# Patient Record
Sex: FEMALE | Race: WHITE
[De-identification: names, ages, dates, MRNs, and addresses within clinical notes are randomized per-mention and may not be internally consistent; named-entity substitution may affect disease eponyms.]

---

## 2017-07-13 ENCOUNTER — HOSPITAL ENCOUNTER (EMERGENCY)
Dept: HOSPITAL 5 - ED | Age: 27
Discharge: HOME | End: 2017-07-13
Payer: SELF-PAY

## 2017-07-13 VITALS — SYSTOLIC BLOOD PRESSURE: 137 MMHG | DIASTOLIC BLOOD PRESSURE: 84 MMHG

## 2017-07-13 DIAGNOSIS — R07.9: Primary | ICD-10-CM

## 2017-07-13 DIAGNOSIS — E66.01: ICD-10-CM

## 2017-07-13 DIAGNOSIS — J45.909: ICD-10-CM

## 2017-07-13 LAB
ANION GAP SERPL CALC-SCNC: 17 MMOL/L
BASOPHILS NFR BLD AUTO: 0.5 % (ref 0–1.8)
BUN SERPL-MCNC: 9 MG/DL (ref 7–17)
BUN/CREAT SERPL: 18 %
CALCIUM SERPL-MCNC: 8.8 MG/DL (ref 8.4–10.2)
CHLORIDE SERPL-SCNC: 99.1 MMOL/L (ref 98–107)
CO2 SERPL-SCNC: 26 MMOL/L (ref 22–30)
EOSINOPHIL NFR BLD AUTO: 1.9 % (ref 0–4.3)
GLUCOSE SERPL-MCNC: 117 MG/DL (ref 65–100)
HCT VFR BLD CALC: 34.2 % (ref 30.3–42.9)
HGB BLD-MCNC: 10.7 GM/DL (ref 10.1–14.3)
MCH RBC QN AUTO: 23 PG (ref 28–32)
MCHC RBC AUTO-ENTMCNC: 31 % (ref 30–34)
MCV RBC AUTO: 73 FL (ref 79–97)
PLATELET # BLD: 308 K/MM3 (ref 140–440)
POTASSIUM SERPL-SCNC: 4 MMOL/L (ref 3.6–5)
RBC # BLD AUTO: 4.69 M/MM3 (ref 3.65–5.03)
SODIUM SERPL-SCNC: 138 MMOL/L (ref 137–145)
WBC # BLD AUTO: 9 K/MM3 (ref 4.5–11)

## 2017-07-13 PROCEDURE — 80048 BASIC METABOLIC PNL TOTAL CA: CPT

## 2017-07-13 PROCEDURE — 83880 ASSAY OF NATRIURETIC PEPTIDE: CPT

## 2017-07-13 PROCEDURE — 36415 COLL VENOUS BLD VENIPUNCTURE: CPT

## 2017-07-13 PROCEDURE — 94640 AIRWAY INHALATION TREATMENT: CPT

## 2017-07-13 PROCEDURE — 99284 EMERGENCY DEPT VISIT MOD MDM: CPT

## 2017-07-13 PROCEDURE — 84484 ASSAY OF TROPONIN QUANT: CPT

## 2017-07-13 PROCEDURE — 85025 COMPLETE CBC W/AUTO DIFF WBC: CPT

## 2017-07-13 PROCEDURE — 93010 ELECTROCARDIOGRAM REPORT: CPT

## 2017-07-13 PROCEDURE — 71010: CPT

## 2017-07-13 PROCEDURE — 93970 EXTREMITY STUDY: CPT

## 2017-07-13 PROCEDURE — 93005 ELECTROCARDIOGRAM TRACING: CPT

## 2017-07-13 NOTE — XRAY REPORT
AP CHEST:



HISTORY: Shortness of breath



Limited exam with poor penetration. AP view of the chest demonstrates a 

normal mediastinal and

cardiac contour with clear lungs and normal bony and soft tissue

structures.



IMPRESSION:

Unremarkable AP chest.

## 2017-07-13 NOTE — EMERGENCY DEPARTMENT REPORT
ED Chest Pain HPI





- General


Chief Complaint: Chest Pain


Stated Complaint: CHEST PAIN /ASTHMA


Time Seen by Provider: 07/13/17 11:58


Source: patient


Mode of arrival: Ambulatory


Limitations: No Limitations





- History of Present Illness


MD Complaint: chest pain


-: Gradual


Onset: during rest


Pain Location: right chest


Severity: mild


Severity scale (0 -10): 6


Quality: aching


Consistency: intermittent


Improves With: nothing, movement


Worsens With: movement


Context: other


re: dyspnea


Treatments Prior to Arrival: none


Aspirin use within the Past 7 Days: (0) No





- Related Data


 Previous Rx's











 Medication  Instructions  Recorded  Last Taken  Type


 


ALBUTEROL NEB's [Proventil 0.083% 2.5 mg PO Q6HR PRN #90 nebu 07/13/17 Unknown 

Rx





NEBS]    


 


Aspirin [Aspirin TAB] 325 mg PO ONCE #30 tablet 07/13/17 Unknown Rx


 


Hydrochlorothiazide [HCTZ] 25 mg PO QDAY #30 tablet 07/13/17 Unknown Rx











 Allergies











Allergy/AdvReac Type Severity Reaction Status Date / Time


 


No Known Allergies Allergy   Unverified 07/13/17 11:17














Heart Score





- HEART Score


History: Slightly suspicious


EKG: Non-specific


Age: < 45


Risk factors: 1-2 risk factors


Troponin: < normal limit


HEART Score: 2





- Critical Actions


Critical Actions: 0-3 pts:0.9-1.7%risk of adverse cardiac event.Candidate for 

discharge





ED Review of Systems


ROS: 


Stated complaint: CHEST PAIN /ASTHMA


Other details as noted in HPI





Constitutional: no symptoms reported


Respiratory: shortness of breath, wheezing


Cardiovascular: chest pain





ED Past Medical Hx





- Past Medical History


Previous Medical History?: Yes


Hx Asthma: Yes


Additional medical history: Morbid obesity





- Surgical History


Past Surgical History?: No





- Social History


Smoking Status: Never Smoker


Substance Use Type: Alcohol, Non Opiate Pain, Prescribed





- Medications


Home Medications: 


 Home Medications











 Medication  Instructions  Recorded  Confirmed  Last Taken  Type


 


ALBUTEROL NEB's [Proventil 0.083% 2.5 mg PO Q6HR PRN #90 nebu 07/13/17  Unknown 

Rx





NEBS]     


 


Aspirin [Aspirin TAB] 325 mg PO ONCE #30 tablet 07/13/17  Unknown Rx


 


Hydrochlorothiazide [HCTZ] 25 mg PO QDAY #30 tablet 07/13/17  Unknown Rx














ED Physical Exam





- General


Limitations: No Limitations


General appearance: alert





- Head


Head exam: Present: atraumatic





- Eye


Eye exam: Present: normal appearance





- ENT


ENT exam: Present: normal exam, normal orophraynx, mucous membranes dry, mucous 

membranes moist





- Neck


Neck exam: Present: normal inspection





- Respiratory


Respiratory exam: Present: normal lung sounds bilaterally





- Cardiovascular


Cardiovascular Exam: Present: regular rate, normal rhythm





- GI/Abdominal


GI/Abdominal exam: Present: soft, normal bowel sounds





- Back Exam


Back exam: Present: normal inspection





- Neurological Exam


Neurological exam: Present: alert, altered, oriented X3





- Psychiatric


Psychiatric exam: Present: normal affect, normal mood





- Skin


Skin exam: Present: warm, dry





ED Course


 Vital Signs











  07/13/17 07/13/17 07/13/17





  11:17 11:23 11:31


 


Temperature 98.5 F  


 


Pulse Rate 96 H 84 102 H


 


Respiratory 26 H 31 H 19





Rate   


 


Blood Pressure 194/110  151/98


 


Blood Pressure   





[Left]   


 


Blood Pressure   





[Right]   


 


O2 Sat by Pulse 94  96





Oximetry   














  07/13/17 07/13/17 07/13/17





  11:41 11:46 11:54


 


Temperature  98.4 F 


 


Pulse Rate  100 H 95 H


 


Respiratory 29 H 16 12





Rate   


 


Blood Pressure 149/89  


 


Blood Pressure  151/90 151/90





[Left]   


 


Blood Pressure   149/89





[Right]   


 


O2 Sat by Pulse 96 100 97





Oximetry   














  07/13/17 07/13/17 07/13/17





  11:55 12:08 14:16


 


Temperature  98.1 F 98.4 F


 


Pulse Rate  88 88


 


Respiratory 18 16 18





Rate   


 


Blood Pressure   


 


Blood Pressure   





[Left]   


 


Blood Pressure  138/74 139/87





[Right]   


 


O2 Sat by Pulse 96 99 97





Oximetry   














ED Medical Decision Making





- Lab Data


Result diagrams: 


 07/13/17 11:44





 07/13/17 11:44


Critical care attestation.: 


If time is entered above; I have spent that time in minutes in the direct care 

of this critically ill patient, excluding procedure time.








ED Disposition


Clinical Impression: 


 Chest pain not due to acute coronary syndrome, Asthma





Disposition: DC-01 TO HOME OR SELFCARE


Is pt being admited?: No


Does the pt Need Aspirin: No


Condition: Stable


Instructions:  Chest Pain (ED), Asthma (ED)


Prescriptions: 


ALBUTEROL NEB's [Proventil 0.083% NEBS] 2.5 mg PO Q6HR PRN #90 nebu


 PRN Reason: Wheezing


Aspirin [Aspirin TAB] 325 mg PO ONCE #30 tablet


Hydrochlorothiazide [HCTZ] 25 mg PO QDAY #30 tablet


Referrals: 


PRIMARY CARE,MD [Primary Care Provider] - 3-5 Days


Forms:  Work/School Release Form(ED)

## 2017-07-14 NOTE — VASCULAR LAB REPORT
LOWER EXTREMITY VENOUS DUPLEX:



REASON FOR EXAM: Shortness of breath/chest pain.



COMMENTS ON THE RIGHT:

All veins visualized are freely compressible without evidence of

internal echogenicity.  Flow is spontaneous and phasic throughout.



COMMENTS ON THE LEFT:

All veins visualized are freely compressible without evidence of

internal echogenicity.  Flow is spontaneous and phasic throughout.



IMPRESSION:

No evidence of acute or chronic deep venous thrombosis in either

lower extremity.

## 2017-10-09 ENCOUNTER — HOSPITAL ENCOUNTER (EMERGENCY)
Dept: HOSPITAL 5 - ED | Age: 27
Discharge: HOME | End: 2017-10-09
Payer: COMMERCIAL

## 2017-10-09 VITALS — SYSTOLIC BLOOD PRESSURE: 138 MMHG | DIASTOLIC BLOOD PRESSURE: 88 MMHG

## 2017-10-09 DIAGNOSIS — J45.901: Primary | ICD-10-CM

## 2017-10-09 LAB
ANION GAP SERPL CALC-SCNC: 19 MMOL/L
BASOPHILS NFR BLD AUTO: 0.2 % (ref 0–1.8)
BUN SERPL-MCNC: 11 MG/DL (ref 7–17)
BUN/CREAT SERPL: 22 %
CALCIUM SERPL-MCNC: 9.4 MG/DL (ref 8.4–10.2)
CHLORIDE SERPL-SCNC: 98.1 MMOL/L (ref 98–107)
CO2 SERPL-SCNC: 27 MMOL/L (ref 22–30)
EOSINOPHIL NFR BLD AUTO: 2.7 % (ref 0–4.3)
GLUCOSE SERPL-MCNC: 113 MG/DL (ref 65–100)
HCT VFR BLD CALC: 36.9 % (ref 30.3–42.9)
HGB BLD-MCNC: 11.6 GM/DL (ref 10.1–14.3)
MCH RBC QN AUTO: 22 PG (ref 28–32)
MCHC RBC AUTO-ENTMCNC: 31 % (ref 30–34)
MCV RBC AUTO: 70 FL (ref 79–97)
PLATELET # BLD: 287 K/MM3 (ref 140–440)
POTASSIUM SERPL-SCNC: 4.9 MMOL/L (ref 3.6–5)
RBC # BLD AUTO: 5.27 M/MM3 (ref 3.65–5.03)
SODIUM SERPL-SCNC: 139 MMOL/L (ref 137–145)
WBC # BLD AUTO: 9.5 K/MM3 (ref 4.5–11)

## 2017-10-09 PROCEDURE — 99284 EMERGENCY DEPT VISIT MOD MDM: CPT

## 2017-10-09 PROCEDURE — 84484 ASSAY OF TROPONIN QUANT: CPT

## 2017-10-09 PROCEDURE — 71020: CPT

## 2017-10-09 PROCEDURE — 94640 AIRWAY INHALATION TREATMENT: CPT

## 2017-10-09 PROCEDURE — 36415 COLL VENOUS BLD VENIPUNCTURE: CPT

## 2017-10-09 PROCEDURE — 84703 CHORIONIC GONADOTROPIN ASSAY: CPT

## 2017-10-09 PROCEDURE — 80048 BASIC METABOLIC PNL TOTAL CA: CPT

## 2017-10-09 PROCEDURE — 85025 COMPLETE CBC W/AUTO DIFF WBC: CPT

## 2017-10-09 NOTE — EMERGENCY DEPARTMENT REPORT
HPI





- General


Chief Complaint: Dyspnea/Respdistress


Time Seen by Provider: 10/09/17 10:14





- HPI


HPI: 


This is a 27-year-old -American female presents to the emergency 

department, being brought in by her significant other, with a complaint of a 2-

3 day history of "it's my asthma."  She feels that whenever there is a weather 

change that is what affects her asthma.  She complains of some wheezing, 

shortness of breath and a mixed dry and productive cough over the past few days

, over the weekend.  She tried her albuterol nebulized treatments did not feel 

like she was giving sufficient relief.  She denies any chest pain, fever, back 

pain, nausea, vomiting or diaphoresis.  She denies any tobacco use or illicit 

drug abuse.  No recent travel or sick contacts at home.  She does not have a 

primary care physician.








ED Past Medical Hx





- Past Medical History


Hx Asthma: Yes





- Surgical History


Past Surgical History?: No





- Social History


Smoking Status: Never Smoker


Substance Use Type: None





- Medications


Home Medications: 


 Home Medications











 Medication  Instructions  Recorded  Confirmed  Last Taken  Type


 


ALBUTEROL Inhaler [ProAir HFA 2 puff IH QID PRN #1 inhalation 10/09/17  Unknown 

Rx





Inhaler]     


 


ALBUTEROL NEB's [Proventil 0.083% 2.5 mg IH Q4H PRN #1 box 10/09/17  Unknown Rx





NEBS]     


 


predniSONE [Deltasone] 20 mg PO BID #10 tab 10/09/17  Unknown Rx














ED Review of Systems


ROS: 


Stated complaint: SOB


Other details as noted in HPI





Comment: All other systems reviewed and negative


Constitutional: denies: chills, fever


Eyes: denies: eye pain, eye discharge, vision change


ENT: denies: ear pain, throat pain


Respiratory: cough, shortness of breath, wheezing


Cardiovascular: denies: chest pain, palpitations


Gastrointestinal: denies: abdominal pain, nausea, diarrhea


Genitourinary: denies: urgency, dysuria, discharge


Musculoskeletal: denies: back pain, joint swelling, arthralgia


Skin: denies: rash, lesions


Neurological: denies: headache, weakness, paresthesias





Physical Exam





- Physical Exam


Vital Signs: 


 Vital Signs











  10/09/17





  09:47


 


Temperature 97.9 F


 


Pulse Rate 90


 


Respiratory 22





Rate 


 


Blood Pressure 144/109


 


O2 Sat by Pulse 94





Oximetry 











Physical Exam: 


GENERAL: The patient is well-developed well-nourished.


HENT: Normocephalic.  Atraumatic.    Patient has moist mucous membranes.


EYES: Extraocular motions are intact.  Pupils equal reactive to light 

bilaterally.


NECK: Supple.  Trachea is midline.


CHEST/LUNGS: Moderate wheezing throughout the chest.  No tachypnea or accessory 

muscle use.  No cough heard during examination.  There is no respiratory 

distress noted.


HEART/CARDIOVASCULAR: Regular.  There is no tachycardia.  There is no gallop 

rub or murmur.


ABDOMEN: Abdomen is soft, nontender.  Patient has normal bowel sounds.  There 

is no abdominal distention.  Morbidly obese habitus.


SKIN: Skin is warm and dry.


NEURO: The patient is awake, alert, and oriented.  The patient is cooperative.  

The patient has no focal neurologic deficits.  The patient has normal speech.


MUSCULOSKELETAL: There is no tenderness or deformity.  There is no limitation 

range of motion.  There is no evidence of acute injury.








ED Course


 Vital Signs











  10/09/17





  09:47


 


Temperature 97.9 F


 


Pulse Rate 90


 


Respiratory 22





Rate 


 


Blood Pressure 144/109


 


O2 Sat by Pulse 94





Oximetry 














ED Medical Decision Making





- Lab Data


Result diagrams: 


 10/09/17 09:56





 10/09/17 09:56





- Radiology Data


Radiology results: image reviewed


interpreted by me: 


Chest x-ray does not show any acute process.  There are no pleural effusions, 

obvious pneumonia and there is no pneumothorax.








- Medical Decision Making


27 year female presents with a few days of shortness breath, cough and wheezing 

that she believes is her asthma.  She does have moderate wheezing and 

bronchospasm when she first arrived.  She was given a few different breathing 

treatments, steroids and prior to discharge she appears much improved.  Chest x-

ray did not show any pneumonia, pleural effusions or any acute process.  Labs 

are unremarkable.  Vital signs stable throughout her ED course including being 

afebrile and no hypoxia.  She was discharged home with steroids, refill of her 

albuterol and referral to primary care.  She will return to the ER with any 

worsening of her symptoms or any acute distress.








- Differential Diagnosis


asthma, COPD, pneumonia, CHF


Critical Care Time: No


Critical care attestation.: 


If time is entered above; I have spent that time in minutes in the direct care 

of this critically ill patient, excluding procedure time.








ED Disposition


Clinical Impression: 


 Bronchospasm





Asthma exacerbation


Qualifiers:


 Asthma severity: unspecified severity Asthma persistence: unspecified 

Qualified Code(s): J45.901 - Unspecified asthma with (acute) exacerbation





Disposition: DC-01 TO HOME OR SELFCARE


Is pt being admited?: No


Condition: Stable


Instructions:  Asthma (ED), Bronchospasm (ED)


Additional Instructions: 


Please follow up with a primary care physician in the next few days.  Return to 

the emergency Department with any worsening of your symptoms or any acute 

distress.


Prescriptions: 


ALBUTEROL Inhaler [ProAir HFA Inhaler] 2 puff IH QID PRN #1 inhalation


 PRN Reason: Shortness Of Breath


ALBUTEROL NEB's [Proventil 0.083% NEBS] 2.5 mg IH Q4H PRN #1 box


 PRN Reason: Wheezing


predniSONE [Deltasone] 20 mg PO BID #10 tab


Referrals: 


PRIMARY CARE,MD [Primary Care Provider] - 3-5 Days


CLAUS STACY MD [Staff Physician] - 3-5 Days


[facility] [Outside] - 3-5 Days


[facility] [Outside] - 3-5 Days


Time of Disposition: 15:18

## 2017-12-09 ENCOUNTER — HOSPITAL ENCOUNTER (INPATIENT)
Dept: HOSPITAL 5 - ED | Age: 27
LOS: 5 days | Discharge: HOME | DRG: 871 | End: 2017-12-14
Attending: INTERNAL MEDICINE | Admitting: INTERNAL MEDICINE
Payer: SELF-PAY

## 2017-12-09 DIAGNOSIS — J69.0: ICD-10-CM

## 2017-12-09 DIAGNOSIS — J96.01: ICD-10-CM

## 2017-12-09 DIAGNOSIS — Z91.19: ICD-10-CM

## 2017-12-09 DIAGNOSIS — E66.2: ICD-10-CM

## 2017-12-09 DIAGNOSIS — J45.901: ICD-10-CM

## 2017-12-09 DIAGNOSIS — A41.9: Primary | ICD-10-CM

## 2017-12-09 LAB
%HYPO/RBC NFR BLD AUTO: (no result) %
ANION GAP SERPL CALC-SCNC: 19 MMOL/L
ANISOCYTOSIS BLD QL SMEAR: (no result)
BASE EXCESS STD BLDA CALC-SCNC: 3 MMOL/L
BILIRUB UR QL STRIP: (no result)
BLASTOCYTES % (MANUAL): 0 %
BLOOD UR QL VISUAL: (no result)
BUN SERPL-MCNC: 9 MG/DL (ref 7–17)
BUN/CREAT SERPL: 18 %
CALCIUM SERPL-MCNC: 8.4 MG/DL (ref 8.4–10.2)
CHLORIDE SERPL-SCNC: 98 MMOL/L (ref 98–107)
CO2 SERPL-SCNC: 26 MMOL/L (ref 22–30)
CO2 SERPL-SCNC: 30 MMOL/L
GLUCOSE SERPL-MCNC: 134 MG/DL (ref 65–100)
HCO3 BLDV-SCNC: 28.5 MMOL/L
HCT VFR BLD CALC: 36.1 % (ref 30.3–42.9)
HGB BLD-MCNC: 11.2 GM/DL (ref 10.1–14.3)
ISTAT PH: 7.37 (ref 7.35–7.45)
KETONES UR STRIP-MCNC: (no result) MG/DL
LEUKOCYTE ESTERASE UR QL STRIP: (no result)
MCH RBC QN AUTO: 22 PG (ref 28–32)
MCHC RBC AUTO-ENTMCNC: 31 % (ref 30–34)
MCV RBC AUTO: 71 FL (ref 79–97)
NITRITE UR QL STRIP: (no result)
OVALOCYTES BLD QL SMEAR: (no result)
PCO2 BLDA: 49.9 MM[HG] (ref 35–45)
PH UR STRIP: 6 [PH] (ref 5–7)
PLATELET # BLD: 246 K/MM3 (ref 140–440)
PO2 BLDCOA: 66 MM[HG] (ref 80–105)
POTASSIUM SERPL-SCNC: 4.5 MMOL/L (ref 3.6–5)
PROT UR STRIP-MCNC: (no result) MG/DL
RBC # BLD AUTO: 5.11 M/MM3 (ref 3.65–5.03)
RBC #/AREA URNS HPF: < 1 /HPF (ref 0–6)
SAO2 % BLDC: 92 %
SODIUM SERPL-SCNC: 138 MMOL/L (ref 137–145)
TOTAL CELLS COUNTED PERCENT: 7
UROBILINOGEN UR-MCNC: < 2 MG/DL (ref ?–2)
WBC # BLD AUTO: 10.3 K/MM3 (ref 4.5–11)
WBC #/AREA URNS HPF: < 1 /HPF (ref 0–6)

## 2017-12-09 PROCEDURE — 87040 BLOOD CULTURE FOR BACTERIA: CPT

## 2017-12-09 PROCEDURE — 81025 URINE PREGNANCY TEST: CPT

## 2017-12-09 PROCEDURE — 93010 ELECTROCARDIOGRAM REPORT: CPT

## 2017-12-09 PROCEDURE — 80048 BASIC METABOLIC PNL TOTAL CA: CPT

## 2017-12-09 PROCEDURE — 96367 TX/PROPH/DG ADDL SEQ IV INF: CPT

## 2017-12-09 PROCEDURE — 96365 THER/PROPH/DIAG IV INF INIT: CPT

## 2017-12-09 PROCEDURE — 80053 COMPREHEN METABOLIC PANEL: CPT

## 2017-12-09 PROCEDURE — 85007 BL SMEAR W/DIFF WBC COUNT: CPT

## 2017-12-09 PROCEDURE — 4A033R1 MEASUREMENT OF ARTERIAL SATURATION, PERIPHERAL, PERCUTANEOUS APPROACH: ICD-10-PCS | Performed by: INTERNAL MEDICINE

## 2017-12-09 PROCEDURE — 94760 N-INVAS EAR/PLS OXIMETRY 1: CPT

## 2017-12-09 PROCEDURE — 82803 BLOOD GASES ANY COMBINATION: CPT

## 2017-12-09 PROCEDURE — 36415 COLL VENOUS BLD VENIPUNCTURE: CPT

## 2017-12-09 PROCEDURE — 96375 TX/PRO/DX INJ NEW DRUG ADDON: CPT

## 2017-12-09 PROCEDURE — 71020: CPT

## 2017-12-09 PROCEDURE — 71010: CPT

## 2017-12-09 PROCEDURE — 85025 COMPLETE CBC W/AUTO DIFF WBC: CPT

## 2017-12-09 PROCEDURE — 81001 URINALYSIS AUTO W/SCOPE: CPT

## 2017-12-09 PROCEDURE — 83880 ASSAY OF NATRIURETIC PEPTIDE: CPT

## 2017-12-09 PROCEDURE — 93005 ELECTROCARDIOGRAM TRACING: CPT

## 2017-12-09 PROCEDURE — 94640 AIRWAY INHALATION TREATMENT: CPT

## 2017-12-09 RX ADMIN — CEFTRIAXONE SODIUM ONE MLS/10 MIN: 10 INJECTION, POWDER, FOR SOLUTION INTRAVENOUS at 17:52

## 2017-12-09 RX ADMIN — CEFTRIAXONE SODIUM ONE MLS/10 MIN: 10 INJECTION, POWDER, FOR SOLUTION INTRAVENOUS at 18:31

## 2017-12-09 RX ADMIN — IPRATROPIUM BROMIDE AND ALBUTEROL SULFATE SCH: .5; 3 SOLUTION RESPIRATORY (INHALATION) at 21:27

## 2017-12-09 RX ADMIN — HYDROCHLOROTHIAZIDE SCH MG: 25 TABLET ORAL at 23:22

## 2017-12-09 RX ADMIN — IPRATROPIUM BROMIDE AND ALBUTEROL SULFATE SCH AMPUL: .5; 3 SOLUTION RESPIRATORY (INHALATION) at 13:15

## 2017-12-09 RX ADMIN — AZITHROMYCIN SCH MLS/HR: 500 INJECTION, POWDER, LYOPHILIZED, FOR SOLUTION INTRAVENOUS at 23:56

## 2017-12-09 RX ADMIN — FAMOTIDINE SCH MG: 20 TABLET ORAL at 23:23

## 2017-12-09 NOTE — EVENT NOTE
Date: 12/09/17


See dictated H/p in reports


Acute resp failure


Asthma exacerbation


Morbid obesity

## 2017-12-09 NOTE — EMERGENCY DEPARTMENT REPORT
ED Shortness of Breath HPI





- General


Chief Complaint: Dyspnea/Respdistress


Stated Complaint: ASTHMA/CP/SOB


Time Seen by Provider: 12/09/17 14:56


Source: patient


Mode of arrival: Wheelchair


Limitations: No Limitations





- History of Present Illness


Initial Comments: 





27-year-old female with a past medical history of asthma and obesity presents 

to the hospital complaints of cough, fever, shortness of breath and wheezing 4 

days which acutely worsened just today.  No improvement with nebulized 

treatments.  Positive generalized body aches and chest pain associated with 

coughing that is moderate in intensity.  Symptoms with exertion with no 

alleviating factors





- Related Data


 Previous Rx's











 Medication  Instructions  Recorded  Last Taken  Type


 


ALBUTEROL NEB's [Proventil 0.083% 2.5 mg PO Q6HR PRN #90 nebu 07/13/17 Unknown 

Rx





NEBS]    


 


Aspirin [Aspirin TAB] 325 mg PO ONCE #30 tablet 07/13/17 Unknown Rx


 


Hydrochlorothiazide [HCTZ] 25 mg PO QDAY #30 tablet 07/13/17 Unknown Rx


 


ALBUTEROL Inhaler [ProAir HFA 2 puff IH QID PRN #1 inhalation 10/09/17 Unknown 

Rx





Inhaler]    


 


ALBUTEROL NEB's [Proventil 0.083% 2.5 mg IH Q4H PRN #1 box 10/09/17 Unknown Rx





NEBS]    


 


predniSONE [Deltasone] 20 mg PO BID #10 tab 10/09/17 Unknown Rx











 Allergies











Allergy/AdvReac Type Severity Reaction Status Date / Time


 


No Known Allergies Allergy   Unverified 07/13/17 11:17














ED Review of Systems


ROS: 


Stated complaint: ASTHMA/CP/SOB


Other details as noted in HPI





Comment: All other systems reviewed and negative


Other: 





Constitutional: As per HPI


Eyes: No eye pain visual changes or discharge


ENT: No ear pain or throat pain


Neck: Denies pain


Respiratory: As per HPI


Cardiovascular: Denies syncope


GI: Denies abdominal pain, nausea, vomiting, diarrhea


: Denies dysuria


Musculoskeletal: Denies back pain, joint swelling 


Skin: Denies rash, lesions, erythema


Neurologic: Denies headache, numbness, weakness


Psychiatric: Denies suicidal ideation, hallucinations








ED Past Medical Hx





- Past Medical History


Hx Asthma: Yes


Additional medical history: Morbid obesity





- Social History


Smoking Status: Never Smoker


Substance Use Type: None





- Medications


Home Medications: 


 Home Medications











 Medication  Instructions  Recorded  Confirmed  Last Taken  Type


 


ALBUTEROL NEB's [Proventil 0.083% 2.5 mg PO Q6HR PRN #90 nebu 07/13/17  Unknown 

Rx





NEBS]     


 


Aspirin [Aspirin TAB] 325 mg PO ONCE #30 tablet 07/13/17  Unknown Rx


 


Hydrochlorothiazide [HCTZ] 25 mg PO QDAY #30 tablet 07/13/17  Unknown Rx


 


ALBUTEROL Inhaler [ProAir HFA 2 puff IH QID PRN #1 inhalation 10/09/17  Unknown 

Rx





Inhaler]     


 


ALBUTEROL NEB's [Proventil 0.083% 2.5 mg IH Q4H PRN #1 box 10/09/17  Unknown Rx





NEBS]     


 


predniSONE [Deltasone] 20 mg PO BID #10 tab 10/09/17  Unknown Rx














ED Physical Exam





- General


Limitations: No Limitations





- Other


Other exam information: 





General: No limitations, patient is alert in no acute distress


Head exam: Atraumatic, normocephalic


Eyes exam: Normal appearance


ENT: Moist mucous membrane, normal oropharynx


Neck exam: Normal inspection, full range of motion


Respiratory exam: Bilateral wheezing, accessory muscle use, positive tachypnea


Cardiovascular: Tachycardic regular rhythm


Abdomen: Soft, nondistended, and  nontender, with normal bowel sounds, no 

rebound, or guarding


Extremity: Full range of motion normal inspection no deformity, and


Back: Normal Inspection, full range of motion, no tenderness


Neurologic: Alert, oriented x3, cranial nerves intact, no motor or sensory 

deficit


Psychiatric: normal affect, normal mood


Skin: Warm, dry, intact





ED Course


 Vital Signs











  12/09/17 12/09/17 12/09/17





  12:58 13:00 13:15


 


Temperature 97.8 F 97.8 F 


 


Pulse Rate  123 H 


 


Pulse Rate [   111 H





Anterior   





Bilateral   





Throughout]   


 


Respiratory  22 





Rate   


 


Respiratory   22





Rate [Anterior   





Bilateral   





Throughout]   


 


Blood Pressure  135/84 





[Right]   


 


O2 Sat by Pulse  82 L 





Oximetry   














  12/09/17 12/09/17 12/09/17





  13:25 13:40 14:05


 


Temperature   


 


Pulse Rate   


 


Pulse Rate [ 108 H 108 H 105 H





Anterior   





Bilateral   





Throughout]   


 


Respiratory   





Rate   


 


Respiratory 20 20 18





Rate [Anterior   





Bilateral   





Throughout]   


 


Blood Pressure   





[Right]   


 


O2 Sat by Pulse   





Oximetry   














  12/09/17 12/09/17





  16:20 16:35


 


Temperature  


 


Pulse Rate  


 


Pulse Rate [ 106 H 107 H





Anterior  





Bilateral  





Throughout]  


 


Respiratory  





Rate  


 


Respiratory 18 18





Rate [Anterior  





Bilateral  





Throughout]  


 


Blood Pressure  





[Right]  


 


O2 Sat by Pulse  





Oximetry  














- Reevaluation(s)


Reevaluation #1: 





12/09/17 


Patient received multiple nebs, albuterol, and Solu-Medrol in the ED without 

improvement.  IV antibiotics initiated for possible infiltrate





- ABG Interpretation


Ph: 7.36


PCO2: 49


PO2: 66


Bicarbonate: 28


Interpretation: other (hypoxia)


Additional Comments: 





ABG performed on 4 L nasal cannula





ED Medical Decision Making





- Lab Data


Result diagrams: 


 12/09/17 13:11





 12/09/17 13:11








 Lab Results











  12/09/17 12/09/17 12/09/17 Range/Units





  13:11 13:11 15:45 


 


WBC  10.3    (4.5-11.0)  K/mm3


 


RBC  5.11 H    (3.65-5.03)  M/mm3


 


Hgb  11.2    (10.1-14.3)  gm/dl


 


Hct  36.1    (30.3-42.9)  %


 


MCV  71 L    (79-97)  fl


 


MCH  22 L    (28-32)  pg


 


MCHC  31    (30-34)  %


 


RDW  19.3 H    (13.2-15.2)  %


 


Plt Count  246    (140-440)  K/mm3


 


Add Manual Diff  Complete    


 


Total Counted  100    


 


Seg Neuts % (Manual)  78.0 H    (40.0-70.0)  %


 


Band Neutrophils %  0    %


 


Lymphocytes % (Manual)  15.0    (13.4-35.0)  %


 


Reactive Lymphs % (Man)  0    %


 


Monocytes % (Manual)  7.0    (0.0-7.3)  %


 


Eosinophils % (Manual)  0    (0.0-4.3)  %


 


Basophils % (Manual)  0    (0.0-1.8)  %


 


Metamyelocytes %  0    %


 


Myelocytes %  0    %


 


Promyelocytes %  0    %


 


Blast Cells %  0    %


 


Nucleated RBC %  Not Reportable    


 


Seg Neutrophils # Man  8.0 H    (1.8-7.7)  K/mm3


 


Band Neutrophils #  0.0    K/mm3


 


Lymphocytes # (Manual)  1.5    (1.2-5.4)  K/mm3


 


Abs React Lymphs (Man)  0.0    K/mm3


 


Monocytes # (Manual)  0.7    (0.0-0.8)  K/mm3


 


Eosinophils # (Manual)  0.0    (0.0-0.4)  K/mm3


 


Basophils # (Manual)  0.0    (0.0-0.1)  K/mm3


 


Metamyelocytes #  0.0    K/mm3


 


Myelocytes #  0.0    K/mm3


 


Promyelocytes #  0.0    K/mm3


 


Blast Cells #  0.0    K/mm3


 


WBC Morphology  Not Reportable    


 


Hypersegmented Neuts  Not Reportable    


 


Hyposegmented Neuts  Not Reportable    


 


Hypogranular Neuts  Not Reportable    


 


Smudge Cells  Not Reportable    


 


Toxic Granulation  Not Reportable    


 


Toxic Vacuolation  Not Reportable    


 


Dohle Bodies  Not Reportable    


 


Pelger-Huet Anomaly  Not Reportable    


 


Ac Rods  Not Reportable    


 


Platelet Estimate  Consistent w auto    


 


Clumped Platelets  Not Reportable    


 


Plt Clumps, EDTA  Not Reportable    


 


Large Platelets  1+    


 


Giant Platelets  Not Reportable    


 


Platelet Satelliting  Not Reportable    


 


Plt Morphology Comment  Not Reportable    


 


RBC Morphology  Not Reportable    


 


Dimorphic RBCs  Not Reportable    


 


Polychromasia  Not Reportable    


 


Hypochromasia  2+    


 


Poikilocytosis  Not Reportable    


 


Anisocytosis  1+    


 


Microcytosis  Not Reportable    


 


Macrocytosis  Not Reportable    


 


Spherocytes  Not Reportable    


 


Pappenheimer Bodies  Not Reportable    


 


Sickle Cells  Not Reportable    


 


Target Cells  Not Reportable    


 


Tear Drop Cells  Not Reportable    


 


Ovalocytes  1+    


 


Helmet Cells  Not Reportable    


 


[place] Bodies  Not Reportable    


 


Cabot Rings  Not Reportable    


 


Apolinar Cells  Not Reportable    


 


Bite Cells  Not Reportable    


 


Crenated Cell  Not Reportable    


 


Elliptocytes  Not Reportable    


 


Acanthocytes (Spur)  Not Reportable    


 


Rouleaux  Not Reportable    


 


Hemoglobin C Crystals  Not Reportable    


 


Schistocytes  Not Reportable    


 


Malaria parasites  Not Reportable    


 


Sami Bodies  Not Reportable    


 


Hem Pathologist Commnt  No    


 


POC ABG pH     (7.35-7.45)  


 


POC ABG pCO2     (35-45)  


 


POC ABG pO2     ()  


 


POC ABG HCO3     


 


POC ABG Total CO2     


 


POC ABG O2 Sat     


 


POC ABG Base Excess     


 


FiO2     %


 


Sodium   138   (137-145)  mmol/L


 


Potassium   4.5   (3.6-5.0)  mmol/L


 


Chloride   98.0   ()  mmol/L


 


Carbon Dioxide   26   (22-30)  mmol/L


 


Anion Gap   19   mmol/L


 


BUN   9   (7-17)  mg/dL


 


Creatinine   0.5 L   (0.7-1.2)  mg/dL


 


Estimated GFR   > 60   ml/min


 


BUN/Creatinine Ratio   18   %


 


Glucose   134 H   ()  mg/dL


 


Calcium   8.4   (8.4-10.2)  mg/dL


 


NT-Pro-B Natriuret Pep    1693 H  (0-450)  pg/mL


 


Urine Color     (Yellow)  


 


Urine Turbidity     (Clear)  


 


Urine pH     (5.0-7.0)  


 


Ur Specific Gravity     (1.003-1.030)  


 


Urine Protein     (Negative)  mg/dL


 


Urine Glucose (UA)     (Negative)  mg/dL


 


Urine Ketones     (Negative)  mg/dL


 


Urine Blood     (Negative)  


 


Urine Nitrite     (Negative)  


 


Ur Reducing Substances     


 


Urine Bilirubin     (Negative)  


 


Urine Ictotest     


 


Urine Urobilinogen     (<2.0)  mg/dL


 


Ur Leukocyte Esterase     (Negative)  


 


Urine WBC (Auto)     (0.0-6.0)  /HPF


 


Urine RBC (Auto)     (0.0-6.0)  /HPF


 


U Epithel Cells (Auto)     (0-13.0)  /HPF


 


Urine HCG, Qual     (Negative)  














  12/09/17 12/09/17 Range/Units





  16:00 16:19 


 


WBC    (4.5-11.0)  K/mm3


 


RBC    (3.65-5.03)  M/mm3


 


Hgb    (10.1-14.3)  gm/dl


 


Hct    (30.3-42.9)  %


 


MCV    (79-97)  fl


 


MCH    (28-32)  pg


 


MCHC    (30-34)  %


 


RDW    (13.2-15.2)  %


 


Plt Count    (140-440)  K/mm3


 


Add Manual Diff    


 


Total Counted    


 


Seg Neuts % (Manual)    (40.0-70.0)  %


 


Band Neutrophils %    %


 


Lymphocytes % (Manual)    (13.4-35.0)  %


 


Reactive Lymphs % (Man)    %


 


Monocytes % (Manual)    (0.0-7.3)  %


 


Eosinophils % (Manual)    (0.0-4.3)  %


 


Basophils % (Manual)    (0.0-1.8)  %


 


Metamyelocytes %    %


 


Myelocytes %    %


 


Promyelocytes %    %


 


Blast Cells %    %


 


Nucleated RBC %    


 


Seg Neutrophils # Man    (1.8-7.7)  K/mm3


 


Band Neutrophils #    K/mm3


 


Lymphocytes # (Manual)    (1.2-5.4)  K/mm3


 


Abs React Lymphs (Man)    K/mm3


 


Monocytes # (Manual)    (0.0-0.8)  K/mm3


 


Eosinophils # (Manual)    (0.0-0.4)  K/mm3


 


Basophils # (Manual)    (0.0-0.1)  K/mm3


 


Metamyelocytes #    K/mm3


 


Myelocytes #    K/mm3


 


Promyelocytes #    K/mm3


 


Blast Cells #    K/mm3


 


WBC Morphology    


 


Hypersegmented Neuts    


 


Hyposegmented Neuts    


 


Hypogranular Neuts    


 


Smudge Cells    


 


Toxic Granulation    


 


Toxic Vacuolation    


 


Dohle Bodies    


 


Pelger-Huet Anomaly    


 


Ac Rods    


 


Platelet Estimate    


 


Clumped Platelets    


 


Plt Clumps, EDTA    


 


Large Platelets    


 


Giant Platelets    


 


Platelet Satelliting    


 


Plt Morphology Comment    


 


RBC Morphology    


 


Dimorphic RBCs    


 


Polychromasia    


 


Hypochromasia    


 


Poikilocytosis    


 


Anisocytosis    


 


Microcytosis    


 


Macrocytosis    


 


Spherocytes    


 


Pappenheimer Bodies    


 


Sickle Cells    


 


Target Cells    


 


Tear Drop Cells    


 


Ovalocytes    


 


Helmet Cells    


 


[place] Bodies    


 


Cabot Rings    


 


[place] Cells    


 


Bite Cells    


 


Crenated Cell    


 


Elliptocytes    


 


Acanthocytes (Spur)    


 


Rouleaux    


 


Hemoglobin C Crystals    


 


Schistocytes    


 


Malaria parasites    


 


Sami Bodies    


 


Hem Pathologist Commnt    


 


POC ABG pH   7.365  (7.35-7.45)  


 


POC ABG pCO2   49.9 H  (35-45)  


 


POC ABG pO2   66 L  ()  


 


POC ABG HCO3   28.5  


 


POC ABG Total CO2   30  


 


POC ABG O2 Sat   92  


 


POC ABG Base Excess   3  


 


FiO2   4  %


 


Sodium    (137-145)  mmol/L


 


Potassium    (3.6-5.0)  mmol/L


 


Chloride    ()  mmol/L


 


Carbon Dioxide    (22-30)  mmol/L


 


Anion Gap    mmol/L


 


BUN    (7-17)  mg/dL


 


Creatinine    (0.7-1.2)  mg/dL


 


Estimated GFR    ml/min


 


BUN/Creatinine Ratio    %


 


Glucose    ()  mg/dL


 


Calcium    (8.4-10.2)  mg/dL


 


NT-Pro-B Natriuret Pep    (0-450)  pg/mL


 


Urine Color  Straw   (Yellow)  


 


Urine Turbidity  Clear   (Clear)  


 


Urine pH  6.0   (5.0-7.0)  


 


Ur Specific Gravity  1.001 L   (1.003-1.030)  


 


Urine Protein  <15 mg/dl   (Negative)  mg/dL


 


Urine Glucose (UA)  Neg   (Negative)  mg/dL


 


Urine Ketones  Neg   (Negative)  mg/dL


 


Urine Blood  Sm   (Negative)  


 


Urine Nitrite  Neg   (Negative)  


 


Ur Reducing Substances  Not Reportable   


 


Urine Bilirubin  Neg   (Negative)  


 


Urine Ictotest  Not Reportable   


 


Urine Urobilinogen  < 2.0   (<2.0)  mg/dL


 


Ur Leukocyte Esterase  Neg   (Negative)  


 


Urine WBC (Auto)  < 1.0   (0.0-6.0)  /HPF


 


Urine RBC (Auto)  < 1.0   (0.0-6.0)  /HPF


 


U Epithel Cells (Auto)  < 1.0   (0-13.0)  /HPF


 


Urine HCG, Qual  Negative   (Negative)  














- EKG Data


-: EKG Interpreted by Me


EKG shows normal: sinus rhythm, axis (80), QRS complexes (75), ST-T waves (

inferior and flat lateral T waves)


Rate: tachycardia (105)





- EKG Data


When compared to previous EKG there are: no significant change (compared to 07/ 13/2017)





- Radiology Data


Radiology results: report reviewed (chest x-ray read by radiologist: Suspicious 

for right lung base atelectasis or infiltrate)





- Medical Decision Making





Patient received treatment in ED for acute asthma exacerbation with associated 

community-acquired pneumonia.  Patient is also hypoxic on room air in ABG was 

performed on 4 L of oxygen nasal





- Differential Diagnosis


pneumonia, bronchitis, PE, CHF, asthma


Critical Care Time: No


Critical care attestation.: 


If time is entered above; I have spent that time in minutes in the direct care 

of this critically ill patient, excluding procedure time.








ED Disposition


Clinical Impression: 


 Pneumonia, Acute asthma, Morbid obesity, Hypoxia





Disposition: DC-09 OP ADMIT IP TO THIS HOSP


Is pt being admited?: Yes


Condition: Stable


Time of Disposition: 16:20 (Dr Choudhary/hospitalist)

## 2017-12-09 NOTE — XRAY REPORT
FINAL REPORT



PROCEDURE:  XR CHEST ROUTINE 2V



TECHNIQUE:  PA and lateral chest radiographs were obtained. CPT

15161







HISTORY:  shortness of breath 



COMPARISON:  No prior studies are available for comparison.



FINDINGS:  

Study is limited by patient body habitus. The lateral view is

significantly limited. On the AP view there is asymmetric

opacification of the right lung base, which may be related to

atelectasis or infiltrate. Cannot exclude a right pleural

effusion. The upper lungs are well aerated. No pneumothorax is

seen. 



IMPRESSION:  

Study is very limited, however findings are suspicious for right

lung base atelectasis or infiltrate.

## 2017-12-10 LAB
ALBUMIN SERPL-MCNC: 3.8 G/DL (ref 3.9–5)
ALBUMIN/GLOB SERPL: 0.8 %
ALP SERPL-CCNC: 77 UNITS/L (ref 35–129)
ALT SERPL-CCNC: 40 UNITS/L (ref 7–56)
ANION GAP SERPL CALC-SCNC: 17 MMOL/L
BASOPHILS NFR BLD AUTO: 0.2 % (ref 0–1.8)
BILIRUB SERPL-MCNC: 0.4 MG/DL (ref 0.1–1.2)
BUN SERPL-MCNC: 8 MG/DL (ref 7–17)
BUN/CREAT SERPL: 20 %
CALCIUM SERPL-MCNC: 8.8 MG/DL (ref 8.4–10.2)
CHLORIDE SERPL-SCNC: 97.4 MMOL/L (ref 98–107)
CO2 SERPL-SCNC: 29 MMOL/L (ref 22–30)
EOSINOPHIL NFR BLD AUTO: 0 % (ref 0–4.3)
GLUCOSE SERPL-MCNC: 158 MG/DL (ref 65–100)
HCT VFR BLD CALC: 36 % (ref 30.3–42.9)
HGB BLD-MCNC: 11 GM/DL (ref 10.1–14.3)
MCH RBC QN AUTO: 22 PG (ref 28–32)
MCHC RBC AUTO-ENTMCNC: 31 % (ref 30–34)
MCV RBC AUTO: 72 FL (ref 79–97)
PLATELET # BLD: 291 K/MM3 (ref 140–440)
POTASSIUM SERPL-SCNC: 4.6 MMOL/L (ref 3.6–5)
PROT SERPL-MCNC: 8.6 G/DL (ref 6.3–8.2)
RBC # BLD AUTO: 5.03 M/MM3 (ref 3.65–5.03)
SODIUM SERPL-SCNC: 139 MMOL/L (ref 137–145)
WBC # BLD AUTO: 12.3 K/MM3 (ref 4.5–11)

## 2017-12-10 RX ADMIN — FAMOTIDINE SCH MG: 20 TABLET ORAL at 22:33

## 2017-12-10 RX ADMIN — IPRATROPIUM BROMIDE AND ALBUTEROL SULFATE SCH AMPUL: .5; 3 SOLUTION RESPIRATORY (INHALATION) at 13:27

## 2017-12-10 RX ADMIN — IPRATROPIUM BROMIDE AND ALBUTEROL SULFATE SCH AMPUL: .5; 3 SOLUTION RESPIRATORY (INHALATION) at 08:02

## 2017-12-10 RX ADMIN — HYDROCHLOROTHIAZIDE SCH: 25 TABLET ORAL at 11:00

## 2017-12-10 RX ADMIN — FAMOTIDINE SCH MG: 20 TABLET ORAL at 11:01

## 2017-12-10 RX ADMIN — IPRATROPIUM BROMIDE AND ALBUTEROL SULFATE SCH AMPUL: .5; 3 SOLUTION RESPIRATORY (INHALATION) at 20:07

## 2017-12-10 RX ADMIN — CEFTRIAXONE SODIUM SCH MLS/10 MIN: 10 INJECTION, POWDER, FOR SOLUTION INTRAVENOUS at 17:21

## 2017-12-10 RX ADMIN — HEPARIN SODIUM SCH UNIT: 5000 INJECTION, SOLUTION INTRAVENOUS; SUBCUTANEOUS at 22:31

## 2017-12-10 RX ADMIN — AZITHROMYCIN SCH MLS/HR: 500 INJECTION, POWDER, LYOPHILIZED, FOR SOLUTION INTRAVENOUS at 11:11

## 2017-12-10 NOTE — HISTORY AND PHYSICAL REPORT
CHIEF COMPLAINT:  Persistent shortness of breath for 4 days.



HISTORY OF PRESENT ILLNESS:  The patient is a 27-year-old -American

female with history of asthma who has been having persistent wheezing and

shortness of breath for the last 4 days.  Cough productive of mucoid sputum. 

The patient also has generalized body aches.  Shortness of breath and wheezing

not relieved by home nebulizer machine and treatments.  The patient also was

treated with multiple nebulizer treatments in the ER with no relief.  Hence, the

patient being admitted.



PAST MEDICAL HISTORY:  As mentioned asthma.



PAST SURGICAL HISTORY:  None.



SOCIAL HISTORY:  Does not smoke.  No alcohol.  Obesity present.



FAMILY HISTORY:  Hypertension.



CURRENT MEDICATIONS:  Albuterol inhaler and nebulizers.  Prednisone 20 mg twice

a day.  Aspirin 325 once a day.  Hydrochlorothiazide 325 once a day.



REVIEW OF SYSTEMS:  Significant for severe wheezing and shortness of breath, not

relieved by the regular nebulizer treatments.  Otherwise, review of systems

negative.  A 14-point review of systems done.



EMERGENCY ROOM COURSE:  The patient was given multiple breathing treatments with

no relief.  Hence, the patient being admitted.



PHYSICAL EXAMINATION:

GENERAL:  Young female, obese, cooperative during examination.

VITAL SIGNS:  Blood pressure is 137/95, temperature is 97.5, pulse is 103,

respirations 22, sats are 87-95%.

HEENT:  Unremarkable.

NECK:  Accessory muscles of respiration are prominent.

CHEST AND LUNGS:  Bilateral inspiratory rhonchi present.  Decreased air entry

present.  Persistent wheezing present.

CARDIOVASCULAR:  S1, S2 heard.  No gallop, no murmur, no rub.  Apical impulse in

left fifth intercostal space and midclavicular line.

ABDOMEN:  Soft and benign.  No hepatosplenomegaly.  No guarding, no rigidity. 

Hernial orifices are normal.

EXTREMITIES:  Good pedal pulses.  No pedal edema.

CENTRAL NERVOUS SYSTEM:  Alert and oriented x 4, nonfocal exam.

SKIN:  Normal.



LABORATORY DATA:  White count is 10,300, hemoglobin 11.2, hematocrit is 36.1,

platelet count is 246,000.  Electrolytes are normal.  Sodium is 138 and glucose

is slightly high at 134.  ABG:  pH of 7.365, pCO2 of 49.9, pO2 of 66,

bicarbonate of 28.5.  BNP 1693.  Urine negative.  Chest x-ray:  RLL infiltrate



ASSESSMENT AND PLAN:

1.  Acute respiratory failure.  The patient had O2 sats of 87% initially. 

Resolved to some extent after nebulizer treatments and steroids.  Continue to

treat with nebulizer treatments q. 6 round the clock and q. 3 hours  p.r.n., IV

Solu-Medrol at 60 mg q.8h. and also IV Levaquin 750 q.24h.

2.  Acute asthma exacerbation.  As mentioned, continue nebulizer treatments,

Solu-Medrol, and Abx

3. RLL Pneumonia on CXR Patient initiated on Rocephin and Zithromax

4.  Obesity, counseled.  The patient has respiratory distress and hence full

counseling was not done.

5.  Deep venous thrombosis prophylaxis.  Lovenox 40 mg subcutaneous daily.





DD: 12/10/2017 07:01

DT: 12/10/2017 07:24

[facility]# [id number]  [id number]

ANA PAULA/JIA HERNANDEZ

## 2017-12-10 NOTE — PROGRESS NOTE
Assessment and Plan


Assessment and plan: 


Patient is 27-year-old extremely obese woman BMI 66.2 with sequelae of health 

conditions such as sleep apnea noncompliant with CPAP, asthma who presents with 

acute shortness of breath with hypoxia pulse ox 87%.  Chest x-ray shows right 

lower lobe infiltrates.





-Acute hypoxic respiratory failure: Treated with oxygen


-Acute asthma exacerbation: Treated with IV steroids and nebulizer treatments


-Sepsis pneumonia, right lower lobe aspiration type: Treatment IV antibiotics


-Extreme obesity BMI 66.2 with obesity hypoventilation syndrome suspected/sleep 

apnea: Counseling done, she says she is getting insurance next month and will 

reestablish care with physicians, consult dietitian


-DVT prophylaxis: change lovenox to sq heparin due to weight








History


Interval history: 


Patient was seen and examined.  Follow-up on current diagnosis.  Overnight 

uneventful.  Patient denies any chest pain, nausea/vomiting or severe 

headaches.  Imaging, nursing note, chart, labs and old chart reviewed.  

Discussed with patient.  Shortness breath is improved with oxygen.  Patient 

doesn't have insurance so she hasn't seen a physician, she has sleep apnea but 

she doesn't have a CPAP machine because he has a follow-up because no physician








Hospitalist Physical





- Physical exam


Narrative exam: 


GEN: Ill-appearing Morbid obese BMI 66.2 NAD, AWAKE, ALERT, ORIENTATED 3


HEENT: NCAT, EOMI, PERRL, OP Clear


NECK: supple, no adenopathy, no thyromegaly, no JVD


CVS/HEART: RRR, NORMAL S1S2, NO JVD, pulses present bilaterally


CHEST/LUNGS: Bilateral rhonchi Symmetrical chest expansion, diminished entry 

bilaterally


GI/Abdomen: soft, NTND, good bowel sounds, no guarding or rebound


/Bladder: no suprapubic tenderness, no CVA or paraspinal tenderness


EXT/Skin: no c/c/e, no obvious rash


MSK: FROM x 4


Neuro: CN 2-12 grossly intact, no new focal deficits


Psych: calm








- Constitutional


Vitals: 


 











Temp Pulse Resp BP Pulse Ox


 


 97.6 F   100 H  18   131/36   95 


 


 12/10/17 12:20  12/10/17 12:20  12/10/17 12:20  12/10/17 12:20  12/10/17 12:20














Results





- Labs


CBC & Chem 7: 


 12/10/17 05:43





 12/10/17 05:43


Labs: 


 Laboratory Last Values











WBC  12.3 K/mm3 (4.5-11.0)  H  12/10/17  05:43    


 


RBC  5.03 M/mm3 (3.65-5.03)   12/10/17  05:43    


 


Hgb  11.0 gm/dl (10.1-14.3)   12/10/17  05:43    


 


Hct  36.0 % (30.3-42.9)   12/10/17  05:43    


 


MCV  72 fl (79-97)  L  12/10/17  05:43    


 


MCH  22 pg (28-32)  L  12/10/17  05:43    


 


MCHC  31 % (30-34)   12/10/17  05:43    


 


RDW  19.5 % (13.2-15.2)  H  12/10/17  05:43    


 


Plt Count  291 K/mm3 (140-440)   12/10/17  05:43    


 


Lymph % (Auto)  10.7 % (13.4-35.0)  L  12/10/17  05:43    


 


Mono % (Auto)  3.2 % (0.0-7.3)   12/10/17  05:43    


 


Eos % (Auto)  0.0 % (0.0-4.3)   12/10/17  05:43    


 


Baso % (Auto)  0.2 % (0.0-1.8)   12/10/17  05:43    


 


Lymph #  1.3 K/mm3 (1.2-5.4)   12/10/17  05:43    


 


Mono #  0.4 K/mm3 (0.0-0.8)   12/10/17  05:43    


 


Eos #  0.0 K/mm3 (0.0-0.4)   12/10/17  05:43    


 


Baso #  0.0 K/mm3 (0.0-0.1)   12/10/17  05:43    


 


Add Manual Diff  Complete   12/09/17  13:11    


 


Total Counted  100   12/09/17  13:11    


 


Seg Neutrophils %  85.9 % (40.0-70.0)  H  12/10/17  05:43    


 


Seg Neuts % (Manual)  78.0 % (40.0-70.0)  H  12/09/17  13:11    


 


Band Neutrophils %  0 %  12/09/17  13:11    


 


Lymphocytes % (Manual)  15.0 % (13.4-35.0)   12/09/17  13:11    


 


Reactive Lymphs % (Man)  0 %  12/09/17  13:11    


 


Monocytes % (Manual)  7.0 % (0.0-7.3)   12/09/17  13:11    


 


Eosinophils % (Manual)  0 % (0.0-4.3)   12/09/17  13:11    


 


Basophils % (Manual)  0 % (0.0-1.8)   12/09/17  13:11    


 


Metamyelocytes %  0 %  12/09/17  13:11    


 


Myelocytes %  0 %  12/09/17  13:11    


 


Promyelocytes %  0 %  12/09/17  13:11    


 


Blast Cells %  0 %  12/09/17  13:11    


 


Nucleated RBC %  Not Reportable   12/09/17  13:11    


 


Seg Neutrophils #  10.6 K/mm3 (1.8-7.7)  H  12/10/17  05:43    


 


Seg Neutrophils # Man  8.0 K/mm3 (1.8-7.7)  H  12/09/17  13:11    


 


Band Neutrophils #  0.0 K/mm3  12/09/17  13:11    


 


Lymphocytes # (Manual)  1.5 K/mm3 (1.2-5.4)   12/09/17  13:11    


 


Abs React Lymphs (Man)  0.0 K/mm3  12/09/17  13:11    


 


Monocytes # (Manual)  0.7 K/mm3 (0.0-0.8)   12/09/17  13:11    


 


Eosinophils # (Manual)  0.0 K/mm3 (0.0-0.4)   12/09/17  13:11    


 


Basophils # (Manual)  0.0 K/mm3 (0.0-0.1)   12/09/17  13:11    


 


Metamyelocytes #  0.0 K/mm3  12/09/17  13:11    


 


Myelocytes #  0.0 K/mm3  12/09/17  13:11    


 


Promyelocytes #  0.0 K/mm3  12/09/17  13:11    


 


Blast Cells #  0.0 K/mm3  12/09/17  13:11    


 


WBC Morphology  Not Reportable   12/09/17  13:11    


 


Hypersegmented Neuts  Not Reportable   12/09/17  13:11    


 


Hyposegmented Neuts  Not Reportable   12/09/17  13:11    


 


Hypogranular Neuts  Not Reportable   12/09/17  13:11    


 


Smudge Cells  Not Reportable   12/09/17  13:11    


 


Toxic Granulation  Not Reportable   12/09/17  13:11    


 


Toxic Vacuolation  Not Reportable   12/09/17  13:11    


 


Dohle Bodies  Not Reportable   12/09/17  13:11    


 


Pelger-Huet Anomaly  Not Reportable   12/09/17  13:11    


 


Ac Rods  Not Reportable   12/09/17  13:11    


 


Platelet Estimate  Consistent w auto   12/09/17  13:11    


 


Clumped Platelets  Not Reportable   12/09/17  13:11    


 


Plt Clumps, EDTA  Not Reportable   12/09/17  13:11    


 


Large Platelets  1+   12/09/17  13:11    


 


Giant Platelets  Not Reportable   12/09/17  13:11    


 


Platelet Satelliting  Not Reportable   12/09/17  13:11    


 


Plt Morphology Comment  Not Reportable   12/09/17  13:11    


 


RBC Morphology  Not Reportable   12/09/17  13:11    


 


Dimorphic RBCs  Not Reportable   12/09/17  13:11    


 


Polychromasia  Not Reportable   12/09/17  13:11    


 


Hypochromasia  2+   12/09/17  13:11    


 


Poikilocytosis  Not Reportable   12/09/17  13:11    


 


Anisocytosis  1+   12/09/17  13:11    


 


Microcytosis  Not Reportable   12/09/17  13:11    


 


Macrocytosis  Not Reportable   12/09/17  13:11    


 


Spherocytes  Not Reportable   12/09/17  13:11    


 


Pappenheimer Bodies  Not Reportable   12/09/17  13:11    


 


Sickle Cells  Not Reportable   12/09/17  13:11    


 


Target Cells  Not Reportable   12/09/17  13:11    


 


Tear Drop Cells  Not Reportable   12/09/17  13:11    


 


Ovalocytes  1+   12/09/17  13:11    


 


Helmet Cells  Not Reportable   12/09/17  13:11    


 


[place] Bodies  Not Reportable   12/09/17  13:11    


 


Cabot Rings  Not Reportable   12/09/17  13:11    


 


[place] Cells  Not Reportable   12/09/17  13:11    


 


Bite Cells  Not Reportable   12/09/17  13:11    


 


Crenated Cell  Not Reportable   12/09/17  13:11    


 


Elliptocytes  Not Reportable   12/09/17  13:11    


 


Acanthocytes (Spur)  Not Reportable   12/09/17  13:11    


 


Rouleaux  Not Reportable   12/09/17  13:11    


 


Hemoglobin C Crystals  Not Reportable   12/09/17  13:11    


 


Schistocytes  Not Reportable   12/09/17  13:11    


 


Malaria parasites  Not Reportable   12/09/17  13:11    


 


Sami Bodies  Not Reportable   12/09/17  13:11    


 


Hem Pathologist Commnt  No   12/09/17  13:11    


 


POC ABG pH  7.365  (7.35-7.45)   12/09/17  16:19    


 


POC ABG pCO2  49.9  (35-45)  H  12/09/17  16:19    


 


POC ABG pO2  66  ()  L  12/09/17  16:19    


 


POC ABG HCO3  28.5   12/09/17  16:19    


 


POC ABG Total CO2  30   12/09/17  16:19    


 


POC ABG O2 Sat  92   12/09/17  16:19    


 


POC ABG Base Excess  3   12/09/17  16:19    


 


FiO2  4 %  12/09/17  16:19    


 


Sodium  139 mmol/L (137-145)   12/10/17  05:43    


 


Potassium  4.6 mmol/L (3.6-5.0)   12/10/17  05:43    


 


Chloride  97.4 mmol/L ()  L  12/10/17  05:43    


 


Carbon Dioxide  29 mmol/L (22-30)   12/10/17  05:43    


 


Anion Gap  17 mmol/L  12/10/17  05:43    


 


BUN  8 mg/dL (7-17)   12/10/17  05:43    


 


Creatinine  0.4 mg/dL (0.7-1.2)  L  12/10/17  05:43    


 


Estimated GFR  > 60 ml/min  12/10/17  05:43    


 


BUN/Creatinine Ratio  20 %  12/10/17  05:43    


 


Glucose  158 mg/dL ()  H  12/10/17  05:43    


 


Calcium  8.8 mg/dL (8.4-10.2)   12/10/17  05:43    


 


Total Bilirubin  0.40 mg/dL (0.1-1.2)   12/10/17  05:43    


 


AST  29 units/L (5-40)   12/10/17  05:43    


 


ALT  40 units/L (7-56)   12/10/17  05:43    


 


Alkaline Phosphatase  77 units/L ()   12/10/17  05:43    


 


NT-Pro-B Natriuret Pep  1693 pg/mL (0-450)  H  12/09/17  15:45    


 


Total Protein  8.6 g/dL (6.3-8.2)  H  12/10/17  05:43    


 


Albumin  3.8 g/dL (3.9-5)  L  12/10/17  05:43    


 


Albumin/Globulin Ratio  0.8 %  12/10/17  05:43    


 


Urine Color  Straw  (Yellow)   12/09/17  16:00    


 


Urine Turbidity  Clear  (Clear)   12/09/17  16:00    


 


Urine pH  6.0  (5.0-7.0)   12/09/17  16:00    


 


Ur Specific Gravity  1.001  (1.003-1.030)  L  12/09/17  16:00    


 


Urine Protein  <15 mg/dl mg/dL (Negative)   12/09/17  16:00    


 


Urine Glucose (UA)  Neg mg/dL (Negative)   12/09/17  16:00    


 


Urine Ketones  Neg mg/dL (Negative)   12/09/17  16:00    


 


Urine Blood  Sm  (Negative)   12/09/17  16:00    


 


Urine Nitrite  Neg  (Negative)   12/09/17  16:00    


 


Ur Reducing Substances  Not Reportable   12/09/17  16:00    


 


Urine Bilirubin  Neg  (Negative)   12/09/17  16:00    


 


Urine Ictotest  Not Reportable   12/09/17  16:00    


 


Urine Urobilinogen  < 2.0 mg/dL (<2.0)   12/09/17  16:00    


 


Ur Leukocyte Esterase  Neg  (Negative)   12/09/17  16:00    


 


Urine WBC (Auto)  < 1.0 /HPF (0.0-6.0)   12/09/17  16:00    


 


Urine RBC (Auto)  < 1.0 /HPF (0.0-6.0)   12/09/17  16:00    


 


U Epithel Cells (Auto)  < 1.0 /HPF (0-13.0)   12/09/17  16:00    


 


Urine HCG, Qual  Negative  (Negative)   12/09/17  16:00

## 2017-12-11 RX ADMIN — CEFTRIAXONE SODIUM SCH MLS/10 MIN: 10 INJECTION, POWDER, FOR SOLUTION INTRAVENOUS at 12:31

## 2017-12-11 RX ADMIN — HEPARIN SODIUM SCH UNIT: 5000 INJECTION, SOLUTION INTRAVENOUS; SUBCUTANEOUS at 06:37

## 2017-12-11 RX ADMIN — HEPARIN SODIUM SCH UNIT: 5000 INJECTION, SOLUTION INTRAVENOUS; SUBCUTANEOUS at 21:30

## 2017-12-11 RX ADMIN — HEPARIN SODIUM SCH UNIT: 5000 INJECTION, SOLUTION INTRAVENOUS; SUBCUTANEOUS at 15:53

## 2017-12-11 RX ADMIN — FAMOTIDINE SCH MG: 20 TABLET ORAL at 10:35

## 2017-12-11 RX ADMIN — IPRATROPIUM BROMIDE AND ALBUTEROL SULFATE SCH AMPUL: .5; 3 SOLUTION RESPIRATORY (INHALATION) at 09:21

## 2017-12-11 RX ADMIN — AZITHROMYCIN SCH MLS/HR: 500 INJECTION, POWDER, LYOPHILIZED, FOR SOLUTION INTRAVENOUS at 12:42

## 2017-12-11 RX ADMIN — IPRATROPIUM BROMIDE AND ALBUTEROL SULFATE SCH AMPUL: .5; 3 SOLUTION RESPIRATORY (INHALATION) at 19:15

## 2017-12-11 RX ADMIN — FAMOTIDINE SCH MG: 20 TABLET ORAL at 21:30

## 2017-12-11 RX ADMIN — HYDROCHLOROTHIAZIDE SCH MG: 25 TABLET ORAL at 10:35

## 2017-12-11 RX ADMIN — IPRATROPIUM BROMIDE AND ALBUTEROL SULFATE SCH AMPUL: .5; 3 SOLUTION RESPIRATORY (INHALATION) at 13:29

## 2017-12-11 NOTE — PROGRESS NOTE
Assessment and Plan


Assessment and plan: 


Patient is 27-year-old extremely obese woman BMI 66.2 with sequelae of health 

conditions such as sleep apnea noncompliant with CPAP, asthma who presents with 

acute shortness of breath with hypoxia pulse ox 87%.  Chest x-ray shows right 

lower lobe infiltrates.





-Acute hypoxic respiratory failure: Treated with oxygen


-Acute asthma exacerbation: Treated with IV steroids and nebulizer treatments


-Sepsis pneumonia, right lower lobe aspiration type: Treatment IV antibiotics


-Extreme obesity BMI 66.2 with obesity hypoventilation syndrome suspected/sleep 

apnea: Counseling done, she says she is getting insurance next month and will 

reestablish care with physicians, consult dietitian


-DVT prophylaxis: change lovenox to sq heparin due to weight





Consult Pulmonology








History


Interval history: 


Patient was seen and examined.  Follow-up on current diagnosis.  Overnight 

uneventful.  Patient denies any chest pain, nausea/vomiting or severe 

headaches.  Imaging, nursing note, chart, labs and old chart reviewed.  

Discussed with patient.  Shortness breath is improved with oxygen.  Patient 

doesn't have insurance so she hasn't seen a physician, she has sleep apnea but 

she doesn't have a CPAP machine because he has a follow-up because no physician








Hospitalist Physical





- Physical exam


Narrative exam: 


GEN: Ill-appearing Morbid obese BMI 67.4 NAD, AWAKE, ALERT, ORIENTATED 3


HEENT: NCAT, EOMI, PERRL, OP Clear


NECK: supple, no adenopathy, no thyromegaly, no JVD


CVS/HEART: RRR, NORMAL S1S2, NO JVD, pulses present bilaterally


CHEST/LUNGS: Bilateral rhonchi Symmetrical chest expansion, diminished entry 

bilaterally


GI/Abdomen: soft, NTND, good bowel sounds, no guarding or rebound


/Bladder: no suprapubic tenderness, no CVA or paraspinal tenderness


EXT/Skin: no c/c/e, no obvious rash


MSK: FROM x 4


Neuro: CN 2-12 grossly intact, no new focal deficits


Psych: calm








- Constitutional


Vitals: 


 











Temp Pulse Resp BP Pulse Ox


 


 97.8 F   87   22   141/72   99 


 


 12/11/17 09:39  12/11/17 09:39  12/11/17 09:39  12/11/17 09:39  12/11/17 09:39














Results





- Labs


CBC & Chem 7: 


 12/10/17 05:43





 12/10/17 05:43


Labs: 


 Laboratory Last Values











WBC  12.3 K/mm3 (4.5-11.0)  H  12/10/17  05:43    


 


RBC  5.03 M/mm3 (3.65-5.03)   12/10/17  05:43    


 


Hgb  11.0 gm/dl (10.1-14.3)   12/10/17  05:43    


 


Hct  36.0 % (30.3-42.9)   12/10/17  05:43    


 


MCV  72 fl (79-97)  L  12/10/17  05:43    


 


MCH  22 pg (28-32)  L  12/10/17  05:43    


 


MCHC  31 % (30-34)   12/10/17  05:43    


 


RDW  19.5 % (13.2-15.2)  H  12/10/17  05:43    


 


Plt Count  291 K/mm3 (140-440)   12/10/17  05:43    


 


Lymph % (Auto)  10.7 % (13.4-35.0)  L  12/10/17  05:43    


 


Mono % (Auto)  3.2 % (0.0-7.3)   12/10/17  05:43    


 


Eos % (Auto)  0.0 % (0.0-4.3)   12/10/17  05:43    


 


Baso % (Auto)  0.2 % (0.0-1.8)   12/10/17  05:43    


 


Lymph #  1.3 K/mm3 (1.2-5.4)   12/10/17  05:43    


 


Mono #  0.4 K/mm3 (0.0-0.8)   12/10/17  05:43    


 


Eos #  0.0 K/mm3 (0.0-0.4)   12/10/17  05:43    


 


Baso #  0.0 K/mm3 (0.0-0.1)   12/10/17  05:43    


 


Add Manual Diff  Complete   12/09/17  13:11    


 


Total Counted  100   12/09/17  13:11    


 


Seg Neutrophils %  85.9 % (40.0-70.0)  H  12/10/17  05:43    


 


Seg Neuts % (Manual)  78.0 % (40.0-70.0)  H  12/09/17  13:11    


 


Band Neutrophils %  0 %  12/09/17  13:11    


 


Lymphocytes % (Manual)  15.0 % (13.4-35.0)   12/09/17  13:11    


 


Reactive Lymphs % (Man)  0 %  12/09/17  13:11    


 


Monocytes % (Manual)  7.0 % (0.0-7.3)   12/09/17  13:11    


 


Eosinophils % (Manual)  0 % (0.0-4.3)   12/09/17  13:11    


 


Basophils % (Manual)  0 % (0.0-1.8)   12/09/17  13:11    


 


Metamyelocytes %  0 %  12/09/17  13:11    


 


Myelocytes %  0 %  12/09/17  13:11    


 


Promyelocytes %  0 %  12/09/17  13:11    


 


Blast Cells %  0 %  12/09/17  13:11    


 


Nucleated RBC %  Not Reportable   12/09/17  13:11    


 


Seg Neutrophils #  10.6 K/mm3 (1.8-7.7)  H  12/10/17  05:43    


 


Seg Neutrophils # Man  8.0 K/mm3 (1.8-7.7)  H  12/09/17  13:11    


 


Band Neutrophils #  0.0 K/mm3  12/09/17  13:11    


 


Lymphocytes # (Manual)  1.5 K/mm3 (1.2-5.4)   12/09/17  13:11    


 


Abs React Lymphs (Man)  0.0 K/mm3  12/09/17  13:11    


 


Monocytes # (Manual)  0.7 K/mm3 (0.0-0.8)   12/09/17  13:11    


 


Eosinophils # (Manual)  0.0 K/mm3 (0.0-0.4)   12/09/17  13:11    


 


Basophils # (Manual)  0.0 K/mm3 (0.0-0.1)   12/09/17  13:11    


 


Metamyelocytes #  0.0 K/mm3  12/09/17  13:11    


 


Myelocytes #  0.0 K/mm3  12/09/17  13:11    


 


Promyelocytes #  0.0 K/mm3  12/09/17  13:11    


 


Blast Cells #  0.0 K/mm3  12/09/17  13:11    


 


WBC Morphology  Not Reportable   12/09/17  13:11    


 


Hypersegmented Neuts  Not Reportable   12/09/17  13:11    


 


Hyposegmented Neuts  Not Reportable   12/09/17  13:11    


 


Hypogranular Neuts  Not Reportable   12/09/17  13:11    


 


Smudge Cells  Not Reportable   12/09/17  13:11    


 


Toxic Granulation  Not Reportable   12/09/17  13:11    


 


Toxic Vacuolation  Not Reportable   12/09/17  13:11    


 


Dohle Bodies  Not Reportable   12/09/17  13:11    


 


Pelger-Huet Anomaly  Not Reportable   12/09/17  13:11    


 


Ac Rods  Not Reportable   12/09/17  13:11    


 


Platelet Estimate  Consistent w auto   12/09/17  13:11    


 


Clumped Platelets  Not Reportable   12/09/17  13:11    


 


Plt Clumps, EDTA  Not Reportable   12/09/17  13:11    


 


Large Platelets  1+   12/09/17  13:11    


 


Giant Platelets  Not Reportable   12/09/17  13:11    


 


Platelet Satelliting  Not Reportable   12/09/17  13:11    


 


Plt Morphology Comment  Not Reportable   12/09/17  13:11    


 


RBC Morphology  Not Reportable   12/09/17  13:11    


 


Dimorphic RBCs  Not Reportable   12/09/17  13:11    


 


Polychromasia  Not Reportable   12/09/17  13:11    


 


Hypochromasia  2+   12/09/17  13:11    


 


Poikilocytosis  Not Reportable   12/09/17  13:11    


 


Anisocytosis  1+   12/09/17  13:11    


 


Microcytosis  Not Reportable   12/09/17  13:11    


 


Macrocytosis  Not Reportable   12/09/17  13:11    


 


Spherocytes  Not Reportable   12/09/17  13:11    


 


Pappenheimer Bodies  Not Reportable   12/09/17  13:11    


 


Sickle Cells  Not Reportable   12/09/17  13:11    


 


Target Cells  Not Reportable   12/09/17  13:11    


 


Tear Drop Cells  Not Reportable   12/09/17  13:11    


 


Ovalocytes  1+   12/09/17  13:11    


 


Helmet Cells  Not Reportable   12/09/17  13:11    


 


[place] Bodies  Not Reportable   12/09/17  13:11    


 


[place] Rings  Not Reportable   12/09/17  13:11    


 


[place] Cells  Not Reportable   12/09/17  13:11    


 


Bite Cells  Not Reportable   12/09/17  13:11    


 


Crenated Cell  Not Reportable   12/09/17  13:11    


 


Elliptocytes  Not Reportable   12/09/17  13:11    


 


Acanthocytes (Spur)  Not Reportable   12/09/17  13:11    


 


Rouleaux  Not Reportable   12/09/17  13:11    


 


Hemoglobin C Crystals  Not Reportable   12/09/17  13:11    


 


Schistocytes  Not Reportable   12/09/17  13:11    


 


Malaria parasites  Not Reportable   12/09/17  13:11    


 


Sami Bodies  Not Reportable   12/09/17  13:11    


 


Hem Pathologist Commnt  No   12/09/17  13:11    


 


POC ABG pH  7.365  (7.35-7.45)   12/09/17  16:19    


 


POC ABG pCO2  49.9  (35-45)  H  12/09/17  16:19    


 


POC ABG pO2  66  ()  L  12/09/17  16:19    


 


POC ABG HCO3  28.5   12/09/17  16:19    


 


POC ABG Total CO2  30   12/09/17  16:19    


 


POC ABG O2 Sat  92   12/09/17  16:19    


 


POC ABG Base Excess  3   12/09/17  16:19    


 


FiO2  4 %  12/09/17  16:19    


 


Sodium  139 mmol/L (137-145)   12/10/17  05:43    


 


Potassium  4.6 mmol/L (3.6-5.0)   12/10/17  05:43    


 


Chloride  97.4 mmol/L ()  L  12/10/17  05:43    


 


Carbon Dioxide  29 mmol/L (22-30)   12/10/17  05:43    


 


Anion Gap  17 mmol/L  12/10/17  05:43    


 


BUN  8 mg/dL (7-17)   12/10/17  05:43    


 


Creatinine  0.4 mg/dL (0.7-1.2)  L  12/10/17  05:43    


 


Estimated GFR  > 60 ml/min  12/10/17  05:43    


 


BUN/Creatinine Ratio  20 %  12/10/17  05:43    


 


Glucose  158 mg/dL ()  H  12/10/17  05:43    


 


Calcium  8.8 mg/dL (8.4-10.2)   12/10/17  05:43    


 


Total Bilirubin  0.40 mg/dL (0.1-1.2)   12/10/17  05:43    


 


AST  29 units/L (5-40)   12/10/17  05:43    


 


ALT  40 units/L (7-56)   12/10/17  05:43    


 


Alkaline Phosphatase  77 units/L ()   12/10/17  05:43    


 


NT-Pro-B Natriuret Pep  1693 pg/mL (0-450)  H  12/09/17  15:45    


 


Total Protein  8.6 g/dL (6.3-8.2)  H  12/10/17  05:43    


 


Albumin  3.8 g/dL (3.9-5)  L  12/10/17  05:43    


 


Albumin/Globulin Ratio  0.8 %  12/10/17  05:43    


 


Urine Color  Straw  (Yellow)   12/09/17  16:00    


 


Urine Turbidity  Clear  (Clear)   12/09/17  16:00    


 


Urine pH  6.0  (5.0-7.0)   12/09/17  16:00    


 


Ur Specific Gravity  1.001  (1.003-1.030)  L  12/09/17  16:00    


 


Urine Protein  <15 mg/dl mg/dL (Negative)   12/09/17  16:00    


 


Urine Glucose (UA)  Neg mg/dL (Negative)   12/09/17  16:00    


 


Urine Ketones  Neg mg/dL (Negative)   12/09/17  16:00    


 


Urine Blood  Sm  (Negative)   12/09/17  16:00    


 


Urine Nitrite  Neg  (Negative)   12/09/17  16:00    


 


Ur Reducing Substances  Not Reportable   12/09/17  16:00    


 


Urine Bilirubin  Neg  (Negative)   12/09/17  16:00    


 


Urine Ictotest  Not Reportable   12/09/17  16:00    


 


Urine Urobilinogen  < 2.0 mg/dL (<2.0)   12/09/17  16:00    


 


Ur Leukocyte Esterase  Neg  (Negative)   12/09/17  16:00    


 


Urine WBC (Auto)  < 1.0 /HPF (0.0-6.0)   12/09/17  16:00    


 


Urine RBC (Auto)  < 1.0 /HPF (0.0-6.0)   12/09/17  16:00    


 


U Epithel Cells (Auto)  < 1.0 /HPF (0-13.0)   12/09/17  16:00    


 


Urine HCG, Qual  Negative  (Negative)   12/09/17  16:00

## 2017-12-12 RX ADMIN — HEPARIN SODIUM SCH UNIT: 5000 INJECTION, SOLUTION INTRAVENOUS; SUBCUTANEOUS at 15:32

## 2017-12-12 RX ADMIN — AZITHROMYCIN SCH MG: 250 TABLET, FILM COATED ORAL at 09:31

## 2017-12-12 RX ADMIN — IPRATROPIUM BROMIDE AND ALBUTEROL SULFATE SCH AMPUL: .5; 3 SOLUTION RESPIRATORY (INHALATION) at 14:14

## 2017-12-12 RX ADMIN — HEPARIN SODIUM SCH UNIT: 5000 INJECTION, SOLUTION INTRAVENOUS; SUBCUTANEOUS at 06:09

## 2017-12-12 RX ADMIN — FAMOTIDINE SCH MG: 20 TABLET ORAL at 09:30

## 2017-12-12 RX ADMIN — HEPARIN SODIUM SCH UNIT: 5000 INJECTION, SOLUTION INTRAVENOUS; SUBCUTANEOUS at 21:42

## 2017-12-12 RX ADMIN — IPRATROPIUM BROMIDE AND ALBUTEROL SULFATE SCH AMPUL: .5; 3 SOLUTION RESPIRATORY (INHALATION) at 19:48

## 2017-12-12 RX ADMIN — CEFTRIAXONE SODIUM SCH MLS/10 MIN: 10 INJECTION, POWDER, FOR SOLUTION INTRAVENOUS at 13:41

## 2017-12-12 RX ADMIN — FAMOTIDINE SCH MG: 20 TABLET ORAL at 21:41

## 2017-12-12 RX ADMIN — IPRATROPIUM BROMIDE AND ALBUTEROL SULFATE SCH: .5; 3 SOLUTION RESPIRATORY (INHALATION) at 14:13

## 2017-12-12 RX ADMIN — HYDROCHLOROTHIAZIDE SCH MG: 25 TABLET ORAL at 09:30

## 2017-12-12 NOTE — PROGRESS NOTE
Assessment and Plan


Assessment and plan: 


Patient is 27-year-old extremely obese woman BMI 66.2 with sequelae of health 

conditions such as sleep apnea noncompliant with CPAP, asthma who presents with 

acute shortness of breath with hypoxia pulse ox 87%.  Chest x-ray shows right 

lower lobe infiltrates.





-Acute hypoxic respiratory failure: Treated with oxygen


-Acute asthma exacerbation: Treated with IV steroids and nebulizer treatments


-Sepsis pneumonia, right lower lobe aspiration type: Treatment IV antibiotics


-Extreme obesity BMI 66.2 with obesity hypoventilation syndrome suspected/sleep 

apnea: Counseling done, she says she is getting insurance next month and will 

reestablish care with physicians, consult dietitian


-DVT prophylaxis: change lovenox to sq heparin due to weight





Consulted Pulmonology





Bariatric bed ordered and accurate weight done and BMI is actually 88.2 not 66.2





Patient may not need Home O2 but patient doesn't have insurance. D/w case 

management [place]








History


Interval history: 


Patient was seen and examined.  Follow-up on current diagnosis.  Overnight 

uneventful.  Patient denies any chest pain, nausea/vomiting or severe 

headaches.  Imaging, nursing note, chart, labs and old chart reviewed.  

Discussed with patient.  Shortness breath is improved with oxygen.  Patient 

doesn't have insurance so she hasn't seen a physician, she has sleep apnea but 

she doesn't have a CPAP machine because he has a follow-up because no physician








Hospitalist Physical





- Physical exam


Narrative exam: 


GEN: Ill-appearing Morbid obese BMI 88.2 NAD, AWAKE, ALERT, ORIENTATED 3


HEENT: NCAT, EOMI, PERRL, OP Clear


NECK: supple, no adenopathy, no thyromegaly, no JVD


CVS/HEART: RRR, NORMAL S1S2, NO JVD, pulses present bilaterally


CHEST/LUNGS: Bilateral rhonchi Symmetrical chest expansion, diminished entry 

bilaterally


GI/Abdomen: soft, NTND, good bowel sounds, no guarding or rebound


/Bladder: no suprapubic tenderness, no CVA or paraspinal tenderness


EXT/Skin: no c/c/e, no obvious rash


MSK: FROM x 4


Neuro: CN 2-12 grossly intact, no new focal deficits


Psych: calm








- Constitutional


Vitals: 


 











Temp Pulse Resp BP Pulse Ox


 


 97.6 F   100 H  24   146/102   92 


 


 12/12/17 09:01  12/12/17 09:01  12/12/17 09:01  12/12/17 09:01  12/12/17 09:01














Results





- Labs


CBC & Chem 7: 


 12/10/17 05:43





 12/10/17 05:43


Labs: 


 Laboratory Last Values











WBC  12.3 K/mm3 (4.5-11.0)  H  12/10/17  05:43    


 


RBC  5.03 M/mm3 (3.65-5.03)   12/10/17  05:43    


 


Hgb  11.0 gm/dl (10.1-14.3)   12/10/17  05:43    


 


Hct  36.0 % (30.3-42.9)   12/10/17  05:43    


 


MCV  72 fl (79-97)  L  12/10/17  05:43    


 


MCH  22 pg (28-32)  L  12/10/17  05:43    


 


MCHC  31 % (30-34)   12/10/17  05:43    


 


RDW  19.5 % (13.2-15.2)  H  12/10/17  05:43    


 


Plt Count  291 K/mm3 (140-440)   12/10/17  05:43    


 


Lymph % (Auto)  10.7 % (13.4-35.0)  L  12/10/17  05:43    


 


Mono % (Auto)  3.2 % (0.0-7.3)   12/10/17  05:43    


 


Eos % (Auto)  0.0 % (0.0-4.3)   12/10/17  05:43    


 


Baso % (Auto)  0.2 % (0.0-1.8)   12/10/17  05:43    


 


Lymph #  1.3 K/mm3 (1.2-5.4)   12/10/17  05:43    


 


Mono #  0.4 K/mm3 (0.0-0.8)   12/10/17  05:43    


 


Eos #  0.0 K/mm3 (0.0-0.4)   12/10/17  05:43    


 


Baso #  0.0 K/mm3 (0.0-0.1)   12/10/17  05:43    


 


Add Manual Diff  Complete   12/09/17  13:11    


 


Total Counted  100   12/09/17  13:11    


 


Seg Neutrophils %  85.9 % (40.0-70.0)  H  12/10/17  05:43    


 


Seg Neuts % (Manual)  78.0 % (40.0-70.0)  H  12/09/17  13:11    


 


Band Neutrophils %  0 %  12/09/17  13:11    


 


Lymphocytes % (Manual)  15.0 % (13.4-35.0)   12/09/17  13:11    


 


Reactive Lymphs % (Man)  0 %  12/09/17  13:11    


 


Monocytes % (Manual)  7.0 % (0.0-7.3)   12/09/17  13:11    


 


Eosinophils % (Manual)  0 % (0.0-4.3)   12/09/17  13:11    


 


Basophils % (Manual)  0 % (0.0-1.8)   12/09/17  13:11    


 


Metamyelocytes %  0 %  12/09/17  13:11    


 


Myelocytes %  0 %  12/09/17  13:11    


 


Promyelocytes %  0 %  12/09/17  13:11    


 


Blast Cells %  0 %  12/09/17  13:11    


 


Nucleated RBC %  Not Reportable   12/09/17  13:11    


 


Seg Neutrophils #  10.6 K/mm3 (1.8-7.7)  H  12/10/17  05:43    


 


Seg Neutrophils # Man  8.0 K/mm3 (1.8-7.7)  H  12/09/17  13:11    


 


Band Neutrophils #  0.0 K/mm3  12/09/17  13:11    


 


Lymphocytes # (Manual)  1.5 K/mm3 (1.2-5.4)   12/09/17  13:11    


 


Abs React Lymphs (Man)  0.0 K/mm3  12/09/17  13:11    


 


Monocytes # (Manual)  0.7 K/mm3 (0.0-0.8)   12/09/17  13:11    


 


Eosinophils # (Manual)  0.0 K/mm3 (0.0-0.4)   12/09/17  13:11    


 


Basophils # (Manual)  0.0 K/mm3 (0.0-0.1)   12/09/17  13:11    


 


Metamyelocytes #  0.0 K/mm3  12/09/17  13:11    


 


Myelocytes #  0.0 K/mm3  12/09/17  13:11    


 


Promyelocytes #  0.0 K/mm3  12/09/17  13:11    


 


Blast Cells #  0.0 K/mm3  12/09/17  13:11    


 


WBC Morphology  Not Reportable   12/09/17  13:11    


 


Hypersegmented Neuts  Not Reportable   12/09/17  13:11    


 


Hyposegmented Neuts  Not Reportable   12/09/17  13:11    


 


Hypogranular Neuts  Not Reportable   12/09/17  13:11    


 


Smudge Cells  Not Reportable   12/09/17  13:11    


 


Toxic Granulation  Not Reportable   12/09/17  13:11    


 


Toxic Vacuolation  Not Reportable   12/09/17  13:11    


 


Dohle Bodies  Not Reportable   12/09/17  13:11    


 


Pelger-Huet Anomaly  Not Reportable   12/09/17  13:11    


 


Ac Rods  Not Reportable   12/09/17  13:11    


 


Platelet Estimate  Consistent w auto   12/09/17  13:11    


 


Clumped Platelets  Not Reportable   12/09/17  13:11    


 


Plt Clumps, EDTA  Not Reportable   12/09/17  13:11    


 


Large Platelets  1+   12/09/17  13:11    


 


Giant Platelets  Not Reportable   12/09/17  13:11    


 


Platelet Satelliting  Not Reportable   12/09/17  13:11    


 


Plt Morphology Comment  Not Reportable   12/09/17  13:11    


 


RBC Morphology  Not Reportable   12/09/17  13:11    


 


Dimorphic RBCs  Not Reportable   12/09/17  13:11    


 


Polychromasia  Not Reportable   12/09/17  13:11    


 


Hypochromasia  2+   12/09/17  13:11    


 


Poikilocytosis  Not Reportable   12/09/17  13:11    


 


Anisocytosis  1+   12/09/17  13:11    


 


Microcytosis  Not Reportable   12/09/17  13:11    


 


Macrocytosis  Not Reportable   12/09/17  13:11    


 


Spherocytes  Not Reportable   12/09/17  13:11    


 


Pappenheimer Bodies  Not Reportable   12/09/17  13:11    


 


Sickle Cells  Not Reportable   12/09/17  13:11    


 


Target Cells  Not Reportable   12/09/17  13:11    


 


Tear Drop Cells  Not Reportable   12/09/17  13:11    


 


Ovalocytes  1+   12/09/17  13:11    


 


Helmet Cells  Not Reportable   12/09/17  13:11    


 


[place] Bodies  Not Reportable   12/09/17  13:11    


 


[place] Rings  Not Reportable   12/09/17  13:11    


 


[place] Cells  Not Reportable   12/09/17  13:11    


 


Bite Cells  Not Reportable   12/09/17  13:11    


 


Crenated Cell  Not Reportable   12/09/17  13:11    


 


Elliptocytes  Not Reportable   12/09/17  13:11    


 


Acanthocytes (Spur)  Not Reportable   12/09/17  13:11    


 


Rouleaux  Not Reportable   12/09/17  13:11    


 


Hemoglobin C Crystals  Not Reportable   12/09/17  13:11    


 


Schistocytes  Not Reportable   12/09/17  13:11    


 


Malaria parasites  Not Reportable   12/09/17  13:11    


 


Sami Bodies  Not Reportable   12/09/17  13:11    


 


Hem Pathologist Commnt  No   12/09/17  13:11    


 


POC ABG pH  7.365  (7.35-7.45)   12/09/17  16:19    


 


POC ABG pCO2  49.9  (35-45)  H  12/09/17  16:19    


 


POC ABG pO2  66  ()  L  12/09/17  16:19    


 


POC ABG HCO3  28.5   12/09/17  16:19    


 


POC ABG Total CO2  30   12/09/17  16:19    


 


POC ABG O2 Sat  92   12/09/17  16:19    


 


POC ABG Base Excess  3   12/09/17  16:19    


 


FiO2  4 %  12/09/17  16:19    


 


Sodium  139 mmol/L (137-145)   12/10/17  05:43    


 


Potassium  4.6 mmol/L (3.6-5.0)   12/10/17  05:43    


 


Chloride  97.4 mmol/L ()  L  12/10/17  05:43    


 


Carbon Dioxide  29 mmol/L (22-30)   12/10/17  05:43    


 


Anion Gap  17 mmol/L  12/10/17  05:43    


 


BUN  8 mg/dL (7-17)   12/10/17  05:43    


 


Creatinine  0.4 mg/dL (0.7-1.2)  L  12/10/17  05:43    


 


Estimated GFR  > 60 ml/min  12/10/17  05:43    


 


BUN/Creatinine Ratio  20 %  12/10/17  05:43    


 


Glucose  158 mg/dL ()  H  12/10/17  05:43    


 


Calcium  8.8 mg/dL (8.4-10.2)   12/10/17  05:43    


 


Total Bilirubin  0.40 mg/dL (0.1-1.2)   12/10/17  05:43    


 


AST  29 units/L (5-40)   12/10/17  05:43    


 


ALT  40 units/L (7-56)   12/10/17  05:43    


 


Alkaline Phosphatase  77 units/L ()   12/10/17  05:43    


 


NT-Pro-B Natriuret Pep  1693 pg/mL (0-450)  H  12/09/17  15:45    


 


Total Protein  8.6 g/dL (6.3-8.2)  H  12/10/17  05:43    


 


Albumin  3.8 g/dL (3.9-5)  L  12/10/17  05:43    


 


Albumin/Globulin Ratio  0.8 %  12/10/17  05:43    


 


Urine Color  Straw  (Yellow)   12/09/17  16:00    


 


Urine Turbidity  Clear  (Clear)   12/09/17  16:00    


 


Urine pH  6.0  (5.0-7.0)   12/09/17  16:00    


 


Ur Specific Gravity  1.001  (1.003-1.030)  L  12/09/17  16:00    


 


Urine Protein  <15 mg/dl mg/dL (Negative)   12/09/17  16:00    


 


Urine Glucose (UA)  Neg mg/dL (Negative)   12/09/17  16:00    


 


Urine Ketones  Neg mg/dL (Negative)   12/09/17  16:00    


 


Urine Blood  Sm  (Negative)   12/09/17  16:00    


 


Urine Nitrite  Neg  (Negative)   12/09/17  16:00    


 


Ur Reducing Substances  Not Reportable   12/09/17  16:00    


 


Urine Bilirubin  Neg  (Negative)   12/09/17  16:00    


 


Urine Ictotest  Not Reportable   12/09/17  16:00    


 


Urine Urobilinogen  < 2.0 mg/dL (<2.0)   12/09/17  16:00    


 


Ur Leukocyte Esterase  Neg  (Negative)   12/09/17  16:00    


 


Urine WBC (Auto)  < 1.0 /HPF (0.0-6.0)   12/09/17  16:00    


 


Urine RBC (Auto)  < 1.0 /HPF (0.0-6.0)   12/09/17  16:00    


 


U Epithel Cells (Auto)  < 1.0 /HPF (0-13.0)   12/09/17  16:00    


 


Urine HCG, Qual  Negative  (Negative)   12/09/17  16:00

## 2017-12-12 NOTE — PROGRESS NOTE
Assessment and Plan





Community-acquired pneumonia.  No fever this morning.  Chest signs improved.  

Still some cough.


Chronic respiratory acidosis.  Possibly related to obesity hypoventilation 

syndrome.


Asthma exacerbation.  No wheezing this morning.


Obesity hypoventilation syndrome


FABIAN by history


Severe morbid obesity








Recommendations





Continue nebulizer therapy.


Continue antibiotics and if no additional information from cultures, 

recommended 7 days of oral treatment.


Possibly amenable to be switched over to oral antibiotics, if no fever noted


If the wheezing, can switch to oral steroids and tapered down in the next 6 

days.


Will need to continue on Breo or Symbicort for wheezing at discharge


Out of bed as tolerated.  Incentive spirometry


DVT prophylaxis measures


Influenza vaccination.


Needs outpatient evaluation for chronic respiratory acidosis








Discussed with patient in detail.  All questions answered.














Subjective


Date of service: 12/12/17


Principal diagnosis: community-acquired pneumonia, asthma exacerbation, morbid 

obesity


Interval history: 





Reports some cough but feeling better this morning.  No fever reported.  No 

chest pain.





Objective


 Vital Signs - 12hr











  12/12/17 12/12/17





  05:06 09:01


 


Temperature 97.7 F 97.6 F


 


Pulse Rate 78 100 H


 


Respiratory 24 24





Rate  


 


Blood Pressure 137/82 146/102


 


O2 Sat by Pulse 95 92





Oximetry  











Constitutional: no acute distress, alert, other (severe morbid obesity.)


ENT: other (Mallampati 4)


Neck: supple, no JVD (difficult to evaluate the patient obesity)


Ascultation: Right: diminished breath sounds, Bilateral: clear


Gastrointestinal: normoactive bowel sounds, non-tender, non-distended


Extremities: no cyanosis, no edema


Neurologic: normal mental status, non-focal exam, CN II-XII normal, motor 

strength normal and


Psychiatric: mood appropriate, affect normal


CBC and BMP: 


 12/10/17 05:43





 12/10/17 05:43


ABG, PT/INR, D-dimer: 


ABG











POC ABG pH  7.365  (7.35-7.45)   12/09/17  16:19    


 


POC ABG pCO2  49.9  (35-45)  H  12/09/17  16:19    


 


POC ABG pO2  66  ()  L  12/09/17  16:19    


 


POC ABG HCO3  28.5   12/09/17  16:19    


 


POC ABG Total CO2  30   12/09/17  16:19    


 


POC ABG O2 Sat  92   12/09/17  16:19    








Abnormal lab findings: 


 Abnormal Labs











  12/09/17 12/09/17 12/09/17





  13:11 13:11 15:45


 


WBC   


 


RBC  5.11 H  


 


MCV  71 L  


 


MCH  22 L  


 


RDW  19.3 H  


 


Lymph % (Auto)   


 


Seg Neutrophils %   


 


Seg Neuts % (Manual)  78.0 H  


 


Seg Neutrophils #   


 


Seg Neutrophils # Man  8.0 H  


 


POC ABG pCO2   


 


POC ABG pO2   


 


Chloride   


 


Creatinine   0.5 L 


 


Glucose   134 H 


 


NT-Pro-B Natriuret Pep    1693 H


 


Total Protein   


 


Albumin   


 


Ur Specific Gravity   














  12/09/17 12/09/17 12/10/17





  16:00 16:19 05:43


 


WBC    12.3 H


 


RBC   


 


MCV    72 L


 


MCH    22 L


 


RDW    19.5 H


 


Lymph % (Auto)    10.7 L


 


Seg Neutrophils %    85.9 H


 


Seg Neuts % (Manual)   


 


Seg Neutrophils #    10.6 H


 


Seg Neutrophils # Man   


 


POC ABG pCO2   49.9 H 


 


POC ABG pO2   66 L 


 


Chloride   


 


Creatinine   


 


Glucose   


 


NT-Pro-B Natriuret Pep   


 


Total Protein   


 


Albumin   


 


Ur Specific Gravity  1.001 L  














  12/10/17





  05:43


 


WBC 


 


RBC 


 


MCV 


 


MCH 


 


RDW 


 


Lymph % (Auto) 


 


Seg Neutrophils % 


 


Seg Neuts % (Manual) 


 


Seg Neutrophils # 


 


Seg Neutrophils # Man 


 


POC ABG pCO2 


 


POC ABG pO2 


 


Chloride  97.4 L


 


Creatinine  0.4 L


 


Glucose  158 H


 


NT-Pro-B Natriuret Pep 


 


Total Protein  8.6 H


 


Albumin  3.8 L


 


Ur Specific Gravity

## 2017-12-13 RX ADMIN — HEPARIN SODIUM SCH UNIT: 5000 INJECTION, SOLUTION INTRAVENOUS; SUBCUTANEOUS at 15:58

## 2017-12-13 RX ADMIN — IPRATROPIUM BROMIDE AND ALBUTEROL SULFATE SCH AMPUL: .5; 3 SOLUTION RESPIRATORY (INHALATION) at 20:48

## 2017-12-13 RX ADMIN — HEPARIN SODIUM SCH UNIT: 5000 INJECTION, SOLUTION INTRAVENOUS; SUBCUTANEOUS at 22:20

## 2017-12-13 RX ADMIN — IPRATROPIUM BROMIDE AND ALBUTEROL SULFATE SCH AMPUL: .5; 3 SOLUTION RESPIRATORY (INHALATION) at 14:30

## 2017-12-13 RX ADMIN — AZITHROMYCIN SCH MG: 250 TABLET, FILM COATED ORAL at 10:35

## 2017-12-13 RX ADMIN — FAMOTIDINE SCH MG: 20 TABLET ORAL at 22:20

## 2017-12-13 RX ADMIN — HYDROCHLOROTHIAZIDE SCH MG: 25 TABLET ORAL at 10:36

## 2017-12-13 RX ADMIN — CEFTRIAXONE SODIUM SCH MLS/10 MIN: 10 INJECTION, POWDER, FOR SOLUTION INTRAVENOUS at 10:41

## 2017-12-13 RX ADMIN — HEPARIN SODIUM SCH UNIT: 5000 INJECTION, SOLUTION INTRAVENOUS; SUBCUTANEOUS at 05:40

## 2017-12-13 RX ADMIN — IPRATROPIUM BROMIDE AND ALBUTEROL SULFATE SCH AMPUL: .5; 3 SOLUTION RESPIRATORY (INHALATION) at 07:40

## 2017-12-13 RX ADMIN — FAMOTIDINE SCH MG: 20 TABLET ORAL at 10:36

## 2017-12-13 NOTE — PROGRESS NOTE
Assessment and Plan


Assessment and plan: 


Patient is 27-year-old extremely obese woman BMI 88.2 with sequelae of health 

conditions such as sleep apnea noncompliant with CPAP, asthma who presents with 

acute shortness of breath with hypoxia pulse ox 87%.  Chest x-ray shows right 

lower lobe infiltrates.





-Acute hypoxic respiratory failure: Treated with oxygen


-Acute asthma exacerbation: Treated with IV steroids and nebulizer treatments


-Sepsis pneumonia, right lower lobe aspiration type: Treatment IV antibiotics


-Extreme obesity BMI 88.2 with obesity hypoventilation syndrome suspected/sleep 

apnea: Counseling done, she says she is getting insurance next month and will 

reestablish care with physicians, consult dietitian


-DVT prophylaxis: change lovenox to sq heparin due to weight





Bariatric bed ordered and accurate weight done and BMI is actually 88.2 not 66.2





Patient pulse ox dropped into in the 70s when she walked to the nursing 

station. She need home oxygen, but she has no insurance so she will need to pay 

for it herself. 





The cost of O2 is 250.00 per month according to case management. Patient's 

insurance will start Jan. 1st according to patient, so the o2 will be covered 

then. She doesn't want to pay for O2 until then. I told her she can't work or 

drive without O2. 





D/c once cleared by pulmonology. 





History


Interval history: 


Patient was seen and examined.  Follow-up on current diagnosis.  Overnight 

uneventful.  Patient denies any chest pain, nausea/vomiting or severe 

headaches.  Imaging, nursing note, chart, labs and old chart reviewed.  

Discussed with patient.  Shortness breath is improved with oxygen.  Patient 

doesn't have insurance so she hasn't seen a physician, she has sleep apnea but 

she doesn't have a CPAP machine because he has a follow-up because no physician








Hospitalist Physical





- Physical exam


Narrative exam: 


GEN: Ill-appearing Morbid obese BMI 88.2 NAD, AWAKE, ALERT, ORIENTATED 3


HEENT: NCAT, EOMI, PERRL, OP Clear


NECK: supple, no adenopathy, no thyromegaly, no JVD


CVS/HEART: RRR, NORMAL S1S2, NO JVD, pulses present bilaterally


CHEST/LUNGS: Bilateral rhonchi Symmetrical chest expansion, diminished entry 

bilaterally


GI/Abdomen: soft, NTND, good bowel sounds, no guarding or rebound


/Bladder: no suprapubic tenderness, no CVA or paraspinal tenderness


EXT/Skin: no c/c/e, no obvious rash


MSK: FROM x 4


Neuro: CN 2-12 grossly intact, no new focal deficits


Psych: calm








- Constitutional


Vitals: 


 











Temp Pulse Resp BP Pulse Ox


 


 97.3 F L  79   24   141/102   98 


 


 12/13/17 04:44  12/13/17 04:44  12/13/17 04:44  12/13/17 04:44  12/13/17 04:44














Results





- Labs


CBC & Chem 7: 


 12/10/17 05:43





 12/10/17 05:43


Labs: 


 Laboratory Last Values











WBC  12.3 K/mm3 (4.5-11.0)  H  12/10/17  05:43    


 


RBC  5.03 M/mm3 (3.65-5.03)   12/10/17  05:43    


 


Hgb  11.0 gm/dl (10.1-14.3)   12/10/17  05:43    


 


Hct  36.0 % (30.3-42.9)   12/10/17  05:43    


 


MCV  72 fl (79-97)  L  12/10/17  05:43    


 


MCH  22 pg (28-32)  L  12/10/17  05:43    


 


MCHC  31 % (30-34)   12/10/17  05:43    


 


RDW  19.5 % (13.2-15.2)  H  12/10/17  05:43    


 


Plt Count  291 K/mm3 (140-440)   12/10/17  05:43    


 


Lymph % (Auto)  10.7 % (13.4-35.0)  L  12/10/17  05:43    


 


Mono % (Auto)  3.2 % (0.0-7.3)   12/10/17  05:43    


 


Eos % (Auto)  0.0 % (0.0-4.3)   12/10/17  05:43    


 


Baso % (Auto)  0.2 % (0.0-1.8)   12/10/17  05:43    


 


Lymph #  1.3 K/mm3 (1.2-5.4)   12/10/17  05:43    


 


Mono #  0.4 K/mm3 (0.0-0.8)   12/10/17  05:43    


 


Eos #  0.0 K/mm3 (0.0-0.4)   12/10/17  05:43    


 


Baso #  0.0 K/mm3 (0.0-0.1)   12/10/17  05:43    


 


Add Manual Diff  Complete   12/09/17  13:11    


 


Total Counted  100   12/09/17  13:11    


 


Seg Neutrophils %  85.9 % (40.0-70.0)  H  12/10/17  05:43    


 


Seg Neuts % (Manual)  78.0 % (40.0-70.0)  H  12/09/17  13:11    


 


Band Neutrophils %  0 %  12/09/17  13:11    


 


Lymphocytes % (Manual)  15.0 % (13.4-35.0)   12/09/17  13:11    


 


Reactive Lymphs % (Man)  0 %  12/09/17  13:11    


 


Monocytes % (Manual)  7.0 % (0.0-7.3)   12/09/17  13:11    


 


Eosinophils % (Manual)  0 % (0.0-4.3)   12/09/17  13:11    


 


Basophils % (Manual)  0 % (0.0-1.8)   12/09/17  13:11    


 


Metamyelocytes %  0 %  12/09/17  13:11    


 


Myelocytes %  0 %  12/09/17  13:11    


 


Promyelocytes %  0 %  12/09/17  13:11    


 


Blast Cells %  0 %  12/09/17  13:11    


 


Nucleated RBC %  Not Reportable   12/09/17  13:11    


 


Seg Neutrophils #  10.6 K/mm3 (1.8-7.7)  H  12/10/17  05:43    


 


Seg Neutrophils # Man  8.0 K/mm3 (1.8-7.7)  H  12/09/17  13:11    


 


Band Neutrophils #  0.0 K/mm3  12/09/17  13:11    


 


Lymphocytes # (Manual)  1.5 K/mm3 (1.2-5.4)   12/09/17  13:11    


 


Abs React Lymphs (Man)  0.0 K/mm3  12/09/17  13:11    


 


Monocytes # (Manual)  0.7 K/mm3 (0.0-0.8)   12/09/17  13:11    


 


Eosinophils # (Manual)  0.0 K/mm3 (0.0-0.4)   12/09/17  13:11    


 


Basophils # (Manual)  0.0 K/mm3 (0.0-0.1)   12/09/17  13:11    


 


Metamyelocytes #  0.0 K/mm3  12/09/17  13:11    


 


Myelocytes #  0.0 K/mm3  12/09/17  13:11    


 


Promyelocytes #  0.0 K/mm3  12/09/17  13:11    


 


Blast Cells #  0.0 K/mm3  12/09/17  13:11    


 


WBC Morphology  Not Reportable   12/09/17  13:11    


 


Hypersegmented Neuts  Not Reportable   12/09/17  13:11    


 


Hyposegmented Neuts  Not Reportable   12/09/17  13:11    


 


Hypogranular Neuts  Not Reportable   12/09/17  13:11    


 


Smudge Cells  Not Reportable   12/09/17  13:11    


 


Toxic Granulation  Not Reportable   12/09/17  13:11    


 


Toxic Vacuolation  Not Reportable   12/09/17  13:11    


 


Dohle Bodies  Not Reportable   12/09/17  13:11    


 


Pelger-Huet Anomaly  Not Reportable   12/09/17  13:11    


 


Ac Rods  Not Reportable   12/09/17  13:11    


 


Platelet Estimate  Consistent w auto   12/09/17  13:11    


 


Clumped Platelets  Not Reportable   12/09/17  13:11    


 


Plt Clumps, EDTA  Not Reportable   12/09/17  13:11    


 


Large Platelets  1+   12/09/17  13:11    


 


Giant Platelets  Not Reportable   12/09/17  13:11    


 


Platelet Satelliting  Not Reportable   12/09/17  13:11    


 


Plt Morphology Comment  Not Reportable   12/09/17  13:11    


 


RBC Morphology  Not Reportable   12/09/17  13:11    


 


Dimorphic RBCs  Not Reportable   12/09/17  13:11    


 


Polychromasia  Not Reportable   12/09/17  13:11    


 


Hypochromasia  2+   12/09/17  13:11    


 


Poikilocytosis  Not Reportable   12/09/17  13:11    


 


Anisocytosis  1+   12/09/17  13:11    


 


Microcytosis  Not Reportable   12/09/17  13:11    


 


Macrocytosis  Not Reportable   12/09/17  13:11    


 


Spherocytes  Not Reportable   12/09/17  13:11    


 


Pappenheimer Bodies  Not Reportable   12/09/17  13:11    


 


Sickle Cells  Not Reportable   12/09/17  13:11    


 


Target Cells  Not Reportable   12/09/17  13:11    


 


Tear Drop Cells  Not Reportable   12/09/17  13:11    


 


Ovalocytes  1+   12/09/17  13:11    


 


Helmet Cells  Not Reportable   12/09/17  13:11    


 


[place] Bodies  Not Reportable   12/09/17  13:11    


 


Cabot Rings  Not Reportable   12/09/17  13:11    


 


Apolinar Cells  Not Reportable   12/09/17  13:11    


 


Bite Cells  Not Reportable   12/09/17  13:11    


 


Crenated Cell  Not Reportable   12/09/17  13:11    


 


Elliptocytes  Not Reportable   12/09/17  13:11    


 


Acanthocytes (Spur)  Not Reportable   12/09/17  13:11    


 


Rouleaux  Not Reportable   12/09/17  13:11    


 


Hemoglobin C Crystals  Not Reportable   12/09/17  13:11    


 


Schistocytes  Not Reportable   12/09/17  13:11    


 


Malaria parasites  Not Reportable   12/09/17  13:11    


 


Sami Bodies  Not Reportable   12/09/17  13:11    


 


Hem Pathologist Commnt  No   12/09/17  13:11    


 


POC ABG pH  7.365  (7.35-7.45)   12/09/17  16:19    


 


POC ABG pCO2  49.9  (35-45)  H  12/09/17  16:19    


 


POC ABG pO2  66  ()  L  12/09/17  16:19    


 


POC ABG HCO3  28.5   12/09/17  16:19    


 


POC ABG Total CO2  30   12/09/17  16:19    


 


POC ABG O2 Sat  92   12/09/17  16:19    


 


POC ABG Base Excess  3   12/09/17  16:19    


 


FiO2  4 %  12/09/17  16:19    


 


Sodium  139 mmol/L (137-145)   12/10/17  05:43    


 


Potassium  4.6 mmol/L (3.6-5.0)   12/10/17  05:43    


 


Chloride  97.4 mmol/L ()  L  12/10/17  05:43    


 


Carbon Dioxide  29 mmol/L (22-30)   12/10/17  05:43    


 


Anion Gap  17 mmol/L  12/10/17  05:43    


 


BUN  8 mg/dL (7-17)   12/10/17  05:43    


 


Creatinine  0.4 mg/dL (0.7-1.2)  L  12/10/17  05:43    


 


Estimated GFR  > 60 ml/min  12/10/17  05:43    


 


BUN/Creatinine Ratio  20 %  12/10/17  05:43    


 


Glucose  158 mg/dL ()  H  12/10/17  05:43    


 


Calcium  8.8 mg/dL (8.4-10.2)   12/10/17  05:43    


 


Total Bilirubin  0.40 mg/dL (0.1-1.2)   12/10/17  05:43    


 


AST  29 units/L (5-40)   12/10/17  05:43    


 


ALT  40 units/L (7-56)   12/10/17  05:43    


 


Alkaline Phosphatase  77 units/L ()   12/10/17  05:43    


 


NT-Pro-B Natriuret Pep  1693 pg/mL (0-450)  H  12/09/17  15:45    


 


Total Protein  8.6 g/dL (6.3-8.2)  H  12/10/17  05:43    


 


Albumin  3.8 g/dL (3.9-5)  L  12/10/17  05:43    


 


Albumin/Globulin Ratio  0.8 %  12/10/17  05:43    


 


Urine Color  Straw  (Yellow)   12/09/17  16:00    


 


Urine Turbidity  Clear  (Clear)   12/09/17  16:00    


 


Urine pH  6.0  (5.0-7.0)   12/09/17  16:00    


 


Ur Specific Gravity  1.001  (1.003-1.030)  L  12/09/17  16:00    


 


Urine Protein  <15 mg/dl mg/dL (Negative)   12/09/17  16:00    


 


Urine Glucose (UA)  Neg mg/dL (Negative)   12/09/17  16:00    


 


Urine Ketones  Neg mg/dL (Negative)   12/09/17  16:00    


 


Urine Blood  Sm  (Negative)   12/09/17  16:00    


 


Urine Nitrite  Neg  (Negative)   12/09/17  16:00    


 


Ur Reducing Substances  Not Reportable   12/09/17  16:00    


 


Urine Bilirubin  Neg  (Negative)   12/09/17  16:00    


 


Urine Ictotest  Not Reportable   12/09/17  16:00    


 


Urine Urobilinogen  < 2.0 mg/dL (<2.0)   12/09/17  16:00    


 


Ur Leukocyte Esterase  Neg  (Negative)   12/09/17  16:00    


 


Urine WBC (Auto)  < 1.0 /HPF (0.0-6.0)   12/09/17  16:00    


 


Urine RBC (Auto)  < 1.0 /HPF (0.0-6.0)   12/09/17  16:00    


 


U Epithel Cells (Auto)  < 1.0 /HPF (0-13.0)   12/09/17  16:00    


 


Urine HCG, Qual  Negative  (Negative)   12/09/17  16:00

## 2017-12-13 NOTE — PROGRESS NOTE
Assessment and Plan





Community-acquired pneumonia.  Improved with no fever or minimal cough.


Chronic respiratory acidosis.  Possibly related to obesity hypoventilation 

syndrome.


Asthma exacerbation.  No wheezing, controlled


Obesity hypoventilation syndrome


FABIAN by history


Severe morbid obesity








Recommendations





Continue nebulizer therapy.


Continue antibiotics and if no additional information from cultures, 

recommended 7 days of oral treatment.


Possibly amenable to be switched over to oral antibiotics, if no fever noted


Will need to continue on Breo or Symbicort for wheezing at discharge


Out of bed as tolerated.  


Incentive spirometry


May not need oxygen at rest but need to be rechecked before discharge walking.


DVT prophylaxis measures


 Weight control reduction diet








Discussed with patient in detail.  All questions answered.














Subjective


Date of service: 12/13/17


Principal diagnosis: community-acquired pneumonia, asthma exacerbation, morbid 

obesity


Interval history: 


Some sporadic cough but no expectoration.  Apparently needed oxygen when she 

tried to move out of bed.








Objective


 Vital Signs - 12hr











  12/13/17 12/13/17 12/13/17





  04:44 07:40 07:50


 


Temperature 97.3 F L  


 


Pulse Rate 79  


 


Pulse Rate [  78 83





Anterior   





Bilateral   





Throughout]   


 


Respiratory 24  





Rate   


 


Respiratory  18 18





Rate [Anterior   





Bilateral   





Throughout]   


 


Blood Pressure 141/102  


 


O2 Sat by Pulse 98 98 





Oximetry   














  12/13/17





  07:51


 


Temperature 97.6 F


 


Pulse Rate 78


 


Pulse Rate [ 





Anterior 





Bilateral 





Throughout] 


 


Respiratory 24





Rate 


 


Respiratory 





Rate [Anterior 





Bilateral 





Throughout] 


 


Blood Pressure 125/88


 


O2 Sat by Pulse 94





Oximetry 











Constitutional: no acute distress, alert, other (severe morbid obesity.)


Neck: supple, no JVD (difficult to evaluate the patient obesity)


Ascultation: Right: diminished breath sounds, wheezes, rhonchi, Bilateral: clear


Cardiovascular: regular rate and rhythm


Gastrointestinal: normoactive bowel sounds, non-tender, non-distended


Extremities: no cyanosis, no edema


Neurologic: normal mental status, non-focal exam, CN II-XII normal, motor 

strength normal and


Psychiatric: mood appropriate, affect normal


CBC and BMP: 


 12/10/17 05:43





 12/10/17 05:43


ABG, PT/INR, D-dimer: 


ABG











POC ABG pH  7.365  (7.35-7.45)   12/09/17  16:19    


 


POC ABG pCO2  49.9  (35-45)  H  12/09/17  16:19    


 


POC ABG pO2  66  ()  L  12/09/17  16:19    


 


POC ABG HCO3  28.5   12/09/17  16:19    


 


POC ABG Total CO2  30   12/09/17  16:19    


 


POC ABG O2 Sat  92   12/09/17  16:19    








Abnormal lab findings: 


 Abnormal Labs











  12/09/17 12/09/17 12/09/17





  13:11 13:11 15:45


 


WBC   


 


RBC  5.11 H  


 


MCV  71 L  


 


MCH  22 L  


 


RDW  19.3 H  


 


Lymph % (Auto)   


 


Seg Neutrophils %   


 


Seg Neuts % (Manual)  78.0 H  


 


Seg Neutrophils #   


 


Seg Neutrophils # Man  8.0 H  


 


POC ABG pCO2   


 


POC ABG pO2   


 


Chloride   


 


Creatinine   0.5 L 


 


Glucose   134 H 


 


NT-Pro-B Natriuret Pep    1693 H


 


Total Protein   


 


Albumin   


 


Ur Specific Gravity   














  12/09/17 12/09/17 12/10/17





  16:00 16:19 05:43


 


WBC    12.3 H


 


RBC   


 


MCV    72 L


 


MCH    22 L


 


RDW    19.5 H


 


Lymph % (Auto)    10.7 L


 


Seg Neutrophils %    85.9 H


 


Seg Neuts % (Manual)   


 


Seg Neutrophils #    10.6 H


 


Seg Neutrophils # Man   


 


POC ABG pCO2   49.9 H 


 


POC ABG pO2   66 L 


 


Chloride   


 


Creatinine   


 


Glucose   


 


NT-Pro-B Natriuret Pep   


 


Total Protein   


 


Albumin   


 


Ur Specific Gravity  1.001 L  














  12/10/17





  05:43


 


WBC 


 


RBC 


 


MCV 


 


MCH 


 


RDW 


 


Lymph % (Auto) 


 


Seg Neutrophils % 


 


Seg Neuts % (Manual) 


 


Seg Neutrophils # 


 


Seg Neutrophils # Man 


 


POC ABG pCO2 


 


POC ABG pO2 


 


Chloride  97.4 L


 


Creatinine  0.4 L


 


Glucose  158 H


 


NT-Pro-B Natriuret Pep 


 


Total Protein  8.6 H


 


Albumin  3.8 L


 


Ur Specific Gravity

## 2017-12-13 NOTE — XRAY REPORT
Single view chest: Compared to 12/9/17.



History: Pneumonia.



Findings:



Andrew patient radiograph. Visualized lungs appears unremarkable. 

Trachea is midline. Cardiomegaly.



Impression:



Film in poor inspiration. Visualized lungs appear unremarkable.

## 2017-12-14 VITALS — DIASTOLIC BLOOD PRESSURE: 79 MMHG | SYSTOLIC BLOOD PRESSURE: 143 MMHG

## 2017-12-14 RX ADMIN — HEPARIN SODIUM SCH UNIT: 5000 INJECTION, SOLUTION INTRAVENOUS; SUBCUTANEOUS at 13:41

## 2017-12-14 RX ADMIN — IPRATROPIUM BROMIDE AND ALBUTEROL SULFATE SCH AMPUL: .5; 3 SOLUTION RESPIRATORY (INHALATION) at 13:46

## 2017-12-14 RX ADMIN — HYDROCHLOROTHIAZIDE SCH MG: 25 TABLET ORAL at 09:34

## 2017-12-14 RX ADMIN — HEPARIN SODIUM SCH UNIT: 5000 INJECTION, SOLUTION INTRAVENOUS; SUBCUTANEOUS at 05:52

## 2017-12-14 RX ADMIN — CEFTRIAXONE SODIUM SCH MLS/10 MIN: 10 INJECTION, POWDER, FOR SOLUTION INTRAVENOUS at 11:41

## 2017-12-14 RX ADMIN — IPRATROPIUM BROMIDE AND ALBUTEROL SULFATE SCH AMPUL: .5; 3 SOLUTION RESPIRATORY (INHALATION) at 08:34

## 2017-12-14 RX ADMIN — FAMOTIDINE SCH MG: 20 TABLET ORAL at 09:34

## 2017-12-14 RX ADMIN — AZITHROMYCIN SCH MG: 250 TABLET, FILM COATED ORAL at 09:34

## 2017-12-14 NOTE — DISCHARGE SUMMARY
Providers





- Providers


Date of Admission: 


12/09/17 19:28





Date of discharge: 12/14/17


Attending physician: 


ADDISON LAMA





 





12/10/17 13:08


Consult to Dietitian/Nutrition [CONS] Routine 


   Physician Instructions: bmi 66


   Reason For Exam: 


   Reason for Consult: Diet education





12/11/17 11:30


Consult to Physician [CONS] Routine 


   Consulting Provider: ANIRUDH NGUYỄN


   Reason For Exam: resp failure, fabian, bmi 67


   Place consult to:: Conrad


   Notified:: yes


   Phone number called:: [phone]


   If yes, spoke with:: Giuliana


   Time called:: 13:15











Primary care physician: 


PRIMARY CARE MD








Hospitalization


Condition: Stable


Hospital course: 


Patient is 27-year-old extremely obese woman BMI 88.2 with sequelae of health 

conditions such as sleep apnea noncompliant with CPAP, asthma who presents with 

acute shortness of breath with hypoxia pulse ox 87%.  Chest x-ray shows right 

lower lobe infiltrates.





-Acute hypoxic respiratory failure: Treated with oxygen


-Acute asthma exacerbation: Treated with IV steroids and nebulizer treatments


-Sepsis pneumonia, right lower lobe aspiration type: Treatment IV antibiotics


-Extreme obesity BMI 88.2 with obesity hypoventilation syndrome suspected/sleep 

apnea: Counseling done, she says she is getting insurance next month and will 

reestablish care with physicians, consult dietitian


-DVT prophylaxis: change lovenox to sq heparin due to weight





Bariatric bed ordered and accurate weight done and BMI is actually 88.2 not 66.2





Patient pulse ox dropped into in the 70s when she walked to the nursing 

station. She need home oxygen, but she has no insurance so she will need to pay 

for it herself. 





The cost of O2 is 250.00 per month according to case management. Patient's 

insurance will start Jan. 1st according to patient, so the o2 will be covered 

then. She doesn't want to pay for O2 until then. I told her she can't work or 

drive without O2. 





D/c once cleared by pulmonology. 





per pulmonology: "Community-acquired pneumonia.  Resolved infiltrate on x-ray.


Chronic respiratory acidosis.  Possibly related to obesity hypoventilation 

syndrome.


Asthma exacerbation.  Controlled


Obesity hypoventilation syndrome


FABIAN by history


Severe morbid obesity


Recommendations


2 by mouth Complete antibiotics  


Outpatient treatment with Breo or Symbicort for wheezing at discharge


Out of bed as tolerated.  


Incentive spirometry, continue as an outpatient


DVT prophylaxis measures


Weight control reduction diet


Outpatient evaluation for her sleep apnea.  We can do this at the office or she 

can continue her follow-up at [facility] clinic


Discussed with patient in detail.  


Will sign off."





I had a long discussion with patient, she doesn't want to pay for oxygen (

mostly likely to be prorated this month but she doesn't want to pay anything). 

She uses her mother oxygen tank when she needs. I will discharge her home with 

Oxygen and if she decides to pay for O2 then she can call the company. She is 

not cleared for work and no strenous activities stressed. 


Disposition: DC-01 TO HOME OR SELFCARE


Time spent for discharge: 39 minutes





Core Measure Documentation





- Palliative Care


Palliative Care/ Comfort Measures: Not Applicable





- Core Measures


Any of the following diagnoses?: none





- VTE Discharge Requirements


Deep Vein Thrombosis/Pulmonary Embolism Present on Admission: No


Has pt received <5 days of overlap therapy or INR<2.0: No


Anticoagulant overlap therapy prescribed at discharge: No


Contraindication No Overlap Therapy order at DC: Not Indicated





Exam





- Physical Exam


Narrative exam: 


GEN: Ill-appearing Morbid obese BMI 88.2 NAD, AWAKE, ALERT, ORIENTATED 3


HEENT: NCAT, EOMI, PERRL, OP Clear


NECK: supple, no adenopathy, no thyromegaly, no JVD


CVS/HEART: RRR, NORMAL S1S2, NO JVD, pulses present bilaterally


CHEST/LUNGS: Bilateral rhonchi Symmetrical chest expansion, diminished entry 

bilaterally


GI/Abdomen: soft, NTND, good bowel sounds, no guarding or rebound


/Bladder: no suprapubic tenderness, no CVA or paraspinal tenderness


EXT/Skin: no c/c/e, no obvious rash


MSK: FROM x 4


Neuro: CN 2-12 grossly intact, no new focal deficits


Psych: calm








- Constitutional


Vitals: 


 











Temp Pulse Resp BP Pulse Ox


 


 97.7 F   87   16   134/82   97 


 


 12/14/17 07:48  12/14/17 08:35  12/14/17 08:35  12/14/17 07:48  12/14/17 08:34














Plan


Activity: other (no strenous activity until cleared by pulmonologist)


Diet: low fat (2000 michael maximum per day including liquids calories)


Special Instructions: home oxygen via


Durable Medical Equipment Needed Upon Discharge: Oxygen, Nebulizer


Follow up with: 


PRIMARY CARE,MD [Primary Care Provider] - 7 Days


GREGORY CALLEJAS MD [Staff Physician] - 7 Days


Prescriptions: 


RX: ALBUTEROL NEB's [Proventil 0.083% NEBS] 2.5 mg IH Q3HRT PRN #30 day


 PRN Reason: Shortness Of Breath


RX: Hydrochlorothiazide [HCTZ] 25 mg PO QDAY #30 tablet


Levofloxacin [Levaquin] 750 mg PO QDAY #3 day


methylPREDNISolone [Medrol Dose George] 1 dose PO DAILY #1 pack


RX: Ipratropium/Albuterol Sulfate [DUONEB *Not for PRN Use*] 1 ampul IH TIDRT #

30 day


Other Discharge Orders: 


Nebulizer  (Amb) Location: Determined By Patient


Oxygen (Amb) Location: Determined By Patient

## 2017-12-14 NOTE — PROGRESS NOTE
Assessment and Plan





Community-acquired pneumonia.  Resolved infiltrate on x-ray.


Chronic respiratory acidosis.  Possibly related to obesity hypoventilation 

syndrome.


Asthma exacerbation.  Controlled


Obesity hypoventilation syndrome


FABIAN by history


Severe morbid obesity








Recommendations





Complete antibiotics  


Outpatient treatment with Breo or Symbicort for wheezing at discharge


Out of bed as tolerated.  


Incentive spirometry, continue as an outpatient


DVT prophylaxis measures


Weight control reduction diet


Outpatient evaluation for her sleep apnea.  We can do this at the office or she 

can continue her follow-up at [facility] clinic








Discussed with patient in detail.  





Will sign off.











Subjective


Date of service: 12/14/17


Principal diagnosis: community-acquired pneumonia, asthma exacerbation, morbid 

obesity


Interval history: 


No complaints reported.  No cough.  Breathing appears to be adequate per 

patient.  Asking when she can be discharged.








Objective


 Vital Signs - 12hr











  12/14/17 12/14/17 12/14/17





  01:16 04:16 07:48


 


Temperature 97.6 F 98.0 F 97.7 F


 


Pulse Rate  83 66


 


Pulse Rate [   





Throughout]   


 


Respiratory 19 18 24





Rate   


 


Respiratory   





Rate [   





Throughout]   


 


Blood Pressure 154/104 142/99 134/82


 


O2 Sat by Pulse  89 97





Oximetry   














  12/14/17 12/14/17 12/14/17





  08:00 08:34 08:35


 


Temperature   


 


Pulse Rate   


 


Pulse Rate [ 87  87





Throughout]   


 


Respiratory   





Rate   


 


Respiratory 17  16





Rate [   





Throughout]   


 


Blood Pressure   


 


O2 Sat by Pulse  97 





Oximetry   











Constitutional: no acute distress, alert, other (severe morbid obesity.)


Neck: supple, no JVD (difficult to evaluate the patient obesity)


Ascultation: Bilateral: clear, diminished breath sounds


Cardiovascular: regular rate and rhythm


Gastrointestinal: normoactive bowel sounds, non-tender, non-distended


Extremities: no cyanosis, no edema


Neurologic: normal mental status, non-focal exam, CN II-XII normal, motor 

strength normal and


Psychiatric: mood appropriate, affect normal


CBC and BMP: 


 12/10/17 05:43





 12/10/17 05:43


ABG, PT/INR, D-dimer: 


ABG











POC ABG pH  7.365  (7.35-7.45)   12/09/17  16:19    


 


POC ABG pCO2  49.9  (35-45)  H  12/09/17  16:19    


 


POC ABG pO2  66  ()  L  12/09/17  16:19    


 


POC ABG HCO3  28.5   12/09/17  16:19    


 


POC ABG Total CO2  30   12/09/17  16:19    


 


POC ABG O2 Sat  92   12/09/17  16:19    








Abnormal lab findings: 


 Abnormal Labs











  12/09/17 12/09/17 12/09/17





  13:11 13:11 15:45


 


WBC   


 


RBC  5.11 H  


 


MCV  71 L  


 


MCH  22 L  


 


RDW  19.3 H  


 


Lymph % (Auto)   


 


Seg Neutrophils %   


 


Seg Neuts % (Manual)  78.0 H  


 


Seg Neutrophils #   


 


Seg Neutrophils # Man  8.0 H  


 


POC ABG pCO2   


 


POC ABG pO2   


 


Chloride   


 


Creatinine   0.5 L 


 


Glucose   134 H 


 


NT-Pro-B Natriuret Pep    1693 H


 


Total Protein   


 


Albumin   


 


Ur Specific Gravity   














  12/09/17 12/09/17 12/10/17





  16:00 16:19 05:43


 


WBC    12.3 H


 


RBC   


 


MCV    72 L


 


MCH    22 L


 


RDW    19.5 H


 


Lymph % (Auto)    10.7 L


 


Seg Neutrophils %    85.9 H


 


Seg Neuts % (Manual)   


 


Seg Neutrophils #    10.6 H


 


Seg Neutrophils # Man   


 


POC ABG pCO2   49.9 H 


 


POC ABG pO2   66 L 


 


Chloride   


 


Creatinine   


 


Glucose   


 


NT-Pro-B Natriuret Pep   


 


Total Protein   


 


Albumin   


 


Ur Specific Gravity  1.001 L  














  12/10/17





  05:43


 


WBC 


 


RBC 


 


MCV 


 


MCH 


 


RDW 


 


Lymph % (Auto) 


 


Seg Neutrophils % 


 


Seg Neuts % (Manual) 


 


Seg Neutrophils # 


 


Seg Neutrophils # Man 


 


POC ABG pCO2 


 


POC ABG pO2 


 


Chloride  97.4 L


 


Creatinine  0.4 L


 


Glucose  158 H


 


NT-Pro-B Natriuret Pep 


 


Total Protein  8.6 H


 


Albumin  3.8 L


 


Ur Specific [place] 











Chest x-ray: report reviewed, image reviewed

## 2020-10-24 NOTE — CONSULTATION
History of Present Illness


Consult date: 12/11/17


Reason for consult: dyspnea, cough, pneumonia, other (morbid obesity, BMI of 

67.4)


History of present illness: 


Called to evaluate case of a 27-year-old -American female, with history 

of asthma presenting with shortness of breath, general malaise and fever for 

the past 4 days.  The patient has prior history of asthma, morbid obesity with 

reportedly sleep apnea but not on active treatment.  Patient reports that she 

started to feel sick about one week ago, with new onset of increased cough, 

wheezing and fever noted sporadically.  Her sputum becomes greenish since then, 

no hemoptysis reported.  No chest pain or chills.  She denies any history of 

sick contact.


Has no flow by sedation this year.  She does not smoke.  Has never been 

intubated for asthma.


Reported history of asthma for years now and she was first diagnosed with sleep 

apnea in high school.  Does not use a CPAP .  No pulmonary follow-up.








Past History


Past Surgical History: No surgical history


Social history: full code.  denies: smoking, alcohol abuse, IV drug use


Family history: CAD, hypertension





Medications and Allergies


 Allergies











Allergy/AdvReac Type Severity Reaction Status Date / Time


 


No Known Allergies Allergy   Unverified 07/13/17 11:17











 Home Medications











 Medication  Instructions  Recorded  Confirmed  Last Taken  Type


 


ALBUTEROL Inhaler [ProAir HFA 2 puff IH QID PRN #1 inhalation 10/09/17 12/12/17 

Unknown Rx





Inhaler]     


 


ALBUTEROL NEB's [Proventil 0.083% 2.5 mg IH Q4H PRN #1 box 10/09/17 12/12/17 

Unknown Rx





NEBS]     











Active Meds: 


Active Medications





Acetaminophen (Tylenol)  650 mg PO Q4H PRN


   PRN Reason: Pain MILD(1-3)/Fever >100.5/HA


Albuterol (Proventil)  2.5 mg IH Q3HRT PRN


   PRN Reason: Shortness Of Breath


   Last Admin: 12/09/17 13:40 Dose:  2.5 mg


Albuterol/Ipratropium (Duoneb *Not For Prn Use*)  1 ampul IH TIDRT DELL


   Last Admin: 12/11/17 13:29 Dose:  1 ampul


Azithromycin (Zithromax)  500 mg PO QDAY DELL


Bisacodyl (Dulcolax)  10 mg AR QDAY PRN


   PRN Reason: Constipation unrelieved by MOM


Famotidine (Pepcid)  20 mg PO BID [facility]


   Last Admin: 12/11/17 10:35 Dose:  20 mg


Heparin Sodium (Porcine) (Heparin)  5,000 unit SUB-Q Q8HR [facility]


   Last Admin: 12/11/17 06:37 Dose:  5,000 unit


Hydrochlorothiazide (Hctz)  25 mg PO QDAY [facility]


   Last Admin: 12/11/17 10:35 Dose:  25 mg


Azithromycin 500 mg/ Sodium (Chloride)  250 mls @ 250 mls/hr IV Q24HR [facility]


   Stop: 12/11/17 13:59


   Last Admin: 12/11/17 12:42 Dose:  250 mls/hr


Ceftriaxone Sodium 2 gm/ (Sodium Chloride)  20 mls @ 20 mls/10 min IV Q24HR [facility]


   Last Admin: 12/11/17 12:31 Dose:  20 mls/10 min


Magnesium Hydroxide (Milk Of Magnesia)  30 ml PO Q4H PRN


   PRN Reason: Constipation


Methylprednisolone Sodium Succinate (Solu-Medrol)  60 mg IV Q8H [facility]


   Last Admin: 12/11/17 11:48 Dose:  60 mg


Morphine Sulfate (Morphine)  2 mg IV Q4H PRN


   PRN Reason: Pain, Moderate (4-6)


Ondansetron HCl (Zofran)  4 mg IV Q8H PRN


   PRN Reason: N/V unrelieved by Reglan


Oxycodone/Acetaminophen (Percocet 5/325)  1 tab PO Q6H PRN


   PRN Reason: Pain, Moderate (4-6)











Review of Systems


Constitutional: weight gain, fever, chills, fatigue, no sweats, no night sweats

, no weakness, no malaise, no lethargy, no daytime sleepiness


Ears, nose, mouth and throat: no ear pain, no ear discharge, no tinnitis


Cardiovascular: edema, shortness of breath, dyspnea on exertion, no chest pain, 

no orthopnea, no palpitations, no syncope


Respiratory: cough, cough with sputum, excessive sputum, congestion, sleep apnea

, no hemoptysis, no wheezing, no pleurisy, no pain, no home oxygen


Gastrointestinal: no abdominal pain, no nausea, no vomiting, no diarrhea


Neurological: no head injury, no transient paralysis, no paralysis, no weakness

, no parathesias





Physical Examination


Vital signs: 


 Vital Signs











Temp


 


 97.8 F 


 


 12/09/17 12:58











General appearance: no acute distress, alert, other (severe morbid obesity.)


ENT: other (Mallampati 4)


Neck: supple, no JVD (difficult to evaluate the patient obesity)


Ascultation: Right: wheezes, rhonchi, Bilateral: diminished breath sounds


Gastrointestinal: normoactive bowel sounds, non-tender, non-distended


Extremities: no cyanosis, no edema


Musculoskeletal: no deformities


normal mental status, non-focal exam, CN II-XII normal, motor strength normal 

and


mood appropriate, affect normal





Results





- Laboratory Findings


CBC and BMP: 


 12/10/17 05:43





 12/10/17 05:43


ABG











POC ABG pH  7.365  (7.35-7.45)   12/09/17  16:19    


 


POC ABG pCO2  49.9  (35-45)  H  12/09/17  16:19    


 


POC ABG pO2  66  ()  L  12/09/17  16:19    


 


POC ABG HCO3  28.5   12/09/17  16:19    


 


POC ABG Total CO2  30   12/09/17  16:19    


 


POC ABG O2 Sat  92   12/09/17  16:19    








Abnormal lab findings: 


 Abnormal Labs











  12/09/17 12/09/17 12/09/17





  13:11 13:11 15:45


 


WBC   


 


RBC  5.11 H  


 


MCV  71 L  


 


MCH  22 L  


 


RDW  19.3 H  


 


Lymph % (Auto)   


 


Seg Neutrophils %   


 


Seg Neuts % (Manual)  78.0 H  


 


Seg Neutrophils #   


 


Seg Neutrophils # Man  8.0 H  


 


POC ABG pCO2   


 


POC ABG pO2   


 


Chloride   


 


Creatinine   0.5 L 


 


Glucose   134 H 


 


NT-Pro-B Natriuret Pep    1693 H


 


Total Protein   


 


Albumin   


 


Ur Specific Gravity   














  12/09/17 12/09/17 12/10/17





  16:00 16:19 05:43


 


WBC    12.3 H


 


RBC   


 


MCV    72 L


 


MCH    22 L


 


RDW    19.5 H


 


Lymph % (Auto)    10.7 L


 


Seg Neutrophils %    85.9 H


 


Seg Neuts % (Manual)   


 


Seg Neutrophils #    10.6 H


 


Seg Neutrophils # Man   


 


POC ABG pCO2   49.9 H 


 


POC ABG pO2   66 L 


 


Chloride   


 


Creatinine   


 


Glucose   


 


NT-Pro-B Natriuret Pep   


 


Total Protein   


 


Albumin   


 


Ur Specific Gravity  1.001 L  














  12/10/17





  05:43


 


WBC 


 


RBC 


 


MCV 


 


MCH 


 


RDW 


 


Lymph % (Auto) 


 


Seg Neutrophils % 


 


Seg Neuts % (Manual) 


 


Seg Neutrophils # 


 


Seg Neutrophils # Man 


 


POC ABG pCO2 


 


POC ABG pO2 


 


Chloride  97.4 L


 


Creatinine  0.4 L


 


Glucose  158 H


 


NT-Pro-B Natriuret Pep 


 


Total Protein  8.6 H


 


Albumin  3.8 L


 


Ur Specific Gravity 














- Diagnostic Findings


Chest x-ray: report reviewed, image reviewed





Assessment and Plan





Community-acquired pneumonia.  Patient will large her consolidation in the 

right lower lobe.


Chronic respiratory acidosis.  Possibly related to obesity hypoventilation 

syndrome.


Obesity hypoventilation syndrome


FABIAN by history


Severe morbid obesity








Recommendations





SC


B/C q 15 min x 2


Ceftriazone 1-2 g qd IV/Azithromycin 500 mg qd IV or Levaquin 750 mg qd if no 

allergies or contraindications


Review antibiotic treatment once cultures available and de-escalate if 

appropriate


Albuterol 2.5 milligram nebulizations every 4-6 hours with or without 

ipratropium


Solu-Medrol 40-60 mg IV every 6-8 hours


Oxygen support via nasal cannula or mask to maintain oximetry over 92%


DVT prophylaxis measures














Thanks, will follow with you.
9

## 2021-01-21 ENCOUNTER — HOSPITAL ENCOUNTER (INPATIENT)
Dept: HOSPITAL 5 - ED | Age: 31
LOS: 3 days | Discharge: HOME | DRG: 189 | End: 2021-01-24
Attending: INTERNAL MEDICINE | Admitting: INTERNAL MEDICINE
Payer: COMMERCIAL

## 2021-01-21 DIAGNOSIS — J96.01: Primary | ICD-10-CM

## 2021-01-21 DIAGNOSIS — Z79.899: ICD-10-CM

## 2021-01-21 DIAGNOSIS — E66.2: ICD-10-CM

## 2021-01-21 DIAGNOSIS — Z20.822: ICD-10-CM

## 2021-01-21 DIAGNOSIS — I10: ICD-10-CM

## 2021-01-21 DIAGNOSIS — Z79.891: ICD-10-CM

## 2021-01-21 DIAGNOSIS — J45.901: ICD-10-CM

## 2021-01-21 DIAGNOSIS — Z79.01: ICD-10-CM

## 2021-01-21 LAB
ALBUMIN SERPL-MCNC: 3.5 G/DL (ref 3.9–5)
ALT SERPL-CCNC: 25 UNITS/L (ref 7–56)
BACTERIA #/AREA URNS HPF: (no result) /HPF
BASOPHILS # (AUTO): 0 K/MM3 (ref 0–0.1)
BASOPHILS NFR BLD AUTO: 0.6 % (ref 0–1.8)
BILIRUB UR QL STRIP: (no result)
BLOOD UR QL VISUAL: (no result)
BUN SERPL-MCNC: 7 MG/DL (ref 7–17)
BUN/CREAT SERPL: 12 %
CALCIUM SERPL-MCNC: 8.7 MG/DL (ref 8.4–10.2)
EOSINOPHIL # BLD AUTO: 0.2 K/MM3 (ref 0–0.4)
EOSINOPHIL NFR BLD AUTO: 1.8 % (ref 0–4.3)
HCO3 BLDA-SCNC: 35.7 MMOL/L (ref 20–26)
HCT VFR BLD CALC: 35.4 % (ref 30.3–42.9)
HEMOLYSIS INDEX: 68
HGB BLD-MCNC: 10.4 GM/DL (ref 10.1–14.3)
LYMPHOCYTES # BLD AUTO: 1.6 K/MM3 (ref 1.2–5.4)
LYMPHOCYTES NFR BLD AUTO: 19.2 % (ref 13.4–35)
MCHC RBC AUTO-ENTMCNC: 29 % (ref 30–34)
MCV RBC AUTO: 75 FL (ref 79–97)
MONOCYTES # (AUTO): 0.6 K/MM3 (ref 0–0.8)
MONOCYTES % (AUTO): 7.1 % (ref 0–7.3)
MUCOUS THREADS #/AREA URNS HPF: (no result) /HPF
PCO2 BLDA: 64.3 MM HG
PH BLDA: 7.36 PH UNITS (ref 7.35–7.45)
PH UR STRIP: 7 [PH] (ref 5–7)
PLATELET # BLD: 284 K/MM3 (ref 140–440)
PO2 BLDA: 61.4 MM HG (ref 80–90)
RBC # BLD AUTO: 4.72 M/MM3 (ref 3.65–5.03)
RBC #/AREA URNS HPF: 1 /HPF (ref 0–6)
UROBILINOGEN UR-MCNC: < 2 MG/DL (ref ?–2)
WBC #/AREA URNS HPF: 1 /HPF (ref 0–6)

## 2021-01-21 PROCEDURE — 93306 TTE W/DOPPLER COMPLETE: CPT

## 2021-01-21 PROCEDURE — 82728 ASSAY OF FERRITIN: CPT

## 2021-01-21 PROCEDURE — 80048 BASIC METABOLIC PNL TOTAL CA: CPT

## 2021-01-21 PROCEDURE — U0003 INFECTIOUS AGENT DETECTION BY NUCLEIC ACID (DNA OR RNA); SEVERE ACUTE RESPIRATORY SYNDROME CORONAVIRUS 2 (SARS-COV-2) (CORONAVIRUS DISEASE [COVID-19]), AMPLIFIED PROBE TECHNIQUE, MAKING USE OF HIGH THROUGHPUT TECHNOLOGIES AS DESCRIBED BY CMS-2020-01-R: HCPCS

## 2021-01-21 PROCEDURE — 80053 COMPREHEN METABOLIC PANEL: CPT

## 2021-01-21 PROCEDURE — 4A033R1 MEASUREMENT OF ARTERIAL SATURATION, PERIPHERAL, PERCUTANEOUS APPROACH: ICD-10-PCS | Performed by: INTERNAL MEDICINE

## 2021-01-21 PROCEDURE — 82803 BLOOD GASES ANY COMBINATION: CPT

## 2021-01-21 PROCEDURE — 94760 N-INVAS EAR/PLS OXIMETRY 1: CPT

## 2021-01-21 PROCEDURE — 93970 EXTREMITY STUDY: CPT

## 2021-01-21 PROCEDURE — 85025 COMPLETE CBC W/AUTO DIFF WBC: CPT

## 2021-01-21 PROCEDURE — 94644 CONT INHLJ TX 1ST HOUR: CPT

## 2021-01-21 PROCEDURE — 36415 COLL VENOUS BLD VENIPUNCTURE: CPT

## 2021-01-21 PROCEDURE — 85610 PROTHROMBIN TIME: CPT

## 2021-01-21 PROCEDURE — 81001 URINALYSIS AUTO W/SCOPE: CPT

## 2021-01-21 PROCEDURE — 93005 ELECTROCARDIOGRAM TRACING: CPT

## 2021-01-21 PROCEDURE — G0378 HOSPITAL OBSERVATION PER HR: HCPCS

## 2021-01-21 PROCEDURE — 84484 ASSAY OF TROPONIN QUANT: CPT

## 2021-01-21 PROCEDURE — 94640 AIRWAY INHALATION TREATMENT: CPT

## 2021-01-21 PROCEDURE — 81025 URINE PREGNANCY TEST: CPT

## 2021-01-21 PROCEDURE — 71045 X-RAY EXAM CHEST 1 VIEW: CPT

## 2021-01-21 PROCEDURE — 85379 FIBRIN DEGRADATION QUANT: CPT

## 2021-01-21 PROCEDURE — 83615 LACTATE (LD) (LDH) ENZYME: CPT

## 2021-01-21 NOTE — HISTORY AND PHYSICAL REPORT
History of Present Illness


Date of examination: 01/21/21


Date of admission: 


01/21/2021


Chief complaint: 





Shortness of Breath


History of present illness: 





30-year-old female with known history of asthma and hypertension presenting to 

the emergency room today complaining of shortness of breath which has been 

ongoing for the past few days.  She has been using her albuterol nebulizing 

treatments at home without any significant improvement. 


She denies any chest pain, no fever or chills, no nausea vomiting, no headache 

or dizziness.  She denies any sick contacts and no recent travel.  Denies any 

contact with anyone with COVID-19.





 Upon arrival in the emergency room patient's oxygen saturation was about 77%.


He was placed on oxygen 4 L of nasal cannula with improvement of oxygen 

saturation to 96%.








Work-up in the emergency room reveals elevated D-dimer.


Chest x-ray showed no acute findings.  CT angiogram could not be done because of

patient's size.





Patient is being admitted with asthma exacerbation with hypoxia.


Patient will be scheduled for VQ scan in the a.m. to evaluate for possible 

pulmonary embolism.





Past History


Past Medical History: hypertension, other (Asthma,Morbid Obesity )


Past Surgical History: No surgical history


Social history: no significant social history


Family history: no significant family history





Medications and Allergies


                                    Allergies











Allergy/AdvReac Type Severity Reaction Status Date / Time


 


No Known Allergies Allergy   Unverified 07/13/17 11:17











                                Home Medications











 Medication  Instructions  Recorded  Confirmed  Last Taken  Type


 


ALBUTEROL NEB's [Proventil 0.083% 2.5 mg IH Q3HRT PRN #30 day 12/14/17  Unknown 

Rx





NEBS]     


 


Acetaminophen [Acetaminophen TAB] 325 mg PO Q4H PRN #30 tablet 12/14/17  Unknown

Rx


 


Famotidine [Pepcid] 20 mg PO BID #14 tablet 12/14/17  Unknown Rx


 


Ipratropium/Albuterol Sulfate 1 ampul IH TIDRT #30 day 12/14/17  Unknown Rx





[DUONEB *Not for PRN Use*]     


 


Albuterol Mdi (or & Nicu Only) 2 puff IH QID PRN #1 inhalation 12/04/18  Unknown

Rx





[ProAir HFA Inhaler]     


 


Azithromycin [Zithromax Z-JAVIER] 0 mg PO DAILY #1 tab 12/04/18  Unknown Rx


 


Benzonatate [Tessalon Perles] 100 mg PO Q8HR #15 capsule 12/04/18  Unknown Rx


 


hydroCHLOROthiazide [HCTZ] 25 mg PO QDAY #30 tablet 12/04/18  Unknown Rx


 


predniSONE 10 mg PO DAILY #13 tablet 12/04/18  Unknown Rx











Active Meds: 


Active Medications





Acetaminophen (Acetaminophen 325 Mg Tab)  650 mg PO Q4H PRN


   PRN Reason: Pain MILD(1-3)/Fever >100.5/HA


Albuterol/Ipratropium (Ipratropium/Albuterol Sulfate 3 Ml Ampul.Neb)  1 ampul IH

 Q6HRT DELL


Sodium Chloride (Nacl 0.9% 1000 Ml)  1,000 mls @ 125 mls/hr IV AS DIRECT DELL


Magnesium Hydroxide (Magnesium Hydroxide (Mom) Oral Liqd Udc)  30 ml PO Q4H PRN


   PRN Reason: Constipation


Methylprednisolone Sodium Succinate (Methylprednisolone Sod Succinate 40 Mg/1 Ml

 Inj)  40 mg IV Q8HR DELL


Morphine Sulfate (Morphine 2 Mg/1 Ml Inj)  2 mg IV Q4H PRN


   PRN Reason: Pain, Moderate (4-6)


Ondansetron HCl (Ondansetron 4 Mg/2 Ml Inj)  4 mg IV Q8H PRN


   PRN Reason: Nausea And Vomiting


Sodium Chloride (Sodium Chloride 0.9% 10 Ml Flush Syringe)  10 ml IV BID DELL


Sodium Chloride (Sodium Chloride 0.9% 10 Ml Flush Syringe)  10 ml IV PRN PRN


   PRN Reason: LINE FLUSH











Review of Systems


Constitutional: no fever, no chills, no fatigue


Ears, nose, mouth and throat: no nasal congestion, no sore throat


Cardiovascular: no chest pain, no palpitations


Respiratory: shortness of breath, wheezing, no cough


Gastrointestinal: no abdominal pain, no nausea, no vomiting, no diarrhea


Genitourinary Female: no pelvic pain, no flank pain, no dysuria


Musculoskeletal: no neck pain, no low back pain


Integumentary: no rash, no pruritis


Neurological: no headaches, no confusion


Psychiatric: no anxiety, no depression





Exam





- Constitutional


Vitals: 


                                        











Temp Pulse Resp BP Pulse Ox


 


 98.3 F   96 H  22   166/103   99 


 


 01/21/21 14:35  01/21/21 22:11  01/21/21 22:11  01/21/21 14:35  01/21/21 18:33











General appearance: Present: no acute distress, well-nourished, obese





- EENT


Eyes: Present: PERRL, EOM intact.  Absent: scleral icterus


ENT: hearing intact, clear oral mucosa, dentition normal





- Neck


Neck: Present: supple, normal ROM





- Respiratory


Respiratory effort: normal


Respiratory: bilateral: wheezing





- Cardiovascular


Rhythm: regular


Heart Sounds: Present: S1 & S2.  Absent: gallop, systolic murmur, diastolic 

murmur, rub, click





- Extremities


Extremities: no ischemia, pulses intact, pulses symmetrical, Full ROM


Peripheral Pulses: within normal limits





- Abdominal


General gastrointestinal: Present: soft, non-tender, distended (Obese.), normal 

bowel sounds, mass





- Integumentary


Integumentary: Present: clear, warm, dry.  Absent: rash





- Musculoskeletal


Musculoskeletal: strength equal bilaterally





- Psychiatric


Psychiatric: appropriate mood/affect, intact judgment & insight, memory intact, 

cooperative





- Neurologic


Neurologic: CNII-XII intact, no focal deficits, moves all extremities





HEART Score





- HEART Score


Troponin: 


                                        











Troponin T  < 0.010 ng/mL (0.00-0.029)   01/21/21  20:16    














Results





- Labs


CBC & Chem 7: 


                                 01/21/21 16:13





                                 01/21/21 16:13


Labs: 


                              Abnormal lab results











  01/21/21 01/21/21 01/21/21 Range/Units





  16:13 16:13 22:05 


 


MCV  75 L    (79-97)  fl


 


MCH  22 L    (28-32)  pg


 


MCHC  29 L    (30-34)  %


 


RDW  19.7 H    (13.2-15.2)  %


 


Seg Neutrophils %  71.3 H    (40.0-70.0)  %


 


ABG pO2    61.4 L  (80.0-90.0)  mm Hg


 


ABG HCO3    35.7 H  (20.0-26.0)  mmol/L


 


ABG O2 Saturation    90.8 L  (95.0-99.0)  %


 


ABG Base Excess    8.7 H  (-2.0-3.0)  mmol/L


 


ABG Hemoglobin    9.6 L  (12.0-16.0)  gm/dl


 


Oxyhemoglobin    88.7 L  (95.0-99.0)  %


 


Sodium   136 L   (137-145)  mmol/L


 


Chloride   94.7 L   ()  mmol/L


 


Carbon Dioxide   36 H   (22-30)  mmol/L


 


Total Protein   8.7 H   (6.3-8.2)  g/dL


 


Albumin   3.5 L   (3.9-5)  g/dL














Assessment and Plan





- Patient Problems


(1) Asthma with severe exacerbation


Current Visit: Yes   Status: Acute   


Plan to address problem: 


Patient placed on nebulizing treatments and IV steroid.  Will monitor oxygen 

saturation closely.








(2) Hypoxia


Current Visit: Yes   Status: Acute   


Plan to address problem: 


Patient placed on oxygen by nasal cannula.  We will keep O2 saturation greater 

or equal to 94%.








(3) Morbid obesity with BMI of 70 and over, adult


Current Visit: Yes   Status: Acute   


Plan to address problem: 


Patient has BMI of 81.5


Dietary consult placed.








(4) DVT prophylaxis


Current Visit: Yes   Status: Acute   


Plan to address problem: 


Patient placed on anticoagulation with subcutaneous Lovenox.


She has been given subcutaneous Lovenox 1 mg/kg meanwhile pending when VQ scan 

will be done.








(5) Full code status


Current Visit: Yes   Status: Acute   


Plan to address problem: 


Patient is a full code.

## 2021-01-21 NOTE — EVENT NOTE
ED Screening Note


ED Screening Note: 


morbidly obese AA asthmatic comes to ER with cp and sob





sat 75





mom a/w


dad dec ACS


non smoker





This initial assessment/diagnostic orders/clinical plan/treatment(s) is/are 

subject to change based on patients health status, clinical progression and re-

assessment by fellow clinical providers in the ED. Further treatment and workup 

at subsequent clinical providers discretion. Patient/guardian urged not to elope

from the ED as their condition may be serious if not clinically assessed and 

managed. 





Initial orders include: 


ro asthma ae/uri/covid/acs

## 2021-01-21 NOTE — EMERGENCY DEPARTMENT REPORT
ED General Adult HPI





- General


Chief complaint: Dyspnea/Respdistress


Stated complaint: CHEST PAIN


Time Seen by Provider: 01/21/21 14:36


Source: patient


Mode of arrival: Ambulatory


Limitations: No Limitations





- History of Present Illness


Initial comments: 





The patient presents to the emergency department with a chief complaint of 

shortness of breath  that has been present for the last couple of days.  Patient

states she has had no relief of her shortness of breath with albuterol nebulizer

treatments at home.  Upon the patient's arrival to the ED her O2 sats was 77%.  

Patient was placed on 4 L of nasal cannula and O2 sats raised to 96%.


-: Gradual


Location: chest


Radiation: non-radiation


Severity scale (0 -10): 4


Quality: aching


Consistency: constant


Improves with: none


Worsens with: none


Associated Symptoms: denies other symptoms


Treatments Prior to Arrival: none





- Related Data


                                  Previous Rx's











 Medication  Instructions  Recorded  Last Taken  Type


 


ALBUTEROL NEB's [Proventil 0.083% 2.5 mg IH Q3HRT PRN #30 day 12/14/17 Unknown 

Rx





NEBS]    


 


Acetaminophen [Acetaminophen TAB] 325 mg PO Q4H PRN #30 tablet 12/14/17 Unknown 

Rx


 


Famotidine [Pepcid] 20 mg PO BID #14 tablet 12/14/17 Unknown Rx


 


Ipratropium/Albuterol Sulfate 1 ampul IH TIDRT #30 day 12/14/17 Unknown Rx





[DUONEB *Not for PRN Use*]    


 


Albuterol Mdi (or & Nicu Only) 2 puff IH QID PRN #1 inhalation 12/04/18 Unknown 

Rx





[ProAir HFA Inhaler]    


 


Azithromycin [Zithromax Z-JAVIER] 0 mg PO DAILY #1 tab 12/04/18 Unknown Rx


 


Benzonatate [Tessalon Perles] 100 mg PO Q8HR #15 capsule 12/04/18 Unknown Rx


 


hydroCHLOROthiazide [HCTZ] 25 mg PO QDAY #30 tablet 12/04/18 Unknown Rx


 


predniSONE 10 mg PO DAILY #13 tablet 12/04/18 Unknown Rx











                                    Allergies











Allergy/AdvReac Type Severity Reaction Status Date / Time


 


No Known Allergies Allergy   Unverified 07/13/17 11:17














ED Review of Systems


ROS: 


Stated complaint: CHEST PAIN


Other details as noted in HPI





Constitutional: denies: chills, fever


Eyes: denies: eye pain, eye discharge, vision change


ENT: denies: ear pain, throat pain


Respiratory: shortness of breath, wheezing.  denies: cough


Cardiovascular: denies: chest pain, palpitations


Endocrine: no symptoms reported


Gastrointestinal: denies: abdominal pain, nausea, diarrhea


Genitourinary: denies: urgency, dysuria, discharge


Musculoskeletal: denies: back pain, joint swelling, arthralgia


Skin: denies: rash, lesions


Neurological: denies: headache, weakness, paresthesias


Psychiatric: denies: anxiety, depression


Hematological/Lymphatic: denies: easy bleeding, easy bruising





ED Past Medical Hx





- Past Medical History


Previous Medical History?: Yes


Hx Hypertension: Yes


Hx Asthma: Yes


Additional medical history: Morbid obesity.  Sleep apnea





- Social History


Smoking Status: Never Smoker





- Medications


Home Medications: 


                                Home Medications











 Medication  Instructions  Recorded  Confirmed  Last Taken  Type


 


ALBUTEROL NEB's [Proventil 0.083% 2.5 mg IH Q3HRT PRN #30 day 12/14/17  Unknown 

Rx





NEBS]     


 


Acetaminophen [Acetaminophen TAB] 325 mg PO Q4H PRN #30 tablet 12/14/17  Unknown

Rx


 


Famotidine [Pepcid] 20 mg PO BID #14 tablet 12/14/17  Unknown Rx


 


Ipratropium/Albuterol Sulfate 1 ampul IH TIDRT #30 day 12/14/17  Unknown Rx





[DUONEB *Not for PRN Use*]     


 


Albuterol Mdi (or & Nicu Only) 2 puff IH QID PRN #1 inhalation 12/04/18  Unknown

Rx





[ProAir HFA Inhaler]     


 


Azithromycin [Zithromax Z-JAVIER] 0 mg PO DAILY #1 tab 12/04/18  Unknown Rx


 


Benzonatate [Tessalon Perles] 100 mg PO Q8HR #15 capsule 12/04/18  Unknown Rx


 


hydroCHLOROthiazide [HCTZ] 25 mg PO QDAY #30 tablet 12/04/18  Unknown Rx


 


predniSONE 10 mg PO DAILY #13 tablet 12/04/18  Unknown Rx














ED Physical Exam





- General


Limitations: No Limitations


General appearance: alert, in no apparent distress





- Head


Head exam: Present: atraumatic, normocephalic





- Eye


Eye exam: Present: normal appearance, PERRL, EOMI





- ENT


ENT exam: Present: mucous membranes moist





- Neck


Neck exam: Present: normal inspection





- Respiratory


Respiratory exam: Present: wheezes, accessory muscle use.  Absent: respiratory 

distress





- Cardiovascular


Cardiovascular Exam: Present: normal rhythm, tachycardia.  Absent: systolic 

murmur, diastolic murmur, rubs, gallop





- GI/Abdominal


GI/Abdominal exam: Present: soft, normal bowel sounds.  Absent: distended, tende

rness





- Extremities Exam


Extremities exam: Present: normal inspection





- Back Exam


Back exam: Present: normal inspection





- Neurological Exam


Neurological exam: Present: alert, oriented X3, CN II-XII intact.  Absent: motor

 sensory deficit





- Psychiatric


Psychiatric exam: Present: normal affect, normal mood





- Skin


Skin exam: Present: warm, dry, intact, normal color.  Absent: rash





ED Course


                                   Vital Signs











  01/21/21 01/21/21 01/21/21





  14:35 14:46 18:33


 


Temperature 98.3 F  


 


Pulse Rate 112 H  70


 


Pulse Rate [   





Bilateral]   


 


Respiratory 26 H  20





Rate   


 


Respiratory   





Rate [Bilateral   





]   


 


Blood Pressure 166/103  





[Right]   


 


O2 Sat by Pulse 77 L 96 99





Oximetry   














  01/21/21





  22:11


 


Temperature 


 


Pulse Rate 


 


Pulse Rate [ 96 H





Bilateral] 


 


Respiratory 





Rate 


 


Respiratory 22





Rate [Bilateral 





] 


 


Blood Pressure 





[Right] 


 


O2 Sat by Pulse 





Oximetry 














ED Medical Decision Making





- Lab Data


Result diagrams: 


                                 01/21/21 16:13





                                 01/21/21 16:13








                                   Lab Results











  01/21/21 01/21/21 01/21/21 Range/Units





  16:13 16:13 17:38 


 


WBC  8.5    (4.5-11.0)  K/mm3


 


RBC  4.72    (3.65-5.03)  M/mm3


 


Hgb  10.4    (10.1-14.3)  gm/dl


 


Hct  35.4    (30.3-42.9)  %


 


MCV  75 L    (79-97)  fl


 


MCH  22 L    (28-32)  pg


 


MCHC  29 L    (30-34)  %


 


RDW  19.7 H    (13.2-15.2)  %


 


Plt Count  284    (140-440)  K/mm3


 


Lymph % (Auto)  19.2    (13.4-35.0)  %


 


Mono % (Auto)  7.1    (0.0-7.3)  %


 


Eos % (Auto)  1.8    (0.0-4.3)  %


 


Baso % (Auto)  0.6    (0.0-1.8)  %


 


Lymph # (Auto)  1.6    (1.2-5.4)  K/mm3


 


Mono # (Auto)  0.6    (0.0-0.8)  K/mm3


 


Eos # (Auto)  0.2    (0.0-0.4)  K/mm3


 


Baso # (Auto)  0.0    (0.0-0.1)  K/mm3


 


Seg Neutrophils %  71.3 H    (40.0-70.0)  %


 


Seg Neutrophils #  6.1    (1.8-7.7)  K/mm3


 


ABG pH     (7.350-7.450)  pH Units


 


ABG pCO2     mm Hg


 


ABG pO2     (80.0-90.0)  mm Hg


 


ABG HCO3     (20.0-26.0)  mmol/L


 


ABG O2 Saturation     (95.0-99.0)  %


 


ABG O2 Content     (0.0-44)  


 


ABG Base Excess     (-2.0-3.0)  mmol/L


 


ABG Hemoglobin     (12.0-16.0)  gm/dl


 


ABG Carboxyhemoglobin     (0.0-5.0)  %


 


ABG Methemoglobin     (0.0-1.5)  %


 


Oxyhemoglobin     (95.0-99.0)  %


 


FiO2     %


 


Sodium   136 L   (137-145)  mmol/L


 


Potassium   4.8   (3.6-5.0)  mmol/L


 


Chloride   94.7 L   ()  mmol/L


 


Carbon Dioxide   36 H   (22-30)  mmol/L


 


Anion Gap   10   mmol/L


 


BUN   7   (7-17)  mg/dL


 


Creatinine   0.6   (0.6-1.2)  mg/dL


 


Estimated GFR   > 60   ml/min


 


BUN/Creatinine Ratio   12   %


 


Glucose   94   ()  mg/dL


 


Calcium   8.7   (8.4-10.2)  mg/dL


 


Total Bilirubin   0.30   (0.1-1.2)  mg/dL


 


AST   28   (5-40)  units/L


 


ALT   25   (7-56)  units/L


 


Alkaline Phosphatase   108   ()  units/L


 


Troponin T   < 0.010   (0.00-0.029)  ng/mL


 


Total Protein   8.7 H   (6.3-8.2)  g/dL


 


Albumin   3.5 L   (3.9-5)  g/dL


 


Albumin/Globulin Ratio   0.7   %


 


Urine Color    Yellow  (Yellow)  


 


Urine Turbidity    Slightly-cloudy  (Clear)  


 


Urine pH    7.0  (5.0-7.0)  


 


Ur Specific Gravity    1.012  (1.003-1.030)  


 


Urine Protein    30 mg/dl  (Negative)  mg/dL


 


Urine Glucose (UA)    Neg  (Negative)  mg/dL


 


Urine Ketones    Neg  (Negative)  mg/dL


 


Urine Blood    Neg  (Negative)  


 


Urine Nitrite    Neg  (Negative)  


 


Ur Reducing Substances    Not Reportable  


 


Urine Bilirubin    Neg  (Negative)  


 


Urine Ictotest    Not Reportable  


 


Urine Urobilinogen    < 2.0  (<2.0)  mg/dL


 


Ur Leukocyte Esterase    Tr  (Negative)  


 


Urine WBC (Auto)    1.0  (0.0-6.0)  /HPF


 


Urine RBC (Auto)    1.0  (0.0-6.0)  /HPF


 


U Epithel Cells (Auto)    8.0  (0-13.0)  /HPF


 


Urine Bacteria (Auto)    1+  (Negative)  /HPF


 


Urine Mucus    Few  /HPF


 


Urine HCG, Qual    Negative  (Negative)  














  01/21/21 01/21/21 Range/Units





  20:16 22:05 


 


WBC    (4.5-11.0)  K/mm3


 


RBC    (3.65-5.03)  M/mm3


 


Hgb    (10.1-14.3)  gm/dl


 


Hct    (30.3-42.9)  %


 


MCV    (79-97)  fl


 


MCH    (28-32)  pg


 


MCHC    (30-34)  %


 


RDW    (13.2-15.2)  %


 


Plt Count    (140-440)  K/mm3


 


Lymph % (Auto)    (13.4-35.0)  %


 


Mono % (Auto)    (0.0-7.3)  %


 


Eos % (Auto)    (0.0-4.3)  %


 


Baso % (Auto)    (0.0-1.8)  %


 


Lymph # (Auto)    (1.2-5.4)  K/mm3


 


Mono # (Auto)    (0.0-0.8)  K/mm3


 


Eos # (Auto)    (0.0-0.4)  K/mm3


 


Baso # (Auto)    (0.0-0.1)  K/mm3


 


Seg Neutrophils %    (40.0-70.0)  %


 


Seg Neutrophils #    (1.8-7.7)  K/mm3


 


ABG pH   7.362  (7.350-7.450)  pH Units


 


ABG pCO2   64.3  mm Hg


 


ABG pO2   61.4 L  (80.0-90.0)  mm Hg


 


ABG HCO3   35.7 H  (20.0-26.0)  mmol/L


 


ABG O2 Saturation   90.8 L  (95.0-99.0)  %


 


ABG O2 Content   12.0  (0.0-44)  


 


ABG Base Excess   8.7 H  (-2.0-3.0)  mmol/L


 


ABG Hemoglobin   9.6 L  (12.0-16.0)  gm/dl


 


ABG Carboxyhemoglobin   1.9  (0.0-5.0)  %


 


ABG Methemoglobin   0.5  (0.0-1.5)  %


 


Oxyhemoglobin   88.7 L  (95.0-99.0)  %


 


FiO2   21  %


 


Sodium    (137-145)  mmol/L


 


Potassium    (3.6-5.0)  mmol/L


 


Chloride    ()  mmol/L


 


Carbon Dioxide    (22-30)  mmol/L


 


Anion Gap    mmol/L


 


BUN    (7-17)  mg/dL


 


Creatinine    (0.6-1.2)  mg/dL


 


Estimated GFR    ml/min


 


BUN/Creatinine Ratio    %


 


Glucose    ()  mg/dL


 


Calcium    (8.4-10.2)  mg/dL


 


Total Bilirubin    (0.1-1.2)  mg/dL


 


AST    (5-40)  units/L


 


ALT    (7-56)  units/L


 


Alkaline Phosphatase    ()  units/L


 


Troponin T  < 0.010   (0.00-0.029)  ng/mL


 


Total Protein    (6.3-8.2)  g/dL


 


Albumin    (3.9-5)  g/dL


 


Albumin/Globulin Ratio    %


 


Urine Color    (Yellow)  


 


Urine Turbidity    (Clear)  


 


Urine pH    (5.0-7.0)  


 


Ur Specific Gravity    (1.003-1.030)  


 


Urine Protein    (Negative)  mg/dL


 


Urine Glucose (UA)    (Negative)  mg/dL


 


Urine Ketones    (Negative)  mg/dL


 


Urine Blood    (Negative)  


 


Urine Nitrite    (Negative)  


 


Ur Reducing Substances    


 


Urine Bilirubin    (Negative)  


 


Urine Ictotest    


 


Urine Urobilinogen    (<2.0)  mg/dL


 


Ur Leukocyte Esterase    (Negative)  


 


Urine WBC (Auto)    (0.0-6.0)  /HPF


 


Urine RBC (Auto)    (0.0-6.0)  /HPF


 


U Epithel Cells (Auto)    (0-13.0)  /HPF


 


Urine Bacteria (Auto)    (Negative)  /HPF


 


Urine Mucus    /HPF


 


Urine HCG, Qual    (Negative)  














- Radiology Data


Radiology results: report reviewed





- Medical Decision Making





The patient's O2 sats was 77% on arrival patient was placed on O2 via nasal 

cannula with O2 sats decreasing to the mid 90s


ABG shows a PO2 of approximately 61 with O2 sat of 90%.


Upon me taken the patient off O2 her sats dropped into the low 80s


Patient received a continuous breathing treatment, magnesium, Solu-Medrol prior 

to me removing her oxygen for further evaluation.


D-dimer will be obtained and followed by admitting team with the plan to give 

her Lovenox if its elevated and VQ scan in the a.m.


Critical Care Time: Yes


Critical care time in (mins) excluding proc time.: 45


Critical care attestation.: 


If time is entered above; I have spent that time in minutes in the direct care 

of this critically ill patient, excluding procedure time.








ED Disposition


Clinical Impression: 


 Hypoxia, Asthma with severe exacerbation





Disposition: [id number] OP ADMIT IP TO THIS HOSP


Is pt being admited?: Yes


Does the pt Need Aspirin: Yes


Condition: Fair


Referrals: 


PRIMARY CARE,MD [Primary Care Provider] - 3-5 Days

## 2021-01-21 NOTE — XRAY REPORT
CHEST 1 VIEW 1/21/2021 8:15 PM



INDICATION / CLINICAL INFORMATION: Chest Pain.



COMPARISON: 12/2/2018



FINDINGS:



SUPPORT DEVICES: None.



HEART / MEDIASTINUM: No significant abnormality. 



LUNGS / PLEURA: No significant pulmonary or pleural abnormality. No pneumothorax. 



ADDITIONAL FINDINGS: No significant additional findings.



IMPRESSION:

1. No acute findings.



Signer Name: Joel Banuelos MD 

Signed: 1/21/2021 8:38 PM

Workstation Name: [id number]

## 2021-01-22 LAB
BASOPHILS # (AUTO): 0 K/MM3 (ref 0–0.1)
BASOPHILS NFR BLD AUTO: 0.2 % (ref 0–1.8)
BUN SERPL-MCNC: 7 MG/DL (ref 7–17)
BUN/CREAT SERPL: 14 %
CALCIUM SERPL-MCNC: 8.6 MG/DL (ref 8.4–10.2)
EOSINOPHIL # BLD AUTO: 0 K/MM3 (ref 0–0.4)
EOSINOPHIL NFR BLD AUTO: 0.1 % (ref 0–4.3)
HCT VFR BLD CALC: 32 % (ref 30.3–42.9)
HEMOLYSIS INDEX: 9
HGB BLD-MCNC: 9.6 GM/DL (ref 10.1–14.3)
INR PPP: 1.2 (ref 0.87–1.13)
LYMPHOCYTES # BLD AUTO: 0.8 K/MM3 (ref 1.2–5.4)
LYMPHOCYTES NFR BLD AUTO: 8.6 % (ref 13.4–35)
MCHC RBC AUTO-ENTMCNC: 30 % (ref 30–34)
MCV RBC AUTO: 74 FL (ref 79–97)
MONOCYTES # (AUTO): 0.3 K/MM3 (ref 0–0.8)
MONOCYTES % (AUTO): 3 % (ref 0–7.3)
PLATELET # BLD: 281 K/MM3 (ref 140–440)
RBC # BLD AUTO: 4.35 M/MM3 (ref 3.65–5.03)

## 2021-01-22 RX ADMIN — IPRATROPIUM BROMIDE AND ALBUTEROL SULFATE SCH AMPUL: .5; 3 SOLUTION RESPIRATORY (INHALATION) at 20:49

## 2021-01-22 RX ADMIN — Medication SCH ML: at 10:13

## 2021-01-22 RX ADMIN — METHYLPREDNISOLONE SODIUM SUCCINATE SCH MG: 40 INJECTION, POWDER, FOR SOLUTION INTRAMUSCULAR; INTRAVENOUS at 05:52

## 2021-01-22 RX ADMIN — IPRATROPIUM BROMIDE AND ALBUTEROL SULFATE SCH AMPUL: .5; 3 SOLUTION RESPIRATORY (INHALATION) at 04:23

## 2021-01-22 RX ADMIN — BUDESONIDE SCH MG: 0.5 INHALANT RESPIRATORY (INHALATION) at 20:49

## 2021-01-22 RX ADMIN — METHYLPREDNISOLONE SODIUM SUCCINATE SCH MG: 40 INJECTION, POWDER, FOR SOLUTION INTRAMUSCULAR; INTRAVENOUS at 21:39

## 2021-01-22 RX ADMIN — ARFORMOTEROL TARTRATE SCH MCG: 15 SOLUTION RESPIRATORY (INHALATION) at 20:49

## 2021-01-22 RX ADMIN — Medication SCH ML: at 21:39

## 2021-01-22 RX ADMIN — IPRATROPIUM BROMIDE AND ALBUTEROL SULFATE SCH AMPUL: .5; 3 SOLUTION RESPIRATORY (INHALATION) at 08:10

## 2021-01-22 RX ADMIN — FAMOTIDINE SCH MG: 20 TABLET ORAL at 15:02

## 2021-01-22 RX ADMIN — IPRATROPIUM BROMIDE AND ALBUTEROL SULFATE SCH: .5; 3 SOLUTION RESPIRATORY (INHALATION) at 16:02

## 2021-01-22 RX ADMIN — METHYLPREDNISOLONE SODIUM SUCCINATE SCH MG: 40 INJECTION, POWDER, FOR SOLUTION INTRAMUSCULAR; INTRAVENOUS at 15:02

## 2021-01-22 RX ADMIN — FUROSEMIDE SCH MG: 10 INJECTION, SOLUTION INTRAVENOUS at 15:02

## 2021-01-22 NOTE — CONSULTATION
PULMONARY CONSULTATION NOTE



CONSULTING PHYSICIAN:  Dr. Richard Aranda.



REASON FOR CONSULTATION:  Acute asthma exacerbation, acute hypoxemic respiratory

failure.



CHIEF COMPLAINT AND HISTORY OF PRESENT ILLNESS:  The patient is a 30-year-old

morbidly obese female with past medical history of hypertension, not on home

oxygen, showed up in the Emergency Room yesterday complaining of shortness of

breath, had been going on for a few days.  She was using her albuterol nebulizer

treatments more frequently at home without significant improvement.  She is not

on any controller medication.  She denies a history of intubation as regards of

the asthma.  She thinks she was admitted less than twice in the hospital last

year.  She denies nightly awakenings with her asthma symptoms, but states that

she pretty much does not get to sleep well at night.  It is unclear if it is

because of the asthma or if it is because of sleep disordered breathing.  She

states she was tested for obstructive sleep apnea in the past was positive, but

did not like the noninvasive ventilation machine.  She had denied any nausea,

vomiting, cough or expectoration.  Denied any hemoptysis.  Denied any new onset

leg pain or swelling either unilaterally or bilaterally or suggestion of a deep

venous thrombosis.  She denied any sick contacts.  Denied known contact with

anyone with COVID-19 infection.  In the Emergency Room, she was hypoxemic, O2

sats 77% on room air.  This improved on 4 liters nasal cannula.  Workup in the

ER revealed elevated D-dimers.  CT angiogram could not be done because of her

son.  We are asked to assist with management.  When I stopped by to see her, she

was resting in bed, still with mildly increased respiratory effort at rest.  She

was on supplemental oxygen at 2 liters nasal cannula.  Denied chest pains.  She

has smoked intermittently in her life, but never a pack a day smoker and gives a

less than 5-pack-year tobacco smoking history and does not currently smoke. 

This really is as much of the history of presentation as I have.



PAST MEDICAL HISTORY:  Asthma, hypertension, morbid obesity, obstructive sleep

apnea.



PAST SURGICAL HISTORY:  Denies.



MEDICATIONS:  She was on at the time I stopped by to see her were reviewed,

pertinent medications include the following:  Tylenol 650 mg p.o. q. 4 hours

p.r.n. mild pain or fevers, DuoNeb nebulizer treatments nebulized q. 6 hours,

p.r.n. milk of magnesia, Solu-Medrol 40 mg IV q. 8 hours, morphine 2 mg IV q. 4

hours p.r.n. moderate pain, Zofran 4 mg IV q. 8 hours p.r.n. nausea and

vomiting.



ALLERGIES:  No known drug allergies.



DIET:  Morbidly obese, BMI 81.5.  Denies acute weight loss or gain in the

preceding few weeks to months.



FAMILY AND SOCIAL HISTORY:  Lives in the community.  She has a patchy tobacco

use history, but denies.  She gives a less than 5-pack-year smoking history and

states she does not smoke currently.  Denies alcohol or illicit drug use or

abuse.  Family and social history otherwise noncontributory.



REVIEW OF SYSTEMS:  No loss of consciousness.  No new-onset seizures.  No

new-onset focal weakness.  She has been a little lightheaded at times with

coughing spells.  Denies gross hematochezia or melena.  Denies gross hematuria

or dysuria.  Denies hematemesis.  Denies hemoptysis, denies heat or cold

intolerance.  Admits to sleep disordered breathing.  Denies polydipsia or

polyuria.  Complete 13-system review of systems obtained.  Pertinent positives

and/or negatives as in body of history above, otherwise noncontributory.



PHYSICAL EXAMINATION:

VITAL SIGNS:  At presentation, she is afebrile and she also denied fevers at

home.  Temperature 98.3 degrees Fahrenheit, pulse of 112, respiratory rate of

26, blood pressure 166/103, O2 sats were 77% at presentation.

GENERAL:  She is a young, morbidly obese female, normocephalic, atraumatic,

talking to me with mildly increased respiratory effort at rest.

HEAD, EYES, EARS, NOSE AND THROAT:  Anicteric.  No conjunctival erythema. 

Oropharynx was moist.  Mallampati #4 oropharynx.  No gross jugular venous

distention, no thyromegaly.  She does have a large neck circumference.  Grossly,

there were no palpable lymph nodes in the supraclavicular or submandibular lymph

node chains.

LUNGS:  Auscultation of both lung fields significant for diminished bilateral

air movement, prolonged expiratory phase.  No active wheezing.

HEART:  Heart sounds 1 and 2 are heard.  They were regular in rate and rhythm at

the time of my evaluation without overt rubs or murmurs.

ABDOMEN:  Soft, full, protuberant.  Bowel sounds are positive, nontender, no

palpable hepatosplenomegaly.

EXTREMITIES:  Without overt digital clubbing, no cyanosis, no pedal edema. 

Pedal pulses were 2+ bilaterally.

NEUROLOGIC:  Pupils were equal, round, reactive to light, about 4 mm. 

Extraocular muscle movements were intact.  She moves all 4 extremities

spontaneously.

SKIN:  Normal turgor in the areas examined without overt cellulitis or rash. 

Please see the registered nurse and wound care nurse's notes for full

description of her skin.

PSYCHIATRIC:  Mood or affect were normal/appropriate.  She had intact judgment

and insight.



LABORATORY DATA:  From my review are as follows:  White cell count 8500,

hemoglobin 10.4, hematocrit 35.4, platelet count 284.  No manual differential. 

D-dimer was elevated at 763.  Arterial blood gas showed a pH of 7.36, pCO2 of

64, pO2 of 61 that was on room air.  Serum sodium was 136, potassium 4.8,

chloride 95, bicarbonate 36, BUN 7, creatinine 0.6, glucose was 94.  Liver

function tests within normal limits.  Troponin within normal limits. 

Urinalysis, trace leukocyte esterase, negative for nitrites, only 1 white cell

per high power field, 1+ bacteremia.  Urine pregnancy test was negative.  No

blood cultures or other cultures for my review.  Chest x-ray was done.  I have

reviewed the images as well as the radiologist's interpretation, it is a

somewhat lordotic film.  Soft tissue shadows also do affect the interpretation,

perhaps slightly under penetrated.  Overall, I really do not see any acute

infiltrate.  I cannot rule out small bilateral pleural effusions, but I think

those are just due to soft tissue shadows.  Of note, she does have borderline

cardiomegaly.



ASSESSMENT:

1.  Acute hypoxemic respiratory failure.

2.  Acute asthma exacerbation.

3.  Morbid obesity.

4.  Obstructive sleep apnea.

5.  Chronic hypercarbia.

6.  History of hypertension.

7.  Elevated serum D-dimers.

8.  Hyperkalemia with potassium of 5.3 today.

9.  Hyperglycemia.

10.  Obstructive sleep apnea, untreated.



PLAN:  We do agree with current interventions for acute asthma exacerbation.  I

will go ahead and start her on long-acting bronchodilators with Brovana as well

as inhaled corticosteroids.   She has refused to use a bilevel positive air

pressure ventilation therapy at bedtime for increased shortness of breath. 

States that she is claustrophobic to some extent.  She does agree to repeat a

sleep study upon discharge.  Considering her comorbidities, I will go ahead and

order a 2D echo, both to check right-sided pressures, but also left-sided

pressures.  Her BUN and creatinine suggests that she may be able to tolerate

some diuresis and I will await to see if she has a systolic or diastolic

dysfunction.  The chest x-ray, although it is affected by soft tissue shadows,

may also represent an element of mild interstitial edema.  I will empirically

give her a one-time dose of Lasix.  Oxygen will be weaned to keep sats greater

than or equal to about 90%.  She will remain in contact and airborne precautions

while the coronavirus test result is pending.  I think this is appropriate

considering her comorbidities.  I will go ahead and get a ferritin level as well

as LDH and trend as necessary to help guide clinical decision making especially

if her coronavirus PCR test comes back positive.  I will start her on double

dose Lovenox, so to say DVT prophylaxis, although 40 mg subcutaneous b.i.d. in

light of her morbid obesity.  I will hold on full weight-based anticoagulation. 

At this point, she will be started also on Pepcid for GI prophylaxis, especially

with her on steroids and anticoagulation.  Flu and pneumonia vaccination will be

addressed per protocol.  Chronic disease medication management will be deferred

to the attending physician.



Thank you very much for the consult.  We will follow along and make further

recommendations as picture progresses/becomes clearer.  Continued tobacco

abstinence has been strongly counseled as have lifestyle changes to include

weight loss at the bedside.





DD: 01/22/2021 14:34

DT: 01/22/2021 15:23

JOB# [id number]  [id number]

AJM/JIA

## 2021-01-22 NOTE — PROGRESS NOTE
Assessment and Plan


Assessment and plan: 





Acute asthma exacerbation.  Continue bronchodilators and IV steroids.  O2 to 

make sats greater than 92%.





Acute hypoxic respiratory failure.  Etiology secondary to above.  Continue O2 to

maintain sats.





Morbid obesity.  Patient has a BMI of 81.5.  Patient will be counseled on diet, 

exercise and lifestyle modifications prior to discharge.





Elevated D-dimer.  Check lower extremity Doppler studies for risk 

stratification.  Body habitus too large for CTA chest.  Empiric Lovenox 

presently





History


Interval history: 





No new issues overnight.





Hospitalist Physical





- Constitutional


Vitals: 


                                        











Temp Pulse Resp BP Pulse Ox


 


 97.7 F   89   20   142/78   96 


 


 01/22/21 04:47  01/22/21 08:00  01/22/21 08:00  01/22/21 04:47  01/22/21 08:47











General appearance: Present: no acute distress, well-nourished, obese





- EENT


Eyes: Present: PERRL, EOM intact


ENT: hearing intact, clear oral mucosa, dentition normal





- Neck


Neck: Present: supple, normal ROM





- Respiratory


Respiratory effort: normal


Respiratory: bilateral: CTA





- Cardiovascular


Rhythm: regular


Heart Sounds: Present: S1 & S2.  Absent: gallop, rub





- Extremities


Extremities: no ischemia, No edema, Full ROM





- Abdominal


General gastrointestinal: soft, non-tender, non-distended, normal bowel sounds





- Integumentary


Integumentary: Present: clear, warm, dry





- Neurologic


Neurologic: CNII-XII intact, moves all extremities





HEART Score





- HEART Score


Troponin: 


                                        











Troponin T  < 0.010 ng/mL (0.00-0.029)   01/21/21  20:16    














Results





- Labs


CBC & Chem 7: 


                                 01/22/21 04:31





                                 01/22/21 04:31


Labs: 


                             Laboratory Last Values











WBC  9.8 K/mm3 (4.5-11.0)   01/22/21  04:31    


 


RBC  4.35 M/mm3 (3.65-5.03)   01/22/21  04:31    


 


Hgb  9.6 gm/dl (10.1-14.3)  L  01/22/21  04:31    


 


Hct  32.0 % (30.3-42.9)   01/22/21  04:31    


 


MCV  74 fl (79-97)  L  01/22/21  04:31    


 


MCH  22 pg (28-32)  L  01/22/21  04:31    


 


MCHC  30 % (30-34)   01/22/21  04:31    


 


RDW  19.2 % (13.2-15.2)  H  01/22/21  04:31    


 


Plt Count  281 K/mm3 (140-440)   01/22/21  04:31    


 


Lymph % (Auto)  8.6 % (13.4-35.0)  L  01/22/21  04:31    


 


Mono % (Auto)  3.0 % (0.0-7.3)   01/22/21  04:31    


 


Eos % (Auto)  0.1 % (0.0-4.3)   01/22/21  04:31    


 


Baso % (Auto)  0.2 % (0.0-1.8)   01/22/21  04:31    


 


Lymph # (Auto)  0.8 K/mm3 (1.2-5.4)  L  01/22/21  04:31    


 


Mono # (Auto)  0.3 K/mm3 (0.0-0.8)   01/22/21  04:31    


 


Eos # (Auto)  0.0 K/mm3 (0.0-0.4)   01/22/21  04:31    


 


Baso # (Auto)  0.0 K/mm3 (0.0-0.1)   01/22/21  04:31    


 


Seg Neutrophils %  88.1 % (40.0-70.0)  H  01/22/21  04:31    


 


Seg Neutrophils #  8.6 K/mm3 (1.8-7.7)  H  01/22/21  04:31    


 


PT  15.2 Sec. (12.2-14.9)  H  01/22/21  04:31    


 


INR  1.20  (0.87-1.13)  H  01/22/21  04:31    


 


D-Dimer  762.58 ng/mlDDU (0-234)  H  01/21/21  23:13    


 


ABG pH  7.362 pH Units (7.350-7.450)   01/21/21  22:05    


 


ABG pCO2  64.3 mm Hg  01/21/21  22:05    


 


ABG pO2  61.4 mm Hg (80.0-90.0)  L  01/21/21  22:05    


 


ABG HCO3  35.7 mmol/L (20.0-26.0)  H  01/21/21  22:05    


 


ABG O2 Saturation  90.8 % (95.0-99.0)  L  01/21/21  22:05    


 


ABG O2 Content  12.0  (0.0-44)   01/21/21  22:05    


 


ABG Base Excess  8.7 mmol/L (-2.0-3.0)  H  01/21/21  22:05    


 


ABG Hemoglobin  9.6 gm/dl (12.0-16.0)  L  01/21/21  22:05    


 


ABG Carboxyhemoglobin  1.9 % (0.0-5.0)   01/21/21  22:05    


 


ABG Methemoglobin  0.5 % (0.0-1.5)   01/21/21  22:05    


 


Oxyhemoglobin  88.7 % (95.0-99.0)  L  01/21/21  22:05    


 


FiO2  21 %  01/21/21  22:05    


 


Sodium  138 mmol/L (137-145)   01/22/21  04:31    


 


Potassium  5.3 mmol/L (3.6-5.0)  H  01/22/21  04:31    


 


Chloride  95.2 mmol/L ()  L  01/22/21  04:31    


 


Carbon Dioxide  36 mmol/L (22-30)  H  01/22/21  04:31    


 


Anion Gap  12 mmol/L  01/22/21  04:31    


 


BUN  7 mg/dL (7-17)   01/22/21  04:31    


 


Creatinine  0.5 mg/dL (0.6-1.2)  L  01/22/21  04:31    


 


Estimated GFR  > 60 ml/min  01/22/21  04:31    


 


BUN/Creatinine Ratio  14 %  01/22/21  04:31    


 


Glucose  163 mg/dL ()  H  01/22/21  04:31    


 


Calcium  8.6 mg/dL (8.4-10.2)   01/22/21  04:31    


 


Total Bilirubin  0.30 mg/dL (0.1-1.2)   01/21/21  16:13    


 


AST  28 units/L (5-40)   01/21/21  16:13    


 


ALT  25 units/L (7-56)   01/21/21  16:13    


 


Alkaline Phosphatase  108 units/L ()   01/21/21  16:13    


 


Troponin T  < 0.010 ng/mL (0.00-0.029)   01/21/21  20:16    


 


Total Protein  8.7 g/dL (6.3-8.2)  H  01/21/21  16:13    


 


Albumin  3.5 g/dL (3.9-5)  L  01/21/21  16:13    


 


Albumin/Globulin Ratio  0.7 %  01/21/21  16:13    


 


Urine Color  Yellow  (Yellow)   01/21/21  17:38    


 


Urine Turbidity  Slightly-cloudy  (Clear)   01/21/21  17:38    


 


Urine pH  7.0  (5.0-7.0)   01/21/21  17:38    


 


Ur Specific Gravity  1.012  (1.003-1.030)   01/21/21  17:38    


 


Urine Protein  30 mg/dl mg/dL (Negative)   01/21/21  17:38    


 


Urine Glucose (UA)  Neg mg/dL (Negative)   01/21/21  17:38    


 


Urine Ketones  Neg mg/dL (Negative)   01/21/21  17:38    


 


Urine Blood  Neg  (Negative)   01/21/21  17:38    


 


Urine Nitrite  Neg  (Negative)   01/21/21  17:38    


 


Ur Reducing Substances  Not Reportable   01/21/21  17:38    


 


Urine Bilirubin  Neg  (Negative)   01/21/21  17:38    


 


Urine Ictotest  Not Reportable   01/21/21  17:38    


 


Urine Urobilinogen  < 2.0 mg/dL (<2.0)   01/21/21  17:38    


 


Ur Leukocyte Esterase  Tr  (Negative)   01/21/21  17:38    


 


Urine WBC (Auto)  1.0 /HPF (0.0-6.0)   01/21/21  17:38    


 


Urine RBC (Auto)  1.0 /HPF (0.0-6.0)   01/21/21  17:38    


 


U Epithel Cells (Auto)  8.0 /HPF (0-13.0)   01/21/21  17:38    


 


Urine Bacteria (Auto)  1+ /HPF (Negative)   01/21/21  17:38    


 


Urine Mucus  Few /HPF  01/21/21  17:38    


 


Urine HCG, Qual  Negative  (Negative)   01/21/21  17:38    











Diamond/IV: 


                                        





Voiding Method                   Toilet


IV Catheter Type [Left Hand]     INT / Saline Lock











Active Medications





- Current Medications


Current Medications: 














Generic Name Dose Route Start Last Admin





  Trade Name Freq  PRN Reason Stop Dose Admin


 


Acetaminophen  650 mg  01/21/21 23:22 





  Acetaminophen 325 Mg Tab  PO  





  Q4H PRN  





  Pain MILD(1-3)/Fever >100.5/HA  


 


Albuterol/Ipratropium  1 ampul  01/22/21 02:00  01/22/21 08:10





  Ipratropium/Albuterol Sulfate 3 Ml Ampul.Neb  IH   1 ampul





  Q6HRT DELL   Administration


 


Sodium Chloride  1,000 mls @ 125 mls/hr  01/21/21 23:30 





  Nacl 0.9% 1000 Ml  IV  





  AS DIRECT DELL  


 


Magnesium Hydroxide  30 ml  01/21/21 23:22 





  Magnesium Hydroxide (Mom) Oral Liqd Udc  PO  





  Q4H PRN  





  Constipation  


 


Methylprednisolone Sodium Succinate  40 mg  01/22/21 06:00  01/22/21 05:52





  Methylprednisolone Sod Succinate 40 Mg/1 Ml Inj  IV   40 mg





  Q8HR DELL   Administration


 


Morphine Sulfate  2 mg  01/21/21 23:22 





  Morphine 2 Mg/1 Ml Inj  IV  





  Q4H PRN  





  Pain, Moderate (4-6)  


 


Ondansetron HCl  4 mg  01/21/21 23:22 





  Ondansetron 4 Mg/2 Ml Inj  IV  





  Q8H PRN  





  Nausea And Vomiting  


 


Sodium Chloride  10 ml  01/22/21 10:00  01/22/21 10:13





  Sodium Chloride 0.9% 10 Ml Flush Syringe  IV   10 ml





  BID DELL   Administration


 


Sodium Chloride  10 ml  01/21/21 23:22 





  Sodium Chloride 0.9% 10 Ml Flush Syringe  IV  





  PRN PRN  





  LINE FLUSH

## 2021-01-22 NOTE — VASCULAR LAB REPORT
VL venous duplex LE BILAT



INDICATION / CLINICAL INFORMATION:

elevated d-dimer.



COMPARISON:

None available.



FINDINGS:

No evidence of deep vein thrombosis in either leg.



Signer Name: Carlos Veloz MD 

Signed: 1/22/2021 3:39 PM

Workstation Name: [email or web address]

## 2021-01-22 NOTE — CONSULTATION
History of Present Illness


Consult date: 01/22/21


Requesting physician: FANNY ALCALA


Reason for consult: asthma


History of present illness: 





PULMONARY/CCM CONSULT NOTE (Full dictation # [id number])





Please see dictated notes for full details





Past History


Past Medical History: hypertension, other (Asthma,Morbid Obesity )


Past Surgical History: No surgical history


Social history: no significant social history


Family history: no significant family history





Medications and Allergies


                                    Allergies











Allergy/AdvReac Type Severity Reaction Status Date / Time


 


No Known Allergies Allergy   Unverified 07/13/17 11:17











                                Home Medications











 Medication  Instructions  Recorded  Confirmed  Last Taken  Type


 


ALBUTEROL NEB's [Proventil 0.083% 2.5 mg IH Q3HRT PRN #30 day 12/14/17 01/22/21 

Unknown Rx





NEBS]     


 


Acetaminophen [Acetaminophen TAB] 325 mg PO Q4H PRN #30 tablet 12/14/17 01/22/21

Unknown Rx


 


Famotidine [Pepcid] 20 mg PO BID #14 tablet 12/14/17 01/22/21 Unknown Rx


 


Ipratropium/Albuterol Sulfate 1 ampul IH TIDRT #30 day 12/14/17 01/22/21 Unknown

 Rx





[DUONEB *Not for PRN Use*]     


 


Albuterol Mdi (or & Nicu Only) 2 puff IH QID PRN #1 inhalation 12/04/18 01/22/21

Unknown Rx





[ProAir HFA Inhaler]     


 


Azithromycin [Zithromax Z-JAVIER] 0 mg PO DAILY #1 tab 12/04/18 01/22/21 Unknown Rx


 


Benzonatate [Tessalon Perles] 100 mg PO Q8HR #15 capsule 12/04/18 01/22/21 

Unknown Rx


 


hydroCHLOROthiazide [HCTZ] 25 mg PO QDAY #30 tablet 12/04/18 01/22/21 Unknown Rx


 


predniSONE 10 mg PO DAILY #13 tablet 12/04/18 01/22/21 Unknown Rx











Active Meds: 


Active Medications





Acetaminophen (Acetaminophen 325 Mg Tab)  650 mg PO Q4H PRN


   PRN Reason: Pain MILD(1-3)/Fever >100.5/HA


Albuterol/Ipratropium (Ipratropium/Albuterol Sulfate 3 Ml Ampul.Neb)  1 ampul IH

 Q6HRT DELL


   Last Admin: 01/22/21 08:10 Dose:  1 ampul


   Documented by: 


Sodium Chloride (Nacl 0.9% 1000 Ml)  1,000 mls @ 125 mls/hr IV AS DIRECT [facility]


Magnesium Hydroxide (Magnesium Hydroxide (Mom) Oral Liqd Udc)  30 ml PO Q4H PRN


   PRN Reason: Constipation


Methylprednisolone Sodium Succinate (Methylprednisolone Sod Succinate 40 Mg/1 Ml

 Inj)  40 mg IV Q8HR [facility]


   Last Admin: 01/22/21 05:52 Dose:  40 mg


   Documented by: 


Morphine Sulfate (Morphine 2 Mg/1 Ml Inj)  2 mg IV Q4H PRN


   PRN Reason: Pain, Moderate (4-6)


Ondansetron HCl (Ondansetron 4 Mg/2 Ml Inj)  4 mg IV Q8H PRN


   PRN Reason: Nausea And Vomiting


Sodium Chloride (Sodium Chloride 0.9% 10 Ml Flush Syringe)  10 ml IV BID [facility]


   Last Admin: 01/22/21 10:13 Dose:  10 ml


   Documented by: 


Sodium Chloride (Sodium Chloride 0.9% 10 Ml Flush Syringe)  10 ml IV PRN PRN


   PRN Reason: LINE FLUSH











Physical Examination


Vital signs: 


                                   Vital Signs











Temp Pulse Resp BP Pulse Ox


 


 98.3 F   112 H  26 H  166/103   77 L


 


 01/21/21 14:35  01/21/21 14:35  01/21/21 14:35  01/21/21 14:35  01/21/21 14:35














Results





- Laboratory Findings


CBC and BMP: 


                                 01/22/21 04:31





                                 01/22/21 04:31


ABG











ABG pH  7.362 pH Units (7.350-7.450)   01/21/21  22:05    


 


ABG pCO2  64.3 mm Hg  01/21/21  22:05    


 


ABG pO2  61.4 mm Hg (80.0-90.0)  L  01/21/21  22:05    


 


ABG O2 Saturation  90.8 % (95.0-99.0)  L  01/21/21  22:05    





PT/INR, D-dimer











PT  15.2 Sec. (12.2-14.9)  H  01/22/21  04:31    


 


INR  1.20  (0.87-1.13)  H  01/22/21  04:31    


 


D-Dimer  762.58 ng/mlDDU (0-234)  H  01/21/21  23:13    








Abnormal lab findings: 


                                  Abnormal Labs











  01/21/21 01/21/21 01/21/21





  16:13 16:13 22:05


 


Hgb   


 


MCV  75 L  


 


MCH  22 L  


 


MCHC  29 L  


 


RDW  19.7 H  


 


Lymph % (Auto)   


 


Lymph # (Auto)   


 


Seg Neutrophils %  71.3 H  


 


Seg Neutrophils #   


 


PT   


 


INR   


 


D-Dimer   


 


ABG pO2    61.4 L


 


ABG HCO3    35.7 H


 


ABG O2 Saturation    90.8 L


 


ABG Base Excess    8.7 H


 


ABG Hemoglobin    9.6 L


 


Oxyhemoglobin    88.7 L


 


Sodium   136 L 


 


Potassium   


 


Chloride   94.7 L 


 


Carbon Dioxide   36 H 


 


Creatinine   


 


Glucose   


 


Total Protein   8.7 H 


 


Albumin   3.5 L 














  01/21/21 01/22/21 01/22/21





  23:13 04:31 04:31


 


Hgb   9.6 L 


 


MCV   74 L 


 


MCH   22 L 


 


MCHC   


 


RDW   19.2 H 


 


Lymph % (Auto)   8.6 L 


 


Lymph # (Auto)   0.8 L 


 


Seg Neutrophils %   88.1 H 


 


Seg Neutrophils #   8.6 H 


 


PT    15.2 H


 


INR    1.20 H


 


D-Dimer  762.58 H  


 


ABG pO2   


 


ABG HCO3   


 


ABG O2 Saturation   


 


ABG Base Excess   


 


ABG Hemoglobin   


 


Oxyhemoglobin   


 


Sodium   


 


Potassium   


 


Chloride   


 


Carbon Dioxide   


 


Creatinine   


 


Glucose   


 


Total Protein   


 


Albumin   














  01/22/21





  04:31


 


Hgb 


 


MCV 


 


MCH 


 


MCHC 


 


RDW 


 


Lymph % (Auto) 


 


Lymph # (Auto) 


 


Seg Neutrophils % 


 


Seg Neutrophils # 


 


PT 


 


INR 


 


D-Dimer 


 


ABG pO2 


 


ABG HCO3 


 


ABG O2 Saturation 


 


ABG Base Excess 


 


ABG Hemoglobin 


 


Oxyhemoglobin 


 


Sodium 


 


Potassium  5.3 H


 


Chloride  95.2 L


 


Carbon Dioxide  36 H


 


Creatinine  0.5 L


 


Glucose  163 H


 


Total Protein 


 


Albumin

## 2021-01-23 RX ADMIN — IPRATROPIUM BROMIDE AND ALBUTEROL SULFATE SCH AMPUL: .5; 3 SOLUTION RESPIRATORY (INHALATION) at 20:43

## 2021-01-23 RX ADMIN — IPRATROPIUM BROMIDE AND ALBUTEROL SULFATE SCH AMPUL: .5; 3 SOLUTION RESPIRATORY (INHALATION) at 09:00

## 2021-01-23 RX ADMIN — FAMOTIDINE SCH MG: 20 TABLET ORAL at 21:52

## 2021-01-23 RX ADMIN — BENZONATATE SCH MG: 100 CAPSULE ORAL at 14:03

## 2021-01-23 RX ADMIN — Medication SCH ML: at 09:54

## 2021-01-23 RX ADMIN — BENZONATATE SCH MG: 100 CAPSULE ORAL at 21:52

## 2021-01-23 RX ADMIN — IPRATROPIUM BROMIDE AND ALBUTEROL SULFATE SCH: .5; 3 SOLUTION RESPIRATORY (INHALATION) at 02:27

## 2021-01-23 RX ADMIN — METHYLPREDNISOLONE SODIUM SUCCINATE SCH MG: 40 INJECTION, POWDER, FOR SOLUTION INTRAMUSCULAR; INTRAVENOUS at 05:20

## 2021-01-23 RX ADMIN — BUDESONIDE SCH MG: 0.5 INHALANT RESPIRATORY (INHALATION) at 09:00

## 2021-01-23 RX ADMIN — BUDESONIDE SCH MG: 0.5 INHALANT RESPIRATORY (INHALATION) at 20:43

## 2021-01-23 RX ADMIN — ARFORMOTEROL TARTRATE SCH MCG: 15 SOLUTION RESPIRATORY (INHALATION) at 09:00

## 2021-01-23 RX ADMIN — FAMOTIDINE SCH MG: 20 TABLET ORAL at 09:54

## 2021-01-23 RX ADMIN — Medication SCH ML: at 21:52

## 2021-01-23 RX ADMIN — ARFORMOTEROL TARTRATE SCH MCG: 15 SOLUTION RESPIRATORY (INHALATION) at 20:43

## 2021-01-23 RX ADMIN — FUROSEMIDE SCH MG: 10 INJECTION, SOLUTION INTRAVENOUS at 09:54

## 2021-01-23 RX ADMIN — IPRATROPIUM BROMIDE AND ALBUTEROL SULFATE SCH AMPUL: .5; 3 SOLUTION RESPIRATORY (INHALATION) at 13:26

## 2021-01-23 RX ADMIN — METHYLPREDNISOLONE SODIUM SUCCINATE SCH MG: 40 INJECTION, POWDER, FOR SOLUTION INTRAMUSCULAR; INTRAVENOUS at 17:35

## 2021-01-23 NOTE — PROGRESS NOTE
Assessment and Plan


Assessment and plan: 





Acute asthma exacerbation.  Continue bronchodilators and IV steroids.  O2 to 

make sats greater than 92%.





Obesity hypoventilation syndrome/FABIAN





Acute hypoxic respiratory failure.  Etiology multifactorial and secondary to 

above.  Continue O2 to maintain sats.





Morbid obesity.  Patient has a BMI of 81.5.  Patient will be counseled on diet, 

exercise and lifestyle modifications prior to discharge.





Elevated D-dimer.  Check lower extremity Doppler studies for risk 

stratification.  Body habitus too large for CTA chest or VQ scan.  Empiric 

Lovenox presently





1/23.  Doppler studies negative for DVT.  D-dimer only mildly elevated.  Wean IV

steroids today and continue bronchodilators/nebulizers.  Covid testing negative.





History


Interval history: 





No new issues overnight.





Hospitalist Physical





- Constitutional


Vitals: 


                                        











Temp Pulse Resp BP Pulse Ox


 


 97.7 F   87   18   164/103   98 


 


 01/23/21 03:56  01/23/21 09:00  01/23/21 09:00  01/23/21 04:21  01/23/21 09:00











General appearance: Present: no acute distress, well-nourished, obese





- EENT


Eyes: Present: PERRL, EOM intact


ENT: hearing intact, clear oral mucosa, dentition normal





- Neck


Neck: Present: supple, normal ROM





- Respiratory


Respiratory effort: normal


Respiratory: bilateral: CTA





- Cardiovascular


Rhythm: regular


Heart Sounds: Present: S1 & S2.  Absent: gallop, rub





- Extremities


Extremities: no ischemia, No edema, Full ROM





- Abdominal


General gastrointestinal: soft, non-tender, non-distended, normal bowel sounds





- Integumentary


Integumentary: Present: clear, warm, dry





- Neurologic


Neurologic: CNII-XII intact, moves all extremities





HEART Score





- HEART Score


Troponin: 


                                        











Troponin T  < 0.010 ng/mL (0.00-0.029)   01/21/21  20:16    














Results





- Labs


CBC & Chem 7: 


                                 01/22/21 04:31





                                 01/22/21 04:31


Labs: 


                             Laboratory Last Values











WBC  9.8 K/mm3 (4.5-11.0)   01/22/21  04:31    


 


RBC  4.35 M/mm3 (3.65-5.03)   01/22/21  04:31    


 


Hgb  9.6 gm/dl (10.1-14.3)  L  01/22/21  04:31    


 


Hct  32.0 % (30.3-42.9)   01/22/21  04:31    


 


MCV  74 fl (79-97)  L  01/22/21  04:31    


 


MCH  22 pg (28-32)  L  01/22/21  04:31    


 


MCHC  30 % (30-34)   01/22/21  04:31    


 


RDW  19.2 % (13.2-15.2)  H  01/22/21  04:31    


 


Plt Count  281 K/mm3 (140-440)   01/22/21  04:31    


 


Lymph % (Auto)  8.6 % (13.4-35.0)  L  01/22/21  04:31    


 


Mono % (Auto)  3.0 % (0.0-7.3)   01/22/21  04:31    


 


Eos % (Auto)  0.1 % (0.0-4.3)   01/22/21  04:31    


 


Baso % (Auto)  0.2 % (0.0-1.8)   01/22/21  04:31    


 


Lymph # (Auto)  0.8 K/mm3 (1.2-5.4)  L  01/22/21  04:31    


 


Mono # (Auto)  0.3 K/mm3 (0.0-0.8)   01/22/21  04:31    


 


Eos # (Auto)  0.0 K/mm3 (0.0-0.4)   01/22/21  04:31    


 


Baso # (Auto)  0.0 K/mm3 (0.0-0.1)   01/22/21  04:31    


 


Seg Neutrophils %  88.1 % (40.0-70.0)  H  01/22/21  04:31    


 


Seg Neutrophils #  8.6 K/mm3 (1.8-7.7)  H  01/22/21  04:31    


 


PT  15.2 Sec. (12.2-14.9)  H  01/22/21  04:31    


 


INR  1.20  (0.87-1.13)  H  01/22/21  04:31    


 


D-Dimer  762.58 ng/mlDDU (0-234)  H  01/21/21  23:13    


 


ABG pH  7.362 pH Units (7.350-7.450)   01/21/21  22:05    


 


ABG pCO2  64.3 mm Hg  01/21/21  22:05    


 


ABG pO2  61.4 mm Hg (80.0-90.0)  L  01/21/21  22:05    


 


ABG HCO3  35.7 mmol/L (20.0-26.0)  H  01/21/21  22:05    


 


ABG O2 Saturation  90.8 % (95.0-99.0)  L  01/21/21  22:05    


 


ABG O2 Content  12.0  (0.0-44)   01/21/21  22:05    


 


ABG Base Excess  8.7 mmol/L (-2.0-3.0)  H  01/21/21  22:05    


 


ABG Hemoglobin  9.6 gm/dl (12.0-16.0)  L  01/21/21  22:05    


 


ABG Carboxyhemoglobin  1.9 % (0.0-5.0)   01/21/21  22:05    


 


ABG Methemoglobin  0.5 % (0.0-1.5)   01/21/21  22:05    


 


Oxyhemoglobin  88.7 % (95.0-99.0)  L  01/21/21  22:05    


 


FiO2  21 %  01/21/21  22:05    


 


Sodium  138 mmol/L (137-145)   01/22/21  04:31    


 


Potassium  5.3 mmol/L (3.6-5.0)  H  01/22/21  04:31    


 


Chloride  95.2 mmol/L ()  L  01/22/21  04:31    


 


Carbon Dioxide  36 mmol/L (22-30)  H  01/22/21  04:31    


 


Anion Gap  12 mmol/L  01/22/21  04:31    


 


BUN  7 mg/dL (7-17)   01/22/21  04:31    


 


Creatinine  0.5 mg/dL (0.6-1.2)  L  01/22/21  04:31    


 


Estimated GFR  > 60 ml/min  01/22/21  04:31    


 


BUN/Creatinine Ratio  14 %  01/22/21  04:31    


 


Glucose  163 mg/dL ()  H  01/22/21  04:31    


 


Calcium  8.6 mg/dL (8.4-10.2)   01/22/21  04:31    


 


Ferritin  56.3 ng/mL (10.0-200.0)   01/22/21  18:21    


 


Total Bilirubin  0.30 mg/dL (0.1-1.2)   01/21/21  16:13    


 


AST  28 units/L (5-40)   01/21/21  16:13    


 


ALT  25 units/L (7-56)   01/21/21  16:13    


 


Alkaline Phosphatase  108 units/L ()   01/21/21  16:13    


 


Lactate Dehydrogenase  282 units/L ()  H  01/22/21  18:21    


 


Troponin T  < 0.010 ng/mL (0.00-0.029)   01/21/21  20:16    


 


Total Protein  8.7 g/dL (6.3-8.2)  H  01/21/21  16:13    


 


Albumin  3.5 g/dL (3.9-5)  L  01/21/21  16:13    


 


Albumin/Globulin Ratio  0.7 %  01/21/21  16:13    


 


Urine Color  Yellow  (Yellow)   01/21/21  17:38    


 


Urine Turbidity  Slightly-cloudy  (Clear)   01/21/21  17:38    


 


Urine pH  7.0  (5.0-7.0)   01/21/21  17:38    


 


Ur Specific Gravity  1.012  (1.003-1.030)   01/21/21  17:38    


 


Urine Protein  30 mg/dl mg/dL (Negative)   01/21/21  17:38    


 


Urine Glucose (UA)  Neg mg/dL (Negative)   01/21/21  17:38    


 


Urine Ketones  Neg mg/dL (Negative)   01/21/21  17:38    


 


Urine Blood  Neg  (Negative)   01/21/21  17:38    


 


Urine Nitrite  Neg  (Negative)   01/21/21  17:38    


 


Ur Reducing Substances  Not Reportable   01/21/21  17:38    


 


Urine Bilirubin  Neg  (Negative)   01/21/21  17:38    


 


Urine Ictotest  Not Reportable   01/21/21  17:38    


 


Urine Urobilinogen  < 2.0 mg/dL (<2.0)   01/21/21  17:38    


 


Ur Leukocyte Esterase  Tr  (Negative)   01/21/21  17:38    


 


Urine WBC (Auto)  1.0 /HPF (0.0-6.0)   01/21/21  17:38    


 


Urine RBC (Auto)  1.0 /HPF (0.0-6.0)   01/21/21  17:38    


 


U Epithel Cells (Auto)  8.0 /HPF (0-13.0)   01/21/21  17:38    


 


Urine Bacteria (Auto)  1+ /HPF (Negative)   01/21/21  17:38    


 


Urine Mucus  Few /HPF  01/21/21  17:38    


 


Urine HCG, Qual  Negative  (Negative)   01/21/21  17:38    


 


Coronavirus (PCR)  Negative  (Negative)   01/22/21  Unknown











Diamond/IV: 


                                        





Voiding Method                   Toilet


IV Catheter Type [Left Hand]     INT / Saline Lock











Active Medications





- Current Medications


Current Medications: 














Generic Name Dose Route Start Last Admin





  Trade Name Freq  PRN Reason Stop Dose Admin


 


Acetaminophen  650 mg  01/21/21 23:22 





  Acetaminophen 325 Mg Tab  PO  





  Q4H PRN  





  Pain MILD(1-3)/Fever >100.5/HA  


 


Albuterol/Ipratropium  1 ampul  01/22/21 02:00  01/23/21 09:00





  Ipratropium/Albuterol Sulfate 3 Ml Ampul.Neb  IH   1 ampul





  Q6HRT DELL   Administration


 


Arformoterol Tartrate  15 mcg  01/22/21 20:00  01/23/21 09:00





  Arformoterol 15 Mcg/2 Ml Nebu  IH   15 mcg





  Q12HRT DELL   Administration


 


Budesonide  0.5 mg  01/22/21 20:00  01/23/21 09:00





  Budesonide 0.5 Mg/2 Ml Nebu  IH   0.5 mg





  Q12HRT DELL   Administration


 


Famotidine  20 mg  01/22/21 15:00  01/22/21 15:02





  Famotidine 20 Mg Tab  PO   20 mg





  QDAY DELL   Administration


 


Furosemide  20 mg  01/22/21 15:00  01/22/21 15:02





  Furosemide 20 Mg/2 Ml Inj  IV  01/23/21 14:59  20 mg





  QDAY DELL   Administration


 


Hydralazine HCl  5 mg  01/23/21 04:13  01/23/21 04:21





  Hydralazine 20 Mg/1 Ml Inj  IV   5 mg





  Q6H PRN   Administration





  Hypertension  


 


Hydrochlorothiazide  25 mg  01/23/21 10:00 





  Hydrochlorothiazide 25 Mg Tab  PO  





  QDAY DELL  


 


Sodium Chloride  1,000 mls @ 42 mls/hr  01/23/21 04:10  01/23/21 04:21





  Nacl 0.9% 1000 Ml  IV  01/24/21 03:58  42 mls/hr





  ONCE ONE   Administration


 


Magnesium Hydroxide  30 ml  01/21/21 23:22 





  Magnesium Hydroxide (Mom) Oral Liqd Udc  PO  





  Q4H PRN  





  Constipation  


 


Methylprednisolone Sodium Succinate  40 mg  01/22/21 06:00  01/23/21 05:20





  Methylprednisolone Sod Succinate 40 Mg/1 Ml Inj  IV   40 mg





  Q8HR DELL   Administration


 


Morphine Sulfate  2 mg  01/21/21 23:22 





  Morphine 2 Mg/1 Ml Inj  IV  





  Q4H PRN  





  Pain, Moderate (4-6)  


 


Ondansetron HCl  4 mg  01/21/21 23:22 





  Ondansetron 4 Mg/2 Ml Inj  IV  





  Q8H PRN  





  Nausea And Vomiting  


 


Sodium Chloride  10 ml  01/22/21 10:00  01/22/21 21:39





  Sodium Chloride 0.9% 10 Ml Flush Syringe  IV   10 ml





  BID DELL   Administration


 


Sodium Chloride  10 ml  01/21/21 23:22 





  Sodium Chloride 0.9% 10 Ml Flush Syringe  IV  





  PRN PRN  





  LINE FLUSH

## 2021-01-23 NOTE — PROGRESS NOTE
Assessment and Plan





This is 30 yearl old  female. Morbidly Obese history of asthma 

admitted for shortness of breath and wheezing. Patient resting on 3 litres o2. 

Says breathing better. O2 saturation 98% on 3 litres O2. Patient afebrile no 

leukocytosis. Patient denies chest pain. Slight cough. Patient is on I/V 

solumedrol and albuterol/atrovent aerosol treatments q 6 hours. Chest xray 

reported . No acute findings. Recommend sleep study as out patient.





- Patient Problems


(1) Asthma with severe exacerbation


Status: Acute   


Plan to address problem: 


O2 3 litres via nasal canula.


 Continue I/V Solumedrol.


 Albuterol/atrovent aerosol treatments q 6 hours.








(2) Morbid obesity with BMI of 70 and over, adult


Status: Acute   


Plan to address problem: 


Recommend to loose weight.


 Diet and exercise.


 Recommend sleep study as out patient.








(3) Sleep apnea


Status: Acute   


Plan to address problem: 


Patient with morbid Obesity and symptoms of sleep apnea, like snoring, 

fragmentation of sleep and day time sleepiness. Recommend sleep study as out 

patient.








Subjective


Date of service: 01/23/21


Interval history: 





This is 30 yearl old  female. Morbidly Obese history of asthma 

admitted for shortness of breath and wheezing. Patient resting on 3 litres o2. S

ays breathing better. O2 saturation 98% on 3 litres O2. Patient afebrile no 

leukocytosis. Patient denies chest pain. Slight cough. Patient is on I/V 

solumedrol and albuterol/atrovent aerosol treatments q 6 hours. Chest xray 

reported . No acute findings. Recommend sleep study as out patient.





Objective


                               Vital Signs - 12hr











  01/23/21 01/23/21 01/23/21





  03:56 04:21 07:42


 


Temperature 97.7 F  98.4 F


 


Pulse Rate 95 H  73


 


Pulse Rate [   





Bilateral]   


 


Respiratory 20  20





Rate   


 


Respiratory   





Rate [Bilateral   





]   


 


Blood Pressure 164/103 164/103 128/91


 


O2 Sat by Pulse 95  92





Oximetry   














  01/23/21 01/23/21 01/23/21





  09:00 10:00 11:28


 


Temperature   


 


Pulse Rate   72


 


Pulse Rate [ 87  





Bilateral]   


 


Respiratory  20 





Rate   


 


Respiratory 18  





Rate [Bilateral   





]   


 


Blood Pressure   


 


O2 Sat by Pulse 98 96 





Oximetry   











Constitutional: no acute distress, alert, other (Morbidly Obese.)


Eyes: non-icteric


ENT: oropharynx moist


Neck: supple, other (Short)


Effort: mildly labored


Ascultation: Bilateral: diminished breath sounds, other (Prolonged expiratory 

phase.)


Cardiovascular: regular rate and rhythm


Gastrointestinal: normoactive bowel sounds, soft, other (Obese.)


Integumentary: rash, other (Stasis dermatitis.)


Extremities: no cyanosis


Neurologic: normal mental status, non-focal exam, pupils equal and round


Psychiatric: mood appropriate


CBC and BMP: 


                                 01/22/21 04:31





                                 01/22/21 04:31


ABG, PT/INR, D-dimer: 


ABG











ABG pH  7.362 pH Units (7.350-7.450)   01/21/21  22:05    


 


ABG pCO2  64.3 mm Hg  01/21/21  22:05    


 


ABG pO2  61.4 mm Hg (80.0-90.0)  L  01/21/21  22:05    


 


ABG O2 Saturation  90.8 % (95.0-99.0)  L  01/21/21  22:05    





PT/INR, D-dimer











PT  15.2 Sec. (12.2-14.9)  H  01/22/21  04:31    


 


INR  1.20  (0.87-1.13)  H  01/22/21  04:31    


 


D-Dimer  762.58 ng/mlDDU (0-234)  H  01/21/21  23:13    








Abnormal lab findings: 


                                  Abnormal Labs











  01/21/21 01/21/21 01/21/21





  16:13 16:13 22:05


 


Hgb   


 


MCV  75 L  


 


MCH  22 L  


 


MCHC  29 L  


 


RDW  19.7 H  


 


Lymph % (Auto)   


 


Lymph # (Auto)   


 


Seg Neutrophils %  71.3 H  


 


Seg Neutrophils #   


 


PT   


 


INR   


 


D-Dimer   


 


ABG pO2    61.4 L


 


ABG HCO3    35.7 H


 


ABG O2 Saturation    90.8 L


 


ABG Base Excess    8.7 H


 


ABG Hemoglobin    9.6 L


 


Oxyhemoglobin    88.7 L


 


Sodium   136 L 


 


Potassium   


 


Chloride   94.7 L 


 


Carbon Dioxide   36 H 


 


Creatinine   


 


Glucose   


 


Lactate Dehydrogenase   


 


Total Protein   8.7 H 


 


Albumin   3.5 L 














  01/21/21 01/22/21 01/22/21





  23:13 04:31 04:31


 


Hgb   9.6 L 


 


MCV   74 L 


 


MCH   22 L 


 


MCHC   


 


RDW   19.2 H 


 


Lymph % (Auto)   8.6 L 


 


Lymph # (Auto)   0.8 L 


 


Seg Neutrophils %   88.1 H 


 


Seg Neutrophils #   8.6 H 


 


PT    15.2 H


 


INR    1.20 H


 


D-Dimer  762.58 H  


 


ABG pO2   


 


ABG HCO3   


 


ABG O2 Saturation   


 


ABG Base Excess   


 


ABG Hemoglobin   


 


Oxyhemoglobin   


 


Sodium   


 


Potassium   


 


Chloride   


 


Carbon Dioxide   


 


Creatinine   


 


Glucose   


 


Lactate Dehydrogenase   


 


Total Protein   


 


Albumin   














  01/22/21 01/22/21





  04:31 18:21


 


Hgb  


 


MCV  


 


MCH  


 


MCHC  


 


RDW  


 


Lymph % (Auto)  


 


Lymph # (Auto)  


 


Seg Neutrophils %  


 


Seg Neutrophils #  


 


PT  


 


INR  


 


D-Dimer  


 


ABG pO2  


 


ABG HCO3  


 


ABG O2 Saturation  


 


ABG Base Excess  


 


ABG Hemoglobin  


 


Oxyhemoglobin  


 


Sodium  


 


Potassium  5.3 H 


 


Chloride  95.2 L 


 


Carbon Dioxide  36 H 


 


Creatinine  0.5 L 


 


Glucose  163 H 


 


Lactate Dehydrogenase   282 H


 


Total Protein  


 


Albumin  











Chest x-ray: report reviewed (No acute findings.), image reviewed

## 2021-01-24 VITALS — DIASTOLIC BLOOD PRESSURE: 89 MMHG | SYSTOLIC BLOOD PRESSURE: 142 MMHG

## 2021-01-24 RX ADMIN — Medication SCH ML: at 10:52

## 2021-01-24 RX ADMIN — BENZOCAINE AND MENTHOL PRN EACH: 15; 2.6 LOZENGE ORAL at 06:26

## 2021-01-24 RX ADMIN — BENZONATATE SCH MG: 100 CAPSULE ORAL at 06:22

## 2021-01-24 RX ADMIN — IPRATROPIUM BROMIDE AND ALBUTEROL SULFATE SCH: .5; 3 SOLUTION RESPIRATORY (INHALATION) at 08:08

## 2021-01-24 RX ADMIN — FAMOTIDINE SCH MG: 20 TABLET ORAL at 10:52

## 2021-01-24 RX ADMIN — BUDESONIDE SCH MG: 0.5 INHALANT RESPIRATORY (INHALATION) at 08:08

## 2021-01-24 RX ADMIN — IPRATROPIUM BROMIDE AND ALBUTEROL SULFATE SCH: .5; 3 SOLUTION RESPIRATORY (INHALATION) at 06:04

## 2021-01-24 RX ADMIN — METHYLPREDNISOLONE SODIUM SUCCINATE SCH MG: 40 INJECTION, POWDER, FOR SOLUTION INTRAMUSCULAR; INTRAVENOUS at 06:22

## 2021-01-24 RX ADMIN — ARFORMOTEROL TARTRATE SCH MCG: 15 SOLUTION RESPIRATORY (INHALATION) at 08:08

## 2021-01-24 RX ADMIN — IPRATROPIUM BROMIDE AND ALBUTEROL SULFATE SCH: .5; 3 SOLUTION RESPIRATORY (INHALATION) at 14:10

## 2021-01-24 RX ADMIN — BENZOCAINE AND MENTHOL PRN EACH: 15; 2.6 LOZENGE ORAL at 01:30

## 2021-01-24 NOTE — DISCHARGE SUMMARY
Providers





- Providers


Date of Admission: 


01/23/21 11:56





Date of discharge: 01/24/21


Attending physician: 


SUJATHA MALONEY





                                        





01/21/21 23:22


Consult to Physician [CONS] Routine 


   Comment: 


   Consulting Provider: RAJENDRA CHEATHAM


   Physician Instructions: 


   Reason For Exam: Asthma Exacerbation











Primary care physician: 


PRIMARY CARE MD








Hospitalization


Reason for admission: asthma exac


Condition: Fair


Hospital course: 





30-year-old female with known history of asthma and hypertension presenting to 

the emergency room complaining of shortness of breath which had been ongoing for

 the past few days.  She reported using her albuterol nebulizing treatments at 

home without any significant improvement. 


She denied any chest pain, no fever or chills, no nausea vomiting, no headache 

or dizziness.  She denies any sick contacts and no recent travel.  Denies any 

contact with anyone with COVID-19.  Upon arrival in the emergency room patient's

 oxygen saturation was about 77%.  The patient was admitted with diagnosis of 

acute hypoxic respiratory failure secondary to acute asthma exacerbation, 

obesity hypoventilation syndrome and FABIAN.  Patient was also noted to have work-

up in the emergency room that revealed elevated D-dimer.  However, Chest x-ray 

showed no acute findings.  CT angiogram and VQ scan could not be done because of

 patient's body habitus/obesity.  Patient had further risk stratification with 

lower extremity Dopplers that were found to be negative.  Patient also had 

COVID-19 testing that was negative.  Patient was treated with IV steroids, 

bronchodilators and nebulizer treatments.  Patient's oxygenation improved and 

she returned back to her baseline satting greater than 92% on room air.  Patient

 is felt to receive maximal hospital benefit and will be discharged home.  

Dedicated discharge time 35 minutes.








Disposition: DC-01 TO HOME OR SELFCARE


Time spent for discharge: 35





- Discharge Diagnoses


(1) Acute respiratory failure with hypoxia


Status: Acute   





(2) Obesity hypoventilation syndrome


Status: Acute   





(3) Asthma with severe exacerbation


Status: Acute   





(4) Morbid obesity


Status: Acute   





(5) Sleep apnea


Status: Acute   





Core Measure Documentation





- Palliative Care


Palliative Care/ Comfort Measures: Not Applicable





- Core Measures


Any of the following diagnoses?: none





Exam





- Constitutional


Vitals: 


                                        











Temp Pulse Resp BP Pulse Ox


 


 97.2 F L  89   20   142/96   97 


 


 01/24/21 04:34  01/24/21 08:08  01/24/21 08:08  01/24/21 04:34  01/24/21 08:07











General appearance: Present: no acute distress, obese





- EENT


Eyes: Present: PERRL


ENT: hearing intact, clear oral mucosa





- Neck


Neck: Present: supple, normal ROM





- Respiratory


Respiratory effort: normal


Respiratory: bilateral: CTA





- Cardiovascular


Heart Sounds: Present: S1 & S2.  Absent: rub, click





- Extremities


Extremities: pulses symmetrical, No edema


Peripheral Pulses: within normal limits





- Abdominal


General gastrointestinal: Present: soft, non-tender, non-distended, normal bowel

 sounds


Female genitourinary: Present: normal





- Integumentary


Integumentary: Present: clear, warm, dry





- Musculoskeletal


Musculoskeletal: gait normal, strength equal bilaterally





- Psychiatric


Psychiatric: appropriate mood/affect, intact judgment & insight





- Neurologic


Neurologic: CNII-XII intact, moves all extremities





Plan


Activity: advance as tolerated


Weight Bearing Status: Weight Bear as Tolerated


Diet: low fat, low cholesterol


Follow up with: 


PRIMARY CARE,MD [Primary Care Provider] - 3-5 Days


LU DELA CRUZ MD [Staff Physician] - 7 Days


Prescriptions: 


Ipratropium/Albuterol Sulfate [DUONEB *Not for PRN Use*] 1 ampul IH TIDRT #30 

day


hydroCHLOROthiazide [HCTZ] 25 mg PO QDAY #30 tablet


Famotidine [Pepcid] 20 mg PO BID #14 tablet


Albuterol Mdi (or & Nicu Only) [ProAir HFA Inhaler] 2 puff IH QID PRN #1 

inhalation


 PRN Reason: Shortness Of Breath


ALBUTEROL NEB's [Proventil 0.083% NEBS] 2.5 mg IH Q3HRT PRN #30 day


 PRN Reason: Shortness Of Breath


Benzonatate [Tessalon Perles] 100 mg PO Q8HR #15 capsule


Azithromycin [Zithromax Z-JAVIER] 0 mg PO DAILY #1 tab

## 2021-01-24 NOTE — PROGRESS NOTE
Assessment and Plan





This is 30 yearl old  female. Morbidly Obese history of asthma 

admitted for shortness of breath and wheezing. Patient resting on 3 litres o2. 

Says breathing better. O2 saturation 94% on 3 litres O2. Patient afebrile no 

leukocytosis. Patient denies chest pain. Slight cough. Patient is on I/V 

solumedrol and albuterol/atrovent aerosol treatments q 6 hours. Chest xray 

reported . No acute findings. 


Patient going home to day.


Patient already has homne o2 3 litres.


Recommend PO steroids in tapering doses.


Continue albuterol/atrovent aerosol treatments.


Recommend sleep study as out patient.


Recommend to loose weight.





- Patient Problems


(1) Asthma with severe exacerbation


Status: Acute   


Plan to address problem: 


O2 3 litres via nasal canula.


 Recommend Po prednisone in tapering doses.


 Albuterol/atrovent aerosol treatments q 6 hours.








(2) Morbid obesity with BMI of 70 and over, adult


Status: Acute   


Plan to address problem: 


Recommend to loose weight.


 Diet and exercise.


 Recommend sleep study as out patient.








(3) Sleep apnea


Status: Acute   


Plan to address problem: 


Patient with morbid Obesity, Short neck, and symptoms of sleep apnea, like 

snoring, fragmentation of sleep and day time sleepiness. Recommend sleep study 

as out patient.








Subjective


Date of service: 01/24/21


Interval history: 





This is 30 yearl old  female. Morbidly Obese history of asthma 

admitted for shortness of breath and wheezing. Patient resting on 3 litres o2. 

Says breathing better. O2 saturation 94% on 3 litres O2. Patient afebrile no 

leukocytosis. Patient denies chest pain. Slight cough. Patient is on I/V 

solumedrol and albuterol/atrovent aerosol treatments q 6 hours. Chest xray 

reported . No acute findings. 


Patient going home to day.


Patient already has homne o2 3 litres.


Recommend PO steroids in tapering doses.


Continue albuterol/atrovent aerosol treatments.


Recommend sleep study as out patient.


Recommend to loose weight.





Objective


                               Vital Signs - 12hr











  01/24/21 01/24/21 01/24/21





  04:34 08:07 08:08


 


Temperature 97.2 F L 97.8 F 


 


Pulse Rate 83 68 


 


Pulse Rate [   89





Bilateral]   


 


Respiratory 20 24 





Rate   


 


Respiratory   20





Rate [Bilateral   





]   


 


Blood Pressure 142/96 130/95 


 


O2 Sat by Pulse 97 100 





Oximetry   














  01/24/21 01/24/21 01/24/21





  11:00 11:35 12:39


 


Temperature  98.5 F 


 


Pulse Rate  71 85


 


Pulse Rate [   





Bilateral]   


 


Respiratory 20 24 





Rate   


 


Respiratory   





Rate [Bilateral   





]   


 


Blood Pressure  142/89 


 


O2 Sat by Pulse 96 94 





Oximetry   











Constitutional: no acute distress, alert, other (Morbidly Obese.)


Eyes: non-icteric


ENT: oropharynx moist


Neck: supple, other (Short)


Effort: mildly labored


Ascultation: Bilateral: diminished breath sounds, other (Prolonged expiratory p

hase.)


Cardiovascular: regular rate and rhythm


Gastrointestinal: normoactive bowel sounds, soft, other (Obese.)


Integumentary: rash, other (Stasis dermatitis.)


Extremities: no cyanosis


Neurologic: normal mental status, non-focal exam, pupils equal and round


Psychiatric: mood appropriate


CBC and BMP: 


                                 01/22/21 04:31





                                 01/22/21 04:31


ABG, PT/INR, D-dimer: 


ABG











ABG pH  7.362 pH Units (7.350-7.450)   01/21/21  22:05    


 


ABG pCO2  64.3 mm Hg  01/21/21  22:05    


 


ABG pO2  61.4 mm Hg (80.0-90.0)  L  01/21/21  22:05    


 


ABG O2 Saturation  90.8 % (95.0-99.0)  L  01/21/21  22:05    





PT/INR, D-dimer











PT  15.2 Sec. (12.2-14.9)  H  01/22/21  04:31    


 


INR  1.20  (0.87-1.13)  H  01/22/21  04:31    


 


D-Dimer  762.58 ng/mlDDU (0-234)  H  01/21/21  23:13    








Abnormal lab findings: 


                                  Abnormal Labs











  01/21/21 01/21/21 01/21/21





  16:13 16:13 22:05


 


Hgb   


 


MCV  75 L  


 


MCH  22 L  


 


MCHC  29 L  


 


RDW  19.7 H  


 


Lymph % (Auto)   


 


Lymph # (Auto)   


 


Seg Neutrophils %  71.3 H  


 


Seg Neutrophils #   


 


PT   


 


INR   


 


D-Dimer   


 


ABG pO2    61.4 L


 


ABG HCO3    35.7 H


 


ABG O2 Saturation    90.8 L


 


ABG Base Excess    8.7 H


 


ABG Hemoglobin    9.6 L


 


Oxyhemoglobin    88.7 L


 


Sodium   136 L 


 


Potassium   


 


Chloride   94.7 L 


 


Carbon Dioxide   36 H 


 


Creatinine   


 


Glucose   


 


Lactate Dehydrogenase   


 


Total Protein   8.7 H 


 


Albumin   3.5 L 














  01/21/21 01/22/21 01/22/21





  23:13 04:31 04:31


 


Hgb   9.6 L 


 


MCV   74 L 


 


MCH   22 L 


 


MCHC   


 


RDW   19.2 H 


 


Lymph % (Auto)   8.6 L 


 


Lymph # (Auto)   0.8 L 


 


Seg Neutrophils %   88.1 H 


 


Seg Neutrophils #   8.6 H 


 


PT    15.2 H


 


INR    1.20 H


 


D-Dimer  762.58 H  


 


ABG pO2   


 


ABG HCO3   


 


ABG O2 Saturation   


 


ABG Base Excess   


 


ABG Hemoglobin   


 


Oxyhemoglobin   


 


Sodium   


 


Potassium   


 


Chloride   


 


Carbon Dioxide   


 


Creatinine   


 


Glucose   


 


Lactate Dehydrogenase   


 


Total Protein   


 


Albumin   














  01/22/21 01/22/21





  04:31 18:21


 


Hgb  


 


MCV  


 


MCH  


 


MCHC  


 


RDW  


 


Lymph % (Auto)  


 


Lymph # (Auto)  


 


Seg Neutrophils %  


 


Seg Neutrophils #  


 


PT  


 


INR  


 


D-Dimer  


 


ABG pO2  


 


ABG HCO3  


 


ABG O2 Saturation  


 


ABG Base Excess  


 


ABG Hemoglobin  


 


Oxyhemoglobin  


 


Sodium  


 


Potassium  5.3 H 


 


Chloride  95.2 L 


 


Carbon Dioxide  36 H 


 


Creatinine  0.5 L 


 


Glucose  163 H 


 


Lactate Dehydrogenase   282 H


 


Total Protein  


 


Albumin

## 2021-09-21 ENCOUNTER — HOSPITAL ENCOUNTER (INPATIENT)
Dept: HOSPITAL 5 - ED | Age: 31
LOS: 19 days | DRG: 283 | End: 2021-10-10
Attending: INTERNAL MEDICINE | Admitting: HOSPITALIST
Payer: COMMERCIAL

## 2021-09-21 DIAGNOSIS — R73.9: ICD-10-CM

## 2021-09-21 DIAGNOSIS — R74.01: ICD-10-CM

## 2021-09-21 DIAGNOSIS — J44.1: ICD-10-CM

## 2021-09-21 DIAGNOSIS — Z20.822: ICD-10-CM

## 2021-09-21 DIAGNOSIS — Z71.3: ICD-10-CM

## 2021-09-21 DIAGNOSIS — Z13.30: ICD-10-CM

## 2021-09-21 DIAGNOSIS — E87.1: ICD-10-CM

## 2021-09-21 DIAGNOSIS — E03.9: ICD-10-CM

## 2021-09-21 DIAGNOSIS — I11.0: Primary | ICD-10-CM

## 2021-09-21 DIAGNOSIS — G93.41: ICD-10-CM

## 2021-09-21 DIAGNOSIS — I50.33: ICD-10-CM

## 2021-09-21 DIAGNOSIS — E83.51: ICD-10-CM

## 2021-09-21 DIAGNOSIS — K72.00: ICD-10-CM

## 2021-09-21 DIAGNOSIS — I21.A1: ICD-10-CM

## 2021-09-21 DIAGNOSIS — E16.2: ICD-10-CM

## 2021-09-21 DIAGNOSIS — Z83.3: ICD-10-CM

## 2021-09-21 DIAGNOSIS — Z79.899: ICD-10-CM

## 2021-09-21 DIAGNOSIS — E87.5: ICD-10-CM

## 2021-09-21 DIAGNOSIS — E87.0: ICD-10-CM

## 2021-09-21 DIAGNOSIS — E66.2: ICD-10-CM

## 2021-09-21 DIAGNOSIS — N17.0: ICD-10-CM

## 2021-09-21 DIAGNOSIS — J96.21: ICD-10-CM

## 2021-09-21 DIAGNOSIS — I89.0: ICD-10-CM

## 2021-09-21 DIAGNOSIS — D63.8: ICD-10-CM

## 2021-09-21 DIAGNOSIS — J96.22: ICD-10-CM

## 2021-09-21 DIAGNOSIS — Z82.49: ICD-10-CM

## 2021-09-21 LAB
BUN SERPL-MCNC: 15 MG/DL (ref 7–17)
BUN/CREAT SERPL: 11 %
CALCIUM SERPL-MCNC: 8.8 MG/DL (ref 8.4–10.2)
HCT VFR BLD CALC: 33.5 % (ref 30.3–42.9)
HEMOLYSIS INDEX: 20
HGB BLD-MCNC: 9.1 GM/DL (ref 10.1–14.3)
MCHC RBC AUTO-ENTMCNC: 27 % (ref 30–34)
MCV RBC AUTO: 77 FL (ref 79–97)
PLATELET # BLD: 367 K/MM3 (ref 140–440)
RBC # BLD AUTO: 4.36 M/MM3 (ref 3.65–5.03)

## 2021-09-21 PROCEDURE — 93005 ELECTROCARDIOGRAM TRACING: CPT

## 2021-09-21 PROCEDURE — 82533 TOTAL CORTISOL: CPT

## 2021-09-21 PROCEDURE — C9113 INJ PANTOPRAZOLE SODIUM, VIA: HCPCS

## 2021-09-21 PROCEDURE — 83935 ASSAY OF URINE OSMOLALITY: CPT

## 2021-09-21 PROCEDURE — 80053 COMPREHEN METABOLIC PANEL: CPT

## 2021-09-21 PROCEDURE — 85007 BL SMEAR W/DIFF WBC COUNT: CPT

## 2021-09-21 PROCEDURE — 85379 FIBRIN DEGRADATION QUANT: CPT

## 2021-09-21 PROCEDURE — 71045 X-RAY EXAM CHEST 1 VIEW: CPT

## 2021-09-21 PROCEDURE — 87040 BLOOD CULTURE FOR BACTERIA: CPT

## 2021-09-21 PROCEDURE — 74018 RADEX ABDOMEN 1 VIEW: CPT

## 2021-09-21 PROCEDURE — 85610 PROTHROMBIN TIME: CPT

## 2021-09-21 PROCEDURE — 82330 ASSAY OF CALCIUM: CPT

## 2021-09-21 PROCEDURE — 80048 BASIC METABOLIC PNL TOTAL CA: CPT

## 2021-09-21 PROCEDURE — 86803 HEPATITIS C AB TEST: CPT

## 2021-09-21 PROCEDURE — 84100 ASSAY OF PHOSPHORUS: CPT

## 2021-09-21 PROCEDURE — 80061 LIPID PANEL: CPT

## 2021-09-21 PROCEDURE — 93321 DOPPLER ECHO F-UP/LMTD STD: CPT

## 2021-09-21 PROCEDURE — 82803 BLOOD GASES ANY COMBINATION: CPT

## 2021-09-21 PROCEDURE — 84484 ASSAY OF TROPONIN QUANT: CPT

## 2021-09-21 PROCEDURE — 76705 ECHO EXAM OF ABDOMEN: CPT

## 2021-09-21 PROCEDURE — 86403 PARTICLE AGGLUT ANTBDY SCRN: CPT

## 2021-09-21 PROCEDURE — 83735 ASSAY OF MAGNESIUM: CPT

## 2021-09-21 PROCEDURE — 82553 CREATINE MB FRACTION: CPT

## 2021-09-21 PROCEDURE — 85027 COMPLETE CBC AUTOMATED: CPT

## 2021-09-21 PROCEDURE — 85014 HEMATOCRIT: CPT

## 2021-09-21 PROCEDURE — 94660 CPAP INITIATION&MGMT: CPT

## 2021-09-21 PROCEDURE — 83930 ASSAY OF BLOOD OSMOLALITY: CPT

## 2021-09-21 PROCEDURE — 84443 ASSAY THYROID STIM HORMONE: CPT

## 2021-09-21 PROCEDURE — 82962 GLUCOSE BLOOD TEST: CPT

## 2021-09-21 PROCEDURE — 84439 ASSAY OF FREE THYROXINE: CPT

## 2021-09-21 PROCEDURE — 82805 BLOOD GASES W/O2 SATURATION: CPT

## 2021-09-21 PROCEDURE — 85018 HEMOGLOBIN: CPT

## 2021-09-21 PROCEDURE — 84300 ASSAY OF URINE SODIUM: CPT

## 2021-09-21 PROCEDURE — 80076 HEPATIC FUNCTION PANEL: CPT

## 2021-09-21 PROCEDURE — 86708 HEPATITIS A ANTIBODY: CPT

## 2021-09-21 PROCEDURE — 87076 CULTURE ANAEROBE IDENT EACH: CPT

## 2021-09-21 PROCEDURE — 36415 COLL VENOUS BLD VENIPUNCTURE: CPT

## 2021-09-21 PROCEDURE — 94760 N-INVAS EAR/PLS OXIMETRY 1: CPT

## 2021-09-21 PROCEDURE — 94640 AIRWAY INHALATION TREATMENT: CPT

## 2021-09-21 PROCEDURE — 93308 TTE F-UP OR LMTD: CPT

## 2021-09-21 PROCEDURE — U0003 INFECTIOUS AGENT DETECTION BY NUCLEIC ACID (DNA OR RNA); SEVERE ACUTE RESPIRATORY SYNDROME CORONAVIRUS 2 (SARS-COV-2) (CORONAVIRUS DISEASE [COVID-19]), AMPLIFIED PROBE TECHNIQUE, MAKING USE OF HIGH THROUGHPUT TECHNOLOGIES AS DESCRIBED BY CMS-2020-01-R: HCPCS

## 2021-09-21 PROCEDURE — 85049 AUTOMATED PLATELET COUNT: CPT

## 2021-09-21 PROCEDURE — 36600 WITHDRAWAL OF ARTERIAL BLOOD: CPT

## 2021-09-21 PROCEDURE — 83880 ASSAY OF NATRIURETIC PEPTIDE: CPT

## 2021-09-21 PROCEDURE — 85025 COMPLETE CBC W/AUTO DIFF WBC: CPT

## 2021-09-21 PROCEDURE — 76770 US EXAM ABDO BACK WALL COMP: CPT

## 2021-09-21 PROCEDURE — 85730 THROMBOPLASTIN TIME PARTIAL: CPT

## 2021-09-21 PROCEDURE — 86706 HEP B SURFACE ANTIBODY: CPT

## 2021-09-21 PROCEDURE — 82550 ASSAY OF CK (CPK): CPT

## 2021-09-21 PROCEDURE — 93325 DOPPLER ECHO COLOR FLOW MAPG: CPT

## 2021-09-21 PROCEDURE — 93970 EXTREMITY STUDY: CPT

## 2021-09-21 NOTE — XRAY REPORT
XR chest 1V ap



INDICATION / CLINICAL INFORMATION: dyspnea.



COMPARISON: 1/21/2021



FINDINGS: Findings in the chest are accentuated by body habitus and phase of inspiration.



SUPPORT DEVICES: None.

HEART /PULMONARY VASCULATURE: The cardiac silhouette is accentuated. Pulmonary vasculature is congest
ed. 

LUNGS / PLEURA: No focal airspace consolidation. No pleural effusion. No pneumothorax. 



ADDITIONAL FINDINGS: No significant additional findings.



IMPRESSION:

Cardiac silhouette is accentuated with prominence of the pulmonary vasculature, may reflect CHF.



Signer Name: Chang Mckenzie MD 

Signed: 9/21/2021 10:39 PM

Workstation Name: [id number]

## 2021-09-21 NOTE — EMERGENCY DEPARTMENT REPORT
ED Shortness of Breath HPI





- General


Chief Complaint: Dyspnea/Respdistress


Stated Complaint: SOB


Time Seen by Provider: 09/21/21 21:45


Source: patient


Mode of arrival: Stretcher


Limitations: Physical Limitation





- History of Present Illness


Initial Comments: 





 patient presents with multiple issues including shortness of breath, edema, 

generalized malaise, fatigue, and weakness.  She states she just does not feel 

well.  She has a headache.  She states her legs are swollen.  She feels as 

though she is retaining fluid.  She states that she went to her regular doctors 

on the 13th.  They tested her for Covid.  It was negative.  She was told to go 

see a cardiologist.  She is not seen a cardiologist as of yet.  She came in here

because she was not feeling well and did not know what to do.  She has had no 

sick contacts.  There is been no hematemesis or coffee-ground emesis.  She has 

no vomiting.  She does report diarrhea.





- Related Data


                                  Previous Rx's











 Medication  Instructions  Recorded  Last Taken  Type


 


Acetaminophen [Acetaminophen TAB] 325 mg PO Q4H PRN #30 tablet 12/14/17 Unknown 

Rx


 


ALBUTEROL NEB's [Proventil 0.083% 2.5 mg IH Q3HRT PRN #30 day 01/24/21 Unknown 

Rx





NEBS]    


 


Albuterol Mdi (or & Nicu Only) 2 puff IH QID PRN #1 inhalation 01/24/21 Unknown 

Rx





[ProAir HFA Inhaler]    


 


Azithromycin [Zithromax Z-JAVIER] 0 mg PO DAILY #1 tab 01/24/21 Unknown Rx


 


Benzonatate [Tessalon Perles] 100 mg PO Q8HR #15 capsule 01/24/21 Unknown Rx


 


Famotidine [Pepcid] 20 mg PO BID #14 tablet 01/24/21 Unknown Rx


 


Ipratropium/Albuterol Sulfate 1 ampul IH TIDRT #30 day 01/24/21 Unknown Rx





[DUONEB *Not for PRN Use*]    


 


hydroCHLOROthiazide [HCTZ] 25 mg PO QDAY  tablet 01/24/21 Unknown Rx


 


hydroCHLOROthiazide [HCTZ] 25 mg PO QDAY #30 tablet 01/24/21 Unknown Rx


 


methylPREDNISolone [Medrol 4MG 4 mg PO DAILY #1 tab.ds.pk 01/24/21 Unknown Rx





DOSEPAK (21 tabs)]    











                                    Allergies











Allergy/AdvReac Type Severity Reaction Status Date / Time


 


No Known Allergies Allergy   Unverified 07/13/17 11:17














ED Review of Systems


ROS: 


Stated complaint: SOB


Other details as noted in HPI





Comment: All other systems reviewed and negative


Constitutional: no symptoms reported


Eyes: denies: eye pain


ENT: denies: throat pain


Respiratory: see HPI


Cardiovascular: denies: chest pain


Endocrine: denies: unexplained weight loss


Gastrointestinal: denies: abdominal pain


Genitourinary: denies: dysuria


Musculoskeletal: denies: back pain


Skin: denies: rash


Neurological: as per HPI, headache


Hematological/Lymphatic: denies: easy bruising





ED Past Medical Hx





- Past Medical History


Hx Hypertension: Yes


Hx Asthma: Yes


Hx COPD: Yes


Additional medical history: Morbid obesity.  Sleep apnea





- Family History


Family history: hypertension





- Social History


Smoking Status: Never Smoker





- Medications


Home Medications: 


                                Home Medications











 Medication  Instructions  Recorded  Confirmed  Last Taken  Type


 


Acetaminophen [Acetaminophen TAB] 325 mg PO Q4H PRN #30 tablet 12/14/17 01/22/21

Unknown Rx


 


ALBUTEROL NEB's [Proventil 0.083% 2.5 mg IH Q3HRT PRN #30 day 01/24/21  Unknown 

Rx





NEBS]     


 


Albuterol Mdi (or & Nicu Only) 2 puff IH QID PRN #1 inhalation 01/24/21  Unknown

Rx





[ProAir HFA Inhaler]     


 


Azithromycin [Zithromax Z-JAVIER] 0 mg PO DAILY #1 tab 01/24/21  Unknown Rx


 


Benzonatate [Tessalon Perles] 100 mg PO Q8HR #15 capsule 01/24/21  Unknown Rx


 


Famotidine [Pepcid] 20 mg PO BID #14 tablet 01/24/21  Unknown Rx


 


Ipratropium/Albuterol Sulfate 1 ampul IH TIDRT #30 day 01/24/21  Unknown Rx





[DUONEB *Not for PRN Use*]     


 


hydroCHLOROthiazide [HCTZ] 25 mg PO QDAY  tablet 01/24/21  Unknown Rx


 


hydroCHLOROthiazide [HCTZ] 25 mg PO QDAY #30 tablet 01/24/21  Unknown Rx


 


methylPREDNISolone [Medrol 4MG 4 mg PO DAILY #1 tab.ds.pk 01/24/21  Unknown Rx





DOSEPAK (21 tabs)]     














ED Physical Exam





- General


Limitations: No Limitations, Physical Limitation, Other (  Pulse ox was noted at

90% and hypoxic.  On her 3 L by cannula, she is 99%.  This is baseline for her.)


General appearance: alert, in no apparent distress, obese





- Head


Head exam: Present: atraumatic, normocephalic





- Eye


Eye exam: Present: normal appearance, EOMI





- ENT


ENT exam: Present: normal exam, normal orophraynx





- Neck


Neck exam: Present: normal inspection.  Absent: meningismus





- Respiratory


Respiratory exam: Present: normal lung sounds bilaterally.  Absent: respiratory 

distress





- Cardiovascular


Cardiovascular Exam: Present: normal rhythm, tachycardia





- GI/Abdominal


GI/Abdominal exam: Present: soft.  Absent: tenderness





- Extremities Exam


Extremities exam: Present: normal capillary refill, pedal edema (  Bilateral 2+ 

above the knee)





- Neurological Exam


Neurological exam: Present: alert, oriented X3, other ( body habitus limits 

complete neurologic exam).  Absent: motor sensory deficit





- Psychiatric


Psychiatric exam: Present: normal affect, normal mood





- Skin


Skin exam: Present: warm, dry





ED Course


                                   Vital Signs











  09/21/21 09/21/21 09/21/21





  21:41 21:45 22:01


 


Temperature   


 


Pulse Rate 116 H 116 H 117 H


 


Respiratory 15 12 30 H





Rate   


 


Blood Pressure   106/31


 


Blood Pressure   





[Right]   


 


O2 Sat by Pulse 91 89 48 L





Oximetry   














  09/21/21 09/21/21 09/21/21





  22:15 22:31 22:45


 


Temperature   


 


Pulse Rate   119 H


 


Respiratory   14





Rate   


 


Blood Pressure 90/14 76/25 91/47


 


Blood Pressure   





[Right]   


 


O2 Sat by Pulse 92 83 L 83 L





Oximetry   














  09/21/21 09/21/21 09/21/21





  23:01 23:15 23:31


 


Temperature   


 


Pulse Rate   


 


Respiratory 24 21 17





Rate   


 


Blood Pressure 79/15 89/18 95/43


 


Blood Pressure   





[Right]   


 


O2 Sat by Pulse 88 84 91





Oximetry   














  09/21/21 09/22/21 09/22/21





  23:45 00:01 00:15


 


Temperature   


 


Pulse Rate   


 


Respiratory 22 24 15





Rate   


 


Blood Pressure 90/43 86/43 117/30


 


Blood Pressure   





[Right]   


 


O2 Sat by Pulse 95 97 65 L





Oximetry   














  09/22/21 09/22/21 09/22/21





  00:31 00:45 01:01


 


Temperature   


 


Pulse Rate   


 


Respiratory 12 31 H 18





Rate   


 


Blood Pressure 117/30 89/71 117/30


 


Blood Pressure   





[Right]   


 


O2 Sat by Pulse 98 92 94





Oximetry   














  09/22/21 09/22/21 09/22/21





  01:15 01:31 01:45


 


Temperature   


 


Pulse Rate   


 


Respiratory 19 37 H 25 H





Rate   


 


Blood Pressure 91/42 101/50 98/50


 


Blood Pressure   





[Right]   


 


O2 Sat by Pulse 94 99 95





Oximetry   














  09/22/21 09/22/21 09/22/21





  02:01 02:15 02:31


 


Temperature   


 


Pulse Rate   


 


Respiratory 18 18 15





Rate   


 


Blood Pressure 106/49 117/94 79/49


 


Blood Pressure   





[Right]   


 


O2 Sat by Pulse 95 99 91





Oximetry   














  09/22/21 09/22/21 09/22/21





  02:45 03:01 03:16


 


Temperature   


 


Pulse Rate  117 H 51 L


 


Respiratory 35 H 29 H 16





Rate   


 


Blood Pressure 81/50 81/50 84/51


 


Blood Pressure   





[Right]   


 


O2 Sat by Pulse 97 97 96





Oximetry   














  09/22/21 09/22/21 09/22/21





  03:31 03:45 04:01


 


Temperature   


 


Pulse Rate   


 


Respiratory   





Rate   


 


Blood Pressure 93/59 93/59 93/59


 


Blood Pressure   





[Right]   


 


O2 Sat by Pulse 98 97 95





Oximetry   














  09/22/21 09/22/21 09/22/21





  04:15 04:31 04:45


 


Temperature   


 


Pulse Rate  113 H 


 


Respiratory  19 





Rate   


 


Blood Pressure 93/59 95/47 95/47


 


Blood Pressure  95/47 





[Right]   


 


O2 Sat by Pulse 95 95 95





Oximetry   














  09/22/21 09/22/21 09/22/21





  05:01 05:15 05:31


 


Temperature   


 


Pulse Rate   


 


Respiratory   





Rate   


 


Blood Pressure 84/47 84/47 83/44


 


Blood Pressure   





[Right]   


 


O2 Sat by Pulse 96 82 L 97





Oximetry   














  09/22/21 09/22/21 09/22/21





  05:45 06:01 06:15


 


Temperature   


 


Pulse Rate   


 


Respiratory   





Rate   


 


Blood Pressure 99/40 108/42 108/42


 


Blood Pressure   





[Right]   


 


O2 Sat by Pulse 92 91 94





Oximetry   














  09/22/21 09/22/21 09/22/21





  06:31 06:45 07:01


 


Temperature   


 


Pulse Rate   


 


Respiratory   





Rate   


 


Blood Pressure 108/48 108/48 112/53


 


Blood Pressure   





[Right]   


 


O2 Sat by Pulse 80 L 75 L 94





Oximetry   














  09/22/21 09/22/21 09/22/21





  07:15 07:30 07:31


 


Temperature  98.0 F 


 


Pulse Rate  108 H 


 


Respiratory  19 





Rate   


 


Blood Pressure 112/53  109/64


 


Blood Pressure  109/64 





[Right]   


 


O2 Sat by Pulse 96 96 96





Oximetry   














  09/22/21 09/22/21 09/22/21





  07:41 07:50 08:01


 


Temperature   


 


Pulse Rate   


 


Respiratory   





Rate   


 


Blood Pressure 109/64 98/48 98/48


 


Blood Pressure   





[Right]   


 


O2 Sat by Pulse 94 94 95





Oximetry   














  09/22/21 09/22/21





  08:11 08:20


 


Temperature  


 


Pulse Rate  


 


Respiratory  





Rate  


 


Blood Pressure 98/48 83/40


 


Blood Pressure  





[Right]  


 


O2 Sat by Pulse 96 95





Oximetry  














- Reevaluation(s)


Reevaluation #1: 





09/21/21 21:55


  IV and labs were ordered.


Reevaluation #2: 





09/25/21 11:44


  Chart was completed during downtime.


09/25/21 11:44





09/25/21 11:48


Patient did require admission.  Labs have been reviewed.  Case has been 

discussed with the hospitalist.





ED Medical Decision Making





- Lab Data


Result diagrams: 


                                 09/23/21 04:52





                                 09/25/21 05:48





- Medical Decision Making





  Patient presented with dependent edema and was found to have anasarca.  She 

had evidence of pulmonary edema that would require admission and diuresis.  She 

did not have evidence of STEMI  on EKG patient did not have evidence of 

pneumonia or pneumothorax.  There is no symptomatology that would have been 

consistent with coronavirus infection.  She will be admitted for diuresis.


Critical Care Time: No


Critical care attestation.: 


If time is entered above; I have spent that time in minutes in the direct care 

of this critically ill patient, excluding procedure time.








ED Disposition


Clinical Impression: 


 Generalized weakness





Dyspnea


Qualifiers:


 Dyspnea type: shortness of breath Qualified Code(s): R06.02 - Shortness of 

breath





Disposition: 01 HOME / SELF CARE / HOMELESS


Is pt being admited?: No


Does the pt Need Aspirin: No


Condition: Stable

## 2021-09-22 LAB
%HYPO/RBC NFR BLD AUTO: (no result) %
ANISOCYTOSIS BLD QL SMEAR: (no result)
BAND NEUTROPHILS # (MANUAL): 2.2 K/MM3
BUN SERPL-MCNC: 16 MG/DL (ref 7–17)
BUN/CREAT SERPL: 9 %
CALCIUM SERPL-MCNC: 8.6 MG/DL (ref 8.4–10.2)
HCT VFR BLD CALC: 32.2 % (ref 30.3–42.9)
HDLC SERPL-MCNC: 25 MG/DL (ref 40–59)
HEMOLYSIS INDEX: 0
HGB BLD-MCNC: 9.3 GM/DL (ref 10.1–14.3)
MCHC RBC AUTO-ENTMCNC: 29 % (ref 30–34)
MCV RBC AUTO: 74 FL (ref 79–97)
MYELOCYTES # (MANUAL): 0.5 K/MM3
PLATELET # BLD: 399 K/MM3 (ref 140–440)
PROMYELOCYTES # (MANUAL): 0 K/MM3
RBC # BLD AUTO: 4.33 M/MM3 (ref 3.65–5.03)
TOTAL CELLS COUNTED BLD: 100

## 2021-09-22 RX ADMIN — HEPARIN SODIUM SCH UNIT: 5000 INJECTION, SOLUTION INTRAVENOUS; SUBCUTANEOUS at 22:52

## 2021-09-22 RX ADMIN — Medication SCH ML: at 11:04

## 2021-09-22 RX ADMIN — FAMOTIDINE SCH MG: 20 TABLET ORAL at 22:52

## 2021-09-22 RX ADMIN — FAMOTIDINE SCH MG: 20 TABLET ORAL at 11:03

## 2021-09-22 RX ADMIN — HEPARIN SODIUM SCH UNIT: 5000 INJECTION, SOLUTION INTRAVENOUS; SUBCUTANEOUS at 06:30

## 2021-09-22 RX ADMIN — HEPARIN SODIUM SCH UNIT: 5000 INJECTION, SOLUTION INTRAVENOUS; SUBCUTANEOUS at 13:00

## 2021-09-22 RX ADMIN — BENZONATATE SCH MG: 100 CAPSULE ORAL at 13:00

## 2021-09-22 RX ADMIN — BENZONATATE SCH MG: 100 CAPSULE ORAL at 22:52

## 2021-09-22 RX ADMIN — Medication SCH ML: at 22:52

## 2021-09-22 RX ADMIN — NYSTATIN SCH APPLIC: 100000 POWDER TOPICAL at 22:54

## 2021-09-22 RX ADMIN — IPRATROPIUM BROMIDE AND ALBUTEROL SULFATE SCH: .5; 3 SOLUTION RESPIRATORY (INHALATION) at 17:39

## 2021-09-22 RX ADMIN — IPRATROPIUM BROMIDE AND ALBUTEROL SULFATE SCH: .5; 3 SOLUTION RESPIRATORY (INHALATION) at 17:40

## 2021-09-22 NOTE — HISTORY AND PHYSICAL REPORT
History of Present Illness


Date of examination: 09/22/21


Date of admission: 


09/22/21


Chief complaint: 





Dyspnea


Respiratory distress


History of present illness: 





31 years old female with history of morbid obesity, hypertension, asthma COPD 

sleep apnea was brought to the emergency room because of shortness of breath, 

edema, generalized malaise, fatigue, and weakness for last couple of days.  She 

states she just does not feel well.  She has a headache.  She states her legs 

are swollen.  She feels as though she is retaining fluid.  She states that she 

went to her regular doctors on the 13th.  They tested her for Covid.  It was 

negative.  She was told to go see a cardiologist.  She is not seen a 

cardiologist as of yet.  She came in here because she was not feeling well and 

did not know what to do.  She has had no sick contacts.  








In the emergency room patient is found to have acute CHF exacerbation patient 

proBNP is 7573 also patient cardiac enzyme is elevated troponin is 0.043. 

Patient blood glucose also 55 patient is hypoglycemic





Med rec is done. Advance discharge process is initiated





Past History


Past Medical History: COPD, hypertension, other (Asthma, morbid obesity, sleep 

apnea)





Medications and Allergies


                                    Allergies











Allergy/AdvReac Type Severity Reaction Status Date / Time


 


No Known Allergies Allergy   Unverified 07/13/17 11:17











                                Home Medications











 Medication  Instructions  Recorded  Confirmed  Last Taken  Type


 


Acetaminophen [Acetaminophen TAB] 325 mg PO Q4H PRN #30 tablet 12/14/17 01/22/21

Unknown Rx


 


ALBUTEROL NEB's [Proventil 0.083% 2.5 mg IH Q3HRT PRN #30 day 01/24/21  Unknown 

Rx





NEBS]     


 


Albuterol Mdi (or & Nicu Only) 2 puff IH QID PRN #1 inhalation 01/24/21  Unknown

Rx





[ProAir HFA Inhaler]     


 


Azithromycin [Zithromax Z-JAVIER] 0 mg PO DAILY #1 tab 01/24/21  Unknown Rx


 


Benzonatate [Tessalon Perles] 100 mg PO Q8HR #15 capsule 01/24/21  Unknown Rx


 


Famotidine [Pepcid] 20 mg PO BID #14 tablet 01/24/21  Unknown Rx


 


Ipratropium/Albuterol Sulfate 1 ampul IH TIDRT #30 day 01/24/21  Unknown Rx





[DUONEB *Not for PRN Use*]     


 


hydroCHLOROthiazide [HCTZ] 25 mg PO QDAY  tablet 01/24/21  Unknown Rx


 


hydroCHLOROthiazide [HCTZ] 25 mg PO QDAY #30 tablet 01/24/21  Unknown Rx


 


methylPREDNISolone [Medrol 4MG 4 mg PO DAILY #1 tab.ds.pk 01/24/21  Unknown Rx





DOSEPAK (21 tabs)]     














Review of Systems


All systems: negative


Constitutional: fatigue, weakness, malaise


Cardiovascular: edema, shortness of breath, dyspnea on exertion, other (Leg 

swelling)


Respiratory: shortness of breath, dyspnea on exertion





Exam





- Constitutional


Vitals: 


                                        











Temp Pulse Resp BP Pulse Ox


 


    116 H  8 L     92 


 


    09/21/21 21:41  09/21/21 21:41     09/21/21 21:41











General appearance: Present: no acute distress, well-nourished





- EENT


Eyes: Present: PERRL


ENT: hearing intact, clear oral mucosa





- Neck


Neck: Present: supple, normal ROM





- Respiratory


Respiratory effort: normal


Respiratory: bilateral: rales





- Cardiovascular


Heart Sounds: Present: S1 & S2.  Absent: rub, click





- Extremities


Extremities: pulses symmetrical


Extremity abnormal: edema


Peripheral Pulses: within normal limits





- Abdominal


General gastrointestinal: Present: soft, non-tender, non-distended, normal bowel

sounds


Female genitourinary: Present: normal





- Integumentary


Integumentary: Present: clear, warm, dry





- Musculoskeletal


Musculoskeletal: gait normal, strength equal bilaterally





- Psychiatric


Psychiatric: appropriate mood/affect, intact judgment & insight





- Neurologic


Neurologic: CNII-XII intact, moves all extremities





HEART Score





- HEART Score


Troponin: 


                                        











Troponin T  0.043 ng/mL (0.00-0.029)  H  09/21/21  22:04    














Results





- Labs


CBC & Chem 7: 


                                 09/21/21 22:04





                                 09/21/21 22:04


Labs: 


                             Laboratory Last Values











WBC  17.7 K/mm3 (4.5-11.0)  H  09/21/21  22:04    


 


RBC  4.36 M/mm3 (3.65-5.03)   09/21/21  22:04    


 


Hgb  9.1 gm/dl (10.1-14.3)  L  09/21/21  22:04    


 


Hct  33.5 % (30.3-42.9)   09/21/21  22:04    


 


MCV  77 fl (79-97)  L  09/21/21  22:04    


 


MCH  21 pg (28-32)  L  09/21/21  22:04    


 


MCHC  27 % (30-34)  L  09/21/21  22:04    


 


RDW  24.3 % (13.2-15.2)  H  09/21/21  22:04    


 


Plt Count  367 K/mm3 (140-440)   09/21/21  22:04    


 


Sodium  135 mmol/L (137-145)  L  09/21/21  22:04    


 


Potassium  5.0 mmol/L (3.6-5.0)   09/21/21  22:04    


 


Chloride  91.0 mmol/L ()  L  09/21/21  22:04    


 


Carbon Dioxide  17 mmol/L (22-30)  L  09/21/21  22:04    


 


Anion Gap  32 mmol/L  09/21/21  22:04    


 


BUN  15 mg/dL (7-17)   09/21/21  22:04    


 


Creatinine  1.4 mg/dL (0.6-1.2)  H  09/21/21  22:04    


 


Estimated GFR  52 ml/min  09/21/21  22:04    


 


BUN/Creatinine Ratio  11 %  09/21/21  22:04    


 


Glucose  55 mg/dL ()  L  09/21/21  22:04    


 


POC Glucose  85 mg/dL ()   09/22/21  02:41    


 


Calcium  8.8 mg/dL (8.4-10.2)   09/21/21  22:04    


 


Troponin T  0.043 ng/mL (0.00-0.029)  H  09/21/21  22:04    


 


NT-Pro-B Natriuret Pep  7573 pg/mL (0-450)  H  09/21/21  22:04    














- Imaging and Cardiology


Chest x-ray: report reviewed





Assessment and Plan


VTE prophylaxis?: Chemical


Plan of care discussed with patient/family: Yes





- Patient Problems


(1) Non-ST elevation MI (NSTEMI)


Current Visit: Yes   Status: Acute   


Plan to address problem: 


Admit the patient to the medical telemetry. Aspirin 325 mg p.o. daily. Lipitor 

40 mg p.o. daily. Nitroglycerin as needed. We do the serial cardiac enzyme. We 

also consult cardiology for evaluation and order echocardiogram.








(2) Acute exacerbation of CHF (congestive heart failure)


Current Visit: Yes   Status: Acute   


Plan to address problem: 


Fluid restriction. Maintain input output. Lasix 40 mg IV every 12 hours. Oxygen 

via nasal cannula 3 L/min. DuoNeb by nebulizer every 4 hours. Echocardiogram. 

Cardiology consult








(3) Hypertension


Current Visit: Yes   Status: Acute   


Plan to address problem: 


We will continue the home medication. Hydralazine 10 mg IV every 6 hours as 

needed. We will monitor the blood pressure closely








(4) COPD (chronic obstructive pulmonary disease)


Current Visit: Yes   Status: Acute   


Plan to address problem: 


Oxygen by nasal cannula 3 L/min. DuoNeb via nebulizer every 4 hours. Albuterol 

via nebulizer every 4 hours as needed.








(5) Morbid obesity


Current Visit: Yes   Status: Acute   


Plan to address problem: 


We counseled regarding weight reduction. Outpatient follow-up with bariatric 

surgeon








(6) Hyperglycemia


Current Visit: Yes   Status: Acute   


Plan to address problem: 


We will give 1 ampoule of D50. We will put the patient on cardiac diet. We will 

monitor the blood glucose closely








(7) DVT prophylaxis


Current Visit: No   Status: Acute   


Plan to address problem: 


Heparin 5000 units subcu every 8 hours for DVT prophylaxis. Pepcid 20 mg p.o. 

twice daily for GI prophylaxis. Patient is a full code

## 2021-09-22 NOTE — EVENT NOTE
Date: 09/22/21


Patient seen and examined


31 years old female with history of morbid obesity, hypertension, asthma COPD 

sleep apnea was brought to the emergency room because of shortness of breath, 

edema, generalized malaise, fatigue, and weakness for last couple of days.


Patient admitted with an STEMI and acute exacerbation of CHF. Continue current 

management and plan as dictated in the HPI. Cardiology consulted will follow 

recommendation


2D echo ordered we will follow, continue IV diuresis

## 2021-09-22 NOTE — CONSULTATION
History of Present Illness


Consult date: 09/22/21


Requesting physician: GARCIA CORTEZ


Consult reason: other (NSTEMI)


History of present illness: 





Patient is a 30 y/o female, previously unknown to our practice, with a PMHX of 

morbid obesity, HTN, COPD who presented to [facility] with a complaint of SOB, edema, 

malaise, and falls x 1month. She reports that in the last week these symptoms 

have worsened and she came to the hospital for relief. She reports that she is 

retaining fluid, has orthopnea, and CASTILLO. She reports that her breathing is 

better with O2 and worsened with exertion.Work up reveal troponins 0.032, BNP 

7573, cr 1.8, and WBC 24.3. Cardiology is consulted for NSTEMI.





Past History


Past Medical History: COPD, hypertension, other (Asthma, morbid obesity, sleep 

apnea)


Social history: no significant social history


Family history: CAD, diabetes





Medications and Allergies


                                    Allergies











Allergy/AdvReac Type Severity Reaction Status Date / Time


 


No Known Allergies Allergy   Unverified 07/13/17 11:17











                                Home Medications











 Medication  Instructions  Recorded  Confirmed  Last Taken  Type


 


Acetaminophen [Acetaminophen TAB] 325 mg PO Q4H PRN #30 tablet 12/14/17 01/22/21

Unknown Rx


 


ALBUTEROL NEB's [Proventil 0.083% 2.5 mg IH Q3HRT PRN #30 day 01/24/21  Unknown 

Rx





NEBS]     


 


Albuterol Mdi (or & Nicu Only) 2 puff IH QID PRN #1 inhalation 01/24/21  Unknown

Rx





[ProAir HFA Inhaler]     


 


Azithromycin [Zithromax Z-JAVIER] 0 mg PO DAILY #1 tab 01/24/21  Unknown Rx


 


Benzonatate [Tessalon Perles] 100 mg PO Q8HR #15 capsule 01/24/21  Unknown Rx


 


Famotidine [Pepcid] 20 mg PO BID #14 tablet 01/24/21  Unknown Rx


 


Ipratropium/Albuterol Sulfate 1 ampul IH TIDRT #30 day 01/24/21  Unknown Rx





[DUONEB *Not for PRN Use*]     


 


hydroCHLOROthiazide [HCTZ] 25 mg PO QDAY  tablet 01/24/21  Unknown Rx


 


hydroCHLOROthiazide [HCTZ] 25 mg PO QDAY #30 tablet 01/24/21  Unknown Rx


 


methylPREDNISolone [Medrol 4MG 4 mg PO DAILY #1 tab.ds.pk 01/24/21  Unknown Rx





DOSEPAK (21 tabs)]     











Active Meds: 


Active Medications





Acetaminophen (Acetaminophen 325 Mg Tab)  650 mg PO Q4H PRN


   PRN Reason: Pain MILD(1-3)/Fever >100.5/HA


Albuterol (Albuterol 2.5 Mg/3 Ml Nebu)  2.5 mg IH Q4HRT PRN


   PRN Reason: Shortness Of Breath


Albuterol/Ipratropium (Ipratropium/Albuterol Sulfate 3 Ml Ampul.Neb)  1 ampul IH

TIDRT [facility]


Aspirin (Aspirin Ec 325 Mg Tab)  325 mg PO QDAY [facility]


Atorvastatin Calcium (Atorvastatin 40 Mg Tab)  40 mg PO QHS [facility]


Azithromycin (Azithromycin 250 Mg Tab)  500 mg PO DAILY [facility]; Protocol


   Stop: 09/27/21 10:01


Benzonatate (Benzonatate 100 Mg Cap)  100 mg PO Q8HR [facility]


   Last Admin: 09/22/21 13:00 Dose:  100 mg


   Documented by: 


Famotidine (Famotidine 20 Mg Tab)  20 mg PO BID [facility]


   Last Admin: 09/22/21 11:03 Dose:  20 mg


   Documented by: 


Heparin Sodium (Porcine) (Heparin 5,000 Unit/1 Ml Vial)  5,000 unit SUB-Q Q8HR 

[facility]


   Last Admin: 09/22/21 13:00 Dose:  5,000 unit


   Documented by: 


Hydrochlorothiazide (Hydrochlorothiazide 25 Mg Tab)  25 mg PO QDAY [facility]


   Last Admin: 09/22/21 11:03 Dose:  25 mg


   Documented by: 


Hydromorphone HCl (Hydromorphone 1 Mg/1 Ml Inj)  0.5 mg IV Q3H PRN


   PRN Reason: Pain , Severe (7-10)


Sodium Chloride (Nacl 0.9% 1000 Ml)  1,000 mls @ 75 mls/hr IV ONCE ONE


   Stop: 09/23/21 02:14


   Last Admin: 09/22/21 12:57 Dose:  75 mls/hr


   Documented by: 


Methylprednisolone (Methylprednisolone 4 Mg Tab)  4 mg PO DAILY [facility]


   Last Admin: 09/22/21 11:03 Dose:  4 mg


   Documented by: 


Nitroglycerin (Nitroglycerin 0.4 Mg Tab Subl)  0.4 mg SL Q5M PRN


   PRN Reason: Chest Pain


Ondansetron HCl (Ondansetron 4 Mg/2 Ml Inj)  4 mg IV Q8H PRN


   PRN Reason: Nausea And Vomiting


Oxycodone/Acetaminophen (Oxycodone /Acetaminophen 5-325mg Tab)  1 tab PO Q6H PRN


   PRN Reason: Pain, Moderate (4-6)


Sodium Chloride (Sodium Chloride 0.9% 10 Ml Flush Syringe)  10 ml IV BID [facility]


   Last Admin: 09/22/21 11:04 Dose:  10 ml


   Documented by: 


Sodium Chloride (Sodium Chloride 0.9% 10 Ml Flush Syringe)  10 ml IV PRN PRN


   PRN Reason: LINE FLUSH


Tramadol HCl (Tramadol 50 Mg Tab)  50 mg PO Q6H PRN


   PRN Reason: Pain, Moderate (4-6)











Review of Systems


All systems: negative


Constitutional: fatigue, weakness, no weight loss, no weight gain, no fever, no 

chills


Ears, nose, mouth and throat: no nose pain, no nasal congestion, no nasal 

discharge, no sinus pressure, no sinus pain


Cardiovascular: orthopnea, edema, shortness of breath, dyspnea on exertion, no 

chest pain, no palpitations, no rapid/irregular heart beat


Respiratory: cough with sputum, shortness of breath, dyspnea on exertion, no 

cough


Gastrointestinal: no abdominal pain, no nausea, no vomiting


Musculoskeletal: no neck stiffness, no neck pain, no shooting arm pain


Integumentary: no rash, no pruritis, no redness


Neurological: weakness, no paralysis, no parathesias


Psychiatric: no anxiety


Endocrine: no cold intolerance


Hematologic/Lymphatic: no easy bruising, no easy bleeding





Physical Examination


                                   Vital Signs











Pulse Resp Pulse Ox


 


 116 H  8 L  92 


 


 09/21/21 21:41  09/21/21 21:41  09/21/21 21:41











General appearance: no acute distress


HEENT: Positive: PERRL


Cardiac: Positive: Regular Rhythm, Tachycardia


Lungs: Positive: Decreased Breath Sounds


Neuro: Positive: Grossly Intact


Abdomen: Positive: Soft


Skin: Negative: Rash, Suspicious Lesions, Ulceration


Extremities: Present: upper extr. pulses, edema





Results





                                 09/22/21 04:03





                                 09/22/21 04:03


                                     Lipids











  09/21/21 Range/Units





  22:04 


 


Triglycerides  105  (2-149)  mg/dL


 


Cholesterol  85  ()  mg/dL


 


HDL Cholesterol  25 L  (40-59)  mg/dL


 


Cholesterol/HDL Ratio  3.40  %








                                       CBC











  09/21/21 09/22/21 Range/Units





  22:04 04:03 


 


WBC  17.7 H  24.3 H  (4.5-11.0)  K/mm3


 


RBC  4.36  4.33  (3.65-5.03)  M/mm3


 


Hgb  9.1 L  9.3 L  (10.1-14.3)  gm/dl


 


Hct  33.5  32.2  (30.3-42.9)  %


 


Plt Count  367  399  (140-440)  K/mm3








                          Comprehensive Metabolic Panel











  09/21/21 09/22/21 Range/Units





  22:04 04:03 


 


Sodium  135 L  134 L  (137-145)  mmol/L


 


Potassium  5.0  4.9  (3.6-5.0)  mmol/L


 


Chloride  91.0 L  91.2 L  ()  mmol/L


 


Carbon Dioxide  17 L  23  (22-30)  mmol/L


 


BUN  15  16  (7-17)  mg/dL


 


Creatinine  1.4 H  1.8 H  (0.6-1.2)  mg/dL


 


Glucose  55 L  87  ()  mg/dL


 


Calcium  8.8  8.6  (8.4-10.2)  mg/dL














- Imaging and Cardiology


Echo: pending, report reviewed


EKG: report reviewed, image reviewed





EKG interpretations





- Telemetry


EKG Rhythm: Sinus Tachycardia





- EKG


Sinus rhythms and dysrhythmias: sinus tachycardia


Repolarization changes or abnormalities: nonspecific abnormality, ST segment, 

and/or T wave





Assessment and Plan





Patient is a 30 y/o female with a PMHX of morbid obesity, HTN, COPD 








Elevated troponins


Elevated BNP


* Troponins minimally elevated 0.032->0.099->0.078. EKG shows sinus tach 116 

  with no acute ischemic changes. AMI ruled out


* BNP noted to be elevated. Hold Lasix in acute renal insufficiency. Echo 

  pending


* Echo 1/22/2021- Technically very difficult and limited study, due to patient 

  body habitus EF 55 to 60%, left and right atrium are normal in size right 

  ventricular cavity is normal right ventricular global systolic function





Acute Renal insufficiency


* Patient cr 1.8 on admission.


* Management per primary team





HTN


* Patient has been hypotensive.


* Hold ACE/ARB in setting of acute renal insufficiency and hypotension





Obesity Hypoventilation Syndrome


COPD


* Patient currently on NC


* Management per primary team








Echo pending. Hold ACE/ARB and Lasix in setting of renal insufficiency.


Patient seen in conjunction with Dr. Torres who agrees with this plan of care. 

Will continue to follow





- Patient Problems


(1) Elevated troponin


Current Visit: Yes   Status: Acute   





(2) Acute renal insufficiency


Current Visit: Yes   Status: Acute   





(3) COPD (chronic obstructive pulmonary disease)


Current Visit: Yes   Status: Acute   





(4) Hypertension


Current Visit: Yes   Status: Acute   





(5) Morbid obesity


Current Visit: Yes   Status: Acute   





(6) Morbid obesity with BMI of 70 and over, adult


Current Visit: No   Status: Acute   





(7) Obesity hypoventilation syndrome


Current Visit: No   Status: Acute

## 2021-09-22 NOTE — ELECTROCARDIOGRAPH REPORT
[facility]

                                       

Test Date:    2021               Test Time:    22:34:19

Pat Name:     PIPE AHMADI             Department:   

Patient ID:   [id number]          Room:         A459 1

Gender:       F                        Technician:   HENRY

:          [id number]               Requested By: TALI PALACIOS

Order Number: [id number]              Reading MD:   Reagan Torres

                                 Measurements

Intervals                              Axis          

Rate:         116                      P:            65

MS:           126                      QRS:          85

QRSD:         67                       T:            -15

QT:           329                                    

QTc:          458                                    

                           Interpretive Statements

Sinus tachycardia

Ventricular premature complex

Low voltage, precordial leads

Borderline T abnormalities, diffuse leads

No previous ECG available for comparison

Electronically Signed On 2021 10:06:38 EDT by Reagan Torres

## 2021-09-23 LAB
%HYPO/RBC NFR BLD AUTO: (no result) %
ANISOCYTOSIS BLD QL SMEAR: (no result)
BAND NEUTROPHILS # (MANUAL): 2.7 K/MM3
BUN SERPL-MCNC: 27 MG/DL (ref 7–17)
BUN SERPL-MCNC: 33 MG/DL (ref 7–17)
BUN/CREAT SERPL: 11 %
BUN/CREAT SERPL: 12 %
CALCIUM SERPL-MCNC: 7.3 MG/DL (ref 8.4–10.2)
CALCIUM SERPL-MCNC: 7.7 MG/DL (ref 8.4–10.2)
HCT VFR BLD CALC: 30 % (ref 30.3–42.9)
HEMOLYSIS INDEX: 0
HEMOLYSIS INDEX: 9
HGB BLD-MCNC: 8.5 GM/DL (ref 10.1–14.3)
MCHC RBC AUTO-ENTMCNC: 28 % (ref 30–34)
MCV RBC AUTO: 73 FL (ref 79–97)
MYELOCYTES # (MANUAL): 0 K/MM3
PLATELET # BLD: 339 K/MM3 (ref 140–440)
PROMYELOCYTES # (MANUAL): 0 K/MM3
RBC # BLD AUTO: 4.09 M/MM3 (ref 3.65–5.03)
TARGETS BLD QL SMEAR: (no result)
TOTAL CELLS COUNTED BLD: 100
TOXIC GRANULES BLD QL SMEAR: (no result)
WBC TOXIC VACUOLES BLD QL SMEAR: (no result)

## 2021-09-23 RX ADMIN — IPRATROPIUM BROMIDE AND ALBUTEROL SULFATE SCH: .5; 3 SOLUTION RESPIRATORY (INHALATION) at 01:05

## 2021-09-23 RX ADMIN — BENZONATATE SCH MG: 100 CAPSULE ORAL at 21:38

## 2021-09-23 RX ADMIN — IPRATROPIUM BROMIDE AND ALBUTEROL SULFATE SCH AMPUL: .5; 3 SOLUTION RESPIRATORY (INHALATION) at 16:40

## 2021-09-23 RX ADMIN — IPRATROPIUM BROMIDE AND ALBUTEROL SULFATE SCH AMPUL: .5; 3 SOLUTION RESPIRATORY (INHALATION) at 22:02

## 2021-09-23 RX ADMIN — HEPARIN SODIUM SCH UNIT: 5000 INJECTION, SOLUTION INTRAVENOUS; SUBCUTANEOUS at 06:21

## 2021-09-23 RX ADMIN — NYSTATIN SCH: 100000 POWDER TOPICAL at 05:27

## 2021-09-23 RX ADMIN — IPRATROPIUM BROMIDE AND ALBUTEROL SULFATE SCH AMPUL: .5; 3 SOLUTION RESPIRATORY (INHALATION) at 11:27

## 2021-09-23 RX ADMIN — IPRATROPIUM BROMIDE AND ALBUTEROL SULFATE SCH: .5; 3 SOLUTION RESPIRATORY (INHALATION) at 00:19

## 2021-09-23 RX ADMIN — FAMOTIDINE SCH MG: 20 TABLET ORAL at 09:58

## 2021-09-23 RX ADMIN — HEPARIN SODIUM SCH UNIT: 5000 INJECTION, SOLUTION INTRAVENOUS; SUBCUTANEOUS at 21:38

## 2021-09-23 RX ADMIN — Medication SCH ML: at 21:39

## 2021-09-23 RX ADMIN — BENZONATATE SCH MG: 100 CAPSULE ORAL at 14:41

## 2021-09-23 RX ADMIN — AZITHROMYCIN SCH MG: 250 TABLET, FILM COATED ORAL at 10:02

## 2021-09-23 RX ADMIN — NYSTATIN SCH APPLIC: 100000 POWDER TOPICAL at 21:39

## 2021-09-23 RX ADMIN — BENZONATATE SCH MG: 100 CAPSULE ORAL at 06:21

## 2021-09-23 RX ADMIN — ASPIRIN SCH MG: 325 TABLET, COATED ORAL at 09:58

## 2021-09-23 RX ADMIN — SODIUM POLYSTYRENE SULFONATE SCH GM: 15 SUSPENSION ORAL; RECTAL at 18:43

## 2021-09-23 RX ADMIN — NYSTATIN SCH APPLIC: 100000 POWDER TOPICAL at 09:58

## 2021-09-23 RX ADMIN — NYSTATIN SCH: 100000 POWDER TOPICAL at 11:43

## 2021-09-23 RX ADMIN — BENZONATATE SCH: 100 CAPSULE ORAL at 11:43

## 2021-09-23 RX ADMIN — Medication SCH ML: at 09:59

## 2021-09-23 RX ADMIN — HEPARIN SODIUM SCH UNIT: 5000 INJECTION, SOLUTION INTRAVENOUS; SUBCUTANEOUS at 14:45

## 2021-09-23 RX ADMIN — FAMOTIDINE SCH MG: 10 TABLET ORAL at 21:38

## 2021-09-23 NOTE — CONSULTATION
History of Present Illness





- Reason for Consult


Consult date: 09/23/21


acute renal failure





- History of Present Illness


This is a 31-year-old woman with morbid obesity and hypertension who presented 

with 1 month history of progressive shortness of breath and lower extremity 

swelling.  In the emergency department she was noted to be in heart for 

exacerbation along with acute kidney injury and was admitted for further workup.

 Nephrology was consulted for acute kidney injury.  Patient is somnolent and is 

unable to provide details at this time.  History has been obtained from chart.








Past History


Past Medical History: COPD, hypertension, other (Asthma, morbid obesity, sleep 

apnea)


Social history: no significant social history


Family history: CAD, diabetes





Medications and Allergies


                                    Allergies











Allergy/AdvReac Type Severity Reaction Status Date / Time


 


No Known Allergies Allergy   Unverified 07/13/17 11:17











                                Home Medications











 Medication  Instructions  Recorded  Confirmed  Last Taken  Type


 


Acetaminophen [Acetaminophen TAB] 325 mg PO Q4H PRN #30 tablet 12/14/17 01/22/21

Unknown Rx


 


ALBUTEROL NEB's [Proventil 0.083% 2.5 mg IH Q3HRT PRN #30 day 01/24/21  Unknown 

Rx





NEBS]     


 


Albuterol Mdi (or & Nicu Only) 2 puff IH QID PRN #1 inhalation 01/24/21  Unknown

Rx





[ProAir HFA Inhaler]     


 


Azithromycin [Zithromax Z-JAVIER] 0 mg PO DAILY #1 tab 01/24/21  Unknown Rx


 


Benzonatate [Tessalon Perles] 100 mg PO Q8HR #15 capsule 01/24/21  Unknown Rx


 


Famotidine [Pepcid] 20 mg PO BID #14 tablet 01/24/21  Unknown Rx


 


Ipratropium/Albuterol Sulfate 1 ampul IH TIDRT #30 day 01/24/21  Unknown Rx





[DUONEB *Not for PRN Use*]     


 


hydroCHLOROthiazide [HCTZ] 25 mg PO QDAY  tablet 01/24/21  Unknown Rx


 


hydroCHLOROthiazide [HCTZ] 25 mg PO QDAY #30 tablet 01/24/21  Unknown Rx


 


methylPREDNISolone [Medrol 4MG 4 mg PO DAILY #1 tab.ds.pk 01/24/21  Unknown Rx





DOSEPAK (21 tabs)]     











Active Meds: 


Active Medications





Acetaminophen (Acetaminophen 325 Mg Tab)  650 mg PO Q4H PRN


   PRN Reason: Pain MILD(1-3)/Fever >100.5/HA


Albuterol (Albuterol 2.5 Mg/3 Ml Nebu)  2.5 mg IH Q4HRT PRN


   PRN Reason: Shortness Of Breath


Albuterol/Ipratropium (Ipratropium/Albuterol Sulfate 3 Ml Ampul.Neb)  1 ampul IH

TIDRT [facility]


   Last Admin: 09/23/21 11:27 Dose:  1 ampul


   Documented by: 


Aspirin (Aspirin Ec 325 Mg Tab)  325 mg PO QDAY [facility]


   Last Admin: 09/23/21 09:58 Dose:  325 mg


   Documented by: 


Atorvastatin Calcium (Atorvastatin 40 Mg Tab)  40 mg PO QHS [facility]


   Last Admin: 09/22/21 22:52 Dose:  40 mg


   Documented by: 


Azithromycin (Azithromycin 250 Mg Tab)  500 mg PO DAILY [facility]; Protocol


   Stop: 09/27/21 10:01


   Last Admin: 09/23/21 10:02 Dose:  500 mg


   Documented by: 


Benzonatate (Benzonatate 100 Mg Cap)  100 mg PO Q8HR [facility]


   Last Admin: 09/23/21 14:41 Dose:  100 mg


   Documented by: 


Famotidine (Famotidine 10 Mg Tab)  10 mg PO BID [facility]


Heparin Sodium (Porcine) (Heparin 5,000 Unit/1 Ml Vial)  5,000 unit SUB-Q Q8HR 

[facility]


   Last Admin: 09/23/21 14:45 Dose:  5,000 unit


   Documented by: 


Hydromorphone HCl (Hydromorphone 1 Mg/1 Ml Inj)  0.5 mg IV Q3H PRN


   PRN Reason: Pain , Severe (7-10)


Sodium Chloride (Nacl 0.9% 1000 Ml)  1,000 mls @ 125 mls/hr IV AS DIRECT [facility]


   Stop: 09/23/21 20:59


   Last Admin: 09/23/21 09:59 Dose:  125 mls/hr


   Documented by: 


Methylprednisolone (Methylprednisolone 4 Mg Tab)  4 mg PO DAILY [facility]


   Last Admin: 09/23/21 09:58 Dose:  4 mg


   Documented by: 


Nitroglycerin (Nitroglycerin 0.4 Mg Tab Subl)  0.4 mg SL Q5M PRN


   PRN Reason: Chest Pain


Nystatin (Nystatin Powder 15 Gm)  1 applic TP BID [facility]


   Last Admin: 09/23/21 11:43 Dose:  Not Given


   Documented by: 


Ondansetron HCl (Ondansetron 4 Mg/2 Ml Inj)  4 mg IV Q8H PRN


   PRN Reason: Nausea And Vomiting


Oxycodone/Acetaminophen (Oxycodone /Acetaminophen 5-325mg Tab)  1 tab PO Q6H PRN


   PRN Reason: Pain, Moderate (4-6)


Sodium Chloride (Sodium Chloride 0.9% 10 Ml Flush Syringe)  10 ml IV BID [facility]


   Last Admin: 09/23/21 09:59 Dose:  10 ml


   Documented by: 


Sodium Chloride (Sodium Chloride 0.9% 10 Ml Flush Syringe)  10 ml IV PRN PRN


   PRN Reason: LINE FLUSH


Tramadol HCl (Tramadol 50 Mg Tab)  50 mg PO Q6H PRN


   PRN Reason: Pain, Moderate (4-6)











Review of Systems


ROS unobtainable: due to mental status





Exam





- Vital Signs


Vital signs: 


                                   Vital Signs











Pulse Resp Pulse Ox


 


 116 H  8 L  92 


 


 09/21/21 21:41  09/21/21 21:41  09/21/21 21:41














- Physical Exam


Narrative exam: 


General: Morbid obesity


HEENT: Oral mucosa moist


Neck: Supple, no JVD


Chest: Clear to auscultation bilaterally


Heart: RRR, S1 and S2, no pericardial rub


Abdomen: Soft, nontender, no renal bruit


Extremity: No peripheral cyanosis, edema


Neurological: Somnolent, awakens to sternal rub


Dermatology: Unable to assess


Psych: No agitation


Musculoskeletal: No joint effusion








Results





- Lab Results





                                 09/23/21 04:52





                                 09/23/21 15:46


                             Most recent lab results











Calcium  7.3 mg/dL (8.4-10.2)  L  09/23/21  15:46    














Assessment and Plan


Assessment





Acute kidney injury, unknown baseline


Hyponatremia


Hyperkalemia


Hypotension


Hypocalcemia


Morbid obesity





Recommendations





Order Kayexalate


Check urine electrolytes


Check renal ultrasound


Check cortisol


Check ionized calcium


Maintain MAP more than 65


Hold ACE/ARB at this time


Hold diuretics given soft pressures


Renally dose medications


Avoid nephrotoxins


Renal diet

## 2021-09-23 NOTE — PROGRESS NOTE
Assessment and Plan


Assessment and plan: 





- Patient Problems


(1) Non-ST elevation MI (NSTEMI)


Current Visit: Yes   Status: Acute   


Plan to address problem: 


Admit the patient to the medical telemetry. Aspirin 325 mg p.o. daily. Lipitor 

40 mg p.o. daily. Nitroglycerin as needed. We do the serial cardiac enzyme. We 

also consult cardiology for evaluation and order echocardiogram.








(2) Acute exacerbation of CHF (congestive heart failure)


Current Visit: Yes   Status: Acute   


Plan to address problem: 


Fluid restriction. Maintain input output. Lasix 40 mg IV every 12 hours. Oxygen 

via nasal cannula 3 L/min. DuoNeb by nebulizer every 4 hours. Echocardiogram. 

Cardiology consult








(3) Hypertension


Current Visit: Yes   Status: Acute   


Plan to address problem: 


We will continue the home medication. Hydralazine 10 mg IV every 6 hours as 

needed. We will monitor the blood pressure closely








(4) COPD (chronic obstructive pulmonary disease)


Current Visit: Yes   Status: Acute   


Plan to address problem: 


Oxygen by nasal cannula 3 L/min. DuoNeb via nebulizer every 4 hours. Albuterol 

via nebulizer every 4 hours as needed.








(5) Morbid obesity


Current Visit: Yes   Status: Acute   


Plan to address problem: 


We counseled regarding weight reduction. Outpatient follow-up with bariatric 

surgeon








(6) Hyperglycemia


Current Visit: Yes   Status: Acute   


Plan to address problem: 


We will give 1 ampoule of D50. We will put the patient on cardiac diet. We will 

monitor the blood glucose closely








(7) DVT prophylaxis


Current Visit: No   Status: Acute   


Plan to address problem: 


Heparin 5000 units subcu every 8 hours for DVT prophylaxis. Pepcid 20 mg p.o. 

twice daily for GI prophylaxis. Patient is a full code





9/23


-Patient is on heparin drip for non-STEMI, cardiology is following.


-Patient is off Lasix and ACE inhibitors because of [place].


-Kidney function is worsening overnight and I put a consult for nephrology.


-Patient is hypotensive and blood pressure from this morning was 101/41.  We 

will continue to monitor.  And if it is dropping we we will give him fluid.


-Echo was done and EF of 50 to 55%.  Diastolic dysfunction is indeterminate.  

Management per cardiology


-Patient has COPD continues on dexamethasone, nebulizer treatment as needed.


-Patient has hyponatremia and hyperkalemia.  I give the patient Kayexalate.  

Monitor electrolytes.  Will follow nephrology for additional recommendation.








History


Interval history: 





Patient was seen and evaluated this morning


Patient was on 3 L of oxygen, which she uses at home


Patient was complaining chest pain, no shortness of breath





Hospitalist Physical





- Physical exam


Narrative exam: 





 Not in cardiopulmonary distress. 


 The patient is morbidly obese.


 Vital signs as documented.


 Head exam is unremarkable.


 No scleral icterus .


 Neck is without jugular venous distension, thyromegaly, or carotid bruits. 


 Lungs are clear to auscultation.


Cardiac exam reveals regular rate and  Rhythm. 


Abdominal exam reveals normal bowel sounds, nontender, no organomegaly.


Extremities are nonedematous and both femoral and pedal pulses are normal.


CNS: Alert and oriented 3.  No focal weakness.





- Constitutional


Vitals: 


                                        











Temp Pulse Resp BP Pulse Ox


 


 98.6 F   95 H  20   104/47   95 


 


 09/23/21 08:50  09/23/21 08:50  09/23/21 08:50  09/23/21 08:50  09/23/21 08:50











General appearance: Present: no acute distress





HEART Score





- HEART Score


Troponin: 


                                        











Troponin T  0.078 ng/mL (0.00-0.029)  H D 09/22/21  13:03    














Results





- Labs


CBC & Chem 7: 


                                 09/23/21 04:52





                                 09/23/21 04:52


Labs: 


                             Laboratory Last Values











WBC  20.9 K/mm3 (4.5-11.0)  H  09/23/21  04:52    


 


RBC  4.09 M/mm3 (3.65-5.03)   09/23/21  04:52    


 


Hgb  8.5 gm/dl (10.1-14.3)  L  09/23/21  04:52    


 


Hct  30.0 % (30.3-42.9)  L  09/23/21  04:52    


 


MCV  73 fl (79-97)  L  09/23/21  04:52    


 


MCH  21 pg (28-32)  L  09/23/21  04:52    


 


MCHC  28 % (30-34)  L  09/23/21  04:52    


 


RDW  23.9 % (13.2-15.2)  H  09/23/21  04:52    


 


Plt Count  339 K/mm3 (140-440)   09/23/21  04:52    


 


Add Manual Diff  Complete   09/22/21  04:03    


 


Total Counted  100   09/22/21  04:03    


 


Seg Neuts % (Manual)  78.0 % (40.0-70.0)  H  09/22/21  04:03    


 


Band Neutrophils %  9.0 %  09/22/21  04:03    


 


Lymphocytes % (Manual)  7.0 % (13.4-35.0)  L  09/22/21  04:03    


 


Monocytes % (Manual)  4.0 % (0.0-7.3)   09/22/21  04:03    


 


Myelocytes %  2.0 %  09/22/21  04:03    


 


Nucleated RBC %  1.0 % (0.0-0.9)  H  09/22/21  04:03    


 


Seg Neutrophils # Man  19.0 K/mm3 (1.8-7.7)  H  09/22/21  04:03    


 


Band Neutrophils #  2.2 K/mm3  09/22/21  04:03    


 


Lymphocytes # (Manual)  1.7 K/mm3 (1.2-5.4)   09/22/21  04:03    


 


Abs React Lymphs (Man)  0.0 K/mm3  09/22/21  04:03    


 


Monocytes # (Manual)  1.0 K/mm3 (0.0-0.8)  H  09/22/21  04:03    


 


Eosinophils # (Manual)  0.0 K/mm3 (0.0-0.4)   09/22/21  04:03    


 


Basophils # (Manual)  0.0 K/mm3 (0.0-0.1)   09/22/21  04:03    


 


Metamyelocytes #  0.0 K/mm3  09/22/21  04:03    


 


Myelocytes #  0.5 K/mm3  09/22/21  04:03    


 


Promyelocytes #  0.0 K/mm3  09/22/21  04:03    


 


Blast Cells #  0.0 K/mm3  09/22/21  04:03    


 


WBC Morphology  Not Reportable   09/22/21  04:03    


 


Hypersegmented Neuts  Not Reportable   09/22/21  04:03    


 


Hyposegmented Neuts  Not Reportable   09/22/21  04:03    


 


Hypogranular Neuts  Not Reportable   09/22/21  04:03    


 


Smudge Cells  Not Reportable   09/22/21  04:03    


 


Toxic Granulation  Not Reportable   09/22/21  04:03    


 


Toxic Vacuolation  Not Reportable   09/22/21  04:03    


 


Dohle Bodies  Not Reportable   09/22/21  04:03    


 


Pelger-Huet Anomaly  Not Reportable   09/22/21  04:03    


 


Ac Rods  Not Reportable   09/22/21  04:03    


 


Platelet Estimate  Consistent w auto   09/22/21  04:03    


 


Clumped Platelets  Not Reportable   09/22/21  04:03    


 


Plt Clumps, EDTA  Not Reportable   09/22/21  04:03    


 


Large Platelets  Not Reportable   09/22/21  04:03    


 


Giant Platelets  Not Reportable   09/22/21  04:03    


 


Platelet Satelliting  Not Reportable   09/22/21  04:03    


 


Plt Morphology Comment  Not Reportable   09/22/21  04:03    


 


RBC Morphology  Not Reportable   09/22/21  04:03    


 


Dimorphic RBCs  Not Reportable   09/22/21  04:03    


 


Polychromasia  Not Reportable   09/22/21  04:03    


 


Hypochromasia  2+   09/22/21  04:03    


 


Poikilocytosis  Not Reportable   09/22/21  04:03    


 


Anisocytosis  2+   09/22/21  04:03    


 


Microcytosis  Not Reportable   09/22/21  04:03    


 


Macrocytosis  Not Reportable   09/22/21  04:03    


 


Spherocytes  Not Reportable   09/22/21  04:03    


 


Pappenheimer Bodies  Not Reportable   09/22/21  04:03    


 


Sickle Cells  Not Reportable   09/22/21  04:03    


 


Target Cells  Not Reportable   09/22/21  04:03    


 


Tear Drop Cells  Not Reportable   09/22/21  04:03    


 


Ovalocytes  Not Reportable   09/22/21  04:03    


 


Helmet Cells  Not Reportable   09/22/21  04:03    


 


[place] Bodies  Not Reportable   09/22/21  04:03    


 


Cabot Rings  Not Reportable   09/22/21  04:03    


 


Apolinar Cells  Not Reportable   09/22/21  04:03    


 


Bite Cells  Not Reportable   09/22/21  04:03    


 


Crenated Cell  Not Reportable   09/22/21  04:03    


 


Elliptocytes  Not Reportable   09/22/21  04:03    


 


Acanthocytes (Spur)  Not Reportable   09/22/21  04:03    


 


Rouleaux  Not Reportable   09/22/21  04:03    


 


Hemoglobin C Crystals  Not Reportable   09/22/21  04:03    


 


Schistocytes  Not Reportable   09/22/21  04:03    


 


Malaria parasites  Not Reportable   09/22/21  04:03    


 


Sami Bodies  Not Reportable   09/22/21  04:03    


 


Hem Pathologist Commnt  No   09/22/21  04:03    


 


Sodium  129 mmol/L (137-145)  L  09/23/21  04:52    


 


Potassium  5.6 mmol/L (3.6-5.0)  H  09/23/21  04:52    


 


Chloride  88.6 mmol/L ()  L  09/23/21  04:52    


 


Carbon Dioxide  26 mmol/L (22-30)   09/23/21  04:52    


 


Anion Gap  20 mmol/L  09/23/21  04:52    


 


BUN  27 mg/dL (7-17)  H  09/23/21  04:52    


 


Creatinine  2.4 mg/dL (0.6-1.2)  H  09/23/21  04:52    


 


Estimated GFR  28 ml/min  09/23/21  04:52    


 


BUN/Creatinine Ratio  11 %  09/23/21  04:52    


 


Glucose  121 mg/dL ()  H  09/23/21  04:52    


 


POC Glucose  139 mg/dL ()  H  09/23/21  08:46    


 


Calcium  7.7 mg/dL (8.4-10.2)  L  09/23/21  04:52    


 


Troponin T  0.078 ng/mL (0.00-0.029)  H D 09/22/21  13:03    


 


NT-Pro-B Natriuret Pep  7573 pg/mL (0-450)  H  09/21/21  22:04    


 


Triglycerides  105 mg/dL (2-149)   09/21/21  22:04    


 


Cholesterol  85 mg/dL ()   09/21/21  22:04    


 


LDL Cholesterol Direct  47 mg/dL ()  L  09/21/21  22:04    


 


HDL Cholesterol  25 mg/dL (40-59)  L  09/21/21  22:04    


 


Cholesterol/HDL Ratio  3.40 %  09/21/21  22:04    











Diamond/IV: 


                                        





Voiding Method                   External Female Catheter











Active Medications





- Current Medications


Current Medications: 














Generic Name Dose Route Start Last Admin





  Trade Name Freq  PRN Reason Stop Dose Admin


 


Acetaminophen  650 mg  09/22/21 03:00 





  Acetaminophen 325 Mg Tab  PO  





  Q4H PRN  





  Pain MILD(1-3)/Fever >100.5/HA  


 


Albuterol  2.5 mg  09/22/21 03:00 





  Albuterol 2.5 Mg/3 Ml Nebu  IH  





  Q4HRT PRN  





  Shortness Of Breath  


 


Albuterol/Ipratropium  1 ampul  09/22/21 08:00  09/23/21 01:05





  Ipratropium/Albuterol Sulfate 3 Ml Ampul.Neb  IH   Not Given





  TIDRT [facility]  


 


Aspirin  325 mg  09/23/21 10:00 





  Aspirin Ec 325 Mg Tab  PO  





  QDAY [facility]  


 


Atorvastatin Calcium  40 mg  09/22/21 22:00  09/22/21 22:52





  Atorvastatin 40 Mg Tab  PO   40 mg





  QHS DELL   Administration


 


Azithromycin  500 mg  09/23/21 10:00 





  Azithromycin 250 Mg Tab  PO  09/27/21 10:01 





  DAILY [facility]  





  Protocol  


 


Benzonatate  100 mg  09/22/21 06:00  09/23/21 06:21





  Benzonatate 100 Mg Cap  PO   100 mg





  Q8HR DELL   Administration


 


Famotidine  20 mg  09/22/21 10:00  09/22/21 22:52





  Famotidine 20 Mg Tab  PO   20 mg





  BID [facility]   Administration


 


Heparin Sodium (Porcine)  5,000 unit  09/22/21 06:00  09/23/21 06:21





  Heparin 5,000 Unit/1 Ml Vial  SUB-Q   5,000 unit





  Q8HR [facility]   Administration


 


Hydromorphone HCl  0.5 mg  09/22/21 03:00 





  Hydromorphone 1 Mg/1 Ml Inj  IV  





  Q3H PRN  





  Pain , Severe (7-10)  


 


Sodium Chloride  1,000 mls @ 125 mls/hr  09/23/21 09:00 





  Nacl 0.9% 1000 Ml  IV  09/23/21 20:59 





  AS DIRECT [facility]  


 


Methylprednisolone  4 mg  09/22/21 10:00  09/22/21 11:03





  Methylprednisolone 4 Mg Tab  PO   4 mg





  DAILY [facility]   Administration


 


Nitroglycerin  0.4 mg  09/22/21 03:00 





  Nitroglycerin 0.4 Mg Tab Subl  SL  





  Q5M PRN  





  Chest Pain  


 


Nystatin  1 applic  09/22/21 18:00  09/23/21 05:27





  Nystatin Powder 15 Gm  TP   Not Given





  BID [facility]  


 


Ondansetron HCl  4 mg  09/22/21 03:00 





  Ondansetron 4 Mg/2 Ml Inj  IV  





  Q8H PRN  





  Nausea And Vomiting  


 


Oxycodone/Acetaminophen  1 tab  09/22/21 03:00 





  Oxycodone /Acetaminophen 5-325mg Tab  PO  





  Q6H PRN  





  Pain, Moderate (4-6)  


 


Sodium Chloride  10 ml  09/22/21 10:00  09/22/21 22:52





  Sodium Chloride 0.9% 10 Ml Flush Syringe  IV   10 ml





  BID DELL   Administration


 


Sodium Chloride  10 ml  09/22/21 03:00 





  Sodium Chloride 0.9% 10 Ml Flush Syringe  IV  





  PRN PRN  





  LINE FLUSH  


 


Sodium Polystyrene Sulfonate  30 gm  09/23/21 08:54 





  Sodium Polystyrene 15 Gm/60 Ml Oral Liqd  PO  09/23/21 08:55 





  ONCE ONE  


 


Tramadol HCl  50 mg  09/22/21 03:00 





  Tramadol 50 Mg Tab  PO  





  Q6H PRN  





  Pain, Moderate (4-6)  














Nutrition/Malnutrition Assess





- Dietary Evaluation


Nutrition/Malnutrition Findings: 


                                        





Nutrition Notes                                            Start:  09/22/21 

14:21


Freq:                                                      Status: Active       




Protocol:                                                                       




 Document     09/22/21 14:21  GB  (Rec: 09/22/21 14:37  GB  [id number])


 Nutrition Notes


     Need for Assessment generated from:         RN Referral


     Initial or Follow up                        Assessment


     Current Diagnosis                           Hypertension


     Other Pertinent Diagnosis                   Dyspnea, respiratory distress,


                                                 morbid obesity


     Current Diet                                Cardiac


     Labs/Tests                                  9/22: Na 134 low [id number]


                                                 Creatinine 1.8 elevated


                                                 [id number]


     Pertinent Medications                       azithromycin, fluid


                                                 restriction


     Height                                      5 ft 4 in


     Weight                                      272.155 kg


     Ideal Body Weight (kg)                      54.54


     BMI                                         102.9


     Intake Prior to Admission                   Good


     Weight change and time frame                Pt reports no significant


                                                 weight change.


     Weight Status                               Morbidly Obese


     Subjective/Other Information                Pt states she does want to


                                                 lose weight.  This writer, BATLA,


                                                 discussed with pt to select a


                                                 changeable habits to focus on


                                                 and make the changes a few


                                                 items at a time (examples if


                                                 eat out 5 nights change to 3


                                                 nights/ 1 fruit a day change


                                                 to 2 fruit a day).  Encouraged


                                                 pt to contact RD in Bariatric


                                                 center for outpatient


                                                 counseling.


     Percent of energy/protein needs met:        Meals and snacks provided meet


                                                 100% of estimated energy


                                                 needs.


     Burn                                        Absent


     Trauma                                      Absent


     GI Symptoms                                 None


     Food Allergy                                No


     Usual Diet at Home                          regular


     Skin Integrity/Comment                      skin risk 13


     Current % PO                                Good (%)


     Minimum of two criteria                     No


     #1


      Nutrition Diagnosis                        Overweight/obesity


      Comments:                                  Discussed diet/life style


                                                 changes using small goal


                                                 successes to advancement,


                                                 encouraged outpatient RD


                                                 counseling at bariatric center


      Etiology                                   dyspnea, respiratory distress


      As Evidenced by Signs and Symptoms         , %


     Is patient on ventilator?                   No


     Is Patient Ambulatory and/or Out of Bed     No


     REE-([place]-confined to bed)      4093.764


     Kcal/Kg value to use for calculation        10


     Approximate Energy Requirements Using       2722


      kcal/Kg                                    


     Calculation Used for Recommendations        Kcal/kg


     Additional Notes                            Protein 0.6 g/kg r/t BMI over


                                                 50: 163g


                                                 Fluids: 1ml/kcal or per MD


 Nutrition Intervention


     Change Diet Order:                          continue with current diet


     Nutrition Support:                          n/a


     Add Supplement/Snack (indicate name/kcal    n/a


      /protein )                                 


     Teaching Recipient                          Patient


     Learning Readiness                          Good


     Teaching Methods                            Discussion


     Response to Teaching                        Verbalize understanding


     Barriers to Learning                        Motivation,Emotional,Social


     Patient aware of follow up options          Yes


     Actions To Overcome Barriers                Collaboration with Other


                                                 Providers


     Goal #1                                     Pt to contact and make


                                                 appointment with outpatient RD


                                                 in bariatric center


     Goal #2                                     weight to decrease -1% during


                                                 LOS


     Follow-Up By:                               09/27/21

## 2021-09-23 NOTE — PROGRESS NOTE
Assessment and Plan





Patient is a 30 y/o female with a PMHX of morbid obesity, HTN, COPD 








NSTEMI type 2


Elevated BNP


* Troponins minimally elevated 0.032->0.099->0.078. EKG shows sinus tach 116 

  with no acute ischemic changes. AMI ruled out


* BNP noted to be elevated. Hold Lasix in acute renal insufficiency. Echo 

  pending


* Echo 1/22/2021- Technically very difficult and limited study, due to patient 

  body habitus EF 55 to 60%, left and right atrium are normal in size right 

  ventricular cavity is normal right ventricular global systolic function


* Echo 9/22/2021-technically difficult study, contrast used for LV function, EF 

  55 to 60%, right ventricular systolic function is normal, aortic valve not 

  well visualized





Acute Renal insufficiency


Hyponatremia


* Patient cr 1.8 on admission.


* Nephrology following


HTN


* Patient has been hypotensive.


* Hold ACE/ARB in setting of acute renal insufficiency and hypotension





Obesity Hypoventilation Syndrome


COPD


* Patient currently on NC


* Management per primary team





Plan:





Normal EF on echo. Continue to hold ACE/ARB, HCTZ, and Lasix in setting of renal

insufficiency. BMP in AM. No ischemic eval due to patient's due patient's 

obesity.


Patient seen in conjunction with Dr. Torres who agrees with this plan of care. 

Will continue to follow





- Patient Problems


(1) Elevated troponin


Current Visit: Yes   Status: Acute   





(2) Acute renal insufficiency


Current Visit: Yes   Status: Acute   





(3) COPD (chronic obstructive pulmonary disease)


Current Visit: Yes   Status: Acute   





(4) Hypertension


Current Visit: Yes   Status: Acute   





(5) Morbid obesity


Current Visit: Yes   Status: Acute   





(6) Morbid obesity with BMI of 70 and over, adult


Current Visit: No   Status: Acute   





(7) Obesity hypoventilation syndrome


Current Visit: No   Status: Acute   





Subjective


Date of service: 09/23/21


Principal diagnosis: Acute on chronic renal insufficiency


Interval history: 





Patient lying in bed.


Sinus  95 with no events on monitor





Objective


                                   Vital Signs











  Temp Pulse Pulse Pulse Resp BP Pulse Ox


 


 09/23/21 08:50  98.6 F  95 H    20  104/47  95


 


 09/23/21 05:44  98.1 F  94 H    18  101/41  95


 


 09/23/21 01:05        98


 


 09/23/21 00:20   106 H     


 


 09/22/21 19:50        93


 


 09/22/21 19:27  98.0 F  106 H    20  108/50  93


 


 09/22/21 15:52  97.4 F L  89     139/113 


 


 09/22/21 14:13    79  79  17   95














- Physical Examination


General: No Apparent Distress


HEENT: Positive: PERRL


Cardiac: Positive: Reg Rate and Rhythm


Lungs: Positive: Decreased Breath Sounds


Neuro: Positive: Grossly Intact


Abdomen: Positive: Soft


Skin: Negative: Rash, Suspicious Lesions, Ulceration


Extremities: Present: upper extr. pulses, edema





- Labs and Meds


                                       CBC











  09/23/21 Range/Units





  04:52 


 


WBC  20.9 H  (4.5-11.0)  K/mm3


 


RBC  4.09  (3.65-5.03)  M/mm3


 


Hgb  8.5 L  (10.1-14.3)  gm/dl


 


Hct  30.0 L  (30.3-42.9)  %


 


Plt Count  339  (140-440)  K/mm3








                          Comprehensive Metabolic Panel











  09/23/21 Range/Units





  04:52 


 


Sodium  129 L  (137-145)  mmol/L


 


Potassium  5.6 H  (3.6-5.0)  mmol/L


 


Chloride  88.6 L  ()  mmol/L


 


Carbon Dioxide  26  (22-30)  mmol/L


 


BUN  27 H  (7-17)  mg/dL


 


Creatinine  2.4 H  (0.6-1.2)  mg/dL


 


Glucose  121 H  ()  mg/dL


 


Calcium  7.7 L  (8.4-10.2)  mg/dL














- Imaging and Cardiology


EKG: report reviewed, image reviewed


Echo: report reviewed





- Telemetry


EKG Rhythm: Sinus Rhythm





- EKG


Sinus rhythms and dysrhythmias: sinus tachycardia


Repolarization changes or abnormalities: nonspecific abnormality, ST segment, 

and/or T wave

## 2021-09-24 LAB
BUN SERPL-MCNC: 39 MG/DL (ref 7–17)
BUN SERPL-MCNC: 47 MG/DL (ref 7–17)
BUN/CREAT SERPL: 13 %
BUN/CREAT SERPL: 16 %
CALCIUM SERPL-MCNC: 6.9 MG/DL (ref 8.4–10.2)
CALCIUM SERPL-MCNC: 7.2 MG/DL (ref 8.4–10.2)
HEMOLYSIS INDEX: 0
HEMOLYSIS INDEX: 11

## 2021-09-24 RX ADMIN — Medication SCH ML: at 22:30

## 2021-09-24 RX ADMIN — NYSTATIN SCH: 100000 POWDER TOPICAL at 11:01

## 2021-09-24 RX ADMIN — Medication SCH ML: at 11:01

## 2021-09-24 RX ADMIN — HEPARIN SODIUM SCH: 5000 INJECTION, SOLUTION INTRAVENOUS; SUBCUTANEOUS at 15:55

## 2021-09-24 RX ADMIN — ASPIRIN SCH MG: 325 TABLET, COATED ORAL at 11:01

## 2021-09-24 RX ADMIN — SODIUM POLYSTYRENE SULFONATE SCH GM: 15 SUSPENSION ORAL; RECTAL at 11:00

## 2021-09-24 RX ADMIN — FAMOTIDINE SCH MG: 10 TABLET ORAL at 22:30

## 2021-09-24 RX ADMIN — BENZONATATE SCH MG: 100 CAPSULE ORAL at 22:30

## 2021-09-24 RX ADMIN — NYSTATIN SCH APPLIC: 100000 POWDER TOPICAL at 22:31

## 2021-09-24 RX ADMIN — HEPARIN SODIUM SCH UNIT: 5000 INJECTION, SOLUTION INTRAVENOUS; SUBCUTANEOUS at 22:30

## 2021-09-24 RX ADMIN — OXYCODONE AND ACETAMINOPHEN PRN TAB: 5; 325 TABLET ORAL at 18:16

## 2021-09-24 RX ADMIN — BENZONATATE SCH MG: 100 CAPSULE ORAL at 05:25

## 2021-09-24 RX ADMIN — IPRATROPIUM BROMIDE AND ALBUTEROL SULFATE SCH AMPUL: .5; 3 SOLUTION RESPIRATORY (INHALATION) at 14:15

## 2021-09-24 RX ADMIN — AZITHROMYCIN SCH MG: 250 TABLET, FILM COATED ORAL at 11:00

## 2021-09-24 RX ADMIN — IPRATROPIUM BROMIDE AND ALBUTEROL SULFATE SCH AMPUL: .5; 3 SOLUTION RESPIRATORY (INHALATION) at 09:36

## 2021-09-24 RX ADMIN — FAMOTIDINE SCH MG: 10 TABLET ORAL at 11:01

## 2021-09-24 RX ADMIN — BENZONATATE SCH MG: 100 CAPSULE ORAL at 17:15

## 2021-09-24 RX ADMIN — IPRATROPIUM BROMIDE AND ALBUTEROL SULFATE SCH AMPUL: .5; 3 SOLUTION RESPIRATORY (INHALATION) at 19:10

## 2021-09-24 RX ADMIN — HEPARIN SODIUM SCH UNIT: 5000 INJECTION, SOLUTION INTRAVENOUS; SUBCUTANEOUS at 05:25

## 2021-09-24 NOTE — PROGRESS NOTE
Assessment and Plan


Assessment





Acute kidney injury, unknown baseline. Renal ultrasound notes poor visualization

due to body habitus.


Hyponatremia


Hyperkalemia


Hypotension


Hypocalcemia


Morbid obesity





Recommendations





Suspect intravascular volume depletion - Ordered LR x 1 L, assess reponse


S/p Kayexalate


Ordered cortisol - pending


Ordered ionized calcium - pending


Maintain MAP more than 65


Hold ACE/ARB at this time


Hold diuretics given soft pressures


Renally dose medications


Avoid nephrotoxins


Renal diet








Subjective


Date of service: 09/24/21


Principal diagnosis: Acute on chronic renal insufficiency


Interval history: 


Patient was seen for her renal issues


Nursing, interdisciplinary and consult notes were reviewed


Vitals, input and output, medications and labs were reviewed


On oxygen








Objective





- Exam


Narrative Exam: 


General: Morbid obesity


HEENT: Oral mucosa moist


Neck: Supple, no JVD


Chest: Clear to auscultation bilaterally


Heart: RRR, S1 and S2, no pericardial rub


Abdomen: Soft, nontender, no renal bruit


Extremity: No peripheral cyanosis, edema


Neurological: Awake and alert


Dermatology: Unable to assess


Psych: Calm and cooperative


Musculoskeletal: No joint effusion








- Vital Signs


Vital signs: 


                               Vital Signs - 12hr











  09/24/21 09/24/21 09/24/21





  07:49 08:00 09:38


 


Pulse Rate [  91 H 





Anterior   





Bilateral   





Throughout]   


 


Respiratory  18 





Rate [Anterior   





Bilateral   





Throughout]   


 


O2 Sat by Pulse 91  98





Oximetry   














  09/24/21





  14:00


 


Pulse Rate [ 89





Anterior 





Bilateral 





Throughout] 


 


Respiratory 18





Rate [Anterior 





Bilateral 





Throughout] 


 


O2 Sat by Pulse 





Oximetry 














- Lab





                                 09/23/21 04:52





                                 09/24/21 02:20


                             Most recent lab results











Calcium  7.2 mg/dL (8.4-10.2)  L  09/24/21  02:20    


 


Urine Sodium  16 mmol/L  09/23/21  18:46    














Medications & Allergies





- Medications


Allergies/Adverse Reactions: 


                                    Allergies





No Known Allergies Allergy (Unverified 07/13/17 11:17)


   








Home Medications: 


                                Home Medications











 Medication  Instructions  Recorded  Confirmed  Last Taken  Type


 


Acetaminophen [Acetaminophen TAB] 325 mg PO Q4H PRN #30 tablet 12/14/17 01/22/21

Unknown Rx


 


ALBUTEROL NEB's [Proventil 0.083% 2.5 mg IH Q3HRT PRN #30 day 01/24/21  Unknown 

Rx





NEBS]     


 


Albuterol Mdi (or & Nicu Only) 2 puff IH QID PRN #1 inhalation 01/24/21  Unknown

Rx





[ProAir HFA Inhaler]     


 


Azithromycin [Zithromax Z-JAVIER] 0 mg PO DAILY #1 tab 01/24/21  Unknown Rx


 


Benzonatate [Tessalon Perles] 100 mg PO Q8HR #15 capsule 01/24/21  Unknown Rx


 


Famotidine [Pepcid] 20 mg PO BID #14 tablet 01/24/21  Unknown Rx


 


Ipratropium/Albuterol Sulfate 1 ampul IH TIDRT #30 day 01/24/21  Unknown Rx





[DUONEB *Not for PRN Use*]     


 


hydroCHLOROthiazide [HCTZ] 25 mg PO QDAY  tablet 01/24/21  Unknown Rx


 


hydroCHLOROthiazide [HCTZ] 25 mg PO QDAY #30 tablet 01/24/21  Unknown Rx


 


methylPREDNISolone [Medrol 4MG 4 mg PO DAILY #1 tab.ds.pk 01/24/21  Unknown Rx





DOSEPAK (21 tabs)]     











Active Medications: 














Generic Name Dose Route Start Last Admin





  Trade Name Freq  PRN Reason Stop Dose Admin


 


Acetaminophen  650 mg  09/22/21 03:00 





  Acetaminophen 325 Mg Tab  PO  





  Q4H PRN  





  Pain MILD(1-3)/Fever >100.5/HA  


 


Albuterol  2.5 mg  09/22/21 03:00 





  Albuterol 2.5 Mg/3 Ml Nebu  IH  





  Q4HRT PRN  





  Shortness Of Breath  


 


Albuterol/Ipratropium  1 ampul  09/22/21 08:00  09/24/21 14:15





  Ipratropium/Albuterol Sulfate 3 Ml Ampul.Neb  IH   1 ampul





  TIDRT DELL   Administration


 


Aspirin  325 mg  09/23/21 10:00  09/24/21 11:01





  Aspirin Ec 325 Mg Tab  PO   325 mg





  QDAY DELL   Administration


 


Atorvastatin Calcium  40 mg  09/22/21 22:00  09/23/21 21:38





  Atorvastatin 40 Mg Tab  PO   40 mg





  QHS DELL   Administration


 


Azithromycin  500 mg  09/23/21 10:00  09/24/21 11:00





  Azithromycin 250 Mg Tab  PO  09/27/21 10:01  500 mg





  DAILY DELL   Administration





  Protocol  


 


Benzonatate  100 mg  09/22/21 06:00  09/24/21 05:25





  Benzonatate 100 Mg Cap  PO   100 mg





  Q8HR DELL   Administration


 


Famotidine  10 mg  09/23/21 22:00  09/24/21 11:01





  Famotidine 10 Mg Tab  PO   10 mg





  BID DELL   Administration


 


Heparin Sodium (Porcine)  5,000 unit  09/22/21 06:00  09/24/21 05:25





  Heparin 5,000 Unit/1 Ml Vial  SUB-Q   5,000 unit





  Q8HR DELL   Administration


 


Hydromorphone HCl  0.5 mg  09/22/21 03:00 





  Hydromorphone 1 Mg/1 Ml Inj  IV  





  Q3H PRN  





  Pain , Severe (7-10)  


 


Lactated Ringer's  1,000 mls @ 100 mls/hr  09/24/21 10:00  09/24/21 11:02





  Lactated Ringers  IV  09/24/21 19:59  100 mls/hr





  AS DIRECT DELL   Administration


 


Methylprednisolone  4 mg  09/22/21 10:00  09/24/21 11:01





  Methylprednisolone 4 Mg Tab  PO   4 mg





  DAILY DELL   Administration


 


Nitroglycerin  0.4 mg  09/22/21 03:00 





  Nitroglycerin 0.4 Mg Tab Subl  SL  





  Q5M PRN  





  Chest Pain  


 


Nystatin  1 applic  09/22/21 18:00  09/24/21 11:01





  Nystatin Powder 15 Gm  TP   Not Given





  BID DELL  


 


Ondansetron HCl  4 mg  09/22/21 03:00 





  Ondansetron 4 Mg/2 Ml Inj  IV  





  Q8H PRN  





  Nausea And Vomiting  


 


Oxycodone/Acetaminophen  1 tab  09/22/21 03:00 





  Oxycodone /Acetaminophen 5-325mg Tab  PO  





  Q6H PRN  





  Pain, Moderate (4-6)  


 


Sodium Chloride  10 ml  09/22/21 10:00  09/24/21 11:01





  Sodium Chloride 0.9% 10 Ml Flush Syringe  IV   10 ml





  BID DELL   Administration


 


Sodium Chloride  10 ml  09/22/21 03:00 





  Sodium Chloride 0.9% 10 Ml Flush Syringe  IV  





  PRN PRN  





  LINE FLUSH  


 


Tramadol HCl  50 mg  09/22/21 03:00 





  Tramadol 50 Mg Tab  PO  





  Q6H PRN  





  Pain, Moderate (4-6)

## 2021-09-24 NOTE — PROGRESS NOTE
Assessment and Plan





Patient is a 30 y/o female with a PMHX of morbid obesity, HTN, COPD 








NSTEMI type 2


Elevated BNP


* Troponins minimally elevated 0.032->0.099->0.078. EKG shows sinus tach 116 

  with no acute ischemic changes.


* BNP noted to be elevated. Hold Lasix in acute renal insufficiency.


* Echo 1/22/2021- Technically very difficult and limited study, due to patient 

  body habitus EF 55 to 60%, left and right atrium are normal in size right 

  ventricular cavity is normal right ventricular global systolic function


* Echo 9/22/2021-technically difficult study, contrast used for LV function, EF 

  55 to 60%, right ventricular systolic function is normal, aortic valve not 

  well visualized





Acute Renal insufficiency


Hyponatremia


* Patient cr 1.8 on admission.


* Nephrology following


HTN


* Patient has been hypotensive.


* Hold ACE/ARB in setting of acute renal insufficiency and hypotension





Obesity Hypoventilation Syndrome


COPD


* Patient currently on NC


* Management per primary team





Plan:





Continue to hold ACE/ARB, HCTZ, and Lasix in setting of renal insufficiency. BMP

in AM. No ischemic eval due to patient's due patient's obesity.


Patient seen in conjunction with Dr. Torres who agrees with this plan of care. 

Will continue to follow





- Patient Problems


(1) Elevated troponin


Current Visit: Yes   Status: Acute   





(2) Acute renal insufficiency


Current Visit: Yes   Status: Acute   





(3) COPD (chronic obstructive pulmonary disease)


Current Visit: Yes   Status: Acute   





(4) Hypertension


Current Visit: Yes   Status: Acute   





(5) Morbid obesity


Current Visit: Yes   Status: Acute   





(6) Morbid obesity with BMI of 70 and over, adult


Current Visit: No   Status: Acute   





(7) Obesity hypoventilation syndrome


Current Visit: No   Status: Acute   





Subjective


Date of service: 09/24/21


Principal diagnosis: Acute on chronic renal insufficiency


Interval history: 





Patient lying in bed.


Sinus  95 with no events on monitor





Objective


                                   Vital Signs











  Temp Pulse Pulse Resp Resp BP Pulse Ox


 


 09/24/21 09:38        98


 


 09/24/21 08:00    91 H   18  


 


 09/24/21 07:49        91


 


 09/24/21 00:00   95 H     


 


 09/23/21 23:24        91


 


 09/23/21 21:57    85   18   98


 


 09/23/21 19:26  98.8 F  95 H   21   114/71  74 L


 


 09/23/21 17:05   91 H      89


 


 09/23/21 17:00  98.5 F  95 H   20   109/65  66 L


 


 09/23/21 16:50      18  


 


 09/23/21 16:00   95 H     














- Physical Examination


General: No Apparent Distress


HEENT: Positive: PERRL


Neck: Positive: trachea midline


Cardiac: Positive: Reg Rate and Rhythm


Lungs: Positive: Decreased Breath Sounds


Neuro: Positive: Grossly Intact


Abdomen: Positive: Soft


Skin: Negative: Rash, Suspicious Lesions, Ulceration


Extremities: Present: upper extr. pulses, edema





- Labs and Meds


                          Comprehensive Metabolic Panel











  09/23/21 09/24/21 Range/Units





  15:46 02:20 


 


Sodium  128 L  128 L  (137-145)  mmol/L


 


Potassium  5.5 H  5.2 H  (3.6-5.0)  mmol/L


 


Chloride  88.1 L  88.6 L  ()  mmol/L


 


Carbon Dioxide  26  25  (22-30)  mmol/L


 


BUN  33 H  39 H  (7-17)  mg/dL


 


Creatinine  2.7 H  3.1 H  (0.6-1.2)  mg/dL


 


Glucose  172 H  171 H  ()  mg/dL


 


Calcium  7.3 L  7.2 L  (8.4-10.2)  mg/dL














- Imaging and Cardiology


EKG: report reviewed, image reviewed


Echo: report reviewed





- Telemetry


EKG Rhythm: Sinus Rhythm





- EKG


Sinus rhythms and dysrhythmias: sinus rhythm


Repolarization changes or abnormalities: nonspecific abnormality, ST segment, 

and/or T wave

## 2021-09-24 NOTE — PROGRESS NOTE
Assessment and Plan


Assessment and plan: 





- Patient Problems


(1) Non-ST elevation MI (NSTEMI)


Current Visit: Yes   Status: Acute   


Plan to address problem: 


Admit the patient to the medical telemetry. Aspirin 325 mg p.o. daily. Lipitor 

40 mg p.o. daily. Nitroglycerin as needed. We do the serial cardiac enzyme. We 

also consult cardiology for evaluation and order echocardiogram.








(2) Acute exacerbation of CHF (congestive heart failure)


Current Visit: Yes   Status: Acute   


Plan to address problem: 


Fluid restriction. Maintain input output. Lasix 40 mg IV every 12 hours. Oxygen 

via nasal cannula 3 L/min. DuoNeb by nebulizer every 4 hours. Echocardiogram. 

Cardiology consult








(3) Hypertension


Current Visit: Yes   Status: Acute   


Plan to address problem: 


We will continue the home medication. Hydralazine 10 mg IV every 6 hours as 

needed. We will monitor the blood pressure closely








(4) COPD (chronic obstructive pulmonary disease)


Current Visit: Yes   Status: Acute   


Plan to address problem: 


Oxygen by nasal cannula 3 L/min. DuoNeb via nebulizer every 4 hours. Albuterol 

via nebulizer every 4 hours as needed.








(5) Morbid obesity


Current Visit: Yes   Status: Acute   


Plan to address problem: 


We counseled regarding weight reduction. Outpatient follow-up with bariatric 

surgeon








(6) Hyperglycemia


Current Visit: Yes   Status: Acute   


Plan to address problem: 


We will give 1 ampoule of D50. We will put the patient on cardiac diet. We will 

monitor the blood glucose closely








(7) DVT prophylaxis


Current Visit: No   Status: Acute   


Plan to address problem: 


Heparin 5000 units subcu every 8 hours for DVT prophylaxis. Pepcid 20 mg p.o. 

twice daily for GI prophylaxis. Patient is a full code





9/23


-Patient is on heparin drip for non-STEMI, cardiology is following.


-Patient is off Lasix and ACE inhibitors because of WOODROW.


-Kidney function is worsening overnight and I put a consult for nephrology.


-Patient is hypotensive and blood pressure from this morning was 101/41.  We 

will continue to monitor.  And if it is dropping we we will give him fluid.


-Echo was done and EF of 50 to 55%.  Diastolic dysfunction is indeterminate.  

Management per cardiology


-Patient has COPD continues on dexamethasone, nebulizer treatment as needed.


-Patient has hyponatremia and hyperkalemia.  I give the patient Kayexalate.  

Monitor electrolytes.  Will follow nephrology for additional recommendation.





9/24


-Renal function is worsening, nephrology is following


-Blood pressure is better today


-Hyperkalemia; I added Kayexalate


-Morbid obesity








History


Interval history: 





Patient was seen and evaluated this morning


Patient was on 3 L of oxygen, which she uses at home


Patient was complaining chest pain, no shortness of breath





Hospitalist Physical





- Physical exam


Narrative exam: 





 Not in cardiopulmonary distress. 


 The patient is morbidly obese.


 Vital signs as documented.


 Head exam is unremarkable.


 No scleral icterus .


 Neck is without jugular venous distension, thyromegaly, or carotid bruits. 


 Lungs are clear to auscultation.


Cardiac exam reveals regular rate and  Rhythm. 


Abdominal exam reveals normal bowel sounds, nontender, no organomegaly.


Extremities are nonedematous and both femoral and pedal pulses are normal.


CNS: Alert and oriented 3.  No focal weakness.





- Constitutional


Vitals: 


                                        











Temp Pulse Resp BP Pulse Ox


 


 98.8 F   95 H  18   114/71   91 


 


 09/23/21 19:26  09/24/21 00:00  09/23/21 21:57  09/23/21 19:26  09/24/21 07:49











General appearance: Present: no acute distress





HEART Score





- HEART Score


Troponin: 


                                        











Troponin T  0.078 ng/mL (0.00-0.029)  H D 09/22/21  13:03    














Results





- Labs


CBC & Chem 7: 


                                 09/23/21 04:52





                                 09/24/21 02:20


Labs: 


                             Laboratory Last Values











WBC  20.9 K/mm3 (4.5-11.0)  H  09/23/21  04:52    


 


RBC  4.09 M/mm3 (3.65-5.03)   09/23/21  04:52    


 


Hgb  8.5 gm/dl (10.1-14.3)  L  09/23/21  04:52    


 


Hct  30.0 % (30.3-42.9)  L  09/23/21  04:52    


 


MCV  73 fl (79-97)  L  09/23/21  04:52    


 


MCH  21 pg (28-32)  L  09/23/21  04:52    


 


MCHC  28 % (30-34)  L  09/23/21  04:52    


 


RDW  23.9 % (13.2-15.2)  H  09/23/21  04:52    


 


Plt Count  339 K/mm3 (140-440)   09/23/21  04:52    


 


Add Manual Diff  Complete   09/23/21  04:52    


 


Total Counted  100   09/23/21  04:52    


 


Seg Neuts % (Manual)  77.0 % (40.0-70.0)  H  09/23/21  04:52    


 


Band Neutrophils %  13.0 %  09/23/21  04:52    


 


Lymphocytes % (Manual)  1.0 % (13.4-35.0)  L  09/23/21  04:52    


 


Reactive Lymphs % (Man)  6.0 %  09/23/21  04:52    


 


Monocytes % (Manual)  2.0 % (0.0-7.3)   09/23/21  04:52    


 


Metamyelocytes %  1.0 %  09/23/21  04:52    


 


Myelocytes %  2.0 %  09/22/21  04:03    


 


Nucleated RBC %  1.0 % (0.0-0.9)  H  09/23/21  04:52    


 


Seg Neutrophils # Man  16.1 K/mm3 (1.8-7.7)  H  09/23/21  04:52    


 


Band Neutrophils #  2.7 K/mm3  09/23/21  04:52    


 


Lymphocytes # (Manual)  0.2 K/mm3 (1.2-5.4)  L  09/23/21  04:52    


 


Abs React Lymphs (Man)  1.3 K/mm3  09/23/21  04:52    


 


Monocytes # (Manual)  0.4 K/mm3 (0.0-0.8)   09/23/21  04:52    


 


Eosinophils # (Manual)  0.0 K/mm3 (0.0-0.4)   09/23/21  04:52    


 


Basophils # (Manual)  0.0 K/mm3 (0.0-0.1)   09/23/21  04:52    


 


Metamyelocytes #  0.2 K/mm3  09/23/21  04:52    


 


Myelocytes #  0.0 K/mm3  09/23/21  04:52    


 


Promyelocytes #  0.0 K/mm3  09/23/21  04:52    


 


Blast Cells #  0.0 K/mm3  09/23/21  04:52    


 


WBC Morphology  Not Reportable   09/23/21  04:52    


 


Hypersegmented Neuts  Not Reportable   09/23/21  04:52    


 


Hyposegmented Neuts  Not Reportable   09/23/21  04:52    


 


Hypogranular Neuts  Not Reportable   09/23/21  04:52    


 


Smudge Cells  Not Reportable   09/23/21  04:52    


 


Toxic Granulation  2+   09/23/21  04:52    


 


Toxic Vacuolation  2+   09/23/21  04:52    


 


Dohle Bodies  Not Reportable   09/23/21  04:52    


 


Pelger-Huet Anomaly  Not Reportable   09/23/21  04:52    


 


Ac Rods  Not Reportable   09/23/21  04:52    


 


Platelet Estimate  Consistent w auto   09/23/21  04:52    


 


Clumped Platelets  1+   09/23/21  04:52    


 


Plt Clumps, EDTA  Not Reportable   09/23/21  04:52    


 


Large Platelets  2+   09/23/21  04:52    


 


Giant Platelets  Not Reportable   09/23/21  04:52    


 


Platelet Satelliting  Rare   09/23/21  04:52    


 


Plt Morphology Comment  Not Reportable   09/23/21  04:52    


 


RBC Morphology  Not Reportable   09/23/21  04:52    


 


Dimorphic RBCs  Not Reportable   09/23/21  04:52    


 


Polychromasia  Not Reportable   09/23/21  04:52    


 


Hypochromasia  2+   09/23/21  04:52    


 


Poikilocytosis  Not Reportable   09/23/21  04:52    


 


Anisocytosis  2+   09/23/21  04:52    


 


Microcytosis  Not Reportable   09/23/21  04:52    


 


Macrocytosis  Not Reportable   09/23/21  04:52    


 


Spherocytes  Not Reportable   09/23/21  04:52    


 


Pappenheimer Bodies  Not Reportable   09/23/21  04:52    


 


Sickle Cells  Not Reportable   09/23/21  04:52    


 


Target Cells  1+   09/23/21  04:52    


 


Tear Drop Cells  Not Reportable   09/23/21  04:52    


 


Ovalocytes  Not Reportable   09/23/21  04:52    


 


Helmet Cells  Not Reportable   09/23/21  04:52    


 


[place] Bodies  Not Reportable   09/23/21  04:52    


 


Cabot Rings  Not Reportable   09/23/21  04:52    


 


Apolinar Cells  Not Reportable   09/23/21  04:52    


 


Bite Cells  Not Reportable   09/23/21  04:52    


 


Crenated Cell  Not Reportable   09/23/21  04:52    


 


Elliptocytes  Not Reportable   09/23/21  04:52    


 


Acanthocytes (Spur)  Not Reportable   09/23/21  04:52    


 


Rouleaux  Not Reportable   09/23/21  04:52    


 


Hemoglobin C Crystals  Not Reportable   09/23/21  04:52    


 


Schistocytes  Not Reportable   09/23/21  04:52    


 


Malaria parasites  Not Reportable   09/23/21  04:52    


 


Sami Bodies  Not Reportable   09/23/21  04:52    


 


Hem Pathologist Commnt  No   09/23/21  04:52    


 


Sodium  128 mmol/L (137-145)  L  09/24/21  02:20    


 


Potassium  5.2 mmol/L (3.6-5.0)  H  09/24/21  02:20    


 


Chloride  88.6 mmol/L ()  L  09/24/21  02:20    


 


Carbon Dioxide  25 mmol/L (22-30)   09/24/21  02:20    


 


Anion Gap  20 mmol/L  09/24/21  02:20    


 


BUN  39 mg/dL (7-17)  H  09/24/21  02:20    


 


Creatinine  3.1 mg/dL (0.6-1.2)  H  09/24/21  02:20    


 


Estimated GFR  21 ml/min  09/24/21  02:20    


 


BUN/Creatinine Ratio  13 %  09/24/21  02:20    


 


Glucose  171 mg/dL ()  H  09/24/21  02:20    


 


POC Glucose  168 mg/dL ()  H  09/24/21  07:39    


 


Osmolality  301 Mosm/kg  09/23/21  23:15    


 


Calcium  7.2 mg/dL (8.4-10.2)  L  09/24/21  02:20    


 


Troponin T  0.078 ng/mL (0.00-0.029)  H D 09/22/21  13:03    


 


NT-Pro-B Natriuret Pep  7573 pg/mL (0-450)  H  09/21/21  22:04    


 


Triglycerides  105 mg/dL (2-149)   09/21/21  22:04    


 


Cholesterol  85 mg/dL ()   09/21/21  22:04    


 


LDL Cholesterol Direct  47 mg/dL ()  L  09/21/21  22:04    


 


HDL Cholesterol  25 mg/dL (40-59)  L  09/21/21  22:04    


 


Cholesterol/HDL Ratio  3.40 %  09/21/21  22:04    


 


Urine Osmolality  157 Mosm/kg  09/23/21  18:46    


 


Urine Sodium  16 mmol/L  09/23/21  18:46    











Diamond/IV: 


                                        





Voiding Method                   External Female Catheter











Active Medications





- Current Medications


Current Medications: 














Generic Name Dose Route Start Last Admin





  Trade Name Freq  PRN Reason Stop Dose Admin


 


Acetaminophen  650 mg  09/22/21 03:00 





  Acetaminophen 325 Mg Tab  PO  





  Q4H PRN  





  Pain MILD(1-3)/Fever >100.5/HA  


 


Albuterol  2.5 mg  09/22/21 03:00 





  Albuterol 2.5 Mg/3 Ml Nebu  IH  





  Q4HRT PRN  





  Shortness Of Breath  


 


Albuterol/Ipratropium  1 ampul  09/22/21 08:00  09/23/21 22:02





  Ipratropium/Albuterol Sulfate 3 Ml Ampul.Neb  IH   1 ampul





  TIDRT DELL   Administration


 


Aspirin  325 mg  09/23/21 10:00  09/23/21 09:58





  Aspirin Ec 325 Mg Tab  PO   325 mg





  QDAY DELL   Administration


 


Atorvastatin Calcium  40 mg  09/22/21 22:00  09/23/21 21:38





  Atorvastatin 40 Mg Tab  PO   40 mg





  QHS DELL   Administration


 


Azithromycin  500 mg  09/23/21 10:00  09/23/21 10:02





  Azithromycin 250 Mg Tab  PO  09/27/21 10:01  500 mg





  DAILY DELL   Administration





  Protocol  


 


Benzonatate  100 mg  09/22/21 06:00  09/24/21 05:25





  Benzonatate 100 Mg Cap  PO   100 mg





  Q8HR DELL   Administration


 


Famotidine  10 mg  09/23/21 22:00  09/23/21 21:38





  Famotidine 10 Mg Tab  PO   10 mg





  BID DELL   Administration


 


Heparin Sodium (Porcine)  5,000 unit  09/22/21 06:00  09/24/21 05:25





  Heparin 5,000 Unit/1 Ml Vial  SUB-Q   5,000 unit





  Q8HR DELL   Administration


 


Hydromorphone HCl  0.5 mg  09/22/21 03:00 





  Hydromorphone 1 Mg/1 Ml Inj  IV  





  Q3H PRN  





  Pain , Severe (7-10)  


 


Methylprednisolone  4 mg  09/22/21 10:00  09/23/21 09:58





  Methylprednisolone 4 Mg Tab  PO   4 mg





  DAILY DELL   Administration


 


Nitroglycerin  0.4 mg  09/22/21 03:00 





  Nitroglycerin 0.4 Mg Tab Subl  SL  





  Q5M PRN  





  Chest Pain  


 


Nystatin  1 applic  09/22/21 18:00  09/23/21 21:39





  Nystatin Powder 15 Gm  TP   1 applic





  BID DELL   Administration


 


Ondansetron HCl  4 mg  09/22/21 03:00 





  Ondansetron 4 Mg/2 Ml Inj  IV  





  Q8H PRN  





  Nausea And Vomiting  


 


Oxycodone/Acetaminophen  1 tab  09/22/21 03:00 





  Oxycodone /Acetaminophen 5-325mg Tab  PO  





  Q6H PRN  





  Pain, Moderate (4-6)  


 


Sodium Chloride  10 ml  09/22/21 10:00  09/23/21 21:39





  Sodium Chloride 0.9% 10 Ml Flush Syringe  IV   10 ml





  BID DELL   Administration


 


Sodium Chloride  10 ml  09/22/21 03:00 





  Sodium Chloride 0.9% 10 Ml Flush Syringe  IV  





  PRN PRN  





  LINE FLUSH  


 


Sodium Polystyrene Sulfonate  15 gm  09/23/21 18:00  09/23/21 18:43





  Sodium Polystyrene 15 Gm/60 Ml Oral Liqd  PO   15 gm





  QDAY DELL   Administration


 


Sodium Polystyrene Sulfonate  30 gm  09/24/21 07:57 





  Sodium Polystyrene 15 Gm/60 Ml Oral Liqd  PO  09/24/21 12:00 





  ONCE NR  


 


Tramadol HCl  50 mg  09/22/21 03:00 





  Tramadol 50 Mg Tab  PO  





  Q6H PRN  





  Pain, Moderate (4-6)  














Nutrition/Malnutrition Assess





- Dietary Evaluation


Nutrition/Malnutrition Findings: 


                                        





Nutrition Notes                                            Start:  09/22/21 

14:21


Freq:                                                      Status: Active       




Protocol:                                                                       




 Document     09/22/21 14:21  GB  (Rec: 09/22/21 14:37  GB  [id number])


 Nutrition Notes


     Need for Assessment generated from:         RN Referral


     Initial or Follow up                        Assessment


     Current Diagnosis                           Hypertension


     Other Pertinent Diagnosis                   Dyspnea, respiratory distress,


                                                 morbid obesity


     Current Diet                                Cardiac


     Labs/Tests                                  9/22: Na 134 low f6qlygus


                                                 Creatinine 1.8 elevated


                                                 [id number]


     Pertinent Medications                       azithromycin, fluid


                                                 restriction


     Height                                      5 ft 4 in


     Weight                                      272.155 kg


     Ideal Body Weight (kg)                      54.54


     BMI                                         102.9


     Intake Prior to Admission                   Good


     Weight change and time frame                Pt reports no significant


                                                 weight change.


     Weight Status                               Morbidly Obese


     Subjective/Other Information                Pt states she does want to


                                                 lose weight.  This writer, RD,


                                                 discussed with pt to select a


                                                 changeable habits to focus on


                                                 and make the changes a few


                                                 items at a time (examples if


                                                 eat out 5 nights change to 3


                                                 nights/ 1 fruit a day change


                                                 to 2 fruit a day).  Encouraged


                                                 pt to contact RD in Bariatric


                                                 center for outpatient


                                                 counseling.


     Percent of energy/protein needs met:        Meals and snacks provided meet


                                                 100% of estimated energy


                                                 needs.


     Burn                                        Absent


     Trauma                                      Absent


     GI Symptoms                                 None


     Food Allergy                                No


     Usual Diet at Home                          regular


     Skin Integrity/Comment                      skin risk 13


     Current % PO                                Good (%)


     Minimum of two criteria                     No


     #1


      Nutrition Diagnosis                        Overweight/obesity


      Comments:                                  Discussed diet/life style


                                                 changes using small goal


                                                 successes to advancement,


                                                 encouraged outpatient RD


                                                 counseling at bariatric center


      Etiology                                   dyspnea, respiratory distress


      As Evidenced by Signs and Symptoms         , %


     Is patient on ventilator?                   No


     Is Patient Ambulatory and/or Out of Bed     No


     REE-([facility]-confined to bed)      4093.764


     Kcal/Kg value to use for calculation        10


     Approximate Energy Requirements Using       2722


      kcal/Kg                                    


     Calculation Used for Recommendations        Kcal/kg


     Additional Notes                            Protein 0.6 g/kg r/t BMI over


                                                 50: 163g


                                                 Fluids: 1ml/kcal or per MD


 Nutrition Intervention


     Change Diet Order:                          continue with current diet


     Nutrition Support:                          n/a


     Add Supplement/Snack (indicate name/kcal    n/a


      /protein )                                 


     Teaching Recipient                          Patient


     Learning Readiness                          Good


     Teaching Methods                            Discussion


     Response to Teaching                        Verbalize understanding


     Barriers to Learning                        Motivation,Emotional,Social


     Patient aware of follow up options          Yes


     Actions To Overcome Barriers                Collaboration with Other


                                                 Providers


     Goal #1                                     Pt to contact and make


                                                 appointment with outpatient RD


                                                 in bariatric center


     Goal #2                                     weight to decrease -1% during


                                                 LOS


     Follow-Up By:                               09/27/21

## 2021-09-24 NOTE — ULTRASOUND REPORT
ULTRASOUND RENAL



INDICATION:

WOODROW.



COMPARISON:

No relevant prior imaging study available. 



FINDINGS:

RIGHT KIDNEY: Not visualized secondary to body habitus and bowel gas

LEFT KIDNEY: Not visualized secondary to body habitus and bowel gas



Urinary Bladder: Not visualized secondary to body habitus and bowel gas.

Free Fluid: None.

Additional Findings: None.



IMPRESSION

1. Suboptimal study. Bladder and kidneys not visualized sonographically..



Signer Name: Dominic Pretty MD 

Signed: 9/24/2021 9:44 AM

Workstation Name: [id number]

## 2021-09-24 NOTE — ELECTROCARDIOGRAPH REPORT
[facility]

                                       

Test Date:    2021               Test Time:    06:41:52

Pat Name:     PIPE AHMADI             Department:   

Patient ID:   [id number]          Room:         A459

Gender:       F                        Technician:   NKECHI

:          1990               Requested By: GARCIA CORTEZ

Order Number: [id number]              Reading MD:   Reagan Torres

                                 Measurements

Intervals                              Axis          

Rate:         94                       P:            71

MI:           116                      QRS:          71

QRSD:         78                       T:            -50

QT:           348                                    

QTc:          435                                    

                           Interpretive Statements

Sinus rhythm

Probable left atrial enlargement

nonspecific st-t

Compared to ECG 2021 22:34:19

Sinus tachycardia no longer present

Ventricular premature complex(es) no longer present

T-wave abnormality no longer present

Electronically Signed On 2021 8:50:26 EDT by Reagan Torres

## 2021-09-25 LAB
BUN SERPL-MCNC: 53 MG/DL (ref 7–17)
BUN SERPL-MCNC: 53 MG/DL (ref 7–17)
BUN/CREAT SERPL: 19 %
BUN/CREAT SERPL: 22 %
CALCIUM SERPL-MCNC: 7.1 MG/DL (ref 8.4–10.2)
CALCIUM SERPL-MCNC: 7.2 MG/DL (ref 8.4–10.2)
HEMOLYSIS INDEX: 221
HEMOLYSIS INDEX: 74

## 2021-09-25 RX ADMIN — NYSTATIN SCH APPLIC: 100000 POWDER TOPICAL at 09:48

## 2021-09-25 RX ADMIN — SODIUM CHLORIDE TAB 1 GM SCH GM: 1 TAB at 22:38

## 2021-09-25 RX ADMIN — BENZONATATE SCH MG: 100 CAPSULE ORAL at 05:15

## 2021-09-25 RX ADMIN — HEPARIN SODIUM SCH: 5000 INJECTION, SOLUTION INTRAVENOUS; SUBCUTANEOUS at 14:04

## 2021-09-25 RX ADMIN — FUROSEMIDE SCH MG: 10 INJECTION, SOLUTION INTRAVENOUS at 18:09

## 2021-09-25 RX ADMIN — HEPARIN SODIUM SCH: 5000 INJECTION, SOLUTION INTRAVENOUS; SUBCUTANEOUS at 22:37

## 2021-09-25 RX ADMIN — ASPIRIN SCH MG: 325 TABLET, COATED ORAL at 09:48

## 2021-09-25 RX ADMIN — SODIUM POLYSTYRENE SULFONATE SCH GM: 15 SUSPENSION ORAL; RECTAL at 11:07

## 2021-09-25 RX ADMIN — Medication SCH ML: at 22:38

## 2021-09-25 RX ADMIN — NYSTATIN SCH APPLIC: 100000 POWDER TOPICAL at 22:45

## 2021-09-25 RX ADMIN — IPRATROPIUM BROMIDE AND ALBUTEROL SULFATE SCH AMPUL: .5; 3 SOLUTION RESPIRATORY (INHALATION) at 12:39

## 2021-09-25 RX ADMIN — FAMOTIDINE SCH MG: 10 TABLET ORAL at 22:38

## 2021-09-25 RX ADMIN — BENZONATATE SCH MG: 100 CAPSULE ORAL at 22:38

## 2021-09-25 RX ADMIN — BENZONATATE SCH MG: 100 CAPSULE ORAL at 14:03

## 2021-09-25 RX ADMIN — HEPARIN SODIUM SCH UNIT: 5000 INJECTION, SOLUTION INTRAVENOUS; SUBCUTANEOUS at 05:15

## 2021-09-25 RX ADMIN — AZITHROMYCIN SCH MG: 250 TABLET, FILM COATED ORAL at 09:48

## 2021-09-25 RX ADMIN — SODIUM CHLORIDE TAB 1 GM SCH GM: 1 TAB at 14:03

## 2021-09-25 RX ADMIN — IPRATROPIUM BROMIDE AND ALBUTEROL SULFATE SCH AMPUL: .5; 3 SOLUTION RESPIRATORY (INHALATION) at 22:12

## 2021-09-25 RX ADMIN — Medication SCH ML: at 09:48

## 2021-09-25 RX ADMIN — FAMOTIDINE SCH MG: 10 TABLET ORAL at 09:47

## 2021-09-25 NOTE — PROGRESS NOTE
Assessment and Plan


Assessment





Acute kidney injury, unknown baseline. Renal ultrasound notes poor visualization

due to body habitus.


Hyponatremia


Hyperkalemia


Hypotension


Hypocalcemia


Morbid obesity





Recommendations





Na trended down following hydration, start sodium tabs and diuresis, recheck 

labs this evening


S/p Kayexalate


Ordered cortisol - pending


Ordered ionized calcium - pending


Maintain MAP more than 65


Hold ACE/ARB at this time


Hold diuretics given soft pressures


Renally dose medications


Avoid nephrotoxins


Renal diet








Subjective


Date of service: 09/25/21


Principal diagnosis: Acute on chronic renal insufficiency


Interval history: 


Patient was seen for her renal issues


Nursing, interdisciplinary and consult notes were reviewed


Vitals, input and output, medications and labs were reviewed


Remains on NC








Objective





- Exam


Narrative Exam: 


General: Morbid obesity


HEENT: Oral mucosa moist


Neck: Supple, no JVD


Chest: Clear to auscultation bilaterally


Heart: RRR, S1 and S2, no pericardial rub


Abdomen: Soft, nontender, no renal bruit


Extremity: No peripheral cyanosis, edema


Neurological: Awake and alert


Dermatology: Unable to assess


Psych: Calm and cooperative


Musculoskeletal: No joint effusion








- Vital Signs


Vital signs: 


                               Vital Signs - 12hr











  09/25/21 09/25/21 09/25/21





  08:24 11:55 12:00


 


Temperature 97.6 F 98.3 F 


 


Pulse Rate 97 H 96 H 


 


Respiratory 22 22 





Rate   


 


Blood Pressure 140/70 137/62 


 


O2 Sat by Pulse 96 99 91





Oximetry   














  09/25/21





  16:18


 


Temperature 98.9 F


 


Pulse Rate 101 H


 


Respiratory 22





Rate 


 


Blood Pressure 137/76


 


O2 Sat by Pulse 95





Oximetry 














- Lab





                                 09/23/21 04:52





                                 09/25/21 05:48


                             Most recent lab results











Calcium  7.2 mg/dL (8.4-10.2)  L  09/25/21  05:48    


 


Urine Sodium  16 mmol/L  09/23/21  18:46    














Medications & Allergies





- Medications


Allergies/Adverse Reactions: 


                                    Allergies





No Known Allergies Allergy (Unverified 07/13/17 11:17)


   








Home Medications: 


                                Home Medications











 Medication  Instructions  Recorded  Confirmed  Last Taken  Type


 


Acetaminophen [Acetaminophen TAB] 325 mg PO Q4H PRN #30 tablet 12/14/17 01/22/21

Unknown Rx


 


ALBUTEROL NEB's [Proventil 0.083% 2.5 mg IH Q3HRT PRN #30 day 01/24/21  Unknown 

Rx





NEBS]     


 


Albuterol Mdi (or & Nicu Only) 2 puff IH QID PRN #1 inhalation 01/24/21  Unknown

Rx





[ProAir HFA Inhaler]     


 


Azithromycin [Zithromax Z-JAVIER] 0 mg PO DAILY #1 tab 01/24/21  Unknown Rx


 


Benzonatate [Tessalon Perles] 100 mg PO Q8HR #15 capsule 01/24/21  Unknown Rx


 


Famotidine [Pepcid] 20 mg PO BID #14 tablet 01/24/21  Unknown Rx


 


Ipratropium/Albuterol Sulfate 1 ampul IH TIDRT #30 day 01/24/21  Unknown Rx





[DUONEB *Not for PRN Use*]     


 


hydroCHLOROthiazide [HCTZ] 25 mg PO QDAY  tablet 01/24/21  Unknown Rx


 


hydroCHLOROthiazide [HCTZ] 25 mg PO QDAY #30 tablet 01/24/21  Unknown Rx


 


methylPREDNISolone [Medrol 4MG 4 mg PO DAILY #1 tab.ds.pk 01/24/21  Unknown Rx





DOSEPAK (21 tabs)]     











Active Medications: 














Generic Name Dose Route Start Last Admin





  Trade Name Freq  PRN Reason Stop Dose Admin


 


Acetaminophen  650 mg  09/22/21 03:00 





  Acetaminophen 325 Mg Tab  PO  





  Q4H PRN  





  Pain MILD(1-3)/Fever >100.5/HA  


 


Albuterol  2.5 mg  09/22/21 03:00 





  Albuterol 2.5 Mg/3 Ml Nebu  IH  





  Q4HRT PRN  





  Shortness Of Breath  


 


Albuterol/Ipratropium  1 ampul  09/22/21 08:00  09/25/21 12:39





  Ipratropium/Albuterol Sulfate 3 Ml Ampul.Neb  IH   1 ampul





  TIDRT DELL   Administration


 


Aspirin  325 mg  09/23/21 10:00  09/25/21 09:48





  Aspirin Ec 325 Mg Tab  PO   325 mg





  QDAY DELL   Administration


 


Atorvastatin Calcium  40 mg  09/22/21 22:00  09/24/21 22:30





  Atorvastatin 40 Mg Tab  PO   40 mg





  QHS DELL   Administration


 


Azithromycin  500 mg  09/23/21 10:00  09/25/21 09:48





  Azithromycin 250 Mg Tab  PO  09/27/21 10:01  500 mg





  DAILY DELL   Administration





  Protocol  


 


Benzonatate  100 mg  09/22/21 06:00  09/25/21 14:03





  Benzonatate 100 Mg Cap  PO   100 mg





  Q8HR DELL   Administration


 


Famotidine  10 mg  09/23/21 22:00  09/25/21 09:47





  Famotidine 10 Mg Tab  PO   10 mg





  BID DELL   Administration


 


Furosemide  60 mg  09/25/21 18:00  09/25/21 18:09





  Furosemide 40 Mg/4 Ml Inj  IV   60 mg





  0600,1800 DELL   Administration


 


Heparin Sodium (Porcine)  5,000 unit  09/22/21 06:00  09/25/21 14:04





  Heparin 5,000 Unit/1 Ml Vial  SUB-Q   Not Given





  Q8HR DELL  


 


Hydromorphone HCl  0.5 mg  09/22/21 03:00 





  Hydromorphone 1 Mg/1 Ml Inj  IV  





  Q3H PRN  





  Pain , Severe (7-10)  


 


Methylprednisolone  4 mg  09/22/21 10:00  09/25/21 09:47





  Methylprednisolone 4 Mg Tab  PO   4 mg





  DAILY DELL   Administration


 


Nitroglycerin  0.4 mg  09/22/21 03:00 





  Nitroglycerin 0.4 Mg Tab Subl  SL  





  Q5M PRN  





  Chest Pain  


 


Nystatin  1 applic  09/22/21 18:00  09/25/21 09:48





  Nystatin Powder 15 Gm  TP   1 applic





  BID DELL   Administration


 


Ondansetron HCl  4 mg  09/22/21 03:00 





  Ondansetron 4 Mg/2 Ml Inj  IV  





  Q8H PRN  





  Nausea And Vomiting  


 


Oxycodone/Acetaminophen  1 tab  09/22/21 03:00  09/24/21 18:16





  Oxycodone /Acetaminophen 5-325mg Tab  PO   1 tab





  Q6H PRN   Administration





  Pain, Moderate (4-6)  


 


Sodium Chloride  10 ml  09/22/21 10:00  09/25/21 09:48





  Sodium Chloride 0.9% 10 Ml Flush Syringe  IV   10 ml





  BID DELL   Administration


 


Sodium Chloride  10 ml  09/22/21 03:00 





  Sodium Chloride 0.9% 10 Ml Flush Syringe  IV  





  PRN PRN  





  LINE FLUSH  


 


Sodium Chloride  1 gm  09/25/21 14:00  09/25/21 14:03





  Sodium Chloride 1 Gm Tab  PO   1 gm





  TID DELL   Administration


 


Sodium Polystyrene Sulfonate  15 gm  09/25/21 11:00  09/25/21 11:07





  Sodium Polystyrene 15 Gm/60 Ml Oral Liqd  PO   15 gm





  QDAY DELL   Administration


 


Tramadol HCl  50 mg  09/22/21 03:00 





  Tramadol 50 Mg Tab  PO  





  Q6H PRN  





  Pain, Moderate (4-6)

## 2021-09-25 NOTE — PROGRESS NOTE
Assessment and Plan


Assessment and plan: 





- Patient Problems


(1) Non-ST elevation MI (NSTEMI)


Current Visit: Yes   Status: Acute   


Plan to address problem: 


Admit the patient to the medical telemetry. Aspirin 325 mg p.o. daily. Lipitor 

40 mg p.o. daily. Nitroglycerin as needed. We do the serial cardiac enzyme. We 

also consult cardiology for evaluation and order echocardiogram.








(2) Acute exacerbation of CHF (congestive heart failure)


Current Visit: Yes   Status: Acute   


Plan to address problem: 


Fluid restriction. Maintain input output. Lasix 40 mg IV every 12 hours. Oxygen 

via nasal cannula 3 L/min. DuoNeb by nebulizer every 4 hours. Echocardiogram. 

Cardiology consult








(3) Hypertension


Current Visit: Yes   Status: Acute   


Plan to address problem: 


We will continue the home medication. Hydralazine 10 mg IV every 6 hours as 

needed. We will monitor the blood pressure closely








(4) COPD (chronic obstructive pulmonary disease)


Current Visit: Yes   Status: Acute   


Plan to address problem: 


Oxygen by nasal cannula 3 L/min. DuoNeb via nebulizer every 4 hours. Albuterol 

via nebulizer every 4 hours as needed.








(5) Morbid obesity


Current Visit: Yes   Status: Acute   


Plan to address problem: 


We counseled regarding weight reduction. Outpatient follow-up with bariatric 

surgeon








(6) Hyperglycemia


Current Visit: Yes   Status: Acute   


Plan to address problem: 


We will give 1 ampoule of D50. We will put the patient on cardiac diet. We will 

monitor the blood glucose closely








(7) DVT prophylaxis


Current Visit: No   Status: Acute   


Plan to address problem: 


Heparin 5000 units subcu every 8 hours for DVT prophylaxis. Pepcid 20 mg p.o. 

twice daily for GI prophylaxis. Patient is a full code





9/23


-Patient is on heparin drip for non-STEMI, cardiology is following.


-Patient is off Lasix and ACE inhibitors because of [place].


-Kidney function is worsening overnight and I put a consult for nephrology.


-Patient is hypotensive and blood pressure from this morning was 101/41.  We 

will continue to monitor.  And if it is dropping we we will give him fluid.


-Echo was done and EF of 50 to 55%.  Diastolic dysfunction is indeterminate.  

Management per cardiology


-Patient has COPD continues on dexamethasone, nebulizer treatment as needed.


-Patient has hyponatremia and hyperkalemia.  I give the patient Kayexalate.  

Monitor electrolytes.  Will follow nephrology for additional recommendation.





9/24


-Renal function is worsening, nephrology is following


-Blood pressure is better today


-Hyperkalemia; I added Kayexalate


-Morbid obesity








9/25


-Slight improvement in creatinine.  Hyponatremia worsened. Nephrology is 

following and put the patient back on Lasix.


-Blood pressure is better today


-Potassium this morning was 5.8, I ordered 60 g of Kayexalate


-Morbidly obese


-Obesity hypoventilation syndrome





History


Interval history: 





Patient was seen and evaluated this morning


Patient was on 6L of oxygen


No chest pain or shortness of breath


She was complaining generalized weakness





Hospitalist Physical





- Physical exam


Narrative exam: 





 Not in cardiopulmonary distress. 


 The patient is morbidly obese.


 Vital signs as documented.


 Head exam is unremarkable.


 No scleral icterus .


 Neck is without jugular venous distension, thyromegaly, or carotid bruits. 


 Lungs are clear to auscultation.


Cardiac exam reveals regular rate and  Rhythm. 


Abdominal exam reveals normal bowel sounds, nontender, no organomegaly.


Extremities are nonedematous and both femoral and pedal pulses are normal.


CNS: Alert and oriented 3.  No focal weakness.





- Constitutional


Vitals: 


                                        











Temp Pulse Resp BP Pulse Ox


 


 97.6 F   97 H  22   140/70   96 


 


 09/25/21 08:24  09/25/21 08:24  09/25/21 08:24  09/25/21 08:24  09/25/21 08:24











General appearance: Present: no acute distress





HEART Score





- HEART Score


Troponin: 


                                        











Troponin T  0.078 ng/mL (0.00-0.029)  H D 09/22/21  13:03    














Results





- Labs


CBC & Chem 7: 


                                 09/23/21 04:52





                                 09/25/21 05:48


Labs: 


                             Laboratory Last Values











WBC  20.9 K/mm3 (4.5-11.0)  H  09/23/21  04:52    


 


RBC  4.09 M/mm3 (3.65-5.03)   09/23/21  04:52    


 


Hgb  8.5 gm/dl (10.1-14.3)  L  09/23/21  04:52    


 


Hct  30.0 % (30.3-42.9)  L  09/23/21  04:52    


 


MCV  73 fl (79-97)  L  09/23/21  04:52    


 


MCH  21 pg (28-32)  L  09/23/21  04:52    


 


MCHC  28 % (30-34)  L  09/23/21  04:52    


 


RDW  23.9 % (13.2-15.2)  H  09/23/21  04:52    


 


Plt Count  339 K/mm3 (140-440)   09/23/21  04:52    


 


Add Manual Diff  Complete   09/23/21  04:52    


 


Total Counted  100   09/23/21  04:52    


 


Seg Neuts % (Manual)  77.0 % (40.0-70.0)  H  09/23/21  04:52    


 


Band Neutrophils %  13.0 %  09/23/21  04:52    


 


Lymphocytes % (Manual)  1.0 % (13.4-35.0)  L  09/23/21  04:52    


 


Reactive Lymphs % (Man)  6.0 %  09/23/21  04:52    


 


Monocytes % (Manual)  2.0 % (0.0-7.3)   09/23/21  04:52    


 


Metamyelocytes %  1.0 %  09/23/21  04:52    


 


Myelocytes %  2.0 %  09/22/21  04:03    


 


Nucleated RBC %  1.0 % (0.0-0.9)  H  09/23/21  04:52    


 


Seg Neutrophils # Man  16.1 K/mm3 (1.8-7.7)  H  09/23/21  04:52    


 


Band Neutrophils #  2.7 K/mm3  09/23/21  04:52    


 


Lymphocytes # (Manual)  0.2 K/mm3 (1.2-5.4)  L  09/23/21  04:52    


 


Abs React Lymphs (Man)  1.3 K/mm3  09/23/21  04:52    


 


Monocytes # (Manual)  0.4 K/mm3 (0.0-0.8)   09/23/21  04:52    


 


Eosinophils # (Manual)  0.0 K/mm3 (0.0-0.4)   09/23/21  04:52    


 


Basophils # (Manual)  0.0 K/mm3 (0.0-0.1)   09/23/21  04:52    


 


Metamyelocytes #  0.2 K/mm3  09/23/21  04:52    


 


Myelocytes #  0.0 K/mm3  09/23/21  04:52    


 


Promyelocytes #  0.0 K/mm3  09/23/21  04:52    


 


Blast Cells #  0.0 K/mm3  09/23/21  04:52    


 


WBC Morphology  Not Reportable   09/23/21  04:52    


 


Hypersegmented Neuts  Not Reportable   09/23/21  04:52    


 


Hyposegmented Neuts  Not Reportable   09/23/21  04:52    


 


Hypogranular Neuts  Not Reportable   09/23/21  04:52    


 


Smudge Cells  Not Reportable   09/23/21  04:52    


 


Toxic Granulation  2+   09/23/21  04:52    


 


Toxic Vacuolation  2+   09/23/21  04:52    


 


Dohle Bodies  Not Reportable   09/23/21  04:52    


 


Pelger-Huet Anomaly  Not Reportable   09/23/21  04:52    


 


Ac Rods  Not Reportable   09/23/21  04:52    


 


Platelet Estimate  Consistent w auto   09/23/21  04:52    


 


Clumped Platelets  1+   09/23/21  04:52    


 


Plt Clumps, EDTA  Not Reportable   09/23/21  04:52    


 


Large Platelets  2+   09/23/21  04:52    


 


Giant Platelets  Not Reportable   09/23/21  04:52    


 


Platelet Satelliting  Rare   09/23/21  04:52    


 


Plt Morphology Comment  Not Reportable   09/23/21  04:52    


 


RBC Morphology  Not Reportable   09/23/21  04:52    


 


Dimorphic RBCs  Not Reportable   09/23/21  04:52    


 


Polychromasia  Not Reportable   09/23/21  04:52    


 


Hypochromasia  2+   09/23/21  04:52    


 


Poikilocytosis  Not Reportable   09/23/21  04:52    


 


Anisocytosis  2+   09/23/21  04:52    


 


Microcytosis  Not Reportable   09/23/21  04:52    


 


Macrocytosis  Not Reportable   09/23/21  04:52    


 


Spherocytes  Not Reportable   09/23/21  04:52    


 


Pappenheimer Bodies  Not Reportable   09/23/21  04:52    


 


Sickle Cells  Not Reportable   09/23/21  04:52    


 


Target Cells  1+   09/23/21  04:52    


 


Tear Drop Cells  Not Reportable   09/23/21  04:52    


 


Ovalocytes  Not Reportable   09/23/21  04:52    


 


Helmet Cells  Not Reportable   09/23/21  04:52    


 


[place] Bodies  Not Reportable   09/23/21  04:52    


 


Cabot Rings  Not Reportable   09/23/21  04:52    


 


Apolinar Cells  Not Reportable   09/23/21  04:52    


 


Bite Cells  Not Reportable   09/23/21  04:52    


 


Crenated Cell  Not Reportable   09/23/21  04:52    


 


Elliptocytes  Not Reportable   09/23/21  04:52    


 


Acanthocytes (Spur)  Not Reportable   09/23/21  04:52    


 


Rouleaux  Not Reportable   09/23/21  04:52    


 


Hemoglobin C Crystals  Not Reportable   09/23/21  04:52    


 


Schistocytes  Not Reportable   09/23/21  04:52    


 


Malaria parasites  Not Reportable   09/23/21  04:52    


 


Sami Bodies  Not Reportable   09/23/21  04:52    


 


Hem Pathologist Commnt  No   09/23/21  04:52    


 


Sodium  124 mmol/L (137-145)  L  09/25/21  05:48    


 


Potassium  5.8 mmol/L (3.6-5.0)  H D 09/25/21  05:48    


 


Chloride  89.4 mmol/L ()  L  09/25/21  05:48    


 


Carbon Dioxide  22 mmol/L (22-30)   09/25/21  05:48    


 


Anion Gap  18 mmol/L  09/25/21  05:48    


 


BUN  53 mg/dL (7-17)  H  09/25/21  05:48    


 


Creatinine  2.8 mg/dL (0.6-1.2)  H  09/25/21  05:48    


 


Estimated GFR  24 ml/min  09/25/21  05:48    


 


BUN/Creatinine Ratio  19 %  09/25/21  05:48    


 


Glucose  150 mg/dL ()  H  09/25/21  05:48    


 


POC Glucose  152 mg/dL ()  H  09/25/21  08:48    


 


Osmolality  301 Mosm/kg  09/23/21  23:15    


 


Calcium  7.2 mg/dL (8.4-10.2)  L  09/25/21  05:48    


 


Total Creatine Kinase  1712 units/L ()  H  09/24/21  10:04    


 


Troponin T  0.078 ng/mL (0.00-0.029)  H D 09/22/21  13:03    


 


NT-Pro-B Natriuret Pep  7573 pg/mL (0-450)  H  09/21/21  22:04    


 


Triglycerides  105 mg/dL (2-149)   09/21/21  22:04    


 


Cholesterol  85 mg/dL ()   09/21/21  22:04    


 


LDL Cholesterol Direct  47 mg/dL ()  L  09/21/21  22:04    


 


HDL Cholesterol  25 mg/dL (40-59)  L  09/21/21  22:04    


 


Cholesterol/HDL Ratio  3.40 %  09/21/21  22:04    


 


Urine Osmolality  157 Mosm/kg  09/23/21  18:46    


 


Urine Sodium  16 mmol/L  09/23/21  18:46    











Diamond/IV: 


                                        





Voiding Method                   External Female Catheter











Active Medications





- Current Medications


Current Medications: 














Generic Name Dose Route Start Last Admin





  Trade Name Freq  PRN Reason Stop Dose Admin


 


Acetaminophen  650 mg  09/22/21 03:00 





  Acetaminophen 325 Mg Tab  PO  





  Q4H PRN  





  Pain MILD(1-3)/Fever >100.5/HA  


 


Albuterol  2.5 mg  09/22/21 03:00 





  Albuterol 2.5 Mg/3 Ml Nebu  IH  





  Q4HRT PRN  





  Shortness Of Breath  


 


Albuterol/Ipratropium  1 ampul  09/22/21 08:00  09/24/21 19:10





  Ipratropium/Albuterol Sulfate 3 Ml Ampul.Neb  IH   1 ampul





  TIDRT DELL   Administration


 


Aspirin  325 mg  09/23/21 10:00  09/25/21 09:48





  Aspirin Ec 325 Mg Tab  PO   325 mg





  QDAY DELL   Administration


 


Atorvastatin Calcium  40 mg  09/22/21 22:00  09/24/21 22:30





  Atorvastatin 40 Mg Tab  PO   40 mg





  QHS DELL   Administration


 


Azithromycin  500 mg  09/23/21 10:00  09/25/21 09:48





  Azithromycin 250 Mg Tab  PO  09/27/21 10:01  500 mg





  DAILY DELL   Administration





  Protocol  


 


Benzonatate  100 mg  09/22/21 06:00  09/25/21 05:15





  Benzonatate 100 Mg Cap  PO   100 mg





  Q8HR DELL   Administration


 


Famotidine  10 mg  09/23/21 22:00  09/25/21 09:47





  Famotidine 10 Mg Tab  PO   10 mg





  BID DELL   Administration


 


Furosemide  60 mg  09/25/21 18:00 





  Furosemide 40 Mg/4 Ml Inj  IV  





  0600,1800 DELL  


 


Heparin Sodium (Porcine)  5,000 unit  09/22/21 06:00  09/25/21 05:15





  Heparin 5,000 Unit/1 Ml Vial  SUB-Q   5,000 unit





  Q8HR DELL   Administration


 


Hydromorphone HCl  0.5 mg  09/22/21 03:00 





  Hydromorphone 1 Mg/1 Ml Inj  IV  





  Q3H PRN  





  Pain , Severe (7-10)  


 


Methylprednisolone  4 mg  09/22/21 10:00  09/25/21 09:47





  Methylprednisolone 4 Mg Tab  PO   4 mg





  DAILY DELL   Administration


 


Nitroglycerin  0.4 mg  09/22/21 03:00 





  Nitroglycerin 0.4 Mg Tab Subl  SL  





  Q5M PRN  





  Chest Pain  


 


Nystatin  1 applic  09/22/21 18:00  09/25/21 09:48





  Nystatin Powder 15 Gm  TP   1 applic





  BID DELL   Administration


 


Ondansetron HCl  4 mg  09/22/21 03:00 





  Ondansetron 4 Mg/2 Ml Inj  IV  





  Q8H PRN  





  Nausea And Vomiting  


 


Oxycodone/Acetaminophen  1 tab  09/22/21 03:00  09/24/21 18:16





  Oxycodone /Acetaminophen 5-325mg Tab  PO   1 tab





  Q6H PRN   Administration





  Pain, Moderate (4-6)  


 


Sodium Chloride  10 ml  09/22/21 10:00  09/25/21 09:48





  Sodium Chloride 0.9% 10 Ml Flush Syringe  IV   10 ml





  BID DELL   Administration


 


Sodium Chloride  10 ml  09/22/21 03:00 





  Sodium Chloride 0.9% 10 Ml Flush Syringe  IV  





  PRN PRN  





  LINE FLUSH  


 


Sodium Polystyrene Sulfonate  15 gm  09/25/21 11:00  09/25/21 11:07





  Sodium Polystyrene 15 Gm/60 Ml Oral Liqd  PO   15 gm





  QDAY DELL   Administration


 


Sodium Polystyrene Sulfonate  60 gm  09/25/21 11:23 





  Sodium Polystyrene 15 Gm/60 Ml Oral Liqd  PO  09/25/21 11:24 





  ONCE ONE  


 


Tramadol HCl  50 mg  09/22/21 03:00 





  Tramadol 50 Mg Tab  PO  





  Q6H PRN  





  Pain, Moderate (4-6)  














Nutrition/Malnutrition Assess





- Dietary Evaluation


Nutrition/Malnutrition Findings: 


                                        





Nutrition Notes                                            Start:  09/22/21 

14:21


Freq:                                                      Status: Active       




Protocol:                                                                       




 Document     09/22/21 14:21  GB  (Rec: 09/22/21 14:37  GB  [id number])


 Nutrition Notes


     Need for Assessment generated from:         RN Referral


     Initial or Follow up                        Assessment


     Current Diagnosis                           Hypertension


     Other Pertinent Diagnosis                   Dyspnea, respiratory distress,


                                                 morbid obesity


     Current Diet                                Cardiac


     Labs/Tests                                  9/22: Na 134 low [id number]


                                                 Creatinine 1.8 elevated


                                                 [id number]


     Pertinent Medications                       azithromycin, fluid


                                                 restriction


     Height                                      5 ft 4 in


     Weight                                      272.155 kg


     Ideal Body Weight (kg)                      54.54


     BMI                                         102.9


     Intake Prior to Admission                   Good


     Weight change and time frame                Pt reports no significant


                                                 weight change.


     Weight Status                               Morbidly Obese


     Subjective/Other Information                Pt states she does want to


                                                 lose weight.  This writer, BALTA,


                                                 discussed with pt to select a


                                                 changeable habits to focus on


                                                 and make the changes a few


                                                 items at a time (examples if


                                                 eat out 5 nights change to 3


                                                 nights/ 1 fruit a day change


                                                 to 2 fruit a day).  Encouraged


                                                 pt to contact RD in Bariatric


                                                 center for outpatient


                                                 counseling.


     Percent of energy/protein needs met:        Meals and snacks provided meet


                                                 100% of estimated energy


                                                 needs.


     Burn                                        Absent


     Trauma                                      Absent


     GI Symptoms                                 None


     Food Allergy                                No


     Usual Diet at Home                          regular


     Skin Integrity/Comment                      skin risk 13


     Current % PO                                Good (%)


     Minimum of two criteria                     No


     #1


      Nutrition Diagnosis                        Overweight/obesity


      Comments:                                  Discussed diet/life style


                                                 changes using small goal


                                                 successes to advancement,


                                                 encouraged outpatient RD


                                                 counseling at bariatric center


      Etiology                                   dyspnea, respiratory distress


      As Evidenced by Signs and Symptoms         , %


     Is patient on ventilator?                   No


     Is Patient Ambulatory and/or Out of Bed     No


     REE-([facility]-confined to bed)      4093.764


     Kcal/Kg value to use for calculation        10


     Approximate Energy Requirements Using       2722


      kcal/Kg                                    


     Calculation Used for Recommendations        Kcal/kg


     Additional Notes                            Protein 0.6 g/kg r/t BMI over


                                                 50: 163g


                                                 Fluids: 1ml/kcal or per MD


 Nutrition Intervention


     Change Diet Order:                          continue with current diet


     Nutrition Support:                          n/a


     Add Supplement/Snack (indicate name/kcal    n/a


      /protein )                                 


     Teaching Recipient                          Patient


     Learning Readiness                          Good


     Teaching Methods                            Discussion


     Response to Teaching                        Verbalize understanding


     Barriers to Learning                        Motivation,Emotional,Social


     Patient aware of follow up options          Yes


     Actions To Overcome Barriers                Collaboration with Other


                                                 Providers


     Goal #1                                     Pt to contact and make


                                                 appointment with outpatient RD


                                                 in bariatric center


     Goal #2                                     weight to decrease -1% during


                                                 LOS


     Follow-Up By:                               09/27/21

## 2021-09-26 LAB
%HYPO/RBC NFR BLD AUTO: (no result) %
BAND NEUTROPHILS # (MANUAL): 0.1 K/MM3
BUN SERPL-MCNC: 54 MG/DL (ref 7–17)
BUN/CREAT SERPL: 23 %
CALCIUM SERPL-MCNC: 7.2 MG/DL (ref 8.4–10.2)
HCT VFR BLD CALC: 29.8 % (ref 30.3–42.9)
HEMOLYSIS INDEX: 0
HGB BLD-MCNC: 8.7 GM/DL (ref 10.1–14.3)
MCHC RBC AUTO-ENTMCNC: 29 % (ref 30–34)
MCV RBC AUTO: 74 FL (ref 79–97)
MYELOCYTES # (MANUAL): 0 K/MM3
PLATELET # BLD: 254 K/MM3 (ref 140–440)
PROMYELOCYTES # (MANUAL): 0 K/MM3
RBC # BLD AUTO: 4.05 M/MM3 (ref 3.65–5.03)
TARGETS BLD QL SMEAR: (no result)
TOTAL CELLS COUNTED BLD: 100

## 2021-09-26 RX ADMIN — FAMOTIDINE SCH MG: 10 TABLET ORAL at 21:05

## 2021-09-26 RX ADMIN — HEPARIN SODIUM SCH: 5000 INJECTION, SOLUTION INTRAVENOUS; SUBCUTANEOUS at 13:56

## 2021-09-26 RX ADMIN — HEPARIN SODIUM SCH UNIT: 5000 INJECTION, SOLUTION INTRAVENOUS; SUBCUTANEOUS at 21:04

## 2021-09-26 RX ADMIN — BENZONATATE SCH MG: 100 CAPSULE ORAL at 06:30

## 2021-09-26 RX ADMIN — Medication SCH ML: at 21:05

## 2021-09-26 RX ADMIN — BENZONATATE SCH MG: 100 CAPSULE ORAL at 13:55

## 2021-09-26 RX ADMIN — HEPARIN SODIUM SCH: 5000 INJECTION, SOLUTION INTRAVENOUS; SUBCUTANEOUS at 06:31

## 2021-09-26 RX ADMIN — IPRATROPIUM BROMIDE AND ALBUTEROL SULFATE SCH AMPUL: .5; 3 SOLUTION RESPIRATORY (INHALATION) at 20:46

## 2021-09-26 RX ADMIN — FAMOTIDINE SCH MG: 10 TABLET ORAL at 09:37

## 2021-09-26 RX ADMIN — ASPIRIN SCH MG: 325 TABLET, COATED ORAL at 09:37

## 2021-09-26 RX ADMIN — Medication SCH ML: at 09:39

## 2021-09-26 RX ADMIN — SODIUM CHLORIDE TAB 1 GM SCH GM: 1 TAB at 09:38

## 2021-09-26 RX ADMIN — SODIUM POLYSTYRENE SULFONATE SCH GM: 15 SUSPENSION ORAL; RECTAL at 09:39

## 2021-09-26 RX ADMIN — SODIUM CHLORIDE TAB 1 GM SCH GM: 1 TAB at 13:55

## 2021-09-26 RX ADMIN — SODIUM CHLORIDE TAB 1 GM SCH GM: 1 TAB at 21:04

## 2021-09-26 RX ADMIN — BENZONATATE SCH MG: 100 CAPSULE ORAL at 21:05

## 2021-09-26 RX ADMIN — FUROSEMIDE SCH MG: 10 INJECTION, SOLUTION INTRAVENOUS at 06:30

## 2021-09-26 RX ADMIN — IPRATROPIUM BROMIDE AND ALBUTEROL SULFATE SCH AMPUL: .5; 3 SOLUTION RESPIRATORY (INHALATION) at 14:38

## 2021-09-26 RX ADMIN — NYSTATIN SCH APPLIC: 100000 POWDER TOPICAL at 09:39

## 2021-09-26 RX ADMIN — FUROSEMIDE SCH MG: 10 INJECTION, SOLUTION INTRAVENOUS at 17:06

## 2021-09-26 RX ADMIN — IPRATROPIUM BROMIDE AND ALBUTEROL SULFATE SCH: .5; 3 SOLUTION RESPIRATORY (INHALATION) at 10:05

## 2021-09-26 RX ADMIN — AZITHROMYCIN SCH MG: 250 TABLET, FILM COATED ORAL at 09:38

## 2021-09-26 RX ADMIN — NYSTATIN SCH APPLIC: 100000 POWDER TOPICAL at 21:05

## 2021-09-26 NOTE — PROGRESS NOTE
Assessment and Plan


Assessment and plan: 





- Patient Problems


(1) Non-ST elevation MI (NSTEMI)


Current Visit: Yes   Status: Acute   


Plan to address problem: 


Admit the patient to the medical telemetry. Aspirin 325 mg p.o. daily. Lipitor 

40 mg p.o. daily. Nitroglycerin as needed. We do the serial cardiac enzyme. We 

also consult cardiology for evaluation and order echocardiogram.








(2) Acute exacerbation of CHF (congestive heart failure)


Current Visit: Yes   Status: Acute   


Plan to address problem: 


Fluid restriction. Maintain input output. Lasix 40 mg IV every 12 hours. Oxygen 

via nasal cannula 3 L/min. DuoNeb by nebulizer every 4 hours. Echocardiogram. 

Cardiology consult








(3) Hypertension


Current Visit: Yes   Status: Acute   


Plan to address problem: 


We will continue the home medication. Hydralazine 10 mg IV every 6 hours as 

needed. We will monitor the blood pressure closely








(4) COPD (chronic obstructive pulmonary disease)


Current Visit: Yes   Status: Acute   


Plan to address problem: 


Oxygen by nasal cannula 3 L/min. DuoNeb via nebulizer every 4 hours. Albuterol 

via nebulizer every 4 hours as needed.








(5) Morbid obesity


Current Visit: Yes   Status: Acute   


Plan to address problem: 


We counseled regarding weight reduction. Outpatient follow-up with bariatric 

surgeon








(6) Hyperglycemia


Current Visit: Yes   Status: Acute   


Plan to address problem: 


We will give 1 ampoule of D50. We will put the patient on cardiac diet. We will 

monitor the blood glucose closely








(7) DVT prophylaxis


Current Visit: No   Status: Acute   


Plan to address problem: 


Heparin 5000 units subcu every 8 hours for DVT prophylaxis. Pepcid 20 mg p.o. 

twice daily for GI prophylaxis. Patient is a full code





9/23


-Patient is on heparin drip for non-STEMI, cardiology is following.


-Patient is off Lasix and ACE inhibitors because of [place].


-Kidney function is worsening overnight and I put a consult for nephrology.


-Patient is hypotensive and blood pressure from this morning was 101/41.  We 

will continue to monitor.  And if it is dropping we we will give him fluid.


-Echo was done and EF of 50 to 55%.  Diastolic dysfunction is indeterminate.  

Management per cardiology


-Patient has COPD continues on dexamethasone, nebulizer treatment as needed.


-Patient has hyponatremia and hyperkalemia.  I give the patient Kayexalate.  

Monitor electrolytes.  Will follow nephrology for additional recommendation.





9/24


-Renal function is worsening, nephrology is following


-Blood pressure is better today


-Hyperkalemia; I added Kayexalate


-Morbid obesity








9/25


-Slight improvement in creatinine.  Hyponatremia worsened. Nephrology is 

following and put the patient back on Lasix.


-Blood PRESSURE IS BETTER today


-Potassium this morning was 5.8, I ordered 60 g of Kayexalate


-Morbidly obese


-Obesity hypoventilation syndrome





9/26


-Creatinine is improving and this morning was 2.4


-Hyponatremia improved this morning was 134.  Patient is on Lasix


-Blood pressure is better


-Patient is on 10 L of oxygen; worsened 


-Morbidly obese, FABIAN





History


Interval history: 





Patient was seen and evaluated this morning


Patient was on 10 L of oxygen


No chest pain





Hospitalist Physical





- Physical exam


Narrative exam: 





 Not in cardiopulmonary distress. 


 The patient is morbidly obese.


 Vital signs as documented.


 Head exam is unremarkable.


 No scleral icterus .


 Neck is without jugular venous distension, thyromegaly, or carotid bruits. 


 Lungs are clear to auscultation.


Cardiac exam reveals regular rate and  Rhythm. 


Abdominal exam reveals normal bowel sounds, nontender, no organomegaly.


Extremities are nonedematous and both femoral and pedal pulses are normal.


CNS: Alert and oriented 3.  No focal weakness.





- Constitutional


Vitals: 


                                        











Temp Pulse Resp BP Pulse Ox


 


 97.7 F   93 H  22   136/64   90 


 


 09/26/21 08:28  09/26/21 08:28  09/26/21 08:28  09/26/21 08:28  09/26/21 08:28











General appearance: Present: no acute distress





HEART Score





- HEART Score


Troponin: 


                                        











Troponin T  0.078 ng/mL (0.00-0.029)  H D 09/22/21  13:03    














Results





- Labs


CBC & Chem 7: 


                                 09/23/21 04:52





                                 09/26/21 05:03


Labs: 


                             Laboratory Last Values











WBC  20.9 K/mm3 (4.5-11.0)  H  09/23/21  04:52    


 


RBC  4.09 M/mm3 (3.65-5.03)   09/23/21  04:52    


 


Hgb  8.5 gm/dl (10.1-14.3)  L  09/23/21  04:52    


 


Hct  30.0 % (30.3-42.9)  L  09/23/21  04:52    


 


MCV  73 fl (79-97)  L  09/23/21  04:52    


 


MCH  21 pg (28-32)  L  09/23/21  04:52    


 


MCHC  28 % (30-34)  L  09/23/21  04:52    


 


RDW  23.9 % (13.2-15.2)  H  09/23/21  04:52    


 


Plt Count  339 K/mm3 (140-440)   09/23/21  04:52    


 


Add Manual Diff  Complete   09/23/21  04:52    


 


Total Counted  100   09/23/21  04:52    


 


Seg Neuts % (Manual)  77.0 % (40.0-70.0)  H  09/23/21  04:52    


 


Band Neutrophils %  13.0 %  09/23/21  04:52    


 


Lymphocytes % (Manual)  1.0 % (13.4-35.0)  L  09/23/21  04:52    


 


Reactive Lymphs % (Man)  6.0 %  09/23/21  04:52    


 


Monocytes % (Manual)  2.0 % (0.0-7.3)   09/23/21  04:52    


 


Metamyelocytes %  1.0 %  09/23/21  04:52    


 


Myelocytes %  2.0 %  09/22/21  04:03    


 


Nucleated RBC %  1.0 % (0.0-0.9)  H  09/23/21  04:52    


 


Seg Neutrophils # Man  16.1 K/mm3 (1.8-7.7)  H  09/23/21  04:52    


 


Band Neutrophils #  2.7 K/mm3  09/23/21  04:52    


 


Lymphocytes # (Manual)  0.2 K/mm3 (1.2-5.4)  L  09/23/21  04:52    


 


Abs React Lymphs (Man)  1.3 K/mm3  09/23/21  04:52    


 


Monocytes # (Manual)  0.4 K/mm3 (0.0-0.8)   09/23/21  04:52    


 


Eosinophils # (Manual)  0.0 K/mm3 (0.0-0.4)   09/23/21  04:52    


 


Basophils # (Manual)  0.0 K/mm3 (0.0-0.1)   09/23/21  04:52    


 


Metamyelocytes #  0.2 K/mm3  09/23/21  04:52    


 


Myelocytes #  0.0 K/mm3  09/23/21  04:52    


 


Promyelocytes #  0.0 K/mm3  09/23/21  04:52    


 


Blast Cells #  0.0 K/mm3  09/23/21  04:52    


 


WBC Morphology  Not Reportable   09/23/21  04:52    


 


Hypersegmented Neuts  Not Reportable   09/23/21  04:52    


 


Hyposegmented Neuts  Not Reportable   09/23/21  04:52    


 


Hypogranular Neuts  Not Reportable   09/23/21  04:52    


 


Smudge Cells  Not Reportable   09/23/21  04:52    


 


Toxic Granulation  2+   09/23/21  04:52    


 


Toxic Vacuolation  2+   09/23/21  04:52    


 


Dohle Bodies  Not Reportable   09/23/21  04:52    


 


Pelger-Huet Anomaly  Not Reportable   09/23/21  04:52    


 


Ac Rods  Not Reportable   09/23/21  04:52    


 


Platelet Estimate  Consistent w auto   09/23/21  04:52    


 


Clumped Platelets  1+   09/23/21  04:52    


 


Plt Clumps, EDTA  Not Reportable   09/23/21  04:52    


 


Large Platelets  2+   09/23/21  04:52    


 


Giant Platelets  Not Reportable   09/23/21  04:52    


 


Platelet Satelliting  Rare   09/23/21  04:52    


 


Plt Morphology Comment  Not Reportable   09/23/21  04:52    


 


RBC Morphology  Not Reportable   09/23/21  04:52    


 


Dimorphic RBCs  Not Reportable   09/23/21  04:52    


 


Polychromasia  Not Reportable   09/23/21  04:52    


 


Hypochromasia  2+   09/23/21  04:52    


 


Poikilocytosis  Not Reportable   09/23/21  04:52    


 


Anisocytosis  2+   09/23/21  04:52    


 


Microcytosis  Not Reportable   09/23/21  04:52    


 


Macrocytosis  Not Reportable   09/23/21  04:52    


 


Spherocytes  Not Reportable   09/23/21  04:52    


 


Pappenheimer Bodies  Not Reportable   09/23/21  04:52    


 


Sickle Cells  Not Reportable   09/23/21  04:52    


 


Target Cells  1+   09/23/21  04:52    


 


Tear Drop Cells  Not Reportable   09/23/21  04:52    


 


Ovalocytes  Not Reportable   09/23/21  04:52    


 


Helmet Cells  Not Reportable   09/23/21  04:52    


 


[place] Bodies  Not Reportable   09/23/21  04:52    


 


Cabot Rings  Not Reportable   09/23/21  04:52    


 


Apolinar Cells  Not Reportable   09/23/21  04:52    


 


Bite Cells  Not Reportable   09/23/21  04:52    


 


Crenated Cell  Not Reportable   09/23/21  04:52    


 


Elliptocytes  Not Reportable   09/23/21  04:52    


 


Acanthocytes (Spur)  Not Reportable   09/23/21  04:52    


 


Rouleaux  Not Reportable   09/23/21  04:52    


 


Hemoglobin C Crystals  Not Reportable   09/23/21  04:52    


 


Schistocytes  Not Reportable   09/23/21  04:52    


 


Malaria parasites  Not Reportable   09/23/21  04:52    


 


Sami Bodies  Not Reportable   09/23/21  04:52    


 


Hem Pathologist Commnt  No   09/23/21  04:52    


 


Sodium  134 mmol/L (137-145)  L  09/26/21  05:03    


 


Potassium  4.2 mmol/L (3.6-5.0)   09/26/21  05:03    


 


Chloride  92.9 mmol/L ()  L  09/26/21  05:03    


 


Carbon Dioxide  33 mmol/L (22-30)  H D 09/26/21  05:03    


 


Anion Gap  12 mmol/L  09/26/21  05:03    


 


BUN  54 mg/dL (7-17)  H  09/26/21  05:03    


 


Creatinine  2.4 mg/dL (0.6-1.2)  H  09/26/21  05:03    


 


Estimated GFR  28 ml/min  09/26/21  05:03    


 


BUN/Creatinine Ratio  23 %  09/26/21  05:03    


 


Glucose  150 mg/dL ()  H  09/26/21  05:03    


 


POC Glucose  144 mg/dL ()  H  09/26/21  07:41    


 


Osmolality  301 Mosm/kg  09/23/21  23:15    


 


Calcium  7.2 mg/dL (8.4-10.2)  L  09/26/21  05:03    


 


Total Creatine Kinase  1712 units/L ()  H  09/24/21  10:04    


 


Troponin T  0.078 ng/mL (0.00-0.029)  H D 09/22/21  13:03    


 


NT-Pro-B Natriuret Pep  7573 pg/mL (0-450)  H  09/21/21  22:04    


 


Triglycerides  105 mg/dL (2-149)   09/21/21  22:04    


 


Cholesterol  85 mg/dL ()   09/21/21  22:04    


 


LDL Cholesterol Direct  47 mg/dL ()  L  09/21/21  22:04    


 


HDL Cholesterol  25 mg/dL (40-59)  L  09/21/21  22:04    


 


Cholesterol/HDL Ratio  3.40 %  09/21/21  22:04    


 


Urine Osmolality  157 Mosm/kg  09/23/21  18:46    


 


Urine Sodium  16 mmol/L  09/23/21  18:46    











Diamond/IV: 


                                        





Voiding Method                   Incontinent











Active Medications





- Current Medications


Current Medications: 














Generic Name Dose Route Start Last Admin





  Trade Name Freq  PRN Reason Stop Dose Admin


 


Acetaminophen  650 mg  09/22/21 03:00 





  Acetaminophen 325 Mg Tab  PO  





  Q4H PRN  





  Pain MILD(1-3)/Fever >100.5/HA  


 


Albuterol  2.5 mg  09/22/21 03:00 





  Albuterol 2.5 Mg/3 Ml Nebu  IH  





  Q4HRT PRN  





  Shortness Of Breath  


 


Albuterol/Ipratropium  1 ampul  09/22/21 08:00  09/25/21 22:12





  Ipratropium/Albuterol Sulfate 3 Ml Ampul.Neb  IH   1 ampul





  TIDRT DELL   Administration


 


Aspirin  325 mg  09/23/21 10:00  09/25/21 09:48





  Aspirin Ec 325 Mg Tab  PO   325 mg





  QDAY DELL   Administration


 


Atorvastatin Calcium  40 mg  09/22/21 22:00  09/25/21 22:39





  Atorvastatin 40 Mg Tab  PO   40 mg





  QHS DELL   Administration


 


Azithromycin  500 mg  09/23/21 10:00  09/25/21 09:48





  Azithromycin 250 Mg Tab  PO  09/27/21 10:01  500 mg





  DAILY DELL   Administration





  Protocol  


 


Benzonatate  100 mg  09/22/21 06:00  09/26/21 06:30





  Benzonatate 100 Mg Cap  PO   100 mg





  Q8HR DELL   Administration


 


Famotidine  10 mg  09/23/21 22:00  09/25/21 22:38





  Famotidine 10 Mg Tab  PO   10 mg





  BID DELL   Administration


 


Furosemide  60 mg  09/25/21 18:00  09/26/21 06:30





  Furosemide 40 Mg/4 Ml Inj  IV   60 mg





  0600,1800 DELL   Administration


 


Heparin Sodium (Porcine)  5,000 unit  09/22/21 06:00  09/25/21 22:37





  Heparin 5,000 Unit/1 Ml Vial  SUB-Q   Not Given





  Q8HR DELL  


 


Hydromorphone HCl  0.5 mg  09/22/21 03:00 





  Hydromorphone 1 Mg/1 Ml Inj  IV  





  Q3H PRN  





  Pain , Severe (7-10)  


 


Methylprednisolone  4 mg  09/22/21 10:00  09/25/21 09:47





  Methylprednisolone 4 Mg Tab  PO   4 mg





  DAILY DELL   Administration


 


Nitroglycerin  0.4 mg  09/22/21 03:00 





  Nitroglycerin 0.4 Mg Tab Subl  SL  





  Q5M PRN  





  Chest Pain  


 


Nystatin  1 applic  09/22/21 18:00  09/25/21 22:45





  Nystatin Powder 15 Gm  TP   1 applic





  BID DELL   Administration


 


Ondansetron HCl  4 mg  09/22/21 03:00 





  Ondansetron 4 Mg/2 Ml Inj  IV  





  Q8H PRN  





  Nausea And Vomiting  


 


Oxycodone/Acetaminophen  1 tab  09/22/21 03:00  09/24/21 18:16





  Oxycodone /Acetaminophen 5-325mg Tab  PO   1 tab





  Q6H PRN   Administration





  Pain, Moderate (4-6)  


 


Sodium Chloride  10 ml  09/22/21 10:00  09/25/21 22:38





  Sodium Chloride 0.9% 10 Ml Flush Syringe  IV   10 ml





  BID DELL   Administration


 


Sodium Chloride  10 ml  09/22/21 03:00 





  Sodium Chloride 0.9% 10 Ml Flush Syringe  IV  





  PRN PRN  





  LINE FLUSH  


 


Sodium Chloride  1 gm  09/25/21 14:00  09/25/21 22:38





  Sodium Chloride 1 Gm Tab  PO   1 gm





  TID DELL   Administration


 


Sodium Polystyrene Sulfonate  15 gm  09/25/21 11:00  09/25/21 11:07





  Sodium Polystyrene 15 Gm/60 Ml Oral Liqd  PO   15 gm





  QDAY DELL   Administration


 


Tramadol HCl  50 mg  09/22/21 03:00 





  Tramadol 50 Mg Tab  PO  





  Q6H PRN  





  Pain, Moderate (4-6)  














Nutrition/Malnutrition Assess





- Dietary Evaluation


Nutrition/Malnutrition Findings: 


                                        





Nutrition Notes                                            Start:  09/22/21 

14:21


Freq:                                                      Status: Active       




Protocol:                                                                       




 Document     09/22/21 14:21  GB  (Rec: 09/22/21 14:37  GB  [id number])


 Nutrition Notes


     Need for Assessment generated from:         RN Referral


     Initial or Follow up                        Assessment


     Current Diagnosis                           Hypertension


     Other Pertinent Diagnosis                   Dyspnea, respiratory distress,


                                                 morbid obesity


     Current Diet                                Cardiac


     Labs/Tests                                  9/22: Na 134 low [id number]


                                                 Creatinine 1.8 elevated


                                                 [id number]


     Pertinent Medications                       azithromycin, fluid


                                                 restriction


     Height                                      5 ft 4 in


     Weight                                      272.155 kg


     Ideal Body Weight (kg)                      54.54


     BMI                                         102.9


     Intake Prior to Admission                   Good


     Weight change and time frame                Pt reports no significant


                                                 weight change.


     Weight Status                               Morbidly Obese


     Subjective/Other Information                Pt states she does want to


                                                 lose weight.  This writer, BALTA,


                                                 discussed with pt to select a


                                                 changeable habits to focus on


                                                 and make the changes a few


                                                 items at a time (examples if


                                                 eat out 5 nights change to 3


                                                 nights/ 1 fruit a day change


                                                 to 2 fruit a day).  Encouraged


                                                 pt to contact RD in Bariatric


                                                 center for outpatient


                                                 counseling.


     Percent of energy/protein needs met:        Meals and snacks provided meet


                                                 100% of estimated energy


                                                 needs.


     Burn                                        Absent


     Trauma                                      Absent


     GI Symptoms                                 None


     Food Allergy                                No


     Usual Diet at Home                          regular


     Skin Integrity/Comment                      skin risk 13


     Current % PO                                Good (%)


     Minimum of two criteria                     No


     #1


      Nutrition Diagnosis                        Overweight/obesity


      Comments:                                  Discussed diet/life style


                                                 changes using small goal


                                                 successes to advancement,


                                                 encouraged outpatient RD


                                                 counseling at bariatric center


      Etiology                                   dyspnea, respiratory distress


      As Evidenced by Signs and Symptoms         , %


     Is patient on ventilator?                   No


     Is Patient Ambulatory and/or Out of Bed     No


     REE-([facility]-confined to bed)      4093.764


     Kcal/Kg value to use for calculation        10


     Approximate Energy Requirements Using       2722


      kcal/Kg                                    


     Calculation Used for Recommendations        Kcal/kg


     Additional Notes                            Protein 0.6 g/kg r/t BMI over


                                                 50: 163g


                                                 Fluids: 1ml/kcal or per MD


 Nutrition Intervention


     Change Diet Order:                          continue with current diet


     Nutrition Support:                          n/a


     Add Supplement/Snack (indicate name/kcal    n/a


      /protein )                                 


     Teaching Recipient                          Patient


     Learning Readiness                          Good


     Teaching Methods                            Discussion


     Response to Teaching                        Verbalize understanding


     Barriers to Learning                        Motivation,Emotional,Social


     Patient aware of follow up options          Yes


     Actions To Overcome Barriers                Collaboration with Other


                                                 Providers


     Goal #1                                     Pt to contact and make


                                                 appointment with outpatient RD


                                                 in bariatric center


     Goal #2                                     weight to decrease -1% during


                                                 LOS


     Follow-Up By:                               09/27/21

## 2021-09-26 NOTE — XRAY REPORT
CHEST 1 VIEW



INDICATION / CLINICAL INFORMATION: chest eval.



COMPARISON: 9/21/2021



FINDINGS:



SUPPORT DEVICES: None.

HEART / MEDIASTINUM: Stable. 

LUNGS / PLEURA: Examination markedly degraded secondary to soft tissue attenuation. No significant in
terval change in faint bibasilar airspace opacities. No pneumothorax. 



ADDITIONAL FINDINGS: No significant additional findings.



IMPRESSION:

Allowing for Limited examination, no change.



Signer Name: Kenneth Hills MD 

Signed: 9/26/2021 3:18 PM

Workstation Name: [id number]

## 2021-09-26 NOTE — PROGRESS NOTE
Assessment and Plan


Assessment





Acute kidney injury, unknown baseline. Renal ultrasound notes poor visualization

due to body habitus.


Hypervolemic Hyponatremia


Hyperkalemia


Hypocalcemia


Morbid obesity





Recommendations





Continue sodium tabs and diuresis


S/p Kayexalate on admissiom


Ordered cortisol - pending


Ordered ionized calcium - pending


Maintain MAP more than 65


Hold ACE/ARB at this time


Hold diuretics given soft pressures


Renally dose medications


Avoid nephrotoxins


Renal diet








Subjective


Date of service: 09/26/21


Principal diagnosis: Acute on chronic renal insufficiency


Interval history: 


Patient was seen for her renal issues


Nursing, interdisciplinary and consult notes were reviewed


Vitals, input and output, medications and labs were reviewed


Remains on NC








Objective





- Exam


Narrative Exam: 


General: Morbid obesity


HEENT: Oral mucosa moist


Neck: Supple, no JVD


Chest: Clear to auscultation bilaterally


Heart: RRR, S1 and S2, no pericardial rub


Abdomen: Soft, nontender, no renal bruit


Extremity: No peripheral cyanosis, edema


Neurological: Awake and alert


Dermatology: Unable to assess


Psych: Calm and cooperative


Musculoskeletal: No joint effusion








- Vital Signs


Vital signs: 


                               Vital Signs - 12hr











  09/26/21 09/26/21 09/26/21





  06:23 08:10 08:28


 


Temperature 97.6 F  97.7 F


 


Pulse Rate 102 H 99 H 93 H


 


Pulse Rate [   





Anterior   





Bilateral   





Throughout]   


 


Respiratory 20  22





Rate   


 


Respiratory   





Rate [Anterior   





Bilateral   





Throughout]   


 


Blood Pressure 123/68  136/64


 


O2 Sat by Pulse 98  90





Oximetry   














  09/26/21 09/26/21 09/26/21





  10:04 11:42 12:00


 


Temperature  98.9 F 


 


Pulse Rate  103 H 


 


Pulse Rate [   





Anterior   





Bilateral   





Throughout]   


 


Respiratory  22 





Rate   


 


Respiratory   





Rate [Anterior   





Bilateral   





Throughout]   


 


Blood Pressure  142/73 


 


O2 Sat by Pulse 96 99 91





Oximetry   














  09/26/21 09/26/21 09/26/21





  14:53 14:54 17:02


 


Temperature   98.3 F


 


Pulse Rate   104 H


 


Pulse Rate [ 101 H  





Anterior   





Bilateral   





Throughout]   


 


Respiratory   20





Rate   


 


Respiratory 20  





Rate [Anterior   





Bilateral   





Throughout]   


 


Blood Pressure   145/81


 


O2 Sat by Pulse  98 97





Oximetry   














- Lab





                                 09/26/21 14:11





                                 09/26/21 05:03


                             Most recent lab results











Calcium  7.2 mg/dL (8.4-10.2)  L  09/26/21  05:03    


 


Urine Sodium  16 mmol/L  09/23/21  18:46    














Medications & Allergies





- Medications


Allergies/Adverse Reactions: 


                                    Allergies





No Known Allergies Allergy (Unverified 07/13/17 11:17)


   








Home Medications: 


                                Home Medications











 Medication  Instructions  Recorded  Confirmed  Last Taken  Type


 


Acetaminophen [Acetaminophen TAB] 325 mg PO Q4H PRN #30 tablet 12/14/17 01/22/21

Unknown Rx


 


ALBUTEROL NEB's [Proventil 0.083% 2.5 mg IH Q3HRT PRN #30 day 01/24/21  Unknown 

Rx





NEBS]     


 


Albuterol Mdi (or & Nicu Only) 2 puff IH QID PRN #1 inhalation 01/24/21  Unknown

Rx





[ProAir HFA Inhaler]     


 


Azithromycin [Zithromax Z-JAVIER] 0 mg PO DAILY #1 tab 01/24/21  Unknown Rx


 


Benzonatate [Tessalon Perles] 100 mg PO Q8HR #15 capsule 01/24/21  Unknown Rx


 


Famotidine [Pepcid] 20 mg PO BID #14 tablet 01/24/21  Unknown Rx


 


Ipratropium/Albuterol Sulfate 1 ampul IH TIDRT #30 day 01/24/21  Unknown Rx





[DUONEB *Not for PRN Use*]     


 


hydroCHLOROthiazide [HCTZ] 25 mg PO QDAY  tablet 01/24/21  Unknown Rx


 


hydroCHLOROthiazide [HCTZ] 25 mg PO QDAY #30 tablet 01/24/21  Unknown Rx


 


methylPREDNISolone [Medrol 4MG 4 mg PO DAILY #1 tab.ds.pk 01/24/21  Unknown Rx





DOSEPAK (21 tabs)]     











Active Medications: 














Generic Name Dose Route Start Last Admin





  Trade Name Freq  PRN Reason Stop Dose Admin


 


Acetaminophen  650 mg  09/22/21 03:00 





  Acetaminophen 325 Mg Tab  PO  





  Q4H PRN  





  Pain MILD(1-3)/Fever >100.5/HA  


 


Albuterol  2.5 mg  09/22/21 03:00 





  Albuterol 2.5 Mg/3 Ml Nebu  IH  





  Q4HRT PRN  





  Shortness Of Breath  


 


Albuterol/Ipratropium  1 ampul  09/22/21 08:00  09/26/21 14:38





  Ipratropium/Albuterol Sulfate 3 Ml Ampul.Neb  IH   1 ampul





  TIDRT DELL   Administration


 


Aspirin  325 mg  09/23/21 10:00  09/26/21 09:37





  Aspirin Ec 325 Mg Tab  PO   325 mg





  QDAY DELL   Administration


 


Atorvastatin Calcium  40 mg  09/22/21 22:00  09/25/21 22:39





  Atorvastatin 40 Mg Tab  PO   40 mg





  QHS DELL   Administration


 


Azithromycin  500 mg  09/23/21 10:00  09/26/21 09:38





  Azithromycin 250 Mg Tab  PO  09/27/21 10:01  500 mg





  DAILY DELL   Administration





  Protocol  


 


Benzonatate  100 mg  09/22/21 06:00  09/26/21 13:55





  Benzonatate 100 Mg Cap  PO   100 mg





  Q8HR DELL   Administration


 


Famotidine  10 mg  09/23/21 22:00  09/26/21 09:37





  Famotidine 10 Mg Tab  PO   10 mg





  BID DELL   Administration


 


Furosemide  60 mg  09/25/21 18:00  09/26/21 17:06





  Furosemide 40 Mg/4 Ml Inj  IV   60 mg





  0600,1800 [facility]   Administration


 


Guaifenesin  200 mg  09/26/21 14:47 





  Guaifenesin 100 Mg/5 Ml Oral Liqd  PO  





  Q4H PRN  





  Cough  


 


Heparin Sodium (Porcine)  5,000 unit  09/22/21 06:00  09/26/21 13:56





  Heparin 5,000 Unit/1 Ml Vial  SUB-Q   Not Given





  Q8HR [facility]  


 


Hydromorphone HCl  0.5 mg  09/22/21 03:00 





  Hydromorphone 1 Mg/1 Ml Inj  IV  





  Q3H PRN  





  Pain , Severe (7-10)  


 


Methylprednisolone  4 mg  09/22/21 10:00  09/26/21 09:39





  Methylprednisolone 4 Mg Tab  PO   4 mg





  DAILY DELL   Administration


 


Nitroglycerin  0.4 mg  09/22/21 03:00 





  Nitroglycerin 0.4 Mg Tab Subl  SL  





  Q5M PRN  





  Chest Pain  


 


Nystatin  1 applic  09/22/21 18:00  09/26/21 09:39





  Nystatin Powder 15 Gm  TP   1 applic





  BID DELL   Administration


 


Ondansetron HCl  4 mg  09/22/21 03:00 





  Ondansetron 4 Mg/2 Ml Inj  IV  





  Q8H PRN  





  Nausea And Vomiting  


 


Oxycodone/Acetaminophen  1 tab  09/22/21 03:00  09/24/21 18:16





  Oxycodone /Acetaminophen 5-325mg Tab  PO   1 tab





  Q6H PRN   Administration





  Pain, Moderate (4-6)  


 


Sodium Chloride  10 ml  09/22/21 10:00  09/26/21 09:39





  Sodium Chloride 0.9% 10 Ml Flush Syringe  IV   10 ml





  BID DELL   Administration


 


Sodium Chloride  10 ml  09/22/21 03:00 





  Sodium Chloride 0.9% 10 Ml Flush Syringe  IV  





  PRN PRN  





  LINE FLUSH  


 


Sodium Chloride  1 gm  09/25/21 14:00  09/26/21 13:55





  Sodium Chloride 1 Gm Tab  PO   1 gm





  TID DELL   Administration


 


Tramadol HCl  50 mg  09/22/21 03:00 





  Tramadol 50 Mg Tab  PO  





  Q6H PRN  





  Pain, Moderate (4-6)

## 2021-09-26 NOTE — PROGRESS NOTE
Assessment and Plan











Morbid obesity/OHS/COPD


Acute Renal insufficiency


Hyponatremia


HTN





Echo 1/22/2021:  Technically very difficult and limited study, due to patient 

body habitus EF 55 to 60%, left and right atrium are normal in size right 

ventricular cavity is normal right ventricular global systolic function


Echo 9/22/2021:  technically difficult study, contrast used for LV function, EF 

55 to 60%, right ventricular systolic function is normal, aortic valve not well 

visualized





Consider PO diuretics











Subjective


Date of service: 09/26/21


Principal diagnosis: Acute on chronic renal insufficiency


Interval history: 





Pt lying in bed sleeping.  





Objective


                                   Vital Signs











  Temp Pulse Pulse Resp Resp BP Pulse Ox


 


 09/26/21 10:04        96


 


 09/26/21 08:28  97.7 F  93 H   22   136/64  90


 


 09/26/21 06:23  97.6 F  102 H   20   123/68  98


 


 09/26/21 03:45        96


 


 09/26/21 00:11        91


 


 09/26/21 00:08   106 H     


 


 09/26/21 00:03  97.4 F L  106 H   18   127/76  96


 


 09/25/21 22:17        96


 


 09/25/21 22:16    99 H   20  


 


 09/25/21 21:06  98.2 F  97 H   18   138/85  92


 


 09/25/21 16:18  98.9 F  101 H   22   137/76  95


 


 09/25/21 13:20    90   18  


 


 09/25/21 12:00        91


 


 09/25/21 11:55  98.3 F  96 H   22   137/62  99














- Physical Examination


General: No Apparent Distress


HEENT: Positive: PERRL


Neck: Positive: trachea midline


Cardiac: Positive: Reg Rate and Rhythm


Lungs: Positive: Decreased Breath Sounds


Neuro: Positive: Grossly Intact


Abdomen: Positive: Soft


Skin: Negative: Rash, Suspicious Lesions, Ulceration


Extremities: Present: upper extr. pulses.  Absent: edema





- Labs and Meds


                          Comprehensive Metabolic Panel











  09/25/21 09/26/21 Range/Units





  19:12 05:03 


 


Sodium  128 L  134 L  (137-145)  mmol/L


 


Potassium  5.0  4.2  (3.6-5.0)  mmol/L


 


Chloride  89.7 L  92.9 L  ()  mmol/L


 


Carbon Dioxide  26  33 H D  (22-30)  mmol/L


 


BUN  53 H  54 H  (7-17)  mg/dL


 


Creatinine  2.4 H  2.4 H  (0.6-1.2)  mg/dL


 


Glucose  159 H  150 H  ()  mg/dL


 


Calcium  7.1 L  7.2 L  (8.4-10.2)  mg/dL














- Imaging and Cardiology


EKG: report reviewed, image reviewed


Echo: report reviewed





- EKG


Sinus rhythms and dysrhythmias: sinus rhythm


Repolarization changes or abnormalities: nonspecific abnormality, ST segment, 

and/or T wave

## 2021-09-27 LAB
BUN SERPL-MCNC: 63 MG/DL (ref 7–17)
BUN/CREAT SERPL: 29 %
CALCIUM SERPL-MCNC: 7.8 MG/DL (ref 8.4–10.2)
HCO3 BLDA-SCNC: 36.2 MMOL/L (ref 20–26)
HCO3 BLDA-SCNC: 37.2 MMOL/L (ref 20–26)
HEMOLYSIS INDEX: 0
PCO2 BLDA: 109.3 MM HG
PCO2 BLDA: 86.9 MM HG
PH BLDA: 7.15 PH UNITS (ref 7.35–7.45)
PH BLDA: 7.24 PH UNITS (ref 7.35–7.45)
PO2 BLDA: 81.8 MM HG (ref 80–90)
PO2 BLDA: 91.8 MM HG (ref 80–90)

## 2021-09-27 PROCEDURE — 5A09357 ASSISTANCE WITH RESPIRATORY VENTILATION, LESS THAN 24 CONSECUTIVE HOURS, CONTINUOUS POSITIVE AIRWAY PRESSURE: ICD-10-PCS | Performed by: INTERNAL MEDICINE

## 2021-09-27 PROCEDURE — 0BH17EZ INSERTION OF ENDOTRACHEAL AIRWAY INTO TRACHEA, VIA NATURAL OR ARTIFICIAL OPENING: ICD-10-PCS | Performed by: INTERNAL MEDICINE

## 2021-09-27 PROCEDURE — 4A033R1 MEASUREMENT OF ARTERIAL SATURATION, PERIPHERAL, PERCUTANEOUS APPROACH: ICD-10-PCS | Performed by: INTERNAL MEDICINE

## 2021-09-27 RX ADMIN — ASPIRIN SCH MG: 325 TABLET, COATED ORAL at 11:59

## 2021-09-27 RX ADMIN — SODIUM CHLORIDE TAB 1 GM SCH: 1 TAB at 08:05

## 2021-09-27 RX ADMIN — BENZONATATE SCH: 100 CAPSULE ORAL at 23:25

## 2021-09-27 RX ADMIN — FUROSEMIDE SCH MG: 40 TABLET ORAL at 18:58

## 2021-09-27 RX ADMIN — OXYCODONE AND ACETAMINOPHEN PRN TAB: 5; 325 TABLET ORAL at 01:52

## 2021-09-27 RX ADMIN — HEPARIN SODIUM SCH UNIT: 5000 INJECTION, SOLUTION INTRAVENOUS; SUBCUTANEOUS at 22:37

## 2021-09-27 RX ADMIN — HEPARIN SODIUM SCH UNIT: 5000 INJECTION, SOLUTION INTRAVENOUS; SUBCUTANEOUS at 15:02

## 2021-09-27 RX ADMIN — FUROSEMIDE SCH MG: 10 INJECTION, SOLUTION INTRAVENOUS at 06:38

## 2021-09-27 RX ADMIN — FAMOTIDINE SCH: 10 TABLET ORAL at 23:25

## 2021-09-27 RX ADMIN — Medication SCH ML: at 22:37

## 2021-09-27 RX ADMIN — Medication SCH ML: at 11:08

## 2021-09-27 RX ADMIN — HEPARIN SODIUM SCH UNIT: 5000 INJECTION, SOLUTION INTRAVENOUS; SUBCUTANEOUS at 06:38

## 2021-09-27 RX ADMIN — IPRATROPIUM BROMIDE AND ALBUTEROL SULFATE SCH AMPUL: .5; 3 SOLUTION RESPIRATORY (INHALATION) at 14:48

## 2021-09-27 RX ADMIN — SODIUM CHLORIDE TAB 1 GM SCH GM: 1 TAB at 16:00

## 2021-09-27 RX ADMIN — SODIUM CHLORIDE TAB 1 GM SCH: 1 TAB at 23:25

## 2021-09-27 RX ADMIN — BENZONATATE SCH MG: 100 CAPSULE ORAL at 15:02

## 2021-09-27 RX ADMIN — NYSTATIN SCH APPLIC: 100000 POWDER TOPICAL at 23:29

## 2021-09-27 RX ADMIN — NYSTATIN SCH APPLIC: 100000 POWDER TOPICAL at 12:05

## 2021-09-27 RX ADMIN — HYDROMORPHONE HYDROCHLORIDE PRN MG: 1 INJECTION, SOLUTION INTRAMUSCULAR; INTRAVENOUS; SUBCUTANEOUS at 18:57

## 2021-09-27 RX ADMIN — BENZONATATE SCH MG: 100 CAPSULE ORAL at 06:38

## 2021-09-27 RX ADMIN — AZITHROMYCIN SCH MG: 250 TABLET, FILM COATED ORAL at 11:55

## 2021-09-27 RX ADMIN — IPRATROPIUM BROMIDE AND ALBUTEROL SULFATE SCH AMPUL: .5; 3 SOLUTION RESPIRATORY (INHALATION) at 08:11

## 2021-09-27 RX ADMIN — IPRATROPIUM BROMIDE AND ALBUTEROL SULFATE SCH: .5; 3 SOLUTION RESPIRATORY (INHALATION) at 21:56

## 2021-09-27 RX ADMIN — FAMOTIDINE SCH MG: 10 TABLET ORAL at 11:03

## 2021-09-27 NOTE — PROGRESS NOTE
Assessment and Plan





Patient is a 32 y/o female with a PMHX of morbid obesity, HTN, COPD 








NSTEMI type 2


Elevated BNP


* Troponins minimally elevated 0.032->0.099->0.078. EKG shows sinus tach 116 

  with no acute ischemic changes.


* BNP noted to be elevated. Currently on Lasix 40mg PO BID


* Echo 1/22/2021- Technically very difficult and limited study, due to patient 

  body habitus EF 55 to 60%, left and right atrium are normal in size right 

  ventricular cavity is normal right ventricular global systolic function


* Echo 9/22/2021-technically difficult study, contrast used for LV function, EF 

  55 to 60%, right ventricular systolic function is normal, aortic valve not 

  well visualized





Acute Renal insufficiency


Hyponatremia


* Patient cr 1.8 on admission.


* Nephrology following


HTN


* Patient has been hypotensive.


* Hold ACE/ARB in setting of acute renal insufficiency and hypotension





Obesity Hypoventilation Syndrome


COPD


* Patient currently on NC


* Management per primary team





Plan:





Continue to hold ACE/ARB, HCTZ in setting of renal insufficiency.No ischemic 

eval due to patient's due patient's obesity.


Patient seen in conjunction with Dr. Ariza who agrees with this plan of care. 

Will continue to follow





- Patient Problems


(1) Elevated troponin


Current Visit: Yes   Status: Acute   





(2) Acute renal insufficiency


Current Visit: Yes   Status: Acute   





(3) COPD (chronic obstructive pulmonary disease)


Current Visit: Yes   Status: Acute   





(4) Hypertension


Current Visit: Yes   Status: Acute   





(5) Morbid obesity


Current Visit: Yes   Status: Acute   





(6) Morbid obesity with BMI of 70 and over, adult


Current Visit: No   Status: Acute   





(7) Obesity hypoventilation syndrome


Current Visit: No   Status: Acute   





Subjective


Date of service: 09/27/21


Principal diagnosis: Acute on chronic renal insufficiency


Interval history: 





Patient lying in bed.


Sinus  100 with no events on monitor





Objective


                                   Vital Signs











  Temp Pulse Pulse Pulse Resp Resp BP


 


 09/27/21 08:11    104 H    20 


 


 09/27/21 04:33       


 


 09/27/21 04:03       


 


 09/27/21 03:20  98.1 F  103 H    24   114/72


 


 09/27/21 00:00     104 H   


 


 09/26/21 23:04   103 H    20   128/66


 


 09/26/21 22:00   105 H     


 


 09/26/21 20:50       


 


 09/26/21 20:49    103 H    20 


 


 09/26/21 19:16  98.7 F  101 H    20   128/78


 


 09/26/21 17:02  98.3 F  104 H    20   145/81


 


 09/26/21 14:54       


 


 09/26/21 14:53    101 H    20 


 


 09/26/21 12:00       


 


 09/26/21 11:42  98.9 F  103 H    22   142/73














  Pulse Ox


 


 09/27/21 08:11  98


 


 09/27/21 04:33  100


 


 09/27/21 04:03  100


 


 09/27/21 03:20  43 L


 


 09/27/21 00:00  91


 


 09/26/21 23:04  97


 


 09/26/21 22:00 


 


 09/26/21 20:50  97


 


 09/26/21 20:49 


 


 09/26/21 19:16  97


 


 09/26/21 17:02  97


 


 09/26/21 14:54  98


 


 09/26/21 14:53 


 


 09/26/21 12:00  91


 


 09/26/21 11:42  99














- Physical Examination


General: No Apparent Distress


HEENT: Positive: PERRL


Neck: Positive: trachea midline


Cardiac: Positive: Regular Rhythm, Tachycardia


Lungs: Positive: Decreased Breath Sounds


Neuro: Positive: Grossly Intact


Abdomen: Positive: Soft


Skin: Negative: Rash, Suspicious Lesions, Ulceration


Extremities: Present: upper extr. pulses.  Absent: edema





- Labs and Meds


                                       CBC











  09/26/21 Range/Units





  14:11 


 


WBC  14.7 H  (4.5-11.0)  K/mm3


 


RBC  4.05  (3.65-5.03)  M/mm3


 


Hgb  8.7 L  (10.1-14.3)  gm/dl


 


Hct  29.8 L  (30.3-42.9)  %


 


Plt Count  254  (140-440)  K/mm3








                          Comprehensive Metabolic Panel











  09/27/21 Range/Units





  05:03 


 


Sodium  134 L  (137-145)  mmol/L


 


Potassium  4.2  (3.6-5.0)  mmol/L


 


Chloride  91.3 L  ()  mmol/L


 


Carbon Dioxide  33 H  (22-30)  mmol/L


 


BUN  63 H  (7-17)  mg/dL


 


Creatinine  2.2 H  (0.6-1.2)  mg/dL


 


Glucose  159 H  ()  mg/dL


 


Calcium  7.8 L  (8.4-10.2)  mg/dL














- Imaging and Cardiology


EKG: report reviewed, image reviewed


Echo: report reviewed





- Telemetry


EKG Rhythm: Sinus Tachycardia





- EKG


Sinus rhythms and dysrhythmias: sinus tachycardia


Repolarization changes or abnormalities: nonspecific abnormality, ST segment, 

and/or T wave

## 2021-09-27 NOTE — CONSULTATION
History of Present Illness


Consult date: 09/27/21


Reason for consult: dyspnea, hypoxemia, pneumonia, obstructive sleep apnea


History of present illness: 





31 years old female with history of morbid obesity, hypertension, asthma COPD 

sleep apnea was brought to the emergency room because of shortness of breath, 

edema, generalized malaise, fatigue, and weakness for last couple of days.  She 

states she just does not feel well.  She has a headache.  She states her legs 

are swollen.  She feels as though she is retaining fluid.  She states that she 

went to her regular doctors on the 13th.  They tested her for Covid.  It was 

negative.  She was told to go see a cardiologist.  She is not seen a car

diologist as of yet.  She came in here because she was not feeling well and did 

not know what to do.  She has had no sick contacts.  





Patients smoking, alchol or drug history not known at this time. Social history 

not known at this time. No known drug allergies.





In the emergency room patient is found to have acute CHF exacerbation patient 

proBNP is 7573 also patient cardiac enzyme is elevated troponin is 0.043. 

Patient blood glucose also 55 patient is hypoglycemic


Patient lethergic and sleepy. Patient afebrile. has leukocytosis. Blood pressure

129/77, Pulse 103.


Patient is on BIPAP 24/12, rate 20, FIO2 30%. O2 saturation running 98%.


Patients chest xray 9/26/21 reported Examination markedly degraded secondary to 

soft tissue attenuation. No significant 


interval change in faint bibasilar airspace opacities. No pneumothorax.   





Patient presently on Albuterol/atrovent aerosol treatments q 6 hours., S/C 

Heparin, Famotidine, Po medrol.


 











Past History


Past Medical History: hypertension, other (Asthma, morbid obesity, sleep apnea. 

Obesity hypoventilation.)


Social history: no significant social history


Family history: CAD, diabetes





Medications and Allergies


                                    Allergies











Allergy/AdvReac Type Severity Reaction Status Date / Time


 


No Known Allergies Allergy   Unverified 07/13/17 11:17











                                Home Medications











 Medication  Instructions  Recorded  Confirmed  Last Taken  Type


 


Acetaminophen [Acetaminophen TAB] 325 mg PO Q4H PRN #30 tablet 12/14/17 01/22/21

Unknown Rx


 


ALBUTEROL NEB's [Proventil 0.083% 2.5 mg IH Q3HRT PRN #30 day 01/24/21  Unknown 

Rx





NEBS]     


 


Albuterol Mdi (or & Nicu Only) 2 puff IH QID PRN #1 inhalation 01/24/21  Unknown

Rx





[ProAir HFA Inhaler]     


 


Azithromycin [Zithromax Z-JAVIER] 0 mg PO DAILY #1 tab 01/24/21  Unknown Rx


 


Benzonatate [Tessalon Perles] 100 mg PO Q8HR #15 capsule 01/24/21  Unknown Rx


 


Famotidine [Pepcid] 20 mg PO BID #14 tablet 01/24/21  Unknown Rx


 


Ipratropium/Albuterol Sulfate 1 ampul IH TIDRT #30 day 01/24/21  Unknown Rx





[DUONEB *Not for PRN Use*]     


 


hydroCHLOROthiazide [HCTZ] 25 mg PO QDAY  tablet 01/24/21  Unknown Rx


 


hydroCHLOROthiazide [HCTZ] 25 mg PO QDAY #30 tablet 01/24/21  Unknown Rx


 


methylPREDNISolone [Medrol 4MG 4 mg PO DAILY #1 tab.ds.pk 01/24/21  Unknown Rx





DOSEPAK (21 tabs)]     











Active Meds: 


Active Medications





Acetaminophen (Acetaminophen 325 Mg Tab)  650 mg PO Q4H PRN


   PRN Reason: Pain MILD(1-3)/Fever >100.5/HA


Albuterol (Albuterol 2.5 Mg/3 Ml Nebu)  2.5 mg IH Q4HRT PRN


   PRN Reason: Shortness Of Breath


Albuterol/Ipratropium (Ipratropium/Albuterol Sulfate 3 Ml Ampul.Neb)  1 ampul IH

TIDRT [facility]


   Last Admin: 09/27/21 21:56 Dose:  Not Given


   Documented by: 


Aspirin (Aspirin Ec 325 Mg Tab)  325 mg PO QDAY [facility]


   Last Admin: 09/27/21 11:59 Dose:  325 mg


   Documented by: 


Atorvastatin Calcium (Atorvastatin 40 Mg Tab)  40 mg PO QHS [facility]


   Last Admin: 09/26/21 21:04 Dose:  40 mg


   Documented by: 


Benzonatate (Benzonatate 100 Mg Cap)  100 mg PO Q8HR [facility]


   Last Admin: 09/27/21 15:02 Dose:  100 mg


   Documented by: 


Famotidine (Famotidine 10 Mg Tab)  10 mg PO BID [facility]


   Last Admin: 09/27/21 11:03 Dose:  10 mg


   Documented by: 


Furosemide (Furosemide 40 Mg Tab)  40 mg PO 0600,1800 [facility]


   Last Admin: 09/27/21 18:58 Dose:  40 mg


   Documented by: 


Guaifenesin (Guaifenesin 100 Mg/5 Ml Oral Liqd)  200 mg PO Q4H PRN


   PRN Reason: Cough


Heparin Sodium (Porcine) (Heparin 5,000 Unit/1 Ml Vial)  5,000 unit SUB-Q Q8HR 

[facility]


   Last Admin: 09/27/21 22:37 Dose:  5,000 unit


   Documented by: 


Hydromorphone HCl (Hydromorphone 1 Mg/1 Ml Inj)  0.5 mg IV Q3H PRN


   PRN Reason: Pain , Severe (7-10)


   Last Admin: 09/27/21 18:57 Dose:  0.5 mg


   Documented by: 


Methylprednisolone (Methylprednisolone 4 Mg Tab)  4 mg PO DAILY [facility]


   Last Admin: 09/27/21 12:00 Dose:  4 mg


   Documented by: 


Nitroglycerin (Nitroglycerin 0.4 Mg Tab Subl)  0.4 mg SL Q5M PRN


   PRN Reason: Chest Pain


Nystatin (Nystatin Powder 15 Gm)  1 applic TP BID [facility]


   Last Admin: 09/27/21 12:05 Dose:  1 applic


   Documented by: 


Ondansetron HCl (Ondansetron 4 Mg/2 Ml Inj)  4 mg IV Q8H PRN


   PRN Reason: Nausea And Vomiting


Oxycodone/Acetaminophen (Oxycodone /Acetaminophen 5-325mg Tab)  1 tab PO Q6H PRN


   PRN Reason: Pain, Moderate (4-6)


   Last Admin: 09/27/21 01:52 Dose:  1 tab


   Documented by: 


Sodium Chloride (Sodium Chloride 0.9% 10 Ml Flush Syringe)  10 ml IV BID [facility]


   Last Admin: 09/27/21 22:37 Dose:  10 ml


   Documented by: 


Sodium Chloride (Sodium Chloride 0.9% 10 Ml Flush Syringe)  10 ml IV PRN PRN


   PRN Reason: LINE FLUSH


Sodium Chloride (Sodium Chloride 1 Gm Tab)  1 gm PO TID [facility]


   Last Admin: 09/27/21 16:00 Dose:  1 gm


   Documented by: 


Tramadol HCl (Tramadol 50 Mg Tab)  50 mg PO Q6H PRN


   PRN Reason: Pain, Moderate (4-6)











Review of Systems


All systems: negative





Physical Examination


Vital signs: 


                                   Vital Signs











Pulse Resp Pulse Ox


 


 116 H  8 L  92 


 


 09/21/21 21:41  09/21/21 21:41  09/21/21 21:41











General appearance: lethargic, appears uncomfortable, other (Morbidly Obese on 

BIPAP.)


Eyes: non-icteric


ENT: oropharynx moist


Neck: supple, no lymphadenopathy, no JVD


Effort: mildly labored


Ascultation: Bilateral: diminished breath sounds


Cardiovascular: regular rate and rhythm


Gastrointestinal: normoactive bowel sounds, soft, non-tender


Integumentary: decubitus ulcer, other (Stasis dermatititis on legs and abdomen.)


Extremities: edema


Musculoskeletal: other (Morbidly Obese. can Not move.)


Gait: other (Morbidly Obese, Can Not move.)


pupils equal and round, unable to assess


other (Can not assess.)





Results





- Laboratory Findings


CBC and BMP: 


                                 09/29/21 04:55





                                 09/29/21 04:55


ABG











ABG pH  7.237 pH Units (7.350-7.450)  L  09/27/21  18:40    


 


ABG pCO2  86.9 mm Hg  09/27/21  18:40    


 


ABG pO2  81.8 mm Hg (80.0-90.0)   09/27/21  18:40    


 


ABG O2 Saturation  95.9 % (95.0-99.0)   09/27/21  18:40    








Abnormal lab findings: 


                                  Abnormal Labs











  09/21/21 09/21/21 09/22/21





  22:04 22:04 00:18


 


WBC  17.7 H  


 


Hgb  9.1 L  


 


Hct   


 


MCV  77 L  


 


MCH  21 L  


 


MCHC  27 L  


 


RDW  24.3 H  


 


Seg Neuts % (Manual)   


 


Lymphocytes % (Manual)   


 


Nucleated RBC %   


 


Seg Neutrophils # Man   


 


Lymphocytes # (Manual)   


 


Monocytes # (Manual)   


 


ABG pH   


 


ABG pO2   


 


ABG HCO3   


 


ABG Base Excess   


 


ABG Hemoglobin   


 


Oxyhemoglobin   


 


Sodium   135 L 


 


Potassium   


 


Chloride   91.0 L 


 


Carbon Dioxide   17 L 


 


BUN   


 


Creatinine   1.4 H 


 


Glucose   55 L 


 


POC Glucose    49 L


 


Calcium   


 


Ionized Calcium   


 


Total Creatine Kinase   


 


Troponin T   0.043 H 


 


NT-Pro-B Natriuret Pep   7573 H 


 


LDL Cholesterol Direct   47 L 


 


HDL Cholesterol   25 L 














  09/22/21 09/22/21 09/22/21





  04:03 04:03 09:22


 


WBC  24.3 H  


 


Hgb  9.3 L  


 


Hct   


 


MCV  74 L  


 


MCH  21 L  


 


MCHC  29 L  


 


RDW  23.1 H  


 


Seg Neuts % (Manual)  78.0 H  


 


Lymphocytes % (Manual)  7.0 L  


 


Nucleated RBC %  1.0 H  


 


Seg Neutrophils # Man  19.0 H  


 


Lymphocytes # (Manual)   


 


Monocytes # (Manual)  1.0 H  


 


ABG pH   


 


ABG pO2   


 


ABG HCO3   


 


ABG Base Excess   


 


ABG Hemoglobin   


 


Oxyhemoglobin   


 


Sodium   134 L 


 


Potassium   


 


Chloride   91.2 L 


 


Carbon Dioxide   


 


BUN   


 


Creatinine   1.8 H 


 


Glucose   


 


POC Glucose   


 


Calcium   


 


Ionized Calcium   


 


Total Creatine Kinase   


 


Troponin T    0.099 H


 


NT-Pro-B Natriuret Pep   


 


LDL Cholesterol Direct   


 


HDL Cholesterol   














  09/22/21 09/22/21 09/23/21





  13:03 20:28 04:52


 


WBC    20.9 H


 


Hgb    8.5 L


 


Hct    30.0 L


 


MCV    73 L


 


MCH    21 L


 


MCHC    28 L


 


RDW    23.9 H


 


Seg Neuts % (Manual)    77.0 H


 


Lymphocytes % (Manual)    1.0 L


 


Nucleated RBC %    1.0 H


 


Seg Neutrophils # Man    16.1 H


 


Lymphocytes # (Manual)    0.2 L


 


Monocytes # (Manual)   


 


ABG pH   


 


ABG pO2   


 


ABG HCO3   


 


ABG Base Excess   


 


ABG Hemoglobin   


 


Oxyhemoglobin   


 


Sodium   


 


Potassium   


 


Chloride   


 


Carbon Dioxide   


 


BUN   


 


Creatinine   


 


Glucose   


 


POC Glucose   115 H 


 


Calcium   


 


Ionized Calcium   


 


Total Creatine Kinase   


 


Troponin T  0.078 H D  


 


NT-Pro-B Natriuret Pep   


 


LDL Cholesterol Direct   


 


HDL Cholesterol   














  09/23/21 09/23/21 09/23/21





  04:52 08:46 11:50


 


WBC   


 


Hgb   


 


Hct   


 


MCV   


 


MCH   


 


MCHC   


 


RDW   


 


Seg Neuts % (Manual)   


 


Lymphocytes % (Manual)   


 


Nucleated RBC %   


 


Seg Neutrophils # Man   


 


Lymphocytes # (Manual)   


 


Monocytes # (Manual)   


 


ABG pH   


 


ABG pO2   


 


ABG HCO3   


 


ABG Base Excess   


 


ABG Hemoglobin   


 


Oxyhemoglobin   


 


Sodium  129 L  


 


Potassium  5.6 H  


 


Chloride  88.6 L  


 


Carbon Dioxide   


 


BUN  27 H  


 


Creatinine  2.4 H  


 


Glucose  121 H  


 


POC Glucose   139 H  156 H


 


Calcium  7.7 L  


 


Ionized Calcium   


 


Total Creatine Kinase   


 


Troponin T   


 


NT-Pro-B Natriuret Pep   


 


LDL Cholesterol Direct   


 


HDL Cholesterol   














  09/23/21 09/23/21 09/23/21





  15:46 16:58 22:08


 


WBC   


 


Hgb   


 


Hct   


 


MCV   


 


MCH   


 


MCHC   


 


RDW   


 


Seg Neuts % (Manual)   


 


Lymphocytes % (Manual)   


 


Nucleated RBC %   


 


Seg Neutrophils # Man   


 


Lymphocytes # (Manual)   


 


Monocytes # (Manual)   


 


ABG pH   


 


ABG pO2   


 


ABG HCO3   


 


ABG Base Excess   


 


ABG Hemoglobin   


 


Oxyhemoglobin   


 


Sodium  128 L  


 


Potassium  5.5 H  


 


Chloride  88.1 L  


 


Carbon Dioxide   


 


BUN  33 H  


 


Creatinine  2.7 H  


 


Glucose  172 H  


 


POC Glucose   187 H  186 H


 


Calcium  7.3 L  


 


Ionized Calcium   


 


Total Creatine Kinase   


 


Troponin T   


 


NT-Pro-B Natriuret Pep   


 


LDL Cholesterol Direct   


 


HDL Cholesterol   














  09/23/21 09/24/21 09/24/21





  23:15 02:20 07:39


 


WBC   


 


Hgb   


 


Hct   


 


MCV   


 


MCH   


 


MCHC   


 


RDW   


 


Seg Neuts % (Manual)   


 


Lymphocytes % (Manual)   


 


Nucleated RBC %   


 


Seg Neutrophils # Man   


 


Lymphocytes # (Manual)   


 


Monocytes # (Manual)   


 


ABG pH   


 


ABG pO2   


 


ABG HCO3   


 


ABG Base Excess   


 


ABG Hemoglobin   


 


Oxyhemoglobin   


 


Sodium   128 L 


 


Potassium   5.2 H 


 


Chloride   88.6 L 


 


Carbon Dioxide   


 


BUN   39 H 


 


Creatinine   3.1 H 


 


Glucose   171 H 


 


POC Glucose    168 H


 


Calcium   7.2 L 


 


Ionized Calcium  3.6 L  


 


Total Creatine Kinase   


 


Troponin T   


 


NT-Pro-B Natriuret Pep   


 


LDL Cholesterol Direct   


 


HDL Cholesterol   














  09/24/21 09/24/21 09/24/21





  10:04 11:37 17:18


 


WBC   


 


Hgb   


 


Hct   


 


MCV   


 


MCH   


 


MCHC   


 


RDW   


 


Seg Neuts % (Manual)   


 


Lymphocytes % (Manual)   


 


Nucleated RBC %   


 


Seg Neutrophils # Man   


 


Lymphocytes # (Manual)   


 


Monocytes # (Manual)   


 


ABG pH   


 


ABG pO2   


 


ABG HCO3   


 


ABG Base Excess   


 


ABG Hemoglobin   


 


Oxyhemoglobin   


 


Sodium   


 


Potassium   


 


Chloride   


 


Carbon Dioxide   


 


BUN   


 


Creatinine   


 


Glucose   


 


POC Glucose   170 H  152 H


 


Calcium   


 


Ionized Calcium   


 


Total Creatine Kinase  1712 H  


 


Troponin T   


 


NT-Pro-B Natriuret Pep   


 


LDL Cholesterol Direct   


 


HDL Cholesterol   














  09/24/21 09/24/21 09/25/21





  19:38 22:02 05:48


 


WBC   


 


Hgb   


 


Hct   


 


MCV   


 


MCH   


 


MCHC   


 


RDW   


 


Seg Neuts % (Manual)   


 


Lymphocytes % (Manual)   


 


Nucleated RBC %   


 


Seg Neutrophils # Man   


 


Lymphocytes # (Manual)   


 


Monocytes # (Manual)   


 


ABG pH   


 


ABG pO2   


 


ABG HCO3   


 


ABG Base Excess   


 


ABG Hemoglobin   


 


Oxyhemoglobin   


 


Sodium  128 L   124 L


 


Potassium    5.8 H D


 


Chloride  89.6 L   89.4 L


 


Carbon Dioxide   


 


BUN  47 H   53 H


 


Creatinine  3.0 H   2.8 H


 


Glucose  165 H   150 H


 


POC Glucose   147 H 


 


Calcium  6.9 L   7.2 L


 


Ionized Calcium   


 


Total Creatine Kinase   


 


Troponin T   


 


NT-Pro-B Natriuret Pep   


 


LDL Cholesterol Direct   


 


HDL Cholesterol   














  09/25/21 09/25/21 09/25/21





  08:48 11:35 19:12


 


WBC   


 


Hgb   


 


Hct   


 


MCV   


 


MCH   


 


MCHC   


 


RDW   


 


Seg Neuts % (Manual)   


 


Lymphocytes % (Manual)   


 


Nucleated RBC %   


 


Seg Neutrophils # Man   


 


Lymphocytes # (Manual)   


 


Monocytes # (Manual)   


 


ABG pH   


 


ABG pO2   


 


ABG HCO3   


 


ABG Base Excess   


 


ABG Hemoglobin   


 


Oxyhemoglobin   


 


Sodium    128 L


 


Potassium   


 


Chloride    89.7 L


 


Carbon Dioxide   


 


BUN    53 H


 


Creatinine    2.4 H


 


Glucose    159 H


 


POC Glucose  152 H  152 H 


 


Calcium    7.1 L


 


Ionized Calcium   


 


Total Creatine Kinase   


 


Troponin T   


 


NT-Pro-B Natriuret Pep   


 


LDL Cholesterol Direct   


 


HDL Cholesterol   














  09/25/21 09/26/21 09/26/21





  22:03 05:03 07:41


 


WBC   


 


Hgb   


 


Hct   


 


MCV   


 


MCH   


 


MCHC   


 


RDW   


 


Seg Neuts % (Manual)   


 


Lymphocytes % (Manual)   


 


Nucleated RBC %   


 


Seg Neutrophils # Man   


 


Lymphocytes # (Manual)   


 


Monocytes # (Manual)   


 


ABG pH   


 


ABG pO2   


 


ABG HCO3   


 


ABG Base Excess   


 


ABG Hemoglobin   


 


Oxyhemoglobin   


 


Sodium   134 L 


 


Potassium   


 


Chloride   92.9 L 


 


Carbon Dioxide   33 H D 


 


BUN   54 H 


 


Creatinine   2.4 H 


 


Glucose   150 H 


 


POC Glucose  152 H   144 H


 


Calcium   7.2 L 


 


Ionized Calcium   


 


Total Creatine Kinase   


 


Troponin T   


 


NT-Pro-B Natriuret Pep   


 


LDL Cholesterol Direct   


 


HDL Cholesterol   














  09/26/21 09/26/21 09/26/21





  11:38 14:11 17:02


 


WBC   14.7 H 


 


Hgb   8.7 L 


 


Hct   29.8 L 


 


MCV   74 L 


 


MCH   22 L 


 


MCHC   29 L 


 


RDW   24.9 H 


 


Seg Neuts % (Manual)   86.0 H 


 


Lymphocytes % (Manual)   6.0 L 


 


Nucleated RBC %   


 


Seg Neutrophils # Man   12.6 H 


 


Lymphocytes # (Manual)   0.9 L 


 


Monocytes # (Manual)   


 


ABG pH   


 


ABG pO2   


 


ABG HCO3   


 


ABG Base Excess   


 


ABG Hemoglobin   


 


Oxyhemoglobin   


 


Sodium   


 


Potassium   


 


Chloride   


 


Carbon Dioxide   


 


BUN   


 


Creatinine   


 


Glucose   


 


POC Glucose  151 H   154 H


 


Calcium   


 


Ionized Calcium   


 


Total Creatine Kinase   


 


Troponin T   


 


NT-Pro-B Natriuret Pep   


 


LDL Cholesterol Direct   


 


HDL Cholesterol   














  09/26/21 09/27/21 09/27/21





  23:14 05:03 10:00


 


WBC   


 


Hgb   


 


Hct   


 


MCV   


 


MCH   


 


MCHC   


 


RDW   


 


Seg Neuts % (Manual)   


 


Lymphocytes % (Manual)   


 


Nucleated RBC %   


 


Seg Neutrophils # Man   


 


Lymphocytes # (Manual)   


 


Monocytes # (Manual)   


 


ABG pH    7.150 L*


 


ABG pO2    91.8 H


 


ABG HCO3    37.2 H


 


ABG Base Excess    7.1 H


 


ABG Hemoglobin    7.1 L


 


Oxyhemoglobin    93.4 L


 


Sodium   134 L 


 


Potassium   


 


Chloride   91.3 L 


 


Carbon Dioxide   33 H 


 


BUN   63 H 


 


Creatinine   2.2 H 


 


Glucose   159 H 


 


POC Glucose  151 H  


 


Calcium   7.8 L 


 


Ionized Calcium   


 


Total Creatine Kinase   


 


Troponin T   


 


NT-Pro-B Natriuret Pep   


 


LDL Cholesterol Direct   


 


HDL Cholesterol   














  09/27/21 09/27/21 09/27/21





  12:39 16:37 18:40


 


WBC   


 


Hgb   


 


Hct   


 


MCV   


 


MCH   


 


MCHC   


 


RDW   


 


Seg Neuts % (Manual)   


 


Lymphocytes % (Manual)   


 


Nucleated RBC %   


 


Seg Neutrophils # Man   


 


Lymphocytes # (Manual)   


 


Monocytes # (Manual)   


 


ABG pH    7.237 L


 


ABG pO2   


 


ABG HCO3    36.2 H


 


ABG Base Excess    7.4 H


 


ABG Hemoglobin    7.8 L


 


Oxyhemoglobin    93.7 L


 


Sodium   


 


Potassium   


 


Chloride   


 


Carbon Dioxide   


 


BUN   


 


Creatinine   


 


Glucose   


 


POC Glucose  140 H  132 H 


 


Calcium   


 


Ionized Calcium   


 


Total Creatine Kinase   


 


Troponin T   


 


NT-Pro-B Natriuret Pep   


 


LDL Cholesterol Direct   


 


HDL Cholesterol   














- Diagnostic Findings


Chest x-ray: report reviewed, image reviewed


Additional studies: 





CHEST 1 VIEW  9/26/21


 


 INDICATION / CLINICAL INFORMATION: chest eval.  


 


 COMPARISON: 9/21/2021  


 


 FINDINGS:  


 


 SUPPORT DEVICES: None.  


 HEART / MEDIASTINUM: Stable.   


 LUNGS / PLEURA: Examination markedly degraded secondary to soft tissue 

attenuation. No significant 


interval change in faint bibasilar airspace opacities. No pneumothorax.   


 


 ADDITIONAL FINDINGS: No significant additional findings.  


 


 IMPRESSION:  


 Allowing for Limited examination, no change.  


 








Assessment and Plan





31 years old female with history of morbid obesity, hypertension, asthma , sleep

apnea was brought to the emergency room because of shortness of breath, edema, 

generalized malaise, fatigue, and weakness for last couple of days.  She states 

she just does not feel well.  She has a headache.  She states her legs are 

swollen.  She feels as though she is retaining fluid.  She states that she went 

to her regular doctors on the 13th.  They tested her for Covid.  It was 

negative.  She was told to go see a cardiologist.  She is not seen a 

cardiologist as of yet.  She came in here because she was not feeling well and 

did not know what to do.  She has had no sick contacts.  








Patients smoking, alchol or drug history not known at this time. Social history 

not known at this time. No known drug allergies.





In the emergency room patient is found to have acute CHF exacerbation patient 

proBNP is 7573 also patient cardiac enzyme is elevated troponin is 0.043. 

Patient blood glucose also 55 patient is hypoglycemic





Patient lethergic and sleepy. Patient afebrile. has leukocytosis. Blood pressure

129/77, Pulse 103.


Patient is on BIPAP 24/12, rate 20, FIO2 30%. O2 saturation running 98%.


Patients chest xray 9/26/21 reported Examination markedly degraded secondary to 

soft tissue attenuation. No significant 


interval change in faint bibasilar airspace opacities. No pneumothorax.   





Patient presently on Albuterol/atrovent aerosol treatments q 6 hours., S/C 

Heparin, Famotidine, Po medrol.





I spent critical care time of 45 minutes, review the chart, examine the patient 

, Review chest xray, Lab results, talking to the respiratory therapy and nursing

staff in this critically ill morbidly obese patient.


 





- Patient Problems


(1) Acute respiratory failure with hypoxia and hypercapnia


Current Visit: Yes   Status: Acute   


Plan to address problem: 


BIPAP 24/12, rate 20, FIO2 30%


 Albuterol/atrovent aerosol treatments.


 Continue medrol


 Contibue S/C Heparin.


 Continue famotidine.


 








(2) Acute exacerbation of CHF (congestive heart failure)


Current Visit: Yes   Status: Acute   


Plan to address problem: 


Management as per cardiology.








(3) Hypertension


Current Visit: Yes   Status: Acute   


Plan to address problem: 


Management as per primary care.








(4) Morbid obesity with BMI of 70 and over, adult


Current Visit: No   Status: Acute   


Plan to address problem: 


Weight reduction diet.


 Mobility protocol


 Fall precautions.








(5) Obesity hypoventilation syndrome


Current Visit: No   Status: Acute   


Plan to address problem: 


BIPAP 24/12, rate 20, FIO2 30%.








(6) Sleep apnea


Current Visit: No   Status: Acute   


Plan to address problem: 


BIPAP 24/12, rate 20, FIO2 30%.


 Sleep study if she can as out patient.

## 2021-09-27 NOTE — PROGRESS NOTE
Assessment and Plan


Assessment and plan: 


(1) Non-ST elevation MI (NSTEMI)


Current Visit: Yes   Status: Acute   


Plan to address problem: 


Admit the patient to the medical telemetry. Aspirin 325 mg p.o. daily. Lipitor 

40 mg p.o. daily. Nitroglycerin as needed. We do the serial cardiac enzyme. We 

also consult cardiology for evaluation and order echocardiogram.








(2) Acute exacerbation of CHF (congestive heart failure)


Current Visit: Yes   Status: Acute   


Plan to address problem: 


Fluid restriction. Maintain input output. Lasix 40 mg IV every 12 hours. Oxygen 

via nasal cannula 3 L/min. DuoNeb by nebulizer every 4 hours. Echocardiogram. 

Cardiology consult








(3) Hypertension


Current Visit: Yes   Status: Acute   


Plan to address problem: 


We will continue the home medication. Hydralazine 10 mg IV every 6 hours as 

needed. We will monitor the blood pressure closely








(4) COPD (chronic obstructive pulmonary disease)


Current Visit: Yes   Status: Acute   


Plan to address problem: 


Oxygen by nasal cannula 3 L/min. DuoNeb via nebulizer every 4 hours. Albuterol 

via nebulizer every 4 hours as needed.








(5) Morbid obesity


Current Visit: Yes   Status: Acute   


Plan to address problem: 


We counseled regarding weight reduction. Outpatient follow-up with bariatric 

surgeon








(6) Hyperglycemia


Current Visit: Yes   Status: Acute   


Plan to address problem: 


We will give 1 ampoule of D50. We will put the patient on cardiac diet. We will 

monitor the blood glucose closely








(7) DVT prophylaxis


Current Visit: No   Status: Acute   


Plan to address problem: 


Heparin 5000 units subcu every 8 hours for DVT prophylaxis. Pepcid 20 mg p.o. 

twice daily for GI prophylaxis. Patient is a full code





9/23


-Patient is on heparin drip for non-STEMI, cardiology is following.


-Patient is off Lasix and ACE inhibitors because of [place].


-Kidney function is worsening overnight and I put a consult for nephrology.


-Patient is hypotensive and blood pressure from this morning was 101/41.  We 

will continue to monitor.  And if it is dropping we we will give him fluid.


-Echo was done and EF of 50 to 55%.  Diastolic dysfunction is indeterminate.  

Management per cardiology


-Patient has COPD continues on dexamethasone, nebulizer treatment as needed.


-Patient has hyponatremia and hyperkalemia.  I give the patient Kayexalate.  

Monitor electrolytes.  Will follow nephrology for additional recommendation.





9/24


-Renal function is worsening, nephrology is following


-Blood pressure is better today


-Hyperkalemia; I added Kayexalate


-Morbid obesity








9/25


-Slight improvement in creatinine.  Hyponatremia worsened. Nephrology is 

following and put the patient back on Lasix.


-Blood PRESSURE IS BETTER today


-Potassium this morning was 5.8, I ordered 60 g of Kayexalate


-Morbidly obese


-Obesity hypoventilation syndrome





9/26


-Creatinine is improving and this morning was 2.4


-Hyponatremia improved this morning was 134.  Patient is on Lasix


-Blood pressure is better


-Patient is on 10 L of oxygen; worsened 


-Morbidly obese, FABIAN








9/27: Follow ordered ultrasound of abdomen, per Physician unable to get CT. I 

ordered an ABG due to my concern about her breathing pattern this morning, 

patient may benefit from. Will try to establish if patient has a primary 

pulmonary doctor. ABG is showing consistent with Respiratory Acidosis. Will 

place on BIPAP now. May need to transfer to IMCU for closer monitoring. Patient 

likely with Obesity Hypoventilation syndrome.


cardiology work up, ongoing. 


RENAL SHOWING SOME IMPROVEMENT


Guarded prognosis





History


Interval history: 


Patient seen and examined, very stoprous this morning, on 10 liters of oxygen, 

no cpap/bipap in the room








Hospitalist Physical





- Constitutional


Vitals: 


                                        











Temp Pulse Resp BP Pulse Ox


 


 98.1 F   104 H  20   114/72   98 


 


 09/27/21 03:20  09/27/21 08:11  09/27/21 08:11  09/27/21 03:20  09/27/21 08:11











General appearance: Present: no acute distress





HEART Score





- HEART Score


Troponin: 


                                        











Troponin T  0.078 ng/mL (0.00-0.029)  H D 09/22/21  13:03    














Results





- Labs


CBC & Chem 7: 


                                 09/26/21 14:11





                                 09/27/21 05:03


Labs: 


                             Laboratory Last Values











WBC  14.7 K/mm3 (4.5-11.0)  H  09/26/21  14:11    


 


RBC  4.05 M/mm3 (3.65-5.03)   09/26/21  14:11    


 


Hgb  8.7 gm/dl (10.1-14.3)  L  09/26/21  14:11    


 


Hct  29.8 % (30.3-42.9)  L  09/26/21  14:11    


 


MCV  74 fl (79-97)  L  09/26/21  14:11    


 


MCH  22 pg (28-32)  L  09/26/21  14:11    


 


MCHC  29 % (30-34)  L  09/26/21  14:11    


 


RDW  24.9 % (13.2-15.2)  H  09/26/21  14:11    


 


Plt Count  254 K/mm3 (140-440)   09/26/21  14:11    


 


Add Manual Diff  Complete   09/26/21  14:11    


 


Total Counted  100   09/26/21  14:11    


 


Seg Neuts % (Manual)  86.0 % (40.0-70.0)  H  09/26/21  14:11    


 


Band Neutrophils %  1.0 %  09/26/21  14:11    


 


Lymphocytes % (Manual)  6.0 % (13.4-35.0)  L  09/26/21  14:11    


 


Reactive Lymphs % (Man)  1.0 %  09/26/21  14:11    


 


Monocytes % (Manual)  4.0 % (0.0-7.3)   09/26/21  14:11    


 


Metamyelocytes %  2.0 %  09/26/21  14:11    


 


Myelocytes %  2.0 %  09/22/21  04:03    


 


Nucleated RBC %  Not Reportable   09/26/21  14:11    


 


Seg Neutrophils # Man  12.6 K/mm3 (1.8-7.7)  H  09/26/21  14:11    


 


Band Neutrophils #  0.1 K/mm3  09/26/21  14:11    


 


Lymphocytes # (Manual)  0.9 K/mm3 (1.2-5.4)  L  09/26/21  14:11    


 


Abs React Lymphs (Man)  0.1 K/mm3  09/26/21  14:11    


 


Monocytes # (Manual)  0.6 K/mm3 (0.0-0.8)   09/26/21  14:11    


 


Eosinophils # (Manual)  0.0 K/mm3 (0.0-0.4)   09/26/21  14:11    


 


Basophils # (Manual)  0.0 K/mm3 (0.0-0.1)   09/26/21  14:11    


 


Metamyelocytes #  0.3 K/mm3  09/26/21  14:11    


 


Myelocytes #  0.0 K/mm3  09/26/21  14:11    


 


Promyelocytes #  0.0 K/mm3  09/26/21  14:11    


 


Blast Cells #  0.0 K/mm3  09/26/21  14:11    


 


WBC Morphology  Not Reportable   09/26/21  14:11    


 


WBC Morphology  TNR   09/26/21  14:11    


 


Hypersegmented Neuts  Not Reportable   09/26/21  14:11    


 


Hyposegmented Neuts  Not Reportable   09/26/21  14:11    


 


Hypogranular Neuts  Not Reportable   09/26/21  14:11    


 


Smudge Cells  Not Reportable   09/26/21  14:11    


 


Toxic Granulation  Not Reportable   09/26/21  14:11    


 


Toxic Vacuolation  Not Reportable   09/26/21  14:11    


 


Dohle Bodies  Not Reportable   09/26/21  14:11    


 


Pelger-Huet Anomaly  Not Reportable   09/26/21  14:11    


 


Ac Rods  Not Reportable   09/26/21  14:11    


 


Platelet Estimate  Consistent w auto   09/26/21  14:11    


 


Clumped Platelets  Not Reportable   09/26/21  14:11    


 


Plt Clumps, EDTA  Not Reportable   09/26/21  14:11    


 


Large Platelets  Not Reportable   09/26/21  14:11    


 


Giant Platelets  Not Reportable   09/26/21  14:11    


 


Platelet Satelliting  Not Reportable   09/26/21  14:11    


 


Plt Morphology Comment  Not Reportable   09/26/21  14:11    


 


RBC Morphology  Not Reportable   09/26/21  14:11    


 


Dimorphic RBCs  Not Reportable   09/26/21  14:11    


 


Polychromasia  Few   09/26/21  14:11    


 


Hypochromasia  1+   09/26/21  14:11    


 


Poikilocytosis  Not Reportable   09/26/21  14:11    


 


Anisocytosis  Not Reportable   09/26/21  14:11    


 


Microcytosis  Not Reportable   09/26/21  14:11    


 


Macrocytosis  Not Reportable   09/26/21  14:11    


 


Spherocytes  Not Reportable   09/26/21  14:11    


 


Pappenheimer Bodies  Not Reportable   09/26/21  14:11    


 


Sickle Cells  Not Reportable   09/26/21  14:11    


 


Target Cells  1+   09/26/21  14:11    


 


Tear Drop Cells  Few   09/26/21  14:11    


 


Ovalocytes  Not Reportable   09/26/21  14:11    


 


Helmet Cells  Not Reportable   09/26/21  14:11    


 


[place] Bodies  Not Reportable   09/26/21  14:11    


 


Cabot Rings  Not Reportable   09/26/21  14:11    


 


Apolinar Cells  Not Reportable   09/26/21  14:11    


 


Bite Cells  Not Reportable   09/26/21  14:11    


 


Crenated Cell  Not Reportable   09/26/21  14:11    


 


Elliptocytes  Not Reportable   09/26/21  14:11    


 


Acanthocytes (Spur)  Not Reportable   09/26/21  14:11    


 


Rouleaux  Not Reportable   09/26/21  14:11    


 


Hemoglobin C Crystals  Not Reportable   09/26/21  14:11    


 


Schistocytes  Not Reportable   09/26/21  14:11    


 


Malaria parasites  Not Reportable   09/26/21  14:11    


 


Sami Bodies  Not Reportable   09/26/21  14:11    


 


Hem Pathologist Commnt  No   09/26/21  14:11    


 


Sodium  134 mmol/L (137-145)  L  09/27/21  05:03    


 


Potassium  4.2 mmol/L (3.6-5.0)   09/27/21  05:03    


 


Chloride  91.3 mmol/L ()  L  09/27/21  05:03    


 


Carbon Dioxide  33 mmol/L (22-30)  H  09/27/21  05:03    


 


Anion Gap  14 mmol/L  09/27/21  05:03    


 


BUN  63 mg/dL (7-17)  H  09/27/21  05:03    


 


Creatinine  2.2 mg/dL (0.6-1.2)  H  09/27/21  05:03    


 


Estimated GFR  31 ml/min  09/27/21  05:03    


 


BUN/Creatinine Ratio  29 %  09/27/21  05:03    


 


Glucose  159 mg/dL ()  H  09/27/21  05:03    


 


POC Glucose  151 mg/dL ()  H  09/26/21  23:14    


 


Osmolality  301 Mosm/kg  09/23/21  23:15    


 


Calcium  7.8 mg/dL (8.4-10.2)  L  09/27/21  05:03    


 


Ionized Calcium  3.6 mg/dL (4.8-5.6)  L  09/23/21  23:15    


 


Total Creatine Kinase  1712 units/L ()  H  09/24/21  10:04    


 


Troponin T  0.078 ng/mL (0.00-0.029)  H D 09/22/21  13:03    


 


NT-Pro-B Natriuret Pep  7573 pg/mL (0-450)  H  09/21/21  22:04    


 


Triglycerides  105 mg/dL (2-149)   09/21/21  22:04    


 


Cholesterol  85 mg/dL ()   09/21/21  22:04    


 


LDL Cholesterol Direct  47 mg/dL ()  L  09/21/21  22:04    


 


HDL Cholesterol  25 mg/dL (40-59)  L  09/21/21  22:04    


 


Cholesterol/HDL Ratio  3.40 %  09/21/21  22:04    


 


Urine Osmolality  157 Mosm/kg  09/23/21  18:46    


 


Urine Sodium  16 mmol/L  09/23/21  18:46    











Diamond/IV: 


                                        





Voiding Method                   External Female Catheter











Active Medications





- Current Medications


Current Medications: 














Generic Name Dose Route Start Last Admin





  Trade Name Freq  PRN Reason Stop Dose Admin


 


Acetaminophen  650 mg  09/22/21 03:00 





  Acetaminophen 325 Mg Tab  PO  





  Q4H PRN  





  Pain MILD(1-3)/Fever >100.5/HA  


 


Albuterol  2.5 mg  09/22/21 03:00 





  Albuterol 2.5 Mg/3 Ml Nebu  IH  





  Q4HRT PRN  





  Shortness Of Breath  


 


Albuterol/Ipratropium  1 ampul  09/22/21 08:00  09/27/21 08:11





  Ipratropium/Albuterol Sulfate 3 Ml Ampul.Neb  IH   1 ampul





  TIDRT DELL   Administration


 


Aspirin  325 mg  09/23/21 10:00  09/26/21 09:37





  Aspirin Ec 325 Mg Tab  PO   325 mg





  QDAY DELL   Administration


 


Atorvastatin Calcium  40 mg  09/22/21 22:00  09/26/21 21:04





  Atorvastatin 40 Mg Tab  PO   40 mg





  QHS DELL   Administration


 


Azithromycin  500 mg  09/23/21 10:00  09/26/21 09:38





  Azithromycin 250 Mg Tab  PO  09/27/21 10:01  500 mg





  DAILY DELL   Administration





  Protocol  


 


Benzonatate  100 mg  09/22/21 06:00  09/27/21 06:38





  Benzonatate 100 Mg Cap  PO   100 mg





  Q8HR DELL   Administration


 


Famotidine  10 mg  09/23/21 22:00  09/26/21 21:05





  Famotidine 10 Mg Tab  PO   10 mg





  BID DELL   Administration


 


Furosemide  60 mg  09/25/21 18:00  09/27/21 06:38





  Furosemide 40 Mg/4 Ml Inj  IV   60 mg





  0600,1800 DELL   Administration


 


Guaifenesin  200 mg  09/26/21 14:47 





  Guaifenesin 100 Mg/5 Ml Oral Liqd  PO  





  Q4H PRN  





  Cough  


 


Heparin Sodium (Porcine)  5,000 unit  09/22/21 06:00  09/27/21 06:38





  Heparin 5,000 Unit/1 Ml Vial  SUB-Q   5,000 unit





  Q8HR DELL   Administration


 


Hydromorphone HCl  0.5 mg  09/22/21 03:00 





  Hydromorphone 1 Mg/1 Ml Inj  IV  





  Q3H PRN  





  Pain , Severe (7-10)  


 


Methylprednisolone  4 mg  09/22/21 10:00  09/26/21 09:39





  Methylprednisolone 4 Mg Tab  PO   4 mg





  DAILY DELL   Administration


 


Nitroglycerin  0.4 mg  09/22/21 03:00 





  Nitroglycerin 0.4 Mg Tab Subl  SL  





  Q5M PRN  





  Chest Pain  


 


Nystatin  1 applic  09/22/21 18:00  09/26/21 21:05





  Nystatin Powder 15 Gm  TP   1 applic





  BID DELL   Administration


 


Ondansetron HCl  4 mg  09/22/21 03:00 





  Ondansetron 4 Mg/2 Ml Inj  IV  





  Q8H PRN  





  Nausea And Vomiting  


 


Oxycodone/Acetaminophen  1 tab  09/22/21 03:00  09/27/21 01:52





  Oxycodone /Acetaminophen 5-325mg Tab  PO   1 tab





  Q6H PRN   Administration





  Pain, Moderate (4-6)  


 


Sodium Chloride  10 ml  09/22/21 10:00  09/26/21 21:05





  Sodium Chloride 0.9% 10 Ml Flush Syringe  IV   10 ml





  BID DELL   Administration


 


Sodium Chloride  10 ml  09/22/21 03:00 





  Sodium Chloride 0.9% 10 Ml Flush Syringe  IV  





  PRN PRN  





  LINE FLUSH  


 


Sodium Chloride  1 gm  09/25/21 14:00  09/26/21 21:04





  Sodium Chloride 1 Gm Tab  PO   1 gm





  TID DELL   Administration


 


Tramadol HCl  50 mg  09/22/21 03:00 





  Tramadol 50 Mg Tab  PO  





  Q6H PRN  





  Pain, Moderate (4-6)  














Nutrition/Malnutrition Assess





- Dietary Evaluation


Nutrition/Malnutrition Findings: 


                                        





Nutrition Notes                                            Start:  09/22/21 

14:21


Freq:                                                      Status: Active       




Protocol:                                                                       




 Document     09/22/21 14:21  GB  (Rec: 09/22/21 14:37  GB  [id number])


 Nutrition Notes


     Need for Assessment generated from:         RN Referral


     Initial or Follow up                        Assessment


     Current Diagnosis                           Hypertension


     Other Pertinent Diagnosis                   Dyspnea, respiratory distress,


                                                 morbid obesity


     Current Diet                                Cardiac


     Labs/Tests                                  9/22: Na 134 low [id number]


                                                 Creatinine 1.8 elevated


                                                 [id number]


     Pertinent Medications                       azithromycin, fluid


                                                 restriction


     Height                                      5 ft 4 in


     Weight                                      272.155 kg


     Ideal Body Weight (kg)                      54.54


     BMI                                         102.9


     Intake Prior to Admission                   Good


     Weight change and time frame                Pt reports no significant


                                                 weight change.


     Weight Status                               Morbidly Obese


     Subjective/Other Information                Pt states she does want to


                                                 lose weight.  This writer, RD,


                                                 discussed with pt to select a


                                                 changeable habits to focus on


                                                 and make the changes a few


                                                 items at a time (examples if


                                                 eat out 5 nights change to 3


                                                 nights/ 1 fruit a day change


                                                 to 2 fruit a day).  Encouraged


                                                 pt to contact RD in Bariatric


                                                 center for outpatient


                                                 counseling.


     Percent of energy/protein needs met:        Meals and snacks provided meet


                                                 100% of estimated energy


                                                 needs.


     Burn                                        Absent


     Trauma                                      Absent


     GI Symptoms                                 None


     Food Allergy                                No


     Usual Diet at Home                          regular


     Skin Integrity/Comment                      skin risk 13


     Current % PO                                Good (%)


     Minimum of two criteria                     No


     #1


      Nutrition Diagnosis                        Overweight/obesity


      Comments:                                  Discussed diet/life style


                                                 changes using small goal


                                                 successes to advancement,


                                                 encouraged outpatient RD


                                                 counseling at bariatric center


      Etiology                                   dyspnea, respiratory distress


      As Evidenced by Signs and Symptoms         , %


     Is patient on ventilator?                   No


     Is Patient Ambulatory and/or Out of Bed     No


     REE-([facility]-confined to bed)      4093.764


     Kcal/Kg value to use for calculation        10


     Approximate Energy Requirements Using       2722


      kcal/Kg                                    


     Calculation Used for Recommendations        Kcal/kg


     Additional Notes                            Protein 0.6 g/kg r/t BMI over


                                                 50: 163g


                                                 Fluids: 1ml/kcal or per MD


 Nutrition Intervention


     Change Diet Order:                          continue with current diet


     Nutrition Support:                          n/a


     Add Supplement/Snack (indicate name/kcal    n/a


      /protein )                                 


     Teaching Recipient                          Patient


     Learning Readiness                          Good


     Teaching Methods                            Discussion


     Response to Teaching                        Verbalize understanding


     Barriers to Learning                        Motivation,Emotional,Social


     Patient aware of follow up options          Yes


     Actions To Overcome Barriers                Collaboration with Other


                                                 Providers


     Goal #1                                     Pt to contact and make


                                                 appointment with outpatient RD


                                                 in bariatric center


     Goal #2                                     weight to decrease -1% during


                                                 LOS


     Follow-Up By:                               09/27/21

## 2021-09-27 NOTE — PROGRESS NOTE
Subjective


Date of service: 09/27/21


Principal diagnosis: Acute on chronic renal insufficiency


Interval history: 





Assessment


Acute kidney injury, unknown baseline. Renal ultrasound notes poor visualization

due to body habitus.


Hypervolemic Hyponatremia


Hyperkalemia


Hypocalcemia


Morbid obesity











Recommendations


Continue sodium tabs and diuresis


cr is stable today


echo noted, likely diastolic disease


rec lasix 40mg po bid


rise in bun noted


S/p Kayexalate on admission


Maintain MAP more than 65


Hold ACE/ARB at this time


Hold diuretics given soft pressures


Renally dose medications


Avoid nephrotoxins


Renal diet


home soon from renal standpoint, follow up 2 weeks








Subjective


Date of service: 09/26/21


Principal diagnosis: Acute on chronic renal insufficiency


Interval history: 


Patient was seen for her renal issues


Nursing, interdisciplinary and consult notes were reviewed


Vitals, input and output, medications and labs were reviewed


Remains on NC








Objective





- Exam


Narrative Exam: 


General: Morbid obesity


HEENT: Oral mucosa moist


Neck: Supple, no JVD


Chest: Clear to auscultation bilaterally


Heart: RRR, S1 and S2, no pericardial rub


Abdomen: Soft, nontender, no renal bruit


Extremity: No peripheral cyanosis, edema


Neurological: Awake and alert


Dermatology: Unable to assess


Psych: Calm and cooperative


Musculoskeletal: No joint effusion





Objective





- Vital Signs


Vital signs: 


                               Vital Signs - 12hr











  09/26/21 09/27/21 09/27/21





  23:04 00:00 03:20


 


Temperature   98.1 F


 


Pulse Rate 103 H  103 H


 


Pulse Rate [   





Anterior   





Bilateral   





Throughout]   


 


Pulse Rate [  104 H 





From Monitor]   


 


Respiratory 20  24





Rate   


 


Respiratory   





Rate [Anterior   





Bilateral   





Throughout]   


 


Blood Pressure 128/66  114/72


 


O2 Sat by Pulse 97 91 43 L





Oximetry   














  09/27/21 09/27/21 09/27/21





  04:03 04:33 08:11


 


Temperature   


 


Pulse Rate   


 


Pulse Rate [   104 H





Anterior   





Bilateral   





Throughout]   


 


Pulse Rate [   





From Monitor]   


 


Respiratory   





Rate   


 


Respiratory   20





Rate [Anterior   





Bilateral   





Throughout]   


 


Blood Pressure   


 


O2 Sat by Pulse 100 100 98





Oximetry   














- Lab





                                 09/26/21 14:11





                                 09/27/21 05:03


                             Most recent lab results











Calcium  7.8 mg/dL (8.4-10.2)  L  09/27/21  05:03    


 


Urine Sodium  16 mmol/L  09/23/21  18:46    














Medications & Allergies





- Medications


Allergies/Adverse Reactions: 


                                    Allergies





No Known Allergies Allergy (Unverified 07/13/17 11:17)


   








Home Medications: 


                                Home Medications











 Medication  Instructions  Recorded  Confirmed  Last Taken  Type


 


Acetaminophen [Acetaminophen TAB] 325 mg PO Q4H PRN #30 tablet 12/14/17 01/22/21

Unknown Rx


 


ALBUTEROL NEB's [Proventil 0.083% 2.5 mg IH Q3HRT PRN #30 day 01/24/21  Unknown 

Rx





NEBS]     


 


Albuterol Mdi (or & Nicu Only) 2 puff IH QID PRN #1 inhalation 01/24/21  Unknown

Rx





[ProAir HFA Inhaler]     


 


Azithromycin [Zithromax Z-JAVIER] 0 mg PO DAILY #1 tab 01/24/21  Unknown Rx


 


Benzonatate [Tessalon Perles] 100 mg PO Q8HR #15 capsule 01/24/21  Unknown Rx


 


Famotidine [Pepcid] 20 mg PO BID #14 tablet 01/24/21  Unknown Rx


 


Ipratropium/Albuterol Sulfate 1 ampul IH TIDRT #30 day 01/24/21  Unknown Rx





[DUONEB *Not for PRN Use*]     


 


hydroCHLOROthiazide [HCTZ] 25 mg PO QDAY  tablet 01/24/21  Unknown Rx


 


hydroCHLOROthiazide [HCTZ] 25 mg PO QDAY #30 tablet 01/24/21  Unknown Rx


 


methylPREDNISolone [Medrol 4MG 4 mg PO DAILY #1 tab.ds.pk 01/24/21  Unknown Rx





DOSEPAK (21 tabs)]     











Active Medications: 














Generic Name Dose Route Start Last Admin





  Trade Name Freq  PRN Reason Stop Dose Admin


 


Acetaminophen  650 mg  09/22/21 03:00 





  Acetaminophen 325 Mg Tab  PO  





  Q4H PRN  





  Pain MILD(1-3)/Fever >100.5/HA  


 


Albuterol  2.5 mg  09/22/21 03:00 





  Albuterol 2.5 Mg/3 Ml Nebu  IH  





  Q4HRT PRN  





  Shortness Of Breath  


 


Albuterol/Ipratropium  1 ampul  09/22/21 08:00  09/27/21 08:11





  Ipratropium/Albuterol Sulfate 3 Ml Ampul.Neb  IH   1 ampul





  TIDRT DELL   Administration


 


Aspirin  325 mg  09/23/21 10:00  09/26/21 09:37





  Aspirin Ec 325 Mg Tab  PO   325 mg





  QDAY DELL   Administration


 


Atorvastatin Calcium  40 mg  09/22/21 22:00  09/26/21 21:04





  Atorvastatin 40 Mg Tab  PO   40 mg





  QHS DELL   Administration


 


Benzonatate  100 mg  09/22/21 06:00  09/27/21 06:38





  Benzonatate 100 Mg Cap  PO   100 mg





  Q8HR DELL   Administration


 


Famotidine  10 mg  09/23/21 22:00  09/26/21 21:05





  Famotidine 10 Mg Tab  PO   10 mg





  BID DELL   Administration


 


Furosemide  60 mg  09/25/21 18:00  09/27/21 06:38





  Furosemide 40 Mg/4 Ml Inj  IV   60 mg





  0600,1800 DELL   Administration


 


Guaifenesin  200 mg  09/26/21 14:47 





  Guaifenesin 100 Mg/5 Ml Oral Liqd  PO  





  Q4H PRN  





  Cough  


 


Heparin Sodium (Porcine)  5,000 unit  09/22/21 06:00  09/27/21 06:38





  Heparin 5,000 Unit/1 Ml Vial  SUB-Q   5,000 unit





  Q8HR DELL   Administration


 


Hydromorphone HCl  0.5 mg  09/22/21 03:00 





  Hydromorphone 1 Mg/1 Ml Inj  IV  





  Q3H PRN  





  Pain , Severe (7-10)  


 


Methylprednisolone  4 mg  09/22/21 10:00  09/26/21 09:39





  Methylprednisolone 4 Mg Tab  PO   4 mg





  DAILY DELL   Administration


 


Nitroglycerin  0.4 mg  09/22/21 03:00 





  Nitroglycerin 0.4 Mg Tab Subl  SL  





  Q5M PRN  





  Chest Pain  


 


Nystatin  1 applic  09/22/21 18:00  09/26/21 21:05





  Nystatin Powder 15 Gm  TP   1 applic





  BID DELL   Administration


 


Ondansetron HCl  4 mg  09/22/21 03:00 





  Ondansetron 4 Mg/2 Ml Inj  IV  





  Q8H PRN  





  Nausea And Vomiting  


 


Oxycodone/Acetaminophen  1 tab  09/22/21 03:00  09/27/21 01:52





  Oxycodone /Acetaminophen 5-325mg Tab  PO   1 tab





  Q6H PRN   Administration





  Pain, Moderate (4-6)  


 


Sodium Chloride  10 ml  09/22/21 10:00  09/26/21 21:05





  Sodium Chloride 0.9% 10 Ml Flush Syringe  IV   10 ml





  BID DELL   Administration


 


Sodium Chloride  10 ml  09/22/21 03:00 





  Sodium Chloride 0.9% 10 Ml Flush Syringe  IV  





  PRN PRN  





  LINE FLUSH  


 


Sodium Chloride  1 gm  09/25/21 14:00  09/26/21 21:04





  Sodium Chloride 1 Gm Tab  PO   1 gm





  TID DELL   Administration


 


Tramadol HCl  50 mg  09/22/21 03:00 





  Tramadol 50 Mg Tab  PO  





  Q6H PRN  





  Pain, Moderate (4-6)

## 2021-09-28 ENCOUNTER — OUT OF OFFICE VISIT (OUTPATIENT)
Dept: URBAN - METROPOLITAN AREA MEDICAL CENTER 41 | Facility: MEDICAL CENTER | Age: 31
End: 2021-09-28
Payer: COMMERCIAL

## 2021-09-28 DIAGNOSIS — R74.8 ABNORMAL ALKALINE PHOSPHATASE TEST: ICD-10-CM

## 2021-09-28 DIAGNOSIS — R93.2 ABN FIND-BILIARY TRACT: ICD-10-CM

## 2021-09-28 LAB
ALBUMIN SERPL-MCNC: 3.1 G/DL (ref 3.9–5)
ALT SERPL-CCNC: 835 UNITS/L (ref 7–56)
BUN SERPL-MCNC: 68 MG/DL (ref 7–17)
BUN/CREAT SERPL: 36 %
CALCIUM SERPL-MCNC: 8.1 MG/DL (ref 8.4–10.2)
HCT VFR BLD CALC: 29.1 % (ref 30.3–42.9)
HEMOLYSIS INDEX: 7
HGB BLD-MCNC: 8.5 GM/DL (ref 10.1–14.3)
MCHC RBC AUTO-ENTMCNC: 29 % (ref 30–34)
MCV RBC AUTO: 72 FL (ref 79–97)
PLATELET # BLD: 294 K/MM3 (ref 140–440)
RBC # BLD AUTO: 4.03 M/MM3 (ref 3.65–5.03)

## 2021-09-28 PROCEDURE — 99222 1ST HOSP IP/OBS MODERATE 55: CPT | Performed by: INTERNAL MEDICINE

## 2021-09-28 PROCEDURE — G8427 DOCREV CUR MEDS BY ELIG CLIN: HCPCS | Performed by: INTERNAL MEDICINE

## 2021-09-28 PROCEDURE — 99232 SBSQ HOSP IP/OBS MODERATE 35: CPT | Performed by: INTERNAL MEDICINE

## 2021-09-28 RX ADMIN — HEPARIN SODIUM SCH UNIT: 5000 INJECTION, SOLUTION INTRAVENOUS; SUBCUTANEOUS at 21:03

## 2021-09-28 RX ADMIN — HEPARIN SODIUM SCH UNIT: 5000 INJECTION, SOLUTION INTRAVENOUS; SUBCUTANEOUS at 14:32

## 2021-09-28 RX ADMIN — FUROSEMIDE SCH: 40 TABLET ORAL at 05:46

## 2021-09-28 RX ADMIN — NYSTATIN SCH APPLIC: 100000 POWDER TOPICAL at 16:47

## 2021-09-28 RX ADMIN — HEPARIN SODIUM SCH UNIT: 5000 INJECTION, SOLUTION INTRAVENOUS; SUBCUTANEOUS at 05:36

## 2021-09-28 RX ADMIN — ASPIRIN SCH MG: 325 TABLET, COATED ORAL at 11:31

## 2021-09-28 RX ADMIN — Medication SCH ML: at 11:31

## 2021-09-28 RX ADMIN — IPRATROPIUM BROMIDE AND ALBUTEROL SULFATE SCH: .5; 3 SOLUTION RESPIRATORY (INHALATION) at 18:03

## 2021-09-28 RX ADMIN — BENZONATATE SCH: 100 CAPSULE ORAL at 05:46

## 2021-09-28 RX ADMIN — FAMOTIDINE SCH MG: 10 TABLET ORAL at 11:31

## 2021-09-28 RX ADMIN — BENZONATATE SCH MG: 100 CAPSULE ORAL at 14:32

## 2021-09-28 RX ADMIN — HYDROMORPHONE HYDROCHLORIDE PRN MG: 1 INJECTION, SOLUTION INTRAMUSCULAR; INTRAVENOUS; SUBCUTANEOUS at 04:16

## 2021-09-28 RX ADMIN — FAMOTIDINE SCH MG: 10 TABLET ORAL at 21:02

## 2021-09-28 RX ADMIN — BENZONATATE SCH MG: 100 CAPSULE ORAL at 21:02

## 2021-09-28 RX ADMIN — NYSTATIN SCH APPLIC: 100000 POWDER TOPICAL at 21:02

## 2021-09-28 NOTE — CONSULTATION
History of Present Illness


Consult date: 09/28/21


Reason for consult: wound care


Chief complaint: 





Abdominal wound





- History of present illness


History of present illness: 





31-year-old female history of severe morbid obesity, obesity hypoventilation 

syndrome who was admitted to the hospital with an acute respiratory 

exacerbation. Patient is being managed in the [facility]. Patient was noted to have 

bloody drainage from below her pannus today with in the area. Surgery is 

consulted for evaluation. Patient states she has had a longstanding history of 

wounds in this area and has not sought out care for this issue. Patient is 

bedbound.





Past History


Past Medical History: COPD, hypertension, other (Asthma, morbid obesity, sleep 

apnea)


Social history: no significant social history


Family history: CAD, diabetes





Medications and Allergies


                                    Allergies











Allergy/AdvReac Type Severity Reaction Status Date / Time


 


No Known Allergies Allergy   Unverified 07/13/17 11:17











                                Home Medications











 Medication  Instructions  Recorded  Confirmed  Last Taken  Type


 


Acetaminophen [Acetaminophen TAB] 325 mg PO Q4H PRN #30 tablet 12/14/17 01/22/21

Unknown Rx


 


ALBUTEROL NEB's [Proventil 0.083% 2.5 mg IH Q3HRT PRN #30 day 01/24/21  Unknown 

Rx





NEBS]     


 


Albuterol Mdi (or & Nicu Only) 2 puff IH QID PRN #1 inhalation 01/24/21  Unknown

Rx





[ProAir HFA Inhaler]     


 


Azithromycin [Zithromax Z-JAVIER] 0 mg PO DAILY #1 tab 01/24/21  Unknown Rx


 


Benzonatate [Tessalon Perles] 100 mg PO Q8HR #15 capsule 01/24/21  Unknown Rx


 


Famotidine [Pepcid] 20 mg PO BID #14 tablet 01/24/21  Unknown Rx


 


Ipratropium/Albuterol Sulfate 1 ampul IH TIDRT #30 day 01/24/21  Unknown Rx





[DUONEB *Not for PRN Use*]     


 


hydroCHLOROthiazide [HCTZ] 25 mg PO QDAY  tablet 01/24/21  Unknown Rx


 


hydroCHLOROthiazide [HCTZ] 25 mg PO QDAY #30 tablet 01/24/21  Unknown Rx


 


methylPREDNISolone [Medrol 4MG 4 mg PO DAILY #1 tab.ds.pk 01/24/21  Unknown Rx





DOSEPAK (21 tabs)]     











Active Meds: 


Active Medications





Acetaminophen (Acetaminophen 325 Mg Tab)  650 mg PO Q4H PRN


   PRN Reason: Pain MILD(1-3)/Fever >100.5/HA


Albuterol (Albuterol 2.5 Mg/3 Ml Nebu)  2.5 mg IH Q4HRT PRN


   PRN Reason: Shortness Of Breath


Albuterol/Ipratropium (Ipratropium/Albuterol Sulfate 3 Ml Ampul.Neb)  1 ampul IH

TIDRT [facility]


   Last Admin: 09/27/21 21:56 Dose:  Not Given


   Documented by: 


Aspirin (Aspirin Ec 325 Mg Tab)  325 mg PO QDAY [facility]


   Last Admin: 09/28/21 11:31 Dose:  325 mg


   Documented by: 


Atorvastatin Calcium (Atorvastatin 40 Mg Tab)  40 mg PO QHS [facility]


   Last Admin: 09/27/21 23:25 Dose:  Not Given


   Documented by: 


Benzonatate (Benzonatate 100 Mg Cap)  100 mg PO Q8HR [facility]


   Last Admin: 09/28/21 14:32 Dose:  100 mg


   Documented by: 


Famotidine (Famotidine 10 Mg Tab)  10 mg PO BID [facility]


   Last Admin: 09/28/21 11:31 Dose:  10 mg


   Documented by: 


Furosemide (Furosemide 40 Mg Tab)  40 mg PO 0600,1800 [facility]


   Last Admin: 09/28/21 05:46 Dose:  Not Given


   Documented by: 


Guaifenesin (Guaifenesin 100 Mg/5 Ml Oral Liqd)  200 mg PO Q4H PRN


   PRN Reason: Cough


Heparin Sodium (Porcine) (Heparin 5,000 Unit/1 Ml Vial)  5,000 unit SUB-Q Q8HR 

[facility]


   Last Admin: 09/28/21 14:32 Dose:  5,000 unit


   Documented by: 


Hydromorphone HCl (Hydromorphone 1 Mg/1 Ml Inj)  0.5 mg IV Q3H PRN


   PRN Reason: Pain , Severe (7-10)


   Last Admin: 09/28/21 04:16 Dose:  0.5 mg


   Documented by: 


Methylprednisolone (Methylprednisolone 4 Mg Tab)  4 mg PO DAILY [facility]


   Last Admin: 09/28/21 11:56 Dose:  4 mg


   Documented by: 


Nitroglycerin (Nitroglycerin 0.4 Mg Tab Subl)  0.4 mg SL Q5M PRN


   PRN Reason: Chest Pain


Nystatin (Nystatin Powder 15 Gm)  1 applic TP BID [facility]


   Last Admin: 09/27/21 23:29 Dose:  1 applic


   Documented by: 


Ondansetron HCl (Ondansetron 4 Mg/2 Ml Inj)  4 mg IV Q8H PRN


   PRN Reason: Nausea And Vomiting


Oxycodone/Acetaminophen (Oxycodone /Acetaminophen 5-325mg Tab)  1 tab PO Q6H PRN


   PRN Reason: Pain, Moderate (4-6)


   Last Admin: 09/27/21 01:52 Dose:  1 tab


   Documented by: 


Sodium Chloride (Sodium Chloride 0.9% 10 Ml Flush Syringe)  10 ml IV BID DELL


   Last Admin: 09/28/21 11:31 Dose:  10 ml


   Documented by: 


Sodium Chloride (Sodium Chloride 0.9% 10 Ml Flush Syringe)  10 ml IV PRN PRN


   PRN Reason: LINE FLUSH


Tramadol HCl (Tramadol 50 Mg Tab)  50 mg PO Q6H PRN


   PRN Reason: Pain, Moderate (4-6)











Review of Systems


All systems: negative (10 point ROS performed and negative except for that 

listed in HPI)





Exam


                                   Vital Signs











Pulse Resp Pulse Ox


 


 116 H  8 L  92 


 


 09/21/21 21:41  09/21/21 21:41  09/21/21 21:41











Narrative exam: 





Gen.: Awake, alert, oriented x3.  No apparent distress


ENT: Trachea midline.  No lymphadenopathy.  No scleral icterus or conjunctival 

pallor


CV: S1, S2 present


Respiratory: No audible wheezes


Abdomen: Soft, obese, nontender. There is severe lymphedema involving the lower 

aspect of the abdominal wall, large pannus. There are 2 wounds of the skin at 

under the pannus towards the left of the midline. The wound beds are pink with 

minimal hyper granulation tissue. There is serous drainage from the area of lym

phedema. No obvious signs of infection. Both wounds were packed with 1 piece of 

Mesalt packing. A calcium alginate pad was placed over the area of serous 

drainage and covered with an ABD pad. The pannus was elevated with a rolled up 

towel.


Extremities: No clubbing, cyanosis, edema





Results





- Labs





                                 09/28/21 04:27





                                 09/28/21 04:27


                              Abnormal lab results











  09/27/21 09/27/21 09/27/21 Range/Units





  16:37 18:40 23:55 


 


Hgb     (10.1-14.3)  gm/dl


 


Hct     (30.3-42.9)  %


 


MCV     (79-97)  fl


 


MCH     (28-32)  pg


 


MCHC     (30-34)  %


 


RDW     (13.2-15.2)  %


 


ABG pH   7.237 L   (7.350-7.450)  pH Units


 


ABG HCO3   36.2 H   (20.0-26.0)  mmol/L


 


ABG Base Excess   7.4 H   (-2.0-3.0)  mmol/L


 


ABG Hemoglobin   7.8 L   (12.0-16.0)  gm/dl


 


Oxyhemoglobin   93.7 L   (95.0-99.0)  %


 


Sodium     (137-145)  mmol/L


 


Chloride     ()  mmol/L


 


Carbon Dioxide     (22-30)  mmol/L


 


BUN     (7-17)  mg/dL


 


Creatinine     (0.6-1.2)  mg/dL


 


Glucose     ()  mg/dL


 


POC Glucose  132 H   135 H  ()  mg/dL


 


Calcium     (8.4-10.2)  mg/dL


 


Total Bilirubin     (0.1-1.2)  mg/dL


 


AST     (5-40)  units/L


 


ALT     (7-56)  units/L


 


Total Protein     (6.3-8.2)  g/dL


 


Albumin     (3.9-5)  g/dL














  09/28/21 09/28/21 Range/Units





  04:27 04:27 


 


Hgb  8.5 L   (10.1-14.3)  gm/dl


 


Hct  29.1 L   (30.3-42.9)  %


 


MCV  72 L   (79-97)  fl


 


MCH  21 L   (28-32)  pg


 


MCHC  29 L   (30-34)  %


 


RDW  24.9 H   (13.2-15.2)  %


 


ABG pH    (7.350-7.450)  pH Units


 


ABG HCO3    (20.0-26.0)  mmol/L


 


ABG Base Excess    (-2.0-3.0)  mmol/L


 


ABG Hemoglobin    (12.0-16.0)  gm/dl


 


Oxyhemoglobin    (95.0-99.0)  %


 


Sodium   135 L  (137-145)  mmol/L


 


Chloride   93.5 L  ()  mmol/L


 


Carbon Dioxide   33 H  (22-30)  mmol/L


 


BUN   68 H  (7-17)  mg/dL


 


Creatinine   1.9 H  (0.6-1.2)  mg/dL


 


Glucose   143 H  ()  mg/dL


 


POC Glucose    ()  mg/dL


 


Calcium   8.1 L  (8.4-10.2)  mg/dL


 


Total Bilirubin   1.50 H  (0.1-1.2)  mg/dL


 


AST   494 H  (5-40)  units/L


 


ALT   835 H  (7-56)  units/L


 


Total Protein   9.5 H  (6.3-8.2)  g/dL


 


Albumin   3.1 L  (3.9-5)  g/dL








                                 Diabetes panel











  09/28/21 Range/Units





  04:27 


 


Sodium  135 L  (137-145)  mmol/L


 


Potassium  4.4  (3.6-5.0)  mmol/L


 


Chloride  93.5 L  ()  mmol/L


 


Carbon Dioxide  33 H  (22-30)  mmol/L


 


BUN  68 H  (7-17)  mg/dL


 


Creatinine  1.9 H  (0.6-1.2)  mg/dL


 


Glucose  143 H  ()  mg/dL


 


Calcium  8.1 L  (8.4-10.2)  mg/dL


 


AST  494 H  (5-40)  units/L


 


ALT  835 H  (7-56)  units/L


 


Alkaline Phosphatase  95  ()  units/L


 


Total Protein  9.5 H  (6.3-8.2)  g/dL


 


Albumin  3.1 L  (3.9-5)  g/dL








                                  Calcium panel











  09/28/21 Range/Units





  04:27 


 


Calcium  8.1 L  (8.4-10.2)  mg/dL


 


Albumin  3.1 L  (3.9-5)  g/dL








                                 Pituitary panel











  09/28/21 Range/Units





  04:27 


 


Sodium  135 L  (137-145)  mmol/L


 


Potassium  4.4  (3.6-5.0)  mmol/L


 


Chloride  93.5 L  ()  mmol/L


 


Carbon Dioxide  33 H  (22-30)  mmol/L


 


BUN  68 H  (7-17)  mg/dL


 


Creatinine  1.9 H  (0.6-1.2)  mg/dL


 


Glucose  143 H  ()  mg/dL


 


Calcium  8.1 L  (8.4-10.2)  mg/dL








                                  Adrenal panel











  09/28/21 Range/Units





  04:27 


 


Sodium  135 L  (137-145)  mmol/L


 


Potassium  4.4  (3.6-5.0)  mmol/L


 


Chloride  93.5 L  ()  mmol/L


 


Carbon Dioxide  33 H  (22-30)  mmol/L


 


BUN  68 H  (7-17)  mg/dL


 


Creatinine  1.9 H  (0.6-1.2)  mg/dL


 


Glucose  143 H  ()  mg/dL


 


Calcium  8.1 L  (8.4-10.2)  mg/dL


 


Total Bilirubin  1.50 H  (0.1-1.2)  mg/dL


 


AST  494 H  (5-40)  units/L


 


ALT  835 H  (7-56)  units/L


 


Alkaline Phosphatase  95  ()  units/L


 


Total Protein  9.5 H  (6.3-8.2)  g/dL


 


Albumin  3.1 L  (3.9-5)  g/dL














Assessment and Plan





31-year-old female with





1. Super morbid obesity


2. Lymphedema of abdominal wall


3. Open wound of abdominal wall without penetration into abdominal cavity





Plan:


1. Pack wounds daily with mesalt. Need to maintain dry environment


2. Elevate pannus


3. Optimize nutrition





Guarded prognosis for wound healing due to body habitus and location of wounds.


No acute surgical intervention.





Thank you for this consultation. Please call with any questions or concerns.





Evaluation and treatment of this patient was during the time of the national and

state emergency arising from COVID19 coronavirus pandemic. Treatment and 

procedures performed meet the current and available best practice and guidelines

for patient during the COVID pandemic.

## 2021-09-28 NOTE — ULTRASOUND REPORT
LIMITED ABDOMINAL ULTRASOUND



HISTORY: Liver failure.



COMPARISON: 9/23/2021.



TECHNIQUE: Multiple real-time ultrasonographic grayscale images were obtained of the abdomen.



FINDINGS:



Exam is limited due to patient's body habitus and limited range of motion. Technologist reports these
 are the best images possible under current conditions.



Limited evaluation of liver shows no discrete hepatic lesion. The contour liver appears slightly nodu
le which is a nonspecific finding, but can be seen in the setting of cirrhosis. The common bile duct 
is within normal limits measuring 4.6 mm.



The aorta measures up to 2.3 cm within its proximal extent, within normal limits.



The gallbladder and right kidney are not well visualized.



Free fluid:  Minimal ascites detected.



Additional findings: None.



IMPRESSION:



Limited ultrasound of the abdomen without acute findings.



The liver is poorly visualized, but appears to demonstrate slightly nodular contour. This is a nonspe
cific finding, but can be seen in the setting of cirrhosis. Recommend clinical correlation.



Signer Name: Brendan Driver MD 

Signed: 9/28/2021 10:54 AM

Workstation Name: [id number]

## 2021-09-28 NOTE — PROGRESS NOTE
Assessment and Plan





31 years old female with history of morbid obesity, hypertension, asthma , sleep

apnea was brought to the emergency room because of shortness of breath, edema, 

generalized malaise, fatigue, and weakness for last couple of days.  She states 

she just does not feel well.  She has a headache.  She states her legs are 

swollen.  She feels as though she is retaining fluid.  She states that she went 

to her regular doctors on the 13th.  They tested her for Covid.  It was 

negative.  She was told to go see a cardiologist.  She is not seen a car

diologist as of yet.  She came in here because she was not feeling well and did 

not know what to do.  She has had no sick contacts.  








In the emergency room patient is found to have acute CHF exacerbation patient 

proBNP is 7573 also patient cardiac enzyme is elevated troponin is 0.043. 

Patient blood glucose also 55 patient is hypoglycemic





Patient awake this morning. Still confused some what. Patient presently on 4 

litres O2. O2 saturation 98%. Patient eating. Patient off the BIPAP for eating. 

Recommend to place her back on BIPAP after finish eating. BIPAP 24/12, rate 20, 

FIO2 30% stand by in the room.


Patient afebrile. No leukocytosis to day. Blood pressure 160/80. pulse 99.





Patient is on Albuterol/atrovent aerosol treatments, S/C Heparin, Famotidine and

PO Medrol.





I spent critical care time of 35 minutes, reviewing the chart, Examine the 

patient, Review lab results, talking to respiratory therapy and nursing staff 

and work out plan of treatment in this morbidly Obese, critically ill Patient.








- Patient Problems


(1) Acute respiratory failure with hypoxia and hypercapnia


Current Visit: Yes   Status: Acute   


Plan to address problem: 


BIPAP 24/12, rate 20, FIO2 30%


 Albuterol/atrovent aerosol treatments.


 Continue medrol


 Contibue S/C Heparin.


 Continue famotidine.


 








(2) Acute exacerbation of CHF (congestive heart failure)


Current Visit: Yes   Status: Acute   


Plan to address problem: 


Management as per cardiology.








(3) Hypertension


Current Visit: Yes   Status: Acute   


Plan to address problem: 


Management as per primary care.








(4) Morbid obesity with BMI of 70 and over, adult


Current Visit: No   Status: Acute   


Plan to address problem: 


Weight reduction diet.


 Mobility protocol


 Fall precautions.








(5) Obesity hypoventilation syndrome


Current Visit: No   Status: Acute   


Plan to address problem: 


BIPAP 24/12, rate 20, FIO2 30%.








(6) Sleep apnea


Current Visit: No   Status: Acute   


Plan to address problem: 


BIPAP 24/12, rate 20, FIO2 30%.


 Sleep study if she can as out patient.








Subjective


Date of service: 09/28/21


Principal diagnosis: Acute on chronic renal insufficiency


Interval history: 





31 years old female with history of morbid obesity, hypertension, asthma COPD 

sleep apnea was brought to the emergency room because of shortness of breath, 

edema, generalized malaise, fatigue, and weakness for last couple of days.  She 

states she just does not feel well.  She has a headache.  She states her legs 

are swollen.  She feels as though she is retaining fluid.  She states that she 

went to her regular doctors on the 13th.  They tested her for Covid.  It was 

negative.  She was told to go see a cardiologist.  She is not seen a 

cardiologist as of yet.  She came in here because she was not feeling well and 

did not know what to do.  She has had no sick contacts.  








In the emergency room patient is found to have acute CHF exacerbation patient 

proBNP is 7573 also patient cardiac enzyme is elevated troponin is 0.043. P

atient blood glucose also 55 patient is hypoglycemic





Patient awake this morning. Still confused some what. Patient presently on 4 

litres O2. O2 saturation 98%. Patient eating. Patient off the BIPAP for eating. 

Recommend to place her back on BIPAP after finish eating. BIPAP 24/12, rate 20, 

FIO2 30% stand by in the room.


Patient afebrile. No leukocytosis to day. Blood pressure 160/80. pulse 99.





Patient is on Albuterol/atrovent aerosol treatments, S/C Heparin, Famotidine and

PO Medrol.








Objective


                               Vital Signs - 12hr











  09/28/21 09/28/21 09/28/21





  00:00 04:00 08:00


 


Temperature 98.2 F 98.4 F 98 F


 


Pulse Rate [ 108 H  





From Monitor]   


 


O2 Sat by Pulse 99  





Oximetry   











Constitutional: no acute distress, alert, other (Morbidly Obese on 4 litres O2.)


Eyes: non-icteric


ENT: oropharynx moist


Neck: supple, no lymphadenopathy, no JVD


Effort: mildly labored


Ascultation: Bilateral: diminished breath sounds


Cardiovascular: regular rate and rhythm


Gastrointestinal: normoactive bowel sounds, soft, non-tender


Integumentary: decubitus ulcer, other (Stasis dermatititis on legs and abdomen.)


Neurologic: pupils equal and round, unable to assess


Psychiatric: other (Can not assess.)


CBC and BMP: 


                                 09/29/21 04:55





                                 09/29/21 04:55


ABG, PT/INR, D-dimer: 


ABG











ABG pH  7.237 pH Units (7.350-7.450)  L  09/27/21  18:40    


 


ABG pCO2  86.9 mm Hg  09/27/21  18:40    


 


ABG pO2  81.8 mm Hg (80.0-90.0)   09/27/21  18:40    


 


ABG O2 Saturation  95.9 % (95.0-99.0)   09/27/21  18:40    








Abnormal lab findings: 


                                  Abnormal Labs











  09/21/21 09/21/21 09/22/21





  22:04 22:04 00:18


 


WBC  17.7 H  


 


Hgb  9.1 L  


 


Hct   


 


MCV  77 L  


 


MCH  21 L  


 


MCHC  27 L  


 


RDW  24.3 H  


 


Seg Neuts % (Manual)   


 


Lymphocytes % (Manual)   


 


Nucleated RBC %   


 


Seg Neutrophils # Man   


 


Lymphocytes # (Manual)   


 


Monocytes # (Manual)   


 


ABG pH   


 


ABG pO2   


 


ABG HCO3   


 


ABG Base Excess   


 


ABG Hemoglobin   


 


Oxyhemoglobin   


 


Sodium   135 L 


 


Potassium   


 


Chloride   91.0 L 


 


Carbon Dioxide   17 L 


 


BUN   


 


Creatinine   1.4 H 


 


Glucose   55 L 


 


POC Glucose    49 L


 


Calcium   


 


Ionized Calcium   


 


Total Bilirubin   


 


AST   


 


ALT   


 


Total Creatine Kinase   


 


Troponin T   0.043 H 


 


NT-Pro-B Natriuret Pep   7573 H 


 


Total Protein   


 


Albumin   


 


LDL Cholesterol Direct   47 L 


 


HDL Cholesterol   25 L 














  09/22/21 09/22/21 09/22/21





  04:03 04:03 09:22


 


WBC  24.3 H  


 


Hgb  9.3 L  


 


Hct   


 


MCV  74 L  


 


MCH  21 L  


 


MCHC  29 L  


 


RDW  23.1 H  


 


Seg Neuts % (Manual)  78.0 H  


 


Lymphocytes % (Manual)  7.0 L  


 


Nucleated RBC %  1.0 H  


 


Seg Neutrophils # Man  19.0 H  


 


Lymphocytes # (Manual)   


 


Monocytes # (Manual)  1.0 H  


 


ABG pH   


 


ABG pO2   


 


ABG HCO3   


 


ABG Base Excess   


 


ABG Hemoglobin   


 


Oxyhemoglobin   


 


Sodium   134 L 


 


Potassium   


 


Chloride   91.2 L 


 


Carbon Dioxide   


 


BUN   


 


Creatinine   1.8 H 


 


Glucose   


 


POC Glucose   


 


Calcium   


 


Ionized Calcium   


 


Total Bilirubin   


 


AST   


 


ALT   


 


Total Creatine Kinase   


 


Troponin T    0.099 H


 


NT-Pro-B Natriuret Pep   


 


Total Protein   


 


Albumin   


 


LDL Cholesterol Direct   


 


HDL Cholesterol   














  09/22/21 09/22/21 09/23/21





  13:03 20:28 04:52


 


WBC    20.9 H


 


Hgb    8.5 L


 


Hct    30.0 L


 


MCV    73 L


 


MCH    21 L


 


MCHC    28 L


 


RDW    23.9 H


 


Seg Neuts % (Manual)    77.0 H


 


Lymphocytes % (Manual)    1.0 L


 


Nucleated RBC %    1.0 H


 


Seg Neutrophils # Man    16.1 H


 


Lymphocytes # (Manual)    0.2 L


 


Monocytes # (Manual)   


 


ABG pH   


 


ABG pO2   


 


ABG HCO3   


 


ABG Base Excess   


 


ABG Hemoglobin   


 


Oxyhemoglobin   


 


Sodium   


 


Potassium   


 


Chloride   


 


Carbon Dioxide   


 


BUN   


 


Creatinine   


 


Glucose   


 


POC Glucose   115 H 


 


Calcium   


 


Ionized Calcium   


 


Total Bilirubin   


 


AST   


 


ALT   


 


Total Creatine Kinase   


 


Troponin T  0.078 H D  


 


NT-Pro-B Natriuret Pep   


 


Total Protein   


 


Albumin   


 


LDL Cholesterol Direct   


 


HDL Cholesterol   














  09/23/21 09/23/21 09/23/21





  04:52 08:46 11:50


 


WBC   


 


Hgb   


 


Hct   


 


MCV   


 


MCH   


 


MCHC   


 


RDW   


 


Seg Neuts % (Manual)   


 


Lymphocytes % (Manual)   


 


Nucleated RBC %   


 


Seg Neutrophils # Man   


 


Lymphocytes # (Manual)   


 


Monocytes # (Manual)   


 


ABG pH   


 


ABG pO2   


 


ABG HCO3   


 


ABG Base Excess   


 


ABG Hemoglobin   


 


Oxyhemoglobin   


 


Sodium  129 L  


 


Potassium  5.6 H  


 


Chloride  88.6 L  


 


Carbon Dioxide   


 


BUN  27 H  


 


Creatinine  2.4 H  


 


Glucose  121 H  


 


POC Glucose   139 H  156 H


 


Calcium  7.7 L  


 


Ionized Calcium   


 


Total Bilirubin   


 


AST   


 


ALT   


 


Total Creatine Kinase   


 


Troponin T   


 


NT-Pro-B Natriuret Pep   


 


Total Protein   


 


Albumin   


 


LDL Cholesterol Direct   


 


HDL Cholesterol   














  09/23/21 09/23/21 09/23/21





  15:46 16:58 22:08


 


WBC   


 


Hgb   


 


Hct   


 


MCV   


 


MCH   


 


MCHC   


 


RDW   


 


Seg Neuts % (Manual)   


 


Lymphocytes % (Manual)   


 


Nucleated RBC %   


 


Seg Neutrophils # Man   


 


Lymphocytes # (Manual)   


 


Monocytes # (Manual)   


 


ABG pH   


 


ABG pO2   


 


ABG HCO3   


 


ABG Base Excess   


 


ABG Hemoglobin   


 


Oxyhemoglobin   


 


Sodium  128 L  


 


Potassium  5.5 H  


 


Chloride  88.1 L  


 


Carbon Dioxide   


 


BUN  33 H  


 


Creatinine  2.7 H  


 


Glucose  172 H  


 


POC Glucose   187 H  186 H


 


Calcium  7.3 L  


 


Ionized Calcium   


 


Total Bilirubin   


 


AST   


 


ALT   


 


Total Creatine Kinase   


 


Troponin T   


 


NT-Pro-B Natriuret Pep   


 


Total Protein   


 


Albumin   


 


LDL Cholesterol Direct   


 


HDL Cholesterol   














  09/23/21 09/24/21 09/24/21





  23:15 02:20 07:39


 


WBC   


 


Hgb   


 


Hct   


 


MCV   


 


MCH   


 


MCHC   


 


RDW   


 


Seg Neuts % (Manual)   


 


Lymphocytes % (Manual)   


 


Nucleated RBC %   


 


Seg Neutrophils # Man   


 


Lymphocytes # (Manual)   


 


Monocytes # (Manual)   


 


ABG pH   


 


ABG pO2   


 


ABG HCO3   


 


ABG Base Excess   


 


ABG Hemoglobin   


 


Oxyhemoglobin   


 


Sodium   128 L 


 


Potassium   5.2 H 


 


Chloride   88.6 L 


 


Carbon Dioxide   


 


BUN   39 H 


 


Creatinine   3.1 H 


 


Glucose   171 H 


 


POC Glucose    168 H


 


Calcium   7.2 L 


 


Ionized Calcium  3.6 L  


 


Total Bilirubin   


 


AST   


 


ALT   


 


Total Creatine Kinase   


 


Troponin T   


 


NT-Pro-B Natriuret Pep   


 


Total Protein   


 


Albumin   


 


LDL Cholesterol Direct   


 


HDL Cholesterol   














  09/24/21 09/24/21 09/24/21





  10:04 11:37 17:18


 


WBC   


 


Hgb   


 


Hct   


 


MCV   


 


MCH   


 


MCHC   


 


RDW   


 


Seg Neuts % (Manual)   


 


Lymphocytes % (Manual)   


 


Nucleated RBC %   


 


Seg Neutrophils # Man   


 


Lymphocytes # (Manual)   


 


Monocytes # (Manual)   


 


ABG pH   


 


ABG pO2   


 


ABG HCO3   


 


ABG Base Excess   


 


ABG Hemoglobin   


 


Oxyhemoglobin   


 


Sodium   


 


Potassium   


 


Chloride   


 


Carbon Dioxide   


 


BUN   


 


Creatinine   


 


Glucose   


 


POC Glucose   170 H  152 H


 


Calcium   


 


Ionized Calcium   


 


Total Bilirubin   


 


AST   


 


ALT   


 


Total Creatine Kinase  1712 H  


 


Troponin T   


 


NT-Pro-B Natriuret Pep   


 


Total Protein   


 


Albumin   


 


LDL Cholesterol Direct   


 


HDL Cholesterol   














  09/24/21 09/24/21 09/25/21





  19:38 22:02 05:48


 


WBC   


 


Hgb   


 


Hct   


 


MCV   


 


MCH   


 


MCHC   


 


RDW   


 


Seg Neuts % (Manual)   


 


Lymphocytes % (Manual)   


 


Nucleated RBC %   


 


Seg Neutrophils # Man   


 


Lymphocytes # (Manual)   


 


Monocytes # (Manual)   


 


ABG pH   


 


ABG pO2   


 


ABG HCO3   


 


ABG Base Excess   


 


ABG Hemoglobin   


 


Oxyhemoglobin   


 


Sodium  128 L   124 L


 


Potassium    5.8 H D


 


Chloride  89.6 L   89.4 L


 


Carbon Dioxide   


 


BUN  47 H   53 H


 


Creatinine  3.0 H   2.8 H


 


Glucose  165 H   150 H


 


POC Glucose   147 H 


 


Calcium  6.9 L   7.2 L


 


Ionized Calcium   


 


Total Bilirubin   


 


AST   


 


ALT   


 


Total Creatine Kinase   


 


Troponin T   


 


NT-Pro-B Natriuret Pep   


 


Total Protein   


 


Albumin   


 


LDL Cholesterol Direct   


 


HDL Cholesterol   














  09/25/21 09/25/21 09/25/21





  08:48 11:35 19:12


 


WBC   


 


Hgb   


 


Hct   


 


MCV   


 


MCH   


 


MCHC   


 


RDW   


 


Seg Neuts % (Manual)   


 


Lymphocytes % (Manual)   


 


Nucleated RBC %   


 


Seg Neutrophils # Man   


 


Lymphocytes # (Manual)   


 


Monocytes # (Manual)   


 


ABG pH   


 


ABG pO2   


 


ABG HCO3   


 


ABG Base Excess   


 


ABG Hemoglobin   


 


Oxyhemoglobin   


 


Sodium    128 L


 


Potassium   


 


Chloride    89.7 L


 


Carbon Dioxide   


 


BUN    53 H


 


Creatinine    2.4 H


 


Glucose    159 H


 


POC Glucose  152 H  152 H 


 


Calcium    7.1 L


 


Ionized Calcium   


 


Total Bilirubin   


 


AST   


 


ALT   


 


Total Creatine Kinase   


 


Troponin T   


 


NT-Pro-B Natriuret Pep   


 


Total Protein   


 


Albumin   


 


LDL Cholesterol Direct   


 


HDL Cholesterol   














  09/25/21 09/26/21 09/26/21





  22:03 05:03 07:41


 


WBC   


 


Hgb   


 


Hct   


 


MCV   


 


MCH   


 


MCHC   


 


RDW   


 


Seg Neuts % (Manual)   


 


Lymphocytes % (Manual)   


 


Nucleated RBC %   


 


Seg Neutrophils # Man   


 


Lymphocytes # (Manual)   


 


Monocytes # (Manual)   


 


ABG pH   


 


ABG pO2   


 


ABG HCO3   


 


ABG Base Excess   


 


ABG Hemoglobin   


 


Oxyhemoglobin   


 


Sodium   134 L 


 


Potassium   


 


Chloride   92.9 L 


 


Carbon Dioxide   33 H D 


 


BUN   54 H 


 


Creatinine   2.4 H 


 


Glucose   150 H 


 


POC Glucose  152 H   144 H


 


Calcium   7.2 L 


 


Ionized Calcium   


 


Total Bilirubin   


 


AST   


 


ALT   


 


Total Creatine Kinase   


 


Troponin T   


 


NT-Pro-B Natriuret Pep   


 


Total Protein   


 


Albumin   


 


LDL Cholesterol Direct   


 


HDL Cholesterol   














  09/26/21 09/26/21 09/26/21





  11:38 14:11 17:02


 


WBC   14.7 H 


 


Hgb   8.7 L 


 


Hct   29.8 L 


 


MCV   74 L 


 


MCH   22 L 


 


MCHC   29 L 


 


RDW   24.9 H 


 


Seg Neuts % (Manual)   86.0 H 


 


Lymphocytes % (Manual)   6.0 L 


 


Nucleated RBC %   


 


Seg Neutrophils # Man   12.6 H 


 


Lymphocytes # (Manual)   0.9 L 


 


Monocytes # (Manual)   


 


ABG pH   


 


ABG pO2   


 


ABG HCO3   


 


ABG Base Excess   


 


ABG Hemoglobin   


 


Oxyhemoglobin   


 


Sodium   


 


Potassium   


 


Chloride   


 


Carbon Dioxide   


 


BUN   


 


Creatinine   


 


Glucose   


 


POC Glucose  151 H   154 H


 


Calcium   


 


Ionized Calcium   


 


Total Bilirubin   


 


AST   


 


ALT   


 


Total Creatine Kinase   


 


Troponin T   


 


NT-Pro-B Natriuret Pep   


 


Total Protein   


 


Albumin   


 


LDL Cholesterol Direct   


 


HDL Cholesterol   














  09/26/21 09/27/21 09/27/21





  23:14 05:03 10:00


 


WBC   


 


Hgb   


 


Hct   


 


MCV   


 


MCH   


 


MCHC   


 


RDW   


 


Seg Neuts % (Manual)   


 


Lymphocytes % (Manual)   


 


Nucleated RBC %   


 


Seg Neutrophils # Man   


 


Lymphocytes # (Manual)   


 


Monocytes # (Manual)   


 


ABG pH    7.150 L*


 


ABG pO2    91.8 H


 


ABG HCO3    37.2 H


 


ABG Base Excess    7.1 H


 


ABG Hemoglobin    7.1 L


 


Oxyhemoglobin    93.4 L


 


Sodium   134 L 


 


Potassium   


 


Chloride   91.3 L 


 


Carbon Dioxide   33 H 


 


BUN   63 H 


 


Creatinine   2.2 H 


 


Glucose   159 H 


 


POC Glucose  151 H  


 


Calcium   7.8 L 


 


Ionized Calcium   


 


Total Bilirubin   


 


AST   


 


ALT   


 


Total Creatine Kinase   


 


Troponin T   


 


NT-Pro-B Natriuret Pep   


 


Total Protein   


 


Albumin   


 


LDL Cholesterol Direct   


 


HDL Cholesterol   














  09/27/21 09/27/21 09/27/21





  12:39 16:37 18:40


 


WBC   


 


Hgb   


 


Hct   


 


MCV   


 


MCH   


 


MCHC   


 


RDW   


 


Seg Neuts % (Manual)   


 


Lymphocytes % (Manual)   


 


Nucleated RBC %   


 


Seg Neutrophils # Man   


 


Lymphocytes # (Manual)   


 


Monocytes # (Manual)   


 


ABG pH    7.237 L


 


ABG pO2   


 


ABG HCO3    36.2 H


 


ABG Base Excess    7.4 H


 


ABG Hemoglobin    7.8 L


 


Oxyhemoglobin    93.7 L


 


Sodium   


 


Potassium   


 


Chloride   


 


Carbon Dioxide   


 


BUN   


 


Creatinine   


 


Glucose   


 


POC Glucose  140 H  132 H 


 


Calcium   


 


Ionized Calcium   


 


Total Bilirubin   


 


AST   


 


ALT   


 


Total Creatine Kinase   


 


Troponin T   


 


NT-Pro-B Natriuret Pep   


 


Total Protein   


 


Albumin   


 


LDL Cholesterol Direct   


 


HDL Cholesterol   














  09/27/21 09/28/21 09/28/21





  23:55 04:27 04:27


 


WBC   


 


Hgb   8.5 L 


 


Hct   29.1 L 


 


MCV   72 L 


 


MCH   21 L 


 


MCHC   29 L 


 


RDW   24.9 H 


 


Seg Neuts % (Manual)   


 


Lymphocytes % (Manual)   


 


Nucleated RBC %   


 


Seg Neutrophils # Man   


 


Lymphocytes # (Manual)   


 


Monocytes # (Manual)   


 


ABG pH   


 


ABG pO2   


 


ABG HCO3   


 


ABG Base Excess   


 


ABG Hemoglobin   


 


Oxyhemoglobin   


 


Sodium    135 L


 


Potassium   


 


Chloride    93.5 L


 


Carbon Dioxide    33 H


 


BUN    68 H


 


Creatinine    1.9 H


 


Glucose    143 H


 


POC Glucose  135 H  


 


Calcium    8.1 L


 


Ionized Calcium   


 


Total Bilirubin    1.50 H


 


AST    494 H


 


ALT    835 H


 


Total Creatine Kinase   


 


Troponin T   


 


NT-Pro-B Natriuret Pep   


 


Total Protein    9.5 H


 


Albumin    3.1 L


 


LDL Cholesterol Direct   


 


HDL Cholesterol

## 2021-09-28 NOTE — PROGRESS NOTE
Subjective


Date of service: 09/28/21


Principal diagnosis: Acute on chronic renal insufficiency


Interval history: 





Assessment


Acute kidney injury, unknown baseline. Renal ultrasound notes poor visualization

due to body habitus.


Hypervolemic Hyponatremia


Hyperkalemia


Hypocalcemia


Morbid obesity











Recommendations


Continue diuresis


stop Na tabls


cr is stable today


echo noted, likely diastolic disease


rec lasix 40mg po bid at discharge


rise in bun noted


S/p Kayexalate on admission


Maintain MAP more than 65


Hold ACE/ARB at this time


Hold diuretics given soft pressures


Renally dose medications


Avoid nephrotoxins


Renal diet





Subjective


Date of service: 09/26/21


Principal diagnosis: Acute on chronic renal insufficiency


Interval history: 


Patient was seen for her renal issues


Nursing, interdisciplinary and consult notes were reviewed


Vitals, input and output, medications and labs were reviewed


Remains on NC








Objective





- Exam


Narrative Exam: 


General: Morbid obesity


HEENT: Oral mucosa moist


Neck: Supple, no JVD


Chest: Clear to auscultation bilaterally


Heart: RRR, S1 and S2, no pericardial rub


Abdomen: Soft, nontender, no renal bruit


Extremity: No peripheral cyanosis, edema


Neurological: Awake and alert


Dermatology: Unable to assess


Psych: Calm and cooperative


Musculoskeletal: No joint effusion





Objective





- Vital Signs


Vital signs: 


                               Vital Signs - 12hr











  09/28/21 09/28/21 09/28/21





  00:00 04:00 08:00


 


Temperature 98.2 F 98.4 F 98 F


 


Pulse Rate [ 108 H  





From Monitor]   


 


O2 Sat by Pulse 99  





Oximetry   














- Lab





                                 09/28/21 04:27





                                 09/28/21 04:27


                             Most recent lab results











ABG pH  7.237 pH Units (7.350-7.450)  L  09/27/21  18:40    


 


ABG pCO2  86.9 mm Hg  09/27/21  18:40    


 


ABG pO2  81.8 mm Hg (80.0-90.0)   09/27/21  18:40    


 


ABG HCO3  36.2 mmol/L (20.0-26.0)  H  09/27/21  18:40    


 


ABG O2 Saturation  95.9 % (95.0-99.0)   09/27/21  18:40    


 


Calcium  8.1 mg/dL (8.4-10.2)  L  09/28/21  04:27    


 


Urine Sodium  16 mmol/L  09/23/21  18:46    














Medications & Allergies





- Medications


Allergies/Adverse Reactions: 


                                    Allergies





No Known Allergies Allergy (Unverified 07/13/17 11:17)


   








Home Medications: 


                                Home Medications











 Medication  Instructions  Recorded  Confirmed  Last Taken  Type


 


Acetaminophen [Acetaminophen TAB] 325 mg PO Q4H PRN #30 tablet 12/14/17 01/22/21

Unknown Rx


 


ALBUTEROL NEB's [Proventil 0.083% 2.5 mg IH Q3HRT PRN #30 day 01/24/21  Unknown 

Rx





NEBS]     


 


Albuterol Mdi (or & Nicu Only) 2 puff IH QID PRN #1 inhalation 01/24/21  Unknown

Rx





[ProAir HFA Inhaler]     


 


Azithromycin [Zithromax Z-JAVIER] 0 mg PO DAILY #1 tab 01/24/21  Unknown Rx


 


Benzonatate [Tessalon Perles] 100 mg PO Q8HR #15 capsule 01/24/21  Unknown Rx


 


Famotidine [Pepcid] 20 mg PO BID #14 tablet 01/24/21  Unknown Rx


 


Ipratropium/Albuterol Sulfate 1 ampul IH TIDRT #30 day 01/24/21  Unknown Rx





[DUONEB *Not for PRN Use*]     


 


hydroCHLOROthiazide [HCTZ] 25 mg PO QDAY  tablet 01/24/21  Unknown Rx


 


hydroCHLOROthiazide [HCTZ] 25 mg PO QDAY #30 tablet 01/24/21  Unknown Rx


 


methylPREDNISolone [Medrol 4MG 4 mg PO DAILY #1 tab.ds.pk 01/24/21  Unknown Rx





DOSEPAK (21 tabs)]     











Active Medications: 














Generic Name Dose Route Start Last Admin





  Trade Name Freq  PRN Reason Stop Dose Admin


 


Acetaminophen  650 mg  09/22/21 03:00 





  Acetaminophen 325 Mg Tab  PO  





  Q4H PRN  





  Pain MILD(1-3)/Fever >100.5/HA  


 


Albuterol  2.5 mg  09/22/21 03:00 





  Albuterol 2.5 Mg/3 Ml Nebu  IH  





  Q4HRT PRN  





  Shortness Of Breath  


 


Albuterol/Ipratropium  1 ampul  09/22/21 08:00  09/27/21 21:56





  Ipratropium/Albuterol Sulfate 3 Ml Ampul.Neb  IH   Not Given





  TIDRT DELL  


 


Aspirin  325 mg  09/23/21 10:00  09/27/21 11:59





  Aspirin Ec 325 Mg Tab  PO   325 mg





  QDAY DELL   Administration


 


Atorvastatin Calcium  40 mg  09/22/21 22:00  09/27/21 23:25





  Atorvastatin 40 Mg Tab  PO   Not Given





  QHS DELL  


 


Benzonatate  100 mg  09/22/21 06:00  09/28/21 05:46





  Benzonatate 100 Mg Cap  PO   Not Given





  Q8HR [facility]  


 


Famotidine  10 mg  09/23/21 22:00  09/27/21 23:25





  Famotidine 10 Mg Tab  PO   Not Given





  BID [facility]  


 


Furosemide  40 mg  09/27/21 18:00  09/28/21 05:46





  Furosemide 40 Mg Tab  PO   Not Given





  0600,1800 [facility]  


 


Guaifenesin  200 mg  09/26/21 14:47 





  Guaifenesin 100 Mg/5 Ml Oral Liqd  PO  





  Q4H PRN  





  Cough  


 


Heparin Sodium (Porcine)  5,000 unit  09/22/21 06:00  09/28/21 05:36





  Heparin 5,000 Unit/1 Ml Vial  SUB-Q   5,000 unit





  Q8HR DELL   Administration


 


Hydromorphone HCl  0.5 mg  09/22/21 03:00  09/28/21 04:16





  Hydromorphone 1 Mg/1 Ml Inj  IV   0.5 mg





  Q3H PRN   Administration





  Pain , Severe (7-10)  


 


Methylprednisolone  4 mg  09/22/21 10:00  09/27/21 12:00





  Methylprednisolone 4 Mg Tab  PO   4 mg





  DAILY DELL   Administration


 


Nitroglycerin  0.4 mg  09/22/21 03:00 





  Nitroglycerin 0.4 Mg Tab Subl  SL  





  Q5M PRN  





  Chest Pain  


 


Nystatin  1 applic  09/22/21 18:00  09/27/21 23:29





  Nystatin Powder 15 Gm  TP   1 applic





  BID DELL   Administration


 


Ondansetron HCl  4 mg  09/22/21 03:00 





  Ondansetron 4 Mg/2 Ml Inj  IV  





  Q8H PRN  





  Nausea And Vomiting  


 


Oxycodone/Acetaminophen  1 tab  09/22/21 03:00  09/27/21 01:52





  Oxycodone /Acetaminophen 5-325mg Tab  PO   1 tab





  Q6H PRN   Administration





  Pain, Moderate (4-6)  


 


Sodium Chloride  10 ml  09/22/21 10:00  09/27/21 22:37





  Sodium Chloride 0.9% 10 Ml Flush Syringe  IV   10 ml





  BID DELL   Administration


 


Sodium Chloride  10 ml  09/22/21 03:00 





  Sodium Chloride 0.9% 10 Ml Flush Syringe  IV  





  PRN PRN  





  LINE FLUSH  


 


Tramadol HCl  50 mg  09/22/21 03:00 





  Tramadol 50 Mg Tab  PO  





  Q6H PRN  





  Pain, Moderate (4-6)

## 2021-09-28 NOTE — PROGRESS NOTE
Assessment and Plan





Patient is a 30 y/o female with a PMHX of morbid obesity, HTN, COPD 








NSTEMI type 2


Elevated BNP


* Troponins minimally elevated 0.032->0.099->0.078. EKG shows sinus tach 116 

  with no acute ischemic changes.


* BNP noted to be elevated. Currently on Lasix 40mg PO BID


* Echo 1/22/2021- Technically very difficult and limited study, due to patient 

  body habitus EF 55 to 60%, left and right atrium are normal in size right 

  ventricular cavity is normal right ventricular global systolic function


* Echo 9/22/2021-technically difficult study, contrast used for LV function, EF 

  55 to 60%, right ventricular systolic function is normal, aortic valve not 

  well visualized





Acute Renal insufficiency


Hyponatremia


* Patient cr 1.8 on admission.


* Nephrology following


HTN


* Patient has been hypotensive.


* Hold ACE/ARB in setting of acute renal insufficiency and hypotension





Obesity Hypoventilation Syndrome


COPD


Acute respiratory failure


* Patient trasnferreed to [facility] yesterday and is currently on Bipap


* Management per pulmonology





Plan:





Continue to hold ACE/ARB, HCTZ in setting of renal insufficiency. No ischemic 

eval due to patient's due patient's obesity. Cardiac status is stable


Patient seen in conjunction with Dr. Ariza who agrees with this plan of care. 

Will see as needed.





- Patient Problems


(1) Elevated troponin


Current Visit: Yes   Status: Acute   





(2) Acute renal insufficiency


Current Visit: Yes   Status: Acute   





(3) COPD (chronic obstructive pulmonary disease)


Current Visit: Yes   Status: Acute   





(4) Hypertension


Current Visit: Yes   Status: Acute   





(5) Morbid obesity


Current Visit: Yes   Status: Acute   





(6) Morbid obesity with BMI of 70 and over, adult


Current Visit: No   Status: Acute   





(7) Obesity hypoventilation syndrome


Current Visit: No   Status: Acute   





Subjective


Date of service: 09/28/21


Principal diagnosis: Acute on chronic renal insufficiency


Interval history: 





Patient transferred to [facility] for respiratory distress yesterday. Patient lying in

bed currently on BIpap


Sinus  93 with no events on monitor





Objective


                                   Vital Signs











  Temp Pulse Pulse Pulse Resp Resp BP


 


 09/28/21 08:00  98 F      


 


 09/28/21 04:00  98.4 F      


 


 09/28/21 00:00  98.2 F    108 H   


 


 09/27/21 22:00   100 H     


 


 09/27/21 21:35   97 H    22   120/61


 


 09/27/21 21:20  98.2 F      


 


 09/27/21 20:00    100 H    20 


 


 09/27/21 18:40   96 H    26 H  


 


 09/27/21 15:00   98 H    25 H  


 


 09/27/21 14:48    100 H    20 


 


 09/27/21 12:00     111 H   














  Pulse Ox


 


 09/28/21 08:00 


 


 09/28/21 04:00 


 


 09/28/21 00:00  99


 


 09/27/21 22:00 


 


 09/27/21 21:35  93


 


 09/27/21 21:20 


 


 09/27/21 20:00 


 


 09/27/21 18:40  96


 


 09/27/21 15:00  96


 


 09/27/21 14:48  96


 


 09/27/21 12:00  98














- Physical Examination


General: No Apparent Distress


HEENT: Positive: PERRL


Neck: Positive: trachea midline


Cardiac: Positive: Reg Rate and Rhythm


Lungs: Positive: Decreased Breath Sounds


Neuro: Positive: Grossly Intact


Abdomen: Positive: Soft


Skin: Negative: Rash, Suspicious Lesions, Ulceration


Extremities: Present: upper extr. pulses.  Absent: edema





- Labs and Meds


                                 Cardiac Enzymes











  09/28/21 Range/Units





  04:27 


 


AST  494 H  (5-40)  units/L








                                       CBC











  09/28/21 Range/Units





  04:27 


 


WBC  10.8  (4.5-11.0)  K/mm3


 


RBC  4.03  (3.65-5.03)  M/mm3


 


Hgb  8.5 L  (10.1-14.3)  gm/dl


 


Hct  29.1 L  (30.3-42.9)  %


 


Plt Count  294  (140-440)  K/mm3








                          Comprehensive Metabolic Panel











  09/28/21 Range/Units





  04:27 


 


Sodium  135 L  (137-145)  mmol/L


 


Potassium  4.4  (3.6-5.0)  mmol/L


 


Chloride  93.5 L  ()  mmol/L


 


Carbon Dioxide  33 H  (22-30)  mmol/L


 


BUN  68 H  (7-17)  mg/dL


 


Creatinine  1.9 H  (0.6-1.2)  mg/dL


 


Glucose  143 H  ()  mg/dL


 


Calcium  8.1 L  (8.4-10.2)  mg/dL


 


AST  494 H  (5-40)  units/L


 


ALT  835 H  (7-56)  units/L


 


Alkaline Phosphatase  95  ()  units/L


 


Total Protein  9.5 H  (6.3-8.2)  g/dL


 


Albumin  3.1 L  (3.9-5)  g/dL














- Imaging and Cardiology


EKG: report reviewed, image reviewed


Echo: report reviewed





- Telemetry


EKG Rhythm: Sinus Rhythm





- EKG


Sinus rhythms and dysrhythmias: sinus rhythm


Repolarization changes or abnormalities: nonspecific abnormality, ST segment, 

and/or T wave

## 2021-09-28 NOTE — PROGRESS NOTE
Assessment and Plan


Assessment and plan: 


(1) Non-ST elevation MI (NSTEMI)


Current Visit: Yes   Status: Acute   


Plan to address problem: 


Admit the patient to the medical telemetry. Aspirin 325 mg p.o. daily. Lipitor 

40 mg p.o. daily. Nitroglycerin as needed. We do the serial cardiac enzyme. We 

also consult cardiology for evaluation and order echocardiogram.








(2) Acute exacerbation of CHF (congestive heart failure)


Current Visit: Yes   Status: Acute   


Plan to address problem: 


Fluid restriction. Maintain input output. Lasix 40 mg IV every 12 hours. Oxygen 

via nasal cannula 3 L/min. DuoNeb by nebulizer every 4 hours. Echocardiogram. 

Cardiology consult








(3) Hypertension


Current Visit: Yes   Status: Acute   


Plan to address problem: 


We will continue the home medication. Hydralazine 10 mg IV every 6 hours as 

needed. We will monitor the blood pressure closely








(4) COPD (chronic obstructive pulmonary disease)


Current Visit: Yes   Status: Acute   


Plan to address problem: 


Oxygen by nasal cannula 3 L/min. DuoNeb via nebulizer every 4 hours. Albuterol 

via nebulizer every 4 hours as needed.








(5) Morbid obesity


Current Visit: Yes   Status: Acute   


Plan to address problem: 


We counseled regarding weight reduction. Outpatient follow-up with bariatric 

surgeon








(6) Hyperglycemia


Current Visit: Yes   Status: Acute   


Plan to address problem: 


We will give 1 ampoule of D50. We will put the patient on cardiac diet. We will 

monitor the blood glucose closely








(7) DVT prophylaxis


Current Visit: No   Status: Acute   


Plan to address problem: 


Heparin 5000 units subcu every 8 hours for DVT prophylaxis. Pepcid 20 mg p.o. 

twice daily for GI prophylaxis. Patient is a full code





9/23


-Patient is on heparin drip for non-STEMI, cardiology is following.


-Patient is off Lasix and ACE inhibitors because of [place].


-Kidney function is worsening overnight and I put a consult for nephrology.


-Patient is hypotensive and blood pressure from this morning was 101/41.  We 

will continue to monitor.  And if it is dropping we we will give him fluid.


-Echo was done and EF of 50 to 55%.  Diastolic dysfunction is indeterminate.  

Management per cardiology


-Patient has COPD continues on dexamethasone, nebulizer treatment as needed.


-Patient has hyponatremia and hyperkalemia.  I give the patient Kayexalate.  

Monitor electrolytes.  Will follow nephrology for additional recommendation.





9/24


-Renal function is worsening, nephrology is following


-Blood pressure is better today


-Hyperkalemia; I added Kayexalate


-Morbid obesity








9/25


-Slight improvement in creatinine.  Hyponatremia worsened. Nephrology is 

following and put the patient back on Lasix.


-Blood PRESSURE IS BETTER today


-Potassium this morning was 5.8, I ordered 60 g of Kayexalate


-Morbidly obese


-Obesity hypoventilation syndrome





9/26


-Creatinine is improving and this morning was 2.4


-Hyponatremia improved this morning was 134.  Patient is on Lasix


-Blood pressure is better


-Patient is on 10 L of oxygen; worsened 


-Morbidly obese, FABIAN








9/27: Follow ordered ultrasound of abdomen, per Physician unable to get CT. I 

ordered an ABG due to my concern about her breathing pattern this morning, 

patient may benefit from. Will try to establish if patient has a primary 

pulmonary doctor. ABG is showing consistent with Respiratory Acidosis. Will 

place on BIPAP now. May need to transfer to [facility] for closer monitoring. Patient 

likely with Obesity Hypoventilation syndrome.


cardiology work up, ongoing. 


RENAL SHOWING SOME IMPROVEMENT


Guarded prognosis





9/28: Patient seen and examined today identified some lesion under the pannus 

will obtain wound care and wound surgeon evaluation.  Nephrology input noted 

continue diuresis and stop the sodium tabs creatinine is stable.  I did discuss 

with the family and updated them.  We will continue to hold ACE and ARB and if 

pressures continue to drop may need to hold diuresis also despite as written 

above.  Monitor labs including H&H.  She is more awake review of ABG shows 

improving CO2.  Will discuss with case management as patient may need BiPAP on 

discharge home.  I also updated the family





History


Interval history: 


Patient seen and examined, More awake, on BIPAP this morning. Noted some bleed 

overnight, thinking its from the skin under the panus per nursing staff.








Hospitalist Physical





- Physical exam


Narrative exam: 


VITAL SIGNS:  Reviewed.    


GENERAL:  The patient is morbidly obese, more response on BIPAP this morning, 

Vital signs as documented.


HEAD:  No signs of head trauma.


EYES:  Pupils are equal.  Extraocular motions intact.  


EARS:  Hearing grossly intact.


MOUTH:  Oropharynx is normal. 


NECK:  No adenopathy, no JVD.  


CHEST:  Chest with diminished breath sounds bilaterally.  No wheezes, rales, or 

rhonchi.  


CARDIAC:  Regular rate and rhythm.  S1 and S2, without murmurs, gallops, or 

rubs.


VASCULAR:  No Edema.  Peripheral pulses normal and equal in all extremities.


ABDOMEN:  enlarged pannus, thinkend skin, non tender and non distended.  No   

rebound or guarding, and no masses palpated.   Bowel Sounds normal.


MUSCULOSKELETAL:  Good range of motion of all major joints. Extremities without 

clubbing, cyanosis or edema.  


NEUROLOGIC EXAM:  Alert and oriented x 3   No focal sensory or strength defi

cits.   Speech normal.  Follows commands.


PSYCHIATRIC:  Mood normal.


SKIN:  detail exam as documented in skin assessment








- Constitutional


Vitals: 


                                        











Temp Pulse Resp BP Pulse Ox


 


 98 F   108 H  22   120/61   99 


 


 09/28/21 08:00  09/28/21 00:00  09/27/21 21:35  09/27/21 21:35  09/28/21 00:00











General appearance: Present: no acute distress





HEART Score





- HEART Score


Troponin: 


                                        











Troponin T  0.078 ng/mL (0.00-0.029)  H D 09/22/21  13:03    














Results





- Labs


CBC & Chem 7: 


                                 09/28/21 04:27





                                 09/28/21 04:27


Labs: 


                             Laboratory Last Values











WBC  10.8 K/mm3 (4.5-11.0)   09/28/21  04:27    


 


RBC  4.03 M/mm3 (3.65-5.03)   09/28/21  04:27    


 


Hgb  8.5 gm/dl (10.1-14.3)  L  09/28/21  04:27    


 


Hct  29.1 % (30.3-42.9)  L  09/28/21  04:27    


 


MCV  72 fl (79-97)  L  09/28/21  04:27    


 


MCH  21 pg (28-32)  L  09/28/21  04:27    


 


MCHC  29 % (30-34)  L  09/28/21  04:27    


 


RDW  24.9 % (13.2-15.2)  H  09/28/21  04:27    


 


Plt Count  294 K/mm3 (140-440)   09/28/21  04:27    


 


Add Manual Diff  Complete   09/26/21  14:11    


 


Total Counted  100   09/26/21  14:11    


 


Seg Neuts % (Manual)  86.0 % (40.0-70.0)  H  09/26/21  14:11    


 


Band Neutrophils %  1.0 %  09/26/21  14:11    


 


Lymphocytes % (Manual)  6.0 % (13.4-35.0)  L  09/26/21  14:11    


 


Reactive Lymphs % (Man)  1.0 %  09/26/21  14:11    


 


Monocytes % (Manual)  4.0 % (0.0-7.3)   09/26/21  14:11    


 


Metamyelocytes %  2.0 %  09/26/21  14:11    


 


Myelocytes %  2.0 %  09/22/21  04:03    


 


Nucleated RBC %  Not Reportable   09/26/21  14:11    


 


Seg Neutrophils # Man  12.6 K/mm3 (1.8-7.7)  H  09/26/21  14:11    


 


Band Neutrophils #  0.1 K/mm3  09/26/21  14:11    


 


Lymphocytes # (Manual)  0.9 K/mm3 (1.2-5.4)  L  09/26/21  14:11    


 


Abs React Lymphs (Man)  0.1 K/mm3  09/26/21  14:11    


 


Monocytes # (Manual)  0.6 K/mm3 (0.0-0.8)   09/26/21  14:11    


 


Eosinophils # (Manual)  0.0 K/mm3 (0.0-0.4)   09/26/21  14:11    


 


Basophils # (Manual)  0.0 K/mm3 (0.0-0.1)   09/26/21  14:11    


 


Metamyelocytes #  0.3 K/mm3  09/26/21  14:11    


 


Myelocytes #  0.0 K/mm3  09/26/21  14:11    


 


Promyelocytes #  0.0 K/mm3  09/26/21  14:11    


 


Blast Cells #  0.0 K/mm3  09/26/21  14:11    


 


WBC Morphology  Not Reportable   09/26/21  14:11    


 


WBC Morphology  TNR   09/26/21  14:11    


 


Hypersegmented Neuts  Not Reportable   09/26/21  14:11    


 


Hyposegmented Neuts  Not Reportable   09/26/21  14:11    


 


Hypogranular Neuts  Not Reportable   09/26/21  14:11    


 


Smudge Cells  Not Reportable   09/26/21  14:11    


 


Toxic Granulation  Not Reportable   09/26/21  14:11    


 


Toxic Vacuolation  Not Reportable   09/26/21  14:11    


 


Dohle Bodies  Not Reportable   09/26/21  14:11    


 


Pelger-Huet Anomaly  Not Reportable   09/26/21  14:11    


 


Ac Rods  Not Reportable   09/26/21  14:11    


 


Platelet Estimate  Consistent w auto   09/26/21  14:11    


 


Clumped Platelets  Not Reportable   09/26/21  14:11    


 


Plt Clumps, EDTA  Not Reportable   09/26/21  14:11    


 


Large Platelets  Not Reportable   09/26/21  14:11    


 


Giant Platelets  Not Reportable   09/26/21  14:11    


 


Platelet Satelliting  Not Reportable   09/26/21  14:11    


 


Plt Morphology Comment  Not Reportable   09/26/21  14:11    


 


RBC Morphology  Not Reportable   09/26/21  14:11    


 


Dimorphic RBCs  Not Reportable   09/26/21  14:11    


 


Polychromasia  Few   09/26/21  14:11    


 


Hypochromasia  1+   09/26/21  14:11    


 


Poikilocytosis  Not Reportable   09/26/21  14:11    


 


Anisocytosis  Not Reportable   09/26/21  14:11    


 


Microcytosis  Not Reportable   09/26/21  14:11    


 


Macrocytosis  Not Reportable   09/26/21  14:11    


 


Spherocytes  Not Reportable   09/26/21  14:11    


 


Pappenheimer Bodies  Not Reportable   09/26/21  14:11    


 


Sickle Cells  Not Reportable   09/26/21  14:11    


 


Target Cells  1+   09/26/21  14:11    


 


Tear Drop Cells  Few   09/26/21  14:11    


 


Ovalocytes  Not Reportable   09/26/21  14:11    


 


Helmet Cells  Not Reportable   09/26/21  14:11    


 


[place] Bodies  Not Reportable   09/26/21  14:11    


 


Cabot Rings  Not Reportable   09/26/21  14:11    


 


Apolinar Cells  Not Reportable   09/26/21  14:11    


 


Bite Cells  Not Reportable   09/26/21  14:11    


 


Crenated Cell  Not Reportable   09/26/21  14:11    


 


Elliptocytes  Not Reportable   09/26/21  14:11    


 


Acanthocytes (Spur)  Not Reportable   09/26/21  14:11    


 


Rouleaux  Not Reportable   09/26/21  14:11    


 


Hemoglobin C Crystals  Not Reportable   09/26/21  14:11    


 


Schistocytes  Not Reportable   09/26/21  14:11    


 


Malaria parasites  Not Reportable   09/26/21  14:11    


 


Sami Bodies  Not Reportable   09/26/21  14:11    


 


Hem Pathologist Commnt  No   09/26/21  14:11    


 


ABG pH  7.237 pH Units (7.350-7.450)  L  09/27/21  18:40    


 


ABG pCO2  86.9 mm Hg  09/27/21  18:40    


 


ABG pO2  81.8 mm Hg (80.0-90.0)   09/27/21  18:40    


 


ABG HCO3  36.2 mmol/L (20.0-26.0)  H  09/27/21  18:40    


 


ABG O2 Saturation  95.9 % (95.0-99.0)   09/27/21  18:40    


 


ABG O2 Content  10.4  (0.0-44)   09/27/21  18:40    


 


ABG Base Excess  7.4 mmol/L (-2.0-3.0)  H  09/27/21  18:40    


 


ABG Hemoglobin  7.8 gm/dl (12.0-16.0)  L  09/27/21  18:40    


 


ABG Carboxyhemoglobin  1.9 % (0.0-5.0)   09/27/21  18:40    


 


ABG Methemoglobin  0.4 % (0.0-1.5)   09/27/21  18:40    


 


Oxyhemoglobin  93.7 % (95.0-99.0)  L  09/27/21  18:40    


 


FiO2  30 %  09/27/21  18:40    


 


Sodium  135 mmol/L (137-145)  L  09/28/21  04:27    


 


Potassium  4.4 mmol/L (3.6-5.0)   09/28/21  04:27    


 


Chloride  93.5 mmol/L ()  L  09/28/21  04:27    


 


Carbon Dioxide  33 mmol/L (22-30)  H  09/28/21  04:27    


 


Anion Gap  13 mmol/L  09/28/21  04:27    


 


BUN  68 mg/dL (7-17)  H  09/28/21  04:27    


 


Creatinine  1.9 mg/dL (0.6-1.2)  H  09/28/21  04:27    


 


Estimated GFR  37 ml/min  09/28/21  04:27    


 


BUN/Creatinine Ratio  36 %  09/28/21  04:27    


 


Glucose  143 mg/dL ()  H  09/28/21  04:27    


 


POC Glucose  105 mg/dL ()   09/28/21  11:28    


 


Osmolality  301 Mosm/kg  09/23/21  23:15    


 


Calcium  8.1 mg/dL (8.4-10.2)  L  09/28/21  04:27    


 


Ionized Calcium  3.6 mg/dL (4.8-5.6)  L  09/23/21  23:15    


 


Total Bilirubin  1.50 mg/dL (0.1-1.2)  H  09/28/21  04:27    


 


AST  494 units/L (5-40)  H  09/28/21  04:27    


 


ALT  835 units/L (7-56)  H  09/28/21  04:27    


 


Alkaline Phosphatase  95 units/L ()   09/28/21  04:27    


 


Total Creatine Kinase  1712 units/L ()  H  09/24/21  10:04    


 


Troponin T  0.078 ng/mL (0.00-0.029)  H D 09/22/21  13:03    


 


NT-Pro-B Natriuret Pep  7573 pg/mL (0-450)  H  09/21/21  22:04    


 


Total Protein  9.5 g/dL (6.3-8.2)  H  09/28/21  04:27    


 


Albumin  3.1 g/dL (3.9-5)  L  09/28/21  04:27    


 


Albumin/Globulin Ratio  0.5 %  09/28/21  04:27    


 


Triglycerides  105 mg/dL (2-149)   09/21/21  22:04    


 


Cholesterol  85 mg/dL ()   09/21/21  22:04    


 


LDL Cholesterol Direct  47 mg/dL ()  L  09/21/21  22:04    


 


HDL Cholesterol  25 mg/dL (40-59)  L  09/21/21  22:04    


 


Cholesterol/HDL Ratio  3.40 %  09/21/21  22:04    


 


Total Cortisol  41.3 mcg/dL (***)   09/24/21  10:04    


 


Urine Osmolality  157 Mosm/kg  09/23/21  18:46    


 


Urine Sodium  16 mmol/L  09/23/21  18:46    











Diamond/IV: 


                                        





Voiding Method                   Indwelling Catheter











Active Medications





- Current Medications


Current Medications: 














Generic Name Dose Route Start Last Admin





  Trade Name Freq  PRN Reason Stop Dose Admin


 


Acetaminophen  650 mg  09/22/21 03:00 





  Acetaminophen 325 Mg Tab  PO  





  Q4H PRN  





  Pain MILD(1-3)/Fever >100.5/HA  


 


Albuterol  2.5 mg  09/22/21 03:00 





  Albuterol 2.5 Mg/3 Ml Nebu  IH  





  Q4HRT PRN  





  Shortness Of Breath  


 


Albuterol/Ipratropium  1 ampul  09/22/21 08:00  09/27/21 21:56





  Ipratropium/Albuterol Sulfate 3 Ml Ampul.Neb  IH   Not Given





  TIDRT DELL  


 


Aspirin  325 mg  09/23/21 10:00  09/28/21 11:31





  Aspirin Ec 325 Mg Tab  PO   325 mg





  QDAY DELL   Administration


 


Atorvastatin Calcium  40 mg  09/22/21 22:00  09/27/21 23:25





  Atorvastatin 40 Mg Tab  PO   Not Given





  QHS DELL  


 


Benzonatate  100 mg  09/22/21 06:00  09/28/21 05:46





  Benzonatate 100 Mg Cap  PO   Not Given





  Q8HR [facility]  


 


Famotidine  10 mg  09/23/21 22:00  09/28/21 11:31





  Famotidine 10 Mg Tab  PO   10 mg





  BID DELL   Administration


 


Furosemide  40 mg  09/27/21 18:00  09/28/21 05:46





  Furosemide 40 Mg Tab  PO   Not Given





  0600,1800 [facility]  


 


Guaifenesin  200 mg  09/26/21 14:47 





  Guaifenesin 100 Mg/5 Ml Oral Liqd  PO  





  Q4H PRN  





  Cough  


 


Heparin Sodium (Porcine)  5,000 unit  09/22/21 06:00  09/28/21 05:36





  Heparin 5,000 Unit/1 Ml Vial  SUB-Q   5,000 unit





  Q8HR DELL   Administration


 


Hydromorphone HCl  0.5 mg  09/22/21 03:00  09/28/21 04:16





  Hydromorphone 1 Mg/1 Ml Inj  IV   0.5 mg





  Q3H PRN   Administration





  Pain , Severe (7-10)  


 


Methylprednisolone  4 mg  09/22/21 10:00  09/27/21 12:00





  Methylprednisolone 4 Mg Tab  PO   4 mg





  DAILY DELL   Administration


 


Nitroglycerin  0.4 mg  09/22/21 03:00 





  Nitroglycerin 0.4 Mg Tab Subl  SL  





  Q5M PRN  





  Chest Pain  


 


Nystatin  1 applic  09/22/21 18:00  09/27/21 23:29





  Nystatin Powder 15 Gm  TP   1 applic





  BID DELL   Administration


 


Ondansetron HCl  4 mg  09/22/21 03:00 





  Ondansetron 4 Mg/2 Ml Inj  IV  





  Q8H PRN  





  Nausea And Vomiting  


 


Oxycodone/Acetaminophen  1 tab  09/22/21 03:00  09/27/21 01:52





  Oxycodone /Acetaminophen 5-325mg Tab  PO   1 tab





  Q6H PRN   Administration





  Pain, Moderate (4-6)  


 


Sodium Chloride  10 ml  09/22/21 10:00  09/28/21 11:31





  Sodium Chloride 0.9% 10 Ml Flush Syringe  IV   10 ml





  BID DELL   Administration


 


Sodium Chloride  10 ml  09/22/21 03:00 





  Sodium Chloride 0.9% 10 Ml Flush Syringe  IV  





  PRN PRN  





  LINE FLUSH  


 


Tramadol HCl  50 mg  09/22/21 03:00 





  Tramadol 50 Mg Tab  PO  





  Q6H PRN  





  Pain, Moderate (4-6)  














Nutrition/Malnutrition Assess





- Dietary Evaluation


Nutrition/Malnutrition Findings: 


                                        





Nutrition Notes                                            Start:  09/22/21 

14:21


Freq:                                                      Status: Active       




Protocol:                                                                       




 Document     09/27/21 17:08  GB  (Rec: 09/27/21 17:14  GB  [id number])


 Nutrition Notes


     Initial or Follow up                        Reassessment


     Current Diagnosis                           Hypertension


     Other Pertinent Diagnosis                   Dyspnea, respiratory distress,


                                                 morbid obesity


     Current Diet                                Regular


     Labs/Tests                                  9/27: Na 134, BUN 63,


                                                 creatinine 2.2, glucose 159,


                                                 Ca 7.8


     Pertinent Medications                       Lasix, NaCl IV flushes


     Height                                      5 ft 4 in


     Weight                                      287.5 kg


     Ideal Body Weight (kg)                      54.54


     BMI                                         108.8


     Weight change and time frame                expect weight fluctuations r/t


                                                 fluid therapy, CHF


                                                 complications


     Weight Status                               Morbidly Obese


     Subjective/Other Information                Per MD notes 9/27: fluid


                                                 restrictions, on nasal cannula


                                                 3L, possibly obesity


                                                 hypoventilation syndrome,


                                                 recommended to bariatric


                                                 consult


     Percent of energy/protein needs met:        Meals and snacks provided meet


                                                 100% of estimated energy


                                                 needs.  Current po intake of


                                                 meals recorded average 50%.


     Burn                                        Absent


     Trauma                                      Absent


     GI Symptoms                                 None


     Food Allergy                                No


     Current % PO                                Fair (50-74%)


     Minimum of two criteria                     No


     #1


      Nutrition Diagnosis                        Overweight/obesity


      Comments:                                  Discussed diet/life style


                                                 changes using small goal


                                                 successes to advancement,


                                                 encouraged outpatient RD


                                                 counseling at bariatric center


      Etiology                                   dyspnea, respiratory distress


      As Evidenced by Signs and Symptoms         , %


      Diagnosis Progress(for reassessment        Continues


       documentation)                            


     Is patient on ventilator?                   No


     Is Patient Ambulatory and/or Out of Bed     No


     REE-([facility]-confined to bed)      4277.724


     Kcal/Kg value to use for calculation        10


     Approximate Energy Requirements Using       2875


      kcal/Kg                                    


     Calculation Used for Recommendations        Kcal/kg


     Additional Notes                            Protein 0.6 g/kg r/t BMI over


                                                 50: 163g


                                                 Fluids: 1ml/kcal or per MD


 Nutrition Intervention


     Change Diet Order:                          continue with current diet


     Goal #1                                     Pt to contact and make


                                                 appointment with outpatient RD


                                                 in bariatric center


     Goal #2                                     weight to decrease -1% during


                                                 LOS


     Follow-Up By:                               10/04/21


     Additional Comments                         f/u for weight loss, po intake

## 2021-09-29 LAB
ALBUMIN SERPL-MCNC: 2.9 G/DL (ref 3.9–5)
ALT SERPL-CCNC: 609 UNITS/L (ref 7–56)
BUN SERPL-MCNC: 63 MG/DL (ref 7–17)
BUN/CREAT SERPL: 42 %
CALCIUM SERPL-MCNC: 8.3 MG/DL (ref 8.4–10.2)
HCT VFR BLD CALC: 27.1 % (ref 30.3–42.9)
HEMOLYSIS INDEX: 0
HGB BLD-MCNC: 8 GM/DL (ref 10.1–14.3)
MCHC RBC AUTO-ENTMCNC: 30 % (ref 30–34)
MCV RBC AUTO: 70 FL (ref 79–97)
PLATELET # BLD: 301 K/MM3 (ref 140–440)
RBC # BLD AUTO: 3.89 M/MM3 (ref 3.65–5.03)

## 2021-09-29 PROCEDURE — 5A09557 ASSISTANCE WITH RESPIRATORY VENTILATION, GREATER THAN 96 CONSECUTIVE HOURS, CONTINUOUS POSITIVE AIRWAY PRESSURE: ICD-10-PCS | Performed by: INTERNAL MEDICINE

## 2021-09-29 RX ADMIN — IPRATROPIUM BROMIDE AND ALBUTEROL SULFATE SCH: .5; 3 SOLUTION RESPIRATORY (INHALATION) at 09:47

## 2021-09-29 RX ADMIN — ASPIRIN SCH MG: 325 TABLET, COATED ORAL at 10:18

## 2021-09-29 RX ADMIN — BENZONATATE SCH MG: 100 CAPSULE ORAL at 05:44

## 2021-09-29 RX ADMIN — NYSTATIN SCH APPLIC: 100000 POWDER TOPICAL at 10:18

## 2021-09-29 RX ADMIN — FUROSEMIDE SCH MG: 40 TABLET ORAL at 05:43

## 2021-09-29 RX ADMIN — BENZONATATE SCH MG: 100 CAPSULE ORAL at 13:20

## 2021-09-29 RX ADMIN — Medication SCH ML: at 10:20

## 2021-09-29 RX ADMIN — IPRATROPIUM BROMIDE AND ALBUTEROL SULFATE SCH AMPUL: .5; 3 SOLUTION RESPIRATORY (INHALATION) at 22:27

## 2021-09-29 RX ADMIN — ONDANSETRON PRN MG: 2 INJECTION INTRAMUSCULAR; INTRAVENOUS at 15:14

## 2021-09-29 RX ADMIN — FUROSEMIDE SCH MG: 40 TABLET ORAL at 17:22

## 2021-09-29 RX ADMIN — FAMOTIDINE SCH MG: 20 TABLET ORAL at 22:16

## 2021-09-29 RX ADMIN — HEPARIN SODIUM SCH UNIT: 5000 INJECTION, SOLUTION INTRAVENOUS; SUBCUTANEOUS at 13:20

## 2021-09-29 RX ADMIN — IPRATROPIUM BROMIDE AND ALBUTEROL SULFATE SCH: .5; 3 SOLUTION RESPIRATORY (INHALATION) at 00:18

## 2021-09-29 RX ADMIN — FAMOTIDINE SCH MG: 20 TABLET ORAL at 10:18

## 2021-09-29 RX ADMIN — IPRATROPIUM BROMIDE AND ALBUTEROL SULFATE SCH AMPUL: .5; 3 SOLUTION RESPIRATORY (INHALATION) at 14:30

## 2021-09-29 RX ADMIN — FUROSEMIDE SCH: 40 TABLET ORAL at 06:48

## 2021-09-29 RX ADMIN — BENZONATATE SCH MG: 100 CAPSULE ORAL at 22:16

## 2021-09-29 RX ADMIN — Medication SCH ML: at 00:37

## 2021-09-29 RX ADMIN — NYSTATIN SCH APPLIC: 100000 POWDER TOPICAL at 22:46

## 2021-09-29 RX ADMIN — HEPARIN SODIUM SCH UNIT: 5000 INJECTION, SOLUTION INTRAVENOUS; SUBCUTANEOUS at 22:16

## 2021-09-29 RX ADMIN — Medication SCH ML: at 22:17

## 2021-09-29 RX ADMIN — ONDANSETRON PRN MG: 2 INJECTION INTRAMUSCULAR; INTRAVENOUS at 23:51

## 2021-09-29 RX ADMIN — HEPARIN SODIUM SCH UNIT: 5000 INJECTION, SOLUTION INTRAVENOUS; SUBCUTANEOUS at 05:44

## 2021-09-29 NOTE — PROGRESS NOTE
Assessment and Plan





31 years old female with history of morbid obesity, hypertension, asthma , sleep

apnea was brought to the emergency room because of shortness of breath, edema, 

generalized malaise, fatigue, and weakness for last couple of days.  She states 

she just does not feel well.  She has a headache.  She states her legs are 

swollen.  She feels as though she is retaining fluid.  She states that she went 

to her regular doctors on the 13th.  They tested her for Covid.  It was 

negative.  She was told to go see a cardiologist.  She is not seen a car

diologist as of yet.  She came in here because she was not feeling well and did 

not know what to do.  She has had no sick contacts.  








In the emergency room patient is found to have acute CHF exacerbation patient 

proBNP is 7573 also patient cardiac enzyme is elevated troponin is 0.043. 

Patient blood glucose also 55 patient is hypoglycemic


Patients present blood sugur 130.





Patient is sleeping  on BIPAP 24/12, rate 20, FIO2 30% and O2 saturation 98%. No

increased work of breathing on present settings. Patient afebrile. No 

leukocytosis. Blood pressure 107/58, Pulse 94.





Patient presently on albuterol/atrovent aerosol treatments q 6 hours, S/C 

Hepatin, Famotidine and PO Medrol.





I spent critical care time of 35 minutes, review the chart, examine the patient,

review lab results, talking to the nursing staff and respiratory therapy and 

work out plan of treatment in this critically ill morbidly Obese patient.








- Patient Problems


(1) Acute respiratory failure with hypoxia and hypercapnia


Current Visit: Yes   Status: Acute   


Plan to address problem: 


BIPAP 24/12, rate 20, FIO2 30%


 Albuterol/atrovent aerosol treatments.


 Continue medrol


 Contibue S/C Heparin.


 Continue famotidine.


 








(2) Acute exacerbation of CHF (congestive heart failure)


Current Visit: Yes   Status: Acute   


Plan to address problem: 


Management as per cardiology.








(3) Hypertension


Current Visit: Yes   Status: Acute   


Plan to address problem: 


Management as per primary care.








(4) Morbid obesity with BMI of 70 and over, adult


Current Visit: No   Status: Acute   


Plan to address problem: 


Weight reduction diet.


 Mobility protocol


 Fall precautions.








(5) Obesity hypoventilation syndrome


Current Visit: No   Status: Acute   


Plan to address problem: 


BIPAP 24/12, rate 20, FIO2 30%.








(6) Sleep apnea


Current Visit: No   Status: Acute   


Plan to address problem: 


BIPAP 24/12, rate 20, FIO2 30%.


 Sleep study if she can as out patient.








Subjective


Date of service: 09/29/21


Principal diagnosis: Acute on chronic renal insufficiency


Interval history: 





31 years old female with history of morbid obesity, hypertension, asthma COPD 

sleep apnea was brought to the emergency room because of shortness of breath, 

edema, generalized malaise, fatigue, and weakness for last couple of days.  She 

states she just does not feel well.  She has a headache.  She states her legs 

are swollen.  She feels as though she is retaining fluid.  She states that she 

went to her regular doctors on the 13th.  They tested her for Covid.  It was 

negative.  She was told to go see a cardiologist.  She is not seen a 

cardiologist as of yet.  She came in here because she was not feeling well and 

did not know what to do.  She has had no sick contacts.  








In the emergency room patient is found to have acute CHF exacerbation patient 

proBNP is 7573 also patient cardiac enzyme is elevated troponin is 0.043. 

Patient blood glucose also 55 patient is hypoglycemic. Patients present blood 

sugur 130.





Patient is sleeping  on BIPAP 24/12, rate 20, FIO2 30% and O2 saturation 98%. No

increased work of breathing on present settings. Patient afebrile. No 

leukocytosis. Blood pressure 107/58, Pulse 94.





Patient presently on albuterol/atrovent aerosol treatments q 6 hours, S/C 

Hepatin, Famotidine and PO Medrol.








Objective


                               Vital Signs - 12hr











  09/29/21 09/29/21 09/29/21





  08:30 09:00 09:30


 


Temperature   


 


Pulse Rate 100 H 98 H 99 H


 


Pulse Rate [   





Anterior   





Bilateral   





Throughout]   


 


Pulse Rate [   





From Monitor]   


 


Respiratory 15 23 24





Rate   


 


Respiratory   





Rate [Anterior   





Bilateral   





Throughout]   


 


Blood Pressure 133/81 132/83 121/57


 


O2 Sat by Pulse 95 95 96





Oximetry   














  09/29/21 09/29/21 09/29/21





  10:00 10:30 11:00


 


Temperature   


 


Pulse Rate 100 H 98 H 100 H


 


Pulse Rate [   





Anterior   





Bilateral   





Throughout]   


 


Pulse Rate [   





From Monitor]   


 


Respiratory 29 H 30 H 29 H





Rate   


 


Respiratory   





Rate [Anterior   





Bilateral   





Throughout]   


 


Blood Pressure 123/73 123/73 123/73


 


O2 Sat by Pulse 94 97 97





Oximetry   














  09/29/21 09/29/21 09/29/21





  11:30 12:00 12:30


 


Temperature  98.8 F 


 


Pulse Rate 97 H 103 H 104 H


 


Pulse Rate [   





Anterior   





Bilateral   





Throughout]   


 


Pulse Rate [  103 H 





From Monitor]   


 


Respiratory 32 H 14 31 H





Rate   


 


Respiratory   





Rate [Anterior   





Bilateral   





Throughout]   


 


Blood Pressure 168/90 168/90 168/90


 


O2 Sat by Pulse 96 90 96





Oximetry   














  09/29/21 09/29/21 09/29/21





  13:00 13:30 14:00


 


Temperature   


 


Pulse Rate 105 H 103 H 101 H


 


Pulse Rate [   





Anterior   





Bilateral   





Throughout]   


 


Pulse Rate [   





From Monitor]   


 


Respiratory 36 H 22 24





Rate   


 


Respiratory   





Rate [Anterior   





Bilateral   





Throughout]   


 


Blood Pressure 168/90 168/90 165/83


 


O2 Sat by Pulse 95 93 91





Oximetry   














  09/29/21 09/29/21 09/29/21





  14:30 15:00 15:30


 


Temperature   


 


Pulse Rate 98 H 98 H 101 H


 


Pulse Rate [ 102 H  





Anterior   





Bilateral   





Throughout]   


 


Pulse Rate [   





From Monitor]   


 


Respiratory 20 33 H 27 H





Rate   


 


Respiratory 20  





Rate [Anterior   





Bilateral   





Throughout]   


 


Blood Pressure 130/66 124/76 117/78


 


O2 Sat by Pulse  96 91





Oximetry   














  09/29/21 09/29/21 09/29/21





  16:00 16:30 17:00


 


Temperature 98.6 F  


 


Pulse Rate 103 H 105 H 105 H


 


Pulse Rate [   





Anterior   





Bilateral   





Throughout]   


 


Pulse Rate [ 98 H  





From Monitor]   


 


Respiratory 14 25 H 16





Rate   


 


Respiratory   





Rate [Anterior   





Bilateral   





Throughout]   


 


Blood Pressure 130/78 125/82 125/82


 


O2 Sat by Pulse 94 91 77 L





Oximetry   














  09/29/21 09/29/21 09/29/21





  17:30 18:00 18:30


 


Temperature   


 


Pulse Rate 102 H 102 H 97 H


 


Pulse Rate [   





Anterior   





Bilateral   





Throughout]   


 


Pulse Rate [   





From Monitor]   


 


Respiratory 20 21 20





Rate   


 


Respiratory   





Rate [Anterior   





Bilateral   





Throughout]   


 


Blood Pressure 125/77 117/66 116/71


 


O2 Sat by Pulse 93 87 95





Oximetry   














  09/29/21 09/29/21





  18:32 19:00


 


Temperature  


 


Pulse Rate 101 H 


 


Pulse Rate [  





Anterior  





Bilateral  





Throughout]  


 


Pulse Rate [  





From Monitor]  


 


Respiratory 23 23





Rate  


 


Respiratory  





Rate [Anterior  





Bilateral  





Throughout]  


 


Blood Pressure 126/72 116/65


 


O2 Sat by Pulse 92 91





Oximetry  











Constitutional: no acute distress, asleep, other (Morbidly Obese , sleeping on 

BIPAP.)


Eyes: non-icteric


ENT: oropharynx moist


Neck: supple, no lymphadenopathy, no JVD


Effort: mildly labored


Ascultation: Bilateral: diminished breath sounds


Cardiovascular: regular rate and rhythm


Gastrointestinal: normoactive bowel sounds, soft, non-tender


Integumentary: decubitus ulcer, other (Stasis dermatititis on legs and abdomen.)


Extremities: edema, other (stasis dermatitis)


Neurologic: pupils equal and round, unable to assess


Psychiatric: other (Can not assess.)


CBC and BMP: 


                                 09/29/21 04:55





                                 09/29/21 04:55


ABG, PT/INR, D-dimer: 


ABG











ABG pH  7.291  (7.320-7.450)  L  09/28/21  12:03    


 


POC ABG pCO2  75.5 mmHg (32.0-48.0)  H  09/28/21  12:03    


 


ABG pCO2  86.9 mm Hg  09/27/21  18:40    


 


POC ABG pO2  76.2 mmHg ()  L  09/28/21  12:03    


 


ABG pO2  81.8 mm Hg (80.0-90.0)   09/27/21  18:40    


 


POC ABG HCO3  35.6   09/28/21  12:03    


 


ABG O2 Saturation  93.3  (0-100)   09/28/21  12:03    








Abnormal lab findings: 


                                  Abnormal Labs











  09/21/21 09/21/21 09/22/21





  22:04 22:04 00:18


 


WBC  17.7 H  


 


Hgb  9.1 L  


 


Hct   


 


MCV  77 L  


 


MCH  21 L  


 


MCHC  27 L  


 


RDW  24.3 H  


 


Seg Neuts % (Manual)   


 


Lymphocytes % (Manual)   


 


Nucleated RBC %   


 


Seg Neutrophils # Man   


 


Lymphocytes # (Manual)   


 


Monocytes # (Manual)   


 


ABG pH   


 


POC ABG pCO2   


 


POC ABG pO2   


 


ABG pO2   


 


ABG HCO3   


 


ABG Base Excess   


 


ABG Hemoglobin   


 


ABG Oxyhemoglobin   


 


ABG Sodium   


 


ABG Chloride   


 


ABG Glucose   


 


Oxyhemoglobin   


 


Carboxyhemoglobin   


 


Sodium   135 L 


 


Potassium   


 


Chloride   91.0 L 


 


Carbon Dioxide   17 L 


 


BUN   


 


Creatinine   1.4 H 


 


Glucose   55 L 


 


POC Glucose    49 L


 


Calcium   


 


Ionized Calcium   


 


Total Bilirubin   


 


AST   


 


ALT   


 


Total Creatine Kinase   


 


Troponin T   0.043 H 


 


NT-Pro-B Natriuret Pep   7573 H 


 


Total Protein   


 


Albumin   


 


LDL Cholesterol Direct   47 L 


 


HDL Cholesterol   25 L 


 


Arterial Blood Glucose   














  09/22/21 09/22/21 09/22/21





  04:03 04:03 09:22


 


WBC  24.3 H  


 


Hgb  9.3 L  


 


Hct   


 


MCV  74 L  


 


MCH  21 L  


 


MCHC  29 L  


 


RDW  23.1 H  


 


Seg Neuts % (Manual)  78.0 H  


 


Lymphocytes % (Manual)  7.0 L  


 


Nucleated RBC %  1.0 H  


 


Seg Neutrophils # Man  19.0 H  


 


Lymphocytes # (Manual)   


 


Monocytes # (Manual)  1.0 H  


 


ABG pH   


 


POC ABG pCO2   


 


POC ABG pO2   


 


ABG pO2   


 


ABG HCO3   


 


ABG Base Excess   


 


ABG Hemoglobin   


 


ABG Oxyhemoglobin   


 


ABG Sodium   


 


ABG Chloride   


 


ABG Glucose   


 


Oxyhemoglobin   


 


Carboxyhemoglobin   


 


Sodium   134 L 


 


Potassium   


 


Chloride   91.2 L 


 


Carbon Dioxide   


 


BUN   


 


Creatinine   1.8 H 


 


Glucose   


 


POC Glucose   


 


Calcium   


 


Ionized Calcium   


 


Total Bilirubin   


 


AST   


 


ALT   


 


Total Creatine Kinase   


 


Troponin T    0.099 H


 


NT-Pro-B Natriuret Pep   


 


Total Protein   


 


Albumin   


 


LDL Cholesterol Direct   


 


HDL Cholesterol   


 


Arterial Blood Glucose   














  09/22/21 09/22/21 09/23/21





  13:03 20:28 04:52


 


WBC    20.9 H


 


Hgb    8.5 L


 


Hct    30.0 L


 


MCV    73 L


 


MCH    21 L


 


MCHC    28 L


 


RDW    23.9 H


 


Seg Neuts % (Manual)    77.0 H


 


Lymphocytes % (Manual)    1.0 L


 


Nucleated RBC %    1.0 H


 


Seg Neutrophils # Man    16.1 H


 


Lymphocytes # (Manual)    0.2 L


 


Monocytes # (Manual)   


 


ABG pH   


 


POC ABG pCO2   


 


POC ABG pO2   


 


ABG pO2   


 


ABG HCO3   


 


ABG Base Excess   


 


ABG Hemoglobin   


 


ABG Oxyhemoglobin   


 


ABG Sodium   


 


ABG Chloride   


 


ABG Glucose   


 


Oxyhemoglobin   


 


Carboxyhemoglobin   


 


Sodium   


 


Potassium   


 


Chloride   


 


Carbon Dioxide   


 


BUN   


 


Creatinine   


 


Glucose   


 


POC Glucose   115 H 


 


Calcium   


 


Ionized Calcium   


 


Total Bilirubin   


 


AST   


 


ALT   


 


Total Creatine Kinase   


 


Troponin T  0.078 H D  


 


NT-Pro-B Natriuret Pep   


 


Total Protein   


 


Albumin   


 


LDL Cholesterol Direct   


 


HDL Cholesterol   


 


Arterial Blood Glucose   














  09/23/21 09/23/21 09/23/21





  04:52 08:46 11:50


 


WBC   


 


Hgb   


 


Hct   


 


MCV   


 


MCH   


 


MCHC   


 


RDW   


 


Seg Neuts % (Manual)   


 


Lymphocytes % (Manual)   


 


Nucleated RBC %   


 


Seg Neutrophils # Man   


 


Lymphocytes # (Manual)   


 


Monocytes # (Manual)   


 


ABG pH   


 


POC ABG pCO2   


 


POC ABG pO2   


 


ABG pO2   


 


ABG HCO3   


 


ABG Base Excess   


 


ABG Hemoglobin   


 


ABG Oxyhemoglobin   


 


ABG Sodium   


 


ABG Chloride   


 


ABG Glucose   


 


Oxyhemoglobin   


 


Carboxyhemoglobin   


 


Sodium  129 L  


 


Potassium  5.6 H  


 


Chloride  88.6 L  


 


Carbon Dioxide   


 


BUN  27 H  


 


Creatinine  2.4 H  


 


Glucose  121 H  


 


POC Glucose   139 H  156 H


 


Calcium  7.7 L  


 


Ionized Calcium   


 


Total Bilirubin   


 


AST   


 


ALT   


 


Total Creatine Kinase   


 


Troponin T   


 


NT-Pro-B Natriuret Pep   


 


Total Protein   


 


Albumin   


 


LDL Cholesterol Direct   


 


HDL Cholesterol   


 


Arterial Blood Glucose   














  09/23/21 09/23/21 09/23/21





  15:46 16:58 22:08


 


WBC   


 


Hgb   


 


Hct   


 


MCV   


 


MCH   


 


MCHC   


 


RDW   


 


Seg Neuts % (Manual)   


 


Lymphocytes % (Manual)   


 


Nucleated RBC %   


 


Seg Neutrophils # Man   


 


Lymphocytes # (Manual)   


 


Monocytes # (Manual)   


 


ABG pH   


 


POC ABG pCO2   


 


POC ABG pO2   


 


ABG pO2   


 


ABG HCO3   


 


ABG Base Excess   


 


ABG Hemoglobin   


 


ABG Oxyhemoglobin   


 


ABG Sodium   


 


ABG Chloride   


 


ABG Glucose   


 


Oxyhemoglobin   


 


Carboxyhemoglobin   


 


Sodium  128 L  


 


Potassium  5.5 H  


 


Chloride  88.1 L  


 


Carbon Dioxide   


 


BUN  33 H  


 


Creatinine  2.7 H  


 


Glucose  172 H  


 


POC Glucose   187 H  186 H


 


Calcium  7.3 L  


 


Ionized Calcium   


 


Total Bilirubin   


 


AST   


 


ALT   


 


Total Creatine Kinase   


 


Troponin T   


 


NT-Pro-B Natriuret Pep   


 


Total Protein   


 


Albumin   


 


LDL Cholesterol Direct   


 


HDL Cholesterol   


 


Arterial Blood Glucose   














  09/23/21 09/24/21 09/24/21





  23:15 02:20 07:39


 


WBC   


 


Hgb   


 


Hct   


 


MCV   


 


MCH   


 


MCHC   


 


RDW   


 


Seg Neuts % (Manual)   


 


Lymphocytes % (Manual)   


 


Nucleated RBC %   


 


Seg Neutrophils # Man   


 


Lymphocytes # (Manual)   


 


Monocytes # (Manual)   


 


ABG pH   


 


POC ABG pCO2   


 


POC ABG pO2   


 


ABG pO2   


 


ABG HCO3   


 


ABG Base Excess   


 


ABG Hemoglobin   


 


ABG Oxyhemoglobin   


 


ABG Sodium   


 


ABG Chloride   


 


ABG Glucose   


 


Oxyhemoglobin   


 


Carboxyhemoglobin   


 


Sodium   128 L 


 


Potassium   5.2 H 


 


Chloride   88.6 L 


 


Carbon Dioxide   


 


BUN   39 H 


 


Creatinine   3.1 H 


 


Glucose   171 H 


 


POC Glucose    168 H


 


Calcium   7.2 L 


 


Ionized Calcium  3.6 L  


 


Total Bilirubin   


 


AST   


 


ALT   


 


Total Creatine Kinase   


 


Troponin T   


 


NT-Pro-B Natriuret Pep   


 


Total Protein   


 


Albumin   


 


LDL Cholesterol Direct   


 


HDL Cholesterol   


 


Arterial Blood Glucose   














  09/24/21 09/24/21 09/24/21





  10:04 11:37 17:18


 


WBC   


 


Hgb   


 


Hct   


 


MCV   


 


MCH   


 


MCHC   


 


RDW   


 


Seg Neuts % (Manual)   


 


Lymphocytes % (Manual)   


 


Nucleated RBC %   


 


Seg Neutrophils # Man   


 


Lymphocytes # (Manual)   


 


Monocytes # (Manual)   


 


ABG pH   


 


POC ABG pCO2   


 


POC ABG pO2   


 


ABG pO2   


 


ABG HCO3   


 


ABG Base Excess   


 


ABG Hemoglobin   


 


ABG Oxyhemoglobin   


 


ABG Sodium   


 


ABG Chloride   


 


ABG Glucose   


 


Oxyhemoglobin   


 


Carboxyhemoglobin   


 


Sodium   


 


Potassium   


 


Chloride   


 


Carbon Dioxide   


 


BUN   


 


Creatinine   


 


Glucose   


 


POC Glucose   170 H  152 H


 


Calcium   


 


Ionized Calcium   


 


Total Bilirubin   


 


AST   


 


ALT   


 


Total Creatine Kinase  1712 H  


 


Troponin T   


 


NT-Pro-B Natriuret Pep   


 


Total Protein   


 


Albumin   


 


LDL Cholesterol Direct   


 


HDL Cholesterol   


 


Arterial Blood Glucose   














  09/24/21 09/24/21 09/25/21





  19:38 22:02 05:48


 


WBC   


 


Hgb   


 


Hct   


 


MCV   


 


MCH   


 


MCHC   


 


RDW   


 


Seg Neuts % (Manual)   


 


Lymphocytes % (Manual)   


 


Nucleated RBC %   


 


Seg Neutrophils # Man   


 


Lymphocytes # (Manual)   


 


Monocytes # (Manual)   


 


ABG pH   


 


POC ABG pCO2   


 


POC ABG pO2   


 


ABG pO2   


 


ABG HCO3   


 


ABG Base Excess   


 


ABG Hemoglobin   


 


ABG Oxyhemoglobin   


 


ABG Sodium   


 


ABG Chloride   


 


ABG Glucose   


 


Oxyhemoglobin   


 


Carboxyhemoglobin   


 


Sodium  128 L   124 L


 


Potassium    5.8 H D


 


Chloride  89.6 L   89.4 L


 


Carbon Dioxide   


 


BUN  47 H   53 H


 


Creatinine  3.0 H   2.8 H


 


Glucose  165 H   150 H


 


POC Glucose   147 H 


 


Calcium  6.9 L   7.2 L


 


Ionized Calcium   


 


Total Bilirubin   


 


AST   


 


ALT   


 


Total Creatine Kinase   


 


Troponin T   


 


NT-Pro-B Natriuret Pep   


 


Total Protein   


 


Albumin   


 


LDL Cholesterol Direct   


 


HDL Cholesterol   


 


Arterial Blood Glucose   














  09/25/21 09/25/21 09/25/21





  08:48 11:35 19:12


 


WBC   


 


Hgb   


 


Hct   


 


MCV   


 


MCH   


 


MCHC   


 


RDW   


 


Seg Neuts % (Manual)   


 


Lymphocytes % (Manual)   


 


Nucleated RBC %   


 


Seg Neutrophils # Man   


 


Lymphocytes # (Manual)   


 


Monocytes # (Manual)   


 


ABG pH   


 


POC ABG pCO2   


 


POC ABG pO2   


 


ABG pO2   


 


ABG HCO3   


 


ABG Base Excess   


 


ABG Hemoglobin   


 


ABG Oxyhemoglobin   


 


ABG Sodium   


 


ABG Chloride   


 


ABG Glucose   


 


Oxyhemoglobin   


 


Carboxyhemoglobin   


 


Sodium    128 L


 


Potassium   


 


Chloride    89.7 L


 


Carbon Dioxide   


 


BUN    53 H


 


Creatinine    2.4 H


 


Glucose    159 H


 


POC Glucose  152 H  152 H 


 


Calcium    7.1 L


 


Ionized Calcium   


 


Total Bilirubin   


 


AST   


 


ALT   


 


Total Creatine Kinase   


 


Troponin T   


 


NT-Pro-B Natriuret Pep   


 


Total Protein   


 


Albumin   


 


LDL Cholesterol Direct   


 


HDL Cholesterol   


 


Arterial Blood Glucose   














  09/25/21 09/26/21 09/26/21





  22:03 05:03 07:41


 


WBC   


 


Hgb   


 


Hct   


 


MCV   


 


MCH   


 


MCHC   


 


RDW   


 


Seg Neuts % (Manual)   


 


Lymphocytes % (Manual)   


 


Nucleated RBC %   


 


Seg Neutrophils # Man   


 


Lymphocytes # (Manual)   


 


Monocytes # (Manual)   


 


ABG pH   


 


POC ABG pCO2   


 


POC ABG pO2   


 


ABG pO2   


 


ABG HCO3   


 


ABG Base Excess   


 


ABG Hemoglobin   


 


ABG Oxyhemoglobin   


 


ABG Sodium   


 


ABG Chloride   


 


ABG Glucose   


 


Oxyhemoglobin   


 


Carboxyhemoglobin   


 


Sodium   134 L 


 


Potassium   


 


Chloride   92.9 L 


 


Carbon Dioxide   33 H D 


 


BUN   54 H 


 


Creatinine   2.4 H 


 


Glucose   150 H 


 


POC Glucose  152 H   144 H


 


Calcium   7.2 L 


 


Ionized Calcium   


 


Total Bilirubin   


 


AST   


 


ALT   


 


Total Creatine Kinase   


 


Troponin T   


 


NT-Pro-B Natriuret Pep   


 


Total Protein   


 


Albumin   


 


LDL Cholesterol Direct   


 


HDL Cholesterol   


 


Arterial Blood Glucose   














  09/26/21 09/26/21 09/26/21





  11:38 14:11 17:02


 


WBC   14.7 H 


 


Hgb   8.7 L 


 


Hct   29.8 L 


 


MCV   74 L 


 


MCH   22 L 


 


MCHC   29 L 


 


RDW   24.9 H 


 


Seg Neuts % (Manual)   86.0 H 


 


Lymphocytes % (Manual)   6.0 L 


 


Nucleated RBC %   


 


Seg Neutrophils # Man   12.6 H 


 


Lymphocytes # (Manual)   0.9 L 


 


Monocytes # (Manual)   


 


ABG pH   


 


POC ABG pCO2   


 


POC ABG pO2   


 


ABG pO2   


 


ABG HCO3   


 


ABG Base Excess   


 


ABG Hemoglobin   


 


ABG Oxyhemoglobin   


 


ABG Sodium   


 


ABG Chloride   


 


ABG Glucose   


 


Oxyhemoglobin   


 


Carboxyhemoglobin   


 


Sodium   


 


Potassium   


 


Chloride   


 


Carbon Dioxide   


 


BUN   


 


Creatinine   


 


Glucose   


 


POC Glucose  151 H   154 H


 


Calcium   


 


Ionized Calcium   


 


Total Bilirubin   


 


AST   


 


ALT   


 


Total Creatine Kinase   


 


Troponin T   


 


NT-Pro-B Natriuret Pep   


 


Total Protein   


 


Albumin   


 


LDL Cholesterol Direct   


 


HDL Cholesterol   


 


Arterial Blood Glucose   














  09/26/21 09/27/21 09/27/21





  23:14 05:03 10:00


 


WBC   


 


Hgb   


 


Hct   


 


MCV   


 


MCH   


 


MCHC   


 


RDW   


 


Seg Neuts % (Manual)   


 


Lymphocytes % (Manual)   


 


Nucleated RBC %   


 


Seg Neutrophils # Man   


 


Lymphocytes # (Manual)   


 


Monocytes # (Manual)   


 


ABG pH    7.150 L*


 


POC ABG pCO2   


 


POC ABG pO2   


 


ABG pO2    91.8 H


 


ABG HCO3    37.2 H


 


ABG Base Excess    7.1 H


 


ABG Hemoglobin    7.1 L


 


ABG Oxyhemoglobin   


 


ABG Sodium   


 


ABG Chloride   


 


ABG Glucose   


 


Oxyhemoglobin    93.4 L


 


Carboxyhemoglobin   


 


Sodium   134 L 


 


Potassium   


 


Chloride   91.3 L 


 


Carbon Dioxide   33 H 


 


BUN   63 H 


 


Creatinine   2.2 H 


 


Glucose   159 H 


 


POC Glucose  151 H  


 


Calcium   7.8 L 


 


Ionized Calcium   


 


Total Bilirubin   


 


AST   


 


ALT   


 


Total Creatine Kinase   


 


Troponin T   


 


NT-Pro-B Natriuret Pep   


 


Total Protein   


 


Albumin   


 


LDL Cholesterol Direct   


 


HDL Cholesterol   


 


Arterial Blood Glucose   














  09/27/21 09/27/21 09/27/21





  12:39 16:37 18:40


 


WBC   


 


Hgb   


 


Hct   


 


MCV   


 


MCH   


 


MCHC   


 


RDW   


 


Seg Neuts % (Manual)   


 


Lymphocytes % (Manual)   


 


Nucleated RBC %   


 


Seg Neutrophils # Man   


 


Lymphocytes # (Manual)   


 


Monocytes # (Manual)   


 


ABG pH    7.237 L


 


POC ABG pCO2   


 


POC ABG pO2   


 


ABG pO2   


 


ABG HCO3    36.2 H


 


ABG Base Excess    7.4 H


 


ABG Hemoglobin    7.8 L


 


ABG Oxyhemoglobin   


 


ABG Sodium   


 


ABG Chloride   


 


ABG Glucose   


 


Oxyhemoglobin    93.7 L


 


Carboxyhemoglobin   


 


Sodium   


 


Potassium   


 


Chloride   


 


Carbon Dioxide   


 


BUN   


 


Creatinine   


 


Glucose   


 


POC Glucose  140 H  132 H 


 


Calcium   


 


Ionized Calcium   


 


Total Bilirubin   


 


AST   


 


ALT   


 


Total Creatine Kinase   


 


Troponin T   


 


NT-Pro-B Natriuret Pep   


 


Total Protein   


 


Albumin   


 


LDL Cholesterol Direct   


 


HDL Cholesterol   


 


Arterial Blood Glucose   














  09/27/21 09/28/21 09/28/21





  23:55 04:27 04:27


 


WBC   


 


Hgb   8.5 L 


 


Hct   29.1 L 


 


MCV   72 L 


 


MCH   21 L 


 


MCHC   29 L 


 


RDW   24.9 H 


 


Seg Neuts % (Manual)   


 


Lymphocytes % (Manual)   


 


Nucleated RBC %   


 


Seg Neutrophils # Man   


 


Lymphocytes # (Manual)   


 


Monocytes # (Manual)   


 


ABG pH   


 


POC ABG pCO2   


 


POC ABG pO2   


 


ABG pO2   


 


ABG HCO3   


 


ABG Base Excess   


 


ABG Hemoglobin   


 


ABG Oxyhemoglobin   


 


ABG Sodium   


 


ABG Chloride   


 


ABG Glucose   


 


Oxyhemoglobin   


 


Carboxyhemoglobin   


 


Sodium    135 L


 


Potassium   


 


Chloride    93.5 L


 


Carbon Dioxide    33 H


 


BUN    68 H


 


Creatinine    1.9 H


 


Glucose    143 H


 


POC Glucose  135 H  


 


Calcium    8.1 L


 


Ionized Calcium   


 


Total Bilirubin    1.50 H


 


AST    494 H


 


ALT    835 H


 


Total Creatine Kinase   


 


Troponin T   


 


NT-Pro-B Natriuret Pep   


 


Total Protein    9.5 H


 


Albumin    3.1 L


 


LDL Cholesterol Direct   


 


HDL Cholesterol   


 


Arterial Blood Glucose   














  09/28/21 09/28/21 09/29/21





  12:03 17:24 00:19


 


WBC   


 


Hgb   


 


Hct   


 


MCV   


 


MCH   


 


MCHC   


 


RDW   


 


Seg Neuts % (Manual)   


 


Lymphocytes % (Manual)   


 


Nucleated RBC %   


 


Seg Neutrophils # Man   


 


Lymphocytes # (Manual)   


 


Monocytes # (Manual)   


 


ABG pH  7.291 L  


 


POC ABG pCO2  75.5 H  


 


POC ABG pO2  76.2 L  


 


ABG pO2   


 


ABG HCO3   


 


ABG Base Excess   


 


ABG Hemoglobin  9.9 L  


 


ABG Oxyhemoglobin  91.0 L  


 


ABG Sodium  134.9 L  


 


ABG Chloride  93.0 L  


 


ABG Glucose  146 H  


 


Oxyhemoglobin   


 


Carboxyhemoglobin  2.2 H  


 


Sodium   


 


Potassium   


 


Chloride   


 


Carbon Dioxide   


 


BUN   


 


Creatinine   


 


Glucose   


 


POC Glucose   134 H  136 H


 


Calcium   


 


Ionized Calcium   


 


Total Bilirubin   


 


AST   


 


ALT   


 


Total Creatine Kinase   


 


Troponin T   


 


NT-Pro-B Natriuret Pep   


 


Total Protein   


 


Albumin   


 


LDL Cholesterol Direct   


 


HDL Cholesterol   


 


Arterial Blood Glucose  146 H  














  09/29/21 09/29/21 09/29/21





  04:55 04:55 05:29


 


WBC   


 


Hgb  8.0 L  


 


Hct  27.1 L  


 


MCV  70 L  


 


MCH  21 L  


 


MCHC   


 


RDW  24.3 H  


 


Seg Neuts % (Manual)   


 


Lymphocytes % (Manual)   


 


Nucleated RBC %   


 


Seg Neutrophils # Man   


 


Lymphocytes # (Manual)   


 


Monocytes # (Manual)   


 


ABG pH   


 


POC ABG pCO2   


 


POC ABG pO2   


 


ABG pO2   


 


ABG HCO3   


 


ABG Base Excess   


 


ABG Hemoglobin   


 


ABG Oxyhemoglobin   


 


ABG Sodium   


 


ABG Chloride   


 


ABG Glucose   


 


Oxyhemoglobin   


 


Carboxyhemoglobin   


 


Sodium   


 


Potassium   


 


Chloride   95.1 L 


 


Carbon Dioxide   36 H 


 


BUN   63 H 


 


Creatinine   1.5 H 


 


Glucose   131 H 


 


POC Glucose    118 H


 


Calcium   8.3 L 


 


Ionized Calcium   


 


Total Bilirubin   1.80 H 


 


AST   323 H 


 


ALT   609 H 


 


Total Creatine Kinase   


 


Troponin T   


 


NT-Pro-B Natriuret Pep   


 


Total Protein   9.3 H 


 


Albumin   2.9 L 


 


LDL Cholesterol Direct   


 


HDL Cholesterol   


 


Arterial Blood Glucose   














  09/29/21 09/29/21





  11:40 18:30


 


WBC  


 


Hgb  


 


Hct  


 


MCV  


 


MCH  


 


MCHC  


 


RDW  


 


Seg Neuts % (Manual)  


 


Lymphocytes % (Manual)  


 


Nucleated RBC %  


 


Seg Neutrophils # Man  


 


Lymphocytes # (Manual)  


 


Monocytes # (Manual)  


 


ABG pH  


 


POC ABG pCO2  


 


POC ABG pO2  


 


ABG pO2  


 


ABG HCO3  


 


ABG Base Excess  


 


ABG Hemoglobin  


 


ABG Oxyhemoglobin  


 


ABG Sodium  


 


ABG Chloride  


 


ABG Glucose  


 


Oxyhemoglobin  


 


Carboxyhemoglobin  


 


Sodium  


 


Potassium  


 


Chloride  


 


Carbon Dioxide  


 


BUN  


 


Creatinine  


 


Glucose  


 


POC Glucose  139 H  130 H


 


Calcium  


 


Ionized Calcium  


 


Total Bilirubin  


 


AST  


 


ALT  


 


Total Creatine Kinase  


 


Troponin T  


 


NT-Pro-B Natriuret Pep  


 


Total Protein  


 


Albumin  


 


LDL Cholesterol Direct  


 


HDL Cholesterol  


 


Arterial Blood Glucose

## 2021-09-29 NOTE — PROGRESS NOTE
Subjective


Principal diagnosis: Acute on chronic renal insufficiency


Interval history: 





Assessment


Acute kidney injury, unknown baseline. Renal ultrasound notes poor visualization

due to body habitus.


Hypervolemic Hyponatremia


Hyperkalemia


Hypocalcemia


Morbid obesity











Recommendations


Continue diuresis


stopped Na tabls


cr is stable today


echo noted, likely diastolic disease


rec lasix 40mg po bid at discharge


rise in bun noted


S/p Kayexalate on admission


Maintain MAP more than 65


Hold ACE/ARB at this time


Hold diuretics given soft pressures


Renally dose medications


Avoid nephrotoxins


Renal diet


will follow lytes prn





Subjective:


Principal diagnosis: Acute on chronic renal insufficiency


Interval history: 


Patient was seen for her renal issues


Nursing, interdisciplinary and consult notes were reviewed


Vitals, input and output, medications and labs were reviewed


Remains on NC








Objective





- Exam


Narrative Exam: 


General: Morbid obesity


HEENT: Oral mucosa moist


Neck: Supple, no JVD


Chest: Clear to auscultation bilaterally


Heart: RRR, S1 and S2, no pericardial rub


Abdomen: Soft, nontender, no renal bruit


Extremity: No peripheral cyanosis, edema


Neurological: Awake and alert


Dermatology: Unable to assess


Psych: Calm and cooperative


Musculoskeletal: No joint effusion





Objective





- Vital Signs


Vital signs: 


                               Vital Signs - 12hr











  09/29/21 09/29/21 09/29/21





  00:19 00:30 01:00


 


Temperature   


 


Pulse Rate  94 H 94 H


 


Pulse Rate [   





From Monitor]   


 


Respiratory  24 12





Rate   


 


Blood Pressure  147/93 148/97


 


O2 Sat by Pulse 92 95 





Oximetry   














  09/29/21 09/29/21 09/29/21





  01:30 01:55 02:00


 


Temperature   


 


Pulse Rate 96 H 93 H 93 H


 


Pulse Rate [   





From Monitor]   


 


Respiratory 12 25 H 22





Rate   


 


Blood Pressure 152/92 133/80 133/80


 


O2 Sat by Pulse 98 97 95





Oximetry   














  09/29/21 09/29/21 09/29/21





  02:05 02:30 03:00


 


Temperature   


 


Pulse Rate  93 H 94 H


 


Pulse Rate [   





From Monitor]   


 


Respiratory  20 24





Rate   


 


Blood Pressure  131/84 127/81


 


O2 Sat by Pulse 92 98 98





Oximetry   














  09/29/21 09/29/21 09/29/21





  03:30 03:45 04:00


 


Temperature  98.9 F 


 


Pulse Rate 94 H  95 H


 


Pulse Rate [   





From Monitor]   


 


Respiratory 18  28 H





Rate   


 


Blood Pressure 133/73  123/87


 


O2 Sat by Pulse 97  98





Oximetry   














  09/29/21 09/29/21 09/29/21





  04:30 05:00 05:30


 


Temperature   


 


Pulse Rate 98 H 96 H 95 H


 


Pulse Rate [   





From Monitor]   


 


Respiratory 20 28 H 28 H





Rate   


 


Blood Pressure 108/54 135/84 134/76


 


O2 Sat by Pulse 98 99 98





Oximetry   














  09/29/21 09/29/21 09/29/21





  06:00 06:30 07:00


 


Temperature   


 


Pulse Rate 95 H 95 H 97 H


 


Pulse Rate [   





From Monitor]   


 


Respiratory 24 29 H 31 H





Rate   


 


Blood Pressure 125/74 133/70 118/55


 


O2 Sat by Pulse 98 97 98





Oximetry   














  09/29/21 09/29/21 09/29/21





  07:30 08:00 08:30


 


Temperature  98 F 


 


Pulse Rate 96 H 99 H 100 H


 


Pulse Rate [  101 H 





From Monitor]   


 


Respiratory 29 H 12 15





Rate   


 


Blood Pressure 111/71 137/80 133/81


 


O2 Sat by Pulse 98 97 95





Oximetry   














- Lab





                                 09/29/21 04:55





                                 09/29/21 04:55


                             Most recent lab results











ABG pH  7.291  (7.320-7.450)  L  09/28/21  12:03    


 


ABG pCO2  86.9 mm Hg  09/27/21  18:40    


 


ABG pO2  81.8 mm Hg (80.0-90.0)   09/27/21  18:40    


 


ABG HCO3  36.2 mmol/L (20.0-26.0)  H  09/27/21  18:40    


 


ABG O2 Saturation  93.3  (0-100)   09/28/21  12:03    


 


Calcium  8.3 mg/dL (8.4-10.2)  L  09/29/21  04:55    


 


Urine Sodium  16 mmol/L  09/23/21  18:46    














Medications & Allergies





- Medications


Allergies/Adverse Reactions: 


                                    Allergies





No Known Allergies Allergy (Unverified 07/13/17 11:17)


   








Home Medications: 


                                Home Medications











 Medication  Instructions  Recorded  Confirmed  Last Taken  Type


 


Acetaminophen [Acetaminophen TAB] 325 mg PO Q4H PRN #30 tablet 12/14/17 01/22/21

Unknown Rx


 


ALBUTEROL NEB's [Proventil 0.083% 2.5 mg IH Q3HRT PRN #30 day 01/24/21  Unknown 

Rx





NEBS]     


 


Albuterol Mdi (or & Nicu Only) 2 puff IH QID PRN #1 inhalation 01/24/21  Unknown

Rx





[ProAir HFA Inhaler]     


 


Azithromycin [Zithromax Z-JAVIER] 0 mg PO DAILY #1 tab 01/24/21  Unknown Rx


 


Benzonatate [Tessalon Perles] 100 mg PO Q8HR #15 capsule 01/24/21  Unknown Rx


 


Famotidine [Pepcid] 20 mg PO BID #14 tablet 01/24/21  Unknown Rx


 


Ipratropium/Albuterol Sulfate 1 ampul IH TIDRT #30 day 01/24/21  Unknown Rx





[DUONEB *Not for PRN Use*]     


 


hydroCHLOROthiazide [HCTZ] 25 mg PO QDAY  tablet 01/24/21  Unknown Rx


 


hydroCHLOROthiazide [HCTZ] 25 mg PO QDAY #30 tablet 01/24/21  Unknown Rx


 


methylPREDNISolone [Medrol 4MG 4 mg PO DAILY #1 tab.ds.pk 01/24/21  Unknown Rx





DOSEPAK (21 tabs)]     











Active Medications: 














Generic Name Dose Route Start Last Admin





  Trade Name Freq  PRN Reason Stop Dose Admin


 


Acetaminophen  650 mg  09/22/21 03:00 





  Acetaminophen 325 Mg Tab  PO  





  Q4H PRN  





  Pain MILD(1-3)/Fever >100.5/HA  


 


Albuterol  2.5 mg  09/22/21 03:00 





  Albuterol 2.5 Mg/3 Ml Nebu  IH  





  Q4HRT PRN  





  Shortness Of Breath  


 


Albuterol/Ipratropium  1 ampul  09/22/21 08:00  09/29/21 09:47





  Ipratropium/Albuterol Sulfate 3 Ml Ampul.Neb  IH   Not Given





  TIDRT DELL  


 


Aspirin  325 mg  09/23/21 10:00  09/29/21 10:18





  Aspirin Ec 325 Mg Tab  PO   325 mg





  QDAY DELL   Administration


 


Atorvastatin Calcium  40 mg  09/22/21 22:00  09/28/21 21:02





  Atorvastatin 40 Mg Tab  PO   40 mg





  QHS DELL   Administration


 


Benzonatate  100 mg  09/22/21 06:00  09/29/21 05:44





  Benzonatate 100 Mg Cap  PO   100 mg





  Q8HR DELL   Administration


 


Famotidine  20 mg  09/29/21 10:00  09/29/21 10:18





  Famotidine 20 Mg Tab  PO   20 mg





  BID DELL   Administration


 


Furosemide  40 mg  09/27/21 18:00  09/29/21 06:48





  Furosemide 40 Mg Tab  PO   Not Given





  0600,1800 [facility]  


 


Guaifenesin  200 mg  09/26/21 14:47 





  Guaifenesin 100 Mg/5 Ml Oral Liqd  PO  





  Q4H PRN  





  Cough  


 


Heparin Sodium (Porcine)  5,000 unit  09/22/21 06:00  09/29/21 05:44





  Heparin 5,000 Unit/1 Ml Vial  SUB-Q   5,000 unit





  Q8HR DELL   Administration


 


Hydromorphone HCl  0.5 mg  09/22/21 03:00  09/28/21 04:16





  Hydromorphone 1 Mg/1 Ml Inj  IV   0.5 mg





  Q3H PRN   Administration





  Pain , Severe (7-10)  


 


Methylprednisolone  4 mg  09/22/21 10:00  09/29/21 10:18





  Methylprednisolone 4 Mg Tab  PO   4 mg





  DAILY DELL   Administration


 


Nitroglycerin  0.4 mg  09/22/21 03:00 





  Nitroglycerin 0.4 Mg Tab Subl  SL  





  Q5M PRN  





  Chest Pain  


 


Nystatin  1 applic  09/22/21 18:00  09/29/21 10:18





  Nystatin Powder 15 Gm  TP   1 applic





  BID DELL   Administration


 


Ondansetron HCl  4 mg  09/22/21 03:00 





  Ondansetron 4 Mg/2 Ml Inj  IV  





  Q8H PRN  





  Nausea And Vomiting  


 


Oxycodone/Acetaminophen  1 tab  09/22/21 03:00  09/27/21 01:52





  Oxycodone /Acetaminophen 5-325mg Tab  PO   1 tab





  Q6H PRN   Administration





  Pain, Moderate (4-6)  


 


Sodium Chloride  10 ml  09/22/21 10:00  09/29/21 10:20





  Sodium Chloride 0.9% 10 Ml Flush Syringe  IV   10 ml





  BID DELL   Administration


 


Sodium Chloride  10 ml  09/22/21 03:00 





  Sodium Chloride 0.9% 10 Ml Flush Syringe  IV  





  PRN PRN  





  LINE FLUSH  


 


Tramadol HCl  50 mg  09/22/21 03:00 





  Tramadol 50 Mg Tab  PO  





  Q6H PRN  





  Pain, Moderate (4-6)

## 2021-09-29 NOTE — PROGRESS NOTE
Assessment and Plan


Assessment and plan: 


(1) Non-ST elevation MI (NSTEMI) type II Procardia


Current Visit: Yes   Status: Acute   


Plan to address problem: 


Admit the patient to the medical telemetry. Aspirin 325 mg p.o. daily. Lipitor 

40 mg p.o. daily. Nitroglycerin as needed. We do the serial cardiac enzyme. We 

also consult cardiology for evaluation and order echocardiogram.








(2) Acute exacerbation of CHF (congestive heart failure)


Current Visit: Yes   Status: Acute   


Plan to address problem: 


Fluid restriction. Maintain input output. Lasix 40 mg IV every 12 hours. Oxygen 

via nasal cannula 3 L/min. DuoNeb by nebulizer every 4 hours. Echocardiogram. 

Cardiology consult








(3) Hypertension


Current Visit: Yes   Status: Acute   


Plan to address problem: 


We will continue the home medication. Hydralazine 10 mg IV every 6 hours as 

needed. We will monitor the blood pressure closely








(4) COPD (chronic obstructive pulmonary disease)


Current Visit: Yes   Status: Acute   


Plan to address problem: 


Oxygen by nasal cannula 3 L/min. DuoNeb via nebulizer every 4 hours. Albuterol 

via nebulizer every 4 hours as needed.





Super


(5) Morbid obesity


Current Visit: Yes   Status: Acute   


Plan to address problem: 


We counseled regarding weight reduction. Outpatient follow-up with bariatric 

surgeon








(6) Hyperglycemia


Current Visit: Yes   Status: Acute   


Plan to address problem: 


We will give 1 ampoule of D50. We will put the patient on cardiac diet. We will 

monitor the blood glucose closely








(7) acute kidney injury with vasomotor nephropathy





(8) Lymphedema of the abdominal wall





(9)Anemia of chronic disease





(10)Possible passive congestive hepatic failure with underlying NAFLD








(11) DVT prophylaxis


Current Visit: No   Status: Acute   


Plan to address problem: 


Heparin 5000 units subcu every 8 hours for DVT prophylaxis. Pepcid 20 mg p.o. 

twice daily for GI prophylaxis. Patient is a full code





9/23


-Patient is on heparin drip for non-STEMI, cardiology is following.


-Patient is off Lasix and ACE inhibitors because of [place].


-Kidney function is worsening overnight and I put a consult for nephrology.


-Patient is hypotensive and blood pressure from this morning was 101/41.  We 

will continue to monitor.  And if it is dropping we we will give him fluid.


-Echo was done and EF of 50 to 55%.  Diastolic dysfunction is indeterminate.  

Management per cardiology


-Patient has COPD continues on dexamethasone, nebulizer treatment as needed.


-Patient has hyponatremia and hyperkalemia.  I give the patient Kayexalate.  

Monitor electrolytes.  Will follow nephrology for additional recommendation.





9/24


-Renal function is worsening, nephrology is following


-Blood pressure is better today


-Hyperkalemia; I added Kayexalate


-Morbid obesity








9/25


-Slight improvement in creatinine.  Hyponatremia worsened. Nephrology is 

following and put the patient back on Lasix.


-Blood PRESSURE IS BETTER today


-Potassium this morning was 5.8, I ordered 60 g of Kayexalate


-Morbidly obese


-Obesity hypoventilation syndrome





9/26


-Creatinine is improving and this morning was 2.4


-Hyponatremia improved this morning was 134.  Patient is on Lasix


-Blood pressure is better


-Patient is on 10 L of oxygen; worsened 


-Morbidly obese, FABIAN








9/27: Follow ordered ultrasound of abdomen, per Physician unable to get CT. I 

ordered an ABG due to my concern about her breathing pattern this morning, 

patient may benefit from. Will try to establish if patient has a primary 

pulmonary doctor. ABG is showing consistent with Respiratory Acidosis. Will 

place on BIPAP now. May need to transfer to IMCU for closer monitoring. Patient 

likely with Obesity Hypoventilation syndrome.


cardiology work up, ongoing. 


RENAL SHOWING SOME IMPROVEMENT


Guarded prognosis





9/28: Patient seen and examined today identified some lesion under the pannus 

will obtain wound care and wound surgeon evaluation.  Nephrology input noted 

continue diuresis and stop the sodium tabs creatinine is stable.  I did discuss 

with the family and updated them.  We will continue to hold ACE and ARB and if 

pressures continue to drop may need to hold diuresis also despite as written 

above.  Monitor labs including H&H.  She is more awake review of ABG shows 

improving CO2.  Will discuss with case management as patient may need BiPAP on 

discharge home.  I also updated the family








9/29: Patient showing some clinical improvement.  Liver enzymes is showing mild 

improvement although bilirubin increased slightly.  GI input noted and as 

discussed by his notes below


"Abnormal liver enzymes in hepatocellular pattern.  broad ddx and likely 

multifactorial causes including NAFLD, congestive hepatopathy, possibly DILI.  

will check viral hep serologies.  US with limited views, possible coarsening of 

the liver"


Patient also evaluated by surgery noted with the lymphedema of abdominal wall 

and open wound of the abdominal wall without penetration into the abdominal 

cavity with recommendation to pack the wounds daily with mesalt. Need to 

maintain dry environment discussed with nursing staff.  Elevate the pannus and 

optimize nutrition for which I will get nutritional consult.


No surgical intervention at this time.  Patient is bedbound when I asked when 

last she ambulated she said while "I guess he has not worked" but it has been a 

while.  Unfortunately the LTAC center unwilling to accept the patient will 

discuss with pulmonary and with case management about obtaining BiPAP for this 

patient and possible SNF referral.





History


Interval history: 


Patient seen and examined, More awake, tolerating p.o. intake.  Nurse reports 

that patient refused BiPAP or had taken of the mask in the middle of the night. 

Reinforced the importance of this with the patient she verbalized understanding 

15 minutes counseling provided





Hospitalist Physical





- Physical exam


Narrative exam: 


VITAL SIGNS:  Reviewed.    


GENERAL:  The patient is morbidly obese, more response on BIPAP this morning, 

Vital signs as documented.


HEAD:  No signs of head trauma.


EYES:  Pupils are equal.  Extraocular motions intact.  


EARS:  Hearing grossly intact.


MOUTH:  Oropharynx is normal. 


NECK:  No adenopathy, no JVD.  


CHEST:  Chest with diminished breath sounds bilaterally.  No wheezes, rales, or 

rhonchi.  


CARDIAC:  Regular rate and rhythm.  S1 and S2, without murmurs, gallops, or 

rubs.


VASCULAR:  No Edema.  Peripheral pulses normal and equal in all extremities.


ABDOMEN:  enlarged pannus, lymphedema with thickened skin, non tender and non 

distended.  No   rebound or guarding, and no masses palpated.   Bowel Sounds nor

mal.


MUSCULOSKELETAL:  Good range of motion of all major joints. Extremities without 

clubbing, cyanosis or edema.  


NEUROLOGIC EXAM:  Alert and oriented x 3   No focal sensory or strength 

deficits.   Speech normal.  Follows commands.


PSYCHIATRIC:  Mood normal.


SKIN:  detail exam as documented in skin assessment








- Constitutional


Vitals: 


                                        











Temp Pulse Resp BP Pulse Ox


 


 98 F   95 H  24   125/74   98 


 


 09/29/21 08:00  09/29/21 06:00  09/29/21 06:00  09/29/21 06:00  09/29/21 06:00











General appearance: Present: no acute distress





HEART Score





- HEART Score


Troponin: 


                                        











Troponin T  0.078 ng/mL (0.00-0.029)  H D 09/22/21  13:03    














Results





- Labs


CBC & Chem 7: 


                                 09/29/21 04:55





                                 09/29/21 04:55


Labs: 


                             Laboratory Last Values











WBC  10.0 K/mm3 (4.5-11.0)   09/29/21  04:55    


 


RBC  3.89 M/mm3 (3.65-5.03)   09/29/21  04:55    


 


Hgb  8.0 gm/dl (10.1-14.3)  L  09/29/21  04:55    


 


Hct  27.1 % (30.3-42.9)  L  09/29/21  04:55    


 


MCV  70 fl (79-97)  L  09/29/21  04:55    


 


MCH  21 pg (28-32)  L  09/29/21  04:55    


 


MCHC  30 % (30-34)   09/29/21  04:55    


 


RDW  24.3 % (13.2-15.2)  H  09/29/21  04:55    


 


Plt Count  301 K/mm3 (140-440)   09/29/21  04:55    


 


Add Manual Diff  Complete   09/26/21  14:11    


 


Total Counted  100   09/26/21  14:11    


 


Seg Neuts % (Manual)  86.0 % (40.0-70.0)  H  09/26/21  14:11    


 


Band Neutrophils %  1.0 %  09/26/21  14:11    


 


Lymphocytes % (Manual)  6.0 % (13.4-35.0)  L  09/26/21  14:11    


 


Reactive Lymphs % (Man)  1.0 %  09/26/21  14:11    


 


Monocytes % (Manual)  4.0 % (0.0-7.3)   09/26/21  14:11    


 


Metamyelocytes %  2.0 %  09/26/21  14:11    


 


Myelocytes %  2.0 %  09/22/21  04:03    


 


Nucleated RBC %  Not Reportable   09/26/21  14:11    


 


Seg Neutrophils # Man  12.6 K/mm3 (1.8-7.7)  H  09/26/21  14:11    


 


Band Neutrophils #  0.1 K/mm3  09/26/21  14:11    


 


Lymphocytes # (Manual)  0.9 K/mm3 (1.2-5.4)  L  09/26/21  14:11    


 


Abs React Lymphs (Man)  0.1 K/mm3  09/26/21  14:11    


 


Monocytes # (Manual)  0.6 K/mm3 (0.0-0.8)   09/26/21  14:11    


 


Eosinophils # (Manual)  0.0 K/mm3 (0.0-0.4)   09/26/21  14:11    


 


Basophils # (Manual)  0.0 K/mm3 (0.0-0.1)   09/26/21  14:11    


 


Metamyelocytes #  0.3 K/mm3  09/26/21  14:11    


 


Myelocytes #  0.0 K/mm3  09/26/21  14:11    


 


Promyelocytes #  0.0 K/mm3  09/26/21  14:11    


 


Blast Cells #  0.0 K/mm3  09/26/21  14:11    


 


WBC Morphology  Not Reportable   09/26/21  14:11    


 


WBC Morphology  TNR   09/26/21  14:11    


 


Hypersegmented Neuts  Not Reportable   09/26/21  14:11    


 


Hyposegmented Neuts  Not Reportable   09/26/21  14:11    


 


Hypogranular Neuts  Not Reportable   09/26/21  14:11    


 


Smudge Cells  Not Reportable   09/26/21  14:11    


 


Toxic Granulation  Not Reportable   09/26/21  14:11    


 


Toxic Vacuolation  Not Reportable   09/26/21  14:11    


 


Dohle Bodies  Not Reportable   09/26/21  14:11    


 


Pelger-Huet Anomaly  Not Reportable   09/26/21  14:11    


 


Ac Rods  Not Reportable   09/26/21  14:11    


 


Platelet Estimate  Consistent w auto   09/26/21  14:11    


 


Clumped Platelets  Not Reportable   09/26/21  14:11    


 


Plt Clumps, EDTA  Not Reportable   09/26/21  14:11    


 


Large Platelets  Not Reportable   09/26/21  14:11    


 


Giant Platelets  Not Reportable   09/26/21  14:11    


 


Platelet Satelliting  Not Reportable   09/26/21  14:11    


 


Plt Morphology Comment  Not Reportable   09/26/21  14:11    


 


RBC Morphology  Not Reportable   09/26/21  14:11    


 


Dimorphic RBCs  Not Reportable   09/26/21  14:11    


 


Polychromasia  Few   09/26/21  14:11    


 


Hypochromasia  1+   09/26/21  14:11    


 


Poikilocytosis  Not Reportable   09/26/21  14:11    


 


Anisocytosis  Not Reportable   09/26/21  14:11    


 


Microcytosis  Not Reportable   09/26/21  14:11    


 


Macrocytosis  Not Reportable   09/26/21  14:11    


 


Spherocytes  Not Reportable   09/26/21  14:11    


 


Pappenheimer Bodies  Not Reportable   09/26/21  14:11    


 


Sickle Cells  Not Reportable   09/26/21  14:11    


 


Target Cells  1+   09/26/21  14:11    


 


Tear Drop Cells  Few   09/26/21  14:11    


 


Ovalocytes  Not Reportable   09/26/21  14:11    


 


Helmet Cells  Not Reportable   09/26/21  14:11    


 


[place] Bodies  Not Reportable   09/26/21  14:11    


 


C[place] Rings  Not Reportable   09/26/21  14:11    


 


[place] Cells  Not Reportable   09/26/21  14:11    


 


Bite Cells  Not Reportable   09/26/21  14:11    


 


Crenated Cell  Not Reportable   09/26/21  14:11    


 


Elliptocytes  Not Reportable   09/26/21  14:11    


 


Acanthocytes (Spur)  Not Reportable   09/26/21  14:11    


 


Rouleaux  Not Reportable   09/26/21  14:11    


 


Hemoglobin C Crystals  Not Reportable   09/26/21  14:11    


 


Schistocytes  Not Reportable   09/26/21  14:11    


 


Malaria parasites  Not Reportable   09/26/21  14:11    


 


Sami Bodies  Not Reportable   09/26/21  14:11    


 


Hem Pathologist Commnt  No   09/26/21  14:11    


 


ABG pH  7.291  (7.320-7.450)  L  09/28/21  12:03    


 


POC ABG pCO2  75.5 mmHg (32.0-48.0)  H  09/28/21  12:03    


 


ABG pCO2  86.9 mm Hg  09/27/21  18:40    


 


POC ABG pO2  76.2 mmHg ()  L  09/28/21  12:03    


 


ABG pO2  81.8 mm Hg (80.0-90.0)   09/27/21  18:40    


 


POC ABG HCO3  35.6   09/28/21  12:03    


 


ABG HCO3  36.2 mmol/L (20.0-26.0)  H  09/27/21  18:40    


 


ABG O2 Saturation  93.3  (0-100)   09/28/21  12:03    


 


ABG O2 Content  10.4  (0.0-44)   09/27/21  18:40    


 


POC ABG Base Excess  7.2   09/28/21  12:03    


 


ABG Base Excess  7.4 mmol/L (-2.0-3.0)  H  09/27/21  18:40    


 


ABG Hemoglobin  9.9  (12.0-17.5)  L  09/28/21  12:03    


 


ABG Oxyhemoglobin  91.0  (94-98)  L  09/28/21  12:03    


 


ABG Carboxyhemoglobin  1.9 % (0.0-5.0)   09/27/21  18:40    


 


ABG Methemoglobin  0.3  (0.0-1.5)   09/28/21  12:03    


 


ABG Sodium  134.9 mmol/L (136.0-145.0)  L  09/28/21  12:03    


 


ABG Potassium  4.0 mmol/L (3.40-4.50)   09/28/21  12:03    


 


ABG Chloride  93.0 mmol/L ()  L  09/28/21  12:03    


 


ABG Glucose  146 mg/dL (65-95)  H  09/28/21  12:03    


 


Oxyhemoglobin  93.7 % (95.0-99.0)  L  09/27/21  18:40    


 


Carboxyhemoglobin  2.2  (0.5-1.5)  H  09/28/21  12:03    


 


FiO2  30 %  09/27/21  18:40    


 


FiO2 %  36.0   09/28/21  12:03    


 


Sodium  139 mmol/L (137-145)   09/29/21  04:55    


 


Potassium  3.7 mmol/L (3.6-5.0)   09/29/21  04:55    


 


Chloride  95.1 mmol/L ()  L  09/29/21  04:55    


 


Carbon Dioxide  36 mmol/L (22-30)  H  09/29/21  04:55    


 


Anion Gap  12 mmol/L  09/29/21  04:55    


 


BUN  63 mg/dL (7-17)  H  09/29/21  04:55    


 


Creatinine  1.5 mg/dL (0.6-1.2)  H  09/29/21  04:55    


 


Estimated GFR  48 ml/min  09/29/21  04:55    


 


BUN/Creatinine Ratio  42 %  09/29/21  04:55    


 


Glucose  131 mg/dL ()  H  09/29/21  04:55    


 


POC Glucose  118 mg/dL ()  H  09/29/21  05:29    


 


Osmolality  301 Mosm/kg  09/23/21  23:15    


 


Calcium  8.3 mg/dL (8.4-10.2)  L  09/29/21  04:55    


 


Ionized Calcium  3.6 mg/dL (4.8-5.6)  L  09/23/21  23:15    


 


Total Bilirubin  1.80 mg/dL (0.1-1.2)  H  09/29/21  04:55    


 


AST  323 units/L (5-40)  H  09/29/21  04:55    


 


ALT  609 units/L (7-56)  H  09/29/21  04:55    


 


Alkaline Phosphatase  89 units/L ()   09/29/21  04:55    


 


Total Creatine Kinase  1712 units/L ()  H  09/24/21  10:04    


 


Troponin T  0.078 ng/mL (0.00-0.029)  H D 09/22/21  13:03    


 


NT-Pro-B Natriuret Pep  7573 pg/mL (0-450)  H  09/21/21  22:04    


 


Total Protein  9.3 g/dL (6.3-8.2)  H  09/29/21  04:55    


 


Albumin  2.9 g/dL (3.9-5)  L  09/29/21  04:55    


 


Albumin/Globulin Ratio  0.5 %  09/29/21  04:55    


 


Triglycerides  105 mg/dL (2-149)   09/21/21  22:04    


 


Cholesterol  85 mg/dL ()   09/21/21  22:04    


 


LDL Cholesterol Direct  47 mg/dL ()  L  09/21/21  22:04    


 


HDL Cholesterol  25 mg/dL (40-59)  L  09/21/21  22:04    


 


Cholesterol/HDL Ratio  3.40 %  09/21/21  22:04    


 


Total Cortisol  41.3 mcg/dL (***)   09/24/21  10:04    


 


Arterial Blood Glucose  146 mg/dL (65-95)  H  09/28/21  12:03    


 


Urine Osmolality  157 Mosm/kg  09/23/21  18:46    


 


Urine Sodium  16 mmol/L  09/23/21  18:46    


 


Coronavirus (PCR)  Negative  (Negative)   09/28/21  Unknown


 


Hepatitis A IgM Ab  Non-reactive  (NonReactive)   09/28/21  18:45    


 


Hep Bs Antigen  Nonreactive  (Negative)   09/28/21  18:45    


 


Hepatitis C Antibody  Non-reactive  (NonReactive)   09/28/21  18:45    











Diamond/IV: 


                                        





Voiding Method                   Indwelling Catheter











Active Medications





- Current Medications


Current Medications: 














Generic Name Dose Route Start Last Admin





  Trade Name Freq  PRN Reason Stop Dose Admin


 


Acetaminophen  650 mg  09/22/21 03:00 





  Acetaminophen 325 Mg Tab  PO  





  Q4H PRN  





  Pain MILD(1-3)/Fever >100.5/HA  


 


Albuterol  2.5 mg  09/22/21 03:00 





  Albuterol 2.5 Mg/3 Ml Nebu  IH  





  Q4HRT PRN  





  Shortness Of Breath  


 


Albuterol/Ipratropium  1 ampul  09/22/21 08:00  09/29/21 00:18





  Ipratropium/Albuterol Sulfate 3 Ml Ampul.Neb  IH   Not Given





  TIDRT DELL  


 


Aspirin  325 mg  09/23/21 10:00  09/28/21 11:31





  Aspirin Ec 325 Mg Tab  PO   325 mg





  QDAY DELL   Administration


 


Atorvastatin Calcium  40 mg  09/22/21 22:00  09/28/21 21:02





  Atorvastatin 40 Mg Tab  PO   40 mg





  QHS DELL   Administration


 


Benzonatate  100 mg  09/22/21 06:00  09/29/21 05:44





  Benzonatate 100 Mg Cap  PO   100 mg





  Q8HR DELL   Administration


 


Famotidine  20 mg  09/29/21 10:00 





  Famotidine 20 Mg Tab  PO  





  BID DELL  


 


Furosemide  40 mg  09/27/21 18:00  09/29/21 06:48





  Furosemide 40 Mg Tab  PO   Not Given





  0600,1800 DELL  


 


Guaifenesin  200 mg  09/26/21 14:47 





  Guaifenesin 100 Mg/5 Ml Oral Liqd  PO  





  Q4H PRN  





  Cough  


 


Heparin Sodium (Porcine)  5,000 unit  09/22/21 06:00  09/29/21 05:44





  Heparin 5,000 Unit/1 Ml Vial  SUB-Q   5,000 unit





  Q8HR DELL   Administration


 


Hydromorphone HCl  0.5 mg  09/22/21 03:00  09/28/21 04:16





  Hydromorphone 1 Mg/1 Ml Inj  IV   0.5 mg





  Q3H PRN   Administration





  Pain , Severe (7-10)  


 


Methylprednisolone  4 mg  09/22/21 10:00  09/28/21 11:56





  Methylprednisolone 4 Mg Tab  PO   4 mg





  DAILY DELL   Administration


 


Nitroglycerin  0.4 mg  09/22/21 03:00 





  Nitroglycerin 0.4 Mg Tab Subl  SL  





  Q5M PRN  





  Chest Pain  


 


Nystatin  1 applic  09/22/21 18:00  09/28/21 21:02





  Nystatin Powder 15 Gm  TP   1 applic





  BID DELL   Administration


 


Ondansetron HCl  4 mg  09/22/21 03:00 





  Ondansetron 4 Mg/2 Ml Inj  IV  





  Q8H PRN  





  Nausea And Vomiting  


 


Oxycodone/Acetaminophen  1 tab  09/22/21 03:00  09/27/21 01:52





  Oxycodone /Acetaminophen 5-325mg Tab  PO   1 tab





  Q6H PRN   Administration





  Pain, Moderate (4-6)  


 


Sodium Chloride  10 ml  09/22/21 10:00  09/29/21 00:37





  Sodium Chloride 0.9% 10 Ml Flush Syringe  IV   10 ml





  BID DELL   Administration


 


Sodium Chloride  10 ml  09/22/21 03:00 





  Sodium Chloride 0.9% 10 Ml Flush Syringe  IV  





  PRN PRN  





  LINE FLUSH  


 


Tramadol HCl  50 mg  09/22/21 03:00 





  Tramadol 50 Mg Tab  PO  





  Q6H PRN  





  Pain, Moderate (4-6)  














Nutrition/Malnutrition Assess





- Dietary Evaluation


Nutrition/Malnutrition Findings: 


                                        





Nutrition Notes                                            Start:  09/22/21 

14:21


Freq:                                                      Status: Active       




Protocol:                                                                       




 Document     09/27/21 17:08  GB  (Rec: 09/27/21 17:14  GB  [id number])


 Nutrition Notes


     Initial or Follow up                        Reassessment


     Current Diagnosis                           Hypertension


     Other Pertinent Diagnosis                   Dyspnea, respiratory distress,


                                                 morbid obesity


     Current Diet                                Regular


     Labs/Tests                                  9/27: Na 134, BUN 63,


                                                 creatinine 2.2, glucose 159,


                                                 Ca 7.8


     Pertinent Medications                       Lasix, NaCl IV flushes


     Height                                      5 ft 4 in


     Weight                                      287.5 kg


     Ideal Body Weight (kg)                      54.54


     BMI                                         108.8


     Weight change and time frame                expect weight fluctuations r/t


                                                 fluid therapy, CHF


                                                 complications


     Weight Status                               Morbidly Obese


     Subjective/Other Information                Per MD notes 9/27: fluid


                                                 restrictions, on nasal cannula


                                                 3L, possibly obesity


                                                 hypoventilation syndrome,


                                                 recommended to bariatric


                                                 consult


     Percent of energy/protein needs met:        Meals and snacks provided meet


                                                 100% of estimated energy


                                                 needs.  Current po intake of


                                                 meals recorded average 50%.


     Burn                                        Absent


     Trauma                                      Absent


     GI Symptoms                                 None


     Food Allergy                                No


     Current % PO                                Fair (50-74%)


     Minimum of two criteria                     No


     #1


      Nutrition Diagnosis                        Overweight/obesity


      Comments:                                  Discussed diet/life style


                                                 changes using small goal


                                                 successes to advancement,


                                                 encouraged outpatient RD


                                                 counseling at bariatric center


      Etiology                                   dyspnea, respiratory distress


      As Evidenced by Signs and Symptoms         , %


      Diagnosis Progress(for reassessment        Continues


       documentation)                            


     Is patient on ventilator?                   No


     Is Patient Ambulatory and/or Out of Bed     No


     REE-([facility]-confined to bed)      4277.724


     Kcal/Kg value to use for calculation        10


     Approximate Energy Requirements Using       2875


      kcal/Kg                                    


     Calculation Used for Recommendations        Kcal/kg


     Additional Notes                            Protein 0.6 g/kg r/t BMI over


                                                 50: 163g


                                                 Fluids: 1ml/kcal or per MD


 Nutrition Intervention


     Change Diet Order:                          continue with current diet


     Goal #1                                     Pt to contact and make


                                                 appointment with outpatient RD


                                                 in bariatric center


     Goal #2                                     weight to decrease -1% during


                                                 LOS


     Follow-Up By:                               10/04/21


     Additional Comments                         f/u for weight loss, po intake

## 2021-09-30 LAB
ALBUMIN SERPL-MCNC: 3.2 G/DL (ref 3.9–5)
ALT SERPL-CCNC: 572 UNITS/L (ref 7–56)
BUN SERPL-MCNC: 65 MG/DL (ref 7–17)
BUN/CREAT SERPL: 43 %
CALCIUM SERPL-MCNC: 8.9 MG/DL (ref 8.4–10.2)
HCT VFR BLD CALC: 30.2 % (ref 30.3–42.9)
HEMOLYSIS INDEX: 3
HGB BLD-MCNC: 8.8 GM/DL (ref 10.1–14.3)
MCHC RBC AUTO-ENTMCNC: 29 % (ref 30–34)
MCV RBC AUTO: 74 FL (ref 79–97)
PLATELET # BLD: 254 K/MM3 (ref 140–440)
RBC # BLD AUTO: 4.11 M/MM3 (ref 3.65–5.03)

## 2021-09-30 RX ADMIN — METHYLPREDNISOLONE SODIUM SUCCINATE SCH MG: 40 INJECTION, POWDER, FOR SOLUTION INTRAMUSCULAR; INTRAVENOUS at 09:01

## 2021-09-30 RX ADMIN — BENZONATATE SCH MG: 100 CAPSULE ORAL at 05:07

## 2021-09-30 RX ADMIN — HEPARIN SODIUM SCH UNIT: 5000 INJECTION, SOLUTION INTRAVENOUS; SUBCUTANEOUS at 21:39

## 2021-09-30 RX ADMIN — PANTOPRAZOLE SODIUM SCH MG: 40 INJECTION, POWDER, FOR SOLUTION INTRAVENOUS at 09:01

## 2021-09-30 RX ADMIN — SODIUM CHLORIDE TAB 1 GM SCH: 1 TAB at 21:46

## 2021-09-30 RX ADMIN — HEPARIN SODIUM SCH UNIT: 5000 INJECTION, SOLUTION INTRAVENOUS; SUBCUTANEOUS at 15:37

## 2021-09-30 RX ADMIN — NYSTATIN SCH APPLIC: 100000 POWDER TOPICAL at 21:41

## 2021-09-30 RX ADMIN — BENZONATATE SCH MG: 100 CAPSULE ORAL at 15:37

## 2021-09-30 RX ADMIN — NYSTATIN SCH APPLIC: 100000 POWDER TOPICAL at 11:01

## 2021-09-30 RX ADMIN — GUAIFENESIN PRN MG: 100 SOLUTION ORAL at 21:40

## 2021-09-30 RX ADMIN — Medication SCH ML: at 11:02

## 2021-09-30 RX ADMIN — Medication SCH ML: at 21:41

## 2021-09-30 RX ADMIN — HYDROMORPHONE HYDROCHLORIDE PRN MG: 1 INJECTION, SOLUTION INTRAMUSCULAR; INTRAVENOUS; SUBCUTANEOUS at 02:34

## 2021-09-30 RX ADMIN — IPRATROPIUM BROMIDE AND ALBUTEROL SULFATE SCH: .5; 3 SOLUTION RESPIRATORY (INHALATION) at 10:31

## 2021-09-30 RX ADMIN — FUROSEMIDE SCH MG: 10 INJECTION, SOLUTION INTRAVENOUS at 21:35

## 2021-09-30 RX ADMIN — HEPARIN SODIUM SCH UNIT: 5000 INJECTION, SOLUTION INTRAVENOUS; SUBCUTANEOUS at 05:07

## 2021-09-30 RX ADMIN — BENZONATATE SCH MG: 100 CAPSULE ORAL at 21:39

## 2021-09-30 RX ADMIN — ASPIRIN SCH: 325 TABLET, COATED ORAL at 10:59

## 2021-09-30 RX ADMIN — METHYLPREDNISOLONE SODIUM SUCCINATE SCH MG: 40 INJECTION, POWDER, FOR SOLUTION INTRAMUSCULAR; INTRAVENOUS at 21:37

## 2021-09-30 RX ADMIN — IPRATROPIUM BROMIDE AND ALBUTEROL SULFATE SCH AMPUL: .5; 3 SOLUTION RESPIRATORY (INHALATION) at 21:27

## 2021-09-30 RX ADMIN — OXYCODONE AND ACETAMINOPHEN PRN TAB: 5; 325 TABLET ORAL at 21:39

## 2021-09-30 RX ADMIN — IPRATROPIUM BROMIDE AND ALBUTEROL SULFATE SCH: .5; 3 SOLUTION RESPIRATORY (INHALATION) at 17:44

## 2021-09-30 RX ADMIN — FUROSEMIDE SCH MG: 40 TABLET ORAL at 05:07

## 2021-09-30 NOTE — GASTROENTEROLOGY PROGRESS NOTE
Assessment and Plan





abnormal liver enzymes - down trend last couple days, suspect multifactorial as 

previously stated in consult; monitor levels but otherwise no other work-up at 

this time from gi stand point especially given other medical issues/co-morbiditi

es.  





will sign off, please call as needed or with questions. 





Subjective


Date of service: 09/30/21


Principal diagnosis: abnormal liver enzymes


Interval history: 





events from this morning reviewed regarding pt's change in mental 

status/decreased responsiveness; remains on bipap





Objective





- Exam


Narrative Exam: 





gen: morbidly obese, on bipap


abd: obese, nt





- Constitutional


Vitals: 


                                        











Temp Pulse Resp BP Pulse Ox


 


 98.5 F   102 H  16   132/91   95 


 


 09/30/21 12:09  09/30/21 11:30  09/30/21 11:30  09/30/21 11:30  09/30/21 11:30














- Labs


CBC & Chem 7: 


                                 09/30/21 04:53





                                 09/30/21 04:53


Labs: 


                         Laboratory Results - last 24 hr











  09/29/21 09/29/21 09/30/21





  18:30 22:44 04:53


 


WBC    12.5 H


 


RBC    4.11


 


Hgb    8.8 L


 


Hct    30.2 L


 


MCV    74 L


 


MCH    21 L


 


MCHC    29 L


 


RDW    24.5 H


 


Plt Count    254


 


ABG pH   


 


POC ABG pCO2   


 


POC ABG pO2   


 


POC ABG HCO3   


 


ABG O2 Saturation   


 


POC ABG Base Excess   


 


ABG Hemoglobin   


 


ABG Oxyhemoglobin   


 


ABG Methemoglobin   


 


ABG Sodium   


 


ABG Potassium   


 


ABG Chloride   


 


ABG Glucose   


 


Carboxyhemoglobin   


 


FiO2 %   


 


Sodium   


 


Potassium   


 


Chloride   


 


Carbon Dioxide   


 


Anion Gap   


 


BUN   


 


Creatinine   


 


Estimated GFR   


 


BUN/Creatinine Ratio   


 


Glucose   


 


POC Glucose  130 H  123 H 


 


Calcium   


 


Total Bilirubin   


 


AST   


 


ALT   


 


Alkaline Phosphatase   


 


Total Protein   


 


Albumin   


 


Albumin/Globulin Ratio   


 


Arterial Blood Glucose   


 


Arterial Blood Ionized Calcium   














  09/30/21 09/30/21 09/30/21





  04:53 07:16 07:32


 


WBC   


 


RBC   


 


Hgb   


 


Hct   


 


MCV   


 


MCH   


 


MCHC   


 


RDW   


 


Plt Count   


 


ABG pH    7.086 L


 


POC ABG pCO2    142.2 H


 


POC ABG pO2    196.3 H


 


POC ABG HCO3    41.8


 


ABG O2 Saturation    99.6


 


POC ABG Base Excess    8.7


 


ABG Hemoglobin    10.0 L


 


ABG Oxyhemoglobin    98.0


 


ABG Methemoglobin    0


 


ABG Sodium    139.3


 


ABG Potassium    4.2


 


ABG Chloride    96.0 L


 


ABG Glucose    147 H


 


Carboxyhemoglobin    1.6 H


 


FiO2 %    100.0


 


Sodium  139  


 


Potassium  4.5  D  


 


Chloride  96.9 L  


 


Carbon Dioxide  35 H  


 


Anion Gap  12  


 


BUN  65 H  


 


Creatinine  1.5 H  


 


Estimated GFR  48  


 


BUN/Creatinine Ratio  43  


 


Glucose  142 H  


 


POC Glucose   138 H 


 


Calcium  8.9  


 


Total Bilirubin  1.50 H  


 


AST  251 H  


 


ALT  572 H  


 


Alkaline Phosphatase  101  


 


Total Protein  10.1 H  


 


Albumin  3.2 L  


 


Albumin/Globulin Ratio  0.5  


 


Arterial Blood Glucose    147 H


 


Arterial Blood Ionized Calcium    4.6














  09/30/21





  11:29


 


WBC 


 


RBC 


 


Hgb 


 


Hct 


 


MCV 


 


MCH 


 


MCHC 


 


RDW 


 


Plt Count 


 


ABG pH 


 


POC ABG pCO2 


 


POC ABG pO2 


 


POC ABG HCO3 


 


ABG O2 Saturation 


 


POC ABG Base Excess 


 


ABG Hemoglobin 


 


ABG Oxyhemoglobin 


 


ABG Methemoglobin 


 


ABG Sodium 


 


ABG Potassium 


 


ABG Chloride 


 


ABG Glucose 


 


Carboxyhemoglobin 


 


FiO2 % 


 


Sodium 


 


Potassium 


 


Chloride 


 


Carbon Dioxide 


 


Anion Gap 


 


BUN 


 


Creatinine 


 


Estimated GFR 


 


BUN/Creatinine Ratio 


 


Glucose 


 


POC Glucose  131 H


 


Calcium 


 


Total Bilirubin 


 


AST 


 


ALT 


 


Alkaline Phosphatase 


 


Total Protein 


 


Albumin 


 


Albumin/Globulin Ratio 


 


Arterial Blood Glucose 


 


Arterial Blood Ionized Calcium

## 2021-09-30 NOTE — PROGRESS NOTE
Assessment and Plan


Assessment and plan: 


(1) Non-ST elevation MI (NSTEMI) type II Procardia


Current Visit: Yes   Status: Acute   


Plan to address problem: 


Admit the patient to the medical telemetry. Aspirin 325 mg p.o. daily. Lipitor 

40 mg p.o. daily. Nitroglycerin as needed. We do the serial cardiac enzyme. We 

also consult cardiology for evaluation and order echocardiogram.








(2) Acute exacerbation of CHF (congestive heart failure)


Current Visit: Yes   Status: Acute   


Plan to address problem: 


Fluid restriction. Maintain input output. Lasix 40 mg IV every 12 hours. Oxygen 

via nasal cannula 3 L/min. DuoNeb by nebulizer every 4 hours. Echocardiogram. 

Cardiology consult








(3) Hypertension


Current Visit: Yes   Status: Acute   


Plan to address problem: 


We will continue the home medication. Hydralazine 10 mg IV every 6 hours as 

needed. We will monitor the blood pressure closely








(4) COPD (chronic obstructive pulmonary disease)


Current Visit: Yes   Status: Acute   


Plan to address problem: 


Oxygen by nasal cannula 3 L/min. DuoNeb via nebulizer every 4 hours. Albuterol 

via nebulizer every 4 hours as needed.





Super


(5) Morbid obesity


Current Visit: Yes   Status: Acute   


Plan to address problem: 


We counseled regarding weight reduction. Outpatient follow-up with bariatric 

surgeon








(6) Hyperglycemia


Current Visit: Yes   Status: Acute   


Plan to address problem: 


We will give 1 ampoule of D50. We will put the patient on cardiac diet. We will 

monitor the blood glucose closely








(7) acute kidney injury with vasomotor nephropathy





(8) Lymphedema of the abdominal wall





(9)Anemia of chronic disease





(10)Possible passive congestive hepatic failure with underlying NAFLD








(11) DVT prophylaxis


Current Visit: No   Status: Acute   


Plan to address problem: 


Heparin 5000 units subcu every 8 hours for DVT prophylaxis. Pepcid 20 mg p.o. 

twice daily for GI prophylaxis. Patient is a full code





9/23


-Patient is on heparin drip for non-STEMI, cardiology is following.


-Patient is off Lasix and ACE inhibitors because of [place].


-Kidney function is worsening overnight and I put a consult for nephrology.


-Patient is hypotensive and blood pressure from this morning was 101/41.  We 

will continue to monitor.  And if it is dropping we we will give him fluid.


-Echo was done and EF of 50 to 55%.  Diastolic dysfunction is indeterminate.  

Management per cardiology


-Patient has COPD continues on dexamethasone, nebulizer treatment as needed.


-Patient has hyponatremia and hyperkalemia.  I give the patient Kayexalate.  

Monitor electrolytes.  Will follow nephrology for additional recommendation.





9/24


-Renal function is worsening, nephrology is following


-Blood pressure is better today


-Hyperkalemia; I added Kayexalate


-Morbid obesity








9/25


-Slight improvement in creatinine.  Hyponatremia worsened. Nephrology is 

following and put the patient back on Lasix.


-Blood PRESSURE IS BETTER today


-Potassium this morning was 5.8, I ordered 60 g of Kayexalate


-Morbidly obese


-Obesity hypoventilation syndrome





9/26


-Creatinine is improving and this morning was 2.4


-Hyponatremia improved this morning was 134.  Patient is on Lasix


-Blood pressure is better


-Patient is on 10 L of oxygen; worsened 


-Morbidly obese, FABIAN








9/27: Follow ordered ultrasound of abdomen, per Physician unable to get CT. I 

ordered an ABG due to my concern about her breathing pattern this morning, 

patient may benefit from. Will try to establish if patient has a primary 

pulmonary doctor. ABG is showing consistent with Respiratory Acidosis. Will 

place on BIPAP now. May need to transfer to IMCU for closer monitoring. Patient 

likely with Obesity Hypoventilation syndrome.


cardiology work up, ongoing. 


RENAL SHOWING SOME IMPROVEMENT


Guarded prognosis





9/28: Patient seen and examined today identified some lesion under the pannus 

will obtain wound care and wound surgeon evaluation.  Nephrology input noted 

continue diuresis and stop the sodium tabs creatinine is stable.  I did discuss 

with the family and updated them.  We will continue to hold ACE and ARB and if 

pressures continue to drop may need to hold diuresis also despite as written 

above.  Monitor labs including H&H.  She is more awake review of ABG shows 

improving CO2.  Will discuss with case management as patient may need BiPAP on 

discharge home.  I also updated the family








9/29: Patient showing some clinical improvement.  Liver enzymes is showing mild 

improvement although bilirubin increased slightly.  GI input noted and as 

discussed by his notes below


"Abnormal liver enzymes in hepatocellular pattern.  broad ddx and likely 

multifactorial causes including NAFLD, congestive hepatopathy, possibly DILI.  

will check viral hep serologies.  US with limited views, possible coarsening of 

the liver"


Patient also evaluated by surgery noted with the lymphedema of abdominal wall 

and open wound of the abdominal wall without penetration into the abdominal 

cavity with recommendation to pack the wounds daily with mesalt. Need to 

maintain dry environment discussed with nursing staff.  Elevate the pannus and 

optimize nutrition for which I will get nutritional consult.


No surgical intervention at this time.  Patient is bedbound when I asked when 

last she ambulated she said while "I guess he has not worked" but it has been a 

while.  Unfortunately the LTAC center unwilling to accept the patient will 

discuss with pulmonary and with case management about obtaining BiPAP for this 

patient and possible SNF referral.





09/30: Patient this morning and was noted significantly lethargic and unrespons

grey respiratory and a code to be called.  The patient resuscitated without any 

invasive procedure.  ABG was obtained showed a CO2 of 142.  Despite using BiPAP 

for some time through the night, sure how long she used it for.  I have 

discontinued all IV opioids and recommend no IV opioids for this patient at this

time.  I also discontinued p.o. medications as an essential medication and 

change to IV.  We will repeat an ABG in an hour to see if there is some 

improvement.  Patient remains at high risk for possible intubation.  Clinical 

condition is guarded





History


Interval history: 


Patient seen and examined, this morning had a code called due to 

unresponsiveness.  Patient became awake shortly after invasive intervention  was

done





Hospitalist Physical





- Physical exam


Narrative exam: 


VITAL SIGNS:  Reviewed.    


GENERAL:  The patient is morbidly obese, initially very lethargic a BiPAP


Placed she is waking up some, Vital signs as documented.


HEAD:  No signs of head trauma.


EYES:  Pupils are equal.  Extraocular motions intact.  


EARS:  Hearing grossly intact.


MOUTH:  Oropharynx is normal. 


NECK:  No adenopathy, no JVD.  


CHEST:  Chest with diminished breath sounds bilaterally.  No wheezes, rales, or 

rhonchi.  


CARDIAC:  Regular rate and rhythm.  S1 and S2, without murmurs, gallops, or 

rubs.


VASCULAR:  No Edema.  Peripheral pulses normal and equal in all extremities.


ABDOMEN:  enlarged pannus, lymphedema with thickened skin, non tender and non 

distended.  No   rebound or guarding, and no masses palpated.   Bowel Sounds 

normal.


MUSCULOSKELETAL:  Good range of motion of all major joints. Extremities without 

clubbing, cyanosis or edema.  


NEUROLOGIC EXAM:  Alert and oriented x 3   No focal sensory or strength 

deficits.   Speech normal.  Follows commands.


PSYCHIATRIC:  Mood normal.


SKIN:  detail exam as documented in skin assessment








- Constitutional


Vitals: 


                                        











Temp Pulse Resp BP Pulse Ox


 


 97.9 F   108 H  23   139/92   83 L


 


 09/30/21 07:23  09/30/21 06:00  09/30/21 06:00  09/30/21 06:00  09/30/21 06:00











General appearance: Present: no acute distress





HEART Score





- HEART Score


Troponin: 


                                        











Troponin T  0.078 ng/mL (0.00-0.029)  H D 09/22/21  13:03    














Results





- Labs


CBC & Chem 7: 


                                 09/30/21 04:53





                                 09/30/21 04:53


Labs: 


                             Laboratory Last Values











WBC  12.5 K/mm3 (4.5-11.0)  H  09/30/21  04:53    


 


RBC  4.11 M/mm3 (3.65-5.03)   09/30/21  04:53    


 


Hgb  8.8 gm/dl (10.1-14.3)  L  09/30/21  04:53    


 


Hct  30.2 % (30.3-42.9)  L  09/30/21  04:53    


 


MCV  74 fl (79-97)  L  09/30/21  04:53    


 


MCH  21 pg (28-32)  L  09/30/21  04:53    


 


MCHC  29 % (30-34)  L  09/30/21  04:53    


 


RDW  24.5 % (13.2-15.2)  H  09/30/21  04:53    


 


Plt Count  254 K/mm3 (140-440)   09/30/21  04:53    


 


Add Manual Diff  Complete   09/26/21  14:11    


 


Total Counted  100   09/26/21  14:11    


 


Seg Neuts % (Manual)  86.0 % (40.0-70.0)  H  09/26/21  14:11    


 


Band Neutrophils %  1.0 %  09/26/21  14:11    


 


Lymphocytes % (Manual)  6.0 % (13.4-35.0)  L  09/26/21  14:11    


 


Reactive Lymphs % (Man)  1.0 %  09/26/21  14:11    


 


Monocytes % (Manual)  4.0 % (0.0-7.3)   09/26/21  14:11    


 


Metamyelocytes %  2.0 %  09/26/21  14:11    


 


Myelocytes %  2.0 %  09/22/21  04:03    


 


Nucleated RBC %  Not Reportable   09/26/21  14:11    


 


Seg Neutrophils # Man  12.6 K/mm3 (1.8-7.7)  H  09/26/21  14:11    


 


Band Neutrophils #  0.1 K/mm3  09/26/21  14:11    


 


Lymphocytes # (Manual)  0.9 K/mm3 (1.2-5.4)  L  09/26/21  14:11    


 


Abs React Lymphs (Man)  0.1 K/mm3  09/26/21  14:11    


 


Monocytes # (Manual)  0.6 K/mm3 (0.0-0.8)   09/26/21  14:11    


 


Eosinophils # (Manual)  0.0 K/mm3 (0.0-0.4)   09/26/21  14:11    


 


Basophils # (Manual)  0.0 K/mm3 (0.0-0.1)   09/26/21  14:11    


 


Metamyelocytes #  0.3 K/mm3  09/26/21  14:11    


 


Myelocytes #  0.0 K/mm3  09/26/21  14:11    


 


Promyelocytes #  0.0 K/mm3  09/26/21  14:11    


 


Blast Cells #  0.0 K/mm3  09/26/21  14:11    


 


WBC Morphology  Not Reportable   09/26/21  14:11    


 


WBC Morphology  TNR   09/26/21  14:11    


 


Hypersegmented Neuts  Not Reportable   09/26/21  14:11    


 


Hyposegmented Neuts  Not Reportable   09/26/21  14:11    


 


Hypogranular Neuts  Not Reportable   09/26/21  14:11    


 


Smudge Cells  Not Reportable   09/26/21  14:11    


 


Toxic Granulation  Not Reportable   09/26/21  14:11    


 


Toxic Vacuolation  Not Reportable   09/26/21  14:11    


 


Dohle Bodies  Not Reportable   09/26/21  14:11    


 


Pelger-Huet Anomaly  Not Reportable   09/26/21  14:11    


 


Ac Rods  Not Reportable   09/26/21  14:11    


 


Platelet Estimate  Consistent w auto   09/26/21  14:11    


 


Clumped Platelets  Not Reportable   09/26/21  14:11    


 


Plt Clumps, EDTA  Not Reportable   09/26/21  14:11    


 


Large Platelets  Not Reportable   09/26/21  14:11    


 


Giant Platelets  Not Reportable   09/26/21  14:11    


 


Platelet Satelliting  Not Reportable   09/26/21  14:11    


 


Plt Morphology Comment  Not Reportable   09/26/21  14:11    


 


RBC Morphology  Not Reportable   09/26/21  14:11    


 


Dimorphic RBCs  Not Reportable   09/26/21  14:11    


 


Polychromasia  Few   09/26/21  14:11    


 


Hypochromasia  1+   09/26/21  14:11    


 


Poikilocytosis  Not Reportable   09/26/21  14:11    


 


Anisocytosis  Not Reportable   09/26/21  14:11    


 


Microcytosis  Not Reportable   09/26/21  14:11    


 


Macrocytosis  Not Reportable   09/26/21  14:11    


 


Spherocytes  Not Reportable   09/26/21  14:11    


 


Pappenheimer Bodies  Not Reportable   09/26/21  14:11    


 


Sickle Cells  Not Reportable   09/26/21  14:11    


 


Target Cells  1+   09/26/21  14:11    


 


Tear Drop Cells  Few   09/26/21  14:11    


 


Ovalocytes  Not Reportable   09/26/21  14:11    


 


Helmet Cells  Not Reportable   09/26/21  14:11    


 


[place] Bodies  Not Reportable   09/26/21  14:11    


 


Cabot Rings  Not Reportable   09/26/21  14:11    


 


Apolinar Cells  Not Reportable   09/26/21  14:11    


 


Bite Cells  Not Reportable   09/26/21  14:11    


 


Crenated Cell  Not Reportable   09/26/21  14:11    


 


Elliptocytes  Not Reportable   09/26/21  14:11    


 


Acanthocytes (Spur)  Not Reportable   09/26/21  14:11    


 


Rouleaux  Not Reportable   09/26/21  14:11    


 


Hemoglobin C Crystals  Not Reportable   09/26/21  14:11    


 


Schistocytes  Not Reportable   09/26/21  14:11    


 


Malaria parasites  Not Reportable   09/26/21  14:11    


 


Sami Bodies  Not Reportable   09/26/21  14:11    


 


Hem Pathologist Commnt  No   09/26/21  14:11    


 


ABG pH  7.291  (7.320-7.450)  L  09/28/21  12:03    


 


POC ABG pCO2  75.5 mmHg (32.0-48.0)  H  09/28/21  12:03    


 


ABG pCO2  86.9 mm Hg  09/27/21  18:40    


 


POC ABG pO2  76.2 mmHg ()  L  09/28/21  12:03    


 


ABG pO2  81.8 mm Hg (80.0-90.0)   09/27/21  18:40    


 


POC ABG HCO3  35.6   09/28/21  12:03    


 


ABG HCO3  36.2 mmol/L (20.0-26.0)  H  09/27/21  18:40    


 


ABG O2 Saturation  93.3  (0-100)   09/28/21  12:03    


 


ABG O2 Content  10.4  (0.0-44)   09/27/21  18:40    


 


POC ABG Base Excess  7.2   09/28/21  12:03    


 


ABG Base Excess  7.4 mmol/L (-2.0-3.0)  H  09/27/21  18:40    


 


ABG Hemoglobin  9.9  (12.0-17.5)  L  09/28/21  12:03    


 


ABG Oxyhemoglobin  91.0  (94-98)  L  09/28/21  12:03    


 


ABG Carboxyhemoglobin  1.9 % (0.0-5.0)   09/27/21  18:40    


 


ABG Methemoglobin  0.3  (0.0-1.5)   09/28/21  12:03    


 


ABG Sodium  134.9 mmol/L (136.0-145.0)  L  09/28/21  12:03    


 


ABG Potassium  4.0 mmol/L (3.40-4.50)   09/28/21  12:03    


 


ABG Chloride  93.0 mmol/L ()  L  09/28/21  12:03    


 


ABG Glucose  146 mg/dL (65-95)  H  09/28/21  12:03    


 


Oxyhemoglobin  93.7 % (95.0-99.0)  L  09/27/21  18:40    


 


Carboxyhemoglobin  2.2  (0.5-1.5)  H  09/28/21  12:03    


 


FiO2  30 %  09/27/21  18:40    


 


FiO2 %  36.0   09/28/21  12:03    


 


Sodium  139 mmol/L (137-145)   09/30/21  04:53    


 


Potassium  4.5 mmol/L (3.6-5.0)  D 09/30/21  04:53    


 


Chloride  96.9 mmol/L ()  L  09/30/21  04:53    


 


Carbon Dioxide  35 mmol/L (22-30)  H  09/30/21  04:53    


 


Anion Gap  12 mmol/L  09/30/21  04:53    


 


BUN  65 mg/dL (7-17)  H  09/30/21  04:53    


 


Creatinine  1.5 mg/dL (0.6-1.2)  H  09/30/21  04:53    


 


Estimated GFR  48 ml/min  09/30/21  04:53    


 


BUN/Creatinine Ratio  43 %  09/30/21  04:53    


 


Glucose  142 mg/dL ()  H  09/30/21  04:53    


 


POC Glucose  138 mg/dL ()  H  09/30/21  07:16    


 


Osmolality  301 Mosm/kg  09/23/21  23:15    


 


Calcium  8.9 mg/dL (8.4-10.2)   09/30/21  04:53    


 


Ionized Calcium  3.6 mg/dL (4.8-5.6)  L  09/23/21  23:15    


 


Total Bilirubin  1.50 mg/dL (0.1-1.2)  H  09/30/21  04:53    


 


AST  251 units/L (5-40)  H  09/30/21  04:53    


 


ALT  572 units/L (7-56)  H  09/30/21  04:53    


 


Alkaline Phosphatase  101 units/L ()   09/30/21  04:53    


 


Total Creatine Kinase  1712 units/L ()  H  09/24/21  10:04    


 


Troponin T  0.078 ng/mL (0.00-0.029)  H D 09/22/21  13:03    


 


NT-Pro-B Natriuret Pep  7573 pg/mL (0-450)  H  09/21/21  22:04    


 


Total Protein  10.1 g/dL (6.3-8.2)  H  09/30/21  04:53    


 


Albumin  3.2 g/dL (3.9-5)  L  09/30/21  04:53    


 


Albumin/Globulin Ratio  0.5 %  09/30/21  04:53    


 


Triglycerides  105 mg/dL (2-149)   09/21/21  22:04    


 


Cholesterol  85 mg/dL ()   09/21/21  22:04    


 


LDL Cholesterol Direct  47 mg/dL ()  L  09/21/21  22:04    


 


HDL Cholesterol  25 mg/dL (40-59)  L  09/21/21  22:04    


 


Cholesterol/HDL Ratio  3.40 %  09/21/21  22:04    


 


Total Cortisol  41.3 mcg/dL (***)   09/24/21  10:04    


 


Arterial Blood Glucose  146 mg/dL (65-95)  H  09/28/21  12:03    


 


Urine Osmolality  157 Mosm/kg  09/23/21  18:46    


 


Urine Sodium  16 mmol/L  09/23/21  18:46    


 


Coronavirus (PCR)  Negative  (Negative)   09/28/21  Unknown


 


Hepatitis A IgM Ab  Non-reactive  (NonReactive)   09/28/21  18:45    


 


Hep Bs Antigen  Nonreactive  (Negative)   09/28/21  18:45    


 


Hepatitis C Antibody  Non-reactive  (NonReactive)   09/28/21  18:45    











Diamond/IV: 


                                        





Voiding Method                   Indwelling Catheter











Active Medications





- Current Medications


Current Medications: 














Generic Name Dose Route Start Last Admin





  Trade Name Freq  PRN Reason Stop Dose Admin


 


Acetaminophen  650 mg  09/22/21 03:00 





  Acetaminophen 325 Mg Tab  PO  





  Q4H PRN  





  Pain MILD(1-3)/Fever >100.5/HA  


 


Albuterol  2.5 mg  09/22/21 03:00 





  Albuterol 2.5 Mg/3 Ml Nebu  IH  





  Q4HRT PRN  





  Shortness Of Breath  


 


Albuterol/Ipratropium  1 ampul  09/22/21 08:00  09/29/21 22:27





  Ipratropium/Albuterol Sulfate 3 Ml Ampul.Neb  IH   1 ampul





  TIDRT DELL   Administration


 


Aspirin  325 mg  09/23/21 10:00  09/29/21 10:18





  Aspirin Ec 325 Mg Tab  PO   325 mg





  QDAY DELL   Administration


 


Atorvastatin Calcium  40 mg  09/22/21 22:00  09/29/21 22:16





  Atorvastatin 40 Mg Tab  PO   40 mg





  QHS DELL   Administration


 


Benzonatate  100 mg  09/22/21 06:00  09/30/21 05:07





  Benzonatate 100 Mg Cap  PO   100 mg





  Q8HR DELL   Administration


 


Famotidine  20 mg  09/29/21 10:00  09/29/21 22:16





  Famotidine 20 Mg Tab  PO   20 mg





  BID DELL   Administration


 


Furosemide  40 mg  09/27/21 18:00  09/30/21 05:07





  Furosemide 40 Mg Tab  PO   40 mg





  0600,1800 DELL   Administration


 


Guaifenesin  200 mg  09/26/21 14:47 





  Guaifenesin 100 Mg/5 Ml Oral Liqd  PO  





  Q4H PRN  





  Cough  


 


Heparin Sodium (Porcine)  5,000 unit  09/22/21 06:00  09/30/21 05:07





  Heparin 5,000 Unit/1 Ml Vial  SUB-Q   5,000 unit





  Q8HR DELL   Administration


 


Hydromorphone HCl  0.5 mg  09/22/21 03:00  09/30/21 02:34





  Hydromorphone 1 Mg/1 Ml Inj  IV   0.5 mg





  Q3H PRN   Administration





  Pain , Severe (7-10)  


 


Methylprednisolone  4 mg  09/22/21 10:00  09/29/21 10:18





  Methylprednisolone 4 Mg Tab  PO   4 mg





  DAILY DELL   Administration


 


Nitroglycerin  0.4 mg  09/22/21 03:00 





  Nitroglycerin 0.4 Mg Tab Subl  SL  





  Q5M PRN  





  Chest Pain  


 


Nystatin  1 applic  09/22/21 18:00  09/29/21 22:46





  Nystatin Powder 15 Gm  TP   1 applic





  BID DELL   Administration


 


Ondansetron HCl  4 mg  09/22/21 03:00  09/29/21 23:51





  Ondansetron 4 Mg/2 Ml Inj  IV   4 mg





  Q8H PRN   Administration





  Nausea And Vomiting  


 


Oxycodone/Acetaminophen  1 tab  09/22/21 03:00  09/27/21 01:52





  Oxycodone /Acetaminophen 5-325mg Tab  PO   1 tab





  Q6H PRN   Administration





  Pain, Moderate (4-6)  


 


Sodium Chloride  10 ml  09/22/21 10:00  09/29/21 22:17





  Sodium Chloride 0.9% 10 Ml Flush Syringe  IV   10 ml





  BID DELL   Administration


 


Sodium Chloride  10 ml  09/22/21 03:00 





  Sodium Chloride 0.9% 10 Ml Flush Syringe  IV  





  PRN PRN  





  LINE FLUSH  


 


Tramadol HCl  50 mg  09/22/21 03:00 





  Tramadol 50 Mg Tab  PO  





  Q6H PRN  





  Pain, Moderate (4-6)  














Nutrition/Malnutrition Assess





- Dietary Evaluation


Nutrition/Malnutrition Findings: 


                                        





Nutrition Notes                                            Start:  09/22/21 

14:21


Freq:                                                      Status: Active       




Protocol:                                                                       




 Document     09/27/21 17:08  GB  (Rec: 09/27/21 17:14  GB  [id number])


 Nutrition Notes


     Initial or Follow up                        Reassessment


     Current Diagnosis                           Hypertension


     Other Pertinent Diagnosis                   Dyspnea, respiratory distress,


                                                 morbid obesity


     Current Diet                                Regular


     Labs/Tests                                  9/27: Na 134, BUN 63,


                                                 creatinine 2.2, glucose 159,


                                                 Ca 7.8


     Pertinent Medications                       Lasix, NaCl IV flushes


     Height                                      5 ft 4 in


     Weight                                      287.5 kg


     Ideal Body Weight (kg)                      54.54


     BMI                                         108.8


     Weight change and time frame                expect weight fluctuations r/t


                                                 fluid therapy, CHF


                                                 complications


     Weight Status                               Morbidly Obese


     Subjective/Other Information                Per MD notes 9/27: fluid


                                                 restrictions, on nasal cannula


                                                 3L, possibly obesity


                                                 hypoventilation syndrome,


                                                 recommended to bariatric


                                                 consult


     Percent of energy/protein needs met:        Meals and snacks provided meet


                                                 100% of estimated energy


                                                 needs.  Current po intake of


                                                 meals recorded average 50%.


     Burn                                        Absent


     Trauma                                      Absent


     GI Symptoms                                 None


     Food Allergy                                No


     Current % PO                                Fair (50-74%)


     Minimum of two criteria                     No


     #1


      Nutrition Diagnosis                        Overweight/obesity


      Comments:                                  Discussed diet/life style


                                                 changes using small goal


                                                 successes to advancement,


                                                 encouraged outpatient RD


                                                 counseling at bariatric center


      Etiology                                   dyspnea, respiratory distress


      As Evidenced by Signs and Symptoms         , %


      Diagnosis Progress(for reassessment        Continues


       documentation)                            


     Is patient on ventilator?                   No


     Is Patient Ambulatory and/or Out of Bed     No


     REE-([facility]-confined to bed)      4277.724


     Kcal/Kg value to use for calculation        10


     Approximate Energy Requirements Using       2875


      kcal/Kg                                    


     Calculation Used for Recommendations        Kcal/kg


     Additional Notes                            Protein 0.6 g/kg r/t BMI over


                                                 50: 163g


                                                 Fluids: 1ml/kcal or per MD


 Nutrition Intervention


     Change Diet Order:                          continue with current diet


     Goal #1                                     Pt to contact and make


                                                 appointment with outpatient RD


                                                 in bariatric center


     Goal #2                                     weight to decrease -1% during


                                                 LOS


     Follow-Up By:                               10/04/21


     Additional Comments                         f/u for weight loss, po intake

## 2021-09-30 NOTE — PROGRESS NOTE
Subjective


Date of service: 09/30/21


Principal diagnosis: Acute on chronic renal insufficiency


Interval history: 





Assessment


Acute kidney injury, unknown baseline. Renal ultrasound notes poor visualization

due to body habitus.


Hypervolemic Hyponatremia


Hyperkalemia


Hypocalcemia


Morbid obesity











Recommendations


Continue diuresis


stopped Na tabls


cr is stable today


echo noted, likely diastolic disease


rec lasix 40mg po bid, iv if not responsive to diuresis


rise in bun noted


S/p Kayexalate on admission


Maintain MAP more than 65


Hold ACE/ARB at this time


Hold diuretics given soft pressures


Renally dose medications


Avoid nephrotoxins


Renal diet


will follow lytes prn





Subjective:


Principal diagnosis: Acute on chronic renal insufficiency


Interval history: 


Patient was seen for her renal issues


Nursing, interdisciplinary and consult notes were reviewed


Vitals, input and output, medications and labs were reviewed


Remains on NC








Objective





- Exam


Narrative Exam: 


General: Morbid obesity


HEENT: Oral mucosa moist


Neck: Supple, no JVD


Chest: Clear to auscultation bilaterally


Heart: RRR, S1 and S2, no pericardial rub


Abdomen: Soft, nontender, no renal bruit


Extremity: No peripheral cyanosis, edema


Neurological: Awake and alert


Dermatology: Unable to assess


Psych: Calm and cooperative


Musculoskeletal: No joint effusion





Objective





- Vital Signs


Vital signs: 


                               Vital Signs - 12hr











  09/29/21 09/29/21 09/29/21





  21:00 21:30 22:00


 


Temperature   


 


Pulse Rate 94 H 94 H 96 H


 


Pulse Rate [   





Anterior   





Bilateral   





Throughout]   


 


Pulse Rate [   





From Monitor]   


 


Respiratory 16 16 16





Rate   


 


Respiratory   





Rate [Anterior   





Bilateral   





Throughout]   


 


Blood Pressure 107/58 109/57 101/50


 


O2 Sat by Pulse 98 98 97





Oximetry   














  09/29/21 09/29/21 09/29/21





  22:08 22:24 22:27


 


Temperature   


 


Pulse Rate 95 H 94 H 


 


Pulse Rate [   





Anterior   





Bilateral   





Throughout]   


 


Pulse Rate [   





From Monitor]   


 


Respiratory 8 L 23 





Rate   


 


Respiratory   





Rate [Anterior   





Bilateral   





Throughout]   


 


Blood Pressure 101/50 103/54 


 


O2 Sat by Pulse 98 98 98





Oximetry   














  09/29/21 09/29/21 09/29/21





  22:28 22:30 23:00


 


Temperature   


 


Pulse Rate  93 H 96 H


 


Pulse Rate [ 94 H  





Anterior   





Bilateral   





Throughout]   


 


Pulse Rate [   





From Monitor]   


 


Respiratory  22 15





Rate   


 


Respiratory 20  





Rate [Anterior   





Bilateral   





Throughout]   


 


Blood Pressure  113/63 128/90


 


O2 Sat by Pulse  98 97





Oximetry   














  09/29/21 09/30/21 09/30/21





  23:30 00:00 00:30


 


Temperature  98.1 F 


 


Pulse Rate 95 H 96 H 96 H


 


Pulse Rate [   





Anterior   





Bilateral   





Throughout]   


 


Pulse Rate [  98 H 





From Monitor]   


 


Respiratory 20 14 14





Rate   


 


Respiratory   





Rate [Anterior   





Bilateral   





Throughout]   


 


Blood Pressure 140/69 149/98 165/105


 


O2 Sat by Pulse 97 94 99





Oximetry   














  09/30/21 09/30/21 09/30/21





  01:00 01:30 02:00


 


Temperature   


 


Pulse Rate 97 H 100 H 100 H


 


Pulse Rate [   





Anterior   





Bilateral   





Throughout]   


 


Pulse Rate [   





From Monitor]   


 


Respiratory 27 H 23 14





Rate   


 


Respiratory   





Rate [Anterior   





Bilateral   





Throughout]   


 


Blood Pressure 170/118 194/116 128/79


 


O2 Sat by Pulse 97 97 82 L





Oximetry   














  09/30/21 09/30/21 09/30/21





  02:30 03:00 03:30


 


Temperature   


 


Pulse Rate 100 H 104 H 105 H


 


Pulse Rate [   





Anterior   





Bilateral   





Throughout]   


 


Pulse Rate [   





From Monitor]   


 


Respiratory 27 H 18 18





Rate   


 


Respiratory   





Rate [Anterior   





Bilateral   





Throughout]   


 


Blood Pressure 143/98 135/93 137/95


 


O2 Sat by Pulse 94 95 94





Oximetry   














  09/30/21 09/30/21 09/30/21





  04:00 04:30 05:00


 


Temperature 97.8 F  


 


Pulse Rate 106 H 107 H 106 H


 


Pulse Rate [   





Anterior   





Bilateral   





Throughout]   


 


Pulse Rate [ 98 H  





From Monitor]   


 


Respiratory 21 19 24





Rate   


 


Respiratory   





Rate [Anterior   





Bilateral   





Throughout]   


 


Blood Pressure 132/99 147/101 142/101


 


O2 Sat by Pulse 94 93 91





Oximetry   














  09/30/21 09/30/21 09/30/21





  05:30 06:00 06:30


 


Temperature   


 


Pulse Rate 107 H 108 H 108 H


 


Pulse Rate [   





Anterior   





Bilateral   





Throughout]   


 


Pulse Rate [   





From Monitor]   


 


Respiratory 23 23 27 H





Rate   


 


Respiratory   





Rate [Anterior   





Bilateral   





Throughout]   


 


Blood Pressure 126/86 139/92 125/77


 


O2 Sat by Pulse 85 83 L 82 L





Oximetry   














  09/30/21 09/30/21 09/30/21





  07:00 07:23 07:30


 


Temperature  97.9 F 


 


Pulse Rate 128 H  105 H


 


Pulse Rate [   





Anterior   





Bilateral   





Throughout]   


 


Pulse Rate [   





From Monitor]   


 


Respiratory 24  18





Rate   


 


Respiratory   





Rate [Anterior   





Bilateral   





Throughout]   


 


Blood Pressure 181/128  139/98


 


O2 Sat by Pulse 91  100





Oximetry   














  09/30/21 09/30/21 09/30/21





  08:00 08:15 08:30


 


Temperature   


 


Pulse Rate 105 H 105 H 105 H


 


Pulse Rate [   





Anterior   





Bilateral   





Throughout]   


 


Pulse Rate [   





From Monitor]   


 


Respiratory 24 24 21





Rate   


 


Respiratory   





Rate [Anterior   





Bilateral   





Throughout]   


 


Blood Pressure 139/98 139/98 139/98


 


O2 Sat by Pulse 88 94 87





Oximetry   














- Lab





                                 09/30/21 04:53





                                 09/30/21 04:53


                             Most recent lab results











ABG pH  7.086  (7.320-7.450)  L  09/30/21  07:32    


 


ABG pCO2  86.9 mm Hg  09/27/21  18:40    


 


ABG pO2  81.8 mm Hg (80.0-90.0)   09/27/21  18:40    


 


ABG HCO3  36.2 mmol/L (20.0-26.0)  H  09/27/21  18:40    


 


ABG O2 Saturation  99.6  (0-100)   09/30/21  07:32    


 


Calcium  8.9 mg/dL (8.4-10.2)   09/30/21  04:53    


 


Urine Sodium  16 mmol/L  09/23/21  18:46    














Medications & Allergies





- Medications


Allergies/Adverse Reactions: 


                                    Allergies





No Known Allergies Allergy (Unverified 07/13/17 11:17)


   








Home Medications: 


                                Home Medications











 Medication  Instructions  Recorded  Confirmed  Last Taken  Type


 


Acetaminophen [Acetaminophen TAB] 325 mg PO Q4H PRN #30 tablet 12/14/17 01/22/21

Unknown Rx


 


ALBUTEROL NEB's [Proventil 0.083% 2.5 mg IH Q3HRT PRN #30 day 01/24/21  Unknown 

Rx





NEBS]     


 


Albuterol Mdi (or & Nicu Only) 2 puff IH QID PRN #1 inhalation 01/24/21  Unknown

Rx





[ProAir HFA Inhaler]     


 


Azithromycin [Zithromax Z-JAVIER] 0 mg PO DAILY #1 tab 01/24/21  Unknown Rx


 


Benzonatate [Tessalon Perles] 100 mg PO Q8HR #15 capsule 01/24/21  Unknown Rx


 


Famotidine [Pepcid] 20 mg PO BID #14 tablet 01/24/21  Unknown Rx


 


Ipratropium/Albuterol Sulfate 1 ampul IH TIDRT #30 day 01/24/21  Unknown Rx





[DUONEB *Not for PRN Use*]     


 


hydroCHLOROthiazide [HCTZ] 25 mg PO QDAY  tablet 01/24/21  Unknown Rx


 


hydroCHLOROthiazide [HCTZ] 25 mg PO QDAY #30 tablet 01/24/21  Unknown Rx


 


methylPREDNISolone [Medrol 4MG 4 mg PO DAILY #1 tab.ds.pk 01/24/21  Unknown Rx





DOSEPAK (21 tabs)]     











Active Medications: 














Generic Name Dose Route Start Last Admin





  Trade Name Freq  PRN Reason Stop Dose Admin


 


Acetaminophen  650 mg  09/22/21 03:00 





  Acetaminophen 325 Mg Tab  PO  





  Q4H PRN  





  Pain MILD(1-3)/Fever >100.5/HA  


 


Albuterol  2.5 mg  09/22/21 03:00 





  Albuterol 2.5 Mg/3 Ml Nebu  IH  





  Q4HRT PRN  





  Shortness Of Breath  


 


Albuterol/Ipratropium  1 ampul  09/22/21 08:00  09/29/21 22:27





  Ipratropium/Albuterol Sulfate 3 Ml Ampul.Neb  IH   1 ampul





  TIDRT DELL   Administration


 


Aspirin  325 mg  09/23/21 10:00  09/29/21 10:18





  Aspirin Ec 325 Mg Tab  PO   325 mg





  QDAY DELL   Administration


 


Atorvastatin Calcium  40 mg  09/22/21 22:00  09/29/21 22:16





  Atorvastatin 40 Mg Tab  PO   40 mg





  QHS DELL   Administration


 


Benzonatate  100 mg  09/22/21 06:00  09/30/21 05:07





  Benzonatate 100 Mg Cap  PO   100 mg





  Q8HR DELL   Administration


 


Furosemide  40 mg  09/30/21 18:00 





  Furosemide 40 Mg/4 Ml Inj  IV  





  0600,1800 DELL  


 


Furosemide  20 mg  09/30/21 09:00 





  Furosemide 40 Mg/4 Ml Inj  IV  09/30/21 10:00 





  ONCE@0900 NR  


 


Guaifenesin  200 mg  09/26/21 14:47 





  Guaifenesin 100 Mg/5 Ml Oral Liqd  PO  





  Q4H PRN  





  Cough  


 


Heparin Sodium (Porcine)  5,000 unit  09/22/21 06:00  09/30/21 05:07





  Heparin 5,000 Unit/1 Ml Vial  SUB-Q   5,000 unit





  Q8HR DELL   Administration


 


Methylprednisolone Sodium Succinate  20 mg  09/30/21 10:00 





  Methylprednisolone Sod Succinate 40 Mg/1 Ml Inj  IV  





  Q12HR DELL  


 


Nitroglycerin  0.4 mg  09/22/21 03:00 





  Nitroglycerin 0.4 Mg Tab Subl  SL  





  Q5M PRN  





  Chest Pain  


 


Nystatin  1 applic  09/22/21 18:00  09/29/21 22:46





  Nystatin Powder 15 Gm  TP   1 applic





  BID DELL   Administration


 


Ondansetron HCl  4 mg  09/22/21 03:00  09/29/21 23:51





  Ondansetron 4 Mg/2 Ml Inj  IV   4 mg





  Q8H PRN   Administration





  Nausea And Vomiting  


 


Oxycodone/Acetaminophen  1 tab  09/22/21 03:00  09/27/21 01:52





  Oxycodone /Acetaminophen 5-325mg Tab  PO   1 tab





  Q6H PRN   Administration





  Pain, Moderate (4-6)  


 


Pantoprazole Sodium  40 mg  09/30/21 10:00 





  Pantoprazole 40 Mg Inj  IV  





  QDAY DELL  


 


Sodium Chloride  10 ml  09/22/21 10:00  09/29/21 22:17





  Sodium Chloride 0.9% 10 Ml Flush Syringe  IV   10 ml





  BID DELL   Administration


 


Sodium Chloride  10 ml  09/22/21 03:00 





  Sodium Chloride 0.9% 10 Ml Flush Syringe  IV  





  PRN PRN  





  LINE FLUSH  


 


Tramadol HCl  50 mg  09/22/21 03:00 





  Tramadol 50 Mg Tab  PO  





  Q6H PRN  





  Pain, Moderate (4-6)

## 2021-09-30 NOTE — PROGRESS NOTE
Assessment and Plan








Acute hypoxemic and hypercapnic respiratory failure


Acute exacerbation of CHF (congestive heart failure)


Hypertension


Morbid obesity with BMI of 70 and over, adult


Obesity hypoventilation syndrome


Sleep apnea





- continue NIV RTC while attempting to transition to qhs only


- continue diuresis while following electrolytes


- continue to wean supplemental oxygen for target O2 sat's > 90% acutely


- aspiration precautions


- continue bronchodilators with pulmonary hygiene per RT


- continue accuchecks with glycemic control per SSI (While critically ill target

blood glucose of 140-180 mg/dL; avoid hypoglycemia)


- avoid nephrotoxins, renally dose all medications


- AB's per ID rec's 


- prn analgesia per pain score


- Maintenance of sleep-wake cycle, avoid delirium


- G.I. & VTE prophylaxis


- PT/OT/ROM exercises


- continue mobility protocols for pressure ulcer prophylaxis


- Monitor hemodynamics closely


- continue other care per attending / other consultants


- discharge planning ongoing concurrently





COVID SPECIFIC INTERVENTIONS


- COVID-19 assay negative





.... Re-evaluate in am & prn





CONDITION: CRITICAL


PROGNOSIS: GUARDED


CODE STATUS: FULL CODE





The high probability of a clinically significant, sudden or life-threatening 

deterioration of the [respiratory, cardiovascular, renal & neurologic] system(s)

required my full and direct attention, intervention and personal management. The

aggregate critical care time was [34] minutes without overlap. Time includes 

spent on;





[x] Data Review and interpretation 





[x] Patient assessment and monitoring of vital signs 





[x] Documentation 





[x] Medication orders and management




















Subjective


Date of service: 09/30/21


Principal diagnosis: Ac hypoxemic and hypercapnic resp failure; AE-CHF; Morbid 

obesity; FABIAN/OHS


Interval history: 





Patient is seen today for: Acute hypoxemic and hypercapnic respiratory failure; 

Acute exacerbation of CHF; Hypertension; Morbid obesity; FABIAN / OHS





Seen and examined at bedside; 24hour events reviewed; nursing and respiratory 

care staff consulted; no adverse overnight events reported to me; resting in 

bed; remains BIPAP dependent with quick desaturations during breaks; also with 

recurrent worsening of hypercapnia off BIPAP; denies acute chest pain; No 

N/V/F/C














Objective


                               Vital Signs - 12hr











  09/30/21 09/30/21 09/30/21





  02:30 03:00 03:30


 


Temperature   


 


Pulse Rate 100 H 104 H 105 H


 


Pulse Rate [   





From Monitor]   


 


Respiratory 27 H 18 18





Rate   


 


Blood Pressure 143/98 135/93 137/95


 


O2 Sat by Pulse 94 95 94





Oximetry   














  09/30/21 09/30/21 09/30/21





  04:00 04:30 05:00


 


Temperature 97.8 F  


 


Pulse Rate 106 H 107 H 106 H


 


Pulse Rate [ 98 H  





From Monitor]   


 


Respiratory 21 19 24





Rate   


 


Blood Pressure 132/99 147/101 142/101


 


O2 Sat by Pulse 94 93 91





Oximetry   














  09/30/21 09/30/21 09/30/21





  05:30 06:00 06:30


 


Temperature   


 


Pulse Rate 107 H 108 H 108 H


 


Pulse Rate [   





From Monitor]   


 


Respiratory 23 23 27 H





Rate   


 


Blood Pressure 126/86 139/92 125/77


 


O2 Sat by Pulse 85 83 L 82 L





Oximetry   














  09/30/21 09/30/21 09/30/21





  07:00 07:23 07:30


 


Temperature  97.9 F 


 


Pulse Rate 128 H  105 H


 


Pulse Rate [   





From Monitor]   


 


Respiratory 24  18





Rate   


 


Blood Pressure 181/128  139/98


 


O2 Sat by Pulse 91  100





Oximetry   














  09/30/21 09/30/21 09/30/21





  08:00 08:15 08:30


 


Temperature   


 


Pulse Rate 105 H 105 H 105 H


 


Pulse Rate [   





From Monitor]   


 


Respiratory 24 24 21





Rate   


 


Blood Pressure 139/98 139/98 139/98


 


O2 Sat by Pulse 88 94 87





Oximetry   














  09/30/21 09/30/21 09/30/21





  09:00 09:30 10:00


 


Temperature   


 


Pulse Rate 104 H 103 H 102 H


 


Pulse Rate [   





From Monitor]   


 


Respiratory 22 20 20





Rate   


 


Blood Pressure 139/98 139/98 139/98


 


O2 Sat by Pulse 90 91 93





Oximetry   














  09/30/21 09/30/21 09/30/21





  10:30 11:00 11:30


 


Temperature   


 


Pulse Rate 103 H 103 H 102 H


 


Pulse Rate [   





From Monitor]   


 


Respiratory 19 24 16





Rate   


 


Blood Pressure 117/67 116/72 132/91


 


O2 Sat by Pulse 94 94 95





Oximetry   














  09/30/21





  12:09


 


Temperature 98.5 F


 


Pulse Rate 


 


Pulse Rate [ 





From Monitor] 


 


Respiratory 





Rate 


 


Blood Pressure 


 


O2 Sat by Pulse 





Oximetry 











Constitutional: asleep, appears uncomfortable, other (young extremely obese 

female with mildly increased respiratory effort at rest on NIV)


Eyes: non-icteric


ENT: oropharynx moist, other (BIPAP FFM)


Neck: supple, no lymphadenopathy, no JVD


Effort: mildly labored


Ascultation: Bilateral: diminished breath sounds, rhonchi


Percussion: Bilateral: not dull


Cardiovascular: regular rate and rhythm


Gastrointestinal: normoactive bowel sounds, soft, non-tender, non-distended 

(protuberant)


Integumentary: rash (stasis dermatitis type), other (Stasis dermatititis on legs

and abdomen; see N notes for full details)


Extremities: pulses normal, no ischemia or petechiae, edema (2+), other (stasis 

dermatitis)


Neurologic: non-focal exam (grossly), pupils equal and round, CN II-XII normal


Psychiatric: depressed


CBC and BMP: 


                                 10/01/21 08:55





                                 10/01/21 08:55


ABG, PT/INR, D-dimer: 


ABG











ABG pH  7.086  (7.320-7.450)  L  09/30/21  07:32    


 


POC ABG pCO2  142.2 mmHg (32.0-48.0)  H  09/30/21  07:32    


 


ABG pCO2  86.9 mm Hg  09/27/21  18:40    


 


POC ABG pO2  196.3 mmHg ()  H  09/30/21  07:32    


 


ABG pO2  81.8 mm Hg (80.0-90.0)   09/27/21  18:40    


 


POC ABG HCO3  41.8   09/30/21  07:32    


 


ABG O2 Saturation  99.6  (0-100)   09/30/21  07:32    








Abnormal lab findings: 


                                  Abnormal Labs











  09/21/21 09/21/21 09/22/21





  22:04 22:04 00:18


 


WBC  17.7 H  


 


Hgb  9.1 L  


 


Hct   


 


MCV  77 L  


 


MCH  21 L  


 


MCHC  27 L  


 


RDW  24.3 H  


 


Seg Neuts % (Manual)   


 


Lymphocytes % (Manual)   


 


Nucleated RBC %   


 


Seg Neutrophils # Man   


 


Lymphocytes # (Manual)   


 


Monocytes # (Manual)   


 


ABG pH   


 


POC ABG pCO2   


 


POC ABG pO2   


 


ABG pO2   


 


ABG HCO3   


 


ABG Base Excess   


 


ABG Hemoglobin   


 


ABG Oxyhemoglobin   


 


ABG Sodium   


 


ABG Chloride   


 


ABG Glucose   


 


Oxyhemoglobin   


 


Carboxyhemoglobin   


 


Sodium   135 L 


 


Potassium   


 


Chloride   91.0 L 


 


Carbon Dioxide   17 L 


 


BUN   


 


Creatinine   1.4 H 


 


Glucose   55 L 


 


POC Glucose    49 L


 


Calcium   


 


Ionized Calcium   


 


Total Bilirubin   


 


AST   


 


ALT   


 


Total Creatine Kinase   


 


Troponin T   0.043 H 


 


NT-Pro-B Natriuret Pep   7573 H 


 


Total Protein   


 


Albumin   


 


LDL Cholesterol Direct   47 L 


 


HDL Cholesterol   25 L 


 


Arterial Blood Glucose   














  09/22/21 09/22/21 09/22/21





  04:03 04:03 09:22


 


WBC  24.3 H  


 


Hgb  9.3 L  


 


Hct   


 


MCV  74 L  


 


MCH  21 L  


 


MCHC  29 L  


 


RDW  23.1 H  


 


Seg Neuts % (Manual)  78.0 H  


 


Lymphocytes % (Manual)  7.0 L  


 


Nucleated RBC %  1.0 H  


 


Seg Neutrophils # Man  19.0 H  


 


Lymphocytes # (Manual)   


 


Monocytes # (Manual)  1.0 H  


 


ABG pH   


 


POC ABG pCO2   


 


POC ABG pO2   


 


ABG pO2   


 


ABG HCO3   


 


ABG Base Excess   


 


ABG Hemoglobin   


 


ABG Oxyhemoglobin   


 


ABG Sodium   


 


ABG Chloride   


 


ABG Glucose   


 


Oxyhemoglobin   


 


Carboxyhemoglobin   


 


Sodium   134 L 


 


Potassium   


 


Chloride   91.2 L 


 


Carbon Dioxide   


 


BUN   


 


Creatinine   1.8 H 


 


Glucose   


 


POC Glucose   


 


Calcium   


 


Ionized Calcium   


 


Total Bilirubin   


 


AST   


 


ALT   


 


Total Creatine Kinase   


 


Troponin T    0.099 H


 


NT-Pro-B Natriuret Pep   


 


Total Protein   


 


Albumin   


 


LDL Cholesterol Direct   


 


HDL Cholesterol   


 


Arterial Blood Glucose   














  09/22/21 09/22/21 09/23/21





  13:03 20:28 04:52


 


WBC    20.9 H


 


Hgb    8.5 L


 


Hct    30.0 L


 


MCV    73 L


 


MCH    21 L


 


MCHC    28 L


 


RDW    23.9 H


 


Seg Neuts % (Manual)    77.0 H


 


Lymphocytes % (Manual)    1.0 L


 


Nucleated RBC %    1.0 H


 


Seg Neutrophils # Man    16.1 H


 


Lymphocytes # (Manual)    0.2 L


 


Monocytes # (Manual)   


 


ABG pH   


 


POC ABG pCO2   


 


POC ABG pO2   


 


ABG pO2   


 


ABG HCO3   


 


ABG Base Excess   


 


ABG Hemoglobin   


 


ABG Oxyhemoglobin   


 


ABG Sodium   


 


ABG Chloride   


 


ABG Glucose   


 


Oxyhemoglobin   


 


Carboxyhemoglobin   


 


Sodium   


 


Potassium   


 


Chloride   


 


Carbon Dioxide   


 


BUN   


 


Creatinine   


 


Glucose   


 


POC Glucose   115 H 


 


Calcium   


 


Ionized Calcium   


 


Total Bilirubin   


 


AST   


 


ALT   


 


Total Creatine Kinase   


 


Troponin T  0.078 H D  


 


NT-Pro-B Natriuret Pep   


 


Total Protein   


 


Albumin   


 


LDL Cholesterol Direct   


 


HDL Cholesterol   


 


Arterial Blood Glucose   














  09/23/21 09/23/21 09/23/21





  04:52 08:46 11:50


 


WBC   


 


Hgb   


 


Hct   


 


MCV   


 


MCH   


 


MCHC   


 


RDW   


 


Seg Neuts % (Manual)   


 


Lymphocytes % (Manual)   


 


Nucleated RBC %   


 


Seg Neutrophils # Man   


 


Lymphocytes # (Manual)   


 


Monocytes # (Manual)   


 


ABG pH   


 


POC ABG pCO2   


 


POC ABG pO2   


 


ABG pO2   


 


ABG HCO3   


 


ABG Base Excess   


 


ABG Hemoglobin   


 


ABG Oxyhemoglobin   


 


ABG Sodium   


 


ABG Chloride   


 


ABG Glucose   


 


Oxyhemoglobin   


 


Carboxyhemoglobin   


 


Sodium  129 L  


 


Potassium  5.6 H  


 


Chloride  88.6 L  


 


Carbon Dioxide   


 


BUN  27 H  


 


Creatinine  2.4 H  


 


Glucose  121 H  


 


POC Glucose   139 H  156 H


 


Calcium  7.7 L  


 


Ionized Calcium   


 


Total Bilirubin   


 


AST   


 


ALT   


 


Total Creatine Kinase   


 


Troponin T   


 


NT-Pro-B Natriuret Pep   


 


Total Protein   


 


Albumin   


 


LDL Cholesterol Direct   


 


HDL Cholesterol   


 


Arterial Blood Glucose   














  09/23/21 09/23/21 09/23/21





  15:46 16:58 22:08


 


WBC   


 


Hgb   


 


Hct   


 


MCV   


 


MCH   


 


MCHC   


 


RDW   


 


Seg Neuts % (Manual)   


 


Lymphocytes % (Manual)   


 


Nucleated RBC %   


 


Seg Neutrophils # Man   


 


Lymphocytes # (Manual)   


 


Monocytes # (Manual)   


 


ABG pH   


 


POC ABG pCO2   


 


POC ABG pO2   


 


ABG pO2   


 


ABG HCO3   


 


ABG Base Excess   


 


ABG Hemoglobin   


 


ABG Oxyhemoglobin   


 


ABG Sodium   


 


ABG Chloride   


 


ABG Glucose   


 


Oxyhemoglobin   


 


Carboxyhemoglobin   


 


Sodium  128 L  


 


Potassium  5.5 H  


 


Chloride  88.1 L  


 


Carbon Dioxide   


 


BUN  33 H  


 


Creatinine  2.7 H  


 


Glucose  172 H  


 


POC Glucose   187 H  186 H


 


Calcium  7.3 L  


 


Ionized Calcium   


 


Total Bilirubin   


 


AST   


 


ALT   


 


Total Creatine Kinase   


 


Troponin T   


 


NT-Pro-B Natriuret Pep   


 


Total Protein   


 


Albumin   


 


LDL Cholesterol Direct   


 


HDL Cholesterol   


 


Arterial Blood Glucose   














  09/23/21 09/24/21 09/24/21





  23:15 02:20 07:39


 


WBC   


 


Hgb   


 


Hct   


 


MCV   


 


MCH   


 


MCHC   


 


RDW   


 


Seg Neuts % (Manual)   


 


Lymphocytes % (Manual)   


 


Nucleated RBC %   


 


Seg Neutrophils # Man   


 


Lymphocytes # (Manual)   


 


Monocytes # (Manual)   


 


ABG pH   


 


POC ABG pCO2   


 


POC ABG pO2   


 


ABG pO2   


 


ABG HCO3   


 


ABG Base Excess   


 


ABG Hemoglobin   


 


ABG Oxyhemoglobin   


 


ABG Sodium   


 


ABG Chloride   


 


ABG Glucose   


 


Oxyhemoglobin   


 


Carboxyhemoglobin   


 


Sodium   128 L 


 


Potassium   5.2 H 


 


Chloride   88.6 L 


 


Carbon Dioxide   


 


BUN   39 H 


 


Creatinine   3.1 H 


 


Glucose   171 H 


 


POC Glucose    168 H


 


Calcium   7.2 L 


 


Ionized Calcium  3.6 L  


 


Total Bilirubin   


 


AST   


 


ALT   


 


Total Creatine Kinase   


 


Troponin T   


 


NT-Pro-B Natriuret Pep   


 


Total Protein   


 


Albumin   


 


LDL Cholesterol Direct   


 


HDL Cholesterol   


 


Arterial Blood Glucose   














  09/24/21 09/24/21 09/24/21





  10:04 11:37 17:18


 


WBC   


 


Hgb   


 


Hct   


 


MCV   


 


MCH   


 


MCHC   


 


RDW   


 


Seg Neuts % (Manual)   


 


Lymphocytes % (Manual)   


 


Nucleated RBC %   


 


Seg Neutrophils # Man   


 


Lymphocytes # (Manual)   


 


Monocytes # (Manual)   


 


ABG pH   


 


POC ABG pCO2   


 


POC ABG pO2   


 


ABG pO2   


 


ABG HCO3   


 


ABG Base Excess   


 


ABG Hemoglobin   


 


ABG Oxyhemoglobin   


 


ABG Sodium   


 


ABG Chloride   


 


ABG Glucose   


 


Oxyhemoglobin   


 


Carboxyhemoglobin   


 


Sodium   


 


Potassium   


 


Chloride   


 


Carbon Dioxide   


 


BUN   


 


Creatinine   


 


Glucose   


 


POC Glucose   170 H  152 H


 


Calcium   


 


Ionized Calcium   


 


Total Bilirubin   


 


AST   


 


ALT   


 


Total Creatine Kinase  1712 H  


 


Troponin T   


 


NT-Pro-B Natriuret Pep   


 


Total Protein   


 


Albumin   


 


LDL Cholesterol Direct   


 


HDL Cholesterol   


 


Arterial Blood Glucose   














  09/24/21 09/24/21 09/25/21





  19:38 22:02 05:48


 


WBC   


 


Hgb   


 


Hct   


 


MCV   


 


MCH   


 


MCHC   


 


RDW   


 


Seg Neuts % (Manual)   


 


Lymphocytes % (Manual)   


 


Nucleated RBC %   


 


Seg Neutrophils # Man   


 


Lymphocytes # (Manual)   


 


Monocytes # (Manual)   


 


ABG pH   


 


POC ABG pCO2   


 


POC ABG pO2   


 


ABG pO2   


 


ABG HCO3   


 


ABG Base Excess   


 


ABG Hemoglobin   


 


ABG Oxyhemoglobin   


 


ABG Sodium   


 


ABG Chloride   


 


ABG Glucose   


 


Oxyhemoglobin   


 


Carboxyhemoglobin   


 


Sodium  128 L   124 L


 


Potassium    5.8 H D


 


Chloride  89.6 L   89.4 L


 


Carbon Dioxide   


 


BUN  47 H   53 H


 


Creatinine  3.0 H   2.8 H


 


Glucose  165 H   150 H


 


POC Glucose   147 H 


 


Calcium  6.9 L   7.2 L


 


Ionized Calcium   


 


Total Bilirubin   


 


AST   


 


ALT   


 


Total Creatine Kinase   


 


Troponin T   


 


NT-Pro-B Natriuret Pep   


 


Total Protein   


 


Albumin   


 


LDL Cholesterol Direct   


 


HDL Cholesterol   


 


Arterial Blood Glucose   














  09/25/21 09/25/21 09/25/21





  08:48 11:35 19:12


 


WBC   


 


Hgb   


 


Hct   


 


MCV   


 


MCH   


 


MCHC   


 


RDW   


 


Seg Neuts % (Manual)   


 


Lymphocytes % (Manual)   


 


Nucleated RBC %   


 


Seg Neutrophils # Man   


 


Lymphocytes # (Manual)   


 


Monocytes # (Manual)   


 


ABG pH   


 


POC ABG pCO2   


 


POC ABG pO2   


 


ABG pO2   


 


ABG HCO3   


 


ABG Base Excess   


 


ABG Hemoglobin   


 


ABG Oxyhemoglobin   


 


ABG Sodium   


 


ABG Chloride   


 


ABG Glucose   


 


Oxyhemoglobin   


 


Carboxyhemoglobin   


 


Sodium    128 L


 


Potassium   


 


Chloride    89.7 L


 


Carbon Dioxide   


 


BUN    53 H


 


Creatinine    2.4 H


 


Glucose    159 H


 


POC Glucose  152 H  152 H 


 


Calcium    7.1 L


 


Ionized Calcium   


 


Total Bilirubin   


 


AST   


 


ALT   


 


Total Creatine Kinase   


 


Troponin T   


 


NT-Pro-B Natriuret Pep   


 


Total Protein   


 


Albumin   


 


LDL Cholesterol Direct   


 


HDL Cholesterol   


 


Arterial Blood Glucose   














  09/25/21 09/26/21 09/26/21





  22:03 05:03 07:41


 


WBC   


 


Hgb   


 


Hct   


 


MCV   


 


MCH   


 


MCHC   


 


RDW   


 


Seg Neuts % (Manual)   


 


Lymphocytes % (Manual)   


 


Nucleated RBC %   


 


Seg Neutrophils # Man   


 


Lymphocytes # (Manual)   


 


Monocytes # (Manual)   


 


ABG pH   


 


POC ABG pCO2   


 


POC ABG pO2   


 


ABG pO2   


 


ABG HCO3   


 


ABG Base Excess   


 


ABG Hemoglobin   


 


ABG Oxyhemoglobin   


 


ABG Sodium   


 


ABG Chloride   


 


ABG Glucose   


 


Oxyhemoglobin   


 


Carboxyhemoglobin   


 


Sodium   134 L 


 


Potassium   


 


Chloride   92.9 L 


 


Carbon Dioxide   33 H D 


 


BUN   54 H 


 


Creatinine   2.4 H 


 


Glucose   150 H 


 


POC Glucose  152 H   144 H


 


Calcium   7.2 L 


 


Ionized Calcium   


 


Total Bilirubin   


 


AST   


 


ALT   


 


Total Creatine Kinase   


 


Troponin T   


 


NT-Pro-B Natriuret Pep   


 


Total Protein   


 


Albumin   


 


LDL Cholesterol Direct   


 


HDL Cholesterol   


 


Arterial Blood Glucose   














  09/26/21 09/26/21 09/26/21





  11:38 14:11 17:02


 


WBC   14.7 H 


 


Hgb   8.7 L 


 


Hct   29.8 L 


 


MCV   74 L 


 


MCH   22 L 


 


MCHC   29 L 


 


RDW   24.9 H 


 


Seg Neuts % (Manual)   86.0 H 


 


Lymphocytes % (Manual)   6.0 L 


 


Nucleated RBC %   


 


Seg Neutrophils # Man   12.6 H 


 


Lymphocytes # (Manual)   0.9 L 


 


Monocytes # (Manual)   


 


ABG pH   


 


POC ABG pCO2   


 


POC ABG pO2   


 


ABG pO2   


 


ABG HCO3   


 


ABG Base Excess   


 


ABG Hemoglobin   


 


ABG Oxyhemoglobin   


 


ABG Sodium   


 


ABG Chloride   


 


ABG Glucose   


 


Oxyhemoglobin   


 


Carboxyhemoglobin   


 


Sodium   


 


Potassium   


 


Chloride   


 


Carbon Dioxide   


 


BUN   


 


Creatinine   


 


Glucose   


 


POC Glucose  151 H   154 H


 


Calcium   


 


Ionized Calcium   


 


Total Bilirubin   


 


AST   


 


ALT   


 


Total Creatine Kinase   


 


Troponin T   


 


NT-Pro-B Natriuret Pep   


 


Total Protein   


 


Albumin   


 


LDL Cholesterol Direct   


 


HDL Cholesterol   


 


Arterial Blood Glucose   














  09/26/21 09/27/21 09/27/21





  23:14 05:03 10:00


 


WBC   


 


Hgb   


 


Hct   


 


MCV   


 


MCH   


 


MCHC   


 


RDW   


 


Seg Neuts % (Manual)   


 


Lymphocytes % (Manual)   


 


Nucleated RBC %   


 


Seg Neutrophils # Man   


 


Lymphocytes # (Manual)   


 


Monocytes # (Manual)   


 


ABG pH    7.150 L*


 


POC ABG pCO2   


 


POC ABG pO2   


 


ABG pO2    91.8 H


 


ABG HCO3    37.2 H


 


ABG Base Excess    7.1 H


 


ABG Hemoglobin    7.1 L


 


ABG Oxyhemoglobin   


 


ABG Sodium   


 


ABG Chloride   


 


ABG Glucose   


 


Oxyhemoglobin    93.4 L


 


Carboxyhemoglobin   


 


Sodium   134 L 


 


Potassium   


 


Chloride   91.3 L 


 


Carbon Dioxide   33 H 


 


BUN   63 H 


 


Creatinine   2.2 H 


 


Glucose   159 H 


 


POC Glucose  151 H  


 


Calcium   7.8 L 


 


Ionized Calcium   


 


Total Bilirubin   


 


AST   


 


ALT   


 


Total Creatine Kinase   


 


Troponin T   


 


NT-Pro-B Natriuret Pep   


 


Total Protein   


 


Albumin   


 


LDL Cholesterol Direct   


 


HDL Cholesterol   


 


Arterial Blood Glucose   














  09/27/21 09/27/21 09/27/21





  12:39 16:37 18:40


 


WBC   


 


Hgb   


 


Hct   


 


MCV   


 


MCH   


 


MCHC   


 


RDW   


 


Seg Neuts % (Manual)   


 


Lymphocytes % (Manual)   


 


Nucleated RBC %   


 


Seg Neutrophils # Man   


 


Lymphocytes # (Manual)   


 


Monocytes # (Manual)   


 


ABG pH    7.237 L


 


POC ABG pCO2   


 


POC ABG pO2   


 


ABG pO2   


 


ABG HCO3    36.2 H


 


ABG Base Excess    7.4 H


 


ABG Hemoglobin    7.8 L


 


ABG Oxyhemoglobin   


 


ABG Sodium   


 


ABG Chloride   


 


ABG Glucose   


 


Oxyhemoglobin    93.7 L


 


Carboxyhemoglobin   


 


Sodium   


 


Potassium   


 


Chloride   


 


Carbon Dioxide   


 


BUN   


 


Creatinine   


 


Glucose   


 


POC Glucose  140 H  132 H 


 


Calcium   


 


Ionized Calcium   


 


Total Bilirubin   


 


AST   


 


ALT   


 


Total Creatine Kinase   


 


Troponin T   


 


NT-Pro-B Natriuret Pep   


 


Total Protein   


 


Albumin   


 


LDL Cholesterol Direct   


 


HDL Cholesterol   


 


Arterial Blood Glucose   














  09/27/21 09/28/21 09/28/21





  23:55 04:27 04:27


 


WBC   


 


Hgb   8.5 L 


 


Hct   29.1 L 


 


MCV   72 L 


 


MCH   21 L 


 


MCHC   29 L 


 


RDW   24.9 H 


 


Seg Neuts % (Manual)   


 


Lymphocytes % (Manual)   


 


Nucleated RBC %   


 


Seg Neutrophils # Man   


 


Lymphocytes # (Manual)   


 


Monocytes # (Manual)   


 


ABG pH   


 


POC ABG pCO2   


 


POC ABG pO2   


 


ABG pO2   


 


ABG HCO3   


 


ABG Base Excess   


 


ABG Hemoglobin   


 


ABG Oxyhemoglobin   


 


ABG Sodium   


 


ABG Chloride   


 


ABG Glucose   


 


Oxyhemoglobin   


 


Carboxyhemoglobin   


 


Sodium    135 L


 


Potassium   


 


Chloride    93.5 L


 


Carbon Dioxide    33 H


 


BUN    68 H


 


Creatinine    1.9 H


 


Glucose    143 H


 


POC Glucose  135 H  


 


Calcium    8.1 L


 


Ionized Calcium   


 


Total Bilirubin    1.50 H


 


AST    494 H


 


ALT    835 H


 


Total Creatine Kinase   


 


Troponin T   


 


NT-Pro-B Natriuret Pep   


 


Total Protein    9.5 H


 


Albumin    3.1 L


 


LDL Cholesterol Direct   


 


HDL Cholesterol   


 


Arterial Blood Glucose   














  09/28/21 09/28/21 09/29/21





  12:03 17:24 00:19


 


WBC   


 


Hgb   


 


Hct   


 


MCV   


 


MCH   


 


MCHC   


 


RDW   


 


Seg Neuts % (Manual)   


 


Lymphocytes % (Manual)   


 


Nucleated RBC %   


 


Seg Neutrophils # Man   


 


Lymphocytes # (Manual)   


 


Monocytes # (Manual)   


 


ABG pH  7.291 L  


 


POC ABG pCO2  75.5 H  


 


POC ABG pO2  76.2 L  


 


ABG pO2   


 


ABG HCO3   


 


ABG Base Excess   


 


ABG Hemoglobin  9.9 L  


 


ABG Oxyhemoglobin  91.0 L  


 


ABG Sodium  134.9 L  


 


ABG Chloride  93.0 L  


 


ABG Glucose  146 H  


 


Oxyhemoglobin   


 


Carboxyhemoglobin  2.2 H  


 


Sodium   


 


Potassium   


 


Chloride   


 


Carbon Dioxide   


 


BUN   


 


Creatinine   


 


Glucose   


 


POC Glucose   134 H  136 H


 


Calcium   


 


Ionized Calcium   


 


Total Bilirubin   


 


AST   


 


ALT   


 


Total Creatine Kinase   


 


Troponin T   


 


NT-Pro-B Natriuret Pep   


 


Total Protein   


 


Albumin   


 


LDL Cholesterol Direct   


 


HDL Cholesterol   


 


Arterial Blood Glucose  146 H  














  09/29/21 09/29/21 09/29/21





  04:55 04:55 05:29


 


WBC   


 


Hgb  8.0 L  


 


Hct  27.1 L  


 


MCV  70 L  


 


MCH  21 L  


 


MCHC   


 


RDW  24.3 H  


 


Seg Neuts % (Manual)   


 


Lymphocytes % (Manual)   


 


Nucleated RBC %   


 


Seg Neutrophils # Man   


 


Lymphocytes # (Manual)   


 


Monocytes # (Manual)   


 


ABG pH   


 


POC ABG pCO2   


 


POC ABG pO2   


 


ABG pO2   


 


ABG HCO3   


 


ABG Base Excess   


 


ABG Hemoglobin   


 


ABG Oxyhemoglobin   


 


ABG Sodium   


 


ABG Chloride   


 


ABG Glucose   


 


Oxyhemoglobin   


 


Carboxyhemoglobin   


 


Sodium   


 


Potassium   


 


Chloride   95.1 L 


 


Carbon Dioxide   36 H 


 


BUN   63 H 


 


Creatinine   1.5 H 


 


Glucose   131 H 


 


POC Glucose    118 H


 


Calcium   8.3 L 


 


Ionized Calcium   


 


Total Bilirubin   1.80 H 


 


AST   323 H 


 


ALT   609 H 


 


Total Creatine Kinase   


 


Troponin T   


 


NT-Pro-B Natriuret Pep   


 


Total Protein   9.3 H 


 


Albumin   2.9 L 


 


LDL Cholesterol Direct   


 


HDL Cholesterol   


 


Arterial Blood Glucose   














  09/29/21 09/29/21 09/29/21





  11:40 18:30 22:44


 


WBC   


 


Hgb   


 


Hct   


 


MCV   


 


MCH   


 


MCHC   


 


RDW   


 


Seg Neuts % (Manual)   


 


Lymphocytes % (Manual)   


 


Nucleated RBC %   


 


Seg Neutrophils # Man   


 


Lymphocytes # (Manual)   


 


Monocytes # (Manual)   


 


ABG pH   


 


POC ABG pCO2   


 


POC ABG pO2   


 


ABG pO2   


 


ABG HCO3   


 


ABG Base Excess   


 


ABG Hemoglobin   


 


ABG Oxyhemoglobin   


 


ABG Sodium   


 


ABG Chloride   


 


ABG Glucose   


 


Oxyhemoglobin   


 


Carboxyhemoglobin   


 


Sodium   


 


Potassium   


 


Chloride   


 


Carbon Dioxide   


 


BUN   


 


Creatinine   


 


Glucose   


 


POC Glucose  139 H  130 H  123 H


 


Calcium   


 


Ionized Calcium   


 


Total Bilirubin   


 


AST   


 


ALT   


 


Total Creatine Kinase   


 


Troponin T   


 


NT-Pro-B Natriuret Pep   


 


Total Protein   


 


Albumin   


 


LDL Cholesterol Direct   


 


HDL Cholesterol   


 


Arterial Blood Glucose   














  09/30/21 09/30/21 09/30/21





  04:53 04:53 07:16


 


WBC  12.5 H  


 


Hgb  8.8 L  


 


Hct  30.2 L  


 


MCV  74 L  


 


MCH  21 L  


 


MCHC  29 L  


 


RDW  24.5 H  


 


Seg Neuts % (Manual)   


 


Lymphocytes % (Manual)   


 


Nucleated RBC %   


 


Seg Neutrophils # Man   


 


Lymphocytes # (Manual)   


 


Monocytes # (Manual)   


 


ABG pH   


 


POC ABG pCO2   


 


POC ABG pO2   


 


ABG pO2   


 


ABG HCO3   


 


ABG Base Excess   


 


ABG Hemoglobin   


 


ABG Oxyhemoglobin   


 


ABG Sodium   


 


ABG Chloride   


 


ABG Glucose   


 


Oxyhemoglobin   


 


Carboxyhemoglobin   


 


Sodium   


 


Potassium   


 


Chloride   96.9 L 


 


Carbon Dioxide   35 H 


 


BUN   65 H 


 


Creatinine   1.5 H 


 


Glucose   142 H 


 


POC Glucose    138 H


 


Calcium   


 


Ionized Calcium   


 


Total Bilirubin   1.50 H 


 


AST   251 H 


 


ALT   572 H 


 


Total Creatine Kinase   


 


Troponin T   


 


NT-Pro-B Natriuret Pep   


 


Total Protein   10.1 H 


 


Albumin   3.2 L 


 


LDL Cholesterol Direct   


 


HDL Cholesterol   


 


Arterial Blood Glucose   














  09/30/21 09/30/21





  07:32 11:29


 


WBC  


 


Hgb  


 


Hct  


 


MCV  


 


MCH  


 


MCHC  


 


RDW  


 


Seg Neuts % (Manual)  


 


Lymphocytes % (Manual)  


 


Nucleated RBC %  


 


Seg Neutrophils # Man  


 


Lymphocytes # (Manual)  


 


Monocytes # (Manual)  


 


ABG pH  7.086 L 


 


POC ABG pCO2  142.2 H 


 


POC ABG pO2  196.3 H 


 


ABG pO2  


 


ABG HCO3  


 


ABG Base Excess  


 


ABG Hemoglobin  10.0 L 


 


ABG Oxyhemoglobin  


 


ABG Sodium  


 


ABG Chloride  96.0 L 


 


ABG Glucose  147 H 


 


Oxyhemoglobin  


 


Carboxyhemoglobin  1.6 H 


 


Sodium  


 


Potassium  


 


Chloride  


 


Carbon Dioxide  


 


BUN  


 


Creatinine  


 


Glucose  


 


POC Glucose   131 H


 


Calcium  


 


Ionized Calcium  


 


Total Bilirubin  


 


AST  


 


ALT  


 


Total Creatine Kinase  


 


Troponin T  


 


NT-Pro-B Natriuret Pep  


 


Total Protein  


 


Albumin  


 


LDL Cholesterol Direct  


 


HDL Cholesterol  


 


Arterial Blood Glucose  147 H 











Chest x-ray: other (none today)


Allied health notes reviewed: nursing

## 2021-10-01 LAB
ALBUMIN SERPL-MCNC: 3.2 G/DL (ref 3.9–5)
ALT SERPL-CCNC: 437 UNITS/L (ref 7–56)
BUN SERPL-MCNC: 68 MG/DL (ref 7–17)
BUN/CREAT SERPL: 45 %
CALCIUM SERPL-MCNC: 9 MG/DL (ref 8.4–10.2)
HCT VFR BLD CALC: 29.4 % (ref 30.3–42.9)
HEMOLYSIS INDEX: 0
HGB BLD-MCNC: 8.7 GM/DL (ref 10.1–14.3)
MCHC RBC AUTO-ENTMCNC: 30 % (ref 30–34)
MCV RBC AUTO: 74 FL (ref 79–97)
PLATELET # BLD: 227 K/MM3 (ref 140–440)
RBC # BLD AUTO: 3.96 M/MM3 (ref 3.65–5.03)

## 2021-10-01 RX ADMIN — FUROSEMIDE SCH MG: 10 INJECTION, SOLUTION INTRAVENOUS at 17:15

## 2021-10-01 RX ADMIN — HEPARIN SODIUM SCH UNIT: 5000 INJECTION, SOLUTION INTRAVENOUS; SUBCUTANEOUS at 13:09

## 2021-10-01 RX ADMIN — PANTOPRAZOLE SODIUM SCH MG: 40 INJECTION, POWDER, FOR SOLUTION INTRAVENOUS at 09:41

## 2021-10-01 RX ADMIN — NYSTATIN SCH APPLIC: 100000 POWDER TOPICAL at 21:20

## 2021-10-01 RX ADMIN — METHYLPREDNISOLONE SODIUM SUCCINATE SCH MG: 40 INJECTION, POWDER, FOR SOLUTION INTRAMUSCULAR; INTRAVENOUS at 09:40

## 2021-10-01 RX ADMIN — IPRATROPIUM BROMIDE AND ALBUTEROL SULFATE SCH AMPUL: .5; 3 SOLUTION RESPIRATORY (INHALATION) at 08:40

## 2021-10-01 RX ADMIN — HEPARIN SODIUM SCH UNIT: 5000 INJECTION, SOLUTION INTRAVENOUS; SUBCUTANEOUS at 07:13

## 2021-10-01 RX ADMIN — HEPARIN SODIUM SCH UNIT: 5000 INJECTION, SOLUTION INTRAVENOUS; SUBCUTANEOUS at 21:18

## 2021-10-01 RX ADMIN — Medication SCH ML: at 09:40

## 2021-10-01 RX ADMIN — BENZONATATE SCH MG: 100 CAPSULE ORAL at 06:42

## 2021-10-01 RX ADMIN — NYSTATIN SCH APPLIC: 100000 POWDER TOPICAL at 09:41

## 2021-10-01 RX ADMIN — ASPIRIN SCH MG: 325 TABLET, COATED ORAL at 09:41

## 2021-10-01 RX ADMIN — BENZONATATE SCH MG: 100 CAPSULE ORAL at 21:19

## 2021-10-01 RX ADMIN — GUAIFENESIN PRN MG: 100 SOLUTION ORAL at 06:43

## 2021-10-01 RX ADMIN — IPRATROPIUM BROMIDE AND ALBUTEROL SULFATE SCH AMPUL: .5; 3 SOLUTION RESPIRATORY (INHALATION) at 14:34

## 2021-10-01 RX ADMIN — METHYLPREDNISOLONE SODIUM SUCCINATE SCH MG: 40 INJECTION, POWDER, FOR SOLUTION INTRAMUSCULAR; INTRAVENOUS at 21:18

## 2021-10-01 RX ADMIN — BENZONATATE SCH MG: 100 CAPSULE ORAL at 13:09

## 2021-10-01 RX ADMIN — OXYCODONE AND ACETAMINOPHEN PRN TAB: 5; 325 TABLET ORAL at 21:19

## 2021-10-01 RX ADMIN — IPRATROPIUM BROMIDE AND ALBUTEROL SULFATE SCH AMPUL: .5; 3 SOLUTION RESPIRATORY (INHALATION) at 20:09

## 2021-10-01 RX ADMIN — FUROSEMIDE SCH MG: 10 INJECTION, SOLUTION INTRAVENOUS at 06:42

## 2021-10-01 RX ADMIN — Medication SCH ML: at 21:23

## 2021-10-01 NOTE — PROGRESS NOTE
Subjective


Date of service: 10/01/21


Principal diagnosis: Ac hypoxemic and hypercapnic resp failure; AE-CHF; Morbid 

obesity; FABIAN/OHS


Interval history: 





Assessment


Acute kidney injury, unknown baseline. Renal ultrasound notes poor visualization

due to body habitus.


Hypervolemic Hyponatremia


Hyperkalemia


Hypocalcemia


Morbid obesity











Recommendations


Continue diuresis


stopped Na tabls


cr is stable today


echo noted, likely diastolic disease


rec lasix 40mg po bid, iv if not responsive to diuresis


Maintain MAP more than 65


Hold ACE/ARB at this time


Hold diuretics given soft pressures


Renally dose medications


Avoid nephrotoxins


Renal diet


will follow lytes prn





Subjective:


Principal diagnosis: Acute on chronic renal insufficiency


Interval history: 


Patient was seen for her renal issues


Nursing, interdisciplinary and consult notes were reviewed


Vitals, input and output, medications and labs were reviewed


Remains on NC








Objective





- Exam


Narrative Exam: 


General: Morbid obesity


HEENT: Oral mucosa moist


Neck: Supple, no JVD


Chest: Clear to auscultation bilaterally


Heart: RRR, S1 and S2, no pericardial rub


Abdomen: Soft, nontender, no renal bruit


Extremity: No peripheral cyanosis, edema


Neurological: Awake and alert


Dermatology: Unable to assess


Psych: Calm and cooperative


Musculoskeletal: No joint effusion





Objective





- Vital Signs


Vital signs: 


                               Vital Signs - 12hr











  10/01/21 10/01/21 10/01/21





  02:00 02:30 03:00


 


Temperature   


 


Pulse Rate 93 H 92 H 93 H


 


Pulse Rate [   





Anterior   





Bilateral   





Throughout]   


 


Pulse Rate [   





From Monitor]   


 


Respiratory 23 15 24





Rate   


 


Respiratory   





Rate [Anterior   





Bilateral   





Throughout]   


 


Respiratory   





Rate [Left   





Abdomen]   


 


Blood Pressure 122/67 124/73 134/77


 


O2 Sat by Pulse 97 97 96





Oximetry   














  10/01/21 10/01/21 10/01/21





  03:30 04:00 04:30


 


Temperature   


 


Pulse Rate 92 H 90 88


 


Pulse Rate [   





Anterior   





Bilateral   





Throughout]   


 


Pulse Rate [  92 H 





From Monitor]   


 


Respiratory 24 20 13





Rate   


 


Respiratory   





Rate [Anterior   





Bilateral   





Throughout]   


 


Respiratory   





Rate [Left   





Abdomen]   


 


Blood Pressure 127/73 127/70 125/68


 


O2 Sat by Pulse  100 98





Oximetry   














  10/01/21 10/01/21 10/01/21





  05:00 05:30 05:53


 


Temperature   97.6 F


 


Pulse Rate 90 92 H 


 


Pulse Rate [   





Anterior   





Bilateral   





Throughout]   


 


Pulse Rate [   





From Monitor]   


 


Respiratory 16 20 





Rate   


 


Respiratory   





Rate [Anterior   





Bilateral   





Throughout]   


 


Respiratory   





Rate [Left   





Abdomen]   


 


Blood Pressure 145/94 171/121 


 


O2 Sat by Pulse  100 





Oximetry   














  10/01/21 10/01/21 10/01/21





  06:00 06:30 06:50


 


Temperature   


 


Pulse Rate 94 H 91 H 


 


Pulse Rate [   





Anterior   





Bilateral   





Throughout]   


 


Pulse Rate [   





From Monitor]   


 


Respiratory 15 14 





Rate   


 


Respiratory   





Rate [Anterior   





Bilateral   





Throughout]   


 


Respiratory   16





Rate [Left   





Abdomen]   


 


Blood Pressure 158/95 164/95 


 


O2 Sat by Pulse 90 100 





Oximetry   














  10/01/21 10/01/21 10/01/21





  07:00 07:30 08:00


 


Temperature   97.8 F


 


Pulse Rate 100 H 89 90


 


Pulse Rate [   





Anterior   





Bilateral   





Throughout]   


 


Pulse Rate [   87





From Monitor]   


 


Respiratory 24 21 19





Rate   


 


Respiratory   





Rate [Anterior   





Bilateral   





Throughout]   


 


Respiratory   





Rate [Left   





Abdomen]   


 


Blood Pressure 138/81 141/86 142/95


 


O2 Sat by Pulse 100 99 100





Oximetry   














  10/01/21 10/01/21 10/01/21





  08:19 08:30 08:55


 


Temperature   


 


Pulse Rate 100 H  


 


Pulse Rate [   87





Anterior   





Bilateral   





Throughout]   


 


Pulse Rate [   





From Monitor]   


 


Respiratory 26 H 36 H 





Rate   


 


Respiratory   26 H





Rate [Anterior   





Bilateral   





Throughout]   


 


Respiratory   





Rate [Left   





Abdomen]   


 


Blood Pressure 142/95 138/85 


 


O2 Sat by Pulse 100 100 





Oximetry   














  10/01/21 10/01/21 10/01/21





  09:00 09:30 10:00


 


Temperature   


 


Pulse Rate 87 89 88


 


Pulse Rate [   





Anterior   





Bilateral   





Throughout]   


 


Pulse Rate [   





From Monitor]   


 


Respiratory 18 32 H 17





Rate   


 


Respiratory   





Rate [Anterior   





Bilateral   





Throughout]   


 


Respiratory   





Rate [Left   





Abdomen]   


 


Blood Pressure 138/85 130/82 125/78


 


O2 Sat by Pulse 100 98 98





Oximetry   














  10/01/21 10/01/21 10/01/21





  10:30 11:00 12:56


 


Temperature   


 


Pulse Rate 88 88 100 H


 


Pulse Rate [   





Anterior   





Bilateral   





Throughout]   


 


Pulse Rate [   





From Monitor]   


 


Respiratory 24 20 25 H





Rate   


 


Respiratory   





Rate [Anterior   





Bilateral   





Throughout]   


 


Respiratory   





Rate [Left   





Abdomen]   


 


Blood Pressure 114/67 132/87 130/88


 


O2 Sat by Pulse 94  98





Oximetry   














- Lab





                                 10/01/21 08:55





                                 10/01/21 08:55


                             Most recent lab results











ABG pH  7.234  (7.320-7.450)  L  10/01/21  09:47    


 


ABG pCO2  86.9 mm Hg  09/27/21  18:40    


 


ABG pO2  81.8 mm Hg (80.0-90.0)   09/27/21  18:40    


 


ABG HCO3  36.2 mmol/L (20.0-26.0)  H  09/27/21  18:40    


 


ABG O2 Saturation  99.2  (0-100)   10/01/21  09:47    


 


Calcium  9.0 mg/dL (8.4-10.2)   10/01/21  08:55    


 


Urine Sodium  16 mmol/L  09/23/21  18:46    














Medications & Allergies





- Medications


Allergies/Adverse Reactions: 


                                    Allergies





No Known Allergies Allergy (Unverified 07/13/17 11:17)


   








Home Medications: 


                                Home Medications











 Medication  Instructions  Recorded  Confirmed  Last Taken  Type


 


Acetaminophen [Acetaminophen TAB] 325 mg PO Q4H PRN #30 tablet 12/14/17 01/22/21

Unknown Rx


 


ALBUTEROL NEB's [Proventil 0.083% 2.5 mg IH Q3HRT PRN #30 day 01/24/21  Unknown 

Rx





NEBS]     


 


Albuterol Mdi (or & Nicu Only) 2 puff IH QID PRN #1 inhalation 01/24/21  Unknown

Rx





[ProAir HFA Inhaler]     


 


Azithromycin [Zithromax Z-JAVIER] 0 mg PO DAILY #1 tab 01/24/21  Unknown Rx


 


Benzonatate [Tessalon Perles] 100 mg PO Q8HR #15 capsule 01/24/21  Unknown Rx


 


Famotidine [Pepcid] 20 mg PO BID #14 tablet 01/24/21  Unknown Rx


 


Ipratropium/Albuterol Sulfate 1 ampul IH TIDRT #30 day 01/24/21  Unknown Rx





[DUONEB *Not for PRN Use*]     


 


hydroCHLOROthiazide [HCTZ] 25 mg PO QDAY  tablet 01/24/21  Unknown Rx


 


hydroCHLOROthiazide [HCTZ] 25 mg PO QDAY #30 tablet 01/24/21  Unknown Rx


 


methylPREDNISolone [Medrol 4MG 4 mg PO DAILY #1 tab.ds.pk 01/24/21  Unknown Rx





DOSEPAK (21 tabs)]     











Active Medications: 














Generic Name Dose Route Start Last Admin





  Trade Name Freq  PRN Reason Stop Dose Admin


 


Acetaminophen  650 mg  09/22/21 03:00  10/01/21 06:42





  Acetaminophen 325 Mg Tab  PO   650 mg





  Q4H PRN   Administration





  Pain MILD(1-3)/Fever >100.5/HA  


 


Albuterol  2.5 mg  09/22/21 03:00 





  Albuterol 2.5 Mg/3 Ml Nebu  IH  





  Q4HRT PRN  





  Shortness Of Breath  


 


Albuterol/Ipratropium  1 ampul  09/22/21 08:00  10/01/21 08:40





  Ipratropium/Albuterol Sulfate 3 Ml Ampul.Neb  IH   1 ampul





  TIDRT DELL   Administration


 


Aspirin  325 mg  09/23/21 10:00  10/01/21 09:41





  Aspirin Ec 325 Mg Tab  PO   325 mg





  QDAY DELL   Administration


 


Atorvastatin Calcium  40 mg  09/22/21 22:00  09/30/21 21:40





  Atorvastatin 40 Mg Tab  PO   40 mg





  QHS DELL   Administration


 


Benzonatate  100 mg  09/22/21 06:00  10/01/21 13:09





  Benzonatate 100 Mg Cap  PO   100 mg





  Q8HR DELL   Administration


 


Furosemide  40 mg  09/30/21 18:00  10/01/21 06:42





  Furosemide 40 Mg/4 Ml Inj  IV   40 mg





  0600,1800 DELL   Administration


 


Guaifenesin  200 mg  09/26/21 14:47  10/01/21 06:43





  Guaifenesin 100 Mg/5 Ml Oral Liqd  PO   200 mg





  Q4H PRN   Administration





  Cough  


 


Heparin Sodium (Porcine)  5,000 unit  09/22/21 06:00  10/01/21 13:09





  Heparin 5,000 Unit/1 Ml Vial  SUB-Q   5,000 unit





  Q8HR DELL   Administration


 


Methylprednisolone Sodium Succinate  20 mg  09/30/21 10:00  10/01/21 09:40





  Methylprednisolone Sod Succinate 40 Mg/1 Ml Inj  IV   20 mg





  Q12HR DELL   Administration


 


Nitroglycerin  0.4 mg  09/22/21 03:00 





  Nitroglycerin 0.4 Mg Tab Subl  SL  





  Q5M PRN  





  Chest Pain  


 


Nystatin  1 applic  09/22/21 18:00  10/01/21 09:41





  Nystatin Powder 15 Gm  TP   1 applic





  BID DELL   Administration


 


Ondansetron HCl  4 mg  09/22/21 03:00  09/29/21 23:51





  Ondansetron 4 Mg/2 Ml Inj  IV   4 mg





  Q8H PRN   Administration





  Nausea And Vomiting  


 


Oxycodone/Acetaminophen  1 tab  09/22/21 03:00  09/30/21 21:39





  Oxycodone /Acetaminophen 5-325mg Tab  PO   1 tab





  Q6H PRN   Administration





  Pain, Moderate (4-6)  


 


Pantoprazole Sodium  40 mg  09/30/21 10:00  10/01/21 09:41





  Pantoprazole 40 Mg Inj  IV   40 mg





  QDAY DELL   Administration


 


Sodium Chloride  10 ml  09/22/21 10:00  10/01/21 09:40





  Sodium Chloride 0.9% 10 Ml Flush Syringe  IV   10 ml





  BID DELL   Administration


 


Sodium Chloride  10 ml  09/22/21 03:00 





  Sodium Chloride 0.9% 10 Ml Flush Syringe  IV  





  PRN PRN  





  LINE FLUSH  


 


Tramadol HCl  50 mg  09/22/21 03:00 





  Tramadol 50 Mg Tab  PO  





  Q6H PRN  





  Pain, Moderate (4-6)

## 2021-10-01 NOTE — PROGRESS NOTE
Assessment and Plan


Assessment and plan: 


(1) Non-ST elevation MI (NSTEMI) type II Procardia


Current Visit: Yes   Status: Acute   


Plan to address problem: 


Admit the patient to the medical telemetry. Aspirin 325 mg p.o. daily. Lipitor 

40 mg p.o. daily. Nitroglycerin as needed. We do the serial cardiac enzyme. We 

also consult cardiology for evaluation and order echocardiogram.








(2) Acute exacerbation of CHF (congestive heart failure)


Current Visit: Yes   Status: Acute   


Plan to address problem: 


Fluid restriction. Maintain input output. Lasix 40 mg IV every 12 hours. Oxygen 

via nasal cannula 3 L/min. DuoNeb by nebulizer every 4 hours. Echocardiogram. 

Cardiology consult








(3) Hypertension


Current Visit: Yes   Status: Acute   


Plan to address problem: 


We will continue the home medication. Hydralazine 10 mg IV every 6 hours as 

needed. We will monitor the blood pressure closely








(4) COPD (chronic obstructive pulmonary disease)


Current Visit: Yes   Status: Acute   


Plan to address problem: 


Oxygen by nasal cannula 3 L/min. DuoNeb via nebulizer every 4 hours. Albuterol 

via nebulizer every 4 hours as needed.





Super


(5) Morbid obesity


Current Visit: Yes   Status: Acute   


Plan to address problem: 


We counseled regarding weight reduction. Outpatient follow-up with bariatric 

surgeon








(6) Hyperglycemia


Current Visit: Yes   Status: Acute   


Plan to address problem: 


We will give 1 ampoule of D50. We will put the patient on cardiac diet. We will 

monitor the blood glucose closely








(7) acute kidney injury with vasomotor nephropathy





(8) Lymphedema of the abdominal wall





(9)Anemia of chronic disease





(10)Possible passive congestive hepatic failure with underlying NAFLD








(11) DVT prophylaxis


Current Visit: No   Status: Acute   


Plan to address problem: 


Heparin 5000 units subcu every 8 hours for DVT prophylaxis. Pepcid 20 mg p.o. 

twice daily for GI prophylaxis. Patient is a full code





9/23


-Patient is on heparin drip for non-STEMI, cardiology is following.


-Patient is off Lasix and ACE inhibitors because of [place].


-Kidney function is worsening overnight and I put a consult for nephrology.


-Patient is hypotensive and blood pressure from this morning was 101/41.  We 

will continue to monitor.  And if it is dropping we we will give him fluid.


-Echo was done and EF of 50 to 55%.  Diastolic dysfunction is indeterminate.  

Management per cardiology


-Patient has COPD continues on dexamethasone, nebulizer treatment as needed.


-Patient has hyponatremia and hyperkalemia.  I give the patient Kayexalate.  

Monitor electrolytes.  Will follow nephrology for additional recommendation.





9/24


-Renal function is worsening, nephrology is following


-Blood pressure is better today


-Hyperkalemia; I added Kayexalate


-Morbid obesity








9/25


-Slight improvement in creatinine.  Hyponatremia worsened. Nephrology is 

following and put the patient back on Lasix.


-Blood PRESSURE IS BETTER today


-Potassium this morning was 5.8, I ordered 60 g of Kayexalate


-Morbidly obese


-Obesity hypoventilation syndrome





9/26


-Creatinine is improving and this morning was 2.4


-Hyponatremia improved this morning was 134.  Patient is on Lasix


-Blood pressure is better


-Patient is on 10 L of oxygen; worsened 


-Morbidly obese, FABIAN








9/27: Follow ordered ultrasound of abdomen, per Physician unable to get CT. I 

ordered an ABG due to my concern about her breathing pattern this morning, 

patient may benefit from. Will try to establish if patient has a primary 

pulmonary doctor. ABG is showing consistent with Respiratory Acidosis. Will 

place on BIPAP now. May need to transfer to IMCU for closer monitoring. Patient 

likely with Obesity Hypoventilation syndrome.


cardiology work up, ongoing. 


RENAL SHOWING SOME IMPROVEMENT


Guarded prognosis





9/28: Patient seen and examined today identified some lesion under the pannus 

will obtain wound care and wound surgeon evaluation.  Nephrology input noted 

continue diuresis and stop the sodium tabs creatinine is stable.  I did discuss 

with the family and updated them.  We will continue to hold ACE and ARB and if 

pressures continue to drop may need to hold diuresis also despite as written 

above.  Monitor labs including H&H.  She is more awake review of ABG shows 

improving CO2.  Will discuss with case management as patient may need BiPAP on 

discharge home.  I also updated the family








9/29: Patient showing some clinical improvement.  Liver enzymes is showing mild 

improvement although bilirubin increased slightly.  GI input noted and as 

discussed by his notes below


"Abnormal liver enzymes in hepatocellular pattern.  broad ddx and likely 

multifactorial causes including NAFLD, congestive hepatopathy, possibly DILI.  

will check viral hep serologies.  US with limited views, possible coarsening of 

the liver"


Patient also evaluated by surgery noted with the lymphedema of abdominal wall 

and open wound of the abdominal wall without penetration into the abdominal 

cavity with recommendation to pack the wounds daily with mesalt. Need to 

maintain dry environment discussed with nursing staff.  Elevate the pannus and 

optimize nutrition for which I will get nutritional consult.


No surgical intervention at this time.  Patient is bedbound when I asked when 

last she ambulated she said while "I guess he has not worked" but it has been a 

while.  Unfortunately the LTAC center unwilling to accept the patient will 

discuss with pulmonary and with case management about obtaining BiPAP for this 

patient and possible SNF referral.





09/30: Patient this morning and was noted significantly lethargic and unrespons

grey respiratory and a code to be called.  The patient resuscitated without any 

invasive procedure.  ABG was obtained showed a CO2 of 142.  Despite using BiPAP 

for some time through the night, sure how long she used it for.  I have 

discontinued all IV opioids and recommend no IV opioids for this patient at this

time.  I also discontinued p.o. medications as an essential medication and 

change to IV.  We will repeat an ABG in an hour to see if there is some 

improvement.  Patient remains at high risk for possible intubation.  Clinical 

condition is guarded





10/1: Patient remains on BiPAP this am, has some intermittent twitching, will 

check EEG, not likely seizure, but will monitor. Continue current management


, check Albumin and Pre-Albumin level.


Remains critically ill. Liver status showing improvement.





History


Interval history: 


Patient seen and examined, remains on BiPAP, some intermittent twitching





Hospitalist Physical





- Physical exam


Narrative exam: 


VITAL SIGNS:  Reviewed.    


GENERAL:  The patient is morbidly obese,  lethargic on BiPAP


Placed she is waking up some, Vital signs as documented.


HEAD:  No signs of head trauma.


EYES:  Pupils are equal.  Extraocular motions intact.  


EARS:  Hearing grossly intact.


MOUTH:  Oropharynx is normal. 


NECK:  No adenopathy, no JVD.  


CHEST:  Chest with diminished breath sounds bilaterally.  No wheezes, rales, or 

rhonchi.  


CARDIAC:  Regular rate and rhythm.  S1 and S2, without murmurs, gallops, or 

rubs.


VASCULAR:  No Edema.  Peripheral pulses normal and equal in all extremities.


ABDOMEN:  enlarged pannus, lymphedema with thickened skin, non tender and non 

distended.  No   rebound or guarding, and no masses palpated.   Bowel Sounds 

normal.


MUSCULOSKELETAL:  Good range of motion of all major joints. Extremities without 

clubbing, cyanosis or edema.  


NEUROLOGIC EXAM:  Alert and oriented x 3   No focal sensory or strength 

deficits.   Speech normal.  Follows commands.


PSYCHIATRIC:  Mood normal.


SKIN:  detail exam as documented in skin assessment








- Constitutional


Vitals: 


                                        











Temp Pulse Resp BP Pulse Ox


 


 97.8 F   87   27 H  115/63   93 


 


 10/01/21 08:00  10/01/21 14:52  10/01/21 14:52  10/01/21 14:00  10/01/21 14:00











General appearance: Present: no acute distress





HEART Score





- HEART Score


Troponin: 


                                        











Troponin T  0.078 ng/mL (0.00-0.029)  H D 09/22/21  13:03    














Results





- Labs


CBC & Chem 7: 


                                 10/01/21 08:55





                                 10/01/21 08:55


Labs: 


                             Laboratory Last Values











WBC  8.8 K/mm3 (4.5-11.0)   10/01/21  08:55    


 


RBC  3.96 M/mm3 (3.65-5.03)   10/01/21  08:55    


 


Hgb  8.7 gm/dl (10.1-14.3)  L  10/01/21  08:55    


 


Hct  29.4 % (30.3-42.9)  L  10/01/21  08:55    


 


MCV  74 fl (79-97)  L  10/01/21  08:55    


 


MCH  22 pg (28-32)  L  10/01/21  08:55    


 


MCHC  30 % (30-34)   10/01/21  08:55    


 


RDW  24.8 % (13.2-15.2)  H  10/01/21  08:55    


 


Plt Count  227 K/mm3 (140-440)   10/01/21  08:55    


 


Add Manual Diff  Complete   09/26/21  14:11    


 


Total Counted  100   09/26/21  14:11    


 


Seg Neuts % (Manual)  86.0 % (40.0-70.0)  H  09/26/21  14:11    


 


Band Neutrophils %  1.0 %  09/26/21  14:11    


 


Lymphocytes % (Manual)  6.0 % (13.4-35.0)  L  09/26/21  14:11    


 


Reactive Lymphs % (Man)  1.0 %  09/26/21  14:11    


 


Monocytes % (Manual)  4.0 % (0.0-7.3)   09/26/21  14:11    


 


Metamyelocytes %  2.0 %  09/26/21  14:11    


 


Myelocytes %  2.0 %  09/22/21  04:03    


 


Nucleated RBC %  Not Reportable   09/26/21  14:11    


 


Seg Neutrophils # Man  12.6 K/mm3 (1.8-7.7)  H  09/26/21  14:11    


 


Band Neutrophils #  0.1 K/mm3  09/26/21  14:11    


 


Lymphocytes # (Manual)  0.9 K/mm3 (1.2-5.4)  L  09/26/21  14:11    


 


Abs React Lymphs (Man)  0.1 K/mm3  09/26/21  14:11    


 


Monocytes # (Manual)  0.6 K/mm3 (0.0-0.8)   09/26/21  14:11    


 


Eosinophils # (Manual)  0.0 K/mm3 (0.0-0.4)   09/26/21  14:11    


 


Basophils # (Manual)  0.0 K/mm3 (0.0-0.1)   09/26/21  14:11    


 


Metamyelocytes #  0.3 K/mm3  09/26/21  14:11    


 


Myelocytes #  0.0 K/mm3  09/26/21  14:11    


 


Promyelocytes #  0.0 K/mm3  09/26/21  14:11    


 


Blast Cells #  0.0 K/mm3  09/26/21  14:11    


 


WBC Morphology  Not Reportable   09/26/21  14:11    


 


WBC Morphology  TNR   09/26/21  14:11    


 


Hypersegmented Neuts  Not Reportable   09/26/21  14:11    


 


Hyposegmented Neuts  Not Reportable   09/26/21  14:11    


 


Hypogranular Neuts  Not Reportable   09/26/21  14:11    


 


Smudge Cells  Not Reportable   09/26/21  14:11    


 


Toxic Granulation  Not Reportable   09/26/21  14:11    


 


Toxic Vacuolation  Not Reportable   09/26/21  14:11    


 


Dohle Bodies  Not Reportable   09/26/21  14:11    


 


Pelger-Huet Anomaly  Not Reportable   09/26/21  14:11    


 


Ac Rods  Not Reportable   09/26/21  14:11    


 


Platelet Estimate  Consistent w auto   09/26/21  14:11    


 


Clumped Platelets  Not Reportable   09/26/21  14:11    


 


Plt Clumps, EDTA  Not Reportable   09/26/21  14:11    


 


Large Platelets  Not Reportable   09/26/21  14:11    


 


Giant Platelets  Not Reportable   09/26/21  14:11    


 


Platelet Satelliting  Not Reportable   09/26/21  14:11    


 


Plt Morphology Comment  Not Reportable   09/26/21  14:11    


 


RBC Morphology  Not Reportable   09/26/21  14:11    


 


Dimorphic RBCs  Not Reportable   09/26/21  14:11    


 


Polychromasia  Few   09/26/21  14:11    


 


Hypochromasia  1+   09/26/21  14:11    


 


Poikilocytosis  Not Reportable   09/26/21  14:11    


 


Anisocytosis  Not Reportable   09/26/21  14:11    


 


Microcytosis  Not Reportable   09/26/21  14:11    


 


Macrocytosis  Not Reportable   09/26/21  14:11    


 


Spherocytes  Not Reportable   09/26/21  14:11    


 


Pappenheimer Bodies  Not Reportable   09/26/21  14:11    


 


Sickle Cells  Not Reportable   09/26/21  14:11    


 


Target Cells  1+   09/26/21  14:11    


 


Tear Drop Cells  Few   09/26/21  14:11    


 


Ovalocytes  Not Reportable   09/26/21  14:11    


 


Helmet Cells  Not Reportable   09/26/21  14:11    


 


[place] Bodies  Not Reportable   09/26/21  14:11    


 


Cabot Rings  Not Reportable   09/26/21  14:11    


 


[place] Cells  Not Reportable   09/26/21  14:11    


 


Bite Cells  Not Reportable   09/26/21  14:11    


 


Crenated Cell  Not Reportable   09/26/21  14:11    


 


Elliptocytes  Not Reportable   09/26/21  14:11    


 


Acanthocytes (Spur)  Not Reportable   09/26/21  14:11    


 


Rouleaux  Not Reportable   09/26/21  14:11    


 


Hemoglobin C Crystals  Not Reportable   09/26/21  14:11    


 


Schistocytes  Not Reportable   09/26/21  14:11    


 


Malaria parasites  Not Reportable   09/26/21  14:11    


 


Sami Bodies  Not Reportable   09/26/21  14:11    


 


Hem Pathologist Commnt  No   09/26/21  14:11    


 


ABG pH  7.234  (7.320-7.450)  L  10/01/21  09:47    


 


POC ABG pCO2  90.5 mmHg (32.0-48.0)  H  10/01/21  09:47    


 


ABG pCO2  86.9 mm Hg  09/27/21  18:40    


 


POC ABG pO2  139.4 mmHg ()  H  10/01/21  09:47    


 


ABG pO2  81.8 mm Hg (80.0-90.0)   09/27/21  18:40    


 


POC ABG HCO3  37.4   10/01/21  09:47    


 


ABG HCO3  36.2 mmol/L (20.0-26.0)  H  09/27/21  18:40    


 


ABG O2 Saturation  99.2  (0-100)   10/01/21  09:47    


 


ABG O2 Content  10.4  (0.0-44)   09/27/21  18:40    


 


POC ABG Base Excess  7.9   10/01/21  09:47    


 


ABG Base Excess  7.4 mmol/L (-2.0-3.0)  H  09/27/21  18:40    


 


ABG Hemoglobin  9.5  (12.0-17.5)  L  10/01/21  09:47    


 


ABG Oxyhemoglobin  97.2  (94-98)   10/01/21  09:47    


 


ABG Carboxyhemoglobin  1.9 % (0.0-5.0)   09/27/21  18:40    


 


ABG Methemoglobin  0.3  (0.0-1.5)   10/01/21  09:47    


 


ABG Sodium  140.7 mmol/L (136.0-145.0)   10/01/21  09:47    


 


ABG Potassium  4.4 mmol/L (3.40-4.50)   10/01/21  09:47    


 


ABG Chloride  97.0 mmol/L ()  L  10/01/21  09:47    


 


ABG Glucose  179 mg/dL (65-95)  H  10/01/21  09:47    


 


Oxyhemoglobin  93.7 % (95.0-99.0)  L  09/27/21  18:40    


 


Carboxyhemoglobin  1.7  (0.5-1.5)  H  10/01/21  09:47    


 


FiO2  30 %  09/27/21  18:40    


 


FiO2 %  45.0   10/01/21  09:47    


 


Sodium  139 mmol/L (137-145)   10/01/21  08:55    


 


Potassium  4.6 mmol/L (3.6-5.0)   10/01/21  08:55    


 


Chloride  94.4 mmol/L ()  L  10/01/21  08:55    


 


Carbon Dioxide  37 mmol/L (22-30)  H  10/01/21  08:55    


 


Anion Gap  12 mmol/L  10/01/21  08:55    


 


BUN  68 mg/dL (7-17)  H  10/01/21  08:55    


 


Creatinine  1.5 mg/dL (0.6-1.2)  H  10/01/21  08:55    


 


Estimated GFR  48 ml/min  10/01/21  08:55    


 


BUN/Creatinine Ratio  45 %  10/01/21  08:55    


 


Glucose  178 mg/dL ()  H  10/01/21  08:55    


 


POC Glucose  161 mg/dL ()  H  10/01/21  11:39    


 


Osmolality  301 Mosm/kg  09/23/21  23:15    


 


Calcium  9.0 mg/dL (8.4-10.2)   10/01/21  08:55    


 


Ionized Calcium  3.6 mg/dL (4.8-5.6)  L  09/23/21  23:15    


 


Total Bilirubin  1.60 mg/dL (0.1-1.2)  H  10/01/21  08:55    


 


AST  172 units/L (5-40)  H  10/01/21  08:55    


 


ALT  437 units/L (7-56)  H  10/01/21  08:55    


 


Alkaline Phosphatase  92 units/L ()   10/01/21  08:55    


 


Total Creatine Kinase  1712 units/L ()  H  09/24/21  10:04    


 


Troponin T  0.078 ng/mL (0.00-0.029)  H D 09/22/21  13:03    


 


NT-Pro-B Natriuret Pep  7573 pg/mL (0-450)  H  09/21/21  22:04    


 


Total Protein  10.1 g/dL (6.3-8.2)  H  10/01/21  08:55    


 


Albumin  3.2 g/dL (3.9-5)  L  10/01/21  08:55    


 


Albumin/Globulin Ratio  0.5 %  10/01/21  08:55    


 


Triglycerides  105 mg/dL (2-149)   09/21/21  22:04    


 


Cholesterol  85 mg/dL ()   09/21/21  22:04    


 


LDL Cholesterol Direct  47 mg/dL ()  L  09/21/21  22:04    


 


HDL Cholesterol  25 mg/dL (40-59)  L  09/21/21  22:04    


 


Cholesterol/HDL Ratio  3.40 %  09/21/21  22:04    


 


Total Cortisol  41.3 mcg/dL (***)   09/24/21  10:04    


 


Arterial Blood Glucose  179 mg/dL (65-95)  H  10/01/21  09:47    


 


Arterial Blood Ionized Calcium  4.6 mg/dL (4.6-5.3)   09/30/21  07:32    


 


Urine Osmolality  157 Mosm/kg  09/23/21  18:46    


 


Urine Sodium  16 mmol/L  09/23/21  18:46    


 


Coronavirus (PCR)  Negative  (Negative)   09/28/21  Unknown


 


Hepatitis A IgM Ab  Non-reactive  (NonReactive)   09/28/21  18:45    


 


Hep Bs Antigen  Nonreactive  (Negative)   09/28/21  18:45    


 


Hepatitis C Antibody  Non-reactive  (NonReactive)   09/28/21  18:45    











Diamond/IV: 


                                        





Voiding Method                   Indwelling Catheter











Active Medications





- Current Medications


Current Medications: 














Generic Name Dose Route Start Last Admin





  Trade Name Freq  PRN Reason Stop Dose Admin


 


Acetaminophen  650 mg  09/22/21 03:00  10/01/21 06:42





  Acetaminophen 325 Mg Tab  PO   650 mg





  Q4H PRN   Administration





  Pain MILD(1-3)/Fever >100.5/HA  


 


Albuterol  2.5 mg  09/22/21 03:00 





  Albuterol 2.5 Mg/3 Ml Nebu  IH  





  Q4HRT PRN  





  Shortness Of Breath  


 


Albuterol/Ipratropium  1 ampul  09/22/21 08:00  10/01/21 14:34





  Ipratropium/Albuterol Sulfate 3 Ml Ampul.Neb  IH   1 ampul





  TIDRT DELL   Administration


 


Aspirin  325 mg  09/23/21 10:00  10/01/21 09:41





  Aspirin Ec 325 Mg Tab  PO   325 mg





  QDAY DELL   Administration


 


Atorvastatin Calcium  40 mg  09/22/21 22:00  09/30/21 21:40





  Atorvastatin 40 Mg Tab  PO   40 mg





  QHS DELL   Administration


 


Benzonatate  100 mg  09/22/21 06:00  10/01/21 13:09





  Benzonatate 100 Mg Cap  PO   100 mg





  Q8HR DELL   Administration


 


Furosemide  40 mg  09/30/21 18:00  10/01/21 06:42





  Furosemide 40 Mg/4 Ml Inj  IV   40 mg





  0600,1800 DELL   Administration


 


Guaifenesin  200 mg  09/26/21 14:47  10/01/21 06:43





  Guaifenesin 100 Mg/5 Ml Oral Liqd  PO   200 mg





  Q4H PRN   Administration





  Cough  


 


Heparin Sodium (Porcine)  5,000 unit  09/22/21 06:00  10/01/21 13:09





  Heparin 5,000 Unit/1 Ml Vial  SUB-Q   5,000 unit





  Q8HR DELL   Administration


 


Methylprednisolone Sodium Succinate  20 mg  09/30/21 10:00  10/01/21 09:40





  Methylprednisolone Sod Succinate 40 Mg/1 Ml Inj  IV   20 mg





  Q12HR DELL   Administration


 


Nitroglycerin  0.4 mg  09/22/21 03:00 





  Nitroglycerin 0.4 Mg Tab Subl  SL  





  Q5M PRN  





  Chest Pain  


 


Nystatin  1 applic  09/22/21 18:00  10/01/21 09:41





  Nystatin Powder 15 Gm  TP   1 applic





  BID DELL   Administration


 


Ondansetron HCl  4 mg  09/22/21 03:00  09/29/21 23:51





  Ondansetron 4 Mg/2 Ml Inj  IV   4 mg





  Q8H PRN   Administration





  Nausea And Vomiting  


 


Oxycodone/Acetaminophen  1 tab  09/22/21 03:00  09/30/21 21:39





  Oxycodone /Acetaminophen 5-325mg Tab  PO   1 tab





  Q6H PRN   Administration





  Pain, Moderate (4-6)  


 


Pantoprazole Sodium  40 mg  09/30/21 10:00  10/01/21 09:41





  Pantoprazole 40 Mg Inj  IV   40 mg





  QDAY DELL   Administration


 


Sodium Chloride  10 ml  09/22/21 10:00  10/01/21 09:40





  Sodium Chloride 0.9% 10 Ml Flush Syringe  IV   10 ml





  BID DELL   Administration


 


Sodium Chloride  10 ml  09/22/21 03:00 





  Sodium Chloride 0.9% 10 Ml Flush Syringe  IV  





  PRN PRN  





  LINE FLUSH  


 


Tramadol HCl  50 mg  09/22/21 03:00 





  Tramadol 50 Mg Tab  PO  





  Q6H PRN  





  Pain, Moderate (4-6)  














Nutrition/Malnutrition Assess





- Dietary Evaluation


Nutrition/Malnutrition Findings: 


                                        





Nutrition Notes                                            Start:  09/22/21 

14:21


Freq:                                                      Status: Active       




Protocol:                                                                       




 Document     09/27/21 17:08  GB  (Rec: 09/27/21 17:14  GB  [id number])


 Nutrition Notes


     Initial or Follow up                        Reassessment


     Current Diagnosis                           Hypertension


     Other Pertinent Diagnosis                   Dyspnea, respiratory distress,


                                                 morbid obesity


     Current Diet                                Regular


     Labs/Tests                                  9/27: Na 134, BUN 63,


                                                 creatinine 2.2, glucose 159,


                                                 Ca 7.8


     Pertinent Medications                       Lasix, NaCl IV flushes


     Height                                      5 ft 4 in


     Weight                                      287.5 kg


     Ideal Body Weight (kg)                      54.54


     BMI                                         108.8


     Weight change and time frame                expect weight fluctuations r/t


                                                 fluid therapy, CHF


                                                 complications


     Weight Status                               Morbidly Obese


     Subjective/Other Information                Per MD notes 9/27: fluid


                                                 restrictions, on nasal cannula


                                                 3L, possibly obesity


                                                 hypoventilation syndrome,


                                                 recommended to bariatric


                                                 consult


     Percent of energy/protein needs met:        Meals and snacks provided meet


                                                 100% of estimated energy


                                                 needs.  Current po intake of


                                                 meals recorded average 50%.


     Burn                                        Absent


     Trauma                                      Absent


     GI Symptoms                                 None


     Food Allergy                                No


     Current % PO                                Fair (50-74%)


     Minimum of two criteria                     No


     #1


      Nutrition Diagnosis                        Overweight/obesity


      Comments:                                  Discussed diet/life style


                                                 changes using small goal


                                                 successes to advancement,


                                                 encouraged outpatient RD


                                                 counseling at bariatric center


      Etiology                                   dyspnea, respiratory distress


      As Evidenced by Signs and Symptoms         , %


      Diagnosis Progress(for reassessment        Continues


       documentation)                            


     Is patient on ventilator?                   No


     Is Patient Ambulatory and/or Out of Bed     No


     REE-([facility]-confined to bed)      4277.724


     Kcal/Kg value to use for calculation        10


     Approximate Energy Requirements Using       2875


      kcal/Kg                                    


     Calculation Used for Recommendations        Kcal/kg


     Additional Notes                            Protein 0.6 g/kg r/t BMI over


                                                 50: 163g


                                                 Fluids: 1ml/kcal or per MD


 Nutrition Intervention


     Change Diet Order:                          continue with current diet


     Goal #1                                     Pt to contact and make


                                                 appointment with outpatient RD


                                                 in bariatric center


     Goal #2                                     weight to decrease -1% during


                                                 LOS


     Follow-Up By:                               10/04/21


     Additional Comments                         f/u for weight loss, po intake

## 2021-10-01 NOTE — PROGRESS NOTE
Assessment and Plan








Acute hypoxemic and hypercapnic respiratory failure


Acute exacerbation of CHF (congestive heart failure)


Hypertension


Morbid obesity with BMI of 70 and over, adult


Obesity hypoventilation syndrome


Sleep apnea





- avoid opiates / sedating drugs


- quick VTE w/up with dopplers and d-dimers


- continue care as below otherwise;





- continue NIV RTC while attempting to transition to qhs only


- continue diuresis while following electrolytes


- continue to wean supplemental oxygen for target O2 sat's > 90% acutely


- aspiration precautions


- continue bronchodilators with pulmonary hygiene per RT


- continue accuchecks with glycemic control per SSI (While critically ill target

blood glucose of 140-180 mg/dL; avoid hypoglycemia)


- avoid nephrotoxins, renally dose all medications


- AB's per ID rec's 


- prn analgesia per pain score


- Maintenance of sleep-wake cycle, avoid delirium


- G.I. & VTE prophylaxis


- PT/OT/ROM exercises


- continue mobility protocols for pressure ulcer prophylaxis


- Monitor hemodynamics closely


- continue other care per attending / other consultants


- discharge planning ongoing concurrently





COVID SPECIFIC INTERVENTIONS


- COVID-19 assay negative





.... Re-evaluate in am & prn





CONDITION: CRITICAL


PROGNOSIS: GUARDED


CODE STATUS: FULL CODE





The high probability of a clinically significant, sudden or life-threatening 

deterioration of the [respiratory, cardiovascular, renal & neurologic] system(s)

required my full and direct attention, intervention and personal management. The

aggregate critical care time was [33] minutes without overlap. Time includes 

spent on;





[x] Data Review and interpretation 





[x] Patient assessment and monitoring of vital signs 





[x] Documentation 





[x] Medication orders and management

















Subjective


Date of service: 10/01/21


Principal diagnosis: Ac hypoxemic and hypercapnic resp failure; AE-CHF; Morbid 

obesity; FABIAN/OHS


Interval history: 





Patient is seen today for: Acute hypoxemic and hypercapnic respiratory failure; 

Acute exacerbation of CHF; Hypertension; Morbid obesity; FABIAN / OHS





Seen and examined at bedside; 24hour events reviewed; nursing and respiratory 

care staff consulted; no adverse overnight events reported to me; resting in 

bed; remains BIPAP dependent





Objective


                               Vital Signs - 12hr











  10/01/21 10/01/21 10/01/21





  00:30 01:00 01:30


 


Temperature   


 


Pulse Rate 94 H 94 H 93 H


 


Pulse Rate [   





Anterior   





Bilateral   





Throughout]   


 


Pulse Rate [   





From Monitor]   


 


Respiratory 19 21 20





Rate   


 


Respiratory   





Rate [Anterior   





Bilateral   





Throughout]   


 


Respiratory   





Rate [Left   





Abdomen]   


 


Blood Pressure 126/74 131/75 123/70


 


O2 Sat by Pulse 96 97 96





Oximetry   














  10/01/21 10/01/21 10/01/21





  02:00 02:30 03:00


 


Temperature   


 


Pulse Rate 93 H 92 H 93 H


 


Pulse Rate [   





Anterior   





Bilateral   





Throughout]   


 


Pulse Rate [   





From Monitor]   


 


Respiratory 23 15 24





Rate   


 


Respiratory   





Rate [Anterior   





Bilateral   





Throughout]   


 


Respiratory   





Rate [Left   





Abdomen]   


 


Blood Pressure 122/67 124/73 134/77


 


O2 Sat by Pulse 97 97 96





Oximetry   














  10/01/21 10/01/21 10/01/21





  03:30 04:00 04:30


 


Temperature   


 


Pulse Rate 92 H 90 88


 


Pulse Rate [   





Anterior   





Bilateral   





Throughout]   


 


Pulse Rate [  92 H 





From Monitor]   


 


Respiratory 24 20 13





Rate   


 


Respiratory   





Rate [Anterior   





Bilateral   





Throughout]   


 


Respiratory   





Rate [Left   





Abdomen]   


 


Blood Pressure 127/73 127/70 125/68


 


O2 Sat by Pulse  100 98





Oximetry   














  10/01/21 10/01/21 10/01/21





  05:00 05:30 05:53


 


Temperature   97.6 F


 


Pulse Rate 90 92 H 


 


Pulse Rate [   





Anterior   





Bilateral   





Throughout]   


 


Pulse Rate [   





From Monitor]   


 


Respiratory 16 20 





Rate   


 


Respiratory   





Rate [Anterior   





Bilateral   





Throughout]   


 


Respiratory   





Rate [Left   





Abdomen]   


 


Blood Pressure 145/94 171/121 


 


O2 Sat by Pulse  100 





Oximetry   














  10/01/21 10/01/21 10/01/21





  06:00 06:30 06:50


 


Temperature   


 


Pulse Rate 94 H 91 H 


 


Pulse Rate [   





Anterior   





Bilateral   





Throughout]   


 


Pulse Rate [   





From Monitor]   


 


Respiratory 15 14 





Rate   


 


Respiratory   





Rate [Anterior   





Bilateral   





Throughout]   


 


Respiratory   16





Rate [Left   





Abdomen]   


 


Blood Pressure 158/95 164/95 


 


O2 Sat by Pulse 90 100 





Oximetry   














  10/01/21 10/01/21 10/01/21





  07:00 07:30 08:00


 


Temperature   97.8 F


 


Pulse Rate 100 H 89 90


 


Pulse Rate [   





Anterior   





Bilateral   





Throughout]   


 


Pulse Rate [   87





From Monitor]   


 


Respiratory 24 21 19





Rate   


 


Respiratory   





Rate [Anterior   





Bilateral   





Throughout]   


 


Respiratory   





Rate [Left   





Abdomen]   


 


Blood Pressure 138/81 141/86 142/95


 


O2 Sat by Pulse 100 99 100





Oximetry   














  10/01/21 10/01/21 10/01/21





  08:19 08:30 08:55


 


Temperature   


 


Pulse Rate 100 H  


 


Pulse Rate [   87





Anterior   





Bilateral   





Throughout]   


 


Pulse Rate [   





From Monitor]   


 


Respiratory 26 H 36 H 





Rate   


 


Respiratory   26 H





Rate [Anterior   





Bilateral   





Throughout]   


 


Respiratory   





Rate [Left   





Abdomen]   


 


Blood Pressure 142/95 138/85 


 


O2 Sat by Pulse 100 100 





Oximetry   














  10/01/21 10/01/21 10/01/21





  09:00 09:30 10:00


 


Temperature   


 


Pulse Rate 87 89 88


 


Pulse Rate [   





Anterior   





Bilateral   





Throughout]   


 


Pulse Rate [   





From Monitor]   


 


Respiratory 18 32 H 17





Rate   


 


Respiratory   





Rate [Anterior   





Bilateral   





Throughout]   


 


Respiratory   





Rate [Left   





Abdomen]   


 


Blood Pressure 138/85 130/82 125/78


 


O2 Sat by Pulse 100 98 98





Oximetry   














  10/01/21 10/01/21





  10:30 11:00


 


Temperature  


 


Pulse Rate 88 88


 


Pulse Rate [  





Anterior  





Bilateral  





Throughout]  


 


Pulse Rate [  





From Monitor]  


 


Respiratory 24 20





Rate  


 


Respiratory  





Rate [Anterior  





Bilateral  





Throughout]  


 


Respiratory  





Rate [Left  





Abdomen]  


 


Blood Pressure 114/67 132/87


 


O2 Sat by Pulse 94 





Oximetry  











Constitutional: asleep, appears uncomfortable, other (young extremely obese 

female with mildly increased respiratory effort at rest on NIV)


Eyes: non-icteric


ENT: oropharynx moist, other (BIPAP FFM)


Neck: supple, no lymphadenopathy, no JVD


Effort: mildly labored


Ascultation: Bilateral: diminished breath sounds, rhonchi


Percussion: Bilateral: not dull


Cardiovascular: regular rate and rhythm


Gastrointestinal: normoactive bowel sounds, soft, non-tender, non-distended 

(protuberant)


Integumentary: rash (stasis dermatitis type), other (Stasis dermatititis on legs

and abdomen; see [facility] notes for full details)


Extremities: pulses normal, no ischemia or petechiae, edema (2+), other (stasis 

dermatitis)


Neurologic: non-focal exam (grossly), pupils equal and round, CN II-XII normal


Psychiatric: depressed


CBC and BMP: 


                                 10/03/21 04:40





                                 10/03/21 04:40


ABG, PT/INR, D-dimer: 


ABG











ABG pH  7.234  (7.320-7.450)  L  10/01/21  09:47    


 


POC ABG pCO2  90.5 mmHg (32.0-48.0)  H  10/01/21  09:47    


 


ABG pCO2  86.9 mm Hg  09/27/21  18:40    


 


POC ABG pO2  139.4 mmHg ()  H  10/01/21  09:47    


 


ABG pO2  81.8 mm Hg (80.0-90.0)   09/27/21  18:40    


 


POC ABG HCO3  37.4   10/01/21  09:47    


 


ABG O2 Saturation  99.2  (0-100)   10/01/21  09:47    








Abnormal lab findings: 


                                  Abnormal Labs











  09/21/21 09/21/21 09/22/21





  22:04 22:04 00:18


 


WBC  17.7 H  


 


Hgb  9.1 L  


 


Hct   


 


MCV  77 L  


 


MCH  21 L  


 


MCHC  27 L  


 


RDW  24.3 H  


 


Seg Neuts % (Manual)   


 


Lymphocytes % (Manual)   


 


Nucleated RBC %   


 


Seg Neutrophils # Man   


 


Lymphocytes # (Manual)   


 


Monocytes # (Manual)   


 


ABG pH   


 


POC ABG pCO2   


 


POC ABG pO2   


 


ABG pO2   


 


ABG HCO3   


 


ABG Base Excess   


 


ABG Hemoglobin   


 


ABG Oxyhemoglobin   


 


ABG Sodium   


 


ABG Chloride   


 


ABG Glucose   


 


Oxyhemoglobin   


 


Carboxyhemoglobin   


 


Sodium   135 L 


 


Potassium   


 


Chloride   91.0 L 


 


Carbon Dioxide   17 L 


 


BUN   


 


Creatinine   1.4 H 


 


Glucose   55 L 


 


POC Glucose    49 L


 


Calcium   


 


Ionized Calcium   


 


Total Bilirubin   


 


AST   


 


ALT   


 


Total Creatine Kinase   


 


Troponin T   0.043 H 


 


NT-Pro-B Natriuret Pep   7573 H 


 


Total Protein   


 


Albumin   


 


LDL Cholesterol Direct   47 L 


 


HDL Cholesterol   25 L 


 


Arterial Blood Glucose   














  09/22/21 09/22/21 09/22/21





  04:03 04:03 09:22


 


WBC  24.3 H  


 


Hgb  9.3 L  


 


Hct   


 


MCV  74 L  


 


MCH  21 L  


 


MCHC  29 L  


 


RDW  23.1 H  


 


Seg Neuts % (Manual)  78.0 H  


 


Lymphocytes % (Manual)  7.0 L  


 


Nucleated RBC %  1.0 H  


 


Seg Neutrophils # Man  19.0 H  


 


Lymphocytes # (Manual)   


 


Monocytes # (Manual)  1.0 H  


 


ABG pH   


 


POC ABG pCO2   


 


POC ABG pO2   


 


ABG pO2   


 


ABG HCO3   


 


ABG Base Excess   


 


ABG Hemoglobin   


 


ABG Oxyhemoglobin   


 


ABG Sodium   


 


ABG Chloride   


 


ABG Glucose   


 


Oxyhemoglobin   


 


Carboxyhemoglobin   


 


Sodium   134 L 


 


Potassium   


 


Chloride   91.2 L 


 


Carbon Dioxide   


 


BUN   


 


Creatinine   1.8 H 


 


Glucose   


 


POC Glucose   


 


Calcium   


 


Ionized Calcium   


 


Total Bilirubin   


 


AST   


 


ALT   


 


Total Creatine Kinase   


 


Troponin T    0.099 H


 


NT-Pro-B Natriuret Pep   


 


Total Protein   


 


Albumin   


 


LDL Cholesterol Direct   


 


HDL Cholesterol   


 


Arterial Blood Glucose   














  09/22/21 09/22/21 09/23/21





  13:03 20:28 04:52


 


WBC    20.9 H


 


Hgb    8.5 L


 


Hct    30.0 L


 


MCV    73 L


 


MCH    21 L


 


MCHC    28 L


 


RDW    23.9 H


 


Seg Neuts % (Manual)    77.0 H


 


Lymphocytes % (Manual)    1.0 L


 


Nucleated RBC %    1.0 H


 


Seg Neutrophils # Man    16.1 H


 


Lymphocytes # (Manual)    0.2 L


 


Monocytes # (Manual)   


 


ABG pH   


 


POC ABG pCO2   


 


POC ABG pO2   


 


ABG pO2   


 


ABG HCO3   


 


ABG Base Excess   


 


ABG Hemoglobin   


 


ABG Oxyhemoglobin   


 


ABG Sodium   


 


ABG Chloride   


 


ABG Glucose   


 


Oxyhemoglobin   


 


Carboxyhemoglobin   


 


Sodium   


 


Potassium   


 


Chloride   


 


Carbon Dioxide   


 


BUN   


 


Creatinine   


 


Glucose   


 


POC Glucose   115 H 


 


Calcium   


 


Ionized Calcium   


 


Total Bilirubin   


 


AST   


 


ALT   


 


Total Creatine Kinase   


 


Troponin T  0.078 H D  


 


NT-Pro-B Natriuret Pep   


 


Total Protein   


 


Albumin   


 


LDL Cholesterol Direct   


 


HDL Cholesterol   


 


Arterial Blood Glucose   














  09/23/21 09/23/21 09/23/21





  04:52 08:46 11:50


 


WBC   


 


Hgb   


 


Hct   


 


MCV   


 


MCH   


 


MCHC   


 


RDW   


 


Seg Neuts % (Manual)   


 


Lymphocytes % (Manual)   


 


Nucleated RBC %   


 


Seg Neutrophils # Man   


 


Lymphocytes # (Manual)   


 


Monocytes # (Manual)   


 


ABG pH   


 


POC ABG pCO2   


 


POC ABG pO2   


 


ABG pO2   


 


ABG HCO3   


 


ABG Base Excess   


 


ABG Hemoglobin   


 


ABG Oxyhemoglobin   


 


ABG Sodium   


 


ABG Chloride   


 


ABG Glucose   


 


Oxyhemoglobin   


 


Carboxyhemoglobin   


 


Sodium  129 L  


 


Potassium  5.6 H  


 


Chloride  88.6 L  


 


Carbon Dioxide   


 


BUN  27 H  


 


Creatinine  2.4 H  


 


Glucose  121 H  


 


POC Glucose   139 H  156 H


 


Calcium  7.7 L  


 


Ionized Calcium   


 


Total Bilirubin   


 


AST   


 


ALT   


 


Total Creatine Kinase   


 


Troponin T   


 


NT-Pro-B Natriuret Pep   


 


Total Protein   


 


Albumin   


 


LDL Cholesterol Direct   


 


HDL Cholesterol   


 


Arterial Blood Glucose   














  09/23/21 09/23/21 09/23/21





  15:46 16:58 22:08


 


WBC   


 


Hgb   


 


Hct   


 


MCV   


 


MCH   


 


MCHC   


 


RDW   


 


Seg Neuts % (Manual)   


 


Lymphocytes % (Manual)   


 


Nucleated RBC %   


 


Seg Neutrophils # Man   


 


Lymphocytes # (Manual)   


 


Monocytes # (Manual)   


 


ABG pH   


 


POC ABG pCO2   


 


POC ABG pO2   


 


ABG pO2   


 


ABG HCO3   


 


ABG Base Excess   


 


ABG Hemoglobin   


 


ABG Oxyhemoglobin   


 


ABG Sodium   


 


ABG Chloride   


 


ABG Glucose   


 


Oxyhemoglobin   


 


Carboxyhemoglobin   


 


Sodium  128 L  


 


Potassium  5.5 H  


 


Chloride  88.1 L  


 


Carbon Dioxide   


 


BUN  33 H  


 


Creatinine  2.7 H  


 


Glucose  172 H  


 


POC Glucose   187 H  186 H


 


Calcium  7.3 L  


 


Ionized Calcium   


 


Total Bilirubin   


 


AST   


 


ALT   


 


Total Creatine Kinase   


 


Troponin T   


 


NT-Pro-B Natriuret Pep   


 


Total Protein   


 


Albumin   


 


LDL Cholesterol Direct   


 


HDL Cholesterol   


 


Arterial Blood Glucose   














  09/23/21 09/24/21 09/24/21





  23:15 02:20 07:39


 


WBC   


 


Hgb   


 


Hct   


 


MCV   


 


MCH   


 


MCHC   


 


RDW   


 


Seg Neuts % (Manual)   


 


Lymphocytes % (Manual)   


 


Nucleated RBC %   


 


Seg Neutrophils # Man   


 


Lymphocytes # (Manual)   


 


Monocytes # (Manual)   


 


ABG pH   


 


POC ABG pCO2   


 


POC ABG pO2   


 


ABG pO2   


 


ABG HCO3   


 


ABG Base Excess   


 


ABG Hemoglobin   


 


ABG Oxyhemoglobin   


 


ABG Sodium   


 


ABG Chloride   


 


ABG Glucose   


 


Oxyhemoglobin   


 


Carboxyhemoglobin   


 


Sodium   128 L 


 


Potassium   5.2 H 


 


Chloride   88.6 L 


 


Carbon Dioxide   


 


BUN   39 H 


 


Creatinine   3.1 H 


 


Glucose   171 H 


 


POC Glucose    168 H


 


Calcium   7.2 L 


 


Ionized Calcium  3.6 L  


 


Total Bilirubin   


 


AST   


 


ALT   


 


Total Creatine Kinase   


 


Troponin T   


 


NT-Pro-B Natriuret Pep   


 


Total Protein   


 


Albumin   


 


LDL Cholesterol Direct   


 


HDL Cholesterol   


 


Arterial Blood Glucose   














  09/24/21 09/24/21 09/24/21





  10:04 11:37 17:18


 


WBC   


 


Hgb   


 


Hct   


 


MCV   


 


MCH   


 


MCHC   


 


RDW   


 


Seg Neuts % (Manual)   


 


Lymphocytes % (Manual)   


 


Nucleated RBC %   


 


Seg Neutrophils # Man   


 


Lymphocytes # (Manual)   


 


Monocytes # (Manual)   


 


ABG pH   


 


POC ABG pCO2   


 


POC ABG pO2   


 


ABG pO2   


 


ABG HCO3   


 


ABG Base Excess   


 


ABG Hemoglobin   


 


ABG Oxyhemoglobin   


 


ABG Sodium   


 


ABG Chloride   


 


ABG Glucose   


 


Oxyhemoglobin   


 


Carboxyhemoglobin   


 


Sodium   


 


Potassium   


 


Chloride   


 


Carbon Dioxide   


 


BUN   


 


Creatinine   


 


Glucose   


 


POC Glucose   170 H  152 H


 


Calcium   


 


Ionized Calcium   


 


Total Bilirubin   


 


AST   


 


ALT   


 


Total Creatine Kinase  1712 H  


 


Troponin T   


 


NT-Pro-B Natriuret Pep   


 


Total Protein   


 


Albumin   


 


LDL Cholesterol Direct   


 


HDL Cholesterol   


 


Arterial Blood Glucose   














  09/24/21 09/24/21 09/25/21





  19:38 22:02 05:48


 


WBC   


 


Hgb   


 


Hct   


 


MCV   


 


MCH   


 


MCHC   


 


RDW   


 


Seg Neuts % (Manual)   


 


Lymphocytes % (Manual)   


 


Nucleated RBC %   


 


Seg Neutrophils # Man   


 


Lymphocytes # (Manual)   


 


Monocytes # (Manual)   


 


ABG pH   


 


POC ABG pCO2   


 


POC ABG pO2   


 


ABG pO2   


 


ABG HCO3   


 


ABG Base Excess   


 


ABG Hemoglobin   


 


ABG Oxyhemoglobin   


 


ABG Sodium   


 


ABG Chloride   


 


ABG Glucose   


 


Oxyhemoglobin   


 


Carboxyhemoglobin   


 


Sodium  128 L   124 L


 


Potassium    5.8 H D


 


Chloride  89.6 L   89.4 L


 


Carbon Dioxide   


 


BUN  47 H   53 H


 


Creatinine  3.0 H   2.8 H


 


Glucose  165 H   150 H


 


POC Glucose   147 H 


 


Calcium  6.9 L   7.2 L


 


Ionized Calcium   


 


Total Bilirubin   


 


AST   


 


ALT   


 


Total Creatine Kinase   


 


Troponin T   


 


NT-Pro-B Natriuret Pep   


 


Total Protein   


 


Albumin   


 


LDL Cholesterol Direct   


 


HDL Cholesterol   


 


Arterial Blood Glucose   














  09/25/21 09/25/21 09/25/21





  08:48 11:35 19:12


 


WBC   


 


Hgb   


 


Hct   


 


MCV   


 


MCH   


 


MCHC   


 


RDW   


 


Seg Neuts % (Manual)   


 


Lymphocytes % (Manual)   


 


Nucleated RBC %   


 


Seg Neutrophils # Man   


 


Lymphocytes # (Manual)   


 


Monocytes # (Manual)   


 


ABG pH   


 


POC ABG pCO2   


 


POC ABG pO2   


 


ABG pO2   


 


ABG HCO3   


 


ABG Base Excess   


 


ABG Hemoglobin   


 


ABG Oxyhemoglobin   


 


ABG Sodium   


 


ABG Chloride   


 


ABG Glucose   


 


Oxyhemoglobin   


 


Carboxyhemoglobin   


 


Sodium    128 L


 


Potassium   


 


Chloride    89.7 L


 


Carbon Dioxide   


 


BUN    53 H


 


Creatinine    2.4 H


 


Glucose    159 H


 


POC Glucose  152 H  152 H 


 


Calcium    7.1 L


 


Ionized Calcium   


 


Total Bilirubin   


 


AST   


 


ALT   


 


Total Creatine Kinase   


 


Troponin T   


 


NT-Pro-B Natriuret Pep   


 


Total Protein   


 


Albumin   


 


LDL Cholesterol Direct   


 


HDL Cholesterol   


 


Arterial Blood Glucose   














  09/25/21 09/26/21 09/26/21





  22:03 05:03 07:41


 


WBC   


 


Hgb   


 


Hct   


 


MCV   


 


MCH   


 


MCHC   


 


RDW   


 


Seg Neuts % (Manual)   


 


Lymphocytes % (Manual)   


 


Nucleated RBC %   


 


Seg Neutrophils # Man   


 


Lymphocytes # (Manual)   


 


Monocytes # (Manual)   


 


ABG pH   


 


POC ABG pCO2   


 


POC ABG pO2   


 


ABG pO2   


 


ABG HCO3   


 


ABG Base Excess   


 


ABG Hemoglobin   


 


ABG Oxyhemoglobin   


 


ABG Sodium   


 


ABG Chloride   


 


ABG Glucose   


 


Oxyhemoglobin   


 


Carboxyhemoglobin   


 


Sodium   134 L 


 


Potassium   


 


Chloride   92.9 L 


 


Carbon Dioxide   33 H D 


 


BUN   54 H 


 


Creatinine   2.4 H 


 


Glucose   150 H 


 


POC Glucose  152 H   144 H


 


Calcium   7.2 L 


 


Ionized Calcium   


 


Total Bilirubin   


 


AST   


 


ALT   


 


Total Creatine Kinase   


 


Troponin T   


 


NT-Pro-B Natriuret Pep   


 


Total Protein   


 


Albumin   


 


LDL Cholesterol Direct   


 


HDL Cholesterol   


 


Arterial Blood Glucose   














  09/26/21 09/26/21 09/26/21





  11:38 14:11 17:02


 


WBC   14.7 H 


 


Hgb   8.7 L 


 


Hct   29.8 L 


 


MCV   74 L 


 


MCH   22 L 


 


MCHC   29 L 


 


RDW   24.9 H 


 


Seg Neuts % (Manual)   86.0 H 


 


Lymphocytes % (Manual)   6.0 L 


 


Nucleated RBC %   


 


Seg Neutrophils # Man   12.6 H 


 


Lymphocytes # (Manual)   0.9 L 


 


Monocytes # (Manual)   


 


ABG pH   


 


POC ABG pCO2   


 


POC ABG pO2   


 


ABG pO2   


 


ABG HCO3   


 


ABG Base Excess   


 


ABG Hemoglobin   


 


ABG Oxyhemoglobin   


 


ABG Sodium   


 


ABG Chloride   


 


ABG Glucose   


 


Oxyhemoglobin   


 


Carboxyhemoglobin   


 


Sodium   


 


Potassium   


 


Chloride   


 


Carbon Dioxide   


 


BUN   


 


Creatinine   


 


Glucose   


 


POC Glucose  151 H   154 H


 


Calcium   


 


Ionized Calcium   


 


Total Bilirubin   


 


AST   


 


ALT   


 


Total Creatine Kinase   


 


Troponin T   


 


NT-Pro-B Natriuret Pep   


 


Total Protein   


 


Albumin   


 


LDL Cholesterol Direct   


 


HDL Cholesterol   


 


Arterial Blood Glucose   














  09/26/21 09/27/21 09/27/21





  23:14 05:03 10:00


 


WBC   


 


Hgb   


 


Hct   


 


MCV   


 


MCH   


 


MCHC   


 


RDW   


 


Seg Neuts % (Manual)   


 


Lymphocytes % (Manual)   


 


Nucleated RBC %   


 


Seg Neutrophils # Man   


 


Lymphocytes # (Manual)   


 


Monocytes # (Manual)   


 


ABG pH    7.150 L*


 


POC ABG pCO2   


 


POC ABG pO2   


 


ABG pO2    91.8 H


 


ABG HCO3    37.2 H


 


ABG Base Excess    7.1 H


 


ABG Hemoglobin    7.1 L


 


ABG Oxyhemoglobin   


 


ABG Sodium   


 


ABG Chloride   


 


ABG Glucose   


 


Oxyhemoglobin    93.4 L


 


Carboxyhemoglobin   


 


Sodium   134 L 


 


Potassium   


 


Chloride   91.3 L 


 


Carbon Dioxide   33 H 


 


BUN   63 H 


 


Creatinine   2.2 H 


 


Glucose   159 H 


 


POC Glucose  151 H  


 


Calcium   7.8 L 


 


Ionized Calcium   


 


Total Bilirubin   


 


AST   


 


ALT   


 


Total Creatine Kinase   


 


Troponin T   


 


NT-Pro-B Natriuret Pep   


 


Total Protein   


 


Albumin   


 


LDL Cholesterol Direct   


 


HDL Cholesterol   


 


Arterial Blood Glucose   














  09/27/21 09/27/21 09/27/21





  12:39 16:37 18:40


 


WBC   


 


Hgb   


 


Hct   


 


MCV   


 


MCH   


 


MCHC   


 


RDW   


 


Seg Neuts % (Manual)   


 


Lymphocytes % (Manual)   


 


Nucleated RBC %   


 


Seg Neutrophils # Man   


 


Lymphocytes # (Manual)   


 


Monocytes # (Manual)   


 


ABG pH    7.237 L


 


POC ABG pCO2   


 


POC ABG pO2   


 


ABG pO2   


 


ABG HCO3    36.2 H


 


ABG Base Excess    7.4 H


 


ABG Hemoglobin    7.8 L


 


ABG Oxyhemoglobin   


 


ABG Sodium   


 


ABG Chloride   


 


ABG Glucose   


 


Oxyhemoglobin    93.7 L


 


Carboxyhemoglobin   


 


Sodium   


 


Potassium   


 


Chloride   


 


Carbon Dioxide   


 


BUN   


 


Creatinine   


 


Glucose   


 


POC Glucose  140 H  132 H 


 


Calcium   


 


Ionized Calcium   


 


Total Bilirubin   


 


AST   


 


ALT   


 


Total Creatine Kinase   


 


Troponin T   


 


NT-Pro-B Natriuret Pep   


 


Total Protein   


 


Albumin   


 


LDL Cholesterol Direct   


 


HDL Cholesterol   


 


Arterial Blood Glucose   














  09/27/21 09/28/21 09/28/21





  23:55 04:27 04:27


 


WBC   


 


Hgb   8.5 L 


 


Hct   29.1 L 


 


MCV   72 L 


 


MCH   21 L 


 


MCHC   29 L 


 


RDW   24.9 H 


 


Seg Neuts % (Manual)   


 


Lymphocytes % (Manual)   


 


Nucleated RBC %   


 


Seg Neutrophils # Man   


 


Lymphocytes # (Manual)   


 


Monocytes # (Manual)   


 


ABG pH   


 


POC ABG pCO2   


 


POC ABG pO2   


 


ABG pO2   


 


ABG HCO3   


 


ABG Base Excess   


 


ABG Hemoglobin   


 


ABG Oxyhemoglobin   


 


ABG Sodium   


 


ABG Chloride   


 


ABG Glucose   


 


Oxyhemoglobin   


 


Carboxyhemoglobin   


 


Sodium    135 L


 


Potassium   


 


Chloride    93.5 L


 


Carbon Dioxide    33 H


 


BUN    68 H


 


Creatinine    1.9 H


 


Glucose    143 H


 


POC Glucose  135 H  


 


Calcium    8.1 L


 


Ionized Calcium   


 


Total Bilirubin    1.50 H


 


AST    494 H


 


ALT    835 H


 


Total Creatine Kinase   


 


Troponin T   


 


NT-Pro-B Natriuret Pep   


 


Total Protein    9.5 H


 


Albumin    3.1 L


 


LDL Cholesterol Direct   


 


HDL Cholesterol   


 


Arterial Blood Glucose   














  09/28/21 09/28/21 09/29/21





  12:03 17:24 00:19


 


WBC   


 


Hgb   


 


Hct   


 


MCV   


 


MCH   


 


MCHC   


 


RDW   


 


Seg Neuts % (Manual)   


 


Lymphocytes % (Manual)   


 


Nucleated RBC %   


 


Seg Neutrophils # Man   


 


Lymphocytes # (Manual)   


 


Monocytes # (Manual)   


 


ABG pH  7.291 L  


 


POC ABG pCO2  75.5 H  


 


POC ABG pO2  76.2 L  


 


ABG pO2   


 


ABG HCO3   


 


ABG Base Excess   


 


ABG Hemoglobin  9.9 L  


 


ABG Oxyhemoglobin  91.0 L  


 


ABG Sodium  134.9 L  


 


ABG Chloride  93.0 L  


 


ABG Glucose  146 H  


 


Oxyhemoglobin   


 


Carboxyhemoglobin  2.2 H  


 


Sodium   


 


Potassium   


 


Chloride   


 


Carbon Dioxide   


 


BUN   


 


Creatinine   


 


Glucose   


 


POC Glucose   134 H  136 H


 


Calcium   


 


Ionized Calcium   


 


Total Bilirubin   


 


AST   


 


ALT   


 


Total Creatine Kinase   


 


Troponin T   


 


NT-Pro-B Natriuret Pep   


 


Total Protein   


 


Albumin   


 


LDL Cholesterol Direct   


 


HDL Cholesterol   


 


Arterial Blood Glucose  146 H  














  09/29/21 09/29/21 09/29/21





  04:55 04:55 05:29


 


WBC   


 


Hgb  8.0 L  


 


Hct  27.1 L  


 


MCV  70 L  


 


MCH  21 L  


 


MCHC   


 


RDW  24.3 H  


 


Seg Neuts % (Manual)   


 


Lymphocytes % (Manual)   


 


Nucleated RBC %   


 


Seg Neutrophils # Man   


 


Lymphocytes # (Manual)   


 


Monocytes # (Manual)   


 


ABG pH   


 


POC ABG pCO2   


 


POC ABG pO2   


 


ABG pO2   


 


ABG HCO3   


 


ABG Base Excess   


 


ABG Hemoglobin   


 


ABG Oxyhemoglobin   


 


ABG Sodium   


 


ABG Chloride   


 


ABG Glucose   


 


Oxyhemoglobin   


 


Carboxyhemoglobin   


 


Sodium   


 


Potassium   


 


Chloride   95.1 L 


 


Carbon Dioxide   36 H 


 


BUN   63 H 


 


Creatinine   1.5 H 


 


Glucose   131 H 


 


POC Glucose    118 H


 


Calcium   8.3 L 


 


Ionized Calcium   


 


Total Bilirubin   1.80 H 


 


AST   323 H 


 


ALT   609 H 


 


Total Creatine Kinase   


 


Troponin T   


 


NT-Pro-B Natriuret Pep   


 


Total Protein   9.3 H 


 


Albumin   2.9 L 


 


LDL Cholesterol Direct   


 


HDL Cholesterol   


 


Arterial Blood Glucose   














  09/29/21 09/29/21 09/29/21





  11:40 18:30 22:44


 


WBC   


 


Hgb   


 


Hct   


 


MCV   


 


MCH   


 


MCHC   


 


RDW   


 


Seg Neuts % (Manual)   


 


Lymphocytes % (Manual)   


 


Nucleated RBC %   


 


Seg Neutrophils # Man   


 


Lymphocytes # (Manual)   


 


Monocytes # (Manual)   


 


ABG pH   


 


POC ABG pCO2   


 


POC ABG pO2   


 


ABG pO2   


 


ABG HCO3   


 


ABG Base Excess   


 


ABG Hemoglobin   


 


ABG Oxyhemoglobin   


 


ABG Sodium   


 


ABG Chloride   


 


ABG Glucose   


 


Oxyhemoglobin   


 


Carboxyhemoglobin   


 


Sodium   


 


Potassium   


 


Chloride   


 


Carbon Dioxide   


 


BUN   


 


Creatinine   


 


Glucose   


 


POC Glucose  139 H  130 H  123 H


 


Calcium   


 


Ionized Calcium   


 


Total Bilirubin   


 


AST   


 


ALT   


 


Total Creatine Kinase   


 


Troponin T   


 


NT-Pro-B Natriuret Pep   


 


Total Protein   


 


Albumin   


 


LDL Cholesterol Direct   


 


HDL Cholesterol   


 


Arterial Blood Glucose   














  09/30/21 09/30/21 09/30/21





  04:53 04:53 07:16


 


WBC  12.5 H  


 


Hgb  8.8 L  


 


Hct  30.2 L  


 


MCV  74 L  


 


MCH  21 L  


 


MCHC  29 L  


 


RDW  24.5 H  


 


Seg Neuts % (Manual)   


 


Lymphocytes % (Manual)   


 


Nucleated RBC %   


 


Seg Neutrophils # Man   


 


Lymphocytes # (Manual)   


 


Monocytes # (Manual)   


 


ABG pH   


 


POC ABG pCO2   


 


POC ABG pO2   


 


ABG pO2   


 


ABG HCO3   


 


ABG Base Excess   


 


ABG Hemoglobin   


 


ABG Oxyhemoglobin   


 


ABG Sodium   


 


ABG Chloride   


 


ABG Glucose   


 


Oxyhemoglobin   


 


Carboxyhemoglobin   


 


Sodium   


 


Potassium   


 


Chloride   96.9 L 


 


Carbon Dioxide   35 H 


 


BUN   65 H 


 


Creatinine   1.5 H 


 


Glucose   142 H 


 


POC Glucose    138 H


 


Calcium   


 


Ionized Calcium   


 


Total Bilirubin   1.50 H 


 


AST   251 H 


 


ALT   572 H 


 


Total Creatine Kinase   


 


Troponin T   


 


NT-Pro-B Natriuret Pep   


 


Total Protein   10.1 H 


 


Albumin   3.2 L 


 


LDL Cholesterol Direct   


 


HDL Cholesterol   


 


Arterial Blood Glucose   














  09/30/21 09/30/21 09/30/21





  07:32 11:29 14:42


 


WBC   


 


Hgb   


 


Hct   


 


MCV   


 


MCH   


 


MCHC   


 


RDW   


 


Seg Neuts % (Manual)   


 


Lymphocytes % (Manual)   


 


Nucleated RBC %   


 


Seg Neutrophils # Man   


 


Lymphocytes # (Manual)   


 


Monocytes # (Manual)   


 


ABG pH  7.086 L   7.188 L


 


POC ABG pCO2  142.2 H   99.3 H


 


POC ABG pO2  196.3 H   132.3 H


 


ABG pO2   


 


ABG HCO3   


 


ABG Base Excess   


 


ABG Hemoglobin  10.0 L   9.2 L


 


ABG Oxyhemoglobin   


 


ABG Sodium   


 


ABG Chloride  96.0 L   96.0 L


 


ABG Glucose  147 H   146 H


 


Oxyhemoglobin   


 


Carboxyhemoglobin  1.6 H   2.0 H


 


Sodium   


 


Potassium   


 


Chloride   


 


Carbon Dioxide   


 


BUN   


 


Creatinine   


 


Glucose   


 


POC Glucose   131 H 


 


Calcium   


 


Ionized Calcium   


 


Total Bilirubin   


 


AST   


 


ALT   


 


Total Creatine Kinase   


 


Troponin T   


 


NT-Pro-B Natriuret Pep   


 


Total Protein   


 


Albumin   


 


LDL Cholesterol Direct   


 


HDL Cholesterol   


 


Arterial Blood Glucose  147 H   146 H














  09/30/21 09/30/21 10/01/21





  17:23 22:34 07:44


 


WBC   


 


Hgb   


 


Hct   


 


MCV   


 


MCH   


 


MCHC   


 


RDW   


 


Seg Neuts % (Manual)   


 


Lymphocytes % (Manual)   


 


Nucleated RBC %   


 


Seg Neutrophils # Man   


 


Lymphocytes # (Manual)   


 


Monocytes # (Manual)   


 


ABG pH   


 


POC ABG pCO2   


 


POC ABG pO2   


 


ABG pO2   


 


ABG HCO3   


 


ABG Base Excess   


 


ABG Hemoglobin   


 


ABG Oxyhemoglobin   


 


ABG Sodium   


 


ABG Chloride   


 


ABG Glucose   


 


Oxyhemoglobin   


 


Carboxyhemoglobin   


 


Sodium   


 


Potassium   


 


Chloride   


 


Carbon Dioxide   


 


BUN   


 


Creatinine   


 


Glucose   


 


POC Glucose  117 H  169 H  150 H


 


Calcium   


 


Ionized Calcium   


 


Total Bilirubin   


 


AST   


 


ALT   


 


Total Creatine Kinase   


 


Troponin T   


 


NT-Pro-B Natriuret Pep   


 


Total Protein   


 


Albumin   


 


LDL Cholesterol Direct   


 


HDL Cholesterol   


 


Arterial Blood Glucose   














  10/01/21 10/01/21 10/01/21





  08:55 08:55 09:47


 


WBC   


 


Hgb  8.7 L  


 


Hct  29.4 L  


 


MCV  74 L  


 


MCH  22 L  


 


MCHC   


 


RDW  24.8 H  


 


Seg Neuts % (Manual)   


 


Lymphocytes % (Manual)   


 


Nucleated RBC %   


 


Seg Neutrophils # Man   


 


Lymphocytes # (Manual)   


 


Monocytes # (Manual)   


 


ABG pH    7.234 L


 


POC ABG pCO2    90.5 H


 


POC ABG pO2    139.4 H


 


ABG pO2   


 


ABG HCO3   


 


ABG Base Excess   


 


ABG Hemoglobin    9.5 L


 


ABG Oxyhemoglobin   


 


ABG Sodium   


 


ABG Chloride    97.0 L


 


ABG Glucose    179 H


 


Oxyhemoglobin   


 


Carboxyhemoglobin    1.7 H


 


Sodium   


 


Potassium   


 


Chloride   94.4 L 


 


Carbon Dioxide   37 H 


 


BUN   68 H 


 


Creatinine   1.5 H 


 


Glucose   178 H 


 


POC Glucose   


 


Calcium   


 


Ionized Calcium   


 


Total Bilirubin   1.60 H 


 


AST   172 H 


 


ALT   437 H 


 


Total Creatine Kinase   


 


Troponin T   


 


NT-Pro-B Natriuret Pep   


 


Total Protein   10.1 H 


 


Albumin   3.2 L 


 


LDL Cholesterol Direct   


 


HDL Cholesterol   


 


Arterial Blood Glucose    179 H














  10/01/21





  11:39


 


WBC 


 


Hgb 


 


Hct 


 


MCV 


 


MCH 


 


MCHC 


 


RDW 


 


Seg Neuts % (Manual) 


 


Lymphocytes % (Manual) 


 


Nucleated RBC % 


 


Seg Neutrophils # Man 


 


Lymphocytes # (Manual) 


 


Monocytes # (Manual) 


 


ABG pH 


 


POC ABG pCO2 


 


POC ABG pO2 


 


ABG pO2 


 


ABG HCO3 


 


ABG Base Excess 


 


ABG Hemoglobin 


 


ABG Oxyhemoglobin 


 


ABG Sodium 


 


ABG Chloride 


 


ABG Glucose 


 


Oxyhemoglobin 


 


Carboxyhemoglobin 


 


Sodium 


 


Potassium 


 


Chloride 


 


Carbon Dioxide 


 


BUN 


 


Creatinine 


 


Glucose 


 


POC Glucose  161 H


 


Calcium 


 


Ionized Calcium 


 


Total Bilirubin 


 


AST 


 


ALT 


 


Total Creatine Kinase 


 


Troponin T 


 


NT-Pro-B Natriuret Pep 


 


Total Protein 


 


Albumin 


 


LDL Cholesterol Direct 


 


HDL Cholesterol 


 


Arterial Blood Glucose 











Allied health notes reviewed: nursing

## 2021-10-02 LAB
APTT BLD: 25.2 SEC. (ref 24.2–36.6)
BUN SERPL-MCNC: 75 MG/DL (ref 7–17)
BUN/CREAT SERPL: 47 %
CALCIUM SERPL-MCNC: 9.3 MG/DL (ref 8.4–10.2)
HCT VFR BLD CALC: 27.7 % (ref 30.3–42.9)
HEMOLYSIS INDEX: 12
HGB BLD-MCNC: 8.3 GM/DL (ref 10.1–14.3)
INR PPP: 1.51 (ref 0.87–1.13)
PLATELET # BLD: 217 K/MM3 (ref 140–440)

## 2021-10-02 PROCEDURE — 05H933Z INSERTION OF INFUSION DEVICE INTO RIGHT BRACHIAL VEIN, PERCUTANEOUS APPROACH: ICD-10-PCS | Performed by: INTERNAL MEDICINE

## 2021-10-02 PROCEDURE — B54MZZA ULTRASONOGRAPHY OF RIGHT UPPER EXTREMITY VEINS, GUIDANCE: ICD-10-PCS | Performed by: INTERNAL MEDICINE

## 2021-10-02 RX ADMIN — HEPARIN SODIUM SCH UNIT: 5000 INJECTION, SOLUTION INTRAVENOUS; SUBCUTANEOUS at 14:35

## 2021-10-02 RX ADMIN — BENZONATATE SCH MG: 100 CAPSULE ORAL at 21:06

## 2021-10-02 RX ADMIN — IPRATROPIUM BROMIDE AND ALBUTEROL SULFATE SCH AMPUL: .5; 3 SOLUTION RESPIRATORY (INHALATION) at 20:40

## 2021-10-02 RX ADMIN — BENZONATATE SCH MG: 100 CAPSULE ORAL at 06:26

## 2021-10-02 RX ADMIN — Medication SCH ML: at 10:07

## 2021-10-02 RX ADMIN — Medication SCH ML: at 21:12

## 2021-10-02 RX ADMIN — FUROSEMIDE SCH MG: 10 INJECTION, SOLUTION INTRAVENOUS at 06:25

## 2021-10-02 RX ADMIN — IPRATROPIUM BROMIDE AND ALBUTEROL SULFATE SCH AMPUL: .5; 3 SOLUTION RESPIRATORY (INHALATION) at 08:47

## 2021-10-02 RX ADMIN — NYSTATIN SCH APPLIC: 100000 POWDER TOPICAL at 10:06

## 2021-10-02 RX ADMIN — IPRATROPIUM BROMIDE AND ALBUTEROL SULFATE SCH: .5; 3 SOLUTION RESPIRATORY (INHALATION) at 16:10

## 2021-10-02 RX ADMIN — PANTOPRAZOLE SODIUM SCH MG: 40 INJECTION, POWDER, FOR SOLUTION INTRAVENOUS at 10:06

## 2021-10-02 RX ADMIN — NYSTATIN SCH APPLIC: 100000 POWDER TOPICAL at 21:12

## 2021-10-02 RX ADMIN — HEPARIN SODIUM SCH UNIT: 5000 INJECTION, SOLUTION INTRAVENOUS; SUBCUTANEOUS at 06:25

## 2021-10-02 RX ADMIN — METHYLPREDNISOLONE SODIUM SUCCINATE SCH MG: 40 INJECTION, POWDER, FOR SOLUTION INTRAMUSCULAR; INTRAVENOUS at 21:06

## 2021-10-02 RX ADMIN — BENZONATATE SCH MG: 100 CAPSULE ORAL at 14:34

## 2021-10-02 RX ADMIN — METHYLPREDNISOLONE SODIUM SUCCINATE SCH MG: 40 INJECTION, POWDER, FOR SOLUTION INTRAMUSCULAR; INTRAVENOUS at 10:06

## 2021-10-02 RX ADMIN — ASPIRIN SCH MG: 325 TABLET, COATED ORAL at 10:06

## 2021-10-02 RX ADMIN — HEPARIN SODIUM SCH UNIT: 5000 INJECTION, SOLUTION INTRAVENOUS; SUBCUTANEOUS at 21:06

## 2021-10-02 NOTE — VASCULAR LAB REPORT
DUPLEX DOPPLER LOWER EXTREMITY VEINS, BILATERAL



INDICATION / CLINICAL INFORMATION: swelling; pain.



TECHNIQUE: Duplex doppler imaging was performed through the veins of both lower extremities using mehul
ous compression and other maneuvers.



COMPARISON: None available.



FINDINGS:

RIGHT COMMON FEMORAL VEIN: Negative.

RIGHT FEMORAL VEIN: Negative.

RIGHT POPLITEAL VEIN: Negative.

RIGHT CALF VEINS: Negative.



LEFT COMMON FEMORAL VEIN: Negative.

LEFT FEMORAL VEIN: Negative.

LEFT POPLITEAL VEIN: Negative.

LEFT CALF VEINS: Negative.



ADDITIONAL FINDINGS: None.



IMPRESSION:

1. No sonographic evidence for DVT in either lower extremity.



Signer Name: Dennys Cornell DO 

Signed: 10/2/2021 4:15 PM

Workstation Name: [id number]

## 2021-10-02 NOTE — EVENT NOTE
Date: 10/02/21





Patient delirious and pulling off BIPAP mask


received some prn Ativan overnight


OHS and hypoventiulation also a barrier to liberation from NIV





- get sitter for night time BIPAP


- stop all benzodiasepines


- Schedule Seroquel 75 mg p.o. qhs re: delirium / agitation


- schedule BIPAP qhs


- venti-mask during the day


- discussed care plan with her mother who wants intubation as a last resort

## 2021-10-02 NOTE — PROGRESS NOTE
Subjective


Date of service: 10/02/21


Principal diagnosis: Ac hypoxemic and hypercapnic resp failure; AE-CHF; Morbid 

obesity; FABIAN/OHS


Interval history: 





Assessment


Acute kidney injury, unknown baseline. Renal ultrasound notes poor visualization

due to body habitus.


Hypernatremia


Hyperkalemia


Hypocalcemia


Morbid obesity











Recommendations


hold diuresis today due to worsening hypernatremia


stopped Na tabls


cr is stable today


echo noted, likely diastolic disease


rec lasix 40mg po bid, when restart, no need for high dose iv


Maintain MAP more than 65


Hold ACE/ARB at this time


Hold diuretics given soft pressures


Renally dose medications


Avoid nephrotoxins


Renal diet


will follow lytes prn





Subjective:


Principal diagnosis: Acute on chronic renal insufficiency


Interval history: 


Patient was seen for her renal issues


Nursing, interdisciplinary and consult notes were reviewed


Vitals, input and output, medications and labs were reviewed


Remains on NC








Objective





- Exam


Narrative Exam: 


General: Morbid obesity


HEENT: Oral mucosa moist


Neck: Supple, no JVD


Chest: Clear to auscultation bilaterally


Heart: RRR, S1 and S2, no pericardial rub


Abdomen: Soft, nontender, no renal bruit


Extremity: No peripheral cyanosis, edema


Neurological: Awake and alert


Dermatology: Unable to assess


Psych: Calm and cooperative


Musculoskeletal: No joint effusion





Objective





- Vital Signs


Vital signs: 


                               Vital Signs - 12hr











  10/02/21 10/02/21 10/02/21





  03:31 04:01 04:30


 


Temperature   


 


Pulse Rate 89  92 H


 


Respiratory 12  15





Rate   


 


Blood Pressure 132/88 117/100 131/99


 


O2 Sat by Pulse 76 L 98 99





Oximetry   














  10/02/21 10/02/21 10/02/21





  04:42 04:50 05:01


 


Temperature  98.2 F 


 


Pulse Rate 92 H  100 H


 


Respiratory 26 H  22





Rate   


 


Blood Pressure 131/99  130/112


 


O2 Sat by Pulse 99  86





Oximetry   














  10/02/21 10/02/21 10/02/21





  05:20 05:31 06:00


 


Temperature   


 


Pulse Rate 90 95 H 93 H


 


Respiratory 20 17 22





Rate   


 


Blood Pressure  131/99 146/78


 


O2 Sat by Pulse 98 98 98





Oximetry   














  10/02/21 10/02/21 10/02/21





  06:03 06:31 07:00


 


Temperature   


 


Pulse Rate 95 H 95 H 


 


Respiratory  23 





Rate   


 


Blood Pressure  179/115 148/90


 


O2 Sat by Pulse  99 





Oximetry   














  10/02/21 10/02/21 10/02/21





  08:00 08:36 10:33


 


Temperature 98.4 F  


 


Pulse Rate  93 H 


 


Respiratory  27 H 





Rate   


 


Blood Pressure  154/75 


 


O2 Sat by Pulse  96 100





Oximetry   














  10/02/21 10/02/21 10/02/21





  11:11 12:00 14:58


 


Temperature  97.8 F 


 


Pulse Rate 89  92 H


 


Respiratory 30 H  27 H





Rate   


 


Blood Pressure   


 


O2 Sat by Pulse 90  100





Oximetry   














- Lab





                                 10/01/21 08:55





                                 10/02/21 10:20


                             Most recent lab results











ABG pH  7.234  (7.320-7.450)  L  10/01/21  09:47    


 


ABG pCO2  86.9 mm Hg  09/27/21  18:40    


 


ABG pO2  81.8 mm Hg (80.0-90.0)   09/27/21  18:40    


 


ABG HCO3  36.2 mmol/L (20.0-26.0)  H  09/27/21  18:40    


 


ABG O2 Saturation  99.2  (0-100)   10/01/21  09:47    


 


Calcium  9.3 mg/dL (8.4-10.2)   10/02/21  10:20    


 


Urine Sodium  16 mmol/L  09/23/21  18:46    














Medications & Allergies





- Medications


Allergies/Adverse Reactions: 


                                    Allergies





No Known Allergies Allergy (Unverified 07/13/17 11:17)


   








Home Medications: 


                                Home Medications











 Medication  Instructions  Recorded  Confirmed  Last Taken  Type


 


Acetaminophen [Acetaminophen TAB] 325 mg PO Q4H PRN #30 tablet 12/14/17 01/22/21

Unknown Rx


 


ALBUTEROL NEB's [Proventil 0.083% 2.5 mg IH Q3HRT PRN #30 day 01/24/21  Unknown 

Rx





NEBS]     


 


Albuterol Mdi (or & Nicu Only) 2 puff IH QID PRN #1 inhalation 01/24/21  Unknown

Rx





[ProAir HFA Inhaler]     


 


Azithromycin [Zithromax Z-JAVIER] 0 mg PO DAILY #1 tab 01/24/21  Unknown Rx


 


Benzonatate [Tessalon Perles] 100 mg PO Q8HR #15 capsule 01/24/21  Unknown Rx


 


Famotidine [Pepcid] 20 mg PO BID #14 tablet 01/24/21  Unknown Rx


 


Ipratropium/Albuterol Sulfate 1 ampul IH TIDRT #30 day 01/24/21  Unknown Rx





[DUONEB *Not for PRN Use*]     


 


hydroCHLOROthiazide [HCTZ] 25 mg PO QDAY  tablet 01/24/21  Unknown Rx


 


hydroCHLOROthiazide [HCTZ] 25 mg PO QDAY #30 tablet 01/24/21  Unknown Rx


 


methylPREDNISolone [Medrol 4MG 4 mg PO DAILY #1 tab.ds.pk 01/24/21  Unknown Rx





DOSEPAK (21 tabs)]     











Active Medications: 














Generic Name Dose Route Start Last Admin





  Trade Name Freq  PRN Reason Stop Dose Admin


 


Acetaminophen  650 mg  09/22/21 03:00  10/01/21 06:42





  Acetaminophen 325 Mg Tab  PO   650 mg





  Q4H PRN   Administration





  Pain MILD(1-3)/Fever >100.5/HA  


 


Albuterol  2.5 mg  09/22/21 03:00 





  Albuterol 2.5 Mg/3 Ml Nebu  IH  





  Q4HRT PRN  





  Shortness Of Breath  


 


Albuterol/Ipratropium  1 ampul  09/22/21 08:00  10/02/21 08:47





  Ipratropium/Albuterol Sulfate 3 Ml Ampul.Neb  IH   1 ampul





  TIDRT DELL   Administration


 


Aspirin  325 mg  09/23/21 10:00  10/02/21 10:06





  Aspirin Ec 325 Mg Tab  PO   325 mg





  QDAY DELL   Administration


 


Atorvastatin Calcium  40 mg  09/22/21 22:00  10/02/21 03:50





  Atorvastatin 40 Mg Tab  PO   40 mg





  QHS DELL   Administration


 


Benzonatate  100 mg  09/22/21 06:00  10/02/21 14:34





  Benzonatate 100 Mg Cap  PO   100 mg





  Q8HR DELL   Administration


 


Furosemide  40 mg  09/30/21 18:00  10/02/21 06:25





  Furosemide 40 Mg/4 Ml Inj  IV   40 mg





  0600,1800 DELL   Administration


 


Guaifenesin  200 mg  09/26/21 14:47  10/01/21 06:43





  Guaifenesin 100 Mg/5 Ml Oral Liqd  PO   200 mg





  Q4H PRN   Administration





  Cough  


 


Heparin Sodium (Porcine)  5,000 unit  10/02/21 14:00  10/02/21 14:35





  Heparin 5,000 Unit/1 Ml Vial  SUB-Q   5,000 unit





  Q8HR DELL   Administration


 


Methylprednisolone Sodium Succinate  20 mg  09/30/21 10:00  10/02/21 10:06





  Methylprednisolone Sod Succinate 40 Mg/1 Ml Inj  IV   20 mg





  Q12HR DELL   Administration


 


Nitroglycerin  0.4 mg  09/22/21 03:00 





  Nitroglycerin 0.4 Mg Tab Subl  SL  





  Q5M PRN  





  Chest Pain  


 


Nystatin  1 applic  09/22/21 18:00  10/02/21 10:06





  Nystatin Powder 15 Gm  TP   1 applic





  BID DELL   Administration


 


Ondansetron HCl  4 mg  09/22/21 03:00  09/29/21 23:51





  Ondansetron 4 Mg/2 Ml Inj  IV   4 mg





  Q8H PRN   Administration





  Nausea And Vomiting  


 


Oxycodone/Acetaminophen  1 tab  09/22/21 03:00  10/01/21 21:19





  Oxycodone /Acetaminophen 5-325mg Tab  PO   1 tab





  Q6H PRN   Administration





  Pain, Moderate (4-6)  


 


Pantoprazole Sodium  40 mg  09/30/21 10:00  10/02/21 10:06





  Pantoprazole 40 Mg Inj  IV   40 mg





  QDAY DELL   Administration


 


Sodium Chloride  10 ml  09/22/21 10:00  10/02/21 10:07





  Sodium Chloride 0.9% 10 Ml Flush Syringe  IV   10 ml





  BID DELL   Administration


 


Sodium Chloride  10 ml  09/22/21 03:00 





  Sodium Chloride 0.9% 10 Ml Flush Syringe  IV  





  PRN PRN  





  LINE FLUSH  


 


Tramadol HCl  50 mg  09/22/21 03:00 





  Tramadol 50 Mg Tab  PO  





  Q6H PRN  





  Pain, Moderate (4-6)

## 2021-10-02 NOTE — PROGRESS NOTE
Assessment and Plan





31 years old female with history of morbid obesity, hypertension, asthma , sleep

apnea was brought to the emergency room because of shortness of breath, edema, 

generalized malaise, fatigue, and weakness for last couple of days.  She states 

she just does not feel well.  She has a headache.  She states her legs are 

swollen.  She feels as though she is retaining fluid.  She states that she went 

to her regular doctors on the 13th.  They tested her for Covid.  It was 

negative.  She was told to go see a cardiologist.  She is not seen a car

diologist as of yet.  She came in here because she was not feeling well and did 

not know what to do.  She has had no sick contacts.  








In the emergency room patient is found to have acute CHF exacerbation patient 

proBNP is 7573 also patient cardiac enzyme is elevated troponin is 0.043. 

Patient blood glucose also 55 patient is hypoglycemic


Patients present blood sugur 130.





Patient is  awake. Resting  on BIPAP 24/8, rate 25, FIO2 35% and O2 saturation 

97%. Mild increased work of breathing on present settings.


ABG on FIO2 45%











ABG pH  7.234  (7.320-7.450)  L  10/01/21  09:47    


 


POC ABG pCO2  90.5 mmHg (32.0-48.0)  H  10/01/21  09:47    


 


ABG pCO2  86.9 mm Hg  09/27/21  18:40    


 


POC ABG pO2  139.4 mmHg ()  H  10/01/21  09:47    


 


ABG pO2  81.8 mm Hg (80.0-90.0)   09/27/21  18:40    


 


POC ABG HCO3  37.4   10/01/21  09:47    


 


ABG O2 Saturation  99.2  (0-100)   10/01/21  09:47    











 Patient afebrile. No leukocytosis. Blood pressure 154/75, Pulse 93


Patients D dimer 10/1/21 : 5410.10





Patient presently on albuterol/atrovent aerosol treatments q 6 hours, S/C 

Hepatin, Famotidine and I/V SoluMedrol.








I spent critical care time of 35 minutes, review the chart, examine the patient,

review lab results, talking to the nursing staff and respiratory therapy and 

work out plan of treatment in this critically ill morbidly Obese patient.








- Patient Problems


(1) Acute respiratory failure with hypoxia and hypercapnia


Current Visit: Yes   Status: Acute   


Plan to address problem: 


BIPAP 24/8, rate 25, FIO2 35%


 Albuterol/atrovent aerosol treatments.


 Continue Solumedrol


 Contibue S/C Heparin.


 Continue famotidine.


 








(2) Acute exacerbation of CHF (congestive heart failure)


Current Visit: Yes   Status: Acute   


Plan to address problem: 


Management as per cardiology.








(3) Hypertension


Current Visit: Yes   Status: Acute   


Plan to address problem: 


Management as per primary care.








(4) Morbid obesity with BMI of 70 and over, adult


Current Visit: No   Status: Acute   


Plan to address problem: 


Weight reduction diet.


 Mobility protocol


 Fall precautions.








(5) Obesity hypoventilation syndrome


Current Visit: No   Status: Acute   


Plan to address problem: 


BIPAP 24/8, rate 25, FIO2 35%.








(6) Sleep apnea


Current Visit: No   Status: Acute   


Plan to address problem: 


BIPAP 24/8, rate 25, FIO2 35%.


 Sleep study if she can as out patient.








Subjective


Date of service: 10/02/21


Principal diagnosis: Ac hypoxemic and hypercapnic resp failure; AE-CHF; Morbid 

obesity; FABIAN/OHS


Interval history: 





31 years old female with history of morbid obesity, hypertension, asthma COPD 

sleep apnea was brought to the emergency room because of shortness of breath, 

edema, generalized malaise, fatigue, and weakness for last couple of days.  She 

states she just does not feel well.  She has a headache.  She states her legs ar

e swollen.  She feels as though she is retaining fluid.  She states that she 

went to her regular doctors on the 13th.  They tested her for Covid.  It was 

negative.  She was told to go see a cardiologist.  She is not seen a 

cardiologist as of yet.  She came in here because she was not feeling well and 

did not know what to do.  She has had no sick contacts.  








In the emergency room patient is found to have acute CHF exacerbation patient 

proBNP is 7573 also patient cardiac enzyme is elevated troponin is 0.043. 

Patient blood glucose also 55 patient is hypoglycemic. Patients present blood 

sugur 130.





Patient is  awake. Resting  on BIPAP 24/8, rate 25, FIO2 35% and O2 saturation 

97%. Mild increased work of breathing on present settings.


ABG on FIO2 45%











ABG pH  7.234  (7.320-7.450)  L  10/01/21  09:47    


 


POC ABG pCO2  90.5 mmHg (32.0-48.0)  H  10/01/21  09:47    


 


ABG pCO2  86.9 mm Hg  09/27/21  18:40    


 


POC ABG pO2  139.4 mmHg ()  H  10/01/21  09:47    


 


ABG pO2  81.8 mm Hg (80.0-90.0)   09/27/21  18:40    


 


POC ABG HCO3  37.4   10/01/21  09:47    


 


ABG O2 Saturation  99.2  (0-100)   10/01/21  09:47    











 Patient afebrile. No leukocytosis. Blood pressure 154/75, Pulse 93


Patients D dimer 10/1/21 : 5410.10





Patient presently on albuterol/atrovent aerosol treatments q 6 hours, S/C 

Hepatin, Famotidine and I/V SoluMedrol.








Objective


                               Vital Signs - 12hr











  10/02/21 10/02/21 10/02/21





  00:30 01:00 01:30


 


Temperature   


 


Pulse Rate 109 H 88 96 H


 


Pulse Rate [   





From Monitor]   


 


Respiratory 21 13 11 L





Rate   


 


Blood Pressure 141/89 137/78 137/78


 


O2 Sat by Pulse 99 100 100





Oximetry   














  10/02/21 10/02/21 10/02/21





  01:38 02:00 02:31


 


Temperature   


 


Pulse Rate 90 89 92 H


 


Pulse Rate [ 80  





From Monitor]   


 


Respiratory 20 13 18





Rate   


 


Blood Pressure  147/96 151/70


 


O2 Sat by Pulse 98 100 98





Oximetry   














  10/02/21 10/02/21 10/02/21





  03:00 03:31 04:01


 


Temperature   


 


Pulse Rate 87 89 


 


Pulse Rate [   





From Monitor]   


 


Respiratory 24 12 





Rate   


 


Blood Pressure 130/77 132/88 117/100


 


O2 Sat by Pulse 99 76 L 98





Oximetry   














  10/02/21 10/02/21 10/02/21





  04:30 04:42 04:50


 


Temperature   98.2 F


 


Pulse Rate 92 H 92 H 


 


Pulse Rate [   





From Monitor]   


 


Respiratory 15 26 H 





Rate   


 


Blood Pressure 131/99 131/99 


 


O2 Sat by Pulse 99 99 





Oximetry   














  10/02/21 10/02/21 10/02/21





  05:01 05:20 05:31


 


Temperature   


 


Pulse Rate 100 H 90 95 H


 


Pulse Rate [   





From Monitor]   


 


Respiratory 22 20 17





Rate   


 


Blood Pressure 130/112  131/99


 


O2 Sat by Pulse 86 98 98





Oximetry   














  10/02/21 10/02/21 10/02/21





  06:00 06:03 06:31


 


Temperature   


 


Pulse Rate 93 H 95 H 95 H


 


Pulse Rate [   





From Monitor]   


 


Respiratory 22  23





Rate   


 


Blood Pressure 146/78  179/115


 


O2 Sat by Pulse 98  99





Oximetry   














  10/02/21 10/02/21 10/02/21





  07:00 08:00 08:36


 


Temperature  98.4 F 


 


Pulse Rate   93 H


 


Pulse Rate [   





From Monitor]   


 


Respiratory   27 H





Rate   


 


Blood Pressure 148/90  154/75


 


O2 Sat by Pulse   96





Oximetry   














  10/02/21 10/02/21





  10:33 11:11


 


Temperature  


 


Pulse Rate  89


 


Pulse Rate [  





From Monitor]  


 


Respiratory  30 H





Rate  


 


Blood Pressure  


 


O2 Sat by Pulse 100 90





Oximetry  











Constitutional: no acute distress, alert, other (young extremely obese female 

with mildly increased respiratory effort at rest on NIV)


Eyes: non-icteric


ENT: oropharynx moist, other (BIPAP FFM)


Neck: supple, no lymphadenopathy, no JVD


Effort: mildly labored


Ascultation: Bilateral: diminished breath sounds, rhonchi


Percussion: Bilateral: not dull


Cardiovascular: regular rate and rhythm


Gastrointestinal: normoactive bowel sounds, soft, non-tender, non-distended 

(protuberant)


Integumentary: rash (stasis dermatitis type), other (Stasis dermatititis on legs

and abdomen; see [facility] notes for full details)


Extremities: pulses normal, no ischemia or petechiae, edema (2+), other (stasis 

dermatitis)


Neurologic: non-focal exam (grossly), pupils equal and round, CN II-XII normal


Psychiatric: depressed


CBC and BMP: 


                                 10/01/21 08:55





                                 10/02/21 10:20


ABG, PT/INR, D-dimer: 


ABG











ABG pH  7.234  (7.320-7.450)  L  10/01/21  09:47    


 


POC ABG pCO2  90.5 mmHg (32.0-48.0)  H  10/01/21  09:47    


 


ABG pCO2  86.9 mm Hg  09/27/21  18:40    


 


POC ABG pO2  139.4 mmHg ()  H  10/01/21  09:47    


 


ABG pO2  81.8 mm Hg (80.0-90.0)   09/27/21  18:40    


 


POC ABG HCO3  37.4   10/01/21  09:47    


 


ABG O2 Saturation  99.2  (0-100)   10/01/21  09:47    





PT/INR, D-dimer











D-Dimer  5410.10 ng/mlDDU (0-234)  H  10/01/21  16:03    








Abnormal lab findings: 


                                  Abnormal Labs











  09/21/21 09/21/21 09/22/21





  22:04 22:04 00:18


 


WBC  17.7 H  


 


Hgb  9.1 L  


 


Hct   


 


MCV  77 L  


 


MCH  21 L  


 


MCHC  27 L  


 


RDW  24.3 H  


 


Seg Neuts % (Manual)   


 


Lymphocytes % (Manual)   


 


Nucleated RBC %   


 


Seg Neutrophils # Man   


 


Lymphocytes # (Manual)   


 


Monocytes # (Manual)   


 


D-Dimer   


 


ABG pH   


 


POC ABG pCO2   


 


POC ABG pO2   


 


ABG pO2   


 


ABG HCO3   


 


ABG Base Excess   


 


ABG Hemoglobin   


 


ABG Oxyhemoglobin   


 


ABG Sodium   


 


ABG Chloride   


 


ABG Glucose   


 


Oxyhemoglobin   


 


Carboxyhemoglobin   


 


Sodium   135 L 


 


Potassium   


 


Chloride   91.0 L 


 


Carbon Dioxide   17 L 


 


BUN   


 


Creatinine   1.4 H 


 


Glucose   55 L 


 


POC Glucose    49 L


 


Calcium   


 


Ionized Calcium   


 


Total Bilirubin   


 


AST   


 


ALT   


 


Total Creatine Kinase   


 


Troponin T   0.043 H 


 


NT-Pro-B Natriuret Pep   7573 H 


 


Total Protein   


 


Albumin   


 


LDL Cholesterol Direct   47 L 


 


HDL Cholesterol   25 L 


 


Arterial Blood Glucose   














  09/22/21 09/22/21 09/22/21





  04:03 04:03 09:22


 


WBC  24.3 H  


 


Hgb  9.3 L  


 


Hct   


 


MCV  74 L  


 


MCH  21 L  


 


MCHC  29 L  


 


RDW  23.1 H  


 


Seg Neuts % (Manual)  78.0 H  


 


Lymphocytes % (Manual)  7.0 L  


 


Nucleated RBC %  1.0 H  


 


Seg Neutrophils # Man  19.0 H  


 


Lymphocytes # (Manual)   


 


Monocytes # (Manual)  1.0 H  


 


D-Dimer   


 


ABG pH   


 


POC ABG pCO2   


 


POC ABG pO2   


 


ABG pO2   


 


ABG HCO3   


 


ABG Base Excess   


 


ABG Hemoglobin   


 


ABG Oxyhemoglobin   


 


ABG Sodium   


 


ABG Chloride   


 


ABG Glucose   


 


Oxyhemoglobin   


 


Carboxyhemoglobin   


 


Sodium   134 L 


 


Potassium   


 


Chloride   91.2 L 


 


Carbon Dioxide   


 


BUN   


 


Creatinine   1.8 H 


 


Glucose   


 


POC Glucose   


 


Calcium   


 


Ionized Calcium   


 


Total Bilirubin   


 


AST   


 


ALT   


 


Total Creatine Kinase   


 


Troponin T    0.099 H


 


NT-Pro-B Natriuret Pep   


 


Total Protein   


 


Albumin   


 


LDL Cholesterol Direct   


 


HDL Cholesterol   


 


Arterial Blood Glucose   














  09/22/21 09/22/21 09/23/21





  13:03 20:28 04:52


 


WBC    20.9 H


 


Hgb    8.5 L


 


Hct    30.0 L


 


MCV    73 L


 


MCH    21 L


 


MCHC    28 L


 


RDW    23.9 H


 


Seg Neuts % (Manual)    77.0 H


 


Lymphocytes % (Manual)    1.0 L


 


Nucleated RBC %    1.0 H


 


Seg Neutrophils # Man    16.1 H


 


Lymphocytes # (Manual)    0.2 L


 


Monocytes # (Manual)   


 


D-Dimer   


 


ABG pH   


 


POC ABG pCO2   


 


POC ABG pO2   


 


ABG pO2   


 


ABG HCO3   


 


ABG Base Excess   


 


ABG Hemoglobin   


 


ABG Oxyhemoglobin   


 


ABG Sodium   


 


ABG Chloride   


 


ABG Glucose   


 


Oxyhemoglobin   


 


Carboxyhemoglobin   


 


Sodium   


 


Potassium   


 


Chloride   


 


Carbon Dioxide   


 


BUN   


 


Creatinine   


 


Glucose   


 


POC Glucose   115 H 


 


Calcium   


 


Ionized Calcium   


 


Total Bilirubin   


 


AST   


 


ALT   


 


Total Creatine Kinase   


 


Troponin T  0.078 H D  


 


NT-Pro-B Natriuret Pep   


 


Total Protein   


 


Albumin   


 


LDL Cholesterol Direct   


 


HDL Cholesterol   


 


Arterial Blood Glucose   














  09/23/21 09/23/21 09/23/21





  04:52 08:46 11:50


 


WBC   


 


Hgb   


 


Hct   


 


MCV   


 


MCH   


 


MCHC   


 


RDW   


 


Seg Neuts % (Manual)   


 


Lymphocytes % (Manual)   


 


Nucleated RBC %   


 


Seg Neutrophils # Man   


 


Lymphocytes # (Manual)   


 


Monocytes # (Manual)   


 


D-Dimer   


 


ABG pH   


 


POC ABG pCO2   


 


POC ABG pO2   


 


ABG pO2   


 


ABG HCO3   


 


ABG Base Excess   


 


ABG Hemoglobin   


 


ABG Oxyhemoglobin   


 


ABG Sodium   


 


ABG Chloride   


 


ABG Glucose   


 


Oxyhemoglobin   


 


Carboxyhemoglobin   


 


Sodium  129 L  


 


Potassium  5.6 H  


 


Chloride  88.6 L  


 


Carbon Dioxide   


 


BUN  27 H  


 


Creatinine  2.4 H  


 


Glucose  121 H  


 


POC Glucose   139 H  156 H


 


Calcium  7.7 L  


 


Ionized Calcium   


 


Total Bilirubin   


 


AST   


 


ALT   


 


Total Creatine Kinase   


 


Troponin T   


 


NT-Pro-B Natriuret Pep   


 


Total Protein   


 


Albumin   


 


LDL Cholesterol Direct   


 


HDL Cholesterol   


 


Arterial Blood Glucose   














  09/23/21 09/23/21 09/23/21





  15:46 16:58 22:08


 


WBC   


 


Hgb   


 


Hct   


 


MCV   


 


MCH   


 


MCHC   


 


RDW   


 


Seg Neuts % (Manual)   


 


Lymphocytes % (Manual)   


 


Nucleated RBC %   


 


Seg Neutrophils # Man   


 


Lymphocytes # (Manual)   


 


Monocytes # (Manual)   


 


D-Dimer   


 


ABG pH   


 


POC ABG pCO2   


 


POC ABG pO2   


 


ABG pO2   


 


ABG HCO3   


 


ABG Base Excess   


 


ABG Hemoglobin   


 


ABG Oxyhemoglobin   


 


ABG Sodium   


 


ABG Chloride   


 


ABG Glucose   


 


Oxyhemoglobin   


 


Carboxyhemoglobin   


 


Sodium  128 L  


 


Potassium  5.5 H  


 


Chloride  88.1 L  


 


Carbon Dioxide   


 


BUN  33 H  


 


Creatinine  2.7 H  


 


Glucose  172 H  


 


POC Glucose   187 H  186 H


 


Calcium  7.3 L  


 


Ionized Calcium   


 


Total Bilirubin   


 


AST   


 


ALT   


 


Total Creatine Kinase   


 


Troponin T   


 


NT-Pro-B Natriuret Pep   


 


Total Protein   


 


Albumin   


 


LDL Cholesterol Direct   


 


HDL Cholesterol   


 


Arterial Blood Glucose   














  09/23/21 09/24/21 09/24/21





  23:15 02:20 07:39


 


WBC   


 


Hgb   


 


Hct   


 


MCV   


 


MCH   


 


MCHC   


 


RDW   


 


Seg Neuts % (Manual)   


 


Lymphocytes % (Manual)   


 


Nucleated RBC %   


 


Seg Neutrophils # Man   


 


Lymphocytes # (Manual)   


 


Monocytes # (Manual)   


 


D-Dimer   


 


ABG pH   


 


POC ABG pCO2   


 


POC ABG pO2   


 


ABG pO2   


 


ABG HCO3   


 


ABG Base Excess   


 


ABG Hemoglobin   


 


ABG Oxyhemoglobin   


 


ABG Sodium   


 


ABG Chloride   


 


ABG Glucose   


 


Oxyhemoglobin   


 


Carboxyhemoglobin   


 


Sodium   128 L 


 


Potassium   5.2 H 


 


Chloride   88.6 L 


 


Carbon Dioxide   


 


BUN   39 H 


 


Creatinine   3.1 H 


 


Glucose   171 H 


 


POC Glucose    168 H


 


Calcium   7.2 L 


 


Ionized Calcium  3.6 L  


 


Total Bilirubin   


 


AST   


 


ALT   


 


Total Creatine Kinase   


 


Troponin T   


 


NT-Pro-B Natriuret Pep   


 


Total Protein   


 


Albumin   


 


LDL Cholesterol Direct   


 


HDL Cholesterol   


 


Arterial Blood Glucose   














  09/24/21 09/24/21 09/24/21





  10:04 11:37 17:18


 


WBC   


 


Hgb   


 


Hct   


 


MCV   


 


MCH   


 


MCHC   


 


RDW   


 


Seg Neuts % (Manual)   


 


Lymphocytes % (Manual)   


 


Nucleated RBC %   


 


Seg Neutrophils # Man   


 


Lymphocytes # (Manual)   


 


Monocytes # (Manual)   


 


D-Dimer   


 


ABG pH   


 


POC ABG pCO2   


 


POC ABG pO2   


 


ABG pO2   


 


ABG HCO3   


 


ABG Base Excess   


 


ABG Hemoglobin   


 


ABG Oxyhemoglobin   


 


ABG Sodium   


 


ABG Chloride   


 


ABG Glucose   


 


Oxyhemoglobin   


 


Carboxyhemoglobin   


 


Sodium   


 


Potassium   


 


Chloride   


 


Carbon Dioxide   


 


BUN   


 


Creatinine   


 


Glucose   


 


POC Glucose   170 H  152 H


 


Calcium   


 


Ionized Calcium   


 


Total Bilirubin   


 


AST   


 


ALT   


 


Total Creatine Kinase  1712 H  


 


Troponin T   


 


NT-Pro-B Natriuret Pep   


 


Total Protein   


 


Albumin   


 


LDL Cholesterol Direct   


 


HDL Cholesterol   


 


Arterial Blood Glucose   














  09/24/21 09/24/21 09/25/21





  19:38 22:02 05:48


 


WBC   


 


Hgb   


 


Hct   


 


MCV   


 


MCH   


 


MCHC   


 


RDW   


 


Seg Neuts % (Manual)   


 


Lymphocytes % (Manual)   


 


Nucleated RBC %   


 


Seg Neutrophils # Man   


 


Lymphocytes # (Manual)   


 


Monocytes # (Manual)   


 


D-Dimer   


 


ABG pH   


 


POC ABG pCO2   


 


POC ABG pO2   


 


ABG pO2   


 


ABG HCO3   


 


ABG Base Excess   


 


ABG Hemoglobin   


 


ABG Oxyhemoglobin   


 


ABG Sodium   


 


ABG Chloride   


 


ABG Glucose   


 


Oxyhemoglobin   


 


Carboxyhemoglobin   


 


Sodium  128 L   124 L


 


Potassium    5.8 H D


 


Chloride  89.6 L   89.4 L


 


Carbon Dioxide   


 


BUN  47 H   53 H


 


Creatinine  3.0 H   2.8 H


 


Glucose  165 H   150 H


 


POC Glucose   147 H 


 


Calcium  6.9 L   7.2 L


 


Ionized Calcium   


 


Total Bilirubin   


 


AST   


 


ALT   


 


Total Creatine Kinase   


 


Troponin T   


 


NT-Pro-B Natriuret Pep   


 


Total Protein   


 


Albumin   


 


LDL Cholesterol Direct   


 


HDL Cholesterol   


 


Arterial Blood Glucose   














  09/25/21 09/25/21 09/25/21





  08:48 11:35 19:12


 


WBC   


 


Hgb   


 


Hct   


 


MCV   


 


MCH   


 


MCHC   


 


RDW   


 


Seg Neuts % (Manual)   


 


Lymphocytes % (Manual)   


 


Nucleated RBC %   


 


Seg Neutrophils # Man   


 


Lymphocytes # (Manual)   


 


Monocytes # (Manual)   


 


D-Dimer   


 


ABG pH   


 


POC ABG pCO2   


 


POC ABG pO2   


 


ABG pO2   


 


ABG HCO3   


 


ABG Base Excess   


 


ABG Hemoglobin   


 


ABG Oxyhemoglobin   


 


ABG Sodium   


 


ABG Chloride   


 


ABG Glucose   


 


Oxyhemoglobin   


 


Carboxyhemoglobin   


 


Sodium    128 L


 


Potassium   


 


Chloride    89.7 L


 


Carbon Dioxide   


 


BUN    53 H


 


Creatinine    2.4 H


 


Glucose    159 H


 


POC Glucose  152 H  152 H 


 


Calcium    7.1 L


 


Ionized Calcium   


 


Total Bilirubin   


 


AST   


 


ALT   


 


Total Creatine Kinase   


 


Troponin T   


 


NT-Pro-B Natriuret Pep   


 


Total Protein   


 


Albumin   


 


LDL Cholesterol Direct   


 


HDL Cholesterol   


 


Arterial Blood Glucose   














  09/25/21 09/26/21 09/26/21





  22:03 05:03 07:41


 


WBC   


 


Hgb   


 


Hct   


 


MCV   


 


MCH   


 


MCHC   


 


RDW   


 


Seg Neuts % (Manual)   


 


Lymphocytes % (Manual)   


 


Nucleated RBC %   


 


Seg Neutrophils # Man   


 


Lymphocytes # (Manual)   


 


Monocytes # (Manual)   


 


D-Dimer   


 


ABG pH   


 


POC ABG pCO2   


 


POC ABG pO2   


 


ABG pO2   


 


ABG HCO3   


 


ABG Base Excess   


 


ABG Hemoglobin   


 


ABG Oxyhemoglobin   


 


ABG Sodium   


 


ABG Chloride   


 


ABG Glucose   


 


Oxyhemoglobin   


 


Carboxyhemoglobin   


 


Sodium   134 L 


 


Potassium   


 


Chloride   92.9 L 


 


Carbon Dioxide   33 H D 


 


BUN   54 H 


 


Creatinine   2.4 H 


 


Glucose   150 H 


 


POC Glucose  152 H   144 H


 


Calcium   7.2 L 


 


Ionized Calcium   


 


Total Bilirubin   


 


AST   


 


ALT   


 


Total Creatine Kinase   


 


Troponin T   


 


NT-Pro-B Natriuret Pep   


 


Total Protein   


 


Albumin   


 


LDL Cholesterol Direct   


 


HDL Cholesterol   


 


Arterial Blood Glucose   














  09/26/21 09/26/21 09/26/21





  11:38 14:11 17:02


 


WBC   14.7 H 


 


Hgb   8.7 L 


 


Hct   29.8 L 


 


MCV   74 L 


 


MCH   22 L 


 


MCHC   29 L 


 


RDW   24.9 H 


 


Seg Neuts % (Manual)   86.0 H 


 


Lymphocytes % (Manual)   6.0 L 


 


Nucleated RBC %   


 


Seg Neutrophils # Man   12.6 H 


 


Lymphocytes # (Manual)   0.9 L 


 


Monocytes # (Manual)   


 


D-Dimer   


 


ABG pH   


 


POC ABG pCO2   


 


POC ABG pO2   


 


ABG pO2   


 


ABG HCO3   


 


ABG Base Excess   


 


ABG Hemoglobin   


 


ABG Oxyhemoglobin   


 


ABG Sodium   


 


ABG Chloride   


 


ABG Glucose   


 


Oxyhemoglobin   


 


Carboxyhemoglobin   


 


Sodium   


 


Potassium   


 


Chloride   


 


Carbon Dioxide   


 


BUN   


 


Creatinine   


 


Glucose   


 


POC Glucose  151 H   154 H


 


Calcium   


 


Ionized Calcium   


 


Total Bilirubin   


 


AST   


 


ALT   


 


Total Creatine Kinase   


 


Troponin T   


 


NT-Pro-B Natriuret Pep   


 


Total Protein   


 


Albumin   


 


LDL Cholesterol Direct   


 


HDL Cholesterol   


 


Arterial Blood Glucose   














  09/26/21 09/27/21 09/27/21





  23:14 05:03 10:00


 


WBC   


 


Hgb   


 


Hct   


 


MCV   


 


MCH   


 


MCHC   


 


RDW   


 


Seg Neuts % (Manual)   


 


Lymphocytes % (Manual)   


 


Nucleated RBC %   


 


Seg Neutrophils # Man   


 


Lymphocytes # (Manual)   


 


Monocytes # (Manual)   


 


D-Dimer   


 


ABG pH    7.150 L*


 


POC ABG pCO2   


 


POC ABG pO2   


 


ABG pO2    91.8 H


 


ABG HCO3    37.2 H


 


ABG Base Excess    7.1 H


 


ABG Hemoglobin    7.1 L


 


ABG Oxyhemoglobin   


 


ABG Sodium   


 


ABG Chloride   


 


ABG Glucose   


 


Oxyhemoglobin    93.4 L


 


Carboxyhemoglobin   


 


Sodium   134 L 


 


Potassium   


 


Chloride   91.3 L 


 


Carbon Dioxide   33 H 


 


BUN   63 H 


 


Creatinine   2.2 H 


 


Glucose   159 H 


 


POC Glucose  151 H  


 


Calcium   7.8 L 


 


Ionized Calcium   


 


Total Bilirubin   


 


AST   


 


ALT   


 


Total Creatine Kinase   


 


Troponin T   


 


NT-Pro-B Natriuret Pep   


 


Total Protein   


 


Albumin   


 


LDL Cholesterol Direct   


 


HDL Cholesterol   


 


Arterial Blood Glucose   














  09/27/21 09/27/21 09/27/21





  12:39 16:37 18:40


 


WBC   


 


Hgb   


 


Hct   


 


MCV   


 


MCH   


 


MCHC   


 


RDW   


 


Seg Neuts % (Manual)   


 


Lymphocytes % (Manual)   


 


Nucleated RBC %   


 


Seg Neutrophils # Man   


 


Lymphocytes # (Manual)   


 


Monocytes # (Manual)   


 


D-Dimer   


 


ABG pH    7.237 L


 


POC ABG pCO2   


 


POC ABG pO2   


 


ABG pO2   


 


ABG HCO3    36.2 H


 


ABG Base Excess    7.4 H


 


ABG Hemoglobin    7.8 L


 


ABG Oxyhemoglobin   


 


ABG Sodium   


 


ABG Chloride   


 


ABG Glucose   


 


Oxyhemoglobin    93.7 L


 


Carboxyhemoglobin   


 


Sodium   


 


Potassium   


 


Chloride   


 


Carbon Dioxide   


 


BUN   


 


Creatinine   


 


Glucose   


 


POC Glucose  140 H  132 H 


 


Calcium   


 


Ionized Calcium   


 


Total Bilirubin   


 


AST   


 


ALT   


 


Total Creatine Kinase   


 


Troponin T   


 


NT-Pro-B Natriuret Pep   


 


Total Protein   


 


Albumin   


 


LDL Cholesterol Direct   


 


HDL Cholesterol   


 


Arterial Blood Glucose   














  09/27/21 09/28/21 09/28/21





  23:55 04:27 04:27


 


WBC   


 


Hgb   8.5 L 


 


Hct   29.1 L 


 


MCV   72 L 


 


MCH   21 L 


 


MCHC   29 L 


 


RDW   24.9 H 


 


Seg Neuts % (Manual)   


 


Lymphocytes % (Manual)   


 


Nucleated RBC %   


 


Seg Neutrophils # Man   


 


Lymphocytes # (Manual)   


 


Monocytes # (Manual)   


 


D-Dimer   


 


ABG pH   


 


POC ABG pCO2   


 


POC ABG pO2   


 


ABG pO2   


 


ABG HCO3   


 


ABG Base Excess   


 


ABG Hemoglobin   


 


ABG Oxyhemoglobin   


 


ABG Sodium   


 


ABG Chloride   


 


ABG Glucose   


 


Oxyhemoglobin   


 


Carboxyhemoglobin   


 


Sodium    135 L


 


Potassium   


 


Chloride    93.5 L


 


Carbon Dioxide    33 H


 


BUN    68 H


 


Creatinine    1.9 H


 


Glucose    143 H


 


POC Glucose  135 H  


 


Calcium    8.1 L


 


Ionized Calcium   


 


Total Bilirubin    1.50 H


 


AST    494 H


 


ALT    835 H


 


Total Creatine Kinase   


 


Troponin T   


 


NT-Pro-B Natriuret Pep   


 


Total Protein    9.5 H


 


Albumin    3.1 L


 


LDL Cholesterol Direct   


 


HDL Cholesterol   


 


Arterial Blood Glucose   














  09/28/21 09/28/21 09/29/21





  12:03 17:24 00:19


 


WBC   


 


Hgb   


 


Hct   


 


MCV   


 


MCH   


 


MCHC   


 


RDW   


 


Seg Neuts % (Manual)   


 


Lymphocytes % (Manual)   


 


Nucleated RBC %   


 


Seg Neutrophils # Man   


 


Lymphocytes # (Manual)   


 


Monocytes # (Manual)   


 


D-Dimer   


 


ABG pH  7.291 L  


 


POC ABG pCO2  75.5 H  


 


POC ABG pO2  76.2 L  


 


ABG pO2   


 


ABG HCO3   


 


ABG Base Excess   


 


ABG Hemoglobin  9.9 L  


 


ABG Oxyhemoglobin  91.0 L  


 


ABG Sodium  134.9 L  


 


ABG Chloride  93.0 L  


 


ABG Glucose  146 H  


 


Oxyhemoglobin   


 


Carboxyhemoglobin  2.2 H  


 


Sodium   


 


Potassium   


 


Chloride   


 


Carbon Dioxide   


 


BUN   


 


Creatinine   


 


Glucose   


 


POC Glucose   134 H  136 H


 


Calcium   


 


Ionized Calcium   


 


Total Bilirubin   


 


AST   


 


ALT   


 


Total Creatine Kinase   


 


Troponin T   


 


NT-Pro-B Natriuret Pep   


 


Total Protein   


 


Albumin   


 


LDL Cholesterol Direct   


 


HDL Cholesterol   


 


Arterial Blood Glucose  146 H  














  09/29/21 09/29/21 09/29/21





  04:55 04:55 05:29


 


WBC   


 


Hgb  8.0 L  


 


Hct  27.1 L  


 


MCV  70 L  


 


MCH  21 L  


 


MCHC   


 


RDW  24.3 H  


 


Seg Neuts % (Manual)   


 


Lymphocytes % (Manual)   


 


Nucleated RBC %   


 


Seg Neutrophils # Man   


 


Lymphocytes # (Manual)   


 


Monocytes # (Manual)   


 


D-Dimer   


 


ABG pH   


 


POC ABG pCO2   


 


POC ABG pO2   


 


ABG pO2   


 


ABG HCO3   


 


ABG Base Excess   


 


ABG Hemoglobin   


 


ABG Oxyhemoglobin   


 


ABG Sodium   


 


ABG Chloride   


 


ABG Glucose   


 


Oxyhemoglobin   


 


Carboxyhemoglobin   


 


Sodium   


 


Potassium   


 


Chloride   95.1 L 


 


Carbon Dioxide   36 H 


 


BUN   63 H 


 


Creatinine   1.5 H 


 


Glucose   131 H 


 


POC Glucose    118 H


 


Calcium   8.3 L 


 


Ionized Calcium   


 


Total Bilirubin   1.80 H 


 


AST   323 H 


 


ALT   609 H 


 


Total Creatine Kinase   


 


Troponin T   


 


NT-Pro-B Natriuret Pep   


 


Total Protein   9.3 H 


 


Albumin   2.9 L 


 


LDL Cholesterol Direct   


 


HDL Cholesterol   


 


Arterial Blood Glucose   














  09/29/21 09/29/21 09/29/21





  11:40 18:30 22:44


 


WBC   


 


Hgb   


 


Hct   


 


MCV   


 


MCH   


 


MCHC   


 


RDW   


 


Seg Neuts % (Manual)   


 


Lymphocytes % (Manual)   


 


Nucleated RBC %   


 


Seg Neutrophils # Man   


 


Lymphocytes # (Manual)   


 


Monocytes # (Manual)   


 


D-Dimer   


 


ABG pH   


 


POC ABG pCO2   


 


POC ABG pO2   


 


ABG pO2   


 


ABG HCO3   


 


ABG Base Excess   


 


ABG Hemoglobin   


 


ABG Oxyhemoglobin   


 


ABG Sodium   


 


ABG Chloride   


 


ABG Glucose   


 


Oxyhemoglobin   


 


Carboxyhemoglobin   


 


Sodium   


 


Potassium   


 


Chloride   


 


Carbon Dioxide   


 


BUN   


 


Creatinine   


 


Glucose   


 


POC Glucose  139 H  130 H  123 H


 


Calcium   


 


Ionized Calcium   


 


Total Bilirubin   


 


AST   


 


ALT   


 


Total Creatine Kinase   


 


Troponin T   


 


NT-Pro-B Natriuret Pep   


 


Total Protein   


 


Albumin   


 


LDL Cholesterol Direct   


 


HDL Cholesterol   


 


Arterial Blood Glucose   














  09/30/21 09/30/21 09/30/21





  04:53 04:53 07:16


 


WBC  12.5 H  


 


Hgb  8.8 L  


 


Hct  30.2 L  


 


MCV  74 L  


 


MCH  21 L  


 


MCHC  29 L  


 


RDW  24.5 H  


 


Seg Neuts % (Manual)   


 


Lymphocytes % (Manual)   


 


Nucleated RBC %   


 


Seg Neutrophils # Man   


 


Lymphocytes # (Manual)   


 


Monocytes # (Manual)   


 


D-Dimer   


 


ABG pH   


 


POC ABG pCO2   


 


POC ABG pO2   


 


ABG pO2   


 


ABG HCO3   


 


ABG Base Excess   


 


ABG Hemoglobin   


 


ABG Oxyhemoglobin   


 


ABG Sodium   


 


ABG Chloride   


 


ABG Glucose   


 


Oxyhemoglobin   


 


Carboxyhemoglobin   


 


Sodium   


 


Potassium   


 


Chloride   96.9 L 


 


Carbon Dioxide   35 H 


 


BUN   65 H 


 


Creatinine   1.5 H 


 


Glucose   142 H 


 


POC Glucose    138 H


 


Calcium   


 


Ionized Calcium   


 


Total Bilirubin   1.50 H 


 


AST   251 H 


 


ALT   572 H 


 


Total Creatine Kinase   


 


Troponin T   


 


NT-Pro-B Natriuret Pep   


 


Total Protein   10.1 H 


 


Albumin   3.2 L 


 


LDL Cholesterol Direct   


 


HDL Cholesterol   


 


Arterial Blood Glucose   














  09/30/21 09/30/21 09/30/21





  07:32 11:29 14:42


 


WBC   


 


Hgb   


 


Hct   


 


MCV   


 


MCH   


 


MCHC   


 


RDW   


 


Seg Neuts % (Manual)   


 


Lymphocytes % (Manual)   


 


Nucleated RBC %   


 


Seg Neutrophils # Man   


 


Lymphocytes # (Manual)   


 


Monocytes # (Manual)   


 


D-Dimer   


 


ABG pH  7.086 L   7.188 L


 


POC ABG pCO2  142.2 H   99.3 H


 


POC ABG pO2  196.3 H   132.3 H


 


ABG pO2   


 


ABG HCO3   


 


ABG Base Excess   


 


ABG Hemoglobin  10.0 L   9.2 L


 


ABG Oxyhemoglobin   


 


ABG Sodium   


 


ABG Chloride  96.0 L   96.0 L


 


ABG Glucose  147 H   146 H


 


Oxyhemoglobin   


 


Carboxyhemoglobin  1.6 H   2.0 H


 


Sodium   


 


Potassium   


 


Chloride   


 


Carbon Dioxide   


 


BUN   


 


Creatinine   


 


Glucose   


 


POC Glucose   131 H 


 


Calcium   


 


Ionized Calcium   


 


Total Bilirubin   


 


AST   


 


ALT   


 


Total Creatine Kinase   


 


Troponin T   


 


NT-Pro-B Natriuret Pep   


 


Total Protein   


 


Albumin   


 


LDL Cholesterol Direct   


 


HDL Cholesterol   


 


Arterial Blood Glucose  147 H   146 H














  09/30/21 09/30/21 10/01/21





  17:23 22:34 07:44


 


WBC   


 


Hgb   


 


Hct   


 


MCV   


 


MCH   


 


MCHC   


 


RDW   


 


Seg Neuts % (Manual)   


 


Lymphocytes % (Manual)   


 


Nucleated RBC %   


 


Seg Neutrophils # Man   


 


Lymphocytes # (Manual)   


 


Monocytes # (Manual)   


 


D-Dimer   


 


ABG pH   


 


POC ABG pCO2   


 


POC ABG pO2   


 


ABG pO2   


 


ABG HCO3   


 


ABG Base Excess   


 


ABG Hemoglobin   


 


ABG Oxyhemoglobin   


 


ABG Sodium   


 


ABG Chloride   


 


ABG Glucose   


 


Oxyhemoglobin   


 


Carboxyhemoglobin   


 


Sodium   


 


Potassium   


 


Chloride   


 


Carbon Dioxide   


 


BUN   


 


Creatinine   


 


Glucose   


 


POC Glucose  117 H  169 H  150 H


 


Calcium   


 


Ionized Calcium   


 


Total Bilirubin   


 


AST   


 


ALT   


 


Total Creatine Kinase   


 


Troponin T   


 


NT-Pro-B Natriuret Pep   


 


Total Protein   


 


Albumin   


 


LDL Cholesterol Direct   


 


HDL Cholesterol   


 


Arterial Blood Glucose   














  10/01/21 10/01/21 10/01/21





  08:55 08:55 09:47


 


WBC   


 


Hgb  8.7 L  


 


Hct  29.4 L  


 


MCV  74 L  


 


MCH  22 L  


 


MCHC   


 


RDW  24.8 H  


 


Seg Neuts % (Manual)   


 


Lymphocytes % (Manual)   


 


Nucleated RBC %   


 


Seg Neutrophils # Man   


 


Lymphocytes # (Manual)   


 


Monocytes # (Manual)   


 


D-Dimer   


 


ABG pH    7.234 L


 


POC ABG pCO2    90.5 H


 


POC ABG pO2    139.4 H


 


ABG pO2   


 


ABG HCO3   


 


ABG Base Excess   


 


ABG Hemoglobin    9.5 L


 


ABG Oxyhemoglobin   


 


ABG Sodium   


 


ABG Chloride    97.0 L


 


ABG Glucose    179 H


 


Oxyhemoglobin   


 


Carboxyhemoglobin    1.7 H


 


Sodium   


 


Potassium   


 


Chloride   94.4 L 


 


Carbon Dioxide   37 H 


 


BUN   68 H 


 


Creatinine   1.5 H 


 


Glucose   178 H 


 


POC Glucose   


 


Calcium   


 


Ionized Calcium   


 


Total Bilirubin   1.60 H 


 


AST   172 H 


 


ALT   437 H 


 


Total Creatine Kinase   


 


Troponin T   


 


NT-Pro-B Natriuret Pep   


 


Total Protein   10.1 H 


 


Albumin   3.2 L 


 


LDL Cholesterol Direct   


 


HDL Cholesterol   


 


Arterial Blood Glucose    179 H














  10/01/21 10/01/21 10/01/21





  11:39 16:03 17:24


 


WBC   


 


Hgb   


 


Hct   


 


MCV   


 


MCH   


 


MCHC   


 


RDW   


 


Seg Neuts % (Manual)   


 


Lymphocytes % (Manual)   


 


Nucleated RBC %   


 


Seg Neutrophils # Man   


 


Lymphocytes # (Manual)   


 


Monocytes # (Manual)   


 


D-Dimer   5410.10 H 


 


ABG pH   


 


POC ABG pCO2   


 


POC ABG pO2   


 


ABG pO2   


 


ABG HCO3   


 


ABG Base Excess   


 


ABG Hemoglobin   


 


ABG Oxyhemoglobin   


 


ABG Sodium   


 


ABG Chloride   


 


ABG Glucose   


 


Oxyhemoglobin   


 


Carboxyhemoglobin   


 


Sodium   


 


Potassium   


 


Chloride   


 


Carbon Dioxide   


 


BUN   


 


Creatinine   


 


Glucose   


 


POC Glucose  161 H   130 H


 


Calcium   


 


Ionized Calcium   


 


Total Bilirubin   


 


AST   


 


ALT   


 


Total Creatine Kinase   


 


Troponin T   


 


NT-Pro-B Natriuret Pep   


 


Total Protein   


 


Albumin   


 


LDL Cholesterol Direct   


 


HDL Cholesterol   


 


Arterial Blood Glucose   














  10/01/21 10/02/21 10/02/21





  21:52 10:20 11:58


 


WBC   


 


Hgb   


 


Hct   


 


MCV   


 


MCH   


 


MCHC   


 


RDW   


 


Seg Neuts % (Manual)   


 


Lymphocytes % (Manual)   


 


Nucleated RBC %   


 


Seg Neutrophils # Man   


 


Lymphocytes # (Manual)   


 


Monocytes # (Manual)   


 


D-Dimer   


 


ABG pH   


 


POC ABG pCO2   


 


POC ABG pO2   


 


ABG pO2   


 


ABG HCO3   


 


ABG Base Excess   


 


ABG Hemoglobin   


 


ABG Oxyhemoglobin   


 


ABG Sodium   


 


ABG Chloride   


 


ABG Glucose   


 


Oxyhemoglobin   


 


Carboxyhemoglobin   


 


Sodium   146 H D 


 


Potassium   


 


Chloride   


 


Carbon Dioxide   


 


BUN   75 H 


 


Creatinine   1.6 H 


 


Glucose   158 H 


 


POC Glucose  150 H   143 H


 


Calcium   


 


Ionized Calcium   


 


Total Bilirubin   


 


AST   


 


ALT   


 


Total Creatine Kinase   


 


Troponin T   


 


NT-Pro-B Natriuret Pep   


 


Total Protein   


 


Albumin   


 


LDL Cholesterol Direct   


 


HDL Cholesterol   


 


Arterial Blood Glucose   











Allied health notes reviewed: nursing

## 2021-10-02 NOTE — PROGRESS NOTE
Assessment and Plan


Assessment and plan: 


(1) Non-ST elevation MI (NSTEMI) type II Procardia


Current Visit: Yes   Status: Acute   


Plan to address problem: 


Admit the patient to the medical telemetry. Aspirin 325 mg p.o. daily. Lipitor 

40 mg p.o. daily. Nitroglycerin as needed. We do the serial cardiac enzyme. We 

also consult cardiology for evaluation and order echocardiogram.








(2) Acute exacerbation of CHF (congestive heart failure)


Current Visit: Yes   Status: Acute   


Plan to address problem: 


Fluid restriction. Maintain input output. Lasix 40 mg IV every 12 hours. Oxygen 

via nasal cannula 3 L/min. DuoNeb by nebulizer every 4 hours. Echocardiogram. 

Cardiology consult








(3) Hypertension


Current Visit: Yes   Status: Acute   


Plan to address problem: 


We will continue the home medication. Hydralazine 10 mg IV every 6 hours as 

needed. We will monitor the blood pressure closely








(4) COPD (chronic obstructive pulmonary disease)


Current Visit: Yes   Status: Acute   


Plan to address problem: 


Oxygen by nasal cannula 3 L/min. DuoNeb via nebulizer every 4 hours. Albuterol 

via nebulizer every 4 hours as needed.





Super


(5) Morbid obesity


Current Visit: Yes   Status: Acute   


Plan to address problem: 


We counseled regarding weight reduction. Outpatient follow-up with bariatric 

surgeon








(6) Hyperglycemia


Current Visit: Yes   Status: Acute   


Plan to address problem: 


We will give 1 ampoule of D50. We will put the patient on cardiac diet. We will 

monitor the blood glucose closely








(7) acute kidney injury with vasomotor nephropathy





(8) Lymphedema of the abdominal wall





(9)Anemia of chronic disease





(10)Possible passive congestive hepatic failure with underlying NAFLD








(11) DVT prophylaxis


Current Visit: No   Status: Acute   


Plan to address problem: 


Heparin 5000 units subcu every 8 hours for DVT prophylaxis. Pepcid 20 mg p.o. 

twice daily for GI prophylaxis. Patient is a full code





9/23


-Patient is on heparin drip for non-STEMI, cardiology is following.


-Patient is off Lasix and ACE inhibitors because of [place].


-Kidney function is worsening overnight and I put a consult for nephrology.


-Patient is hypotensive and blood pressure from this morning was 101/41.  We 

will continue to monitor.  And if it is dropping we we will give him fluid.


-Echo was done and EF of 50 to 55%.  Diastolic dysfunction is indeterminate.  

Management per cardiology


-Patient has COPD continues on dexamethasone, nebulizer treatment as needed.


-Patient has hyponatremia and hyperkalemia.  I give the patient Kayexalate.  

Monitor electrolytes.  Will follow nephrology for additional recommendation.





9/24


-Renal function is worsening, nephrology is following


-Blood pressure is better today


-Hyperkalemia; I added Kayexalate


-Morbid obesity








9/25


-Slight improvement in creatinine.  Hyponatremia worsened. Nephrology is 

following and put the patient back on Lasix.


-Blood PRESSURE IS BETTER today


-Potassium this morning was 5.8, I ordered 60 g of Kayexalate


-Morbidly obese


-Obesity hypoventilation syndrome





9/26


-Creatinine is improving and this morning was 2.4


-Hyponatremia improved this morning was 134.  Patient is on Lasix


-Blood pressure is better


-Patient is on 10 L of oxygen; worsened 


-Morbidly obese, FABIAN








9/27: Follow ordered ultrasound of abdomen, per Physician unable to get CT. I 

ordered an ABG due to my concern about her breathing pattern this morning, 

patient may benefit from. Will try to establish if patient has a primary 

pulmonary doctor. ABG is showing consistent with Respiratory Acidosis. Will 

place on BIPAP now. May need to transfer to IMCU for closer monitoring. Patient 

likely with Obesity Hypoventilation syndrome.


cardiology work up, ongoing. 


RENAL SHOWING SOME IMPROVEMENT


Guarded prognosis





9/28: Patient seen and examined today identified some lesion under the pannus 

will obtain wound care and wound surgeon evaluation.  Nephrology input noted 

continue diuresis and stop the sodium tabs creatinine is stable.  I did discuss 

with the family and updated them.  We will continue to hold ACE and ARB and if 

pressures continue to drop may need to hold diuresis also despite as written 

above.  Monitor labs including H&H.  She is more awake review of ABG shows 

improving CO2.  Will discuss with case management as patient may need BiPAP on 

discharge home.  I also updated the family








9/29: Patient showing some clinical improvement.  Liver enzymes is showing mild 

improvement although bilirubin increased slightly.  GI input noted and as 

discussed by his notes below


"Abnormal liver enzymes in hepatocellular pattern.  broad ddx and likely 

multifactorial causes including NAFLD, congestive hepatopathy, possibly DILI.  

will check viral hep serologies.  US with limited views, possible coarsening of 

the liver"


Patient also evaluated by surgery noted with the lymphedema of abdominal wall 

and open wound of the abdominal wall without penetration into the abdominal 

cavity with recommendation to pack the wounds daily with mesalt. Need to 

maintain dry environment discussed with nursing staff.  Elevate the pannus and 

optimize nutrition for which I will get nutritional consult.


No surgical intervention at this time.  Patient is bedbound when I asked when 

last she ambulated she said while "I guess he has not worked" but it has been a 

while.  Unfortunately the LTAC center unwilling to accept the patient will 

discuss with pulmonary and with case management about obtaining BiPAP for this 

patient and possible SNF referral.





09/30: Patient this morning and was noted significantly lethargic and unrespons

grey respiratory and a code to be called.  The patient resuscitated without any 

invasive procedure.  ABG was obtained showed a CO2 of 142.  Despite using BiPAP 

for some time through the night, sure how long she used it for.  I have 

discontinued all IV opioids and recommend no IV opioids for this patient at this

time.  I also discontinued p.o. medications as an essential medication and 

change to IV.  We will repeat an ABG in an hour to see if there is some 

improvement.  Patient remains at high risk for possible intubation.  Clinical 

condition is guarded





10/1: Patient remains on BiPAP this am, has some intermittent twitching, will 

check EEG, not likely seizure, but will monitor. Continue current management


, check Albumin and Pre-Albumin level.


Remains critically ill. Liver status showing improvement.





10/2: Patient with elevated D-dimer unfortunately size precludes being able to 

have a CTA chest done here to rule out pulmonary embolism.  Doppler of lower 

extremities is pending. We will continue subcu anticoagulation with heparin at 

this time. Until Doppler is resolved. Patient is not hypoxic so doubt pulmonary 

embolism at this time. Her problem remains hypercapnia. I did take time to go 

over her history while she has been around hospital in the past and noticed a 

remarkable increase in her BMI from the last 2 years. Discussed with the 

intensivist will agree to check thyroid function test. Critical care time 35-

minute





History


Interval history: 


Patient seen and examined, remains on BiPAP, no further twitching but delirious 

intermittently





Hospitalist Physical





- Physical exam


Narrative exam: 


VITAL SIGNS:  Reviewed.    


GENERAL:  The patient is morbidly obese,  lethargic on BiPAP


Placed she is waking up some, Vital signs as documented.


HEAD:  No signs of head trauma.


EYES:  Pupils are equal.  Extraocular motions intact.  


EARS:  Hearing grossly intact.


MOUTH:  Oropharynx is normal. 


NECK:  No adenopathy, no JVD.  


CHEST:  Chest with diminished breath sounds bilaterally.  No wheezes, rales, or 

rhonchi.  


CARDIAC:  Regular rate and rhythm.  S1 and S2, without murmurs, gallops, or 

rubs.


VASCULAR:  No Edema.  Peripheral pulses normal and equal in all extremities.


ABDOMEN:  enlarged pannus, lymphedema with thickened skin, non tender and non 

distended.  No   rebound or guarding, and no masses palpated.   Bowel Sounds 

normal.


MUSCULOSKELETAL:  Good range of motion of all major joints. Extremities without 

clubbing, cyanosis or edema.  


NEUROLOGIC EXAM:  Alert and oriented x 3   No focal sensory or strength 

deficits.   Speech normal.  Follows commands.


PSYCHIATRIC:  Mood normal.


SKIN:  detail exam as documented in skin assessment








- Constitutional


Vitals: 


                                        











Temp Pulse Resp BP Pulse Ox


 


 98.4 F   89   30 H  154/75   90 


 


 10/02/21 08:00  10/02/21 11:11  10/02/21 11:11  10/02/21 08:36  10/02/21 11:11











General appearance: Present: no acute distress





HEART Score





- HEART Score


Troponin: 


                                        











Troponin T  0.078 ng/mL (0.00-0.029)  H D 09/22/21  13:03    














Results





- Labs


CBC & Chem 7: 


                                 10/03/21 04:40





                                 10/03/21 04:40


Labs: 


                             Laboratory Last Values











WBC  8.8 K/mm3 (4.5-11.0)   10/01/21  08:55    


 


RBC  3.96 M/mm3 (3.65-5.03)   10/01/21  08:55    


 


Hgb  8.7 gm/dl (10.1-14.3)  L  10/01/21  08:55    


 


Hct  29.4 % (30.3-42.9)  L  10/01/21  08:55    


 


MCV  74 fl (79-97)  L  10/01/21  08:55    


 


MCH  22 pg (28-32)  L  10/01/21  08:55    


 


MCHC  30 % (30-34)   10/01/21  08:55    


 


RDW  24.8 % (13.2-15.2)  H  10/01/21  08:55    


 


Plt Count  227 K/mm3 (140-440)   10/01/21  08:55    


 


Add Manual Diff  Complete   09/26/21  14:11    


 


Total Counted  100   09/26/21  14:11    


 


Seg Neuts % (Manual)  86.0 % (40.0-70.0)  H  09/26/21  14:11    


 


Band Neutrophils %  1.0 %  09/26/21  14:11    


 


Lymphocytes % (Manual)  6.0 % (13.4-35.0)  L  09/26/21  14:11    


 


Reactive Lymphs % (Man)  1.0 %  09/26/21  14:11    


 


Monocytes % (Manual)  4.0 % (0.0-7.3)   09/26/21  14:11    


 


Metamyelocytes %  2.0 %  09/26/21  14:11    


 


Myelocytes %  2.0 %  09/22/21  04:03    


 


Nucleated RBC %  Not Reportable   09/26/21  14:11    


 


Seg Neutrophils # Man  12.6 K/mm3 (1.8-7.7)  H  09/26/21  14:11    


 


Band Neutrophils #  0.1 K/mm3  09/26/21  14:11    


 


Lymphocytes # (Manual)  0.9 K/mm3 (1.2-5.4)  L  09/26/21  14:11    


 


Abs React Lymphs (Man)  0.1 K/mm3  09/26/21  14:11    


 


Monocytes # (Manual)  0.6 K/mm3 (0.0-0.8)   09/26/21  14:11    


 


Eosinophils # (Manual)  0.0 K/mm3 (0.0-0.4)   09/26/21  14:11    


 


Basophils # (Manual)  0.0 K/mm3 (0.0-0.1)   09/26/21  14:11    


 


Metamyelocytes #  0.3 K/mm3  09/26/21  14:11    


 


Myelocytes #  0.0 K/mm3  09/26/21  14:11    


 


Promyelocytes #  0.0 K/mm3  09/26/21  14:11    


 


Blast Cells #  0.0 K/mm3  09/26/21  14:11    


 


WBC Morphology  Not Reportable   09/26/21  14:11    


 


WBC Morphology  TNR   09/26/21  14:11    


 


Hypersegmented Neuts  Not Reportable   09/26/21  14:11    


 


Hyposegmented Neuts  Not Reportable   09/26/21  14:11    


 


Hypogranular Neuts  Not Reportable   09/26/21  14:11    


 


Smudge Cells  Not Reportable   09/26/21  14:11    


 


Toxic Granulation  Not Reportable   09/26/21  14:11    


 


Toxic Vacuolation  Not Reportable   09/26/21  14:11    


 


Dohle Bodies  Not Reportable   09/26/21  14:11    


 


Pelger-Huet Anomaly  Not Reportable   09/26/21  14:11    


 


Ac Rods  Not Reportable   09/26/21  14:11    


 


Platelet Estimate  Consistent w auto   09/26/21  14:11    


 


Clumped Platelets  Not Reportable   09/26/21  14:11    


 


Plt Clumps, EDTA  Not Reportable   09/26/21  14:11    


 


Large Platelets  Not Reportable   09/26/21  14:11    


 


Giant Platelets  Not Reportable   09/26/21  14:11    


 


Platelet Satelliting  Not Reportable   09/26/21  14:11    


 


Plt Morphology Comment  Not Reportable   09/26/21  14:11    


 


RBC Morphology  Not Reportable   09/26/21  14:11    


 


Dimorphic RBCs  Not Reportable   09/26/21  14:11    


 


Polychromasia  Few   09/26/21  14:11    


 


Hypochromasia  1+   09/26/21  14:11    


 


Poikilocytosis  Not Reportable   09/26/21  14:11    


 


Anisocytosis  Not Reportable   09/26/21  14:11    


 


Microcytosis  Not Reportable   09/26/21  14:11    


 


Macrocytosis  Not Reportable   09/26/21  14:11    


 


Spherocytes  Not Reportable   09/26/21  14:11    


 


Pappenheimer Bodies  Not Reportable   09/26/21  14:11    


 


Sickle Cells  Not Reportable   09/26/21  14:11    


 


Target Cells  1+   09/26/21  14:11    


 


Tear Drop Cells  Few   09/26/21  14:11    


 


Ovalocytes  Not Reportable   09/26/21  14:11    


 


Helmet Cells  Not Reportable   09/26/21  14:11    


 


[place] Bodies  Not Reportable   09/26/21  14:11    


 


Cabot Rings  Not Reportable   09/26/21  14:11    


 


Apolinar Cells  Not Reportable   09/26/21  14:11    


 


Bite Cells  Not Reportable   09/26/21  14:11    


 


Crenated Cell  Not Reportable   09/26/21  14:11    


 


Elliptocytes  Not Reportable   09/26/21  14:11    


 


Acanthocytes (Spur)  Not Reportable   09/26/21  14:11    


 


Rouleaux  Not Reportable   09/26/21  14:11    


 


Hemoglobin C Crystals  Not Reportable   09/26/21  14:11    


 


Schistocytes  Not Reportable   09/26/21  14:11    


 


Malaria parasites  Not Reportable   09/26/21  14:11    


 


Sami Bodies  Not Reportable   09/26/21  14:11    


 


Hem Pathologist Commnt  No   09/26/21  14:11    


 


D-Dimer  5410.10 ng/mlDDU (0-234)  H  10/01/21  16:03    


 


ABG pH  7.234  (7.320-7.450)  L  10/01/21  09:47    


 


POC ABG pCO2  90.5 mmHg (32.0-48.0)  H  10/01/21  09:47    


 


ABG pCO2  86.9 mm Hg  09/27/21  18:40    


 


POC ABG pO2  139.4 mmHg ()  H  10/01/21  09:47    


 


ABG pO2  81.8 mm Hg (80.0-90.0)   09/27/21  18:40    


 


POC ABG HCO3  37.4   10/01/21  09:47    


 


ABG HCO3  36.2 mmol/L (20.0-26.0)  H  09/27/21  18:40    


 


ABG O2 Saturation  99.2  (0-100)   10/01/21  09:47    


 


ABG O2 Content  10.4  (0.0-44)   09/27/21  18:40    


 


POC ABG Base Excess  7.9   10/01/21  09:47    


 


ABG Base Excess  7.4 mmol/L (-2.0-3.0)  H  09/27/21  18:40    


 


ABG Hemoglobin  9.5  (12.0-17.5)  L  10/01/21  09:47    


 


ABG Oxyhemoglobin  97.2  (94-98)   10/01/21  09:47    


 


ABG Carboxyhemoglobin  1.9 % (0.0-5.0)   09/27/21  18:40    


 


ABG Methemoglobin  0.3  (0.0-1.5)   10/01/21  09:47    


 


ABG Sodium  140.7 mmol/L (136.0-145.0)   10/01/21  09:47    


 


ABG Potassium  4.4 mmol/L (3.40-4.50)   10/01/21  09:47    


 


ABG Chloride  97.0 mmol/L ()  L  10/01/21  09:47    


 


ABG Glucose  179 mg/dL (65-95)  H  10/01/21  09:47    


 


Oxyhemoglobin  93.7 % (95.0-99.0)  L  09/27/21  18:40    


 


Carboxyhemoglobin  1.7  (0.5-1.5)  H  10/01/21  09:47    


 


FiO2  30 %  09/27/21  18:40    


 


FiO2 %  45.0   10/01/21  09:47    


 


Sodium  139 mmol/L (137-145)   10/01/21  08:55    


 


Potassium  4.6 mmol/L (3.6-5.0)   10/01/21  08:55    


 


Chloride  94.4 mmol/L ()  L  10/01/21  08:55    


 


Carbon Dioxide  37 mmol/L (22-30)  H  10/01/21  08:55    


 


Anion Gap  12 mmol/L  10/01/21  08:55    


 


BUN  68 mg/dL (7-17)  H  10/01/21  08:55    


 


Creatinine  1.5 mg/dL (0.6-1.2)  H  10/01/21  08:55    


 


Estimated GFR  48 ml/min  10/01/21  08:55    


 


BUN/Creatinine Ratio  45 %  10/01/21  08:55    


 


Glucose  178 mg/dL ()  H  10/01/21  08:55    


 


POC Glucose  150 mg/dL ()  H  10/01/21  21:52    


 


Osmolality  301 Mosm/kg  09/23/21  23:15    


 


Calcium  9.0 mg/dL (8.4-10.2)   10/01/21  08:55    


 


Ionized Calcium  3.6 mg/dL (4.8-5.6)  L  09/23/21  23:15    


 


Total Bilirubin  1.60 mg/dL (0.1-1.2)  H  10/01/21  08:55    


 


AST  172 units/L (5-40)  H  10/01/21  08:55    


 


ALT  437 units/L (7-56)  H  10/01/21  08:55    


 


Alkaline Phosphatase  92 units/L ()   10/01/21  08:55    


 


Total Creatine Kinase  1712 units/L ()  H  09/24/21  10:04    


 


Troponin T  0.078 ng/mL (0.00-0.029)  H D 09/22/21  13:03    


 


NT-Pro-B Natriuret Pep  7573 pg/mL (0-450)  H  09/21/21  22:04    


 


Total Protein  10.1 g/dL (6.3-8.2)  H  10/01/21  08:55    


 


Albumin  3.2 g/dL (3.9-5)  L  10/01/21  08:55    


 


Albumin/Globulin Ratio  0.5 %  10/01/21  08:55    


 


Triglycerides  105 mg/dL (2-149)   09/21/21  22:04    


 


Cholesterol  85 mg/dL ()   09/21/21  22:04    


 


LDL Cholesterol Direct  47 mg/dL ()  L  09/21/21  22:04    


 


HDL Cholesterol  25 mg/dL (40-59)  L  09/21/21  22:04    


 


Cholesterol/HDL Ratio  3.40 %  09/21/21  22:04    


 


Total Cortisol  41.3 mcg/dL (***)   09/24/21  10:04    


 


Arterial Blood Glucose  179 mg/dL (65-95)  H  10/01/21  09:47    


 


Arterial Blood Ionized Calcium  4.6 mg/dL (4.6-5.3)   09/30/21  07:32    


 


Urine Osmolality  157 Mosm/kg  09/23/21  18:46    


 


Urine Sodium  16 mmol/L  09/23/21  18:46    


 


Coronavirus (PCR)  Negative  (Negative)   09/28/21  Unknown


 


Hepatitis A IgM Ab  Non-reactive  (NonReactive)   09/28/21  18:45    


 


Hep Bs Antigen  Nonreactive  (Negative)   09/28/21  18:45    


 


Hepatitis C Antibody  Non-reactive  (NonReactive)   09/28/21  18:45    











Diamond/IV: 


                                        





Voiding Method                   Indwelling Catheter











Active Medications





- Current Medications


Current Medications: 














Generic Name Dose Route Start Last Admin





  Trade Name Freq  PRN Reason Stop Dose Admin


 


Acetaminophen  650 mg  09/22/21 03:00  10/01/21 06:42





  Acetaminophen 325 Mg Tab  PO   650 mg





  Q4H PRN   Administration





  Pain MILD(1-3)/Fever >100.5/HA  


 


Albuterol  2.5 mg  09/22/21 03:00 





  Albuterol 2.5 Mg/3 Ml Nebu  IH  





  Q4HRT PRN  





  Shortness Of Breath  


 


Albuterol/Ipratropium  1 ampul  09/22/21 08:00  10/02/21 08:47





  Ipratropium/Albuterol Sulfate 3 Ml Ampul.Neb  IH   1 ampul





  TIDRT DELL   Administration


 


Aspirin  325 mg  09/23/21 10:00  10/02/21 10:06





  Aspirin Ec 325 Mg Tab  PO   325 mg





  QDAY DELL   Administration


 


Atorvastatin Calcium  40 mg  09/22/21 22:00  10/02/21 03:50





  Atorvastatin 40 Mg Tab  PO   40 mg





  QHS DELL   Administration


 


Benzonatate  100 mg  09/22/21 06:00  10/02/21 06:26





  Benzonatate 100 Mg Cap  PO   100 mg





  Q8HR DELL   Administration


 


Furosemide  40 mg  09/30/21 18:00  10/02/21 06:25





  Furosemide 40 Mg/4 Ml Inj  IV   40 mg





  0600,1800 DELL   Administration


 


Guaifenesin  200 mg  09/26/21 14:47  10/01/21 06:43





  Guaifenesin 100 Mg/5 Ml Oral Liqd  PO   200 mg





  Q4H PRN   Administration





  Cough  


 


Heparin Sodium (Porcine)  5,000 unit  09/22/21 06:00  10/02/21 06:25





  Heparin 5,000 Unit/1 Ml Vial  SUB-Q   5,000 unit





  Q8HR DELL   Administration


 


Methylprednisolone Sodium Succinate  20 mg  09/30/21 10:00  10/02/21 10:06





  Methylprednisolone Sod Succinate 40 Mg/1 Ml Inj  IV   20 mg





  Q12HR DELL   Administration


 


Nitroglycerin  0.4 mg  09/22/21 03:00 





  Nitroglycerin 0.4 Mg Tab Subl  SL  





  Q5M PRN  





  Chest Pain  


 


Nystatin  1 applic  09/22/21 18:00  10/02/21 10:06





  Nystatin Powder 15 Gm  TP   1 applic





  BID DELL   Administration


 


Ondansetron HCl  4 mg  09/22/21 03:00  09/29/21 23:51





  Ondansetron 4 Mg/2 Ml Inj  IV   4 mg





  Q8H PRN   Administration





  Nausea And Vomiting  


 


Oxycodone/Acetaminophen  1 tab  09/22/21 03:00  10/01/21 21:19





  Oxycodone /Acetaminophen 5-325mg Tab  PO   1 tab





  Q6H PRN   Administration





  Pain, Moderate (4-6)  


 


Pantoprazole Sodium  40 mg  09/30/21 10:00  10/02/21 10:06





  Pantoprazole 40 Mg Inj  IV   40 mg





  QDAY DELL   Administration


 


Sodium Chloride  10 ml  09/22/21 10:00  10/02/21 10:07





  Sodium Chloride 0.9% 10 Ml Flush Syringe  IV   10 ml





  BID DELL   Administration


 


Sodium Chloride  10 ml  09/22/21 03:00 





  Sodium Chloride 0.9% 10 Ml Flush Syringe  IV  





  PRN PRN  





  LINE FLUSH  


 


Tramadol HCl  50 mg  09/22/21 03:00 





  Tramadol 50 Mg Tab  PO  





  Q6H PRN  





  Pain, Moderate (4-6)  














Nutrition/Malnutrition Assess





- Dietary Evaluation


Nutrition/Malnutrition Findings: 


                                        





Nutrition Notes                                            Start:  09/22/21 

14:21


Freq:                                                      Status: Active       




Protocol:                                                                       




 Document     09/27/21 17:08  GB  (Rec: 09/27/21 17:14  GB  [id number])


 Nutrition Notes


     Initial or Follow up                        Reassessment


     Current Diagnosis                           Hypertension


     Other Pertinent Diagnosis                   Dyspnea, respiratory distress,


                                                 morbid obesity


     Current Diet                                Regular


     Labs/Tests                                  9/27: Na 134, BUN 63,


                                                 creatinine 2.2, glucose 159,


                                                 Ca 7.8


     Pertinent Medications                       Lasix, NaCl IV flushes


     Height                                      5 ft 4 in


     Weight                                      287.5 kg


     Ideal Body Weight (kg)                      54.54


     BMI                                         108.8


     Weight change and time frame                expect weight fluctuations r/t


                                                 fluid therapy, CHF


                                                 complications


     Weight Status                               Morbidly Obese


     Subjective/Other Information                Per MD notes 9/27: fluid


                                                 restrictions, on nasal cannula


                                                 3L, possibly obesity


                                                 hypoventilation syndrome,


                                                 recommended to bariatric


                                                 consult


     Percent of energy/protein needs met:        Meals and snacks provided meet


                                                 100% of estimated energy


                                                 needs.  Current po intake of


                                                 meals recorded average 50%.


     Burn                                        Absent


     Trauma                                      Absent


     GI Symptoms                                 None


     Food Allergy                                No


     Current % PO                                Fair (50-74%)


     Minimum of two criteria                     No


     #1


      Nutrition Diagnosis                        Overweight/obesity


      Comments:                                  Discussed diet/life style


                                                 changes using small goal


                                                 successes to advancement,


                                                 encouraged outpatient RD


                                                 counseling at bariatric center


      Etiology                                   dyspnea, respiratory distress


      As Evidenced by Signs and Symptoms         , %


      Diagnosis Progress(for reassessment        Continues


       documentation)                            


     Is patient on ventilator?                   No


     Is Patient Ambulatory and/or Out of Bed     No


     REE-([place]-confined to bed)      [phone]


     Kcal/Kg value to use for calculation        10


     Approximate Energy Requirements Using       2875


      kcal/Kg                                    


     Calculation Used for Recommendations        Kcal/kg


     Additional Notes                            Protein 0.6 g/kg r/t BMI over


                                                 50: 163g


                                                 Fluids: 1ml/kcal or per MD


 Nutrition Intervention


     Change Diet Order:                          continue with current diet


     Goal #1                                     Pt to contact and make


                                                 appointment with outpatient RD


                                                 [facility]


     Goal #2                                     weight to decrease -1% during


                                                 LOS


     Follow-Up By:                               10/04/21


     Additional Comments                         f/u for weight loss, po intake

## 2021-10-03 LAB
BUN SERPL-MCNC: 76 MG/DL (ref 7–17)
BUN/CREAT SERPL: 48 %
CALCIUM SERPL-MCNC: 9.3 MG/DL (ref 8.4–10.2)
HCT VFR BLD CALC: 27.5 % (ref 30.3–42.9)
HEMOLYSIS INDEX: 1
HGB BLD-MCNC: 8.1 GM/DL (ref 10.1–14.3)
MCHC RBC AUTO-ENTMCNC: 30 % (ref 30–34)
MCV RBC AUTO: 73 FL (ref 79–97)
PLATELET # BLD: 224 K/MM3 (ref 140–440)
RBC # BLD AUTO: 3.79 M/MM3 (ref 3.65–5.03)

## 2021-10-03 RX ADMIN — HEPARIN SODIUM SCH UNIT: 5000 INJECTION, SOLUTION INTRAVENOUS; SUBCUTANEOUS at 06:30

## 2021-10-03 RX ADMIN — BENZONATATE SCH MG: 100 CAPSULE ORAL at 14:47

## 2021-10-03 RX ADMIN — METHYLPREDNISOLONE SODIUM SUCCINATE SCH MG: 40 INJECTION, POWDER, FOR SOLUTION INTRAMUSCULAR; INTRAVENOUS at 10:00

## 2021-10-03 RX ADMIN — NYSTATIN SCH APPLIC: 100000 POWDER TOPICAL at 09:59

## 2021-10-03 RX ADMIN — IPRATROPIUM BROMIDE AND ALBUTEROL SULFATE SCH AMPUL: .5; 3 SOLUTION RESPIRATORY (INHALATION) at 20:18

## 2021-10-03 RX ADMIN — IPRATROPIUM BROMIDE AND ALBUTEROL SULFATE SCH AMPUL: .5; 3 SOLUTION RESPIRATORY (INHALATION) at 09:04

## 2021-10-03 RX ADMIN — HEPARIN SODIUM SCH UNIT: 5000 INJECTION, SOLUTION INTRAVENOUS; SUBCUTANEOUS at 14:47

## 2021-10-03 RX ADMIN — Medication SCH ML: at 10:00

## 2021-10-03 RX ADMIN — PANTOPRAZOLE SODIUM SCH MG: 40 INJECTION, POWDER, FOR SOLUTION INTRAVENOUS at 10:00

## 2021-10-03 RX ADMIN — Medication SCH ML: at 21:36

## 2021-10-03 RX ADMIN — BENZONATATE SCH MG: 100 CAPSULE ORAL at 21:18

## 2021-10-03 RX ADMIN — METHYLPREDNISOLONE SODIUM SUCCINATE SCH MG: 40 INJECTION, POWDER, FOR SOLUTION INTRAMUSCULAR; INTRAVENOUS at 21:18

## 2021-10-03 RX ADMIN — IPRATROPIUM BROMIDE AND ALBUTEROL SULFATE SCH AMPUL: .5; 3 SOLUTION RESPIRATORY (INHALATION) at 13:39

## 2021-10-03 RX ADMIN — NYSTATIN SCH APPLIC: 100000 POWDER TOPICAL at 21:36

## 2021-10-03 RX ADMIN — ASPIRIN SCH MG: 325 TABLET, COATED ORAL at 10:00

## 2021-10-03 RX ADMIN — BENZONATATE SCH: 100 CAPSULE ORAL at 06:31

## 2021-10-03 RX ADMIN — HEPARIN SODIUM SCH UNIT: 5000 INJECTION, SOLUTION INTRAVENOUS; SUBCUTANEOUS at 21:18

## 2021-10-03 RX ADMIN — HALOPERIDOL LACTATE PRN MG: 5 INJECTION, SOLUTION INTRAMUSCULAR at 22:52

## 2021-10-03 NOTE — PROGRESS NOTE
Subjective


Date of service: 10/03/21


Principal diagnosis: Ac hypoxemic and hypercapnic resp failure; AE-CHF; Morbid 

obesity; FABIAN/OHS


Interval history: 





Assessment


Acute kidney injury, unknown baseline. Renal ultrasound notes poor visualization

due to body habitus.


Hypernatremia


Hyperkalemia


Hypocalcemia


Morbid obesity











Recommendations


Na is better today


stopped Na tabls


cr is stable today


echo noted, likely diastolic disease


rec lasix 40mg po bid, if diuresis desired, no need for high dose iv


Maintain MAP more than 65


Hold ACE/ARB at this time


Hold diuretics given soft pressures


Renally dose medications


Avoid nephrotoxins


Renal diet


will follow lytes prn





Subjective:


Principal diagnosis: Acute on chronic renal insufficiency


Interval history: 


Patient was seen for her renal issues


Nursing, interdisciplinary and consult notes were reviewed


Vitals, input and output, medications and labs were reviewed











Objective





- Exam


Narrative Exam: 


General: Morbid obesity


HEENT: Oral mucosa moist


Neck: Supple, no JVD


Chest: Clear to auscultation bilaterally


Heart: RRR, S1 and S2, no pericardial rub


Abdomen: Soft, nontender, no renal bruit


Extremity: No peripheral cyanosis, edema


Neurological: Awake and alert


Dermatology: Unable to assess


Psych: Calm and cooperative


Musculoskeletal: No joint effusion





Objective





- Vital Signs


Vital signs: 


                               Vital Signs - 12hr











  10/02/21 10/02/21 10/02/21





  22:25 22:30 23:00


 


Temperature   


 


Pulse Rate 123 H 117 H 106 H


 


Pulse Rate [   





Anterior   





Bilateral   





Throughout]   


 


Pulse Rate [   





From Monitor]   


 


Respiratory 30 H 25 H 27 H





Rate   


 


Respiratory   





Rate [Anterior   





Bilateral   





Throughout]   


 


Blood Pressure 129/72 139/82 137/74


 


O2 Sat by Pulse 94 97 95





Oximetry   














  10/02/21 10/02/21 10/03/21





  23:03 23:30 00:00


 


Temperature   97.9 F


 


Pulse Rate 105 H 101 H 104 H


 


Pulse Rate [   





Anterior   





Bilateral   





Throughout]   


 


Pulse Rate [   103 H





From Monitor]   


 


Respiratory 27 H 25 H 20





Rate   


 


Respiratory   





Rate [Anterior   





Bilateral   





Throughout]   


 


Blood Pressure 137/74 133/78 135/76


 


O2 Sat by Pulse 95 93 93





Oximetry   














  10/03/21 10/03/21 10/03/21





  00:04 00:30 01:00


 


Temperature   


 


Pulse Rate 105 H 100 H 96 H


 


Pulse Rate [   





Anterior   





Bilateral   





Throughout]   


 


Pulse Rate [   





From Monitor]   


 


Respiratory 31 H 27 H 24





Rate   


 


Respiratory   





Rate [Anterior   





Bilateral   





Throughout]   


 


Blood Pressure 135/76 143/87 139/83


 


O2 Sat by Pulse 93 96 97





Oximetry   














  10/03/21 10/03/21 10/03/21





  01:30 02:00 02:30


 


Temperature   


 


Pulse Rate 99 H 100 H 97 H


 


Pulse Rate [   





Anterior   





Bilateral   





Throughout]   


 


Pulse Rate [   





From Monitor]   


 


Respiratory 30 H 15 8 L





Rate   


 


Respiratory   





Rate [Anterior   





Bilateral   





Throughout]   


 


Blood Pressure 146/91 145/91 142/90


 


O2 Sat by Pulse 97 95 99





Oximetry   














  10/03/21 10/03/21 10/03/21





  03:00 03:30 04:00


 


Temperature   97.9 F


 


Pulse Rate 95 H 96 H 95 H


 


Pulse Rate [   





Anterior   





Bilateral   





Throughout]   


 


Pulse Rate [   95 H





From Monitor]   


 


Respiratory 20 31 H 30 H





Rate   


 


Respiratory   





Rate [Anterior   





Bilateral   





Throughout]   


 


Blood Pressure 146/92 150/93 143/94


 


O2 Sat by Pulse 99 99 100





Oximetry   














  10/03/21 10/03/21 10/03/21





  04:30 04:33 05:00


 


Temperature   


 


Pulse Rate 94 H 94 H 100 H


 


Pulse Rate [   





Anterior   





Bilateral   





Throughout]   


 


Pulse Rate [   





From Monitor]   


 


Respiratory 18 31 H 18





Rate   


 


Respiratory   





Rate [Anterior   





Bilateral   





Throughout]   


 


Blood Pressure 143/93 143/93 142/90


 


O2 Sat by Pulse 99 99 95





Oximetry   














  10/03/21 10/03/21 10/03/21





  05:30 06:00 06:30


 


Temperature   


 


Pulse Rate 94 H 92 H 95 H


 


Pulse Rate [   





Anterior   





Bilateral   





Throughout]   


 


Pulse Rate [   





From Monitor]   


 


Respiratory 10 L 21 12





Rate   


 


Respiratory   





Rate [Anterior   





Bilateral   





Throughout]   


 


Blood Pressure 132/84 135/85 142/84


 


O2 Sat by Pulse 97 98 95





Oximetry   














  10/03/21 10/03/21 10/03/21





  07:00 07:03 07:30


 


Temperature  97.7 F 


 


Pulse Rate 93 H  90


 


Pulse Rate [   





Anterior   





Bilateral   





Throughout]   


 


Pulse Rate [   





From Monitor]   


 


Respiratory 30 H  22





Rate   


 


Respiratory   





Rate [Anterior   





Bilateral   





Throughout]   


 


Blood Pressure 142/89  130/79


 


O2 Sat by Pulse 98  98





Oximetry   














  10/03/21 10/03/21 10/03/21





  08:00 08:30 09:00


 


Temperature   


 


Pulse Rate 91 H 91 H 93 H


 


Pulse Rate [ 98 H  





Anterior   





Bilateral   





Throughout]   


 


Pulse Rate [   





From Monitor]   


 


Respiratory 16 12 19





Rate   


 


Respiratory 16  





Rate [Anterior   





Bilateral   





Throughout]   


 


Blood Pressure 150/91 141/88 133/81


 


O2 Sat by Pulse 100 97 99





Oximetry   














- Lab





                                 10/03/21 04:40





                                 10/03/21 04:40


                             Most recent lab results











ABG pH  7.382  (7.320-7.450)   10/02/21  14:19    


 


ABG pCO2  86.9 mm Hg  09/27/21  18:40    


 


ABG pO2  81.8 mm Hg (80.0-90.0)   09/27/21  18:40    


 


ABG HCO3  36.2 mmol/L (20.0-26.0)  H  09/27/21  18:40    


 


ABG O2 Saturation  99.4  (0-100)   10/02/21  14:19    


 


Calcium  9.3 mg/dL (8.4-10.2)   10/03/21  04:40    


 


Urine Sodium  16 mmol/L  09/23/21  18:46    














Medications & Allergies





- Medications


Allergies/Adverse Reactions: 


                                    Allergies





No Known Allergies Allergy (Unverified 07/13/17 11:17)


   








Home Medications: 


                                Home Medications











 Medication  Instructions  Recorded  Confirmed  Last Taken  Type


 


Acetaminophen [Acetaminophen TAB] 325 mg PO Q4H PRN #30 tablet 12/14/17 01/22/21

Unknown Rx


 


ALBUTEROL NEB's [Proventil 0.083% 2.5 mg IH Q3HRT PRN #30 day 01/24/21  Unknown 

Rx





NEBS]     


 


Albuterol Mdi (or & Nicu Only) 2 puff IH QID PRN #1 inhalation 01/24/21  Unknown

Rx





[ProAir HFA Inhaler]     


 


Azithromycin [Zithromax Z-JAVIER] 0 mg PO DAILY #1 tab 01/24/21  Unknown Rx


 


Benzonatate [Tessalon Perles] 100 mg PO Q8HR #15 capsule 01/24/21  Unknown Rx


 


Famotidine [Pepcid] 20 mg PO BID #14 tablet 01/24/21  Unknown Rx


 


Ipratropium/Albuterol Sulfate 1 ampul IH TIDRT #30 day 01/24/21  Unknown Rx





[DUONEB *Not for PRN Use*]     


 


hydroCHLOROthiazide [HCTZ] 25 mg PO QDAY  tablet 01/24/21  Unknown Rx


 


hydroCHLOROthiazide [HCTZ] 25 mg PO QDAY #30 tablet 01/24/21  Unknown Rx


 


methylPREDNISolone [Medrol 4MG 4 mg PO DAILY #1 tab.ds.pk 01/24/21  Unknown Rx





DOSEPAK (21 tabs)]     











Active Medications: 














Generic Name Dose Route Start Last Admin





  Trade Name Freq  PRN Reason Stop Dose Admin


 


Acetaminophen  650 mg  09/22/21 03:00  10/01/21 06:42





  Acetaminophen 325 Mg Tab  PO   650 mg





  Q4H PRN   Administration





  Pain MILD(1-3)/Fever >100.5/HA  


 


Albuterol  2.5 mg  09/22/21 03:00 





  Albuterol 2.5 Mg/3 Ml Nebu  IH  





  Q4HRT PRN  





  Shortness Of Breath  


 


Albuterol/Ipratropium  1 ampul  09/22/21 08:00  10/03/21 09:04





  Ipratropium/Albuterol Sulfate 3 Ml Ampul.Neb  IH   1 ampul





  TIDRT DELL   Administration


 


Aspirin  325 mg  09/23/21 10:00  10/03/21 10:00





  Aspirin Ec 325 Mg Tab  PO   325 mg





  QDAY [facility]   Administration


 


Atorvastatin Calcium  40 mg  09/22/21 22:00  10/02/21 21:06





  Atorvastatin 40 Mg Tab  PO   40 mg





  QHS DELL   Administration


 


Benzonatate  100 mg  09/22/21 06:00  10/03/21 06:31





  Benzonatate 100 Mg Cap  PO   Not Given





  Q8HR [facility]  


 


Guaifenesin  200 mg  09/26/21 14:47  10/01/21 06:43





  Guaifenesin 100 Mg/5 Ml Oral Liqd  PO   200 mg





  Q4H PRN   Administration





  Cough  


 


Haloperidol Lactate  5 mg  10/02/21 16:27 





  Haloperidol Lactate 5 Mg/1 Ml Inj  IV  





  Q6H PRN  





  Agitation  


 


Heparin Sodium (Porcine)  5,000 unit  10/02/21 14:00  10/03/21 06:30





  Heparin 5,000 Unit/1 Ml Vial  SUB-Q   5,000 unit





  Q8HR DELL   Administration


 


Levothyroxine Sodium  100 mcg  10/04/21 06:00 





  Levothyroxine 100 Mcg Inj  IV  





  DAILY@0600 DELL  


 


Levothyroxine Sodium  100 mcg  10/03/21 10:00  10/03/21 10:01





  Levothyroxine 100 Mcg Inj  IV  10/03/21 14:00  100 mcg





  ONCE@1000 NR   Administration


 


Methylprednisolone Sodium Succinate  20 mg  09/30/21 10:00  10/03/21 10:00





  Methylprednisolone Sod Succinate 40 Mg/1 Ml Inj  IV   20 mg





  Q12HR DELL   Administration


 


Nitroglycerin  0.4 mg  09/22/21 03:00 





  Nitroglycerin 0.4 Mg Tab Subl  SL  





  Q5M PRN  





  Chest Pain  


 


Nystatin  1 applic  09/22/21 18:00  10/03/21 09:59





  Nystatin Powder 15 Gm  TP   1 applic





  BID DELL   Administration


 


Ondansetron HCl  4 mg  09/22/21 03:00  09/29/21 23:51





  Ondansetron 4 Mg/2 Ml Inj  IV   4 mg





  Q8H PRN   Administration





  Nausea And Vomiting  


 


Oxycodone/Acetaminophen  1 tab  09/22/21 03:00  10/01/21 21:19





  Oxycodone /Acetaminophen 5-325mg Tab  PO   1 tab





  Q6H PRN   Administration





  Pain, Moderate (4-6)  


 


Pantoprazole Sodium  40 mg  09/30/21 10:00  10/03/21 10:00





  Pantoprazole 40 Mg Inj  IV   40 mg





  QDAY DELL   Administration


 


Quetiapine Fumarate  75 mg  10/02/21 22:00  10/02/21 21:06





  Quetiapine 25 Mg Tab  PO   75 mg





  QHS DELL   Administration


 


Sodium Chloride  10 ml  09/22/21 10:00  10/03/21 10:00





  Sodium Chloride 0.9% 10 Ml Flush Syringe  IV   10 ml





  BID DELL   Administration


 


Sodium Chloride  10 ml  09/22/21 03:00 





  Sodium Chloride 0.9% 10 Ml Flush Syringe  IV  





  PRN PRN  





  LINE FLUSH  


 


Tramadol HCl  50 mg  09/22/21 03:00 





  Tramadol 50 Mg Tab  PO  





  Q6H PRN  





  Pain, Moderate (4-6)

## 2021-10-03 NOTE — PROGRESS NOTE
Assessment and Plan


Assessment and plan: 


(1) Non-ST elevation MI (NSTEMI) type II Procardia


Current Visit: Yes   Status: Acute   


Plan to address problem: 


Admit the patient to the medical telemetry. Aspirin 325 mg p.o. daily. Lipitor 

40 mg p.o. daily. Nitroglycerin as needed. We do the serial cardiac enzyme. We 

also consult cardiology for evaluation and order echocardiogram.








(2) Acute exacerbation of CHF (congestive heart failure)


Current Visit: Yes   Status: Acute   


Plan to address problem: 


Fluid restriction. Maintain input output. Lasix 40 mg IV every 12 hours. Oxygen 

via nasal cannula 3 L/min. DuoNeb by nebulizer every 4 hours. Echocardiogram. 

Cardiology consult








(3) hypothyroidism -new diagnosis





(4) COPD (chronic obstructive pulmonary disease)


Current Visit: Yes   Status: Acute   


Plan to address problem: 


Oxygen by nasal cannula 3 L/min. DuoNeb via nebulizer every 4 hours. Albuterol 

via nebulizer every 4 hours as needed.





Super


(5) Morbid obesity


Current Visit: Yes   Status: Acute   


Plan to address problem: 


We counseled regarding weight reduction. Outpatient follow-up with bariatric 

surgeon








(6) Hyperglycemia


Current Visit: Yes   Status: Acute   


Plan to address problem: 


We will give 1 ampoule of D50. We will put the patient on cardiac diet. We will 

monitor the blood glucose closely








(7) acute kidney injury with vasomotor nephropathy





(8) Lymphedema of the abdominal wall





(9)Anemia of chronic disease





(10)Possible passive congestive hepatic failure with underlying NAFLD








(11) hypertension


Current Visit: Yes   Status: Acute   


Plan to address problem: 


We will continue the home medication. Hydralazine 10 mg IV every 6 hours as 

needed. We will monitor the blood pressure closely





(12) DVT prophylaxis


Current Visit: No   Status: Acute   


Plan to address problem: 


Heparin 5000 units subcu every 8 hours for DVT prophylaxis. Pepcid 20 mg p.o. 

twice daily for GI prophylaxis. Patient is a full code





9/23


-Patient is on heparin drip for non-STEMI, cardiology is following.


-Patient is off Lasix and ACE inhibitors because of [place].


-Kidney function is worsening overnight and I put a consult for nephrology.


-Patient is hypotensive and blood pressure from this morning was 101/41.  We 

will continue to monitor.  And if it is dropping we we will give him fluid.


-Echo was done and EF of 50 to 55%.  Diastolic dysfunction is indeterminate.  

Management per cardiology


-Patient has COPD continues on dexamethasone, nebulizer treatment as needed.


-Patient has hyponatremia and hyperkalemia.  I give the patient Kayexalate.  

Monitor electrolytes.  Will follow nephrology for additional recommendation.





9/24


-Renal function is worsening, nephrology is following


-Blood pressure is better today


-Hyperkalemia; I added Kayexalate


-Morbid obesity








9/25


-Slight improvement in creatinine.  Hyponatremia worsened. Nephrology is 

following and put the patient back on Lasix.


-Blood PRESSURE IS BETTER today


-Potassium this morning was 5.8, I ordered 60 g of Kayexalate


-Morbidly obese


-Obesity hypoventilation syndrome





9/26


-Creatinine is improving and this morning was 2.4


-Hyponatremia improved this morning was 134.  Patient is on Lasix


-Blood pressure is better


-Patient is on 10 L of oxygen; worsened 


-Morbidly obese, FABIAN








9/27: Follow ordered ultrasound of abdomen, per Physician unable to get CT. I 

ordered an ABG due to my concern about her breathing pattern this morning, 

patient may benefit from. Will try to establish if patient has a primary 

pulmonary doctor. ABG is showing consistent with Respiratory Acidosis. Will 

place on BIPAP now. May need to transfer to IMCU for closer monitoring. Patient 

likely with Obesity Hypoventilation syndrome.


cardiology work up, ongoing. 


RENAL SHOWING SOME IMPROVEMENT


Guarded prognosis





9/28: Patient seen and examined today identified some lesion under the pannus 

will obtain wound care and wound surgeon evaluation.  Nephrology input noted 

continue diuresis and stop the sodium tabs creatinine is stable.  I did discuss 

with the family and updated them.  We will continue to hold ACE and ARB and if 

pressures continue to drop may need to hold diuresis also despite as written 

above.  Monitor labs including H&H.  She is more awake review of ABG shows 

improving CO2.  Will discuss with case management as patient may need BiPAP on 

discharge home.  I also updated the family








9/29: Patient showing some clinical improvement.  Liver enzymes is showing mild 

improvement although bilirubin increased slightly.  GI input noted and as 

discussed by his notes below


"Abnormal liver enzymes in hepatocellular pattern.  broad ddx and likely 

multifactorial causes including NAFLD, congestive hepatopathy, possibly DILI.  

will check viral hep serologies.  US with limited views, possible coarsening of 

the liver"


Patient also evaluated by surgery noted with the lymphedema of abdominal wall 

and open wound of the abdominal wall without penetration into the abdominal 

cavity with recommendation to pack the wounds daily with mesalt. Need to 

maintain dry environment discussed with nursing staff.  Elevate the pannus and 

optimize nutrition for which I will get nutritional consult.


No surgical intervention at this time.  Patient is bedbound when I asked when 

last she ambulated she said while "I guess he has not worked" but it has been a 

while.  Unfortunately the LTAC center unwilling to accept the patient will 

discuss with pulmonary and with case management about obtaining BiPAP for this 

patient and possible SNF referral.





09/30: Patient this morning and was noted significantly lethargic and 

unresponsive respiratory and a code to be called.  The patient resuscitated 

without any invasive procedure.  ABG was obtained showed a CO2 of 142.  Despite 

using BiPAP for some time through the night, sure how long she used it for.  I 

have discontinued all IV opioids and recommend no IV opioids for this patient at

this time.  I also discontinued p.o. medications as an essential medication and 

change to IV.  We will repeat an ABG in an hour to see if there is some 

improvement.  Patient remains at high risk for possible intubation.  Clinical 

condition is guarded





10/1: Patient remains on BiPAP this am, has some intermittent twitching, will 

check EEG, not likely seizure, but will monitor. Continue current management


, check Albumin and Pre-Albumin level.


Remains critically ill. Liver status showing improvement.





10/2: Patient with elevated D-dimer unfortunately size precludes being able to 

have a CTA chest done here to rule out pulmonary embolism.  Doppler of lower 

extremities is pending. We will continue subcu anticoagulation with heparin at 

this time. Until Doppler is resolved. Patient is not hypoxic so doubt pulmonary 

embolism at this time. Her problem remains hypercapnia. I did take time to go 

over her history while she has been around hospital in the past and noticed a 

remarkable increase in her BMI from the last 2 years. Discussed with the 

intensivist will agree to check thyroid function test. Critical care time 35-

minute





10/3: Patient with profound hypothyroidism, while she does not appear to be with

myxedema coma at this time. Will initiate levothyroxine as I believe that this 

will make profound impact  her management at this time, continue BiPAP continue 

current management. Seroquel was added by pulmonary for delirium and agitation 

management, Ventimask during the day and BiPAP at night








History


Interval history: 


Patient seen and examined, remains on BiPAP, no further twitching but delirious 

intermittently. Per nursing staff no clinical change





Hospitalist Physical





- Physical exam


Narrative exam: 


VITAL SIGNS:  Reviewed.    


GENERAL:  The patient is morbidly obese,  lethargic on BiPAP


Placed she is waking up some, Vital signs as documented.


HEAD:  No signs of head trauma.


EYES:  Pupils are equal.  Extraocular motions intact.  


EARS:  Hearing grossly intact.


MOUTH:  Oropharynx is normal. 


NECK:  No adenopathy, no JVD.  


CHEST:  Chest with diminished breath sounds bilaterally.  No wheezes, rales, or 

rhonchi.  


CARDIAC:  Regular rate and rhythm.  S1 and S2, without murmurs, gallops, or 

rubs.


VASCULAR:  No Edema.  Peripheral pulses normal and equal in all extremities.


ABDOMEN:  enlarged pannus, lymphedema with thickened skin, non tender and non 

distended.  No   rebound or guarding, and no masses palpated.   Bowel Sounds 

normal.


MUSCULOSKELETAL:  Good range of motion of all major joints. Extremities without 

clubbing, cyanosis or edema.  


NEUROLOGIC EXAM: Lethargic but oriented x3   no focal sensory or strength 

deficits.   Speech normal.  Follows some commands.


PSYCHIATRIC:  Mood normal.


SKIN:  detail exam as documented in skin assessment








- Constitutional


Vitals: 


                                        











Temp Pulse Resp BP Pulse Ox


 


 97.7 F   93 H  19   133/81   99 


 


 10/03/21 07:03  10/03/21 09:00  10/03/21 09:00  10/03/21 09:00  10/03/21 09:00











General appearance: Present: no acute distress





HEART Score





- HEART Score


Troponin: 


                                        











Troponin T  0.078 ng/mL (0.00-0.029)  H D 09/22/21  13:03    














Results





- Labs


CBC & Chem 7: 


                                 10/03/21 04:40





                                 10/03/21 04:40


Labs: 


                             Laboratory Last Values











WBC  8.1 K/mm3 (4.5-11.0)   10/03/21  04:40    


 


RBC  3.79 M/mm3 (3.65-5.03)   10/03/21  04:40    


 


Hgb  8.1 gm/dl (10.1-14.3)  L  10/03/21  04:40    


 


Hct  27.5 % (30.3-42.9)  L  10/03/21  04:40    


 


MCV  73 fl (79-97)  L  10/03/21  04:40    


 


MCH  22 pg (28-32)  L  10/03/21  04:40    


 


MCHC  30 % (30-34)   10/03/21  04:40    


 


RDW  25.4 % (13.2-15.2)  H  10/03/21  04:40    


 


Plt Count  224 K/mm3 (140-440)   10/03/21  04:40    


 


Add Manual Diff  Complete   09/26/21  14:11    


 


Total Counted  100   09/26/21  14:11    


 


Seg Neuts % (Manual)  86.0 % (40.0-70.0)  H  09/26/21  14:11    


 


Band Neutrophils %  1.0 %  09/26/21  14:11    


 


Lymphocytes % (Manual)  6.0 % (13.4-35.0)  L  09/26/21  14:11    


 


Reactive Lymphs % (Man)  1.0 %  09/26/21  14:11    


 


Monocytes % (Manual)  4.0 % (0.0-7.3)   09/26/21  14:11    


 


Metamyelocytes %  2.0 %  09/26/21  14:11    


 


Myelocytes %  2.0 %  09/22/21  04:03    


 


Nucleated RBC %  Not Reportable   09/26/21  14:11    


 


Seg Neutrophils # Man  12.6 K/mm3 (1.8-7.7)  H  09/26/21  14:11    


 


Band Neutrophils #  0.1 K/mm3  09/26/21  14:11    


 


Lymphocytes # (Manual)  0.9 K/mm3 (1.2-5.4)  L  09/26/21  14:11    


 


Abs React Lymphs (Man)  0.1 K/mm3  09/26/21  14:11    


 


Monocytes # (Manual)  0.6 K/mm3 (0.0-0.8)   09/26/21  14:11    


 


Eosinophils # (Manual)  0.0 K/mm3 (0.0-0.4)   09/26/21  14:11    


 


Basophils # (Manual)  0.0 K/mm3 (0.0-0.1)   09/26/21  14:11    


 


Metamyelocytes #  0.3 K/mm3  09/26/21  14:11    


 


Myelocytes #  0.0 K/mm3  09/26/21  14:11    


 


Promyelocytes #  0.0 K/mm3  09/26/21  14:11    


 


Blast Cells #  0.0 K/mm3  09/26/21  14:11    


 


WBC Morphology  Not Reportable   09/26/21  14:11    


 


WBC Morphology  TNR   09/26/21  14:11    


 


Hypersegmented Neuts  Not Reportable   09/26/21  14:11    


 


Hyposegmented Neuts  Not Reportable   09/26/21  14:11    


 


Hypogranular Neuts  Not Reportable   09/26/21  14:11    


 


Smudge Cells  Not Reportable   09/26/21  14:11    


 


Toxic Granulation  Not Reportable   09/26/21  14:11    


 


Toxic Vacuolation  Not Reportable   09/26/21  14:11    


 


Dohle Bodies  Not Reportable   09/26/21  14:11    


 


Pelger-Huet Anomaly  Not Reportable   09/26/21  14:11    


 


Ac Rods  Not Reportable   09/26/21  14:11    


 


Platelet Estimate  Consistent w auto   09/26/21  14:11    


 


Clumped Platelets  Not Reportable   09/26/21  14:11    


 


Plt Clumps, EDTA  Not Reportable   09/26/21  14:11    


 


Large Platelets  Not Reportable   09/26/21  14:11    


 


Giant Platelets  Not Reportable   09/26/21  14:11    


 


Platelet Satelliting  Not Reportable   09/26/21  14:11    


 


Plt Morphology Comment  Not Reportable   09/26/21  14:11    


 


RBC Morphology  Not Reportable   09/26/21  14:11    


 


Dimorphic RBCs  Not Reportable   09/26/21  14:11    


 


Polychromasia  Few   09/26/21  14:11    


 


Hypochromasia  1+   09/26/21  14:11    


 


Poikilocytosis  Not Reportable   09/26/21  14:11    


 


Anisocytosis  Not Reportable   09/26/21  14:11    


 


Microcytosis  Not Reportable   09/26/21  14:11    


 


Macrocytosis  Not Reportable   09/26/21  14:11    


 


Spherocytes  Not Reportable   09/26/21  14:11    


 


Pappenheimer Bodies  Not Reportable   09/26/21  14:11    


 


Sickle Cells  Not Reportable   09/26/21  14:11    


 


Target Cells  1+   09/26/21  14:11    


 


Tear Drop Cells  Few   09/26/21  14:11    


 


Ovalocytes  Not Reportable   09/26/21  14:11    


 


Helmet Cells  Not Reportable   09/26/21  14:11    


 


[place] Bodies  Not Reportable   09/26/21  14:11    


 


Cabot Rings  Not Reportable   09/26/21  14:11    


 


[place] Cells  Not Reportable   09/26/21  14:11    


 


Bite Cells  Not Reportable   09/26/21  14:11    


 


Crenated Cell  Not Reportable   09/26/21  14:11    


 


Elliptocytes  Not Reportable   09/26/21  14:11    


 


Acanthocytes (Spur)  Not Reportable   09/26/21  14:11    


 


Rouleaux  Not Reportable   09/26/21  14:11    


 


Hemoglobin C Crystals  Not Reportable   09/26/21  14:11    


 


Schistocytes  Not Reportable   09/26/21  14:11    


 


Malaria parasites  Not Reportable   09/26/21  14:11    


 


Sami Bodies  Not Reportable   09/26/21  14:11    


 


Hem Pathologist Commnt  No   09/26/21  14:11    


 


PT  18.7 Sec. (12.2-14.9)  H  10/02/21  16:55    


 


INR  1.51  (0.87-1.13)  H  10/02/21  16:55    


 


APTT  25.2 Sec. (24.2-36.6)   10/02/21  16:55    


 


D-Dimer  5410.10 ng/mlDDU (0-234)  H  10/01/21  16:03    


 


ABG pH  7.382  (7.320-7.450)   10/02/21  14:19    


 


POC ABG pCO2  67.4 mmHg (32.0-48.0)  H  10/02/21  14:19    


 


ABG pCO2  86.9 mm Hg  09/27/21  18:40    


 


POC ABG pO2  150.6 mmHg ()  H  10/02/21  14:19    


 


ABG pO2  81.8 mm Hg (80.0-90.0)   09/27/21  18:40    


 


POC ABG HCO3  39.2   10/02/21  14:19    


 


ABG HCO3  36.2 mmol/L (20.0-26.0)  H  09/27/21  18:40    


 


ABG O2 Saturation  99.4  (0-100)   10/02/21  14:19    


 


ABG O2 Content  10.4  (0.0-44)   09/27/21  18:40    


 


POC ABG Base Excess  12.2   10/02/21  14:19    


 


ABG Base Excess  7.4 mmol/L (-2.0-3.0)  H  09/27/21  18:40    


 


ABG Hemoglobin  9.3  (12.0-17.5)  L  10/02/21  14:19    


 


ABG Oxyhemoglobin  97.5  (94-98)   10/02/21  14:19    


 


ABG Carboxyhemoglobin  1.9 % (0.0-5.0)   09/27/21  18:40    


 


ABG Methemoglobin  0.3  (0.0-1.5)   10/02/21  14:19    


 


ABG Sodium  143.0 mmol/L (136.0-145.0)   10/02/21  14:19    


 


ABG Potassium  4.2 mmol/L (3.40-4.50)   10/02/21  14:19    


 


ABG Chloride  98.0 mmol/L ()   10/02/21  14:19    


 


ABG Glucose  162 mg/dL (65-95)  H  10/02/21  14:19    


 


Oxyhemoglobin  93.7 % (95.0-99.0)  L  09/27/21  18:40    


 


Carboxyhemoglobin  1.6  (0.5-1.5)  H  10/02/21  14:19    


 


FiO2  30 %  09/27/21  18:40    


 


FiO2 %  35.0   10/02/21  14:19    


 


Sodium  144 mmol/L (137-145)   10/03/21  04:40    


 


Potassium  4.8 mmol/L (3.6-5.0)   10/03/21  04:40    


 


Chloride  98.1 mmol/L ()   10/03/21  04:40    


 


Carbon Dioxide  37 mmol/L (22-30)  H D 10/03/21  04:40    


 


Anion Gap  14 mmol/L  10/03/21  04:40    


 


BUN  76 mg/dL (7-17)  H  10/03/21  04:40    


 


Creatinine  1.6 mg/dL (0.6-1.2)  H  10/03/21  04:40    


 


Estimated GFR  45 ml/min  10/03/21  04:40    


 


BUN/Creatinine Ratio  48 %  10/03/21  04:40    


 


Glucose  173 mg/dL ()  H  10/03/21  04:40    


 


POC Glucose  157 mg/dL ()  H  10/02/21  22:19    


 


Osmolality  301 Mosm/kg  09/23/21  23:15    


 


Calcium  9.3 mg/dL (8.4-10.2)   10/03/21  04:40    


 


Ionized Calcium  3.6 mg/dL (4.8-5.6)  L  09/23/21  23:15    


 


Total Bilirubin  1.60 mg/dL (0.1-1.2)  H  10/01/21  08:55    


 


AST  172 units/L (5-40)  H  10/01/21  08:55    


 


ALT  437 units/L (7-56)  H  10/01/21  08:55    


 


Alkaline Phosphatase  92 units/L ()   10/01/21  08:55    


 


Total Creatine Kinase  1712 units/L ()  H  09/24/21  10:04    


 


Troponin T  0.078 ng/mL (0.00-0.029)  H D 09/22/21  13:03    


 


NT-Pro-B Natriuret Pep  7573 pg/mL (0-450)  H  09/21/21  22:04    


 


Total Protein  10.1 g/dL (6.3-8.2)  H  10/01/21  08:55    


 


Albumin  3.2 g/dL (3.9-5)  L  10/01/21  08:55    


 


Albumin/Globulin Ratio  0.5 %  10/01/21  08:55    


 


Triglycerides  105 mg/dL (2-149)   09/21/21  22:04    


 


Cholesterol  85 mg/dL ()   09/21/21  22:04    


 


LDL Cholesterol Direct  47 mg/dL ()  L  09/21/21  22:04    


 


HDL Cholesterol  25 mg/dL (40-59)  L  09/21/21  22:04    


 


Cholesterol/HDL Ratio  3.40 %  09/21/21  22:04    


 


TSH  6.140 mlU/mL (0.270-4.200)  H  10/02/21  19:55    


 


Free T4  0.67 ng/dL (0.76-1.46)  L  10/02/21  19:55    


 


Total Cortisol  41.3 mcg/dL (***)   09/24/21  10:04    


 


Arterial Blood Glucose  162 mg/dL (65-95)  H  10/02/21  14:19    


 


Arterial Blood Ionized Calcium  4.6 mg/dL (4.6-5.3)   09/30/21  07:32    


 


Urine Osmolality  157 Mosm/kg  09/23/21  18:46    


 


Urine Sodium  16 mmol/L  09/23/21  18:46    


 


Coronavirus (PCR)  Negative  (Negative)   09/28/21  Unknown


 


Hepatitis A IgM Ab  Non-reactive  (NonReactive)   09/28/21  18:45    


 


Hep Bs Antigen  Nonreactive  (Negative)   09/28/21  18:45    


 


Hepatitis C Antibody  Non-reactive  (NonReactive)   09/28/21  18:45    











Diamond/IV: 


                                        





Voiding Method                   Indwelling Catheter











Active Medications





- Current Medications


Current Medications: 














Generic Name Dose Route Start Last Admin





  Trade Name Freq  PRN Reason Stop Dose Admin


 


Acetaminophen  650 mg  09/22/21 03:00  10/01/21 06:42





  Acetaminophen 325 Mg Tab  PO   650 mg





  Q4H PRN   Administration





  Pain MILD(1-3)/Fever >100.5/HA  


 


Albuterol  2.5 mg  09/22/21 03:00 





  Albuterol 2.5 Mg/3 Ml Nebu  IH  





  Q4HRT PRN  





  Shortness Of Breath  


 


Albuterol/Ipratropium  1 ampul  09/22/21 08:00  10/03/21 09:04





  Ipratropium/Albuterol Sulfate 3 Ml Ampul.Neb  IH   1 ampul





  TIDRT DELL   Administration


 


Aspirin  325 mg  09/23/21 10:00  10/02/21 10:06





  Aspirin Ec 325 Mg Tab  PO   325 mg





  QDAY DELL   Administration


 


Atorvastatin Calcium  40 mg  09/22/21 22:00  10/02/21 21:06





  Atorvastatin 40 Mg Tab  PO   40 mg





  QHS DELL   Administration


 


Benzonatate  100 mg  09/22/21 06:00  10/03/21 06:31





  Benzonatate 100 Mg Cap  PO   Not Given





  Q8HR DELL  


 


Guaifenesin  200 mg  09/26/21 14:47  10/01/21 06:43





  Guaifenesin 100 Mg/5 Ml Oral Liqd  PO   200 mg





  Q4H PRN   Administration





  Cough  


 


Haloperidol Lactate  5 mg  10/02/21 16:27 





  Haloperidol Lactate 5 Mg/1 Ml Inj  IV  





  Q6H PRN  





  Agitation  


 


Heparin Sodium (Porcine)  5,000 unit  10/02/21 14:00  10/03/21 06:30





  Heparin 5,000 Unit/1 Ml Vial  SUB-Q   5,000 unit





  Q8HR DELL   Administration


 


Methylprednisolone Sodium Succinate  20 mg  09/30/21 10:00  10/02/21 21:06





  Methylprednisolone Sod Succinate 40 Mg/1 Ml Inj  IV   20 mg





  Q12HR DELL   Administration


 


Nitroglycerin  0.4 mg  09/22/21 03:00 





  Nitroglycerin 0.4 Mg Tab Subl  SL  





  Q5M PRN  





  Chest Pain  


 


Nystatin  1 applic  09/22/21 18:00  10/02/21 21:12





  Nystatin Powder 15 Gm  TP   1 applic





  BID DELL   Administration


 


Ondansetron HCl  4 mg  09/22/21 03:00  09/29/21 23:51





  Ondansetron 4 Mg/2 Ml Inj  IV   4 mg





  Q8H PRN   Administration





  Nausea And Vomiting  


 


Oxycodone/Acetaminophen  1 tab  09/22/21 03:00  10/01/21 21:19





  Oxycodone /Acetaminophen 5-325mg Tab  PO   1 tab





  Q6H PRN   Administration





  Pain, Moderate (4-6)  


 


Pantoprazole Sodium  40 mg  09/30/21 10:00  10/02/21 10:06





  Pantoprazole 40 Mg Inj  IV   40 mg





  QDAY DELL   Administration


 


Quetiapine Fumarate  75 mg  10/02/21 22:00  10/02/21 21:06





  Quetiapine 25 Mg Tab  PO   75 mg





  QHS DELL   Administration


 


Sodium Chloride  10 ml  09/22/21 10:00  10/02/21 21:12





  Sodium Chloride 0.9% 10 Ml Flush Syringe  IV   10 ml





  BID DELL   Administration


 


Sodium Chloride  10 ml  09/22/21 03:00 





  Sodium Chloride 0.9% 10 Ml Flush Syringe  IV  





  PRN PRN  





  LINE FLUSH  


 


Tramadol HCl  50 mg  09/22/21 03:00 





  Tramadol 50 Mg Tab  PO  





  Q6H PRN  





  Pain, Moderate (4-6)  














Nutrition/Malnutrition Assess





- Dietary Evaluation


Nutrition/Malnutrition Findings: 


                                        





Nutrition Notes                                            Start:  09/22/21 

14:21


Freq:                                                      Status: Active       




Protocol:                                                                       




 Document     09/27/21 17:08  GB  (Rec: 09/27/21 17:14  GB  [id number])


 Nutrition Notes


     Initial or Follow up                        Reassessment


     Current Diagnosis                           Hypertension


     Other Pertinent Diagnosis                   Dyspnea, respiratory distress,


                                                 morbid obesity


     Current Diet                                Regular


     Labs/Tests                                  9/27: Na 134, BUN 63,


                                                 creatinine 2.2, glucose 159,


                                                 Ca 7.8


     Pertinent Medications                       Lasix, NaCl IV flushes


     Height                                      5 ft 4 in


     Weight                                      287.5 kg


     Ideal Body Weight (kg)                      54.54


     BMI                                         108.8


     Weight change and time frame                expect weight fluctuations r/t


                                                 fluid therapy, CHF


                                                 complications


     Weight Status                               Morbidly Obese


     Subjective/Other Information                Per MD notes 9/27: fluid


                                                 restrictions, on nasal cannula


                                                 3L, possibly obesity


                                                 hypoventilation syndrome,


                                                 recommended to bariatric


                                                 consult


     Percent of energy/protein needs met:        Meals and snacks provided meet


                                                 100% of estimated energy


                                                 needs.  Current po intake of


                                                 meals recorded average 50%.


     Burn                                        Absent


     Trauma                                      Absent


     GI Symptoms                                 None


     Food Allergy                                No


     Current % PO                                Fair (50-74%)


     Minimum of two criteria                     No


     #1


      Nutrition Diagnosis                        Overweight/obesity


      Comments:                                  Discussed diet/life style


                                                 changes using small goal


                                                 successes to advancement,


                                                 encouraged outpatient RD


                                                 counseling at bariatric center


      Etiology                                   dyspnea, respiratory distress


      As Evidenced by Signs and Symptoms         , %


      Diagnosis Progress(for reassessment        Continues


       documentation)                            


     Is patient on ventilator?                   No


     Is Patient Ambulatory and/or Out of Bed     No


     [facility]-([facility]-confined to bed)      4277.724


     Kcal/Kg value to use for calculation        10


     Approximate Energy Requirements Using       2875


      kcal/Kg                                    


     Calculation Used for Recommendations        Kcal/kg


     Additional Notes                            Protein 0.6 g/kg r/t BMI over


                                                 50: 163g


                                                 Fluids: 1ml/kcal or per MD


 Nutrition Intervention


     Change Diet Order:                          continue with current diet


     Goal #1                                     Pt to contact and make


                                                 appointment with outpatient RD


                                                 in bariatric center


     Goal #2                                     weight to decrease -1% during


                                                 LOS


     Follow-Up By:                               10/04/21


     Additional Comments                         f/u for weight loss, po intake

## 2021-10-03 NOTE — PROGRESS NOTE
Assessment and Plan








Acute hypoxemic and hypercapnic respiratory failure


Acute exacerbation of CHF (congestive heart failure)


Hypertension


Morbid obesity with BMI of 70 and over, adult


Obesity hypoventilation syndrome


Sleep apnea





- repeat ABG at 9pm tonight to assess ventilation


- continue NIV qhs with prn daytime use


- continue avoid opiates / sedating drugs


- dopplers negative for VTE


- will hold on full anticoagulation with improvement clinically and in hypoxemia


- continue care as below otherwise;





- continue diuresis while following electrolytes


- continue to wean supplemental oxygen for target O2 sat's > 90% acutely


- aspiration precautions


- continue bronchodilators with pulmonary hygiene per RT


- continue accuchecks with glycemic control per SSI (While critically ill target

blood glucose of 140-180 mg/dL; avoid hypoglycemia)


- avoid nephrotoxins, renally dose all medications


- AB's per ID rec's 


- prn analgesia per pain score


- Maintenance of sleep-wake cycle, avoid delirium


- G.I. & VTE prophylaxis


- PT/OT/ROM exercises


- continue mobility protocols for pressure ulcer prophylaxis


- Monitor hemodynamics closely


- continue other care per attending / other consultants


- discharge planning ongoing concurrently





COVID SPECIFIC INTERVENTIONS


- COVID-19 assay negative





.... Re-evaluate in am & prn





I have spent ( >35 ) minutes with the patient w/ >50% of the time spent 

counseling and/or coordinating care for this patient. Counseling topics and/or 

how time was spent coordinating patient's care is outlined in the impression and

plan above.





Subjective


Date of service: 10/03/21


Principal diagnosis: Ac hypoxemic and hypercapnic resp failure; AE-CHF; Morbid 

obesity; FABIAN/OHS


Interval history: 





Patient is seen today for: Acute hypoxemic and hypercapnic respiratory failure; 

Acute exacerbation of CHF; Hypertension; Morbid obesity; FABIAN / OHS





Seen and examined at bedside; 24hour events reviewed; nursing and respiratory 

care staff consulted; no adverse overnight events reported to me; resting in 

bed; tolerating 50% venti-mask with good oxygenation; alert; anxious; tolerated 

BIPAP qhs and got good sleep with Seroquel qhs; No N/V/F/C





Objective


                               Vital Signs - 12hr











  10/03/21 10/03/21 10/03/21





  04:30 04:33 05:00


 


Temperature   


 


Pulse Rate 94 H 94 H 100 H


 


Pulse Rate [   





Anterior   





Bilateral   





Throughout]   


 


Respiratory 18 31 H 18





Rate   


 


Respiratory   





Rate [Anterior   





Bilateral   





Throughout]   


 


Blood Pressure 143/93 143/93 142/90


 


O2 Sat by Pulse 99 99 95





Oximetry   














  10/03/21 10/03/21 10/03/21





  05:30 06:00 06:30


 


Temperature   


 


Pulse Rate 94 H 92 H 95 H


 


Pulse Rate [   





Anterior   





Bilateral   





Throughout]   


 


Respiratory 10 L 21 12





Rate   


 


Respiratory   





Rate [Anterior   





Bilateral   





Throughout]   


 


Blood Pressure 132/84 135/85 142/84


 


O2 Sat by Pulse 97 98 95





Oximetry   














  10/03/21 10/03/21 10/03/21





  07:00 07:03 07:30


 


Temperature  97.7 F 


 


Pulse Rate 93 H  90


 


Pulse Rate [   





Anterior   





Bilateral   





Throughout]   


 


Respiratory 30 H  22





Rate   


 


Respiratory   





Rate [Anterior   





Bilateral   





Throughout]   


 


Blood Pressure 142/89  130/79


 


O2 Sat by Pulse 98  98





Oximetry   














  10/03/21 10/03/21 10/03/21





  08:00 08:30 09:00


 


Temperature   


 


Pulse Rate 91 H 91 H 93 H


 


Pulse Rate [ 98 H  





Anterior   





Bilateral   





Throughout]   


 


Respiratory 16 12 19





Rate   


 


Respiratory 16  





Rate [Anterior   





Bilateral   





Throughout]   


 


Blood Pressure 150/91 141/88 133/81


 


O2 Sat by Pulse 100 97 99





Oximetry   














  10/03/21 10/03/21 10/03/21





  09:30 10:01 10:30


 


Temperature   


 


Pulse Rate 104 H 101 H 101 H


 


Pulse Rate [   





Anterior   





Bilateral   





Throughout]   


 


Respiratory 24 14 21





Rate   


 


Respiratory   





Rate [Anterior   





Bilateral   





Throughout]   


 


Blood Pressure 124/89 134/98 150/83


 


O2 Sat by Pulse 100 100 100





Oximetry   














  10/03/21 10/03/21 10/03/21





  11:00 11:30 13:40


 


Temperature   


 


Pulse Rate 106 H 104 H 


 


Pulse Rate [   98 H





Anterior   





Bilateral   





Throughout]   


 


Respiratory 28 H 26 H 





Rate   


 


Respiratory   22





Rate [Anterior   





Bilateral   





Throughout]   


 


Blood Pressure 165/103 190/94 


 


O2 Sat by Pulse 100 99 





Oximetry   














  10/03/21





  13:41


 


Temperature 


 


Pulse Rate 


 


Pulse Rate [ 





Anterior 





Bilateral 





Throughout] 


 


Respiratory 





Rate 


 


Respiratory 





Rate [Anterior 





Bilateral 





Throughout] 


 


Blood Pressure 


 


O2 Sat by Pulse 100





Oximetry 











Constitutional: asleep, appears uncomfortable, other (young extremely obese 

female with mildly increased respiratory effort at rest on NIV)


Eyes: non-icteric


ENT: oropharynx moist, other (BIPAP FFM)


Neck: supple, no lymphadenopathy, no JVD


Effort: mildly labored


Ascultation: Bilateral: diminished breath sounds, rhonchi


Percussion: Bilateral: not dull


Cardiovascular: regular rate and rhythm


Gastrointestinal: normoactive bowel sounds, soft, non-tender, non-distended 

(protuberant)


Integumentary: rash (stasis dermatitis type), other (Stasis dermatititis on legs

and abdomen; see [facility] notes for full details)


Extremities: pulses normal, no ischemia or petechiae, edema (2+), other (stasis 

dermatitis)


Neurologic: non-focal exam (grossly), pupils equal and round, CN II-XII normal


Psychiatric: depressed


CBC and BMP: 


                                 10/03/21 04:40





                                 10/03/21 04:40


ABG, PT/INR, D-dimer: 


ABG











ABG pH  7.382  (7.320-7.450)   10/02/21  14:19    


 


POC ABG pCO2  67.4 mmHg (32.0-48.0)  H  10/02/21  14:19    


 


ABG pCO2  86.9 mm Hg  09/27/21  18:40    


 


POC ABG pO2  150.6 mmHg ()  H  10/02/21  14:19    


 


ABG pO2  81.8 mm Hg (80.0-90.0)   09/27/21  18:40    


 


POC ABG HCO3  39.2   10/02/21  14:19    


 


ABG O2 Saturation  99.4  (0-100)   10/02/21  14:19    





PT/INR, D-dimer











PT  18.7 Sec. (12.2-14.9)  H  10/02/21  16:55    


 


INR  1.51  (0.87-1.13)  H  10/02/21  16:55    


 


D-Dimer  5410.10 ng/mlDDU (0-234)  H  10/01/21  16:03    








Abnormal lab findings: 


                                  Abnormal Labs











  09/21/21 09/21/21 09/22/21





  22:04 22:04 00:18


 


WBC  17.7 H  


 


Hgb  9.1 L  


 


Hct   


 


MCV  77 L  


 


MCH  21 L  


 


MCHC  27 L  


 


RDW  24.3 H  


 


Seg Neuts % (Manual)   


 


Lymphocytes % (Manual)   


 


Nucleated RBC %   


 


Seg Neutrophils # Man   


 


Lymphocytes # (Manual)   


 


Monocytes # (Manual)   


 


PT   


 


INR   


 


D-Dimer   


 


ABG pH   


 


POC ABG pCO2   


 


POC ABG pO2   


 


ABG pO2   


 


ABG HCO3   


 


ABG Base Excess   


 


ABG Hemoglobin   


 


ABG Oxyhemoglobin   


 


ABG Sodium   


 


ABG Chloride   


 


ABG Glucose   


 


Oxyhemoglobin   


 


Carboxyhemoglobin   


 


Sodium   135 L 


 


Potassium   


 


Chloride   91.0 L 


 


Carbon Dioxide   17 L 


 


BUN   


 


Creatinine   1.4 H 


 


Glucose   55 L 


 


POC Glucose    49 L


 


Calcium   


 


Ionized Calcium   


 


Total Bilirubin   


 


AST   


 


ALT   


 


Total Creatine Kinase   


 


Troponin T   0.043 H 


 


NT-Pro-B Natriuret Pep   7573 H 


 


Total Protein   


 


Albumin   


 


LDL Cholesterol Direct   47 L 


 


HDL Cholesterol   25 L 


 


TSH   


 


Free T4   


 


Arterial Blood Glucose   














  09/22/21 09/22/21 09/22/21





  04:03 04:03 09:22


 


WBC  24.3 H  


 


Hgb  9.3 L  


 


Hct   


 


MCV  74 L  


 


MCH  21 L  


 


MCHC  29 L  


 


RDW  23.1 H  


 


Seg Neuts % (Manual)  78.0 H  


 


Lymphocytes % (Manual)  7.0 L  


 


Nucleated RBC %  1.0 H  


 


Seg Neutrophils # Man  19.0 H  


 


Lymphocytes # (Manual)   


 


Monocytes # (Manual)  1.0 H  


 


PT   


 


INR   


 


D-Dimer   


 


ABG pH   


 


POC ABG pCO2   


 


POC ABG pO2   


 


ABG pO2   


 


ABG HCO3   


 


ABG Base Excess   


 


ABG Hemoglobin   


 


ABG Oxyhemoglobin   


 


ABG Sodium   


 


ABG Chloride   


 


ABG Glucose   


 


Oxyhemoglobin   


 


Carboxyhemoglobin   


 


Sodium   134 L 


 


Potassium   


 


Chloride   91.2 L 


 


Carbon Dioxide   


 


BUN   


 


Creatinine   1.8 H 


 


Glucose   


 


POC Glucose   


 


Calcium   


 


Ionized Calcium   


 


Total Bilirubin   


 


AST   


 


ALT   


 


Total Creatine Kinase   


 


Troponin T    0.099 H


 


NT-Pro-B Natriuret Pep   


 


Total Protein   


 


Albumin   


 


LDL Cholesterol Direct   


 


HDL Cholesterol   


 


TSH   


 


Free T4   


 


Arterial Blood Glucose   














  09/22/21 09/22/21 09/23/21





  13:03 20:28 04:52


 


WBC    20.9 H


 


Hgb    8.5 L


 


Hct    30.0 L


 


MCV    73 L


 


MCH    21 L


 


MCHC    28 L


 


RDW    23.9 H


 


Seg Neuts % (Manual)    77.0 H


 


Lymphocytes % (Manual)    1.0 L


 


Nucleated RBC %    1.0 H


 


Seg Neutrophils # Man    16.1 H


 


Lymphocytes # (Manual)    0.2 L


 


Monocytes # (Manual)   


 


PT   


 


INR   


 


D-Dimer   


 


ABG pH   


 


POC ABG pCO2   


 


POC ABG pO2   


 


ABG pO2   


 


ABG HCO3   


 


ABG Base Excess   


 


ABG Hemoglobin   


 


ABG Oxyhemoglobin   


 


ABG Sodium   


 


ABG Chloride   


 


ABG Glucose   


 


Oxyhemoglobin   


 


Carboxyhemoglobin   


 


Sodium   


 


Potassium   


 


Chloride   


 


Carbon Dioxide   


 


BUN   


 


Creatinine   


 


Glucose   


 


POC Glucose   115 H 


 


Calcium   


 


Ionized Calcium   


 


Total Bilirubin   


 


AST   


 


ALT   


 


Total Creatine Kinase   


 


Troponin T  0.078 H D  


 


NT-Pro-B Natriuret Pep   


 


Total Protein   


 


Albumin   


 


LDL Cholesterol Direct   


 


HDL Cholesterol   


 


TSH   


 


Free T4   


 


Arterial Blood Glucose   














  09/23/21 09/23/21 09/23/21





  04:52 08:46 11:50


 


WBC   


 


Hgb   


 


Hct   


 


MCV   


 


MCH   


 


MCHC   


 


RDW   


 


Seg Neuts % (Manual)   


 


Lymphocytes % (Manual)   


 


Nucleated RBC %   


 


Seg Neutrophils # Man   


 


Lymphocytes # (Manual)   


 


Monocytes # (Manual)   


 


PT   


 


INR   


 


D-Dimer   


 


ABG pH   


 


POC ABG pCO2   


 


POC ABG pO2   


 


ABG pO2   


 


ABG HCO3   


 


ABG Base Excess   


 


ABG Hemoglobin   


 


ABG Oxyhemoglobin   


 


ABG Sodium   


 


ABG Chloride   


 


ABG Glucose   


 


Oxyhemoglobin   


 


Carboxyhemoglobin   


 


Sodium  129 L  


 


Potassium  5.6 H  


 


Chloride  88.6 L  


 


Carbon Dioxide   


 


BUN  27 H  


 


Creatinine  2.4 H  


 


Glucose  121 H  


 


POC Glucose   139 H  156 H


 


Calcium  7.7 L  


 


Ionized Calcium   


 


Total Bilirubin   


 


AST   


 


ALT   


 


Total Creatine Kinase   


 


Troponin T   


 


NT-Pro-B Natriuret Pep   


 


Total Protein   


 


Albumin   


 


LDL Cholesterol Direct   


 


HDL Cholesterol   


 


TSH   


 


Free T4   


 


Arterial Blood Glucose   














  09/23/21 09/23/21 09/23/21





  15:46 16:58 22:08


 


WBC   


 


Hgb   


 


Hct   


 


MCV   


 


MCH   


 


MCHC   


 


RDW   


 


Seg Neuts % (Manual)   


 


Lymphocytes % (Manual)   


 


Nucleated RBC %   


 


Seg Neutrophils # Man   


 


Lymphocytes # (Manual)   


 


Monocytes # (Manual)   


 


PT   


 


INR   


 


D-Dimer   


 


ABG pH   


 


POC ABG pCO2   


 


POC ABG pO2   


 


ABG pO2   


 


ABG HCO3   


 


ABG Base Excess   


 


ABG Hemoglobin   


 


ABG Oxyhemoglobin   


 


ABG Sodium   


 


ABG Chloride   


 


ABG Glucose   


 


Oxyhemoglobin   


 


Carboxyhemoglobin   


 


Sodium  128 L  


 


Potassium  5.5 H  


 


Chloride  88.1 L  


 


Carbon Dioxide   


 


BUN  33 H  


 


Creatinine  2.7 H  


 


Glucose  172 H  


 


POC Glucose   187 H  186 H


 


Calcium  7.3 L  


 


Ionized Calcium   


 


Total Bilirubin   


 


AST   


 


ALT   


 


Total Creatine Kinase   


 


Troponin T   


 


NT-Pro-B Natriuret Pep   


 


Total Protein   


 


Albumin   


 


LDL Cholesterol Direct   


 


HDL Cholesterol   


 


TSH   


 


Free T4   


 


Arterial Blood Glucose   














  09/23/21 09/24/21 09/24/21





  23:15 02:20 07:39


 


WBC   


 


Hgb   


 


Hct   


 


MCV   


 


MCH   


 


MCHC   


 


RDW   


 


Seg Neuts % (Manual)   


 


Lymphocytes % (Manual)   


 


Nucleated RBC %   


 


Seg Neutrophils # Man   


 


Lymphocytes # (Manual)   


 


Monocytes # (Manual)   


 


PT   


 


INR   


 


D-Dimer   


 


ABG pH   


 


POC ABG pCO2   


 


POC ABG pO2   


 


ABG pO2   


 


ABG HCO3   


 


ABG Base Excess   


 


ABG Hemoglobin   


 


ABG Oxyhemoglobin   


 


ABG Sodium   


 


ABG Chloride   


 


ABG Glucose   


 


Oxyhemoglobin   


 


Carboxyhemoglobin   


 


Sodium   128 L 


 


Potassium   5.2 H 


 


Chloride   88.6 L 


 


Carbon Dioxide   


 


BUN   39 H 


 


Creatinine   3.1 H 


 


Glucose   171 H 


 


POC Glucose    168 H


 


Calcium   7.2 L 


 


Ionized Calcium  3.6 L  


 


Total Bilirubin   


 


AST   


 


ALT   


 


Total Creatine Kinase   


 


Troponin T   


 


NT-Pro-B Natriuret Pep   


 


Total Protein   


 


Albumin   


 


LDL Cholesterol Direct   


 


HDL Cholesterol   


 


TSH   


 


Free T4   


 


Arterial Blood Glucose   














  09/24/21 09/24/21 09/24/21





  10:04 11:37 17:18


 


WBC   


 


Hgb   


 


Hct   


 


MCV   


 


MCH   


 


MCHC   


 


RDW   


 


Seg Neuts % (Manual)   


 


Lymphocytes % (Manual)   


 


Nucleated RBC %   


 


Seg Neutrophils # Man   


 


Lymphocytes # (Manual)   


 


Monocytes # (Manual)   


 


PT   


 


INR   


 


D-Dimer   


 


ABG pH   


 


POC ABG pCO2   


 


POC ABG pO2   


 


ABG pO2   


 


ABG HCO3   


 


ABG Base Excess   


 


ABG Hemoglobin   


 


ABG Oxyhemoglobin   


 


ABG Sodium   


 


ABG Chloride   


 


ABG Glucose   


 


Oxyhemoglobin   


 


Carboxyhemoglobin   


 


Sodium   


 


Potassium   


 


Chloride   


 


Carbon Dioxide   


 


BUN   


 


Creatinine   


 


Glucose   


 


POC Glucose   170 H  152 H


 


Calcium   


 


Ionized Calcium   


 


Total Bilirubin   


 


AST   


 


ALT   


 


Total Creatine Kinase  1712 H  


 


Troponin T   


 


NT-Pro-B Natriuret Pep   


 


Total Protein   


 


Albumin   


 


LDL Cholesterol Direct   


 


HDL Cholesterol   


 


TSH   


 


Free T4   


 


Arterial Blood Glucose   














  09/24/21 09/24/21 09/25/21





  19:38 22:02 05:48


 


WBC   


 


Hgb   


 


Hct   


 


MCV   


 


MCH   


 


MCHC   


 


RDW   


 


Seg Neuts % (Manual)   


 


Lymphocytes % (Manual)   


 


Nucleated RBC %   


 


Seg Neutrophils # Man   


 


Lymphocytes # (Manual)   


 


Monocytes # (Manual)   


 


PT   


 


INR   


 


D-Dimer   


 


ABG pH   


 


POC ABG pCO2   


 


POC ABG pO2   


 


ABG pO2   


 


ABG HCO3   


 


ABG Base Excess   


 


ABG Hemoglobin   


 


ABG Oxyhemoglobin   


 


ABG Sodium   


 


ABG Chloride   


 


ABG Glucose   


 


Oxyhemoglobin   


 


Carboxyhemoglobin   


 


Sodium  128 L   124 L


 


Potassium    5.8 H D


 


Chloride  89.6 L   89.4 L


 


Carbon Dioxide   


 


BUN  47 H   53 H


 


Creatinine  3.0 H   2.8 H


 


Glucose  165 H   150 H


 


POC Glucose   147 H 


 


Calcium  6.9 L   7.2 L


 


Ionized Calcium   


 


Total Bilirubin   


 


AST   


 


ALT   


 


Total Creatine Kinase   


 


Troponin T   


 


NT-Pro-B Natriuret Pep   


 


Total Protein   


 


Albumin   


 


LDL Cholesterol Direct   


 


HDL Cholesterol   


 


TSH   


 


Free T4   


 


Arterial Blood Glucose   














  09/25/21 09/25/21 09/25/21





  08:48 11:35 19:12


 


WBC   


 


Hgb   


 


Hct   


 


MCV   


 


MCH   


 


MCHC   


 


RDW   


 


Seg Neuts % (Manual)   


 


Lymphocytes % (Manual)   


 


Nucleated RBC %   


 


Seg Neutrophils # Man   


 


Lymphocytes # (Manual)   


 


Monocytes # (Manual)   


 


PT   


 


INR   


 


D-Dimer   


 


ABG pH   


 


POC ABG pCO2   


 


POC ABG pO2   


 


ABG pO2   


 


ABG HCO3   


 


ABG Base Excess   


 


ABG Hemoglobin   


 


ABG Oxyhemoglobin   


 


ABG Sodium   


 


ABG Chloride   


 


ABG Glucose   


 


Oxyhemoglobin   


 


Carboxyhemoglobin   


 


Sodium    128 L


 


Potassium   


 


Chloride    89.7 L


 


Carbon Dioxide   


 


BUN    53 H


 


Creatinine    2.4 H


 


Glucose    159 H


 


POC Glucose  152 H  152 H 


 


Calcium    7.1 L


 


Ionized Calcium   


 


Total Bilirubin   


 


AST   


 


ALT   


 


Total Creatine Kinase   


 


Troponin T   


 


NT-Pro-B Natriuret Pep   


 


Total Protein   


 


Albumin   


 


LDL Cholesterol Direct   


 


HDL Cholesterol   


 


TSH   


 


Free T4   


 


Arterial Blood Glucose   














  09/25/21 09/26/21 09/26/21





  22:03 05:03 07:41


 


WBC   


 


Hgb   


 


Hct   


 


MCV   


 


MCH   


 


MCHC   


 


RDW   


 


Seg Neuts % (Manual)   


 


Lymphocytes % (Manual)   


 


Nucleated RBC %   


 


Seg Neutrophils # Man   


 


Lymphocytes # (Manual)   


 


Monocytes # (Manual)   


 


PT   


 


INR   


 


D-Dimer   


 


ABG pH   


 


POC ABG pCO2   


 


POC ABG pO2   


 


ABG pO2   


 


ABG HCO3   


 


ABG Base Excess   


 


ABG Hemoglobin   


 


ABG Oxyhemoglobin   


 


ABG Sodium   


 


ABG Chloride   


 


ABG Glucose   


 


Oxyhemoglobin   


 


Carboxyhemoglobin   


 


Sodium   134 L 


 


Potassium   


 


Chloride   92.9 L 


 


Carbon Dioxide   33 H D 


 


BUN   54 H 


 


Creatinine   2.4 H 


 


Glucose   150 H 


 


POC Glucose  152 H   144 H


 


Calcium   7.2 L 


 


Ionized Calcium   


 


Total Bilirubin   


 


AST   


 


ALT   


 


Total Creatine Kinase   


 


Troponin T   


 


NT-Pro-B Natriuret Pep   


 


Total Protein   


 


Albumin   


 


LDL Cholesterol Direct   


 


HDL Cholesterol   


 


TSH   


 


Free T4   


 


Arterial Blood Glucose   














  09/26/21 09/26/21 09/26/21





  11:38 14:11 17:02


 


WBC   14.7 H 


 


Hgb   8.7 L 


 


Hct   29.8 L 


 


MCV   74 L 


 


MCH   22 L 


 


MCHC   29 L 


 


RDW   24.9 H 


 


Seg Neuts % (Manual)   86.0 H 


 


Lymphocytes % (Manual)   6.0 L 


 


Nucleated RBC %   


 


Seg Neutrophils # Man   12.6 H 


 


Lymphocytes # (Manual)   0.9 L 


 


Monocytes # (Manual)   


 


PT   


 


INR   


 


D-Dimer   


 


ABG pH   


 


POC ABG pCO2   


 


POC ABG pO2   


 


ABG pO2   


 


ABG HCO3   


 


ABG Base Excess   


 


ABG Hemoglobin   


 


ABG Oxyhemoglobin   


 


ABG Sodium   


 


ABG Chloride   


 


ABG Glucose   


 


Oxyhemoglobin   


 


Carboxyhemoglobin   


 


Sodium   


 


Potassium   


 


Chloride   


 


Carbon Dioxide   


 


BUN   


 


Creatinine   


 


Glucose   


 


POC Glucose  151 H   154 H


 


Calcium   


 


Ionized Calcium   


 


Total Bilirubin   


 


AST   


 


ALT   


 


Total Creatine Kinase   


 


Troponin T   


 


NT-Pro-B Natriuret Pep   


 


Total Protein   


 


Albumin   


 


LDL Cholesterol Direct   


 


HDL Cholesterol   


 


TSH   


 


Free T4   


 


Arterial Blood Glucose   














  09/26/21 09/27/21 09/27/21





  23:14 05:03 10:00


 


WBC   


 


Hgb   


 


Hct   


 


MCV   


 


MCH   


 


MCHC   


 


RDW   


 


Seg Neuts % (Manual)   


 


Lymphocytes % (Manual)   


 


Nucleated RBC %   


 


Seg Neutrophils # Man   


 


Lymphocytes # (Manual)   


 


Monocytes # (Manual)   


 


PT   


 


INR   


 


D-Dimer   


 


ABG pH    7.150 L*


 


POC ABG pCO2   


 


POC ABG pO2   


 


ABG pO2    91.8 H


 


ABG HCO3    37.2 H


 


ABG Base Excess    7.1 H


 


ABG Hemoglobin    7.1 L


 


ABG Oxyhemoglobin   


 


ABG Sodium   


 


ABG Chloride   


 


ABG Glucose   


 


Oxyhemoglobin    93.4 L


 


Carboxyhemoglobin   


 


Sodium   134 L 


 


Potassium   


 


Chloride   91.3 L 


 


Carbon Dioxide   33 H 


 


BUN   63 H 


 


Creatinine   2.2 H 


 


Glucose   159 H 


 


POC Glucose  151 H  


 


Calcium   7.8 L 


 


Ionized Calcium   


 


Total Bilirubin   


 


AST   


 


ALT   


 


Total Creatine Kinase   


 


Troponin T   


 


NT-Pro-B Natriuret Pep   


 


Total Protein   


 


Albumin   


 


LDL Cholesterol Direct   


 


HDL Cholesterol   


 


TSH   


 


Free T4   


 


Arterial Blood Glucose   














  09/27/21 09/27/21 09/27/21





  12:39 16:37 18:40


 


WBC   


 


Hgb   


 


Hct   


 


MCV   


 


MCH   


 


MCHC   


 


RDW   


 


Seg Neuts % (Manual)   


 


Lymphocytes % (Manual)   


 


Nucleated RBC %   


 


Seg Neutrophils # Man   


 


Lymphocytes # (Manual)   


 


Monocytes # (Manual)   


 


PT   


 


INR   


 


D-Dimer   


 


ABG pH    7.237 L


 


POC ABG pCO2   


 


POC ABG pO2   


 


ABG pO2   


 


ABG HCO3    36.2 H


 


ABG Base Excess    7.4 H


 


ABG Hemoglobin    7.8 L


 


ABG Oxyhemoglobin   


 


ABG Sodium   


 


ABG Chloride   


 


ABG Glucose   


 


Oxyhemoglobin    93.7 L


 


Carboxyhemoglobin   


 


Sodium   


 


Potassium   


 


Chloride   


 


Carbon Dioxide   


 


BUN   


 


Creatinine   


 


Glucose   


 


POC Glucose  140 H  132 H 


 


Calcium   


 


Ionized Calcium   


 


Total Bilirubin   


 


AST   


 


ALT   


 


Total Creatine Kinase   


 


Troponin T   


 


NT-Pro-B Natriuret Pep   


 


Total Protein   


 


Albumin   


 


LDL Cholesterol Direct   


 


HDL Cholesterol   


 


TSH   


 


Free T4   


 


Arterial Blood Glucose   














  09/27/21 09/28/21 09/28/21





  23:55 04:27 04:27


 


WBC   


 


Hgb   8.5 L 


 


Hct   29.1 L 


 


MCV   72 L 


 


MCH   21 L 


 


MCHC   29 L 


 


RDW   24.9 H 


 


Seg Neuts % (Manual)   


 


Lymphocytes % (Manual)   


 


Nucleated RBC %   


 


Seg Neutrophils # Man   


 


Lymphocytes # (Manual)   


 


Monocytes # (Manual)   


 


PT   


 


INR   


 


D-Dimer   


 


ABG pH   


 


POC ABG pCO2   


 


POC ABG pO2   


 


ABG pO2   


 


ABG HCO3   


 


ABG Base Excess   


 


ABG Hemoglobin   


 


ABG Oxyhemoglobin   


 


ABG Sodium   


 


ABG Chloride   


 


ABG Glucose   


 


Oxyhemoglobin   


 


Carboxyhemoglobin   


 


Sodium    135 L


 


Potassium   


 


Chloride    93.5 L


 


Carbon Dioxide    33 H


 


BUN    68 H


 


Creatinine    1.9 H


 


Glucose    143 H


 


POC Glucose  135 H  


 


Calcium    8.1 L


 


Ionized Calcium   


 


Total Bilirubin    1.50 H


 


AST    494 H


 


ALT    835 H


 


Total Creatine Kinase   


 


Troponin T   


 


NT-Pro-B Natriuret Pep   


 


Total Protein    9.5 H


 


Albumin    3.1 L


 


LDL Cholesterol Direct   


 


HDL Cholesterol   


 


TSH   


 


Free T4   


 


Arterial Blood Glucose   














  09/28/21 09/28/21 09/29/21





  12:03 17:24 00:19


 


WBC   


 


Hgb   


 


Hct   


 


MCV   


 


MCH   


 


MCHC   


 


RDW   


 


Seg Neuts % (Manual)   


 


Lymphocytes % (Manual)   


 


Nucleated RBC %   


 


Seg Neutrophils # Man   


 


Lymphocytes # (Manual)   


 


Monocytes # (Manual)   


 


PT   


 


INR   


 


D-Dimer   


 


ABG pH  7.291 L  


 


POC ABG pCO2  75.5 H  


 


POC ABG pO2  76.2 L  


 


ABG pO2   


 


ABG HCO3   


 


ABG Base Excess   


 


ABG Hemoglobin  9.9 L  


 


ABG Oxyhemoglobin  91.0 L  


 


ABG Sodium  134.9 L  


 


ABG Chloride  93.0 L  


 


ABG Glucose  146 H  


 


Oxyhemoglobin   


 


Carboxyhemoglobin  2.2 H  


 


Sodium   


 


Potassium   


 


Chloride   


 


Carbon Dioxide   


 


BUN   


 


Creatinine   


 


Glucose   


 


POC Glucose   134 H  136 H


 


Calcium   


 


Ionized Calcium   


 


Total Bilirubin   


 


AST   


 


ALT   


 


Total Creatine Kinase   


 


Troponin T   


 


NT-Pro-B Natriuret Pep   


 


Total Protein   


 


Albumin   


 


LDL Cholesterol Direct   


 


HDL Cholesterol   


 


TSH   


 


Free T4   


 


Arterial Blood Glucose  146 H  














  09/29/21 09/29/21 09/29/21





  04:55 04:55 05:29


 


WBC   


 


Hgb  8.0 L  


 


Hct  27.1 L  


 


MCV  70 L  


 


MCH  21 L  


 


MCHC   


 


RDW  24.3 H  


 


Seg Neuts % (Manual)   


 


Lymphocytes % (Manual)   


 


Nucleated RBC %   


 


Seg Neutrophils # Man   


 


Lymphocytes # (Manual)   


 


Monocytes # (Manual)   


 


PT   


 


INR   


 


D-Dimer   


 


ABG pH   


 


POC ABG pCO2   


 


POC ABG pO2   


 


ABG pO2   


 


ABG HCO3   


 


ABG Base Excess   


 


ABG Hemoglobin   


 


ABG Oxyhemoglobin   


 


ABG Sodium   


 


ABG Chloride   


 


ABG Glucose   


 


Oxyhemoglobin   


 


Carboxyhemoglobin   


 


Sodium   


 


Potassium   


 


Chloride   95.1 L 


 


Carbon Dioxide   36 H 


 


BUN   63 H 


 


Creatinine   1.5 H 


 


Glucose   131 H 


 


POC Glucose    118 H


 


Calcium   8.3 L 


 


Ionized Calcium   


 


Total Bilirubin   1.80 H 


 


AST   323 H 


 


ALT   609 H 


 


Total Creatine Kinase   


 


Troponin T   


 


NT-Pro-B Natriuret Pep   


 


Total Protein   9.3 H 


 


Albumin   2.9 L 


 


LDL Cholesterol Direct   


 


HDL Cholesterol   


 


TSH   


 


Free T4   


 


Arterial Blood Glucose   














  09/29/21 09/29/21 09/29/21





  11:40 18:30 22:44


 


WBC   


 


Hgb   


 


Hct   


 


MCV   


 


MCH   


 


MCHC   


 


RDW   


 


Seg Neuts % (Manual)   


 


Lymphocytes % (Manual)   


 


Nucleated RBC %   


 


Seg Neutrophils # Man   


 


Lymphocytes # (Manual)   


 


Monocytes # (Manual)   


 


PT   


 


INR   


 


D-Dimer   


 


ABG pH   


 


POC ABG pCO2   


 


POC ABG pO2   


 


ABG pO2   


 


ABG HCO3   


 


ABG Base Excess   


 


ABG Hemoglobin   


 


ABG Oxyhemoglobin   


 


ABG Sodium   


 


ABG Chloride   


 


ABG Glucose   


 


Oxyhemoglobin   


 


Carboxyhemoglobin   


 


Sodium   


 


Potassium   


 


Chloride   


 


Carbon Dioxide   


 


BUN   


 


Creatinine   


 


Glucose   


 


POC Glucose  139 H  130 H  123 H


 


Calcium   


 


Ionized Calcium   


 


Total Bilirubin   


 


AST   


 


ALT   


 


Total Creatine Kinase   


 


Troponin T   


 


NT-Pro-B Natriuret Pep   


 


Total Protein   


 


Albumin   


 


LDL Cholesterol Direct   


 


HDL Cholesterol   


 


TSH   


 


Free T4   


 


Arterial Blood Glucose   














  09/30/21 09/30/21 09/30/21





  04:53 04:53 07:16


 


WBC  12.5 H  


 


Hgb  8.8 L  


 


Hct  30.2 L  


 


MCV  74 L  


 


MCH  21 L  


 


MCHC  29 L  


 


RDW  24.5 H  


 


Seg Neuts % (Manual)   


 


Lymphocytes % (Manual)   


 


Nucleated RBC %   


 


Seg Neutrophils # Man   


 


Lymphocytes # (Manual)   


 


Monocytes # (Manual)   


 


PT   


 


INR   


 


D-Dimer   


 


ABG pH   


 


POC ABG pCO2   


 


POC ABG pO2   


 


ABG pO2   


 


ABG HCO3   


 


ABG Base Excess   


 


ABG Hemoglobin   


 


ABG Oxyhemoglobin   


 


ABG Sodium   


 


ABG Chloride   


 


ABG Glucose   


 


Oxyhemoglobin   


 


Carboxyhemoglobin   


 


Sodium   


 


Potassium   


 


Chloride   96.9 L 


 


Carbon Dioxide   35 H 


 


BUN   65 H 


 


Creatinine   1.5 H 


 


Glucose   142 H 


 


POC Glucose    138 H


 


Calcium   


 


Ionized Calcium   


 


Total Bilirubin   1.50 H 


 


AST   251 H 


 


ALT   572 H 


 


Total Creatine Kinase   


 


Troponin T   


 


NT-Pro-B Natriuret Pep   


 


Total Protein   10.1 H 


 


Albumin   3.2 L 


 


LDL Cholesterol Direct   


 


HDL Cholesterol   


 


TSH   


 


Free T4   


 


Arterial Blood Glucose   














  09/30/21 09/30/21 09/30/21





  07:32 11:29 14:42


 


WBC   


 


Hgb   


 


Hct   


 


MCV   


 


MCH   


 


MCHC   


 


RDW   


 


Seg Neuts % (Manual)   


 


Lymphocytes % (Manual)   


 


Nucleated RBC %   


 


Seg Neutrophils # Man   


 


Lymphocytes # (Manual)   


 


Monocytes # (Manual)   


 


PT   


 


INR   


 


D-Dimer   


 


ABG pH  7.086 L   7.188 L


 


POC ABG pCO2  142.2 H   99.3 H


 


POC ABG pO2  196.3 H   132.3 H


 


ABG pO2   


 


ABG HCO3   


 


ABG Base Excess   


 


ABG Hemoglobin  10.0 L   9.2 L


 


ABG Oxyhemoglobin   


 


ABG Sodium   


 


ABG Chloride  96.0 L   96.0 L


 


ABG Glucose  147 H   146 H


 


Oxyhemoglobin   


 


Carboxyhemoglobin  1.6 H   2.0 H


 


Sodium   


 


Potassium   


 


Chloride   


 


Carbon Dioxide   


 


BUN   


 


Creatinine   


 


Glucose   


 


POC Glucose   131 H 


 


Calcium   


 


Ionized Calcium   


 


Total Bilirubin   


 


AST   


 


ALT   


 


Total Creatine Kinase   


 


Troponin T   


 


NT-Pro-B Natriuret Pep   


 


Total Protein   


 


Albumin   


 


LDL Cholesterol Direct   


 


HDL Cholesterol   


 


TSH   


 


Free T4   


 


Arterial Blood Glucose  147 H   146 H














  09/30/21 09/30/21 10/01/21





  17:23 22:34 07:44


 


WBC   


 


Hgb   


 


Hct   


 


MCV   


 


MCH   


 


MCHC   


 


RDW   


 


Seg Neuts % (Manual)   


 


Lymphocytes % (Manual)   


 


Nucleated RBC %   


 


Seg Neutrophils # Man   


 


Lymphocytes # (Manual)   


 


Monocytes # (Manual)   


 


PT   


 


INR   


 


D-Dimer   


 


ABG pH   


 


POC ABG pCO2   


 


POC ABG pO2   


 


ABG pO2   


 


ABG HCO3   


 


ABG Base Excess   


 


ABG Hemoglobin   


 


ABG Oxyhemoglobin   


 


ABG Sodium   


 


ABG Chloride   


 


ABG Glucose   


 


Oxyhemoglobin   


 


Carboxyhemoglobin   


 


Sodium   


 


Potassium   


 


Chloride   


 


Carbon Dioxide   


 


BUN   


 


Creatinine   


 


Glucose   


 


POC Glucose  117 H  169 H  150 H


 


Calcium   


 


Ionized Calcium   


 


Total Bilirubin   


 


AST   


 


ALT   


 


Total Creatine Kinase   


 


Troponin T   


 


NT-Pro-B Natriuret Pep   


 


Total Protein   


 


Albumin   


 


LDL Cholesterol Direct   


 


HDL Cholesterol   


 


TSH   


 


Free T4   


 


Arterial Blood Glucose   














  10/01/21 10/01/21 10/01/21





  08:55 08:55 09:47


 


WBC   


 


Hgb  8.7 L  


 


Hct  29.4 L  


 


MCV  74 L  


 


MCH  22 L  


 


MCHC   


 


RDW  24.8 H  


 


Seg Neuts % (Manual)   


 


Lymphocytes % (Manual)   


 


Nucleated RBC %   


 


Seg Neutrophils # Man   


 


Lymphocytes # (Manual)   


 


Monocytes # (Manual)   


 


PT   


 


INR   


 


D-Dimer   


 


ABG pH    7.234 L


 


POC ABG pCO2    90.5 H


 


POC ABG pO2    139.4 H


 


ABG pO2   


 


ABG HCO3   


 


ABG Base Excess   


 


ABG Hemoglobin    9.5 L


 


ABG Oxyhemoglobin   


 


ABG Sodium   


 


ABG Chloride    97.0 L


 


ABG Glucose    179 H


 


Oxyhemoglobin   


 


Carboxyhemoglobin    1.7 H


 


Sodium   


 


Potassium   


 


Chloride   94.4 L 


 


Carbon Dioxide   37 H 


 


BUN   68 H 


 


Creatinine   1.5 H 


 


Glucose   178 H 


 


POC Glucose   


 


Calcium   


 


Ionized Calcium   


 


Total Bilirubin   1.60 H 


 


AST   172 H 


 


ALT   437 H 


 


Total Creatine Kinase   


 


Troponin T   


 


NT-Pro-B Natriuret Pep   


 


Total Protein   10.1 H 


 


Albumin   3.2 L 


 


LDL Cholesterol Direct   


 


HDL Cholesterol   


 


TSH   


 


Free T4   


 


Arterial Blood Glucose    179 H














  10/01/21 10/01/21 10/01/21





  11:39 16:03 17:24


 


WBC   


 


Hgb   


 


Hct   


 


MCV   


 


MCH   


 


MCHC   


 


RDW   


 


Seg Neuts % (Manual)   


 


Lymphocytes % (Manual)   


 


Nucleated RBC %   


 


Seg Neutrophils # Man   


 


Lymphocytes # (Manual)   


 


Monocytes # (Manual)   


 


PT   


 


INR   


 


D-Dimer   5410.10 H 


 


ABG pH   


 


POC ABG pCO2   


 


POC ABG pO2   


 


ABG pO2   


 


ABG HCO3   


 


ABG Base Excess   


 


ABG Hemoglobin   


 


ABG Oxyhemoglobin   


 


ABG Sodium   


 


ABG Chloride   


 


ABG Glucose   


 


Oxyhemoglobin   


 


Carboxyhemoglobin   


 


Sodium   


 


Potassium   


 


Chloride   


 


Carbon Dioxide   


 


BUN   


 


Creatinine   


 


Glucose   


 


POC Glucose  161 H   130 H


 


Calcium   


 


Ionized Calcium   


 


Total Bilirubin   


 


AST   


 


ALT   


 


Total Creatine Kinase   


 


Troponin T   


 


NT-Pro-B Natriuret Pep   


 


Total Protein   


 


Albumin   


 


LDL Cholesterol Direct   


 


HDL Cholesterol   


 


TSH   


 


Free T4   


 


Arterial Blood Glucose   














  10/01/21 10/02/21 10/02/21





  21:52 10:20 11:58


 


WBC   


 


Hgb   


 


Hct   


 


MCV   


 


MCH   


 


MCHC   


 


RDW   


 


Seg Neuts % (Manual)   


 


Lymphocytes % (Manual)   


 


Nucleated RBC %   


 


Seg Neutrophils # Man   


 


Lymphocytes # (Manual)   


 


Monocytes # (Manual)   


 


PT   


 


INR   


 


D-Dimer   


 


ABG pH   


 


POC ABG pCO2   


 


POC ABG pO2   


 


ABG pO2   


 


ABG HCO3   


 


ABG Base Excess   


 


ABG Hemoglobin   


 


ABG Oxyhemoglobin   


 


ABG Sodium   


 


ABG Chloride   


 


ABG Glucose   


 


Oxyhemoglobin   


 


Carboxyhemoglobin   


 


Sodium   146 H D 


 


Potassium   


 


Chloride   


 


Carbon Dioxide   


 


BUN   75 H 


 


Creatinine   1.6 H 


 


Glucose   158 H 


 


POC Glucose  150 H   143 H


 


Calcium   


 


Ionized Calcium   


 


Total Bilirubin   


 


AST   


 


ALT   


 


Total Creatine Kinase   


 


Troponin T   


 


NT-Pro-B Natriuret Pep   


 


Total Protein   


 


Albumin   


 


LDL Cholesterol Direct   


 


HDL Cholesterol   


 


TSH   


 


Free T4   


 


Arterial Blood Glucose   














  10/02/21 10/02/21 10/02/21





  14:19 16:55 16:55


 


WBC   


 


Hgb   8.3 L 


 


Hct   27.7 L 


 


MCV   


 


MCH   


 


MCHC   


 


RDW   


 


Seg Neuts % (Manual)   


 


Lymphocytes % (Manual)   


 


Nucleated RBC %   


 


Seg Neutrophils # Man   


 


Lymphocytes # (Manual)   


 


Monocytes # (Manual)   


 


PT    18.7 H


 


INR    1.51 H


 


D-Dimer   


 


ABG pH   


 


POC ABG pCO2  67.4 H  


 


POC ABG pO2  150.6 H  


 


ABG pO2   


 


ABG HCO3   


 


ABG Base Excess   


 


ABG Hemoglobin  9.3 L  


 


ABG Oxyhemoglobin   


 


ABG Sodium   


 


ABG Chloride   


 


ABG Glucose  162 H  


 


Oxyhemoglobin   


 


Carboxyhemoglobin  1.6 H  


 


Sodium   


 


Potassium   


 


Chloride   


 


Carbon Dioxide   


 


BUN   


 


Creatinine   


 


Glucose   


 


POC Glucose   


 


Calcium   


 


Ionized Calcium   


 


Total Bilirubin   


 


AST   


 


ALT   


 


Total Creatine Kinase   


 


Troponin T   


 


NT-Pro-B Natriuret Pep   


 


Total Protein   


 


Albumin   


 


LDL Cholesterol Direct   


 


HDL Cholesterol   


 


TSH   


 


Free T4   


 


Arterial Blood Glucose  162 H  














  10/02/21 10/02/21 10/02/21





  17:40 19:55 19:55


 


WBC   


 


Hgb   


 


Hct   


 


MCV   


 


MCH   


 


MCHC   


 


RDW   


 


Seg Neuts % (Manual)   


 


Lymphocytes % (Manual)   


 


Nucleated RBC %   


 


Seg Neutrophils # Man   


 


Lymphocytes # (Manual)   


 


Monocytes # (Manual)   


 


PT   


 


INR   


 


D-Dimer   


 


ABG pH   


 


POC ABG pCO2   


 


POC ABG pO2   


 


ABG pO2   


 


ABG HCO3   


 


ABG Base Excess   


 


ABG Hemoglobin   


 


ABG Oxyhemoglobin   


 


ABG Sodium   


 


ABG Chloride   


 


ABG Glucose   


 


Oxyhemoglobin   


 


Carboxyhemoglobin   


 


Sodium   


 


Potassium   


 


Chloride   


 


Carbon Dioxide   


 


BUN   


 


Creatinine   


 


Glucose   


 


POC Glucose  151 H  


 


Calcium   


 


Ionized Calcium   


 


Total Bilirubin   


 


AST   


 


ALT   


 


Total Creatine Kinase   


 


Troponin T   


 


NT-Pro-B Natriuret Pep   


 


Total Protein   


 


Albumin   


 


LDL Cholesterol Direct   


 


HDL Cholesterol   


 


TSH    6.140 H


 


Free T4   0.67 L 


 


Arterial Blood Glucose   














  10/02/21 10/03/21 10/03/21





  22:19 04:40 04:40


 


WBC   


 


Hgb    8.1 L


 


Hct    27.5 L


 


MCV    73 L


 


MCH    22 L


 


MCHC   


 


RDW    25.4 H


 


Seg Neuts % (Manual)   


 


Lymphocytes % (Manual)   


 


Nucleated RBC %   


 


Seg Neutrophils # Man   


 


Lymphocytes # (Manual)   


 


Monocytes # (Manual)   


 


PT   


 


INR   


 


D-Dimer   


 


ABG pH   


 


POC ABG pCO2   


 


POC ABG pO2   


 


ABG pO2   


 


ABG HCO3   


 


ABG Base Excess   


 


ABG Hemoglobin   


 


ABG Oxyhemoglobin   


 


ABG Sodium   


 


ABG Chloride   


 


ABG Glucose   


 


Oxyhemoglobin   


 


Carboxyhemoglobin   


 


Sodium   


 


Potassium   


 


Chloride   


 


Carbon Dioxide   37 H D 


 


BUN   76 H 


 


Creatinine   1.6 H 


 


Glucose   173 H 


 


POC Glucose  157 H  


 


Calcium   


 


Ionized Calcium   


 


Total Bilirubin   


 


AST   


 


ALT   


 


Total Creatine Kinase   


 


Troponin T   


 


NT-Pro-B Natriuret Pep   


 


Total Protein   


 


Albumin   


 


LDL Cholesterol Direct   


 


HDL Cholesterol   


 


TSH   


 


Free T4   


 


Arterial Blood Glucose   














  10/03/21





  11:36


 


WBC 


 


Hgb 


 


Hct 


 


MCV 


 


MCH 


 


MCHC 


 


RDW 


 


Seg Neuts % (Manual) 


 


Lymphocytes % (Manual) 


 


Nucleated RBC % 


 


Seg Neutrophils # Man 


 


Lymphocytes # (Manual) 


 


Monocytes # (Manual) 


 


PT 


 


INR 


 


D-Dimer 


 


ABG pH 


 


POC ABG pCO2 


 


POC ABG pO2 


 


ABG pO2 


 


ABG HCO3 


 


ABG Base Excess 


 


ABG Hemoglobin 


 


ABG Oxyhemoglobin 


 


ABG Sodium 


 


ABG Chloride 


 


ABG Glucose 


 


Oxyhemoglobin 


 


Carboxyhemoglobin 


 


Sodium 


 


Potassium 


 


Chloride 


 


Carbon Dioxide 


 


BUN 


 


Creatinine 


 


Glucose 


 


POC Glucose  168 H


 


Calcium 


 


Ionized Calcium 


 


Total Bilirubin 


 


AST 


 


ALT 


 


Total Creatine Kinase 


 


Troponin T 


 


NT-Pro-B Natriuret Pep 


 


Total Protein 


 


Albumin 


 


LDL Cholesterol Direct 


 


HDL Cholesterol 


 


TSH 


 


Free T4 


 


Arterial Blood Glucose 











Allied health notes reviewed: nursing

## 2021-10-04 LAB
BUN SERPL-MCNC: 74 MG/DL (ref 7–17)
BUN/CREAT SERPL: 57 %
CALCIUM SERPL-MCNC: 9.2 MG/DL (ref 8.4–10.2)
HEMOLYSIS INDEX: 0

## 2021-10-04 RX ADMIN — IPRATROPIUM BROMIDE AND ALBUTEROL SULFATE SCH AMPUL: .5; 3 SOLUTION RESPIRATORY (INHALATION) at 07:52

## 2021-10-04 RX ADMIN — METHYLPREDNISOLONE SODIUM SUCCINATE SCH MG: 40 INJECTION, POWDER, FOR SOLUTION INTRAMUSCULAR; INTRAVENOUS at 21:43

## 2021-10-04 RX ADMIN — NYSTATIN SCH APPLIC: 100000 POWDER TOPICAL at 21:44

## 2021-10-04 RX ADMIN — PANTOPRAZOLE SODIUM SCH MG: 40 INJECTION, POWDER, FOR SOLUTION INTRAVENOUS at 10:41

## 2021-10-04 RX ADMIN — BENZONATATE SCH: 100 CAPSULE ORAL at 05:11

## 2021-10-04 RX ADMIN — IPRATROPIUM BROMIDE AND ALBUTEROL SULFATE SCH AMPUL: .5; 3 SOLUTION RESPIRATORY (INHALATION) at 13:31

## 2021-10-04 RX ADMIN — LEVOTHYROXINE SODIUM ANHYDROUS SCH MCG: 100 INJECTION, POWDER, LYOPHILIZED, FOR SOLUTION INTRAVENOUS at 05:11

## 2021-10-04 RX ADMIN — BENZONATATE SCH MG: 100 CAPSULE ORAL at 21:43

## 2021-10-04 RX ADMIN — IPRATROPIUM BROMIDE AND ALBUTEROL SULFATE SCH AMPUL: .5; 3 SOLUTION RESPIRATORY (INHALATION) at 20:52

## 2021-10-04 RX ADMIN — NYSTATIN SCH APPLIC: 100000 POWDER TOPICAL at 10:30

## 2021-10-04 RX ADMIN — ASPIRIN SCH MG: 325 TABLET, COATED ORAL at 10:41

## 2021-10-04 RX ADMIN — Medication SCH ML: at 10:41

## 2021-10-04 RX ADMIN — HYDRALAZINE HYDROCHLORIDE PRN MG: 20 INJECTION INTRAMUSCULAR; INTRAVENOUS at 18:27

## 2021-10-04 RX ADMIN — BENZONATATE SCH MG: 100 CAPSULE ORAL at 15:17

## 2021-10-04 RX ADMIN — HEPARIN SODIUM SCH UNIT: 5000 INJECTION, SOLUTION INTRAVENOUS; SUBCUTANEOUS at 21:44

## 2021-10-04 RX ADMIN — HEPARIN SODIUM SCH UNIT: 5000 INJECTION, SOLUTION INTRAVENOUS; SUBCUTANEOUS at 05:11

## 2021-10-04 RX ADMIN — HEPARIN SODIUM SCH UNIT: 5000 INJECTION, SOLUTION INTRAVENOUS; SUBCUTANEOUS at 15:17

## 2021-10-04 RX ADMIN — Medication SCH ML: at 21:44

## 2021-10-04 RX ADMIN — METHYLPREDNISOLONE SODIUM SUCCINATE SCH MG: 40 INJECTION, POWDER, FOR SOLUTION INTRAMUSCULAR; INTRAVENOUS at 10:42

## 2021-10-04 NOTE — PROGRESS NOTE
Assessment and Plan


Assessment and plan: 


(1) Non-ST elevation MI (NSTEMI) type II Procardia


Current Visit: Yes   Status: Acute   


Plan to address problem: 


Admit the patient to the medical telemetry. Aspirin 325 mg p.o. daily. Lipitor 

40 mg p.o. daily. Nitroglycerin as needed. We do the serial cardiac enzyme. We 

also consult cardiology for evaluation and order echocardiogram.








(2) Acute exacerbation of CHF (congestive heart failure)


Current Visit: Yes   Status: Acute   


Plan to address problem: 


Fluid restriction. Maintain input output. Lasix 40 mg IV every 12 hours. Oxygen 

via nasal cannula 3 L/min. DuoNeb by nebulizer every 4 hours. Echocardiogram. 

Cardiology consult








(3) hypothyroidism -new diagnosis





(4) COPD (chronic obstructive pulmonary disease)


Current Visit: Yes   Status: Acute   


Plan to address problem: 


Oxygen by nasal cannula 3 L/min. DuoNeb via nebulizer every 4 hours. Albuterol 

via nebulizer every 4 hours as needed.





Super


(5) Morbid obesity


Current Visit: Yes   Status: Acute   


Plan to address problem: 


We counseled regarding weight reduction. Outpatient follow-up with bariatric 

surgeon








(6) Hyperglycemia


Current Visit: Yes   Status: Acute   


Plan to address problem: 


We will give 1 ampoule of D50. We will put the patient on cardiac diet. We will 

monitor the blood glucose closely








(7) acute kidney injury with vasomotor nephropathy





(8) Lymphedema of the abdominal wall





(9)Anemia of chronic disease





(10)Possible passive congestive hepatic failure with underlying NAFLD








(11) hypertension


Current Visit: Yes   Status: Acute   


Plan to address problem: 


We will continue the home medication. Hydralazine 10 mg IV every 6 hours as 

needed. We will monitor the blood pressure closely





(12) DVT prophylaxis


Current Visit: No   Status: Acute   


Plan to address problem: 


Heparin 5000 units subcu every 8 hours for DVT prophylaxis. Pepcid 20 mg p.o. 

twice daily for GI prophylaxis. Patient is a full code





9/23


-Patient is on heparin drip for non-STEMI, cardiology is following.


-Patient is off Lasix and ACE inhibitors because of [place].


-Kidney function is worsening overnight and I put a consult for nephrology.


-Patient is hypotensive and blood pressure from this morning was 101/41.  We 

will continue to monitor.  And if it is dropping we we will give him fluid.


-Echo was done and EF of 50 to 55%.  Diastolic dysfunction is indeterminate.  

Management per cardiology


-Patient has COPD continues on dexamethasone, nebulizer treatment as needed.


-Patient has hyponatremia and hyperkalemia.  I give the patient Kayexalate.  

Monitor electrolytes.  Will follow nephrology for additional recommendation.





9/24


-Renal function is worsening, nephrology is following


-Blood pressure is better today


-Hyperkalemia; I added Kayexalate


-Morbid obesity








9/25


-Slight improvement in creatinine.  Hyponatremia worsened. Nephrology is 

following and put the patient back on Lasix.


-Blood PRESSURE IS BETTER today


-Potassium this morning was 5.8, I ordered 60 g of Kayexalate


-Morbidly obese


-Obesity hypoventilation syndrome





9/26


-Creatinine is improving and this morning was 2.4


-Hyponatremia improved this morning was 134.  Patient is on Lasix


-Blood pressure is better


-Patient is on 10 L of oxygen; worsened 


-Morbidly obese, FABIAN








9/27: Follow ordered ultrasound of abdomen, per Physician unable to get CT. I 

ordered an ABG due to my concern about her breathing pattern this morning, 

patient may benefit from. Will try to establish if patient has a primary 

pulmonary doctor. ABG is showing consistent with Respiratory Acidosis. Will 

place on BIPAP now. May need to transfer to IMCU for closer monitoring. Patient 

likely with Obesity Hypoventilation syndrome.


cardiology work up, ongoing. 


RENAL SHOWING SOME IMPROVEMENT


Guarded prognosis





9/28: Patient seen and examined today identified some lesion under the pannus 

will obtain wound care and wound surgeon evaluation.  Nephrology input noted 

continue diuresis and stop the sodium tabs creatinine is stable.  I did discuss 

with the family and updated them.  We will continue to hold ACE and ARB and if 

pressures continue to drop may need to hold diuresis also despite as written 

above.  Monitor labs including H&H.  She is more awake review of ABG shows 

improving CO2.  Will discuss with case management as patient may need BiPAP on 

discharge home.  I also updated the family








9/29: Patient showing some clinical improvement.  Liver enzymes is showing mild 

improvement although bilirubin increased slightly.  GI input noted and as 

discussed by his notes below


"Abnormal liver enzymes in hepatocellular pattern.  broad ddx and likely 

multifactorial causes including NAFLD, congestive hepatopathy, possibly DILI.  

will check viral hep serologies.  US with limited views, possible coarsening of 

the liver"


Patient also evaluated by surgery noted with the lymphedema of abdominal wall 

and open wound of the abdominal wall without penetration into the abdominal 

cavity with recommendation to pack the wounds daily with mesalt. Need to 

maintain dry environment discussed with nursing staff.  Elevate the pannus and 

optimize nutrition for which I will get nutritional consult.


No surgical intervention at this time.  Patient is bedbound when I asked when 

last she ambulated she said while "I guess he has not worked" but it has been a 

while.  Unfortunately the LTAC center unwilling to accept the patient will 

discuss with pulmonary and with case management about obtaining BiPAP for this 

patient and possible SNF referral.





09/30: Patient this morning and was noted significantly lethargic and 

unresponsive respiratory and a code to be called.  The patient resuscitated 

without any invasive procedure.  ABG was obtained showed a CO2 of 142.  Despite 

using BiPAP for some time through the night, sure how long she used it for.  I 

have discontinued all IV opioids and recommend no IV opioids for this patient at

this time.  I also discontinued p.o. medications as an essential medication and 

change to IV.  We will repeat an ABG in an hour to see if there is some 

improvement.  Patient remains at high risk for possible intubation.  Clinical 

condition is guarded





10/1: Patient remains on BiPAP this am, has some intermittent twitching, will 

check EEG, not likely seizure, but will monitor. Continue current management


, check Albumin and Pre-Albumin level.


Remains critically ill. Liver status showing improvement.





10/2: Patient with elevated D-dimer unfortunately size precludes being able to 

have a CTA chest done here to rule out pulmonary embolism.  Doppler of lower 

extremities is pending. We will continue subcu anticoagulation with heparin at 

this time. Until Doppler is resolved. Patient is not hypoxic so doubt pulmonary 

embolism at this time. Her problem remains hypercapnia. I did take time to go 

over her history while she has been around hospital in the past and noticed a 

remarkable increase in her BMI from the last 2 years. Discussed with the 

intensivist will agree to check thyroid function test. Critical care time 35-

minute





10/3: Patient with hypothyroidism, while she does not appear to be with myxedema

coma at this time. Will initiate levothyroxine as I believe that this will make 

profound impact  her management at this time, continue BiPAP continue current 

management. Seroquel was added by pulmonary for delirium and agitation 

management, Ventimask during the day and BiPAP at night





10/4: Continue levothyroine, can change to PO in am. Will obtain Psych consult, 

she is clinically stable and will transfer to floor





History


Interval history: 


Patient seen and examined, this morning is on a Ventimask.  She does have some 

hallucination.





Hospitalist Physical





- Physical exam


Narrative exam: 


VITAL SIGNS:  Reviewed.    


GENERAL:  The patient is morbidly obese,  lethargic on BiPAP


Placed she is waking up some, Vital signs as documented.


HEAD:  No signs of head trauma.


EYES:  Pupils are equal.  Extraocular motions intact.  


EARS:  Hearing grossly intact.


MOUTH:  Oropharynx is normal. 


NECK:  No adenopathy, no JVD.  


CHEST:  Chest with diminished breath sounds bilaterally.  No wheezes, rales, or 

rhonchi.  


CARDIAC:  Regular rate and rhythm.  S1 and S2, without murmurs, gallops, or 

rubs.


VASCULAR:  No Edema.  Peripheral pulses normal and equal in all extremities.


ABDOMEN:  enlarged pannus, lymphedema with thickened skin, non tender and non 

distended.  No   rebound or guarding, and no masses palpated.   Bowel Sounds 

normal.


MUSCULOSKELETAL:  Good range of motion of all major joints. Extremities without 

clubbing, cyanosis or edema.  


NEUROLOGIC EXAM: Lethargic but oriented x3   no focal sensory or strength 

deficits.   Speech normal.  Follows some commands.


PSYCHIATRIC:  Mood normal.


SKIN:  detail exam as documented in skin assessment








- Constitutional


Vitals: 


                                        











Temp Pulse Resp BP Pulse Ox


 


 98.3 F   105 H  20   160/107   98 


 


 10/04/21 12:00  10/04/21 13:31  10/04/21 13:31  10/04/21 11:00  10/04/21 13:31











General appearance: Present: no acute distress





HEART Score





- HEART Score


Troponin: 


                                        











Troponin T  0.078 ng/mL (0.00-0.029)  H D 09/22/21  13:03    














Results





- Labs


CBC & Chem 7: 


                                 10/03/21 04:40





                                 10/04/21 04:22


Labs: 


                             Laboratory Last Values











WBC  8.1 K/mm3 (4.5-11.0)   10/03/21  04:40    


 


RBC  3.79 M/mm3 (3.65-5.03)   10/03/21  04:40    


 


Hgb  8.1 gm/dl (10.1-14.3)  L  10/03/21  04:40    


 


Hct  27.5 % (30.3-42.9)  L  10/03/21  04:40    


 


MCV  73 fl (79-97)  L  10/03/21  04:40    


 


MCH  22 pg (28-32)  L  10/03/21  04:40    


 


MCHC  30 % (30-34)   10/03/21  04:40    


 


RDW  25.4 % (13.2-15.2)  H  10/03/21  04:40    


 


Plt Count  224 K/mm3 (140-440)   10/03/21  04:40    


 


Add Manual Diff  Complete   09/26/21  14:11    


 


Total Counted  100   09/26/21  14:11    


 


Seg Neuts % (Manual)  86.0 % (40.0-70.0)  H  09/26/21  14:11    


 


Band Neutrophils %  1.0 %  09/26/21  14:11    


 


Lymphocytes % (Manual)  6.0 % (13.4-35.0)  L  09/26/21  14:11    


 


Reactive Lymphs % (Man)  1.0 %  09/26/21  14:11    


 


Monocytes % (Manual)  4.0 % (0.0-7.3)   09/26/21  14:11    


 


Metamyelocytes %  2.0 %  09/26/21  14:11    


 


Myelocytes %  2.0 %  09/22/21  04:03    


 


Nucleated RBC %  Not Reportable   09/26/21  14:11    


 


Seg Neutrophils # Man  12.6 K/mm3 (1.8-7.7)  H  09/26/21  14:11    


 


Band Neutrophils #  0.1 K/mm3  09/26/21  14:11    


 


Lymphocytes # (Manual)  0.9 K/mm3 (1.2-5.4)  L  09/26/21  14:11    


 


Abs React Lymphs (Man)  0.1 K/mm3  09/26/21  14:11    


 


Monocytes # (Manual)  0.6 K/mm3 (0.0-0.8)   09/26/21  14:11    


 


Eosinophils # (Manual)  0.0 K/mm3 (0.0-0.4)   09/26/21  14:11    


 


Basophils # (Manual)  0.0 K/mm3 (0.0-0.1)   09/26/21  14:11    


 


Metamyelocytes #  0.3 K/mm3  09/26/21  14:11    


 


Myelocytes #  0.0 K/mm3  09/26/21  14:11    


 


Promyelocytes #  0.0 K/mm3  09/26/21  14:11    


 


Blast Cells #  0.0 K/mm3  09/26/21  14:11    


 


WBC Morphology  Not Reportable   09/26/21  14:11    


 


WBC Morphology  TNR   09/26/21  14:11    


 


Hypersegmented Neuts  Not Reportable   09/26/21  14:11    


 


Hyposegmented Neuts  Not Reportable   09/26/21  14:11    


 


Hypogranular Neuts  Not Reportable   09/26/21  14:11    


 


Smudge Cells  Not Reportable   09/26/21  14:11    


 


Toxic Granulation  Not Reportable   09/26/21  14:11    


 


Toxic Vacuolation  Not Reportable   09/26/21  14:11    


 


Dohle Bodies  Not Reportable   09/26/21  14:11    


 


Pelger-Huet Anomaly  Not Reportable   09/26/21  14:11    


 


Ac Rods  Not Reportable   09/26/21  14:11    


 


Platelet Estimate  Consistent w auto   09/26/21  14:11    


 


Clumped Platelets  Not Reportable   09/26/21  14:11    


 


Plt Clumps, EDTA  Not Reportable   09/26/21  14:11    


 


Large Platelets  Not Reportable   09/26/21  14:11    


 


Giant Platelets  Not Reportable   09/26/21  14:11    


 


Platelet Satelliting  Not Reportable   09/26/21  14:11    


 


Plt Morphology Comment  Not Reportable   09/26/21  14:11    


 


RBC Morphology  Not Reportable   09/26/21  14:11    


 


Dimorphic RBCs  Not Reportable   09/26/21  14:11    


 


Polychromasia  Few   09/26/21  14:11    


 


Hypochromasia  1+   09/26/21  14:11    


 


Poikilocytosis  Not Reportable   09/26/21  14:11    


 


Anisocytosis  Not Reportable   09/26/21  14:11    


 


Microcytosis  Not Reportable   09/26/21  14:11    


 


Macrocytosis  Not Reportable   09/26/21  14:11    


 


Spherocytes  Not Reportable   09/26/21  14:11    


 


Pappenheimer Bodies  Not Reportable   09/26/21  14:11    


 


Sickle Cells  Not Reportable   09/26/21  14:11    


 


Target Cells  1+   09/26/21  14:11    


 


Tear Drop Cells  Few   09/26/21  14:11    


 


Ovalocytes  Not Reportable   09/26/21  14:11    


 


Helmet Cells  Not Reportable   09/26/21  14:11    


 


[place] Bodies  Not Reportable   09/26/21  14:11    


 


Cabot Rings  Not Reportable   09/26/21  14:11    


 


Apolinar Cells  Not Reportable   09/26/21  14:11    


 


Bite Cells  Not Reportable   09/26/21  14:11    


 


Crenated Cell  Not Reportable   09/26/21  14:11    


 


Elliptocytes  Not Reportable   09/26/21  14:11    


 


Acanthocytes (Spur)  Not Reportable   09/26/21  14:11    


 


Rouleaux  Not Reportable   09/26/21  14:11    


 


Hemoglobin C Crystals  Not Reportable   09/26/21  14:11    


 


Schistocytes  Not Reportable   09/26/21  14:11    


 


Malaria parasites  Not Reportable   09/26/21  14:11    


 


Sami Bodies  Not Reportable   09/26/21  14:11    


 


Hem Pathologist Commnt  No   09/26/21  14:11    


 


PT  18.7 Sec. (12.2-14.9)  H  10/02/21  16:55    


 


INR  1.51  (0.87-1.13)  H  10/02/21  16:55    


 


APTT  25.2 Sec. (24.2-36.6)   10/02/21  16:55    


 


D-Dimer  5410.10 ng/mlDDU (0-234)  H  10/01/21  16:03    


 


ABG pH  7.382  (7.320-7.450)   10/02/21  14:19    


 


POC ABG pCO2  67.4 mmHg (32.0-48.0)  H  10/02/21  14:19    


 


ABG pCO2  86.9 mm Hg  09/27/21  18:40    


 


POC ABG pO2  150.6 mmHg ()  H  10/02/21  14:19    


 


ABG pO2  81.8 mm Hg (80.0-90.0)   09/27/21  18:40    


 


POC ABG HCO3  39.2   10/02/21  14:19    


 


ABG HCO3  36.2 mmol/L (20.0-26.0)  H  09/27/21  18:40    


 


ABG O2 Saturation  99.4  (0-100)   10/02/21  14:19    


 


ABG O2 Content  10.4  (0.0-44)   09/27/21  18:40    


 


POC ABG Base Excess  12.2   10/02/21  14:19    


 


ABG Base Excess  7.4 mmol/L (-2.0-3.0)  H  09/27/21  18:40    


 


ABG Hemoglobin  9.3  (12.0-17.5)  L  10/02/21  14:19    


 


ABG Oxyhemoglobin  97.5  (94-98)   10/02/21  14:19    


 


ABG Carboxyhemoglobin  1.9 % (0.0-5.0)   09/27/21  18:40    


 


ABG Methemoglobin  0.3  (0.0-1.5)   10/02/21  14:19    


 


ABG Sodium  143.0 mmol/L (136.0-145.0)   10/02/21  14:19    


 


ABG Potassium  4.2 mmol/L (3.40-4.50)   10/02/21  14:19    


 


ABG Chloride  98.0 mmol/L ()   10/02/21  14:19    


 


ABG Glucose  162 mg/dL (65-95)  H  10/02/21  14:19    


 


Oxyhemoglobin  93.7 % (95.0-99.0)  L  09/27/21  18:40    


 


Carboxyhemoglobin  1.6  (0.5-1.5)  H  10/02/21  14:19    


 


FiO2  30 %  09/27/21  18:40    


 


FiO2 %  35.0   10/02/21  14:19    


 


Sodium  147 mmol/L (137-145)  H  10/04/21  04:22    


 


Potassium  4.8 mmol/L (3.6-5.0)   10/04/21  04:22    


 


Chloride  100.6 mmol/L ()   10/04/21  04:22    


 


Carbon Dioxide  37 mmol/L (22-30)  H  10/04/21  04:22    


 


Anion Gap  14 mmol/L  10/04/21  04:22    


 


BUN  74 mg/dL (7-17)  H  10/04/21  04:22    


 


Creatinine  1.3 mg/dL (0.6-1.2)  H  10/04/21  04:22    


 


Estimated GFR  57 ml/min  10/04/21  04:22    


 


BUN/Creatinine Ratio  57 %  10/04/21  04:22    


 


Glucose  193 mg/dL ()  H  10/04/21  04:22    


 


POC Glucose  154 mg/dL ()  H  10/04/21  11:27    


 


Osmolality  301 Mosm/kg  09/23/21  23:15    


 


Calcium  9.2 mg/dL (8.4-10.2)   10/04/21  04:22    


 


Ionized Calcium  3.6 mg/dL (4.8-5.6)  L  09/23/21  23:15    


 


Total Bilirubin  1.60 mg/dL (0.1-1.2)  H  10/01/21  08:55    


 


AST  172 units/L (5-40)  H  10/01/21  08:55    


 


ALT  437 units/L (7-56)  H  10/01/21  08:55    


 


Alkaline Phosphatase  92 units/L ()   10/01/21  08:55    


 


Total Creatine Kinase  1712 units/L ()  H  09/24/21  10:04    


 


Troponin T  0.078 ng/mL (0.00-0.029)  H D 09/22/21  13:03    


 


NT-Pro-B Natriuret Pep  7573 pg/mL (0-450)  H  09/21/21  22:04    


 


Total Protein  10.1 g/dL (6.3-8.2)  H  10/01/21  08:55    


 


Albumin  3.2 g/dL (3.9-5)  L  10/01/21  08:55    


 


Albumin/Globulin Ratio  0.5 %  10/01/21  08:55    


 


Triglycerides  105 mg/dL (2-149)   09/21/21  22:04    


 


Cholesterol  85 mg/dL ()   09/21/21  22:04    


 


LDL Cholesterol Direct  47 mg/dL ()  L  09/21/21  22:04    


 


HDL Cholesterol  25 mg/dL (40-59)  L  09/21/21  22:04    


 


Cholesterol/HDL Ratio  3.40 %  09/21/21  22:04    


 


TSH  6.140 mlU/mL (0.270-4.200)  H  10/02/21  19:55    


 


Free T4  0.67 ng/dL (0.76-1.46)  L  10/02/21  19:55    


 


Total Cortisol  41.3 mcg/dL (***)   09/24/21  10:04    


 


Arterial Blood Glucose  162 mg/dL (65-95)  H  10/02/21  14:19    


 


Arterial Blood Ionized Calcium  4.6 mg/dL (4.6-5.3)   09/30/21  07:32    


 


Urine Osmolality  157 Mosm/kg  09/23/21  18:46    


 


Urine Sodium  16 mmol/L  09/23/21  18:46    


 


Coronavirus (PCR)  Negative  (Negative)   09/28/21  Unknown


 


Hepatitis A IgM Ab  Non-reactive  (NonReactive)   09/28/21  18:45    


 


Hep Bs Antigen  Nonreactive  (Negative)   09/28/21  18:45    


 


Hepatitis C Antibody  Non-reactive  (NonReactive)   09/28/21  18:45    











Diamond/IV: 


                                        





Voiding Method                   Indwelling Catheter











Active Medications





- Current Medications


Current Medications: 














Generic Name Dose Route Start Last Admin





  Trade Name Freq  PRN Reason Stop Dose Admin


 


Acetaminophen  650 mg  09/22/21 03:00  10/01/21 06:42





  Acetaminophen 325 Mg Tab  PO   650 mg





  Q4H PRN   Administration





  Pain MILD(1-3)/Fever >100.5/HA  


 


Albuterol  2.5 mg  09/22/21 03:00 





  Albuterol 2.5 Mg/3 Ml Nebu  IH  





  Q4HRT PRN  





  Shortness Of Breath  


 


Albuterol/Ipratropium  1 ampul  09/22/21 08:00  10/04/21 13:31





  Ipratropium/Albuterol Sulfate 3 Ml Ampul.Neb  IH   1 ampul





  TIDRT DELL   Administration


 


Aspirin  325 mg  09/23/21 10:00  10/04/21 10:41





  Aspirin Ec 325 Mg Tab  PO   325 mg





  QDAY DELL   Administration


 


Atorvastatin Calcium  40 mg  09/22/21 22:00  10/03/21 21:18





  Atorvastatin 40 Mg Tab  PO   40 mg





  QHS DELL   Administration


 


Benzonatate  100 mg  09/22/21 06:00  10/04/21 15:17





  Benzonatate 100 Mg Cap  PO   100 mg





  Q8HR DELL   Administration


 


Guaifenesin  200 mg  09/26/21 14:47  10/01/21 06:43





  Guaifenesin 100 Mg/5 Ml Oral Liqd  PO   200 mg





  Q4H PRN   Administration





  Cough  


 


Haloperidol Lactate  5 mg  10/02/21 16:27  10/03/21 22:52





  Haloperidol Lactate 5 Mg/1 Ml Inj  IV   5 mg





  Q6H PRN   Administration





  Agitation  


 


Heparin Sodium (Porcine)  5,000 unit  10/02/21 14:00  10/04/21 15:17





  Heparin 5,000 Unit/1 Ml Vial  SUB-Q   5,000 unit





  Q8HR DELL   Administration


 


Hydralazine HCl  10 mg  10/03/21 17:25 





  Hydralazine 20 Mg/1 Ml Inj  IV  





  Q6H PRN  





  Blood Pressure  


 


Levothyroxine Sodium  100 mcg  10/04/21 06:00  10/04/21 05:11





  Levothyroxine 100 Mcg Inj  IV   100 mcg





  DAILY@0600 DELL   Administration


 


Methylprednisolone Sodium Succinate  20 mg  09/30/21 10:00  10/04/21 10:42





  Methylprednisolone Sod Succinate 40 Mg/1 Ml Inj  IV   20 mg





  Q12HR DELL   Administration


 


Nitroglycerin  0.4 mg  09/22/21 03:00 





  Nitroglycerin 0.4 Mg Tab Subl  SL  





  Q5M PRN  





  Chest Pain  


 


Nystatin  1 applic  09/22/21 18:00  10/04/21 10:30





  Nystatin Powder 15 Gm  TP   1 applic





  BID DELL   Administration


 


Ondansetron HCl  4 mg  09/22/21 03:00  09/29/21 23:51





  Ondansetron 4 Mg/2 Ml Inj  IV   4 mg





  Q8H PRN   Administration





  Nausea And Vomiting  


 


Oxycodone/Acetaminophen  1 tab  09/22/21 03:00  10/01/21 21:19





  Oxycodone /Acetaminophen 5-325mg Tab  PO   1 tab





  Q6H PRN   Administration





  Pain, Moderate (4-6)  


 


Pantoprazole Sodium  40 mg  09/30/21 10:00  10/04/21 10:41





  Pantoprazole 40 Mg Inj  IV   40 mg





  QDAY DELL   Administration


 


Quetiapine Fumarate  75 mg  10/02/21 22:00  10/03/21 21:18





  Quetiapine 25 Mg Tab  PO   75 mg





  QHS DELL   Administration


 


Sodium Chloride  10 ml  09/22/21 10:00  10/04/21 10:41





  Sodium Chloride 0.9% 10 Ml Flush Syringe  IV   10 ml





  BID DELL   Administration


 


Sodium Chloride  10 ml  09/22/21 03:00 





  Sodium Chloride 0.9% 10 Ml Flush Syringe  IV  





  PRN PRN  





  LINE FLUSH  


 


Tramadol HCl  50 mg  09/22/21 03:00 





  Tramadol 50 Mg Tab  PO  





  Q6H PRN  





  Pain, Moderate (4-6)  














Nutrition/Malnutrition Assess





- Dietary Evaluation


Nutrition/Malnutrition Findings: 


                                        





Nutrition Notes                                            Start:  09/22/21 

14:21


Freq:                                                      Status: Active       




Protocol:                                                                       




 Document     09/27/21 17:08  GB  (Rec: 09/27/21 17:14  YANCY  [id number])


 Nutrition Notes


     Initial or Follow up                        Reassessment


     Current Diagnosis                           Hypertension


     Other Pertinent Diagnosis                   Dyspnea, respiratory distress,


                                                 morbid obesity


     Current Diet                                Regular


     Labs/Tests                                  9/27: Na 134, BUN 63,


                                                 creatinine 2.2, glucose 159,


                                                 Ca 7.8


     Pertinent Medications                       Lasix, NaCl IV flushes


     Height                                      5 ft 4 in


     Weight                                      287.5 kg


     Ideal Body Weight (kg)                      54.54


     BMI                                         108.8


     Weight change and time frame                expect weight fluctuations r/t


                                                 fluid therapy, CHF


                                                 complications


     Weight Status                               Morbidly Obese


     Subjective/Other Information                Per MD notes 9/27: fluid


                                                 restrictions, on nasal cannula


                                                 3L, possibly obesity


                                                 hypoventilation syndrome,


                                                 recommended to bariatric


                                                 consult


     Percent of energy/protein needs met:        Meals and snacks provided meet


                                                 100% of estimated energy


                                                 needs.  Current po intake of


                                                 meals recorded average 50%.


     Burn                                        Absent


     Trauma                                      Absent


     GI Symptoms                                 None


     Food Allergy                                No


     Current % PO                                Fair (50-74%)


     Minimum of two criteria                     No


     #1


      Nutrition Diagnosis                        Overweight/obesity


      Comments:                                  Discussed diet/life style


                                                 changes using small goal


                                                 successes to advancement,


                                                 encouraged outpatient RD


                                                 counseling at bariatric center


      Etiology                                   dyspnea, respiratory distress


      As Evidenced by Signs and Symptoms         , %


      Diagnosis Progress(for reassessment        Continues


       documentation)                            


     Is patient on ventilator?                   No


     Is Patient Ambulatory and/or Out of Bed     No


     REE-([facility]-confined to bed)      4277.724


     Kcal/Kg value to use for calculation        10


     Approximate Energy Requirements Using       2875


      kcal/Kg                                    


     Calculation Used for Recommendations        Kcal/kg


     Additional Notes                            Protein 0.6 g/kg r/t BMI over


                                                 50: 163g


                                                 Fluids: 1ml/kcal or per MD


 Nutrition Intervention


     Change Diet Order:                          continue with current diet


     Goal #1                                     Pt to contact and make


                                                 appointment with outpatient RD


                                                 in bariatric center


     Goal #2                                     weight to decrease -1% during


                                                 LOS


     Follow-Up By:                               10/04/21


     Additional Comments                         f/u for weight loss, po intake

## 2021-10-04 NOTE — PROGRESS NOTE
Assessment and Plan





Assessment


Acute kidney injury, unknown baseline. Renal ultrasound notes poor visualization

due to body habitus.


Hypernatremia


Hyperkalemia


Hypocalcemia


Morbid obesity


Acute hypoxemic and hypercapnic respiratory failure


Acute exacerbation of CHF (congestive heart failure)


Hypertension


Sleep apnea





Recommendations


Na higher today at 147, off sodium tabs


Increase free water intake as able, if unable to drink, will consider D5W


Creatinine improving to 1.3


echo noted, likely diastolic disease


Avoid diuresis if able given hypernatremia, note good urine output 


Maintain MAP more than 65


Hold ACE/ARB at this time


Renally dose medications


Avoid nephrotoxins


Renal diet


Daily renal labs





Subjective


Date of service: 10/04/21


Principal diagnosis: Ac hypoxemic and hypercapnic resp failure; AE-CHF; Morbid 

obesity; FABIAN/OHS


Interval history: 





Patient was seen for her renal issues- remains on Bipap this AM


Nursing, interdisciplinary and consult notes were reviewed


Vitals, input and output, medications and labs were reviewed





Objective





- Exam


Narrative Exam: 





General: Morbid obesity, on Bipap


HEENT: Oral mucosa moist


Neck: Supple, no JVD


Chest: labored breathing on Bipap


Heart: RRR, S1 and S2


Abdomen: Soft, nontender


Extremity: No peripheral cyanosis, edema


Neurological: Awake and alert


Dermatology: skin intact


Psych: Calm and cooperative


Musculoskeletal: No joint effusion





- Vital Signs


Vital signs: 


                               Vital Signs - 12hr











  10/03/21 10/03/21 10/03/21





  22:00 22:08 23:00


 


Temperature   


 


Pulse Rate 101 H 103 H 101 H


 


Pulse Rate [   





Anterior   





Bilateral   





Throughout]   


 


Pulse Rate [   





From Monitor]   


 


Respiratory 15 30 H 22





Rate   


 


Respiratory   





Rate [Anterior   





Bilateral   





Throughout]   


 


Blood Pressure 167/100 167/100 147/91


 


O2 Sat by Pulse 99 97 98





Oximetry   














  10/03/21 10/04/21 10/04/21





  23:05 00:00 00:01


 


Temperature  97.8 F 


 


Pulse Rate 102 H 105 H 106 H


 


Pulse Rate [   





Anterior   





Bilateral   





Throughout]   


 


Pulse Rate [  105 H 





From Monitor]   


 


Respiratory 21 40 H 18





Rate   


 


Respiratory   





Rate [Anterior   





Bilateral   





Throughout]   


 


Blood Pressure 147/91  147/91


 


O2 Sat by Pulse 98 100 94





Oximetry   














  10/04/21 10/04/21 10/04/21





  01:01 01:53 01:56


 


Temperature   


 


Pulse Rate 101 H  


 


Pulse Rate [   





Anterior   





Bilateral   





Throughout]   


 


Pulse Rate [   





From Monitor]   


 


Respiratory 20  





Rate   


 


Respiratory   





Rate [Anterior   





Bilateral   





Throughout]   


 


Blood Pressure 147/91  


 


O2 Sat by Pulse 97 95 98





Oximetry   














  10/04/21 10/04/21 10/04/21





  02:01 03:01 04:00


 


Temperature   97.9 F


 


Pulse Rate 101 H 106 H 94 H


 


Pulse Rate [   





Anterior   





Bilateral   





Throughout]   


 


Pulse Rate [   94 H





From Monitor]   


 


Respiratory 19 18 18





Rate   


 


Respiratory   





Rate [Anterior   





Bilateral   





Throughout]   


 


Blood Pressure 147/91 147/91 


 


O2 Sat by Pulse 97 92 100





Oximetry   














  10/04/21 10/04/21 10/04/21





  04:01 05:01 06:00


 


Temperature   97.6 F


 


Pulse Rate 94 H 103 H 


 


Pulse Rate [   





Anterior   





Bilateral   





Throughout]   


 


Pulse Rate [   





From Monitor]   


 


Respiratory 17 20 





Rate   


 


Respiratory   





Rate [Anterior   





Bilateral   





Throughout]   


 


Blood Pressure 147/91 147/91 


 


O2 Sat by Pulse 98 99 





Oximetry   














  10/04/21 10/04/21





  06:01 07:52


 


Temperature  


 


Pulse Rate 104 H 


 


Pulse Rate [  103 H





Anterior  





Bilateral  





Throughout]  


 


Pulse Rate [  





From Monitor]  


 


Respiratory 26 H 





Rate  


 


Respiratory  20





Rate [Anterior  





Bilateral  





Throughout]  


 


Blood Pressure 147/91 


 


O2 Sat by Pulse 97 99





Oximetry  














- Lab





                                 10/03/21 04:40





                                 10/04/21 04:22


                             Most recent lab results











ABG pH  7.382  (7.320-7.450)   10/02/21  14:19    


 


ABG pCO2  86.9 mm Hg  09/27/21  18:40    


 


ABG pO2  81.8 mm Hg (80.0-90.0)   09/27/21  18:40    


 


ABG HCO3  36.2 mmol/L (20.0-26.0)  H  09/27/21  18:40    


 


ABG O2 Saturation  99.4  (0-100)   10/02/21  14:19    


 


Calcium  9.2 mg/dL (8.4-10.2)   10/04/21  04:22    


 


Urine Sodium  16 mmol/L  09/23/21  18:46    














Medications & Allergies





- Medications


Allergies/Adverse Reactions: 


                                    Allergies





No Known Allergies Allergy (Unverified 07/13/17 11:17)


   








Home Medications: 


                                Home Medications











 Medication  Instructions  Recorded  Confirmed  Last Taken  Type


 


Acetaminophen [Acetaminophen TAB] 325 mg PO Q4H PRN #30 tablet 12/14/17 01/22/21

Unknown Rx


 


ALBUTEROL NEB's [Proventil 0.083% 2.5 mg IH Q3HRT PRN #30 day 01/24/21  Unknown 

Rx





NEBS]     


 


Albuterol Mdi (or & Nicu Only) 2 puff IH QID PRN #1 inhalation 01/24/21  Unknown

Rx





[ProAir HFA Inhaler]     


 


Azithromycin [Zithromax Z-JAVIER] 0 mg PO DAILY #1 tab 01/24/21  Unknown Rx


 


Benzonatate [Tessalon Perles] 100 mg PO Q8HR #15 capsule 01/24/21  Unknown Rx


 


Famotidine [Pepcid] 20 mg PO BID #14 tablet 01/24/21  Unknown Rx


 


Ipratropium/Albuterol Sulfate 1 ampul IH TIDRT #30 day 01/24/21  Unknown Rx





[DUONEB *Not for PRN Use*]     


 


hydroCHLOROthiazide [HCTZ] 25 mg PO QDAY  tablet 01/24/21  Unknown Rx


 


hydroCHLOROthiazide [HCTZ] 25 mg PO QDAY #30 tablet 01/24/21  Unknown Rx


 


methylPREDNISolone [Medrol 4MG 4 mg PO DAILY #1 tab.ds.pk 01/24/21  Unknown Rx





DOSEPAK (21 tabs)]     











Active Medications: 














Generic Name Dose Route Start Last Admin





  Trade Name Freq  PRN Reason Stop Dose Admin


 


Acetaminophen  650 mg  09/22/21 03:00  10/01/21 06:42





  Acetaminophen 325 Mg Tab  PO   650 mg





  Q4H PRN   Administration





  Pain MILD(1-3)/Fever >100.5/HA  


 


Albuterol  2.5 mg  09/22/21 03:00 





  Albuterol 2.5 Mg/3 Ml Nebu  IH  





  Q4HRT PRN  





  Shortness Of Breath  


 


Albuterol/Ipratropium  1 ampul  09/22/21 08:00  10/04/21 07:52





  Ipratropium/Albuterol Sulfate 3 Ml Ampul.Neb  IH   1 ampul





  TIDRT DELL   Administration


 


Aspirin  325 mg  09/23/21 10:00  10/03/21 10:00





  Aspirin Ec 325 Mg Tab  PO   325 mg





  QDAY DELL   Administration


 


Atorvastatin Calcium  40 mg  09/22/21 22:00  10/03/21 21:18





  Atorvastatin 40 Mg Tab  PO   40 mg





  QHS DELL   Administration


 


Benzonatate  100 mg  09/22/21 06:00  10/04/21 05:11





  Benzonatate 100 Mg Cap  PO   Not Given





  Q8HR DELL  


 


Guaifenesin  200 mg  09/26/21 14:47  10/01/21 06:43





  Guaifenesin 100 Mg/5 Ml Oral Liqd  PO   200 mg





  Q4H PRN   Administration





  Cough  


 


Haloperidol Lactate  5 mg  10/02/21 16:27  10/03/21 22:52





  Haloperidol Lactate 5 Mg/1 Ml Inj  IV   5 mg





  Q6H PRN   Administration





  Agitation  


 


Heparin Sodium (Porcine)  5,000 unit  10/02/21 14:00  10/04/21 05:11





  Heparin 5,000 Unit/1 Ml Vial  SUB-Q   5,000 unit





  Q8HR DELL   Administration


 


Hydralazine HCl  10 mg  10/03/21 17:25 





  Hydralazine 20 Mg/1 Ml Inj  IV  





  Q6H PRN  





  Blood Pressure  


 


Levothyroxine Sodium  100 mcg  10/04/21 06:00  10/04/21 05:11





  Levothyroxine 100 Mcg Inj  IV   100 mcg





  DAILY@0600 DELL   Administration


 


Methylprednisolone Sodium Succinate  20 mg  09/30/21 10:00  10/03/21 21:18





  Methylprednisolone Sod Succinate 40 Mg/1 Ml Inj  IV   20 mg





  Q12HR DELL   Administration


 


Nitroglycerin  0.4 mg  09/22/21 03:00 





  Nitroglycerin 0.4 Mg Tab Subl  SL  





  Q5M PRN  





  Chest Pain  


 


Nystatin  1 applic  09/22/21 18:00  10/03/21 21:36





  Nystatin Powder 15 Gm  TP   1 applic





  BID DELL   Administration


 


Ondansetron HCl  4 mg  09/22/21 03:00  09/29/21 23:51





  Ondansetron 4 Mg/2 Ml Inj  IV   4 mg





  Q8H PRN   Administration





  Nausea And Vomiting  


 


Oxycodone/Acetaminophen  1 tab  09/22/21 03:00  10/01/21 21:19





  Oxycodone /Acetaminophen 5-325mg Tab  PO   1 tab





  Q6H PRN   Administration





  Pain, Moderate (4-6)  


 


Pantoprazole Sodium  40 mg  09/30/21 10:00  10/03/21 10:00





  Pantoprazole 40 Mg Inj  IV   40 mg





  QDAY DELL   Administration


 


Quetiapine Fumarate  75 mg  10/02/21 22:00  10/03/21 21:18





  Quetiapine 25 Mg Tab  PO   75 mg





  QHS DELL   Administration


 


Sodium Chloride  10 ml  09/22/21 10:00  10/03/21 21:36





  Sodium Chloride 0.9% 10 Ml Flush Syringe  IV   10 ml





  BID DELL   Administration


 


Sodium Chloride  10 ml  09/22/21 03:00 





  Sodium Chloride 0.9% 10 Ml Flush Syringe  IV  





  PRN PRN  





  LINE FLUSH  


 


Tramadol HCl  50 mg  09/22/21 03:00 





  Tramadol 50 Mg Tab  PO  





  Q6H PRN  





  Pain, Moderate (4-6)

## 2021-10-04 NOTE — PROGRESS NOTE
Assessment and Plan





31 years old female with history of morbid obesity, hypertension, asthma , sleep

apnea was brought to the emergency room because of shortness of breath, edema, 

generalized malaise, fatigue, and weakness for last couple of days.  She states 

she just does not feel well.  She has a headache.  She states her legs are 

swollen.  She feels as though she is retaining fluid.  She states that she went 

to her regular doctors on the 13th.  They tested her for Covid.  It was 

negative.  She was told to go see a cardiologist.  She is not seen a car

diologist as of yet.  She came in here because she was not feeling well and did 

not know what to do.  She has had no sick contacts.  








In the emergency room patient is found to have acute CHF exacerbation patient 

proBNP is 7573 also patient cardiac enzyme is elevated troponin is 0.043. 

Patient blood glucose also 55 patient is hypoglycemic


Patients present blood sugur 130.





Patient is  awake. Resting on 4L O2 saturation 99%. BIPAP 20/8, rate 30, FIO2 

40% and stand-by in the room. Patient afebrile. No leukocytosis. Blood pressure 

160/107, Pulse 105


ABG on FIO2 45%











ABG pH  7.234  (7.320-7.450)  L  10/01/21  09:47    


 


POC ABG pCO2  90.5 mmHg (32.0-48.0)  H  10/01/21  09:47    


 


ABG pCO2  86.9 mm Hg  09/27/21  18:40    


 


POC ABG pO2  139.4 mmHg ()  H  10/01/21  09:47    


 


ABG pO2  81.8 mm Hg (80.0-90.0)   09/27/21  18:40    


 


POC ABG HCO3  37.4   10/01/21  09:47    


 


ABG O2 Saturation  99.2  (0-100)   10/01/21  09:47    











Patients D dimer 10/1/21 : 5410.10





Patient presently on albuterol/atrovent aerosol treatments q 6 hours, 

Subcutaneous Heparin, Protonix,  and I/V SoluMedrol.








I spent critical care time of 35 minutes, review the chart, examine the patient,

review lab results, talking to the nursing staff and respiratory therapy and 

work-up plan of treatment in this critically ill morbidly Obese patient.








- Patient Problems


(1) Acute respiratory failure with hypoxia and hypercapnia


Current Visit: Yes   Status: Acute   


Plan to address problem: 


On 4L O2 Saturation 99%.


BIPAP 20/8, rate 30, FIO2 40%. Stand-by.


 Albuterol/atrovent aerosol treatments.


 Continue Solumedrol


 Contibue S/C Heparin.


 Continue Protonix





 








(2) Acute exacerbation of CHF (congestive heart failure)


Current Visit: Yes   Status: Acute   


Plan to address problem: 


Management as per cardiology.








(3) Hypertension


Current Visit: Yes   Status: Acute   


Plan to address problem: 


Management as per primary care.








(4) Morbid obesity with BMI of 70 and over, adult


Current Visit: No   Status: Acute   


Plan to address problem: 


Weight reduction diet.


 Mobility protocol


 Fall precautions.








(5) Obesity hypoventilation syndrome


Current Visit: No   Status: Acute   


Plan to address problem: 


BIPAP 20/8, rate 30, FIO2 40%.








(6) Sleep apnea


Current Visit: No   Status: Acute   


Plan to address problem: 


BIPAP 20/8, rate 30, FIO2 40%.


 Sleep study if she can as out patient.








Subjective


Date of service: 10/04/21


Principal diagnosis: Ac hypoxemic and hypercapnic resp failure; AE-CHF; Morbid 

obesity; FABIAN/OHS


Interval history: 





31 years old female with history of morbid obesity, hypertension, asthma COPD 

sleep apnea was brought to the emergency room because of shortness of breath, 

edema, generalized malaise, fatigue, and weakness for last couple of days.  She 

states she just does not feel well.  She has a headache.  She states her legs 

are swollen.  She feels as though she is retaining fluid.  She states that she 

went to her regular doctors on the 13th.  They tested her for Covid.  It was 

negative.  She was told to go see a cardiologist.  She is not seen a 

cardiologist as of yet.  She came in here because she was not feeling well and 

did not know what to do.  She has had no sick contacts.  








In the emergency room patient is found to have acute CHF exacerbation patient 

proBNP is 7573 also patient cardiac enzyme is elevated troponin is 0.043. 

Patient blood glucose also 55 patient is hypoglycemic. Patients present blood 

sugur 130.





Patient is  awake. Resting on 4L O2 saturation 99%. BIPAP 20/8, rate 30, FIO2 

40% and stand-by in the room. Patient afebrile. No leukocytosis. Blood pressure 

160/107, Pulse 105





ABG on FIO2 45%











ABG pH  7.234  (7.320-7.450)  L  10/01/21  09:47    


 


POC ABG pCO2  90.5 mmHg (32.0-48.0)  H  10/01/21  09:47    


 


ABG pCO2  86.9 mm Hg  09/27/21  18:40    


 


POC ABG pO2  139.4 mmHg ()  H  10/01/21  09:47    


 


ABG pO2  81.8 mm Hg (80.0-90.0)   09/27/21  18:40    


 


POC ABG HCO3  37.4   10/01/21  09:47    


 


ABG O2 Saturation  99.2  (0-100)   10/01/21  09:47    














Patients D dimer 10/1/21 : 5410.10





Patient presently on albuterol/atrovent aerosol treatments q 6 hours, 

subcutaneous Heparin, Protonix, and I/V SoluMedrol.








Objective


                               Vital Signs - 12hr











  10/04/21 10/04/21 10/04/21





  01:01 01:53 01:56


 


Temperature   


 


Pulse Rate 101 H  


 


Pulse Rate [   





Anterior   





Bilateral   





Throughout]   


 


Pulse Rate [   





From Monitor]   


 


Respiratory 20  





Rate   


 


Respiratory   





Rate [Anterior   





Bilateral   





Throughout]   


 


Blood Pressure 147/91  


 


O2 Sat by Pulse 97 95 98





Oximetry   














  10/04/21 10/04/21 10/04/21





  02:01 03:01 04:00


 


Temperature   97.9 F


 


Pulse Rate 101 H 106 H 94 H


 


Pulse Rate [   





Anterior   





Bilateral   





Throughout]   


 


Pulse Rate [   94 H





From Monitor]   


 


Respiratory 19 18 18





Rate   


 


Respiratory   





Rate [Anterior   





Bilateral   





Throughout]   


 


Blood Pressure 147/91 147/91 


 


O2 Sat by Pulse 97 92 100





Oximetry   














  10/04/21 10/04/21 10/04/21





  04:01 05:01 06:00


 


Temperature   97.6 F


 


Pulse Rate 94 H 103 H 


 


Pulse Rate [   





Anterior   





Bilateral   





Throughout]   


 


Pulse Rate [   





From Monitor]   


 


Respiratory 17 20 





Rate   


 


Respiratory   





Rate [Anterior   





Bilateral   





Throughout]   


 


Blood Pressure 147/91 147/91 


 


O2 Sat by Pulse 98 99 





Oximetry   














  10/04/21 10/04/21 10/04/21





  06:01 07:01 07:52


 


Temperature   


 


Pulse Rate 104 H 107 H 


 


Pulse Rate [   103 H





Anterior   





Bilateral   





Throughout]   


 


Pulse Rate [   





From Monitor]   


 


Respiratory 26 H 15 





Rate   


 


Respiratory   20





Rate [Anterior   





Bilateral   





Throughout]   


 


Blood Pressure 147/91 147/91 


 


O2 Sat by Pulse 97 75 L 99





Oximetry   














  10/04/21 10/04/21 10/04/21





  08:00 08:01 09:00


 


Temperature 98.4 F  


 


Pulse Rate 90 103 H 102 H


 


Pulse Rate [   





Anterior   





Bilateral   





Throughout]   


 


Pulse Rate [   





From Monitor]   


 


Respiratory  13 18





Rate   


 


Respiratory   





Rate [Anterior   





Bilateral   





Throughout]   


 


Blood Pressure  147/91 160/98


 


O2 Sat by Pulse  99 100





Oximetry   














  10/04/21 10/04/21 10/04/21





  10:00 11:00 12:00


 


Temperature   98.3 F


 


Pulse Rate 104 H 102 H 


 


Pulse Rate [   





Anterior   





Bilateral   





Throughout]   


 


Pulse Rate [   





From Monitor]   


 


Respiratory 16 31 H 





Rate   


 


Respiratory   





Rate [Anterior   





Bilateral   





Throughout]   


 


Blood Pressure 153/107 160/107 


 


O2 Sat by Pulse 100 100 





Oximetry   











Constitutional: no acute distress, alert, other (young extremely obese female 

with mildly increased respiratory effort at rest)


Eyes: non-icteric


ENT: oropharynx moist, other (BIPAP FFM)


Neck: supple, no lymphadenopathy, no JVD


Effort: mildly labored


Ascultation: Bilateral: diminished breath sounds, rhonchi


Percussion: Bilateral: not dull


Cardiovascular: regular rate and rhythm


Gastrointestinal: normoactive bowel sounds, soft, non-tender, non-distended 

(protuberant)


Integumentary: rash (stasis dermatitis type), other (Stasis dermatititis on legs

and abdomen; see [facility] notes for full details)


Extremities: pulses normal, no ischemia or petechiae, edema (2+), other (stasis 

dermatitis)


Neurologic: non-focal exam (grossly), pupils equal and round, CN II-XII normal


Psychiatric: depressed


CBC and BMP: 


                                 10/03/21 04:40





                                 10/04/21 04:22


ABG, PT/INR, D-dimer: 


ABG











ABG pH  7.382  (7.320-7.450)   10/02/21  14:19    


 


POC ABG pCO2  67.4 mmHg (32.0-48.0)  H  10/02/21  14:19    


 


ABG pCO2  86.9 mm Hg  09/27/21  18:40    


 


POC ABG pO2  150.6 mmHg ()  H  10/02/21  14:19    


 


ABG pO2  81.8 mm Hg (80.0-90.0)   09/27/21  18:40    


 


POC ABG HCO3  39.2   10/02/21  14:19    


 


ABG O2 Saturation  99.4  (0-100)   10/02/21  14:19    





PT/INR, D-dimer











PT  18.7 Sec. (12.2-14.9)  H  10/02/21  16:55    


 


INR  1.51  (0.87-1.13)  H  10/02/21  16:55    


 


D-Dimer  5410.10 ng/mlDDU (0-234)  H  10/01/21  16:03    








Abnormal lab findings: 


                                  Abnormal Labs











  09/21/21 09/21/21 09/22/21





  22:04 22:04 00:18


 


WBC  17.7 H  


 


Hgb  9.1 L  


 


Hct   


 


MCV  77 L  


 


MCH  21 L  


 


MCHC  27 L  


 


RDW  24.3 H  


 


Seg Neuts % (Manual)   


 


Lymphocytes % (Manual)   


 


Nucleated RBC %   


 


Seg Neutrophils # Man   


 


Lymphocytes # (Manual)   


 


Monocytes # (Manual)   


 


PT   


 


INR   


 


D-Dimer   


 


ABG pH   


 


POC ABG pCO2   


 


POC ABG pO2   


 


ABG pO2   


 


ABG HCO3   


 


ABG Base Excess   


 


ABG Hemoglobin   


 


ABG Oxyhemoglobin   


 


ABG Sodium   


 


ABG Chloride   


 


ABG Glucose   


 


Oxyhemoglobin   


 


Carboxyhemoglobin   


 


Sodium   135 L 


 


Potassium   


 


Chloride   91.0 L 


 


Carbon Dioxide   17 L 


 


BUN   


 


Creatinine   1.4 H 


 


Glucose   55 L 


 


POC Glucose    49 L


 


Calcium   


 


Ionized Calcium   


 


Total Bilirubin   


 


AST   


 


ALT   


 


Total Creatine Kinase   


 


Troponin T   0.043 H 


 


NT-Pro-B Natriuret Pep   7573 H 


 


Total Protein   


 


Albumin   


 


LDL Cholesterol Direct   47 L 


 


HDL Cholesterol   25 L 


 


TSH   


 


Free T4   


 


Arterial Blood Glucose   














  09/22/21 09/22/21 09/22/21





  04:03 04:03 09:22


 


WBC  24.3 H  


 


Hgb  9.3 L  


 


Hct   


 


MCV  74 L  


 


MCH  21 L  


 


MCHC  29 L  


 


RDW  23.1 H  


 


Seg Neuts % (Manual)  78.0 H  


 


Lymphocytes % (Manual)  7.0 L  


 


Nucleated RBC %  1.0 H  


 


Seg Neutrophils # Man  19.0 H  


 


Lymphocytes # (Manual)   


 


Monocytes # (Manual)  1.0 H  


 


PT   


 


INR   


 


D-Dimer   


 


ABG pH   


 


POC ABG pCO2   


 


POC ABG pO2   


 


ABG pO2   


 


ABG HCO3   


 


ABG Base Excess   


 


ABG Hemoglobin   


 


ABG Oxyhemoglobin   


 


ABG Sodium   


 


ABG Chloride   


 


ABG Glucose   


 


Oxyhemoglobin   


 


Carboxyhemoglobin   


 


Sodium   134 L 


 


Potassium   


 


Chloride   91.2 L 


 


Carbon Dioxide   


 


BUN   


 


Creatinine   1.8 H 


 


Glucose   


 


POC Glucose   


 


Calcium   


 


Ionized Calcium   


 


Total Bilirubin   


 


AST   


 


ALT   


 


Total Creatine Kinase   


 


Troponin T    0.099 H


 


NT-Pro-B Natriuret Pep   


 


Total Protein   


 


Albumin   


 


LDL Cholesterol Direct   


 


HDL Cholesterol   


 


TSH   


 


Free T4   


 


Arterial Blood Glucose   














  09/22/21 09/22/21 09/23/21





  13:03 20:28 04:52


 


WBC    20.9 H


 


Hgb    8.5 L


 


Hct    30.0 L


 


MCV    73 L


 


MCH    21 L


 


MCHC    28 L


 


RDW    23.9 H


 


Seg Neuts % (Manual)    77.0 H


 


Lymphocytes % (Manual)    1.0 L


 


Nucleated RBC %    1.0 H


 


Seg Neutrophils # Man    16.1 H


 


Lymphocytes # (Manual)    0.2 L


 


Monocytes # (Manual)   


 


PT   


 


INR   


 


D-Dimer   


 


ABG pH   


 


POC ABG pCO2   


 


POC ABG pO2   


 


ABG pO2   


 


ABG HCO3   


 


ABG Base Excess   


 


ABG Hemoglobin   


 


ABG Oxyhemoglobin   


 


ABG Sodium   


 


ABG Chloride   


 


ABG Glucose   


 


Oxyhemoglobin   


 


Carboxyhemoglobin   


 


Sodium   


 


Potassium   


 


Chloride   


 


Carbon Dioxide   


 


BUN   


 


Creatinine   


 


Glucose   


 


POC Glucose   115 H 


 


Calcium   


 


Ionized Calcium   


 


Total Bilirubin   


 


AST   


 


ALT   


 


Total Creatine Kinase   


 


Troponin T  0.078 H D  


 


NT-Pro-B Natriuret Pep   


 


Total Protein   


 


Albumin   


 


LDL Cholesterol Direct   


 


HDL Cholesterol   


 


TSH   


 


Free T4   


 


Arterial Blood Glucose   














  09/23/21 09/23/21 09/23/21





  04:52 08:46 11:50


 


WBC   


 


Hgb   


 


Hct   


 


MCV   


 


MCH   


 


MCHC   


 


RDW   


 


Seg Neuts % (Manual)   


 


Lymphocytes % (Manual)   


 


Nucleated RBC %   


 


Seg Neutrophils # Man   


 


Lymphocytes # (Manual)   


 


Monocytes # (Manual)   


 


PT   


 


INR   


 


D-Dimer   


 


ABG pH   


 


POC ABG pCO2   


 


POC ABG pO2   


 


ABG pO2   


 


ABG HCO3   


 


ABG Base Excess   


 


ABG Hemoglobin   


 


ABG Oxyhemoglobin   


 


ABG Sodium   


 


ABG Chloride   


 


ABG Glucose   


 


Oxyhemoglobin   


 


Carboxyhemoglobin   


 


Sodium  129 L  


 


Potassium  5.6 H  


 


Chloride  88.6 L  


 


Carbon Dioxide   


 


BUN  27 H  


 


Creatinine  2.4 H  


 


Glucose  121 H  


 


POC Glucose   139 H  156 H


 


Calcium  7.7 L  


 


Ionized Calcium   


 


Total Bilirubin   


 


AST   


 


ALT   


 


Total Creatine Kinase   


 


Troponin T   


 


NT-Pro-B Natriuret Pep   


 


Total Protein   


 


Albumin   


 


LDL Cholesterol Direct   


 


HDL Cholesterol   


 


TSH   


 


Free T4   


 


Arterial Blood Glucose   














  09/23/21 09/23/21 09/23/21





  15:46 16:58 22:08


 


WBC   


 


Hgb   


 


Hct   


 


MCV   


 


MCH   


 


MCHC   


 


RDW   


 


Seg Neuts % (Manual)   


 


Lymphocytes % (Manual)   


 


Nucleated RBC %   


 


Seg Neutrophils # Man   


 


Lymphocytes # (Manual)   


 


Monocytes # (Manual)   


 


PT   


 


INR   


 


D-Dimer   


 


ABG pH   


 


POC ABG pCO2   


 


POC ABG pO2   


 


ABG pO2   


 


ABG HCO3   


 


ABG Base Excess   


 


ABG Hemoglobin   


 


ABG Oxyhemoglobin   


 


ABG Sodium   


 


ABG Chloride   


 


ABG Glucose   


 


Oxyhemoglobin   


 


Carboxyhemoglobin   


 


Sodium  128 L  


 


Potassium  5.5 H  


 


Chloride  88.1 L  


 


Carbon Dioxide   


 


BUN  33 H  


 


Creatinine  2.7 H  


 


Glucose  172 H  


 


POC Glucose   187 H  186 H


 


Calcium  7.3 L  


 


Ionized Calcium   


 


Total Bilirubin   


 


AST   


 


ALT   


 


Total Creatine Kinase   


 


Troponin T   


 


NT-Pro-B Natriuret Pep   


 


Total Protein   


 


Albumin   


 


LDL Cholesterol Direct   


 


HDL Cholesterol   


 


TSH   


 


Free T4   


 


Arterial Blood Glucose   














  09/23/21 09/24/21 09/24/21





  23:15 02:20 07:39


 


WBC   


 


Hgb   


 


Hct   


 


MCV   


 


MCH   


 


MCHC   


 


RDW   


 


Seg Neuts % (Manual)   


 


Lymphocytes % (Manual)   


 


Nucleated RBC %   


 


Seg Neutrophils # Man   


 


Lymphocytes # (Manual)   


 


Monocytes # (Manual)   


 


PT   


 


INR   


 


D-Dimer   


 


ABG pH   


 


POC ABG pCO2   


 


POC ABG pO2   


 


ABG pO2   


 


ABG HCO3   


 


ABG Base Excess   


 


ABG Hemoglobin   


 


ABG Oxyhemoglobin   


 


ABG Sodium   


 


ABG Chloride   


 


ABG Glucose   


 


Oxyhemoglobin   


 


Carboxyhemoglobin   


 


Sodium   128 L 


 


Potassium   5.2 H 


 


Chloride   88.6 L 


 


Carbon Dioxide   


 


BUN   39 H 


 


Creatinine   3.1 H 


 


Glucose   171 H 


 


POC Glucose    168 H


 


Calcium   7.2 L 


 


Ionized Calcium  3.6 L  


 


Total Bilirubin   


 


AST   


 


ALT   


 


Total Creatine Kinase   


 


Troponin T   


 


NT-Pro-B Natriuret Pep   


 


Total Protein   


 


Albumin   


 


LDL Cholesterol Direct   


 


HDL Cholesterol   


 


TSH   


 


Free T4   


 


Arterial Blood Glucose   














  09/24/21 09/24/21 09/24/21





  10:04 11:37 17:18


 


WBC   


 


Hgb   


 


Hct   


 


MCV   


 


MCH   


 


MCHC   


 


RDW   


 


Seg Neuts % (Manual)   


 


Lymphocytes % (Manual)   


 


Nucleated RBC %   


 


Seg Neutrophils # Man   


 


Lymphocytes # (Manual)   


 


Monocytes # (Manual)   


 


PT   


 


INR   


 


D-Dimer   


 


ABG pH   


 


POC ABG pCO2   


 


POC ABG pO2   


 


ABG pO2   


 


ABG HCO3   


 


ABG Base Excess   


 


ABG Hemoglobin   


 


ABG Oxyhemoglobin   


 


ABG Sodium   


 


ABG Chloride   


 


ABG Glucose   


 


Oxyhemoglobin   


 


Carboxyhemoglobin   


 


Sodium   


 


Potassium   


 


Chloride   


 


Carbon Dioxide   


 


BUN   


 


Creatinine   


 


Glucose   


 


POC Glucose   170 H  152 H


 


Calcium   


 


Ionized Calcium   


 


Total Bilirubin   


 


AST   


 


ALT   


 


Total Creatine Kinase  1712 H  


 


Troponin T   


 


NT-Pro-B Natriuret Pep   


 


Total Protein   


 


Albumin   


 


LDL Cholesterol Direct   


 


HDL Cholesterol   


 


TSH   


 


Free T4   


 


Arterial Blood Glucose   














  09/24/21 09/24/21 09/25/21





  19:38 22:02 05:48


 


WBC   


 


Hgb   


 


Hct   


 


MCV   


 


MCH   


 


MCHC   


 


RDW   


 


Seg Neuts % (Manual)   


 


Lymphocytes % (Manual)   


 


Nucleated RBC %   


 


Seg Neutrophils # Man   


 


Lymphocytes # (Manual)   


 


Monocytes # (Manual)   


 


PT   


 


INR   


 


D-Dimer   


 


ABG pH   


 


POC ABG pCO2   


 


POC ABG pO2   


 


ABG pO2   


 


ABG HCO3   


 


ABG Base Excess   


 


ABG Hemoglobin   


 


ABG Oxyhemoglobin   


 


ABG Sodium   


 


ABG Chloride   


 


ABG Glucose   


 


Oxyhemoglobin   


 


Carboxyhemoglobin   


 


Sodium  128 L   124 L


 


Potassium    5.8 H D


 


Chloride  89.6 L   89.4 L


 


Carbon Dioxide   


 


BUN  47 H   53 H


 


Creatinine  3.0 H   2.8 H


 


Glucose  165 H   150 H


 


POC Glucose   147 H 


 


Calcium  6.9 L   7.2 L


 


Ionized Calcium   


 


Total Bilirubin   


 


AST   


 


ALT   


 


Total Creatine Kinase   


 


Troponin T   


 


NT-Pro-B Natriuret Pep   


 


Total Protein   


 


Albumin   


 


LDL Cholesterol Direct   


 


HDL Cholesterol   


 


TSH   


 


Free T4   


 


Arterial Blood Glucose   














  09/25/21 09/25/21 09/25/21





  08:48 11:35 19:12


 


WBC   


 


Hgb   


 


Hct   


 


MCV   


 


MCH   


 


MCHC   


 


RDW   


 


Seg Neuts % (Manual)   


 


Lymphocytes % (Manual)   


 


Nucleated RBC %   


 


Seg Neutrophils # Man   


 


Lymphocytes # (Manual)   


 


Monocytes # (Manual)   


 


PT   


 


INR   


 


D-Dimer   


 


ABG pH   


 


POC ABG pCO2   


 


POC ABG pO2   


 


ABG pO2   


 


ABG HCO3   


 


ABG Base Excess   


 


ABG Hemoglobin   


 


ABG Oxyhemoglobin   


 


ABG Sodium   


 


ABG Chloride   


 


ABG Glucose   


 


Oxyhemoglobin   


 


Carboxyhemoglobin   


 


Sodium    128 L


 


Potassium   


 


Chloride    89.7 L


 


Carbon Dioxide   


 


BUN    53 H


 


Creatinine    2.4 H


 


Glucose    159 H


 


POC Glucose  152 H  152 H 


 


Calcium    7.1 L


 


Ionized Calcium   


 


Total Bilirubin   


 


AST   


 


ALT   


 


Total Creatine Kinase   


 


Troponin T   


 


NT-Pro-B Natriuret Pep   


 


Total Protein   


 


Albumin   


 


LDL Cholesterol Direct   


 


HDL Cholesterol   


 


TSH   


 


Free T4   


 


Arterial Blood Glucose   














  09/25/21 09/26/21 09/26/21





  22:03 05:03 07:41


 


WBC   


 


Hgb   


 


Hct   


 


MCV   


 


MCH   


 


MCHC   


 


RDW   


 


Seg Neuts % (Manual)   


 


Lymphocytes % (Manual)   


 


Nucleated RBC %   


 


Seg Neutrophils # Man   


 


Lymphocytes # (Manual)   


 


Monocytes # (Manual)   


 


PT   


 


INR   


 


D-Dimer   


 


ABG pH   


 


POC ABG pCO2   


 


POC ABG pO2   


 


ABG pO2   


 


ABG HCO3   


 


ABG Base Excess   


 


ABG Hemoglobin   


 


ABG Oxyhemoglobin   


 


ABG Sodium   


 


ABG Chloride   


 


ABG Glucose   


 


Oxyhemoglobin   


 


Carboxyhemoglobin   


 


Sodium   134 L 


 


Potassium   


 


Chloride   92.9 L 


 


Carbon Dioxide   33 H D 


 


BUN   54 H 


 


Creatinine   2.4 H 


 


Glucose   150 H 


 


POC Glucose  152 H   144 H


 


Calcium   7.2 L 


 


Ionized Calcium   


 


Total Bilirubin   


 


AST   


 


ALT   


 


Total Creatine Kinase   


 


Troponin T   


 


NT-Pro-B Natriuret Pep   


 


Total Protein   


 


Albumin   


 


LDL Cholesterol Direct   


 


HDL Cholesterol   


 


TSH   


 


Free T4   


 


Arterial Blood Glucose   














  09/26/21 09/26/21 09/26/21





  11:38 14:11 17:02


 


WBC   14.7 H 


 


Hgb   8.7 L 


 


Hct   29.8 L 


 


MCV   74 L 


 


MCH   22 L 


 


MCHC   29 L 


 


RDW   24.9 H 


 


Seg Neuts % (Manual)   86.0 H 


 


Lymphocytes % (Manual)   6.0 L 


 


Nucleated RBC %   


 


Seg Neutrophils # Man   12.6 H 


 


Lymphocytes # (Manual)   0.9 L 


 


Monocytes # (Manual)   


 


PT   


 


INR   


 


D-Dimer   


 


ABG pH   


 


POC ABG pCO2   


 


POC ABG pO2   


 


ABG pO2   


 


ABG HCO3   


 


ABG Base Excess   


 


ABG Hemoglobin   


 


ABG Oxyhemoglobin   


 


ABG Sodium   


 


ABG Chloride   


 


ABG Glucose   


 


Oxyhemoglobin   


 


Carboxyhemoglobin   


 


Sodium   


 


Potassium   


 


Chloride   


 


Carbon Dioxide   


 


BUN   


 


Creatinine   


 


Glucose   


 


POC Glucose  151 H   154 H


 


Calcium   


 


Ionized Calcium   


 


Total Bilirubin   


 


AST   


 


ALT   


 


Total Creatine Kinase   


 


Troponin T   


 


NT-Pro-B Natriuret Pep   


 


Total Protein   


 


Albumin   


 


LDL Cholesterol Direct   


 


HDL Cholesterol   


 


TSH   


 


Free T4   


 


Arterial Blood Glucose   














  09/26/21 09/27/21 09/27/21





  23:14 05:03 10:00


 


WBC   


 


Hgb   


 


Hct   


 


MCV   


 


MCH   


 


MCHC   


 


RDW   


 


Seg Neuts % (Manual)   


 


Lymphocytes % (Manual)   


 


Nucleated RBC %   


 


Seg Neutrophils # Man   


 


Lymphocytes # (Manual)   


 


Monocytes # (Manual)   


 


PT   


 


INR   


 


D-Dimer   


 


ABG pH    7.150 L*


 


POC ABG pCO2   


 


POC ABG pO2   


 


ABG pO2    91.8 H


 


ABG HCO3    37.2 H


 


ABG Base Excess    7.1 H


 


ABG Hemoglobin    7.1 L


 


ABG Oxyhemoglobin   


 


ABG Sodium   


 


ABG Chloride   


 


ABG Glucose   


 


Oxyhemoglobin    93.4 L


 


Carboxyhemoglobin   


 


Sodium   134 L 


 


Potassium   


 


Chloride   91.3 L 


 


Carbon Dioxide   33 H 


 


BUN   63 H 


 


Creatinine   2.2 H 


 


Glucose   159 H 


 


POC Glucose  151 H  


 


Calcium   7.8 L 


 


Ionized Calcium   


 


Total Bilirubin   


 


AST   


 


ALT   


 


Total Creatine Kinase   


 


Troponin T   


 


NT-Pro-B Natriuret Pep   


 


Total Protein   


 


Albumin   


 


LDL Cholesterol Direct   


 


HDL Cholesterol   


 


TSH   


 


Free T4   


 


Arterial Blood Glucose   














  09/27/21 09/27/21 09/27/21





  12:39 16:37 18:40


 


WBC   


 


Hgb   


 


Hct   


 


MCV   


 


MCH   


 


MCHC   


 


RDW   


 


Seg Neuts % (Manual)   


 


Lymphocytes % (Manual)   


 


Nucleated RBC %   


 


Seg Neutrophils # Man   


 


Lymphocytes # (Manual)   


 


Monocytes # (Manual)   


 


PT   


 


INR   


 


D-Dimer   


 


ABG pH    7.237 L


 


POC ABG pCO2   


 


POC ABG pO2   


 


ABG pO2   


 


ABG HCO3    36.2 H


 


ABG Base Excess    7.4 H


 


ABG Hemoglobin    7.8 L


 


ABG Oxyhemoglobin   


 


ABG Sodium   


 


ABG Chloride   


 


ABG Glucose   


 


Oxyhemoglobin    93.7 L


 


Carboxyhemoglobin   


 


Sodium   


 


Potassium   


 


Chloride   


 


Carbon Dioxide   


 


BUN   


 


Creatinine   


 


Glucose   


 


POC Glucose  140 H  132 H 


 


Calcium   


 


Ionized Calcium   


 


Total Bilirubin   


 


AST   


 


ALT   


 


Total Creatine Kinase   


 


Troponin T   


 


NT-Pro-B Natriuret Pep   


 


Total Protein   


 


Albumin   


 


LDL Cholesterol Direct   


 


HDL Cholesterol   


 


TSH   


 


Free T4   


 


Arterial Blood Glucose   














  09/27/21 09/28/21 09/28/21





  23:55 04:27 04:27


 


WBC   


 


Hgb   8.5 L 


 


Hct   29.1 L 


 


MCV   72 L 


 


MCH   21 L 


 


MCHC   29 L 


 


RDW   24.9 H 


 


Seg Neuts % (Manual)   


 


Lymphocytes % (Manual)   


 


Nucleated RBC %   


 


Seg Neutrophils # Man   


 


Lymphocytes # (Manual)   


 


Monocytes # (Manual)   


 


PT   


 


INR   


 


D-Dimer   


 


ABG pH   


 


POC ABG pCO2   


 


POC ABG pO2   


 


ABG pO2   


 


ABG HCO3   


 


ABG Base Excess   


 


ABG Hemoglobin   


 


ABG Oxyhemoglobin   


 


ABG Sodium   


 


ABG Chloride   


 


ABG Glucose   


 


Oxyhemoglobin   


 


Carboxyhemoglobin   


 


Sodium    135 L


 


Potassium   


 


Chloride    93.5 L


 


Carbon Dioxide    33 H


 


BUN    68 H


 


Creatinine    1.9 H


 


Glucose    143 H


 


POC Glucose  135 H  


 


Calcium    8.1 L


 


Ionized Calcium   


 


Total Bilirubin    1.50 H


 


AST    494 H


 


ALT    835 H


 


Total Creatine Kinase   


 


Troponin T   


 


NT-Pro-B Natriuret Pep   


 


Total Protein    9.5 H


 


Albumin    3.1 L


 


LDL Cholesterol Direct   


 


HDL Cholesterol   


 


TSH   


 


Free T4   


 


Arterial Blood Glucose   














  09/28/21 09/28/21 09/29/21





  12:03 17:24 00:19


 


WBC   


 


Hgb   


 


Hct   


 


MCV   


 


MCH   


 


MCHC   


 


RDW   


 


Seg Neuts % (Manual)   


 


Lymphocytes % (Manual)   


 


Nucleated RBC %   


 


Seg Neutrophils # Man   


 


Lymphocytes # (Manual)   


 


Monocytes # (Manual)   


 


PT   


 


INR   


 


D-Dimer   


 


ABG pH  7.291 L  


 


POC ABG pCO2  75.5 H  


 


POC ABG pO2  76.2 L  


 


ABG pO2   


 


ABG HCO3   


 


ABG Base Excess   


 


ABG Hemoglobin  9.9 L  


 


ABG Oxyhemoglobin  91.0 L  


 


ABG Sodium  134.9 L  


 


ABG Chloride  93.0 L  


 


ABG Glucose  146 H  


 


Oxyhemoglobin   


 


Carboxyhemoglobin  2.2 H  


 


Sodium   


 


Potassium   


 


Chloride   


 


Carbon Dioxide   


 


BUN   


 


Creatinine   


 


Glucose   


 


POC Glucose   134 H  136 H


 


Calcium   


 


Ionized Calcium   


 


Total Bilirubin   


 


AST   


 


ALT   


 


Total Creatine Kinase   


 


Troponin T   


 


NT-Pro-B Natriuret Pep   


 


Total Protein   


 


Albumin   


 


LDL Cholesterol Direct   


 


HDL Cholesterol   


 


TSH   


 


Free T4   


 


Arterial Blood Glucose  146 H  














  09/29/21 09/29/21 09/29/21





  04:55 04:55 05:29


 


WBC   


 


Hgb  8.0 L  


 


Hct  27.1 L  


 


MCV  70 L  


 


MCH  21 L  


 


MCHC   


 


RDW  24.3 H  


 


Seg Neuts % (Manual)   


 


Lymphocytes % (Manual)   


 


Nucleated RBC %   


 


Seg Neutrophils # Man   


 


Lymphocytes # (Manual)   


 


Monocytes # (Manual)   


 


PT   


 


INR   


 


D-Dimer   


 


ABG pH   


 


POC ABG pCO2   


 


POC ABG pO2   


 


ABG pO2   


 


ABG HCO3   


 


ABG Base Excess   


 


ABG Hemoglobin   


 


ABG Oxyhemoglobin   


 


ABG Sodium   


 


ABG Chloride   


 


ABG Glucose   


 


Oxyhemoglobin   


 


Carboxyhemoglobin   


 


Sodium   


 


Potassium   


 


Chloride   95.1 L 


 


Carbon Dioxide   36 H 


 


BUN   63 H 


 


Creatinine   1.5 H 


 


Glucose   131 H 


 


POC Glucose    118 H


 


Calcium   8.3 L 


 


Ionized Calcium   


 


Total Bilirubin   1.80 H 


 


AST   323 H 


 


ALT   609 H 


 


Total Creatine Kinase   


 


Troponin T   


 


NT-Pro-B Natriuret Pep   


 


Total Protein   9.3 H 


 


Albumin   2.9 L 


 


LDL Cholesterol Direct   


 


HDL Cholesterol   


 


TSH   


 


Free T4   


 


Arterial Blood Glucose   














  09/29/21 09/29/21 09/29/21





  11:40 18:30 22:44


 


WBC   


 


Hgb   


 


Hct   


 


MCV   


 


MCH   


 


MCHC   


 


RDW   


 


Seg Neuts % (Manual)   


 


Lymphocytes % (Manual)   


 


Nucleated RBC %   


 


Seg Neutrophils # Man   


 


Lymphocytes # (Manual)   


 


Monocytes # (Manual)   


 


PT   


 


INR   


 


D-Dimer   


 


ABG pH   


 


POC ABG pCO2   


 


POC ABG pO2   


 


ABG pO2   


 


ABG HCO3   


 


ABG Base Excess   


 


ABG Hemoglobin   


 


ABG Oxyhemoglobin   


 


ABG Sodium   


 


ABG Chloride   


 


ABG Glucose   


 


Oxyhemoglobin   


 


Carboxyhemoglobin   


 


Sodium   


 


Potassium   


 


Chloride   


 


Carbon Dioxide   


 


BUN   


 


Creatinine   


 


Glucose   


 


POC Glucose  139 H  130 H  123 H


 


Calcium   


 


Ionized Calcium   


 


Total Bilirubin   


 


AST   


 


ALT   


 


Total Creatine Kinase   


 


Troponin T   


 


NT-Pro-B Natriuret Pep   


 


Total Protein   


 


Albumin   


 


LDL Cholesterol Direct   


 


HDL Cholesterol   


 


TSH   


 


Free T4   


 


Arterial Blood Glucose   














  09/30/21 09/30/21 09/30/21





  04:53 04:53 07:16


 


WBC  12.5 H  


 


Hgb  8.8 L  


 


Hct  30.2 L  


 


MCV  74 L  


 


MCH  21 L  


 


MCHC  29 L  


 


RDW  24.5 H  


 


Seg Neuts % (Manual)   


 


Lymphocytes % (Manual)   


 


Nucleated RBC %   


 


Seg Neutrophils # Man   


 


Lymphocytes # (Manual)   


 


Monocytes # (Manual)   


 


PT   


 


INR   


 


D-Dimer   


 


ABG pH   


 


POC ABG pCO2   


 


POC ABG pO2   


 


ABG pO2   


 


ABG HCO3   


 


ABG Base Excess   


 


ABG Hemoglobin   


 


ABG Oxyhemoglobin   


 


ABG Sodium   


 


ABG Chloride   


 


ABG Glucose   


 


Oxyhemoglobin   


 


Carboxyhemoglobin   


 


Sodium   


 


Potassium   


 


Chloride   96.9 L 


 


Carbon Dioxide   35 H 


 


BUN   65 H 


 


Creatinine   1.5 H 


 


Glucose   142 H 


 


POC Glucose    138 H


 


Calcium   


 


Ionized Calcium   


 


Total Bilirubin   1.50 H 


 


AST   251 H 


 


ALT   572 H 


 


Total Creatine Kinase   


 


Troponin T   


 


NT-Pro-B Natriuret Pep   


 


Total Protein   10.1 H 


 


Albumin   3.2 L 


 


LDL Cholesterol Direct   


 


HDL Cholesterol   


 


TSH   


 


Free T4   


 


Arterial Blood Glucose   














  09/30/21 09/30/21 09/30/21





  07:32 11:29 14:42


 


WBC   


 


Hgb   


 


Hct   


 


MCV   


 


MCH   


 


MCHC   


 


RDW   


 


Seg Neuts % (Manual)   


 


Lymphocytes % (Manual)   


 


Nucleated RBC %   


 


Seg Neutrophils # Man   


 


Lymphocytes # (Manual)   


 


Monocytes # (Manual)   


 


PT   


 


INR   


 


D-Dimer   


 


ABG pH  7.086 L   7.188 L


 


POC ABG pCO2  142.2 H   99.3 H


 


POC ABG pO2  196.3 H   132.3 H


 


ABG pO2   


 


ABG HCO3   


 


ABG Base Excess   


 


ABG Hemoglobin  10.0 L   9.2 L


 


ABG Oxyhemoglobin   


 


ABG Sodium   


 


ABG Chloride  96.0 L   96.0 L


 


ABG Glucose  147 H   146 H


 


Oxyhemoglobin   


 


Carboxyhemoglobin  1.6 H   2.0 H


 


Sodium   


 


Potassium   


 


Chloride   


 


Carbon Dioxide   


 


BUN   


 


Creatinine   


 


Glucose   


 


POC Glucose   131 H 


 


Calcium   


 


Ionized Calcium   


 


Total Bilirubin   


 


AST   


 


ALT   


 


Total Creatine Kinase   


 


Troponin T   


 


NT-Pro-B Natriuret Pep   


 


Total Protein   


 


Albumin   


 


LDL Cholesterol Direct   


 


HDL Cholesterol   


 


TSH   


 


Free T4   


 


Arterial Blood Glucose  147 H   146 H














  09/30/21 09/30/21 10/01/21





  17:23 22:34 07:44


 


WBC   


 


Hgb   


 


Hct   


 


MCV   


 


MCH   


 


MCHC   


 


RDW   


 


Seg Neuts % (Manual)   


 


Lymphocytes % (Manual)   


 


Nucleated RBC %   


 


Seg Neutrophils # Man   


 


Lymphocytes # (Manual)   


 


Monocytes # (Manual)   


 


PT   


 


INR   


 


D-Dimer   


 


ABG pH   


 


POC ABG pCO2   


 


POC ABG pO2   


 


ABG pO2   


 


ABG HCO3   


 


ABG Base Excess   


 


ABG Hemoglobin   


 


ABG Oxyhemoglobin   


 


ABG Sodium   


 


ABG Chloride   


 


ABG Glucose   


 


Oxyhemoglobin   


 


Carboxyhemoglobin   


 


Sodium   


 


Potassium   


 


Chloride   


 


Carbon Dioxide   


 


BUN   


 


Creatinine   


 


Glucose   


 


POC Glucose  117 H  169 H  150 H


 


Calcium   


 


Ionized Calcium   


 


Total Bilirubin   


 


AST   


 


ALT   


 


Total Creatine Kinase   


 


Troponin T   


 


NT-Pro-B Natriuret Pep   


 


Total Protein   


 


Albumin   


 


LDL Cholesterol Direct   


 


HDL Cholesterol   


 


TSH   


 


Free T4   


 


Arterial Blood Glucose   














  10/01/21 10/01/21 10/01/21





  08:55 08:55 09:47


 


WBC   


 


Hgb  8.7 L  


 


Hct  29.4 L  


 


MCV  74 L  


 


MCH  22 L  


 


MCHC   


 


RDW  24.8 H  


 


Seg Neuts % (Manual)   


 


Lymphocytes % (Manual)   


 


Nucleated RBC %   


 


Seg Neutrophils # Man   


 


Lymphocytes # (Manual)   


 


Monocytes # (Manual)   


 


PT   


 


INR   


 


D-Dimer   


 


ABG pH    7.234 L


 


POC ABG pCO2    90.5 H


 


POC ABG pO2    139.4 H


 


ABG pO2   


 


ABG HCO3   


 


ABG Base Excess   


 


ABG Hemoglobin    9.5 L


 


ABG Oxyhemoglobin   


 


ABG Sodium   


 


ABG Chloride    97.0 L


 


ABG Glucose    179 H


 


Oxyhemoglobin   


 


Carboxyhemoglobin    1.7 H


 


Sodium   


 


Potassium   


 


Chloride   94.4 L 


 


Carbon Dioxide   37 H 


 


BUN   68 H 


 


Creatinine   1.5 H 


 


Glucose   178 H 


 


POC Glucose   


 


Calcium   


 


Ionized Calcium   


 


Total Bilirubin   1.60 H 


 


AST   172 H 


 


ALT   437 H 


 


Total Creatine Kinase   


 


Troponin T   


 


NT-Pro-B Natriuret Pep   


 


Total Protein   10.1 H 


 


Albumin   3.2 L 


 


LDL Cholesterol Direct   


 


HDL Cholesterol   


 


TSH   


 


Free T4   


 


Arterial Blood Glucose    179 H














  10/01/21 10/01/21 10/01/21





  11:39 16:03 17:24


 


WBC   


 


Hgb   


 


Hct   


 


MCV   


 


MCH   


 


MCHC   


 


RDW   


 


Seg Neuts % (Manual)   


 


Lymphocytes % (Manual)   


 


Nucleated RBC %   


 


Seg Neutrophils # Man   


 


Lymphocytes # (Manual)   


 


Monocytes # (Manual)   


 


PT   


 


INR   


 


D-Dimer   5410.10 H 


 


ABG pH   


 


POC ABG pCO2   


 


POC ABG pO2   


 


ABG pO2   


 


ABG HCO3   


 


ABG Base Excess   


 


ABG Hemoglobin   


 


ABG Oxyhemoglobin   


 


ABG Sodium   


 


ABG Chloride   


 


ABG Glucose   


 


Oxyhemoglobin   


 


Carboxyhemoglobin   


 


Sodium   


 


Potassium   


 


Chloride   


 


Carbon Dioxide   


 


BUN   


 


Creatinine   


 


Glucose   


 


POC Glucose  161 H   130 H


 


Calcium   


 


Ionized Calcium   


 


Total Bilirubin   


 


AST   


 


ALT   


 


Total Creatine Kinase   


 


Troponin T   


 


NT-Pro-B Natriuret Pep   


 


Total Protein   


 


Albumin   


 


LDL Cholesterol Direct   


 


HDL Cholesterol   


 


TSH   


 


Free T4   


 


Arterial Blood Glucose   














  10/01/21 10/02/21 10/02/21





  21:52 10:20 11:58


 


WBC   


 


Hgb   


 


Hct   


 


MCV   


 


MCH   


 


MCHC   


 


RDW   


 


Seg Neuts % (Manual)   


 


Lymphocytes % (Manual)   


 


Nucleated RBC %   


 


Seg Neutrophils # Man   


 


Lymphocytes # (Manual)   


 


Monocytes # (Manual)   


 


PT   


 


INR   


 


D-Dimer   


 


ABG pH   


 


POC ABG pCO2   


 


POC ABG pO2   


 


ABG pO2   


 


ABG HCO3   


 


ABG Base Excess   


 


ABG Hemoglobin   


 


ABG Oxyhemoglobin   


 


ABG Sodium   


 


ABG Chloride   


 


ABG Glucose   


 


Oxyhemoglobin   


 


Carboxyhemoglobin   


 


Sodium   146 H D 


 


Potassium   


 


Chloride   


 


Carbon Dioxide   


 


BUN   75 H 


 


Creatinine   1.6 H 


 


Glucose   158 H 


 


POC Glucose  150 H   143 H


 


Calcium   


 


Ionized Calcium   


 


Total Bilirubin   


 


AST   


 


ALT   


 


Total Creatine Kinase   


 


Troponin T   


 


NT-Pro-B Natriuret Pep   


 


Total Protein   


 


Albumin   


 


LDL Cholesterol Direct   


 


HDL Cholesterol   


 


TSH   


 


Free T4   


 


Arterial Blood Glucose   














  10/02/21 10/02/21 10/02/21





  14:19 16:55 16:55


 


WBC   


 


Hgb   8.3 L 


 


Hct   27.7 L 


 


MCV   


 


MCH   


 


MCHC   


 


RDW   


 


Seg Neuts % (Manual)   


 


Lymphocytes % (Manual)   


 


Nucleated RBC %   


 


Seg Neutrophils # Man   


 


Lymphocytes # (Manual)   


 


Monocytes # (Manual)   


 


PT    18.7 H


 


INR    1.51 H


 


D-Dimer   


 


ABG pH   


 


POC ABG pCO2  67.4 H  


 


POC ABG pO2  150.6 H  


 


ABG pO2   


 


ABG HCO3   


 


ABG Base Excess   


 


ABG Hemoglobin  9.3 L  


 


ABG Oxyhemoglobin   


 


ABG Sodium   


 


ABG Chloride   


 


ABG Glucose  162 H  


 


Oxyhemoglobin   


 


Carboxyhemoglobin  1.6 H  


 


Sodium   


 


Potassium   


 


Chloride   


 


Carbon Dioxide   


 


BUN   


 


Creatinine   


 


Glucose   


 


POC Glucose   


 


Calcium   


 


Ionized Calcium   


 


Total Bilirubin   


 


AST   


 


ALT   


 


Total Creatine Kinase   


 


Troponin T   


 


NT-Pro-B Natriuret Pep   


 


Total Protein   


 


Albumin   


 


LDL Cholesterol Direct   


 


HDL Cholesterol   


 


TSH   


 


Free T4   


 


Arterial Blood Glucose  162 H  














  10/02/21 10/02/21 10/02/21





  17:40 19:55 19:55


 


WBC   


 


Hgb   


 


Hct   


 


MCV   


 


MCH   


 


MCHC   


 


RDW   


 


Seg Neuts % (Manual)   


 


Lymphocytes % (Manual)   


 


Nucleated RBC %   


 


Seg Neutrophils # Man   


 


Lymphocytes # (Manual)   


 


Monocytes # (Manual)   


 


PT   


 


INR   


 


D-Dimer   


 


ABG pH   


 


POC ABG pCO2   


 


POC ABG pO2   


 


ABG pO2   


 


ABG HCO3   


 


ABG Base Excess   


 


ABG Hemoglobin   


 


ABG Oxyhemoglobin   


 


ABG Sodium   


 


ABG Chloride   


 


ABG Glucose   


 


Oxyhemoglobin   


 


Carboxyhemoglobin   


 


Sodium   


 


Potassium   


 


Chloride   


 


Carbon Dioxide   


 


BUN   


 


Creatinine   


 


Glucose   


 


POC Glucose  151 H  


 


Calcium   


 


Ionized Calcium   


 


Total Bilirubin   


 


AST   


 


ALT   


 


Total Creatine Kinase   


 


Troponin T   


 


NT-Pro-B Natriuret Pep   


 


Total Protein   


 


Albumin   


 


LDL Cholesterol Direct   


 


HDL Cholesterol   


 


TSH    6.140 H


 


Free T4   0.67 L 


 


Arterial Blood Glucose   














  10/02/21 10/03/21 10/03/21





  22:19 04:40 04:40


 


WBC   


 


Hgb    8.1 L


 


Hct    27.5 L


 


MCV    73 L


 


MCH    22 L


 


MCHC   


 


RDW    25.4 H


 


Seg Neuts % (Manual)   


 


Lymphocytes % (Manual)   


 


Nucleated RBC %   


 


Seg Neutrophils # Man   


 


Lymphocytes # (Manual)   


 


Monocytes # (Manual)   


 


PT   


 


INR   


 


D-Dimer   


 


ABG pH   


 


POC ABG pCO2   


 


POC ABG pO2   


 


ABG pO2   


 


ABG HCO3   


 


ABG Base Excess   


 


ABG Hemoglobin   


 


ABG Oxyhemoglobin   


 


ABG Sodium   


 


ABG Chloride   


 


ABG Glucose   


 


Oxyhemoglobin   


 


Carboxyhemoglobin   


 


Sodium   


 


Potassium   


 


Chloride   


 


Carbon Dioxide   37 H D 


 


BUN   76 H 


 


Creatinine   1.6 H 


 


Glucose   173 H 


 


POC Glucose  157 H  


 


Calcium   


 


Ionized Calcium   


 


Total Bilirubin   


 


AST   


 


ALT   


 


Total Creatine Kinase   


 


Troponin T   


 


NT-Pro-B Natriuret Pep   


 


Total Protein   


 


Albumin   


 


LDL Cholesterol Direct   


 


HDL Cholesterol   


 


TSH   


 


Free T4   


 


Arterial Blood Glucose   














  10/03/21 10/03/21 10/03/21





  11:36 17:20 21:17


 


WBC   


 


Hgb   


 


Hct   


 


MCV   


 


MCH   


 


MCHC   


 


RDW   


 


Seg Neuts % (Manual)   


 


Lymphocytes % (Manual)   


 


Nucleated RBC %   


 


Seg Neutrophils # Man   


 


Lymphocytes # (Manual)   


 


Monocytes # (Manual)   


 


PT   


 


INR   


 


D-Dimer   


 


ABG pH   


 


POC ABG pCO2   


 


POC ABG pO2   


 


ABG pO2   


 


ABG HCO3   


 


ABG Base Excess   


 


ABG Hemoglobin   


 


ABG Oxyhemoglobin   


 


ABG Sodium   


 


ABG Chloride   


 


ABG Glucose   


 


Oxyhemoglobin   


 


Carboxyhemoglobin   


 


Sodium   


 


Potassium   


 


Chloride   


 


Carbon Dioxide   


 


BUN   


 


Creatinine   


 


Glucose   


 


POC Glucose  168 H  174 H  163 H


 


Calcium   


 


Ionized Calcium   


 


Total Bilirubin   


 


AST   


 


ALT   


 


Total Creatine Kinase   


 


Troponin T   


 


NT-Pro-B Natriuret Pep   


 


Total Protein   


 


Albumin   


 


LDL Cholesterol Direct   


 


HDL Cholesterol   


 


TSH   


 


Free T4   


 


Arterial Blood Glucose   














  10/04/21 10/04/21





  04:22 11:27


 


WBC  


 


Hgb  


 


Hct  


 


MCV  


 


MCH  


 


MCHC  


 


RDW  


 


Seg Neuts % (Manual)  


 


Lymphocytes % (Manual)  


 


Nucleated RBC %  


 


Seg Neutrophils # Man  


 


Lymphocytes # (Manual)  


 


Monocytes # (Manual)  


 


PT  


 


INR  


 


D-Dimer  


 


ABG pH  


 


POC ABG pCO2  


 


POC ABG pO2  


 


ABG pO2  


 


ABG HCO3  


 


ABG Base Excess  


 


ABG Hemoglobin  


 


ABG Oxyhemoglobin  


 


ABG Sodium  


 


ABG Chloride  


 


ABG Glucose  


 


Oxyhemoglobin  


 


Carboxyhemoglobin  


 


Sodium  147 H 


 


Potassium  


 


Chloride  


 


Carbon Dioxide  37 H 


 


BUN  74 H 


 


Creatinine  1.3 H 


 


Glucose  193 H 


 


POC Glucose   154 H


 


Calcium  


 


Ionized Calcium  


 


Total Bilirubin  


 


AST  


 


ALT  


 


Total Creatine Kinase  


 


Troponin T  


 


NT-Pro-B Natriuret Pep  


 


Total Protein  


 


Albumin  


 


LDL Cholesterol Direct  


 


HDL Cholesterol  


 


TSH  


 


Free T4  


 


Arterial Blood Glucose  











Allied health notes reviewed: nursing

## 2021-10-05 RX ADMIN — NYSTATIN SCH APPLIC: 100000 POWDER TOPICAL at 22:29

## 2021-10-05 RX ADMIN — IPRATROPIUM BROMIDE AND ALBUTEROL SULFATE SCH: .5; 3 SOLUTION RESPIRATORY (INHALATION) at 21:33

## 2021-10-05 RX ADMIN — IPRATROPIUM BROMIDE AND ALBUTEROL SULFATE SCH AMPUL: .5; 3 SOLUTION RESPIRATORY (INHALATION) at 09:20

## 2021-10-05 RX ADMIN — HEPARIN SODIUM SCH UNIT: 5000 INJECTION, SOLUTION INTRAVENOUS; SUBCUTANEOUS at 22:24

## 2021-10-05 RX ADMIN — BENZONATATE SCH: 100 CAPSULE ORAL at 06:03

## 2021-10-05 RX ADMIN — PANTOPRAZOLE SODIUM SCH MG: 40 INJECTION, POWDER, FOR SOLUTION INTRAVENOUS at 10:55

## 2021-10-05 RX ADMIN — Medication SCH ML: at 10:55

## 2021-10-05 RX ADMIN — HEPARIN SODIUM SCH UNIT: 5000 INJECTION, SOLUTION INTRAVENOUS; SUBCUTANEOUS at 05:57

## 2021-10-05 RX ADMIN — NYSTATIN SCH APPLIC: 100000 POWDER TOPICAL at 10:58

## 2021-10-05 RX ADMIN — METHYLPREDNISOLONE SODIUM SUCCINATE SCH MG: 40 INJECTION, POWDER, FOR SOLUTION INTRAMUSCULAR; INTRAVENOUS at 10:55

## 2021-10-05 RX ADMIN — ASPIRIN SCH MG: 325 TABLET, COATED ORAL at 10:55

## 2021-10-05 RX ADMIN — IPRATROPIUM BROMIDE AND ALBUTEROL SULFATE SCH AMPUL: .5; 3 SOLUTION RESPIRATORY (INHALATION) at 16:07

## 2021-10-05 RX ADMIN — BENZONATATE SCH MG: 100 CAPSULE ORAL at 14:19

## 2021-10-05 RX ADMIN — HEPARIN SODIUM SCH UNIT: 5000 INJECTION, SOLUTION INTRAVENOUS; SUBCUTANEOUS at 14:19

## 2021-10-05 RX ADMIN — METHYLPREDNISOLONE SODIUM SUCCINATE SCH MG: 40 INJECTION, POWDER, FOR SOLUTION INTRAMUSCULAR; INTRAVENOUS at 22:23

## 2021-10-05 RX ADMIN — LEVOTHYROXINE SODIUM ANHYDROUS SCH MCG: 100 INJECTION, POWDER, LYOPHILIZED, FOR SOLUTION INTRAVENOUS at 05:57

## 2021-10-05 RX ADMIN — HALOPERIDOL LACTATE PRN MG: 5 INJECTION, SOLUTION INTRAMUSCULAR at 03:30

## 2021-10-05 RX ADMIN — OXYCODONE AND ACETAMINOPHEN PRN TAB: 5; 325 TABLET ORAL at 16:26

## 2021-10-05 RX ADMIN — BENZONATATE SCH MG: 100 CAPSULE ORAL at 22:24

## 2021-10-05 RX ADMIN — Medication SCH ML: at 22:24

## 2021-10-05 NOTE — PROGRESS NOTE
Assessment and Plan





Assessment


Acute kidney injury, unknown baseline. Renal ultrasound notes poor visualization

due to body habitus.


Hypernatremia


Hyperkalemia


Hypocalcemia


Morbid obesity


Acute hypoxemic and hypercapnic respiratory failure


Acute exacerbation of CHF (congestive heart failure)


Hypertension


Sleep apnea





Recommendations


Na higher yesterday at 147, off sodium tabs, no labs for review today


Increase free water intake as able, if unable to drink, will consider D5W


Creatinine improving to 1.3


echo noted, likely diastolic disease


Avoid diuresis if able given hypernatremia, note good urine output 


Maintain MAP more than 65


Hold ACE/ARB at this time


Renally dose medications


Avoid nephrotoxins


Renal diet


Daily renal labs





Subjective


Date of service: 10/05/21


Principal diagnosis: Ac hypoxemic and hypercapnic resp failure; AE-CHF; Morbid 

obesity; FABIAN/OHS


Interval history: 





Patient was seen for her renal issues-off Bipap this AM, on NC


Nursing, interdisciplinary and consult notes were reviewed


Vitals, input and output, medications and labs were reviewed





Objective





- Exam


Narrative Exam: 





General: Morbid obesity, on NC, mild discomfort


HEENT: Oral mucosa moist


Neck: Supple, no JVD


Chest: labored breathing on NC


Heart: RRR, S1 and S2


Abdomen: Soft, nontender


Extremity: No peripheral cyanosis, edema


Neurological: Awake and alert


Dermatology: skin intact


Psych: Calm and cooperative


Musculoskeletal: No joint effusion





- Vital Signs


Vital signs: 


                               Vital Signs - 12hr











  10/05/21 10/05/21 10/05/21





  02:59 03:00 04:05


 


Temperature   98.6 F


 


Pulse Rate  101 H 


 


Pulse Rate [   





Anterior   





Bilateral   





Throughout]   


 


Respiratory  30 H 





Rate   


 


Respiratory   





Rate [Anterior   





Bilateral   





Throughout]   


 


Blood Pressure   158/92


 


Blood Pressure   





[Right]   


 


O2 Sat by Pulse 100 100 





Oximetry   














  10/05/21 10/05/21 10/05/21





  04:29 09:02 09:14


 


Temperature 97.9 F  98.6 F


 


Pulse Rate 101 H  102 H


 


Pulse Rate [   





Anterior   





Bilateral   





Throughout]   


 


Respiratory 30 H 30 H 30 H





Rate   


 


Respiratory   





Rate [Anterior   





Bilateral   





Throughout]   


 


Blood Pressure   


 


Blood Pressure 158/92  158/92





[Right]   


 


O2 Sat by Pulse 95  100





Oximetry   














  10/05/21 10/05/21 10/05/21





  09:47 09:48 11:30


 


Temperature   97.3 F L


 


Pulse Rate   108 H


 


Pulse Rate [ 105 H  





Anterior   





Bilateral   





Throughout]   


 


Respiratory   26 H





Rate   


 


Respiratory 20  





Rate [Anterior   





Bilateral   





Throughout]   


 


Blood Pressure   


 


Blood Pressure   108/80





[Right]   


 


O2 Sat by Pulse  98 93





Oximetry   














  10/05/21





  11:51


 


Temperature 97.3 F L


 


Pulse Rate 


 


Pulse Rate [ 





Anterior 





Bilateral 





Throughout] 


 


Respiratory 26 H





Rate 


 


Respiratory 





Rate [Anterior 





Bilateral 





Throughout] 


 


Blood Pressure 108/80


 


Blood Pressure 





[Right] 


 


O2 Sat by Pulse 





Oximetry 














- Lab





                                 10/03/21 04:40





                                 10/04/21 04:22


                             Most recent lab results











ABG pH  7.382  (7.320-7.450)   10/02/21  14:19    


 


ABG pCO2  86.9 mm Hg  09/27/21  18:40    


 


ABG pO2  81.8 mm Hg (80.0-90.0)   09/27/21  18:40    


 


ABG HCO3  36.2 mmol/L (20.0-26.0)  H  09/27/21  18:40    


 


ABG O2 Saturation  99.4  (0-100)   10/02/21  14:19    


 


Calcium  9.2 mg/dL (8.4-10.2)   10/04/21  04:22    


 


Urine Sodium  16 mmol/L  09/23/21  18:46    














Medications & Allergies





- Medications


Allergies/Adverse Reactions: 


                                    Allergies





No Known Allergies Allergy (Unverified 07/13/17 11:17)


   








Home Medications: 


                                Home Medications











 Medication  Instructions  Recorded  Confirmed  Last Taken  Type


 


Acetaminophen [Acetaminophen TAB] 325 mg PO Q4H PRN #30 tablet 12/14/17 01/22/21

Unknown Rx


 


ALBUTEROL NEB's [Proventil 0.083% 2.5 mg IH Q3HRT PRN #30 day 01/24/21  Unknown 

Rx





NEBS]     


 


Albuterol Mdi (or & Nicu Only) 2 puff IH QID PRN #1 inhalation 01/24/21  Unknown

Rx





[ProAir HFA Inhaler]     


 


Azithromycin [Zithromax Z-JAVIER] 0 mg PO DAILY #1 tab 01/24/21  Unknown Rx


 


Benzonatate [Tessalon Perles] 100 mg PO Q8HR #15 capsule 01/24/21  Unknown Rx


 


Famotidine [Pepcid] 20 mg PO BID #14 tablet 01/24/21  Unknown Rx


 


Ipratropium/Albuterol Sulfate 1 ampul IH TIDRT #30 day 01/24/21  Unknown Rx





[DUONEB *Not for PRN Use*]     


 


hydroCHLOROthiazide [HCTZ] 25 mg PO QDAY  tablet 01/24/21  Unknown Rx


 


hydroCHLOROthiazide [HCTZ] 25 mg PO QDAY #30 tablet 01/24/21  Unknown Rx


 


methylPREDNISolone [Medrol 4MG 4 mg PO DAILY #1 tab.ds.pk 01/24/21  Unknown Rx





DOSEPAK (21 tabs)]     











Active Medications: 














Generic Name Dose Route Start Last Admin





  Trade Name Freq  PRN Reason Stop Dose Admin


 


Acetaminophen  650 mg  09/22/21 03:00  10/01/21 06:42





  Acetaminophen 325 Mg Tab  PO   650 mg





  Q4H PRN   Administration





  Pain MILD(1-3)/Fever >100.5/HA  


 


Albuterol  2.5 mg  09/22/21 03:00 





  Albuterol 2.5 Mg/3 Ml Nebu  IH  





  Q4HRT PRN  





  Shortness Of Breath  


 


Albuterol/Ipratropium  1 ampul  09/22/21 08:00  10/05/21 09:20





  Ipratropium/Albuterol Sulfate 3 Ml Ampul.Neb  IH   1 ampul





  TIDRT DELL   Administration


 


Aspirin  325 mg  09/23/21 10:00  10/05/21 10:55





  Aspirin Ec 325 Mg Tab  PO   325 mg





  QDAY DELL   Administration


 


Atorvastatin Calcium  40 mg  09/22/21 22:00  10/04/21 21:42





  Atorvastatin 40 Mg Tab  PO   40 mg





  QHS DELL   Administration


 


Benzonatate  100 mg  09/22/21 06:00  10/05/21 06:03





  Benzonatate 100 Mg Cap  PO   Not Given





  Q8HR DELL  


 


Guaifenesin  200 mg  09/26/21 14:47  10/01/21 06:43





  Guaifenesin 100 Mg/5 Ml Oral Liqd  PO   200 mg





  Q4H PRN   Administration





  Cough  


 


Haloperidol Lactate  5 mg  10/02/21 16:27  10/05/21 03:30





  Haloperidol Lactate 5 Mg/1 Ml Inj  IV   5 mg





  Q6H PRN   Administration





  Agitation  


 


Heparin Sodium (Porcine)  5,000 unit  10/02/21 14:00  10/05/21 05:57





  Heparin 5,000 Unit/1 Ml Vial  SUB-Q   5,000 unit





  Q8HR DELL   Administration


 


Hydralazine HCl  10 mg  10/03/21 17:25  10/04/21 18:27





  Hydralazine 20 Mg/1 Ml Inj  IV   10 mg





  Q6H PRN   Administration





  Blood Pressure  


 


Levothyroxine Sodium  100 mcg  10/04/21 06:00  10/05/21 05:57





  Levothyroxine 100 Mcg Inj  IV   100 mcg





  DAILY@0600 DELL   Administration


 


Methylprednisolone Sodium Succinate  20 mg  09/30/21 10:00  10/05/21 10:55





  Methylprednisolone Sod Succinate 40 Mg/1 Ml Inj  IV   20 mg





  Q12HR DELL   Administration


 


Nitroglycerin  0.4 mg  09/22/21 03:00 





  Nitroglycerin 0.4 Mg Tab Subl  SL  





  Q5M PRN  





  Chest Pain  


 


Nystatin  1 applic  09/22/21 18:00  10/05/21 10:58





  Nystatin Powder 15 Gm  TP   1 applic





  BID DELL   Administration


 


Ondansetron HCl  4 mg  09/22/21 03:00  09/29/21 23:51





  Ondansetron 4 Mg/2 Ml Inj  IV   4 mg





  Q8H PRN   Administration





  Nausea And Vomiting  


 


Oxycodone/Acetaminophen  1 tab  09/22/21 03:00  10/01/21 21:19





  Oxycodone /Acetaminophen 5-325mg Tab  PO   1 tab





  Q6H PRN   Administration





  Pain, Moderate (4-6)  


 


Pantoprazole Sodium  40 mg  09/30/21 10:00  10/05/21 10:55





  Pantoprazole 40 Mg Inj  IV   40 mg





  QDAY DELL   Administration


 


Quetiapine Fumarate  75 mg  10/02/21 22:00  10/04/21 21:43





  Quetiapine 25 Mg Tab  PO   75 mg





  QHS DELL   Administration


 


Sodium Chloride  10 ml  09/22/21 10:00  10/05/21 10:55





  Sodium Chloride 0.9% 10 Ml Flush Syringe  IV   10 ml





  BID DELL   Administration


 


Sodium Chloride  10 ml  09/22/21 03:00 





  Sodium Chloride 0.9% 10 Ml Flush Syringe  IV  





  PRN PRN  





  LINE FLUSH  


 


Tramadol HCl  50 mg  09/22/21 03:00  10/04/21 21:50





  Tramadol 50 Mg Tab  PO   50 mg





  Q6H PRN   Administration





  Pain, Moderate (4-6)

## 2021-10-05 NOTE — PROGRESS NOTE
Assessment and Plan





31 years old female with history of morbid obesity, hypertension, asthma , sleep

apnea was brought to the emergency room because of shortness of breath, edema, 

generalized malaise, fatigue, and weakness for last couple of days.  She states 

she just does not feel well.  She has a headache.  She states her legs are 

swollen.  She feels as though she is retaining fluid.  She states that she went 

to her regular doctors on the 13th.  They tested her for Covid.  It was 

negative.  She was told to go see a cardiologist.  She is not seen a car

diologist as of yet.  She came in here because she was not feeling well and did 

not know what to do.  She has had no sick contacts.  








In the emergency room patient is found to have acute CHF exacerbation patient 

proBNP is 7573 also patient cardiac enzyme is elevated troponin is 0.043. 

Patient blood glucose also 55 patient is hypoglycemic


Patients present blood sugur 130.





Patient is  awake. Resting on 3L O2. O2 saturation 98%. BIPAP 20/8, rate 30, 

FIO2 40% and stand-by in the room. Patient afebrile. No leukocytosis. Blood 

pressure 136/80, Pulse 102


ABG on FIO2 45%











ABG pH  7.234  (7.320-7.450)  L  10/01/21  09:47    


 


POC ABG pCO2  90.5 mmHg (32.0-48.0)  H  10/01/21  09:47    


 


ABG pCO2  86.9 mm Hg  09/27/21  18:40    


 


POC ABG pO2  139.4 mmHg ()  H  10/01/21  09:47    


 


ABG pO2  81.8 mm Hg (80.0-90.0)   09/27/21  18:40    


 


POC ABG HCO3  37.4   10/01/21  09:47    


 


ABG O2 Saturation  99.2  (0-100)   10/01/21  09:47    











Patients D dimer 10/1/21 : 5410.10





Patient presently on albuterol/atrovent aerosol treatments q 6 hours, 

Subcutaneous Heparin, Protonix,  and I/V SoluMedrol.














- Patient Problems


(1) Acute respiratory failure with hypoxia and hypercapnia


Current Visit: Yes   Status: Acute   


Plan to address problem: 


On 3L O2 Saturation 98%.


BIPAP 20/8, rate 30, FIO2 40%. Stand-by.


 Albuterol/atrovent aerosol treatments.


 Continue Solumedrol


 Contibue S/C Heparin.


 Continue Protonix





 








(2) Acute exacerbation of CHF (congestive heart failure)


Current Visit: Yes   Status: Acute   


Plan to address problem: 


Management as per cardiology.








(3) Hypertension


Current Visit: Yes   Status: Acute   


Plan to address problem: 


Management as per primary care.








(4) Morbid obesity with BMI of 70 and over, adult


Current Visit: No   Status: Acute   


Plan to address problem: 


Weight reduction diet.


 Mobility protocol


 Fall precautions.








(5) Obesity hypoventilation syndrome


Current Visit: No   Status: Acute   


Plan to address problem: 


BIPAP 20/8, rate 30, FIO2 40%.








(6) Sleep apnea


Current Visit: No   Status: Acute   


Plan to address problem: 


BIPAP 20/8, rate 30, FIO2 40%.


 Sleep study if she can as out patient.








Subjective


Date of service: 10/05/21


Principal diagnosis: Ac hypoxemic and hypercapnic resp failure; AE-CHF; Morbid 

obesity; FABIAN/OHS


Interval history: 





31 years old female with history of morbid obesity, hypertension, asthma COPD 

sleep apnea was brought to the emergency room because of shortness of breath, 

edema, generalized malaise, fatigue, and weakness for last couple of days.  She 

states she just does not feel well.  She has a headache.  She states her legs 

are swollen.  She feels as though she is retaining fluid.  She states that she 

went to her regular doctors on the 13th.  They tested her for Covid.  It was 

negative.  She was told to go see a cardiologist.  She is not seen a 

cardiologist as of yet.  She came in here because she was not feeling well and 

did not know what to do.  She has had no sick contacts.  








In the emergency room patient is found to have acute CHF exacerbation patient 

proBNP is 7573 also patient cardiac enzyme is elevated troponin is 0.043. P

atient blood glucose also 55 patient is hypoglycemic. 





Patient is  awake. Resting on 3L O2. O2 saturation 98%. BIPAP 20/8, rate 30, 

FIO2 40% and stand-by in the room. Patient afebrile. No leukocytosis. Blood 

pressure 136/80, Pulse 102





ABG on FIO2 45%











ABG pH  7.234  (7.320-7.450)  L  10/01/21  09:47    


 


POC ABG pCO2  90.5 mmHg (32.0-48.0)  H  10/01/21  09:47    


 


ABG pCO2  86.9 mm Hg  09/27/21  18:40    


 


POC ABG pO2  139.4 mmHg ()  H  10/01/21  09:47    


 


ABG pO2  81.8 mm Hg (80.0-90.0)   09/27/21  18:40    


 


POC ABG HCO3  37.4   10/01/21  09:47    


 


ABG O2 Saturation  99.2  (0-100)   10/01/21  09:47    














Patients D dimer 10/1/21 : 5410.10





Patient presently on albuterol/atrovent aerosol treatments q 6 hours, 

subcutaneous Heparin, Protonix, and I/V SoluMedrol.








Objective


                               Vital Signs - 12hr











  10/05/21 10/05/21 10/05/21





  02:59 03:00 04:05


 


Temperature   98.6 F


 


Pulse Rate  101 H 


 


Pulse Rate [   





Anterior   





Bilateral   





Throughout]   


 


Respiratory  30 H 





Rate   


 


Respiratory   





Rate [Anterior   





Bilateral   





Throughout]   


 


Blood Pressure   158/92


 


Blood Pressure   





[Right]   


 


O2 Sat by Pulse 100 100 





Oximetry   














  10/05/21 10/05/21 10/05/21





  04:29 09:02 09:14


 


Temperature 97.9 F  98.6 F


 


Pulse Rate 101 H  102 H


 


Pulse Rate [   





Anterior   





Bilateral   





Throughout]   


 


Respiratory 30 H 30 H 30 H





Rate   


 


Respiratory   





Rate [Anterior   





Bilateral   





Throughout]   


 


Blood Pressure   


 


Blood Pressure 158/92  158/92





[Right]   


 


O2 Sat by Pulse 95  100





Oximetry   














  10/05/21 10/05/21 10/05/21





  09:47 09:48 11:30


 


Temperature   97.3 F L


 


Pulse Rate   108 H


 


Pulse Rate [ 105 H  





Anterior   





Bilateral   





Throughout]   


 


Respiratory   26 H





Rate   


 


Respiratory 20  





Rate [Anterior   





Bilateral   





Throughout]   


 


Blood Pressure   


 


Blood Pressure   108/80





[Right]   


 


O2 Sat by Pulse  98 93





Oximetry   














  10/05/21





  11:51


 


Temperature 97.3 F L


 


Pulse Rate 


 


Pulse Rate [ 





Anterior 





Bilateral 





Throughout] 


 


Respiratory 26 H





Rate 


 


Respiratory 





Rate [Anterior 





Bilateral 





Throughout] 


 


Blood Pressure 108/80


 


Blood Pressure 





[Right] 


 


O2 Sat by Pulse 





Oximetry 











Constitutional: no acute distress, alert, other (young extremely obese female 

with mildly increased respiratory effort at rest)


Eyes: non-icteric


ENT: oropharynx moist, other (BIPAP FFM)


Neck: supple, no lymphadenopathy, no JVD


Effort: mildly labored


Ascultation: Bilateral: diminished breath sounds, rhonchi


Percussion: Bilateral: not dull


Cardiovascular: regular rate and rhythm


Gastrointestinal: normoactive bowel sounds, soft, non-tender, non-distended 

(protuberant)


Integumentary: rash (stasis dermatitis type), other (Stasis dermatititis on legs

 and abdomen; see [facility] notes for full details)


Extremities: pulses normal, no ischemia or petechiae, edema (2+), other (stasis 

dermatitis)


Neurologic: non-focal exam (grossly), pupils equal and round, CN II-XII normal


Psychiatric: depressed


CBC and BMP: 


                                 10/03/21 04:40





                                 10/04/21 04:22


ABG, PT/INR, D-dimer: 


ABG











ABG pH  7.382  (7.320-7.450)   10/02/21  14:19    


 


POC ABG pCO2  67.4 mmHg (32.0-48.0)  H  10/02/21  14:19    


 


ABG pCO2  86.9 mm Hg  09/27/21  18:40    


 


POC ABG pO2  150.6 mmHg ()  H  10/02/21  14:19    


 


ABG pO2  81.8 mm Hg (80.0-90.0)   09/27/21  18:40    


 


POC ABG HCO3  39.2   10/02/21  14:19    


 


ABG O2 Saturation  99.4  (0-100)   10/02/21  14:19    





PT/INR, D-dimer











PT  18.7 Sec. (12.2-14.9)  H  10/02/21  16:55    


 


INR  1.51  (0.87-1.13)  H  10/02/21  16:55    


 


D-Dimer  5410.10 ng/mlDDU (0-234)  H  10/01/21  16:03    








Abnormal lab findings: 


                                  Abnormal Labs











  09/21/21 09/21/21 09/22/21





  22:04 22:04 00:18


 


WBC  17.7 H  


 


Hgb  9.1 L  


 


Hct   


 


MCV  77 L  


 


MCH  21 L  


 


MCHC  27 L  


 


RDW  24.3 H  


 


Seg Neuts % (Manual)   


 


Lymphocytes % (Manual)   


 


Nucleated RBC %   


 


Seg Neutrophils # Man   


 


Lymphocytes # (Manual)   


 


Monocytes # (Manual)   


 


PT   


 


INR   


 


D-Dimer   


 


ABG pH   


 


POC ABG pCO2   


 


POC ABG pO2   


 


ABG pO2   


 


ABG HCO3   


 


ABG Base Excess   


 


ABG Hemoglobin   


 


ABG Oxyhemoglobin   


 


ABG Sodium   


 


ABG Chloride   


 


ABG Glucose   


 


Oxyhemoglobin   


 


Carboxyhemoglobin   


 


Sodium   135 L 


 


Potassium   


 


Chloride   91.0 L 


 


Carbon Dioxide   17 L 


 


BUN   


 


Creatinine   1.4 H 


 


Glucose   55 L 


 


POC Glucose    49 L


 


Calcium   


 


Ionized Calcium   


 


Total Bilirubin   


 


AST   


 


ALT   


 


Total Creatine Kinase   


 


Troponin T   0.043 H 


 


NT-Pro-B Natriuret Pep   7573 H 


 


Total Protein   


 


Albumin   


 


LDL Cholesterol Direct   47 L 


 


HDL Cholesterol   25 L 


 


TSH   


 


Free T4   


 


Arterial Blood Glucose   














  09/22/21 09/22/21 09/22/21





  04:03 04:03 09:22


 


WBC  24.3 H  


 


Hgb  9.3 L  


 


Hct   


 


MCV  74 L  


 


MCH  21 L  


 


MCHC  29 L  


 


RDW  23.1 H  


 


Seg Neuts % (Manual)  78.0 H  


 


Lymphocytes % (Manual)  7.0 L  


 


Nucleated RBC %  1.0 H  


 


Seg Neutrophils # Man  19.0 H  


 


Lymphocytes # (Manual)   


 


Monocytes # (Manual)  1.0 H  


 


PT   


 


INR   


 


D-Dimer   


 


ABG pH   


 


POC ABG pCO2   


 


POC ABG pO2   


 


ABG pO2   


 


ABG HCO3   


 


ABG Base Excess   


 


ABG Hemoglobin   


 


ABG Oxyhemoglobin   


 


ABG Sodium   


 


ABG Chloride   


 


ABG Glucose   


 


Oxyhemoglobin   


 


Carboxyhemoglobin   


 


Sodium   134 L 


 


Potassium   


 


Chloride   91.2 L 


 


Carbon Dioxide   


 


BUN   


 


Creatinine   1.8 H 


 


Glucose   


 


POC Glucose   


 


Calcium   


 


Ionized Calcium   


 


Total Bilirubin   


 


AST   


 


ALT   


 


Total Creatine Kinase   


 


Troponin T    0.099 H


 


NT-Pro-B Natriuret Pep   


 


Total Protein   


 


Albumin   


 


LDL Cholesterol Direct   


 


HDL Cholesterol   


 


TSH   


 


Free T4   


 


Arterial Blood Glucose   














  09/22/21 09/22/21 09/23/21





  13:03 20:28 04:52


 


WBC    20.9 H


 


Hgb    8.5 L


 


Hct    30.0 L


 


MCV    73 L


 


MCH    21 L


 


MCHC    28 L


 


RDW    23.9 H


 


Seg Neuts % (Manual)    77.0 H


 


Lymphocytes % (Manual)    1.0 L


 


Nucleated RBC %    1.0 H


 


Seg Neutrophils # Man    16.1 H


 


Lymphocytes # (Manual)    0.2 L


 


Monocytes # (Manual)   


 


PT   


 


INR   


 


D-Dimer   


 


ABG pH   


 


POC ABG pCO2   


 


POC ABG pO2   


 


ABG pO2   


 


ABG HCO3   


 


ABG Base Excess   


 


ABG Hemoglobin   


 


ABG Oxyhemoglobin   


 


ABG Sodium   


 


ABG Chloride   


 


ABG Glucose   


 


Oxyhemoglobin   


 


Carboxyhemoglobin   


 


Sodium   


 


Potassium   


 


Chloride   


 


Carbon Dioxide   


 


BUN   


 


Creatinine   


 


Glucose   


 


POC Glucose   115 H 


 


Calcium   


 


Ionized Calcium   


 


Total Bilirubin   


 


AST   


 


ALT   


 


Total Creatine Kinase   


 


Troponin T  0.078 H D  


 


NT-Pro-B Natriuret Pep   


 


Total Protein   


 


Albumin   


 


LDL Cholesterol Direct   


 


HDL Cholesterol   


 


TSH   


 


Free T4   


 


Arterial Blood Glucose   














  09/23/21 09/23/21 09/23/21





  04:52 08:46 11:50


 


WBC   


 


Hgb   


 


Hct   


 


MCV   


 


MCH   


 


MCHC   


 


RDW   


 


Seg Neuts % (Manual)   


 


Lymphocytes % (Manual)   


 


Nucleated RBC %   


 


Seg Neutrophils # Man   


 


Lymphocytes # (Manual)   


 


Monocytes # (Manual)   


 


PT   


 


INR   


 


D-Dimer   


 


ABG pH   


 


POC ABG pCO2   


 


POC ABG pO2   


 


ABG pO2   


 


ABG HCO3   


 


ABG Base Excess   


 


ABG Hemoglobin   


 


ABG Oxyhemoglobin   


 


ABG Sodium   


 


ABG Chloride   


 


ABG Glucose   


 


Oxyhemoglobin   


 


Carboxyhemoglobin   


 


Sodium  129 L  


 


Potassium  5.6 H  


 


Chloride  88.6 L  


 


Carbon Dioxide   


 


BUN  27 H  


 


Creatinine  2.4 H  


 


Glucose  121 H  


 


POC Glucose   139 H  156 H


 


Calcium  7.7 L  


 


Ionized Calcium   


 


Total Bilirubin   


 


AST   


 


ALT   


 


Total Creatine Kinase   


 


Troponin T   


 


NT-Pro-B Natriuret Pep   


 


Total Protein   


 


Albumin   


 


LDL Cholesterol Direct   


 


HDL Cholesterol   


 


TSH   


 


Free T4   


 


Arterial Blood Glucose   














  09/23/21 09/23/21 09/23/21





  15:46 16:58 22:08


 


WBC   


 


Hgb   


 


Hct   


 


MCV   


 


MCH   


 


MCHC   


 


RDW   


 


Seg Neuts % (Manual)   


 


Lymphocytes % (Manual)   


 


Nucleated RBC %   


 


Seg Neutrophils # Man   


 


Lymphocytes # (Manual)   


 


Monocytes # (Manual)   


 


PT   


 


INR   


 


D-Dimer   


 


ABG pH   


 


POC ABG pCO2   


 


POC ABG pO2   


 


ABG pO2   


 


ABG HCO3   


 


ABG Base Excess   


 


ABG Hemoglobin   


 


ABG Oxyhemoglobin   


 


ABG Sodium   


 


ABG Chloride   


 


ABG Glucose   


 


Oxyhemoglobin   


 


Carboxyhemoglobin   


 


Sodium  128 L  


 


Potassium  5.5 H  


 


Chloride  88.1 L  


 


Carbon Dioxide   


 


BUN  33 H  


 


Creatinine  2.7 H  


 


Glucose  172 H  


 


POC Glucose   187 H  186 H


 


Calcium  7.3 L  


 


Ionized Calcium   


 


Total Bilirubin   


 


AST   


 


ALT   


 


Total Creatine Kinase   


 


Troponin T   


 


NT-Pro-B Natriuret Pep   


 


Total Protein   


 


Albumin   


 


LDL Cholesterol Direct   


 


HDL Cholesterol   


 


TSH   


 


Free T4   


 


Arterial Blood Glucose   














  09/23/21 09/24/21 09/24/21





  23:15 02:20 07:39


 


WBC   


 


Hgb   


 


Hct   


 


MCV   


 


MCH   


 


MCHC   


 


RDW   


 


Seg Neuts % (Manual)   


 


Lymphocytes % (Manual)   


 


Nucleated RBC %   


 


Seg Neutrophils # Man   


 


Lymphocytes # (Manual)   


 


Monocytes # (Manual)   


 


PT   


 


INR   


 


D-Dimer   


 


ABG pH   


 


POC ABG pCO2   


 


POC ABG pO2   


 


ABG pO2   


 


ABG HCO3   


 


ABG Base Excess   


 


ABG Hemoglobin   


 


ABG Oxyhemoglobin   


 


ABG Sodium   


 


ABG Chloride   


 


ABG Glucose   


 


Oxyhemoglobin   


 


Carboxyhemoglobin   


 


Sodium   128 L 


 


Potassium   5.2 H 


 


Chloride   88.6 L 


 


Carbon Dioxide   


 


BUN   39 H 


 


Creatinine   3.1 H 


 


Glucose   171 H 


 


POC Glucose    168 H


 


Calcium   7.2 L 


 


Ionized Calcium  3.6 L  


 


Total Bilirubin   


 


AST   


 


ALT   


 


Total Creatine Kinase   


 


Troponin T   


 


NT-Pro-B Natriuret Pep   


 


Total Protein   


 


Albumin   


 


LDL Cholesterol Direct   


 


HDL Cholesterol   


 


TSH   


 


Free T4   


 


Arterial Blood Glucose   














  09/24/21 09/24/21 09/24/21





  10:04 11:37 17:18


 


WBC   


 


Hgb   


 


Hct   


 


MCV   


 


MCH   


 


MCHC   


 


RDW   


 


Seg Neuts % (Manual)   


 


Lymphocytes % (Manual)   


 


Nucleated RBC %   


 


Seg Neutrophils # Man   


 


Lymphocytes # (Manual)   


 


Monocytes # (Manual)   


 


PT   


 


INR   


 


D-Dimer   


 


ABG pH   


 


POC ABG pCO2   


 


POC ABG pO2   


 


ABG pO2   


 


ABG HCO3   


 


ABG Base Excess   


 


ABG Hemoglobin   


 


ABG Oxyhemoglobin   


 


ABG Sodium   


 


ABG Chloride   


 


ABG Glucose   


 


Oxyhemoglobin   


 


Carboxyhemoglobin   


 


Sodium   


 


Potassium   


 


Chloride   


 


Carbon Dioxide   


 


BUN   


 


Creatinine   


 


Glucose   


 


POC Glucose   170 H  152 H


 


Calcium   


 


Ionized Calcium   


 


Total Bilirubin   


 


AST   


 


ALT   


 


Total Creatine Kinase  1712 H  


 


Troponin T   


 


NT-Pro-B Natriuret Pep   


 


Total Protein   


 


Albumin   


 


LDL Cholesterol Direct   


 


HDL Cholesterol   


 


TSH   


 


Free T4   


 


Arterial Blood Glucose   














  09/24/21 09/24/21 09/25/21





  19:38 22:02 05:48


 


WBC   


 


Hgb   


 


Hct   


 


MCV   


 


MCH   


 


MCHC   


 


RDW   


 


Seg Neuts % (Manual)   


 


Lymphocytes % (Manual)   


 


Nucleated RBC %   


 


Seg Neutrophils # Man   


 


Lymphocytes # (Manual)   


 


Monocytes # (Manual)   


 


PT   


 


INR   


 


D-Dimer   


 


ABG pH   


 


POC ABG pCO2   


 


POC ABG pO2   


 


ABG pO2   


 


ABG HCO3   


 


ABG Base Excess   


 


ABG Hemoglobin   


 


ABG Oxyhemoglobin   


 


ABG Sodium   


 


ABG Chloride   


 


ABG Glucose   


 


Oxyhemoglobin   


 


Carboxyhemoglobin   


 


Sodium  128 L   124 L


 


Potassium    5.8 H D


 


Chloride  89.6 L   89.4 L


 


Carbon Dioxide   


 


BUN  47 H   53 H


 


Creatinine  3.0 H   2.8 H


 


Glucose  165 H   150 H


 


POC Glucose   147 H 


 


Calcium  6.9 L   7.2 L


 


Ionized Calcium   


 


Total Bilirubin   


 


AST   


 


ALT   


 


Total Creatine Kinase   


 


Troponin T   


 


NT-Pro-B Natriuret Pep   


 


Total Protein   


 


Albumin   


 


LDL Cholesterol Direct   


 


HDL Cholesterol   


 


TSH   


 


Free T4   


 


Arterial Blood Glucose   














  09/25/21 09/25/21 09/25/21





  08:48 11:35 19:12


 


WBC   


 


Hgb   


 


Hct   


 


MCV   


 


MCH   


 


MCHC   


 


RDW   


 


Seg Neuts % (Manual)   


 


Lymphocytes % (Manual)   


 


Nucleated RBC %   


 


Seg Neutrophils # Man   


 


Lymphocytes # (Manual)   


 


Monocytes # (Manual)   


 


PT   


 


INR   


 


D-Dimer   


 


ABG pH   


 


POC ABG pCO2   


 


POC ABG pO2   


 


ABG pO2   


 


ABG HCO3   


 


ABG Base Excess   


 


ABG Hemoglobin   


 


ABG Oxyhemoglobin   


 


ABG Sodium   


 


ABG Chloride   


 


ABG Glucose   


 


Oxyhemoglobin   


 


Carboxyhemoglobin   


 


Sodium    128 L


 


Potassium   


 


Chloride    89.7 L


 


Carbon Dioxide   


 


BUN    53 H


 


Creatinine    2.4 H


 


Glucose    159 H


 


POC Glucose  152 H  152 H 


 


Calcium    7.1 L


 


Ionized Calcium   


 


Total Bilirubin   


 


AST   


 


ALT   


 


Total Creatine Kinase   


 


Troponin T   


 


NT-Pro-B Natriuret Pep   


 


Total Protein   


 


Albumin   


 


LDL Cholesterol Direct   


 


HDL Cholesterol   


 


TSH   


 


Free T4   


 


Arterial Blood Glucose   














  09/25/21 09/26/21 09/26/21





  22:03 05:03 07:41


 


WBC   


 


Hgb   


 


Hct   


 


MCV   


 


MCH   


 


MCHC   


 


RDW   


 


Seg Neuts % (Manual)   


 


Lymphocytes % (Manual)   


 


Nucleated RBC %   


 


Seg Neutrophils # Man   


 


Lymphocytes # (Manual)   


 


Monocytes # (Manual)   


 


PT   


 


INR   


 


D-Dimer   


 


ABG pH   


 


POC ABG pCO2   


 


POC ABG pO2   


 


ABG pO2   


 


ABG HCO3   


 


ABG Base Excess   


 


ABG Hemoglobin   


 


ABG Oxyhemoglobin   


 


ABG Sodium   


 


ABG Chloride   


 


ABG Glucose   


 


Oxyhemoglobin   


 


Carboxyhemoglobin   


 


Sodium   134 L 


 


Potassium   


 


Chloride   92.9 L 


 


Carbon Dioxide   33 H D 


 


BUN   54 H 


 


Creatinine   2.4 H 


 


Glucose   150 H 


 


POC Glucose  152 H   144 H


 


Calcium   7.2 L 


 


Ionized Calcium   


 


Total Bilirubin   


 


AST   


 


ALT   


 


Total Creatine Kinase   


 


Troponin T   


 


NT-Pro-B Natriuret Pep   


 


Total Protein   


 


Albumin   


 


LDL Cholesterol Direct   


 


HDL Cholesterol   


 


TSH   


 


Free T4   


 


Arterial Blood Glucose   














  09/26/21 09/26/21 09/26/21





  11:38 14:11 17:02


 


WBC   14.7 H 


 


Hgb   8.7 L 


 


Hct   29.8 L 


 


MCV   74 L 


 


MCH   22 L 


 


MCHC   29 L 


 


RDW   24.9 H 


 


Seg Neuts % (Manual)   86.0 H 


 


Lymphocytes % (Manual)   6.0 L 


 


Nucleated RBC %   


 


Seg Neutrophils # Man   12.6 H 


 


Lymphocytes # (Manual)   0.9 L 


 


Monocytes # (Manual)   


 


PT   


 


INR   


 


D-Dimer   


 


ABG pH   


 


POC ABG pCO2   


 


POC ABG pO2   


 


ABG pO2   


 


ABG HCO3   


 


ABG Base Excess   


 


ABG Hemoglobin   


 


ABG Oxyhemoglobin   


 


ABG Sodium   


 


ABG Chloride   


 


ABG Glucose   


 


Oxyhemoglobin   


 


Carboxyhemoglobin   


 


Sodium   


 


Potassium   


 


Chloride   


 


Carbon Dioxide   


 


BUN   


 


Creatinine   


 


Glucose   


 


POC Glucose  151 H   154 H


 


Calcium   


 


Ionized Calcium   


 


Total Bilirubin   


 


AST   


 


ALT   


 


Total Creatine Kinase   


 


Troponin T   


 


NT-Pro-B Natriuret Pep   


 


Total Protein   


 


Albumin   


 


LDL Cholesterol Direct   


 


HDL Cholesterol   


 


TSH   


 


Free T4   


 


Arterial Blood Glucose   














  09/26/21 09/27/21 09/27/21





  23:14 05:03 10:00


 


WBC   


 


Hgb   


 


Hct   


 


MCV   


 


MCH   


 


MCHC   


 


RDW   


 


Seg Neuts % (Manual)   


 


Lymphocytes % (Manual)   


 


Nucleated RBC %   


 


Seg Neutrophils # Man   


 


Lymphocytes # (Manual)   


 


Monocytes # (Manual)   


 


PT   


 


INR   


 


D-Dimer   


 


ABG pH    7.150 L*


 


POC ABG pCO2   


 


POC ABG pO2   


 


ABG pO2    91.8 H


 


ABG HCO3    37.2 H


 


ABG Base Excess    7.1 H


 


ABG Hemoglobin    7.1 L


 


ABG Oxyhemoglobin   


 


ABG Sodium   


 


ABG Chloride   


 


ABG Glucose   


 


Oxyhemoglobin    93.4 L


 


Carboxyhemoglobin   


 


Sodium   134 L 


 


Potassium   


 


Chloride   91.3 L 


 


Carbon Dioxide   33 H 


 


BUN   63 H 


 


Creatinine   2.2 H 


 


Glucose   159 H 


 


POC Glucose  151 H  


 


Calcium   7.8 L 


 


Ionized Calcium   


 


Total Bilirubin   


 


AST   


 


ALT   


 


Total Creatine Kinase   


 


Troponin T   


 


NT-Pro-B Natriuret Pep   


 


Total Protein   


 


Albumin   


 


LDL Cholesterol Direct   


 


HDL Cholesterol   


 


TSH   


 


Free T4   


 


Arterial Blood Glucose   














  09/27/21 09/27/21 09/27/21





  12:39 16:37 18:40


 


WBC   


 


Hgb   


 


Hct   


 


MCV   


 


MCH   


 


MCHC   


 


RDW   


 


Seg Neuts % (Manual)   


 


Lymphocytes % (Manual)   


 


Nucleated RBC %   


 


Seg Neutrophils # Man   


 


Lymphocytes # (Manual)   


 


Monocytes # (Manual)   


 


PT   


 


INR   


 


D-Dimer   


 


ABG pH    7.237 L


 


POC ABG pCO2   


 


POC ABG pO2   


 


ABG pO2   


 


ABG HCO3    36.2 H


 


ABG Base Excess    7.4 H


 


ABG Hemoglobin    7.8 L


 


ABG Oxyhemoglobin   


 


ABG Sodium   


 


ABG Chloride   


 


ABG Glucose   


 


Oxyhemoglobin    93.7 L


 


Carboxyhemoglobin   


 


Sodium   


 


Potassium   


 


Chloride   


 


Carbon Dioxide   


 


BUN   


 


Creatinine   


 


Glucose   


 


POC Glucose  140 H  132 H 


 


Calcium   


 


Ionized Calcium   


 


Total Bilirubin   


 


AST   


 


ALT   


 


Total Creatine Kinase   


 


Troponin T   


 


NT-Pro-B Natriuret Pep   


 


Total Protein   


 


Albumin   


 


LDL Cholesterol Direct   


 


HDL Cholesterol   


 


TSH   


 


Free T4   


 


Arterial Blood Glucose   














  09/27/21 09/28/21 09/28/21





  23:55 04:27 04:27


 


WBC   


 


Hgb   8.5 L 


 


Hct   29.1 L 


 


MCV   72 L 


 


MCH   21 L 


 


MCHC   29 L 


 


RDW   24.9 H 


 


Seg Neuts % (Manual)   


 


Lymphocytes % (Manual)   


 


Nucleated RBC %   


 


Seg Neutrophils # Man   


 


Lymphocytes # (Manual)   


 


Monocytes # (Manual)   


 


PT   


 


INR   


 


D-Dimer   


 


ABG pH   


 


POC ABG pCO2   


 


POC ABG pO2   


 


ABG pO2   


 


ABG HCO3   


 


ABG Base Excess   


 


ABG Hemoglobin   


 


ABG Oxyhemoglobin   


 


ABG Sodium   


 


ABG Chloride   


 


ABG Glucose   


 


Oxyhemoglobin   


 


Carboxyhemoglobin   


 


Sodium    135 L


 


Potassium   


 


Chloride    93.5 L


 


Carbon Dioxide    33 H


 


BUN    68 H


 


Creatinine    1.9 H


 


Glucose    143 H


 


POC Glucose  135 H  


 


Calcium    8.1 L


 


Ionized Calcium   


 


Total Bilirubin    1.50 H


 


AST    494 H


 


ALT    835 H


 


Total Creatine Kinase   


 


Troponin T   


 


NT-Pro-B Natriuret Pep   


 


Total Protein    9.5 H


 


Albumin    3.1 L


 


LDL Cholesterol Direct   


 


HDL Cholesterol   


 


TSH   


 


Free T4   


 


Arterial Blood Glucose   














  09/28/21 09/28/21 09/29/21





  12:03 17:24 00:19


 


WBC   


 


Hgb   


 


Hct   


 


MCV   


 


MCH   


 


MCHC   


 


RDW   


 


Seg Neuts % (Manual)   


 


Lymphocytes % (Manual)   


 


Nucleated RBC %   


 


Seg Neutrophils # Man   


 


Lymphocytes # (Manual)   


 


Monocytes # (Manual)   


 


PT   


 


INR   


 


D-Dimer   


 


ABG pH  7.291 L  


 


POC ABG pCO2  75.5 H  


 


POC ABG pO2  76.2 L  


 


ABG pO2   


 


ABG HCO3   


 


ABG Base Excess   


 


ABG Hemoglobin  9.9 L  


 


ABG Oxyhemoglobin  91.0 L  


 


ABG Sodium  134.9 L  


 


ABG Chloride  93.0 L  


 


ABG Glucose  146 H  


 


Oxyhemoglobin   


 


Carboxyhemoglobin  2.2 H  


 


Sodium   


 


Potassium   


 


Chloride   


 


Carbon Dioxide   


 


BUN   


 


Creatinine   


 


Glucose   


 


POC Glucose   134 H  136 H


 


Calcium   


 


Ionized Calcium   


 


Total Bilirubin   


 


AST   


 


ALT   


 


Total Creatine Kinase   


 


Troponin T   


 


NT-Pro-B Natriuret Pep   


 


Total Protein   


 


Albumin   


 


LDL Cholesterol Direct   


 


HDL Cholesterol   


 


TSH   


 


Free T4   


 


Arterial Blood Glucose  146 H  














  09/29/21 09/29/21 09/29/21





  04:55 04:55 05:29


 


WBC   


 


Hgb  8.0 L  


 


Hct  27.1 L  


 


MCV  70 L  


 


MCH  21 L  


 


MCHC   


 


RDW  24.3 H  


 


Seg Neuts % (Manual)   


 


Lymphocytes % (Manual)   


 


Nucleated RBC %   


 


Seg Neutrophils # Man   


 


Lymphocytes # (Manual)   


 


Monocytes # (Manual)   


 


PT   


 


INR   


 


D-Dimer   


 


ABG pH   


 


POC ABG pCO2   


 


POC ABG pO2   


 


ABG pO2   


 


ABG HCO3   


 


ABG Base Excess   


 


ABG Hemoglobin   


 


ABG Oxyhemoglobin   


 


ABG Sodium   


 


ABG Chloride   


 


ABG Glucose   


 


Oxyhemoglobin   


 


Carboxyhemoglobin   


 


Sodium   


 


Potassium   


 


Chloride   95.1 L 


 


Carbon Dioxide   36 H 


 


BUN   63 H 


 


Creatinine   1.5 H 


 


Glucose   131 H 


 


POC Glucose    118 H


 


Calcium   8.3 L 


 


Ionized Calcium   


 


Total Bilirubin   1.80 H 


 


AST   323 H 


 


ALT   609 H 


 


Total Creatine Kinase   


 


Troponin T   


 


NT-Pro-B Natriuret Pep   


 


Total Protein   9.3 H 


 


Albumin   2.9 L 


 


LDL Cholesterol Direct   


 


HDL Cholesterol   


 


TSH   


 


Free T4   


 


Arterial Blood Glucose   














  09/29/21 09/29/21 09/29/21





  11:40 18:30 22:44


 


WBC   


 


Hgb   


 


Hct   


 


MCV   


 


MCH   


 


MCHC   


 


RDW   


 


Seg Neuts % (Manual)   


 


Lymphocytes % (Manual)   


 


Nucleated RBC %   


 


Seg Neutrophils # Man   


 


Lymphocytes # (Manual)   


 


Monocytes # (Manual)   


 


PT   


 


INR   


 


D-Dimer   


 


ABG pH   


 


POC ABG pCO2   


 


POC ABG pO2   


 


ABG pO2   


 


ABG HCO3   


 


ABG Base Excess   


 


ABG Hemoglobin   


 


ABG Oxyhemoglobin   


 


ABG Sodium   


 


ABG Chloride   


 


ABG Glucose   


 


Oxyhemoglobin   


 


Carboxyhemoglobin   


 


Sodium   


 


Potassium   


 


Chloride   


 


Carbon Dioxide   


 


BUN   


 


Creatinine   


 


Glucose   


 


POC Glucose  139 H  130 H  123 H


 


Calcium   


 


Ionized Calcium   


 


Total Bilirubin   


 


AST   


 


ALT   


 


Total Creatine Kinase   


 


Troponin T   


 


NT-Pro-B Natriuret Pep   


 


Total Protein   


 


Albumin   


 


LDL Cholesterol Direct   


 


HDL Cholesterol   


 


TSH   


 


Free T4   


 


Arterial Blood Glucose   














  09/30/21 09/30/21 09/30/21





  04:53 04:53 07:16


 


WBC  12.5 H  


 


Hgb  8.8 L  


 


Hct  30.2 L  


 


MCV  74 L  


 


MCH  21 L  


 


MCHC  29 L  


 


RDW  24.5 H  


 


Seg Neuts % (Manual)   


 


Lymphocytes % (Manual)   


 


Nucleated RBC %   


 


Seg Neutrophils # Man   


 


Lymphocytes # (Manual)   


 


Monocytes # (Manual)   


 


PT   


 


INR   


 


D-Dimer   


 


ABG pH   


 


POC ABG pCO2   


 


POC ABG pO2   


 


ABG pO2   


 


ABG HCO3   


 


ABG Base Excess   


 


ABG Hemoglobin   


 


ABG Oxyhemoglobin   


 


ABG Sodium   


 


ABG Chloride   


 


ABG Glucose   


 


Oxyhemoglobin   


 


Carboxyhemoglobin   


 


Sodium   


 


Potassium   


 


Chloride   96.9 L 


 


Carbon Dioxide   35 H 


 


BUN   65 H 


 


Creatinine   1.5 H 


 


Glucose   142 H 


 


POC Glucose    138 H


 


Calcium   


 


Ionized Calcium   


 


Total Bilirubin   1.50 H 


 


AST   251 H 


 


ALT   572 H 


 


Total Creatine Kinase   


 


Troponin T   


 


NT-Pro-B Natriuret Pep   


 


Total Protein   10.1 H 


 


Albumin   3.2 L 


 


LDL Cholesterol Direct   


 


HDL Cholesterol   


 


TSH   


 


Free T4   


 


Arterial Blood Glucose   














  09/30/21 09/30/21 09/30/21





  07:32 11:29 14:42


 


WBC   


 


Hgb   


 


Hct   


 


MCV   


 


MCH   


 


MCHC   


 


RDW   


 


Seg Neuts % (Manual)   


 


Lymphocytes % (Manual)   


 


Nucleated RBC %   


 


Seg Neutrophils # Man   


 


Lymphocytes # (Manual)   


 


Monocytes # (Manual)   


 


PT   


 


INR   


 


D-Dimer   


 


ABG pH  7.086 L   7.188 L


 


POC ABG pCO2  142.2 H   99.3 H


 


POC ABG pO2  196.3 H   132.3 H


 


ABG pO2   


 


ABG HCO3   


 


ABG Base Excess   


 


ABG Hemoglobin  10.0 L   9.2 L


 


ABG Oxyhemoglobin   


 


ABG Sodium   


 


ABG Chloride  96.0 L   96.0 L


 


ABG Glucose  147 H   146 H


 


Oxyhemoglobin   


 


Carboxyhemoglobin  1.6 H   2.0 H


 


Sodium   


 


Potassium   


 


Chloride   


 


Carbon Dioxide   


 


BUN   


 


Creatinine   


 


Glucose   


 


POC Glucose   131 H 


 


Calcium   


 


Ionized Calcium   


 


Total Bilirubin   


 


AST   


 


ALT   


 


Total Creatine Kinase   


 


Troponin T   


 


NT-Pro-B Natriuret Pep   


 


Total Protein   


 


Albumin   


 


LDL Cholesterol Direct   


 


HDL Cholesterol   


 


TSH   


 


Free T4   


 


Arterial Blood Glucose  147 H   146 H














  09/30/21 09/30/21 10/01/21





  17:23 22:34 07:44


 


WBC   


 


Hgb   


 


Hct   


 


MCV   


 


MCH   


 


MCHC   


 


RDW   


 


Seg Neuts % (Manual)   


 


Lymphocytes % (Manual)   


 


Nucleated RBC %   


 


Seg Neutrophils # Man   


 


Lymphocytes # (Manual)   


 


Monocytes # (Manual)   


 


PT   


 


INR   


 


D-Dimer   


 


ABG pH   


 


POC ABG pCO2   


 


POC ABG pO2   


 


ABG pO2   


 


ABG HCO3   


 


ABG Base Excess   


 


ABG Hemoglobin   


 


ABG Oxyhemoglobin   


 


ABG Sodium   


 


ABG Chloride   


 


ABG Glucose   


 


Oxyhemoglobin   


 


Carboxyhemoglobin   


 


Sodium   


 


Potassium   


 


Chloride   


 


Carbon Dioxide   


 


BUN   


 


Creatinine   


 


Glucose   


 


POC Glucose  117 H  169 H  150 H


 


Calcium   


 


Ionized Calcium   


 


Total Bilirubin   


 


AST   


 


ALT   


 


Total Creatine Kinase   


 


Troponin T   


 


NT-Pro-B Natriuret Pep   


 


Total Protein   


 


Albumin   


 


LDL Cholesterol Direct   


 


HDL Cholesterol   


 


TSH   


 


Free T4   


 


Arterial Blood Glucose   














  10/01/21 10/01/21 10/01/21





  08:55 08:55 09:47


 


WBC   


 


Hgb  8.7 L  


 


Hct  29.4 L  


 


MCV  74 L  


 


MCH  22 L  


 


MCHC   


 


RDW  24.8 H  


 


Seg Neuts % (Manual)   


 


Lymphocytes % (Manual)   


 


Nucleated RBC %   


 


Seg Neutrophils # Man   


 


Lymphocytes # (Manual)   


 


Monocytes # (Manual)   


 


PT   


 


INR   


 


D-Dimer   


 


ABG pH    7.234 L


 


POC ABG pCO2    90.5 H


 


POC ABG pO2    139.4 H


 


ABG pO2   


 


ABG HCO3   


 


ABG Base Excess   


 


ABG Hemoglobin    9.5 L


 


ABG Oxyhemoglobin   


 


ABG Sodium   


 


ABG Chloride    97.0 L


 


ABG Glucose    179 H


 


Oxyhemoglobin   


 


Carboxyhemoglobin    1.7 H


 


Sodium   


 


Potassium   


 


Chloride   94.4 L 


 


Carbon Dioxide   37 H 


 


BUN   68 H 


 


Creatinine   1.5 H 


 


Glucose   178 H 


 


POC Glucose   


 


Calcium   


 


Ionized Calcium   


 


Total Bilirubin   1.60 H 


 


AST   172 H 


 


ALT   437 H 


 


Total Creatine Kinase   


 


Troponin T   


 


NT-Pro-B Natriuret Pep   


 


Total Protein   10.1 H 


 


Albumin   3.2 L 


 


LDL Cholesterol Direct   


 


HDL Cholesterol   


 


TSH   


 


Free T4   


 


Arterial Blood Glucose    179 H














  10/01/21 10/01/21 10/01/21





  11:39 16:03 17:24


 


WBC   


 


Hgb   


 


Hct   


 


MCV   


 


MCH   


 


MCHC   


 


RDW   


 


Seg Neuts % (Manual)   


 


Lymphocytes % (Manual)   


 


Nucleated RBC %   


 


Seg Neutrophils # Man   


 


Lymphocytes # (Manual)   


 


Monocytes # (Manual)   


 


PT   


 


INR   


 


D-Dimer   5410.10 H 


 


ABG pH   


 


POC ABG pCO2   


 


POC ABG pO2   


 


ABG pO2   


 


ABG HCO3   


 


ABG Base Excess   


 


ABG Hemoglobin   


 


ABG Oxyhemoglobin   


 


ABG Sodium   


 


ABG Chloride   


 


ABG Glucose   


 


Oxyhemoglobin   


 


Carboxyhemoglobin   


 


Sodium   


 


Potassium   


 


Chloride   


 


Carbon Dioxide   


 


BUN   


 


Creatinine   


 


Glucose   


 


POC Glucose  161 H   130 H


 


Calcium   


 


Ionized Calcium   


 


Total Bilirubin   


 


AST   


 


ALT   


 


Total Creatine Kinase   


 


Troponin T   


 


NT-Pro-B Natriuret Pep   


 


Total Protein   


 


Albumin   


 


LDL Cholesterol Direct   


 


HDL Cholesterol   


 


TSH   


 


Free T4   


 


Arterial Blood Glucose   














  10/01/21 10/02/21 10/02/21





  21:52 10:20 11:58


 


WBC   


 


Hgb   


 


Hct   


 


MCV   


 


MCH   


 


MCHC   


 


RDW   


 


Seg Neuts % (Manual)   


 


Lymphocytes % (Manual)   


 


Nucleated RBC %   


 


Seg Neutrophils # Man   


 


Lymphocytes # (Manual)   


 


Monocytes # (Manual)   


 


PT   


 


INR   


 


D-Dimer   


 


ABG pH   


 


POC ABG pCO2   


 


POC ABG pO2   


 


ABG pO2   


 


ABG HCO3   


 


ABG Base Excess   


 


ABG Hemoglobin   


 


ABG Oxyhemoglobin   


 


ABG Sodium   


 


ABG Chloride   


 


ABG Glucose   


 


Oxyhemoglobin   


 


Carboxyhemoglobin   


 


Sodium   146 H D 


 


Potassium   


 


Chloride   


 


Carbon Dioxide   


 


BUN   75 H 


 


Creatinine   1.6 H 


 


Glucose   158 H 


 


POC Glucose  150 H   143 H


 


Calcium   


 


Ionized Calcium   


 


Total Bilirubin   


 


AST   


 


ALT   


 


Total Creatine Kinase   


 


Troponin T   


 


NT-Pro-B Natriuret Pep   


 


Total Protein   


 


Albumin   


 


LDL Cholesterol Direct   


 


HDL Cholesterol   


 


TSH   


 


Free T4   


 


Arterial Blood Glucose   














  10/02/21 10/02/21 10/02/21





  14:19 16:55 16:55


 


WBC   


 


Hgb   8.3 L 


 


Hct   27.7 L 


 


MCV   


 


MCH   


 


MCHC   


 


RDW   


 


Seg Neuts % (Manual)   


 


Lymphocytes % (Manual)   


 


Nucleated RBC %   


 


Seg Neutrophils # Man   


 


Lymphocytes # (Manual)   


 


Monocytes # (Manual)   


 


PT    18.7 H


 


INR    1.51 H


 


D-Dimer   


 


ABG pH   


 


POC ABG pCO2  67.4 H  


 


POC ABG pO2  150.6 H  


 


ABG pO2   


 


ABG HCO3   


 


ABG Base Excess   


 


ABG Hemoglobin  9.3 L  


 


ABG Oxyhemoglobin   


 


ABG Sodium   


 


ABG Chloride   


 


ABG Glucose  162 H  


 


Oxyhemoglobin   


 


Carboxyhemoglobin  1.6 H  


 


Sodium   


 


Potassium   


 


Chloride   


 


Carbon Dioxide   


 


BUN   


 


Creatinine   


 


Glucose   


 


POC Glucose   


 


Calcium   


 


Ionized Calcium   


 


Total Bilirubin   


 


AST   


 


ALT   


 


Total Creatine Kinase   


 


Troponin T   


 


NT-Pro-B Natriuret Pep   


 


Total Protein   


 


Albumin   


 


LDL Cholesterol Direct   


 


HDL Cholesterol   


 


TSH   


 


Free T4   


 


Arterial Blood Glucose  162 H  














  10/02/21 10/02/21 10/02/21





  17:40 19:55 19:55


 


WBC   


 


Hgb   


 


Hct   


 


MCV   


 


MCH   


 


MCHC   


 


RDW   


 


Seg Neuts % (Manual)   


 


Lymphocytes % (Manual)   


 


Nucleated RBC %   


 


Seg Neutrophils # Man   


 


Lymphocytes # (Manual)   


 


Monocytes # (Manual)   


 


PT   


 


INR   


 


D-Dimer   


 


ABG pH   


 


POC ABG pCO2   


 


POC ABG pO2   


 


ABG pO2   


 


ABG HCO3   


 


ABG Base Excess   


 


ABG Hemoglobin   


 


ABG Oxyhemoglobin   


 


ABG Sodium   


 


ABG Chloride   


 


ABG Glucose   


 


Oxyhemoglobin   


 


Carboxyhemoglobin   


 


Sodium   


 


Potassium   


 


Chloride   


 


Carbon Dioxide   


 


BUN   


 


Creatinine   


 


Glucose   


 


POC Glucose  151 H  


 


Calcium   


 


Ionized Calcium   


 


Total Bilirubin   


 


AST   


 


ALT   


 


Total Creatine Kinase   


 


Troponin T   


 


NT-Pro-B Natriuret Pep   


 


Total Protein   


 


Albumin   


 


LDL Cholesterol Direct   


 


HDL Cholesterol   


 


TSH    6.140 H


 


Free T4   0.67 L 


 


Arterial Blood Glucose   














  10/02/21 10/03/21 10/03/21





  22:19 04:40 04:40


 


WBC   


 


Hgb    8.1 L


 


Hct    27.5 L


 


MCV    73 L


 


MCH    22 L


 


MCHC   


 


RDW    25.4 H


 


Seg Neuts % (Manual)   


 


Lymphocytes % (Manual)   


 


Nucleated RBC %   


 


Seg Neutrophils # Man   


 


Lymphocytes # (Manual)   


 


Monocytes # (Manual)   


 


PT   


 


INR   


 


D-Dimer   


 


ABG pH   


 


POC ABG pCO2   


 


POC ABG pO2   


 


ABG pO2   


 


ABG HCO3   


 


ABG Base Excess   


 


ABG Hemoglobin   


 


ABG Oxyhemoglobin   


 


ABG Sodium   


 


ABG Chloride   


 


ABG Glucose   


 


Oxyhemoglobin   


 


Carboxyhemoglobin   


 


Sodium   


 


Potassium   


 


Chloride   


 


Carbon Dioxide   37 H D 


 


BUN   76 H 


 


Creatinine   1.6 H 


 


Glucose   173 H 


 


POC Glucose  157 H  


 


Calcium   


 


Ionized Calcium   


 


Total Bilirubin   


 


AST   


 


ALT   


 


Total Creatine Kinase   


 


Troponin T   


 


NT-Pro-B Natriuret Pep   


 


Total Protein   


 


Albumin   


 


LDL Cholesterol Direct   


 


HDL Cholesterol   


 


TSH   


 


Free T4   


 


Arterial Blood Glucose   














  10/03/21 10/03/21 10/03/21





  11:36 17:20 21:17


 


WBC   


 


Hgb   


 


Hct   


 


MCV   


 


MCH   


 


MCHC   


 


RDW   


 


Seg Neuts % (Manual)   


 


Lymphocytes % (Manual)   


 


Nucleated RBC %   


 


Seg Neutrophils # Man   


 


Lymphocytes # (Manual)   


 


Monocytes # (Manual)   


 


PT   


 


INR   


 


D-Dimer   


 


ABG pH   


 


POC ABG pCO2   


 


POC ABG pO2   


 


ABG pO2   


 


ABG HCO3   


 


ABG Base Excess   


 


ABG Hemoglobin   


 


ABG Oxyhemoglobin   


 


ABG Sodium   


 


ABG Chloride   


 


ABG Glucose   


 


Oxyhemoglobin   


 


Carboxyhemoglobin   


 


Sodium   


 


Potassium   


 


Chloride   


 


Carbon Dioxide   


 


BUN   


 


Creatinine   


 


Glucose   


 


POC Glucose  168 H  174 H  163 H


 


Calcium   


 


Ionized Calcium   


 


Total Bilirubin   


 


AST   


 


ALT   


 


Total Creatine Kinase   


 


Troponin T   


 


NT-Pro-B Natriuret Pep   


 


Total Protein   


 


Albumin   


 


LDL Cholesterol Direct   


 


HDL Cholesterol   


 


TSH   


 


Free T4   


 


Arterial Blood Glucose   














  10/04/21 10/04/21 10/04/21





  04:22 11:27 17:12


 


WBC   


 


Hgb   


 


Hct   


 


MCV   


 


MCH   


 


MCHC   


 


RDW   


 


Seg Neuts % (Manual)   


 


Lymphocytes % (Manual)   


 


Nucleated RBC %   


 


Seg Neutrophils # Man   


 


Lymphocytes # (Manual)   


 


Monocytes # (Manual)   


 


PT   


 


INR   


 


D-Dimer   


 


ABG pH   


 


POC ABG pCO2   


 


POC ABG pO2   


 


ABG pO2   


 


ABG HCO3   


 


ABG Base Excess   


 


ABG Hemoglobin   


 


ABG Oxyhemoglobin   


 


ABG Sodium   


 


ABG Chloride   


 


ABG Glucose   


 


Oxyhemoglobin   


 


Carboxyhemoglobin   


 


Sodium  147 H  


 


Potassium   


 


Chloride   


 


Carbon Dioxide  37 H  


 


BUN  74 H  


 


Creatinine  1.3 H  


 


Glucose  193 H  


 


POC Glucose   154 H  162 H


 


Calcium   


 


Ionized Calcium   


 


Total Bilirubin   


 


AST   


 


ALT   


 


Total Creatine Kinase   


 


Troponin T   


 


NT-Pro-B Natriuret Pep   


 


Total Protein   


 


Albumin   


 


LDL Cholesterol Direct   


 


HDL Cholesterol   


 


TSH   


 


Free T4   


 


Arterial Blood Glucose   














  10/04/21 10/05/21





  21:59 09:05


 


WBC  


 


Hgb  


 


Hct  


 


MCV  


 


MCH  


 


MCHC  


 


RDW  


 


Seg Neuts % (Manual)  


 


Lymphocytes % (Manual)  


 


Nucleated RBC %  


 


Seg Neutrophils # Man  


 


Lymphocytes # (Manual)  


 


Monocytes # (Manual)  


 


PT  


 


INR  


 


D-Dimer  


 


ABG pH  


 


POC ABG pCO2  


 


POC ABG pO2  


 


ABG pO2  


 


ABG HCO3  


 


ABG Base Excess  


 


ABG Hemoglobin  


 


ABG Oxyhemoglobin  


 


ABG Sodium  


 


ABG Chloride  


 


ABG Glucose  


 


Oxyhemoglobin  


 


Carboxyhemoglobin  


 


Sodium  


 


Potassium  


 


Chloride  


 


Carbon Dioxide  


 


BUN  


 


Creatinine  


 


Glucose  


 


POC Glucose  160 H  168 H


 


Calcium  


 


Ionized Calcium  


 


Total Bilirubin  


 


AST  


 


ALT  


 


Total Creatine Kinase  


 


Troponin T  


 


NT-Pro-B Natriuret Pep  


 


Total Protein  


 


Albumin  


 


LDL Cholesterol Direct  


 


HDL Cholesterol  


 


TSH  


 


Free T4  


 


Arterial Blood Glucose  











Allied health notes reviewed: nursing

## 2021-10-05 NOTE — PROGRESS NOTE
Assessment and Plan


Assessment and plan: 


-- Non-ST elevation MI (NSTEMI) type II Procardia


Current Visit: Yes   Status: Acute   


Plan to address problem: 


Admit the patient to the medical telemetry. Aspirin 325 mg p.o. daily. Lipitor 

40 mg p.o. daily. Nitroglycerin as needed. We do the serial cardiac enzyme. We 

also consult cardiology for evaluation and order echocardiogram.








-- Acute exacerbation of CHF (congestive heart failure)


Current Visit: Yes   Status: Acute   


Plan to address problem: 


Fluid restriction. Maintain input output. Lasix 40 mg IV every 12 hours. Oxygen 

via nasal cannula 3 L/min. DuoNeb by nebulizer every 4 hours. Echocardiogram. 

Cardiology consult





-- hypothyroidism -new diagnosis





-- COPD (chronic obstructive pulmonary disease)


Current Visit: Yes   Status: Acute   


Plan to address problem: 


Oxygen by nasal cannula 3 L/min. DuoNeb via nebulizer every 4 hours. Albuterol 

via nebulizer every 4 hours as needed.





-- Morbid obesity


Current Visit: Yes   Status: Acute   


Plan to address problem: 


We counseled regarding weight reduction. Outpatient follow-up with bariatric 

surgeon








-- Hyperglycemia


Current Visit: Yes   Status: Acute   


Plan to address problem: 


We will give 1 ampoule of D50. We will put the patient on cardiac diet. We will 

monitor the blood glucose closely.  








--acute kidney injury with vasomotor nephropathy





-- Lymphedema of the abdominal wall


Due to morbid obesity, supportive care fluid restriction


Low-sodium diet





--Anemia of chronic disease; 


Closely monitor H&H, transfuse as needed





--Acute transaminitis acute liver failure


Acute transaminitis; LFTs trending down


Hepatocellular pattern, multifactorial NAFLD, congestive hepatopathy, DILI, 


GI evaluation and recommendations noted, LFTs trending down





-- hypertension


Current Visit: Yes   Status: Acute   


Plan to address problem: 


We will continue the home medication. Hydralazine 10 mg IV every 6 hours as 

needed. We will monitor the blood pressure closely





-- DVT prophylaxis


Current Visit: No   Status: Acute   


Plan to address problem: 


Heparin 5000 units subcu every 8 hours for DVT prophylaxis. Pepcid 20 mg p.o. 

twice daily for GI prophylaxis. Patient is a full code





9/23


-Patient is on heparin drip for non-STEMI, cardiology is following.


-Patient is off Lasix and ACE inhibitors because of [place].


-Kidney function is worsening overnight and I put a consult for nephrology.


-Patient is hypotensive and blood pressure from this morning was 101/41.  We 

will continue to monitor.  And if it is dropping we we will give him fluid.


-Echo was done and EF of 50 to 55%.  Diastolic dysfunction is indeterminate.  

Management per cardiology


-Patient has COPD continues on dexamethasone, nebulizer treatment as needed.


-Patient has hyponatremia and hyperkalemia.  I give the patient Kayexalate.  

Monitor electrolytes.  Will follow nephrology for additional recommendation.





9/24


-Renal function is worsening, nephrology is following


-Blood pressure is better today


-Hyperkalemia; I added Kayexalate


-Morbid obesity








9/25


-Slight improvement in creatinine.  Hyponatremia worsened. Nephrology is 

following and put the patient back on Lasix.


-Blood PRESSURE IS BETTER today


-Potassium this morning was 5.8, I ordered 60 g of Kayexalate


-Morbidly obese


-Obesity hypoventilation syndrome





9/26


-Creatinine is improving and this morning was 2.4


-Hyponatremia improved this morning was 134.  Patient is on Lasix


-Blood pressure is better


-Patient is on 10 L of oxygen; worsened 


-Morbidly obese, FABIAN








9/27: Follow ordered ultrasound of abdomen, per Physician unable to get CT. I 

ordered an ABG due to my concern about her breathing pattern this morning, 

patient may benefit from. Will try to establish if patient has a primary 

pulmonary doctor. ABG is showing consistent with Respiratory Acidosis. Will 

place on BIPAP now. May need to transfer to IMCU for closer monitoring. Patient 

likely with Obesity Hypoventilation syndrome.


cardiology work up, ongoing. 


RENAL SHOWING SOME IMPROVEMENT


Guarded prognosis





9/28: Patient seen and examined today identified some lesion under the pannus 

will obtain wound care and wound surgeon evaluation.  Nephrology input noted 

continue diuresis and stop the sodium tabs creatinine is stable.  I did discuss 

with the family and updated them.  We will continue to hold ACE and ARB and if 

pressures continue to drop may need to hold diuresis also despite as written 

above.  Monitor labs including H&H.  She is more awake review of ABG shows 

improving CO2.  Will discuss with case management as patient may need BiPAP on 

discharge home.  I also updated the family








9/29: Patient showing some clinical improvement.  Liver enzymes is showing mild 

improvement although bilirubin increased slightly.  GI input noted and as 

discussed by his notes below


"Abnormal liver enzymes in hepatocellular pattern.  broad ddx and likely 

multifactorial causes including NAFLD, congestive hepatopathy, possibly DILI.  

will check viral hep serologies.  US with limited views, possible coarsening of 

the liver"


Patient also evaluated by surgery noted with the lymphedema of abdominal wall 

and open wound of the abdominal wall without penetration into the abdominal 

cavity with recommendation to pack the wounds daily with mesalt. Need to 

maintain dry environment discussed with nursing staff.  Elevate the pannus and 

optimize nutrition for which I will get nutritional consult.


No surgical intervention at this time.  Patient is bedbound when I asked when 

last she ambulated she said while "I guess he has not worked" but it has been a 

while.  Unfortunately the LTAC center unwilling to accept the patient will 

discuss with pulmonary and with case management about obtaining BiPAP for this 

patient and possible SNF referral.





09/30: Patient this morning and was noted significantly lethargic and 

unresponsive respiratory and a code to be called.  The patient resuscitated 

without any invasive procedure.  ABG was obtained showed a CO2 of 142.  Despite 

using BiPAP for some time through the night, sure how long she used it for.  I 

have discontinued all IV opioids and recommend no IV opioids for this patient at

this time.  I also discontinued p.o. medications as an essential medication and 

change to IV.  We will repeat an ABG in an hour to see if there is some 

improvement.  Patient remains at high risk for possible intubation.  Clinical 

condition is guarded





10/1: Patient remains on BiPAP this am, has some intermittent twitching, will 

check EEG, not likely seizure, but will monitor. Continue current management


, check Albumin and Pre-Albumin level.


Remains critically ill. Liver status showing improvement.





10/2: Patient with elevated D-dimer unfortunately size precludes being able to 

have a CTA chest done here to rule out pulmonary embolism.  Doppler of lower 

extremities is pending. We will continue subcu anticoagulation with heparin at 

this time. Until Doppler is resolved. Patient is not hypoxic so doubt pulmonary 

embolism at this time. Her problem remains hypercapnia. I did take time to go 

over her history while she has been around hospital in the past and noticed a 

remarkable increase in her BMI from the last 2 years. Discussed with the 

intensivist will agree to check thyroid function test. Critical care time 35-

minute





10/3: Patient with hypothyroidism, while she does not appear to be with myxedema

coma at this time. Will initiate levothyroxine as I believe that this will make 

profound impact  her management at this time, continue BiPAP continue current 

management. Seroquel was added by pulmonary for delirium and agitation 

management, Ventimask during the day and BiPAP at night





10/4: Continue levothyroine, can change to PO in am. Will obtain Psych consult, 

she is clinically stable and will transfer to floor





10/5; levothyroxine changed to 100 mg p.o. daily


Consultant recommendations noted and appreciated


Monitor LFTs











History


Interval history: 


I have have seen and examined the patient in her room this morning during 

morning rounds


Patient's chart and medications reviewed


Patient is morbidly obese in mild distress


Denies any chest pain or shortness of breath


Vital signs noted








Hospitalist Physical





- Constitutional


Vitals: 


                                        











Temp Pulse Resp BP Pulse Ox


 


 97.3 F L  108 H  26 H  108/80   93 


 


 10/05/21 11:51  10/05/21 11:30  10/05/21 11:51  10/05/21 11:51  10/05/21 11:30











General appearance: Present: mild distress, well-nourished, obese





- EENT


Eyes: Present: PERRL (Morbidly obese), EOM intact





- Neck


Neck: Present: supple, normal ROM





- Respiratory


Respiratory effort: normal


Respiratory: bilateral: diminished, rhonchi, negative: rales, wheezing





- Cardiovascular


Rhythm: regular


Heart Sounds: Present: S1 & S2





- Extremities


Extremities: no ischemia, No edema





- Abdominal


General gastrointestinal: soft, non-tender, non-distended, normal bowel sounds





- Integumentary


Integumentary: Present: clear, warm





- Psychiatric


Psychiatric: appropriate mood/affect, cooperative





- Neurologic


Neurologic: CNII-XII intact, moves all extremities





HEART Score





- HEART Score


Troponin: 


                                        











Troponin T  0.078 ng/mL (0.00-0.029)  H D 09/22/21  13:03    














Results





- Labs


CBC & Chem 7: 


                                 10/03/21 04:40





                                 10/04/21 04:22


Labs: 


                             Laboratory Last Values











WBC  8.1 K/mm3 (4.5-11.0)   10/03/21  04:40    


 


RBC  3.79 M/mm3 (3.65-5.03)   10/03/21  04:40    


 


Hgb  8.1 gm/dl (10.1-14.3)  L  10/03/21  04:40    


 


Hct  27.5 % (30.3-42.9)  L  10/03/21  04:40    


 


MCV  73 fl (79-97)  L  10/03/21  04:40    


 


MCH  22 pg (28-32)  L  10/03/21  04:40    


 


MCHC  30 % (30-34)   10/03/21  04:40    


 


RDW  25.4 % (13.2-15.2)  H  10/03/21  04:40    


 


Plt Count  224 K/mm3 (140-440)   10/03/21  04:40    


 


Add Manual Diff  Complete   09/26/21  14:11    


 


Total Counted  100   09/26/21  14:11    


 


Seg Neuts % (Manual)  86.0 % (40.0-70.0)  H  09/26/21  14:11    


 


Band Neutrophils %  1.0 %  09/26/21  14:11    


 


Lymphocytes % (Manual)  6.0 % (13.4-35.0)  L  09/26/21  14:11    


 


Reactive Lymphs % (Man)  1.0 %  09/26/21  14:11    


 


Monocytes % (Manual)  4.0 % (0.0-7.3)   09/26/21  14:11    


 


Metamyelocytes %  2.0 %  09/26/21  14:11    


 


Myelocytes %  2.0 %  09/22/21  04:03    


 


Nucleated RBC %  Not Reportable   09/26/21  14:11    


 


Seg Neutrophils # Man  12.6 K/mm3 (1.8-7.7)  H  09/26/21  14:11    


 


Band Neutrophils #  0.1 K/mm3  09/26/21  14:11    


 


Lymphocytes # (Manual)  0.9 K/mm3 (1.2-5.4)  L  09/26/21  14:11    


 


Abs React Lymphs (Man)  0.1 K/mm3  09/26/21  14:11    


 


Monocytes # (Manual)  0.6 K/mm3 (0.0-0.8)   09/26/21  14:11    


 


Eosinophils # (Manual)  0.0 K/mm3 (0.0-0.4)   09/26/21  14:11    


 


Basophils # (Manual)  0.0 K/mm3 (0.0-0.1)   09/26/21  14:11    


 


Metamyelocytes #  0.3 K/mm3  09/26/21  14:11    


 


Myelocytes #  0.0 K/mm3  09/26/21  14:11    


 


Promyelocytes #  0.0 K/mm3  09/26/21  14:11    


 


Blast Cells #  0.0 K/mm3  09/26/21  14:11    


 


WBC Morphology  Not Reportable   09/26/21  14:11    


 


WBC Morphology  TNR   09/26/21  14:11    


 


Hypersegmented Neuts  Not Reportable   09/26/21  14:11    


 


Hyposegmented Neuts  Not Reportable   09/26/21  14:11    


 


Hypogranular Neuts  Not Reportable   09/26/21  14:11    


 


Smudge Cells  Not Reportable   09/26/21  14:11    


 


Toxic Granulation  Not Reportable   09/26/21  14:11    


 


Toxic Vacuolation  Not Reportable   09/26/21  14:11    


 


Dohle Bodies  Not Reportable   09/26/21  14:11    


 


Pelger-Huet Anomaly  Not Reportable   09/26/21  14:11    


 


Ac Rods  Not Reportable   09/26/21  14:11    


 


Platelet Estimate  Consistent w auto   09/26/21  14:11    


 


Clumped Platelets  Not Reportable   09/26/21  14:11    


 


Plt Clumps, EDTA  Not Reportable   09/26/21  14:11    


 


Large Platelets  Not Reportable   09/26/21  14:11    


 


Giant Platelets  Not Reportable   09/26/21  14:11    


 


Platelet Satelliting  Not Reportable   09/26/21  14:11    


 


Plt Morphology Comment  Not Reportable   09/26/21  14:11    


 


RBC Morphology  Not Reportable   09/26/21  14:11    


 


Dimorphic RBCs  Not Reportable   09/26/21  14:11    


 


Polychromasia  Few   09/26/21  14:11    


 


Hypochromasia  1+   09/26/21  14:11    


 


Poikilocytosis  Not Reportable   09/26/21  14:11    


 


Anisocytosis  Not Reportable   09/26/21  14:11    


 


Microcytosis  Not Reportable   09/26/21  14:11    


 


Macrocytosis  Not Reportable   09/26/21  14:11    


 


Spherocytes  Not Reportable   09/26/21  14:11    


 


Pappenheimer Bodies  Not Reportable   09/26/21  14:11    


 


Sickle Cells  Not Reportable   09/26/21  14:11    


 


Target Cells  1+   09/26/21  14:11    


 


Tear Drop Cells  Few   09/26/21  14:11    


 


Ovalocytes  Not Reportable   09/26/21  14:11    


 


Helmet Cells  Not Reportable   09/26/21  14:11    


 


[place] Bodies  Not Reportable   09/26/21  14:11    


 


C[place]ot Rings  Not Reportable   09/26/21  14:11    


 


Apolinar Cells  Not Reportable   09/26/21  14:11    


 


Bite Cells  Not Reportable   09/26/21  14:11    


 


Crenated Cell  Not Reportable   09/26/21  14:11    


 


Elliptocytes  Not Reportable   09/26/21  14:11    


 


Acanthocytes (Spur)  Not Reportable   09/26/21  14:11    


 


Rouleaux  Not Reportable   09/26/21  14:11    


 


Hemoglobin C Crystals  Not Reportable   09/26/21  14:11    


 


Schistocytes  Not Reportable   09/26/21  14:11    


 


Malaria parasites  Not Reportable   09/26/21  14:11    


 


Sami Bodies  Not Reportable   09/26/21  14:11    


 


Hem Pathologist Commnt  No   09/26/21  14:11    


 


PT  18.7 Sec. (12.2-14.9)  H  10/02/21  16:55    


 


INR  1.51  (0.87-1.13)  H  10/02/21  16:55    


 


APTT  25.2 Sec. (24.2-36.6)   10/02/21  16:55    


 


D-Dimer  5410.10 ng/mlDDU (0-234)  H  10/01/21  16:03    


 


ABG pH  7.382  (7.320-7.450)   10/02/21  14:19    


 


POC ABG pCO2  67.4 mmHg (32.0-48.0)  H  10/02/21  14:19    


 


ABG pCO2  86.9 mm Hg  09/27/21  18:40    


 


POC ABG pO2  150.6 mmHg ()  H  10/02/21  14:19    


 


ABG pO2  81.8 mm Hg (80.0-90.0)   09/27/21  18:40    


 


POC ABG HCO3  39.2   10/02/21  14:19    


 


ABG HCO3  36.2 mmol/L (20.0-26.0)  H  09/27/21  18:40    


 


ABG O2 Saturation  99.4  (0-100)   10/02/21  14:19    


 


ABG O2 Content  10.4  (0.0-44)   09/27/21  18:40    


 


POC ABG Base Excess  12.2   10/02/21  14:19    


 


ABG Base Excess  7.4 mmol/L (-2.0-3.0)  H  09/27/21  18:40    


 


ABG Hemoglobin  9.3  (12.0-17.5)  L  10/02/21  14:19    


 


ABG Oxyhemoglobin  97.5  (94-98)   10/02/21  14:19    


 


ABG Carboxyhemoglobin  1.9 % (0.0-5.0)   09/27/21  18:40    


 


ABG Methemoglobin  0.3  (0.0-1.5)   10/02/21  14:19    


 


ABG Sodium  143.0 mmol/L (136.0-145.0)   10/02/21  14:19    


 


ABG Potassium  4.2 mmol/L (3.40-4.50)   10/02/21  14:19    


 


ABG Chloride  98.0 mmol/L ()   10/02/21  14:19    


 


ABG Glucose  162 mg/dL (65-95)  H  10/02/21  14:19    


 


Oxyhemoglobin  93.7 % (95.0-99.0)  L  09/27/21  18:40    


 


Carboxyhemoglobin  1.6  (0.5-1.5)  H  10/02/21  14:19    


 


FiO2  30 %  09/27/21  18:40    


 


FiO2 %  35.0   10/02/21  14:19    


 


Sodium  147 mmol/L (137-145)  H  10/04/21  04:22    


 


Potassium  4.8 mmol/L (3.6-5.0)   10/04/21  04:22    


 


Chloride  100.6 mmol/L ()   10/04/21  04:22    


 


Carbon Dioxide  37 mmol/L (22-30)  H  10/04/21  04:22    


 


Anion Gap  14 mmol/L  10/04/21  04:22    


 


BUN  74 mg/dL (7-17)  H  10/04/21  04:22    


 


Creatinine  1.3 mg/dL (0.6-1.2)  H  10/04/21  04:22    


 


Estimated GFR  57 ml/min  10/04/21  04:22    


 


BUN/Creatinine Ratio  57 %  10/04/21  04:22    


 


Glucose  193 mg/dL ()  H  10/04/21  04:22    


 


POC Glucose  174 mg/dL ()  H  10/05/21  12:28    


 


Osmolality  301 Mosm/kg  09/23/21  23:15    


 


Calcium  9.2 mg/dL (8.4-10.2)   10/04/21  04:22    


 


Ionized Calcium  3.6 mg/dL (4.8-5.6)  L  09/23/21  23:15    


 


Total Bilirubin  1.60 mg/dL (0.1-1.2)  H  10/01/21  08:55    


 


AST  172 units/L (5-40)  H  10/01/21  08:55    


 


ALT  437 units/L (7-56)  H  10/01/21  08:55    


 


Alkaline Phosphatase  92 units/L ()   10/01/21  08:55    


 


Total Creatine Kinase  1712 units/L ()  H  09/24/21  10:04    


 


Troponin T  0.078 ng/mL (0.00-0.029)  H D 09/22/21  13:03    


 


NT-Pro-B Natriuret Pep  7573 pg/mL (0-450)  H  09/21/21  22:04    


 


Total Protein  10.1 g/dL (6.3-8.2)  H  10/01/21  08:55    


 


Albumin  3.2 g/dL (3.9-5)  L  10/01/21  08:55    


 


Albumin/Globulin Ratio  0.5 %  10/01/21  08:55    


 


Triglycerides  105 mg/dL (2-149)   09/21/21  22:04    


 


Cholesterol  85 mg/dL ()   09/21/21  22:04    


 


LDL Cholesterol Direct  47 mg/dL ()  L  09/21/21  22:04    


 


HDL Cholesterol  25 mg/dL (40-59)  L  09/21/21  22:04    


 


Cholesterol/HDL Ratio  3.40 %  09/21/21  22:04    


 


TSH  6.140 mlU/mL (0.270-4.200)  H  10/02/21  19:55    


 


Free T4  0.67 ng/dL (0.76-1.46)  L  10/02/21  19:55    


 


Total Cortisol  41.3 mcg/dL (***)   09/24/21  10:04    


 


Arterial Blood Glucose  162 mg/dL (65-95)  H  10/02/21  14:19    


 


Arterial Blood Ionized Calcium  4.6 mg/dL (4.6-5.3)   09/30/21  07:32    


 


Urine Osmolality  157 Mosm/kg  09/23/21  18:46    


 


Urine Sodium  16 mmol/L  09/23/21  18:46    


 


Coronavirus (PCR)  Negative  (Negative)   09/28/21  Unknown


 


Hepatitis A IgM Ab  Non-reactive  (NonReactive)   09/28/21  18:45    


 


Hep Bs Antigen  Nonreactive  (Negative)   09/28/21  18:45    


 


Hepatitis C Antibody  Non-reactive  (NonReactive)   09/28/21  18:45    











Diamond/IV: 


                                        





Voiding Method                   Indwelling Catheter











Active Medications





- Current Medications


Current Medications: 














Generic Name Dose Route Start Last Admin





  Trade Name Freq  PRN Reason Stop Dose Admin


 


Acetaminophen  650 mg  09/22/21 03:00  10/01/21 06:42





  Acetaminophen 325 Mg Tab  PO   650 mg





  Q4H PRN   Administration





  Pain MILD(1-3)/Fever >100.5/HA  


 


Albuterol  2.5 mg  09/22/21 03:00 





  Albuterol 2.5 Mg/3 Ml Nebu  IH  





  Q4HRT PRN  





  Shortness Of Breath  


 


Albuterol/Ipratropium  1 ampul  09/22/21 08:00  10/05/21 16:07





  Ipratropium/Albuterol Sulfate 3 Ml Ampul.Neb  IH   1 ampul





  TIDRT DELL   Administration


 


Aspirin  325 mg  09/23/21 10:00  10/05/21 10:55





  Aspirin Ec 325 Mg Tab  PO   325 mg





  QDAY DELL   Administration


 


Atorvastatin Calcium  40 mg  09/22/21 22:00  10/04/21 21:42





  Atorvastatin 40 Mg Tab  PO   40 mg





  QHS DELL   Administration


 


Benzonatate  100 mg  09/22/21 06:00  10/05/21 14:19





  Benzonatate 100 Mg Cap  PO   100 mg





  Q8HR DELL   Administration


 


Guaifenesin  200 mg  09/26/21 14:47  10/01/21 06:43





  Guaifenesin 100 Mg/5 Ml Oral Liqd  PO   200 mg





  Q4H PRN   Administration





  Cough  


 


Haloperidol Lactate  5 mg  10/02/21 16:27  10/05/21 03:30





  Haloperidol Lactate 5 Mg/1 Ml Inj  IV   5 mg





  Q6H PRN   Administration





  Agitation  


 


Heparin Sodium (Porcine)  5,000 unit  10/02/21 14:00  10/05/21 14:19





  Heparin 5,000 Unit/1 Ml Vial  SUB-Q   5,000 unit





  Q8HR DELL   Administration


 


Hydralazine HCl  10 mg  10/03/21 17:25  10/04/21 18:27





  Hydralazine 20 Mg/1 Ml Inj  IV   10 mg





  Q6H PRN   Administration





  Blood Pressure  


 


Levothyroxine Sodium  100 mcg  10/04/21 06:00  10/05/21 05:57





  Levothyroxine 100 Mcg Inj  IV   100 mcg





  DAILY@0600 DELL   Administration


 


Methylprednisolone Sodium Succinate  20 mg  09/30/21 10:00  10/05/21 10:55





  Methylprednisolone Sod Succinate 40 Mg/1 Ml Inj  IV   20 mg





  Q12HR DELL   Administration


 


Nitroglycerin  0.4 mg  09/22/21 03:00 





  Nitroglycerin 0.4 Mg Tab Subl  SL  





  Q5M PRN  





  Chest Pain  


 


Nystatin  1 applic  09/22/21 18:00  10/05/21 10:58





  Nystatin Powder 15 Gm  TP   1 applic





  BID DELL   Administration


 


Ondansetron HCl  4 mg  09/22/21 03:00  09/29/21 23:51





  Ondansetron 4 Mg/2 Ml Inj  IV   4 mg





  Q8H PRN   Administration





  Nausea And Vomiting  


 


Oxycodone/Acetaminophen  1 tab  09/22/21 03:00  10/01/21 21:19





  Oxycodone /Acetaminophen 5-325mg Tab  PO   1 tab





  Q6H PRN   Administration





  Pain, Moderate (4-6)  


 


Pantoprazole Sodium  40 mg  09/30/21 10:00  10/05/21 10:55





  Pantoprazole 40 Mg Inj  IV   40 mg





  QDAY DELL   Administration


 


Quetiapine Fumarate  75 mg  10/02/21 22:00  10/04/21 21:43





  Quetiapine 25 Mg Tab  PO   75 mg





  QHS DELL   Administration


 


Sodium Chloride  10 ml  09/22/21 10:00  10/05/21 10:55





  Sodium Chloride 0.9% 10 Ml Flush Syringe  IV   10 ml





  BID DELL   Administration


 


Sodium Chloride  10 ml  09/22/21 03:00 





  Sodium Chloride 0.9% 10 Ml Flush Syringe  IV  





  PRN PRN  





  LINE FLUSH  


 


Tramadol HCl  50 mg  09/22/21 03:00  10/04/21 21:50





  Tramadol 50 Mg Tab  PO   50 mg





  Q6H PRN   Administration





  Pain, Moderate (4-6)  














Nutrition/Malnutrition Assess





- Dietary Evaluation


Nutrition/Malnutrition Findings: 


                                        





Nutrition Notes                                            Start:  09/22/21 

14:21


Freq:                                                      Status: Active       




Protocol:                                                                       




 Document     10/04/21 15:55  FORTINO  (Rec: 10/04/21 16:53  FORTINO  [id number])


 Nutrition Notes


     Need for Assessment generated from:         RN Referral


     Initial or Follow up                        Reassessment


     Current Diagnosis                           Heart Failure


     Other Pertinent Diagnosis                   Accute Kidney Injury


     Current Diet                                Renal Diet (from B 09/27).


     Labs/Tests                                  10/04: Na 147, CO2 37, BUN 74,


                                                 Cr 1.3, Glu 193.


     Pertinent Medications                       Reviewed.


     Height                                      5 ft 4 in


     Weight                                      277.3 kg


     Ideal Body Weight (kg)                      54.54


     BMI                                         104.9


     Intake Prior to Admission                   Good


     Weight change and time frame                expect weight fluctuations r/t


                                                 fluid therapy, CHF


                                                 complications


     Weight Status                               Morbidly Obese


     Subjective/Other Information                Pt developed accute renal


                                                 condition that requires


                                                 support from Renal Diet.


     Percent of energy/protein needs met:        Renal diet provides all energy


                                                 /protein needs (2072 Kcal/77 g


                                                 ) per day.


     Burn                                        Absent


     Trauma                                      Absent


     GI Symptoms                                 None


     Food Allergy                                No


     Skin Integrity/Comment                      Integumentary: clear, warm,


                                                 dry.


     Current % PO                                Good (%)


     Minimum of two criteria                     No physical signs of


                                                 malnutrition


     #1


      Nutrition Diagnosis                        Altered nutrition-related


                                                 laboratory values,Overweight/


                                                 obesity


      Comments:                                  Pt developed accute renal


                                                 condition that requires


                                                 support from Renal Diet.


      Etiology                                   Accute Kidney Injury resulted


                                                 from complications from


                                                 concomitant conditions


      As Evidenced by Signs and Symptoms         Altered lab values and MD


                                                 diagnosis


      Diagnosis Progress(for reassessment        Worsened


       documentation)                            


     Is patient on ventilator?                   No


     Is Patient Ambulatory and/or Out of Bed     Yes


     REE-(Ogden-St. Jeor-ambulatory/OOB) [     4501.900


      NUTR.[email or web address]]                               


     Kcal/Kg value to use for calculation        10


     Approximate Energy Requirements Using       2773


      kcal/Kg                                    


     Calculation Used for Recommendations        Kcal/Kg of IBW


     Additional Notes                            Protein 1.0 g/Kg; 55 g/day;


                                                 220 Kcal/day (from IBW).


                                                 Fluids: 1ml/kcal or per MD


 Nutrition Intervention


     Change Diet Order:                          Continue Renal Diet as MD


                                                 prescribed.


     Teaching Recipient                          Patient


     Learning Readiness                          Good


     Teaching Methods                            Discussion


     Response to Teaching                        Verbalize understanding,


                                                 Reinforcement needed


     Barriers to Learning                        Motivation,Emotional,Social


     Patient aware of follow up options          Yes


     Actions To Overcome Barriers                Collaboration with Other


                                                 Providers


     Goal #1                                     Achieve and maintain


                                                 acceptable chemistry lab


                                                 values during LOS


     Goal #2                                     Support Improvement in renal


                                                 functions while providing


                                                 energy/protein needs during


                                                 LOS


     Follow-Up By:                               10/11/21


     Additional Comments                         Continue monitoring % of food


                                                 intake and tolerance, and BM.


                                                 Reinforce education towards


                                                 bariatric therapy after accute


                                                 kidney injury is solved.

## 2021-10-05 NOTE — ELECTROCARDIOGRAPH REPORT
[facility]

                                       

Test Date:    2021-10-03               Test Time:    07:59:11

Pat Name:     PIPE AHMADI             Department:   

Patient ID:   [id number]          Room:         A485

Gender:       F                        Technician:   NKECHI

:          1990               Requested By: CJ PALM

Order Number: [id number]              Reading MD:   Dimitry Cedeño

                                 Measurements

Intervals                              Axis          

Rate:         92                       P:            57

RI:           125                      QRS:          63

QRSD:         78                       T:            -59

QT:           323                                    

QTc:          400                                    

                           Interpretive Statements

Sinus rhythm

Low voltage, precordial leads

Borderline T abnormalities, diffuse leads

Compared to ECG 2021 06:41:52

Low QRS voltage now present

T-wave abnormality now present

Electronically Signed On 10-5-2021 11:15:57 EDT by Dimitry Cedeño

## 2021-10-06 RX ADMIN — NYSTATIN SCH APPLIC: 100000 POWDER TOPICAL at 13:53

## 2021-10-06 RX ADMIN — PANTOPRAZOLE SODIUM SCH MG: 40 INJECTION, POWDER, FOR SOLUTION INTRAVENOUS at 09:15

## 2021-10-06 RX ADMIN — Medication SCH ML: at 22:27

## 2021-10-06 RX ADMIN — ASPIRIN SCH MG: 325 TABLET, COATED ORAL at 09:15

## 2021-10-06 RX ADMIN — BENZONATATE SCH MG: 100 CAPSULE ORAL at 22:27

## 2021-10-06 RX ADMIN — NYSTATIN SCH APPLIC: 100000 POWDER TOPICAL at 22:30

## 2021-10-06 RX ADMIN — IPRATROPIUM BROMIDE AND ALBUTEROL SULFATE SCH AMPUL: .5; 3 SOLUTION RESPIRATORY (INHALATION) at 14:46

## 2021-10-06 RX ADMIN — Medication SCH ML: at 09:16

## 2021-10-06 RX ADMIN — HYDRALAZINE HYDROCHLORIDE PRN MG: 20 INJECTION INTRAMUSCULAR; INTRAVENOUS at 08:54

## 2021-10-06 RX ADMIN — IPRATROPIUM BROMIDE AND ALBUTEROL SULFATE SCH AMPUL: .5; 3 SOLUTION RESPIRATORY (INHALATION) at 08:06

## 2021-10-06 RX ADMIN — BENZONATATE SCH MG: 100 CAPSULE ORAL at 13:52

## 2021-10-06 RX ADMIN — LEVOTHYROXINE SODIUM SCH MCG: 0.1 TABLET ORAL at 05:58

## 2021-10-06 RX ADMIN — HEPARIN SODIUM SCH UNIT: 5000 INJECTION, SOLUTION INTRAVENOUS; SUBCUTANEOUS at 13:52

## 2021-10-06 RX ADMIN — HEPARIN SODIUM SCH UNIT: 5000 INJECTION, SOLUTION INTRAVENOUS; SUBCUTANEOUS at 05:58

## 2021-10-06 RX ADMIN — METHYLPREDNISOLONE SODIUM SUCCINATE SCH MG: 40 INJECTION, POWDER, FOR SOLUTION INTRAMUSCULAR; INTRAVENOUS at 09:16

## 2021-10-06 RX ADMIN — BENZONATATE SCH MG: 100 CAPSULE ORAL at 05:58

## 2021-10-06 RX ADMIN — HEPARIN SODIUM SCH UNIT: 5000 INJECTION, SOLUTION INTRAVENOUS; SUBCUTANEOUS at 22:26

## 2021-10-06 RX ADMIN — IPRATROPIUM BROMIDE AND ALBUTEROL SULFATE SCH: .5; 3 SOLUTION RESPIRATORY (INHALATION) at 20:34

## 2021-10-06 RX ADMIN — METHYLPREDNISOLONE SODIUM SUCCINATE SCH MG: 40 INJECTION, POWDER, FOR SOLUTION INTRAMUSCULAR; INTRAVENOUS at 22:26

## 2021-10-06 NOTE — PROGRESS NOTE
Assessment and Plan





31 years old female with history of morbid obesity, hypertension, asthma , sleep

apnea was brought to the emergency room because of shortness of breath, edema, 

generalized malaise, fatigue, and weakness for last couple of days.  She states 

she just does not feel well.  She has a headache.  She states her legs are 

swollen.  She feels as though she is retaining fluid.  She states that she went 

to her regular doctors on the 13th.  They tested her for Covid.  It was 

negative.  She was told to go see a cardiologist.  She is not seen a car

diologist as of yet.  She came in here because she was not feeling well and did 

not know what to do.  She has had no sick contacts.  








In the emergency room patient is found to have acute CHF exacerbation patient 

proBNP is 7573 also patient cardiac enzyme is elevated troponin is 0.043. 

Patient blood glucose also 55 patient is hypoglycemic


Patients present blood sugur 130.





Patient is  awake. Resting on 3L O2. O2 saturation 98%. BIPAP 20/8, rate 30, 

FIO2 40% and stand-by in the room. Patient afebrile. No leukocytosis. Blood 

pressure 136/80, Pulse 102


ABG on FIO2 45%











ABG pH  7.234  (7.320-7.450)  L  10/01/21  09:47    


 


POC ABG pCO2  90.5 mmHg (32.0-48.0)  H  10/01/21  09:47    


 


ABG pCO2  86.9 mm Hg  09/27/21  18:40    


 


POC ABG pO2  139.4 mmHg ()  H  10/01/21  09:47    


 


ABG pO2  81.8 mm Hg (80.0-90.0)   09/27/21  18:40    


 


POC ABG HCO3  37.4   10/01/21  09:47    


 


ABG O2 Saturation  99.2  (0-100)   10/01/21  09:47    











Patients D dimer 10/1/21 : 5410.10





Patient presently on albuterol/atrovent aerosol treatments q 6 hours, 

Subcutaneous Heparin, Protonix,  and I/V SoluMedrol.














- Patient Problems


(1) Acute respiratory failure with hypoxia and hypercapnia


Current Visit: Yes   Status: Acute   


Plan to address problem: 


On 3L O2 Saturation 98%.


BIPAP 20/8, rate 30, FIO2 40%. Stand-by.


 Albuterol/atrovent aerosol treatments.


 Continue Solumedrol


 Contibue S/C Heparin.


 Continue Protonix





 








(2) Acute exacerbation of CHF (congestive heart failure)


Current Visit: Yes   Status: Acute   


Plan to address problem: 


Management as per cardiology.








(3) Hypertension


Current Visit: Yes   Status: Acute   


Plan to address problem: 


Management as per primary care.








(4) Morbid obesity with BMI of 70 and over, adult


Current Visit: No   Status: Acute   


Plan to address problem: 


Weight reduction diet.


 Mobility protocol


 Fall precautions.








(5) Obesity hypoventilation syndrome


Current Visit: No   Status: Acute   


Plan to address problem: 


BIPAP 20/8, rate 30, FIO2 40%.








(6) Sleep apnea


Current Visit: No   Status: Acute   


Plan to address problem: 


BIPAP 20/8, rate 30, FIO2 40%.


 Sleep study if she can as out patient.








Subjective


Date of service: 10/06/21


Principal diagnosis: Ac hypoxemic and hypercapnic resp failure; AE-CHF; Morbid 

obesity; FABIAN/OHS


Interval history: 





31 years old female with history of morbid obesity, hypertension, asthma COPD 

sleep apnea was brought to the emergency room because of shortness of breath, 

edema, generalized malaise, fatigue, and weakness for last couple of days.  She 

states she just does not feel well.  She has a headache.  She states her legs 

are swollen.  She feels as though she is retaining fluid.  She states that she 

went to her regular doctors on the 13th.  They tested her for Covid.  It was 

negative.  She was told to go see a cardiologist.  She is not seen a 

cardiologist as of yet.  She came in here because she was not feeling well and 

did not know what to do.  She has had no sick contacts.  








In the emergency room patient is found to have acute CHF exacerbation patient 

proBNP is 7573 also patient cardiac enzyme is elevated troponin is 0.043. P

atient blood glucose also 55 patient is hypoglycemic. 





Patient is  awake. Resting on 3L O2. O2 saturation 98%. BIPAP 20/8, rate 30, 

FIO2 40% and stand-by in the room. Patient afebrile. No leukocytosis. Blood 

pressure 136/80, Pulse 102





ABG on FIO2 45%











ABG pH  7.234  (7.320-7.450)  L  10/01/21  09:47    


 


POC ABG pCO2  90.5 mmHg (32.0-48.0)  H  10/01/21  09:47    


 


ABG pCO2  86.9 mm Hg  09/27/21  18:40    


 


POC ABG pO2  139.4 mmHg ()  H  10/01/21  09:47    


 


ABG pO2  81.8 mm Hg (80.0-90.0)   09/27/21  18:40    


 


POC ABG HCO3  37.4   10/01/21  09:47    


 


ABG O2 Saturation  99.2  (0-100)   10/01/21  09:47    














Patients D dimer 10/1/21 : 5410.10





Patient presently on albuterol/atrovent aerosol treatments q 6 hours, 

subcutaneous Heparin, Protonix, and I/V SoluMedrol.








Objective


                               Vital Signs - 12hr











  10/06/21 10/06/21 10/06/21





  11:11 11:14 14:00


 


Temperature  98.5 F 


 


Pulse Rate [   





Anterior   





Bilateral   





Throughout]   


 


Pulse Rate [   





From Monitor]   


 


Respiratory 28 H 34 H 





Rate   


 


Respiratory   





Rate [Anterior   





Bilateral   





Throughout]   


 


Blood Pressure  149/85 


 


O2 Sat by Pulse 94  97





Oximetry   














  10/06/21 10/06/21 10/06/21





  14:46 15:19 15:33


 


Temperature   98.3 F


 


Pulse Rate [ 110 H  





Anterior   





Bilateral   





Throughout]   


 


Pulse Rate [  110 H 





From Monitor]   


 


Respiratory   18





Rate   


 


Respiratory 24  





Rate [Anterior   





Bilateral   





Throughout]   


 


Blood Pressure   125/78


 


O2 Sat by Pulse  91 





Oximetry   














  10/06/21 10/06/21





  19:40 20:35


 


Temperature 99.2 F 


 


Pulse Rate [  





Anterior  





Bilateral  





Throughout]  


 


Pulse Rate [  





From Monitor]  


 


Respiratory 20 





Rate  


 


Respiratory  





Rate [Anterior  





Bilateral  





Throughout]  


 


Blood Pressure 147/125 


 


O2 Sat by Pulse  97





Oximetry  











Constitutional: no acute distress, alert, other (young extremely obese female 

with mildly increased respiratory effort at rest)


Eyes: non-icteric


ENT: oropharynx moist, other (BIPAP FFM)


Neck: supple, no lymphadenopathy, no JVD


Effort: mildly labored


Ascultation: Bilateral: diminished breath sounds, rhonchi


Percussion: Bilateral: not dull


Cardiovascular: regular rate and rhythm


Gastrointestinal: normoactive bowel sounds, soft, non-tender, non-distended 

(protuberant)


Integumentary: rash (stasis dermatitis type), other (Stasis dermatititis on legs

and abdomen; see [facility] notes for full details)


Extremities: pulses normal, no ischemia or petechiae, edema (2+), other (stasis 

dermatitis)


Neurologic: non-focal exam (grossly), pupils equal and round, CN II-XII normal


Psychiatric: depressed


CBC and BMP: 


                                 10/09/21 10:35





                                 10/09/21 17:14


ABG, PT/INR, D-dimer: 


ABG











ABG pH  7.382  (7.320-7.450)   10/02/21  14:19    


 


POC ABG pCO2  67.4 mmHg (32.0-48.0)  H  10/02/21  14:19    


 


ABG pCO2  86.9 mm Hg  09/27/21  18:40    


 


POC ABG pO2  150.6 mmHg ()  H  10/02/21  14:19    


 


ABG pO2  81.8 mm Hg (80.0-90.0)   09/27/21  18:40    


 


POC ABG HCO3  39.2   10/02/21  14:19    


 


ABG O2 Saturation  99.4  (0-100)   10/02/21  14:19    





PT/INR, D-dimer











PT  18.7 Sec. (12.2-14.9)  H  10/02/21  16:55    


 


INR  1.51  (0.87-1.13)  H  10/02/21  16:55    


 


D-Dimer  5410.10 ng/mlDDU (0-234)  H  10/01/21  16:03    








Abnormal lab findings: 


                                  Abnormal Labs











  09/21/21 09/21/21 09/22/21





  22:04 22:04 00:18


 


WBC  17.7 H  


 


Hgb  9.1 L  


 


Hct   


 


MCV  77 L  


 


MCH  21 L  


 


MCHC  27 L  


 


RDW  24.3 H  


 


Seg Neuts % (Manual)   


 


Lymphocytes % (Manual)   


 


Nucleated RBC %   


 


Seg Neutrophils # Man   


 


Lymphocytes # (Manual)   


 


Monocytes # (Manual)   


 


PT   


 


INR   


 


D-Dimer   


 


ABG pH   


 


POC ABG pCO2   


 


POC ABG pO2   


 


ABG pO2   


 


ABG HCO3   


 


ABG Base Excess   


 


ABG Hemoglobin   


 


ABG Oxyhemoglobin   


 


ABG Sodium   


 


ABG Chloride   


 


ABG Glucose   


 


Oxyhemoglobin   


 


Carboxyhemoglobin   


 


Sodium   135 L 


 


Potassium   


 


Chloride   91.0 L 


 


Carbon Dioxide   17 L 


 


BUN   


 


Creatinine   1.4 H 


 


Glucose   55 L 


 


POC Glucose    49 L


 


Calcium   


 


Ionized Calcium   


 


Total Bilirubin   


 


AST   


 


ALT   


 


Total Creatine Kinase   


 


Troponin T   0.043 H 


 


NT-Pro-B Natriuret Pep   7573 H 


 


Total Protein   


 


Albumin   


 


LDL Cholesterol Direct   47 L 


 


HDL Cholesterol   25 L 


 


TSH   


 


Free T4   


 


Arterial Blood Glucose   














  09/22/21 09/22/21 09/22/21





  04:03 04:03 09:22


 


WBC  24.3 H  


 


Hgb  9.3 L  


 


Hct   


 


MCV  74 L  


 


MCH  21 L  


 


MCHC  29 L  


 


RDW  23.1 H  


 


Seg Neuts % (Manual)  78.0 H  


 


Lymphocytes % (Manual)  7.0 L  


 


Nucleated RBC %  1.0 H  


 


Seg Neutrophils # Man  19.0 H  


 


Lymphocytes # (Manual)   


 


Monocytes # (Manual)  1.0 H  


 


PT   


 


INR   


 


D-Dimer   


 


ABG pH   


 


POC ABG pCO2   


 


POC ABG pO2   


 


ABG pO2   


 


ABG HCO3   


 


ABG Base Excess   


 


ABG Hemoglobin   


 


ABG Oxyhemoglobin   


 


ABG Sodium   


 


ABG Chloride   


 


ABG Glucose   


 


Oxyhemoglobin   


 


Carboxyhemoglobin   


 


Sodium   134 L 


 


Potassium   


 


Chloride   91.2 L 


 


Carbon Dioxide   


 


BUN   


 


Creatinine   1.8 H 


 


Glucose   


 


POC Glucose   


 


Calcium   


 


Ionized Calcium   


 


Total Bilirubin   


 


AST   


 


ALT   


 


Total Creatine Kinase   


 


Troponin T    0.099 H


 


NT-Pro-B Natriuret Pep   


 


Total Protein   


 


Albumin   


 


LDL Cholesterol Direct   


 


HDL Cholesterol   


 


TSH   


 


Free T4   


 


Arterial Blood Glucose   














  09/22/21 09/22/21 09/23/21





  13:03 20:28 04:52


 


WBC    20.9 H


 


Hgb    8.5 L


 


Hct    30.0 L


 


MCV    73 L


 


MCH    21 L


 


MCHC    28 L


 


RDW    23.9 H


 


Seg Neuts % (Manual)    77.0 H


 


Lymphocytes % (Manual)    1.0 L


 


Nucleated RBC %    1.0 H


 


Seg Neutrophils # Man    16.1 H


 


Lymphocytes # (Manual)    0.2 L


 


Monocytes # (Manual)   


 


PT   


 


INR   


 


D-Dimer   


 


ABG pH   


 


POC ABG pCO2   


 


POC ABG pO2   


 


ABG pO2   


 


ABG HCO3   


 


ABG Base Excess   


 


ABG Hemoglobin   


 


ABG Oxyhemoglobin   


 


ABG Sodium   


 


ABG Chloride   


 


ABG Glucose   


 


Oxyhemoglobin   


 


Carboxyhemoglobin   


 


Sodium   


 


Potassium   


 


Chloride   


 


Carbon Dioxide   


 


BUN   


 


Creatinine   


 


Glucose   


 


POC Glucose   115 H 


 


Calcium   


 


Ionized Calcium   


 


Total Bilirubin   


 


AST   


 


ALT   


 


Total Creatine Kinase   


 


Troponin T  0.078 H D  


 


NT-Pro-B Natriuret Pep   


 


Total Protein   


 


Albumin   


 


LDL Cholesterol Direct   


 


HDL Cholesterol   


 


TSH   


 


Free T4   


 


Arterial Blood Glucose   














  09/23/21 09/23/21 09/23/21





  04:52 08:46 11:50


 


WBC   


 


Hgb   


 


Hct   


 


MCV   


 


MCH   


 


MCHC   


 


RDW   


 


Seg Neuts % (Manual)   


 


Lymphocytes % (Manual)   


 


Nucleated RBC %   


 


Seg Neutrophils # Man   


 


Lymphocytes # (Manual)   


 


Monocytes # (Manual)   


 


PT   


 


INR   


 


D-Dimer   


 


ABG pH   


 


POC ABG pCO2   


 


POC ABG pO2   


 


ABG pO2   


 


ABG HCO3   


 


ABG Base Excess   


 


ABG Hemoglobin   


 


ABG Oxyhemoglobin   


 


ABG Sodium   


 


ABG Chloride   


 


ABG Glucose   


 


Oxyhemoglobin   


 


Carboxyhemoglobin   


 


Sodium  129 L  


 


Potassium  5.6 H  


 


Chloride  88.6 L  


 


Carbon Dioxide   


 


BUN  27 H  


 


Creatinine  2.4 H  


 


Glucose  121 H  


 


POC Glucose   139 H  156 H


 


Calcium  7.7 L  


 


Ionized Calcium   


 


Total Bilirubin   


 


AST   


 


ALT   


 


Total Creatine Kinase   


 


Troponin T   


 


NT-Pro-B Natriuret Pep   


 


Total Protein   


 


Albumin   


 


LDL Cholesterol Direct   


 


HDL Cholesterol   


 


TSH   


 


Free T4   


 


Arterial Blood Glucose   














  09/23/21 09/23/21 09/23/21





  15:46 16:58 22:08


 


WBC   


 


Hgb   


 


Hct   


 


MCV   


 


MCH   


 


MCHC   


 


RDW   


 


Seg Neuts % (Manual)   


 


Lymphocytes % (Manual)   


 


Nucleated RBC %   


 


Seg Neutrophils # Man   


 


Lymphocytes # (Manual)   


 


Monocytes # (Manual)   


 


PT   


 


INR   


 


D-Dimer   


 


ABG pH   


 


POC ABG pCO2   


 


POC ABG pO2   


 


ABG pO2   


 


ABG HCO3   


 


ABG Base Excess   


 


ABG Hemoglobin   


 


ABG Oxyhemoglobin   


 


ABG Sodium   


 


ABG Chloride   


 


ABG Glucose   


 


Oxyhemoglobin   


 


Carboxyhemoglobin   


 


Sodium  128 L  


 


Potassium  5.5 H  


 


Chloride  88.1 L  


 


Carbon Dioxide   


 


BUN  33 H  


 


Creatinine  2.7 H  


 


Glucose  172 H  


 


POC Glucose   187 H  186 H


 


Calcium  7.3 L  


 


Ionized Calcium   


 


Total Bilirubin   


 


AST   


 


ALT   


 


Total Creatine Kinase   


 


Troponin T   


 


NT-Pro-B Natriuret Pep   


 


Total Protein   


 


Albumin   


 


LDL Cholesterol Direct   


 


HDL Cholesterol   


 


TSH   


 


Free T4   


 


Arterial Blood Glucose   














  09/23/21 09/24/21 09/24/21





  23:15 02:20 07:39


 


WBC   


 


Hgb   


 


Hct   


 


MCV   


 


MCH   


 


MCHC   


 


RDW   


 


Seg Neuts % (Manual)   


 


Lymphocytes % (Manual)   


 


Nucleated RBC %   


 


Seg Neutrophils # Man   


 


Lymphocytes # (Manual)   


 


Monocytes # (Manual)   


 


PT   


 


INR   


 


D-Dimer   


 


ABG pH   


 


POC ABG pCO2   


 


POC ABG pO2   


 


ABG pO2   


 


ABG HCO3   


 


ABG Base Excess   


 


ABG Hemoglobin   


 


ABG Oxyhemoglobin   


 


ABG Sodium   


 


ABG Chloride   


 


ABG Glucose   


 


Oxyhemoglobin   


 


Carboxyhemoglobin   


 


Sodium   128 L 


 


Potassium   5.2 H 


 


Chloride   88.6 L 


 


Carbon Dioxide   


 


BUN   39 H 


 


Creatinine   3.1 H 


 


Glucose   171 H 


 


POC Glucose    168 H


 


Calcium   7.2 L 


 


Ionized Calcium  3.6 L  


 


Total Bilirubin   


 


AST   


 


ALT   


 


Total Creatine Kinase   


 


Troponin T   


 


NT-Pro-B Natriuret Pep   


 


Total Protein   


 


Albumin   


 


LDL Cholesterol Direct   


 


HDL Cholesterol   


 


TSH   


 


Free T4   


 


Arterial Blood Glucose   














  09/24/21 09/24/21 09/24/21





  10:04 11:37 17:18


 


WBC   


 


Hgb   


 


Hct   


 


MCV   


 


MCH   


 


MCHC   


 


RDW   


 


Seg Neuts % (Manual)   


 


Lymphocytes % (Manual)   


 


Nucleated RBC %   


 


Seg Neutrophils # Man   


 


Lymphocytes # (Manual)   


 


Monocytes # (Manual)   


 


PT   


 


INR   


 


D-Dimer   


 


ABG pH   


 


POC ABG pCO2   


 


POC ABG pO2   


 


ABG pO2   


 


ABG HCO3   


 


ABG Base Excess   


 


ABG Hemoglobin   


 


ABG Oxyhemoglobin   


 


ABG Sodium   


 


ABG Chloride   


 


ABG Glucose   


 


Oxyhemoglobin   


 


Carboxyhemoglobin   


 


Sodium   


 


Potassium   


 


Chloride   


 


Carbon Dioxide   


 


BUN   


 


Creatinine   


 


Glucose   


 


POC Glucose   170 H  152 H


 


Calcium   


 


Ionized Calcium   


 


Total Bilirubin   


 


AST   


 


ALT   


 


Total Creatine Kinase  1712 H  


 


Troponin T   


 


NT-Pro-B Natriuret Pep   


 


Total Protein   


 


Albumin   


 


LDL Cholesterol Direct   


 


HDL Cholesterol   


 


TSH   


 


Free T4   


 


Arterial Blood Glucose   














  09/24/21 09/24/21 09/25/21





  19:38 22:02 05:48


 


WBC   


 


Hgb   


 


Hct   


 


MCV   


 


MCH   


 


MCHC   


 


RDW   


 


Seg Neuts % (Manual)   


 


Lymphocytes % (Manual)   


 


Nucleated RBC %   


 


Seg Neutrophils # Man   


 


Lymphocytes # (Manual)   


 


Monocytes # (Manual)   


 


PT   


 


INR   


 


D-Dimer   


 


ABG pH   


 


POC ABG pCO2   


 


POC ABG pO2   


 


ABG pO2   


 


ABG HCO3   


 


ABG Base Excess   


 


ABG Hemoglobin   


 


ABG Oxyhemoglobin   


 


ABG Sodium   


 


ABG Chloride   


 


ABG Glucose   


 


Oxyhemoglobin   


 


Carboxyhemoglobin   


 


Sodium  128 L   124 L


 


Potassium    5.8 H D


 


Chloride  89.6 L   89.4 L


 


Carbon Dioxide   


 


BUN  47 H   53 H


 


Creatinine  3.0 H   2.8 H


 


Glucose  165 H   150 H


 


POC Glucose   147 H 


 


Calcium  6.9 L   7.2 L


 


Ionized Calcium   


 


Total Bilirubin   


 


AST   


 


ALT   


 


Total Creatine Kinase   


 


Troponin T   


 


NT-Pro-B Natriuret Pep   


 


Total Protein   


 


Albumin   


 


LDL Cholesterol Direct   


 


HDL Cholesterol   


 


TSH   


 


Free T4   


 


Arterial Blood Glucose   














  09/25/21 09/25/21 09/25/21





  08:48 11:35 19:12


 


WBC   


 


Hgb   


 


Hct   


 


MCV   


 


MCH   


 


MCHC   


 


RDW   


 


Seg Neuts % (Manual)   


 


Lymphocytes % (Manual)   


 


Nucleated RBC %   


 


Seg Neutrophils # Man   


 


Lymphocytes # (Manual)   


 


Monocytes # (Manual)   


 


PT   


 


INR   


 


D-Dimer   


 


ABG pH   


 


POC ABG pCO2   


 


POC ABG pO2   


 


ABG pO2   


 


ABG HCO3   


 


ABG Base Excess   


 


ABG Hemoglobin   


 


ABG Oxyhemoglobin   


 


ABG Sodium   


 


ABG Chloride   


 


ABG Glucose   


 


Oxyhemoglobin   


 


Carboxyhemoglobin   


 


Sodium    128 L


 


Potassium   


 


Chloride    89.7 L


 


Carbon Dioxide   


 


BUN    53 H


 


Creatinine    2.4 H


 


Glucose    159 H


 


POC Glucose  152 H  152 H 


 


Calcium    7.1 L


 


Ionized Calcium   


 


Total Bilirubin   


 


AST   


 


ALT   


 


Total Creatine Kinase   


 


Troponin T   


 


NT-Pro-B Natriuret Pep   


 


Total Protein   


 


Albumin   


 


LDL Cholesterol Direct   


 


HDL Cholesterol   


 


TSH   


 


Free T4   


 


Arterial Blood Glucose   














  09/25/21 09/26/21 09/26/21





  22:03 05:03 07:41


 


WBC   


 


Hgb   


 


Hct   


 


MCV   


 


MCH   


 


MCHC   


 


RDW   


 


Seg Neuts % (Manual)   


 


Lymphocytes % (Manual)   


 


Nucleated RBC %   


 


Seg Neutrophils # Man   


 


Lymphocytes # (Manual)   


 


Monocytes # (Manual)   


 


PT   


 


INR   


 


D-Dimer   


 


ABG pH   


 


POC ABG pCO2   


 


POC ABG pO2   


 


ABG pO2   


 


ABG HCO3   


 


ABG Base Excess   


 


ABG Hemoglobin   


 


ABG Oxyhemoglobin   


 


ABG Sodium   


 


ABG Chloride   


 


ABG Glucose   


 


Oxyhemoglobin   


 


Carboxyhemoglobin   


 


Sodium   134 L 


 


Potassium   


 


Chloride   92.9 L 


 


Carbon Dioxide   33 H D 


 


BUN   54 H 


 


Creatinine   2.4 H 


 


Glucose   150 H 


 


POC Glucose  152 H   144 H


 


Calcium   7.2 L 


 


Ionized Calcium   


 


Total Bilirubin   


 


AST   


 


ALT   


 


Total Creatine Kinase   


 


Troponin T   


 


NT-Pro-B Natriuret Pep   


 


Total Protein   


 


Albumin   


 


LDL Cholesterol Direct   


 


HDL Cholesterol   


 


TSH   


 


Free T4   


 


Arterial Blood Glucose   














  09/26/21 09/26/21 09/26/21





  11:38 14:11 17:02


 


WBC   14.7 H 


 


Hgb   8.7 L 


 


Hct   29.8 L 


 


MCV   74 L 


 


MCH   22 L 


 


MCHC   29 L 


 


RDW   24.9 H 


 


Seg Neuts % (Manual)   86.0 H 


 


Lymphocytes % (Manual)   6.0 L 


 


Nucleated RBC %   


 


Seg Neutrophils # Man   12.6 H 


 


Lymphocytes # (Manual)   0.9 L 


 


Monocytes # (Manual)   


 


PT   


 


INR   


 


D-Dimer   


 


ABG pH   


 


POC ABG pCO2   


 


POC ABG pO2   


 


ABG pO2   


 


ABG HCO3   


 


ABG Base Excess   


 


ABG Hemoglobin   


 


ABG Oxyhemoglobin   


 


ABG Sodium   


 


ABG Chloride   


 


ABG Glucose   


 


Oxyhemoglobin   


 


Carboxyhemoglobin   


 


Sodium   


 


Potassium   


 


Chloride   


 


Carbon Dioxide   


 


BUN   


 


Creatinine   


 


Glucose   


 


POC Glucose  151 H   154 H


 


Calcium   


 


Ionized Calcium   


 


Total Bilirubin   


 


AST   


 


ALT   


 


Total Creatine Kinase   


 


Troponin T   


 


NT-Pro-B Natriuret Pep   


 


Total Protein   


 


Albumin   


 


LDL Cholesterol Direct   


 


HDL Cholesterol   


 


TSH   


 


Free T4   


 


Arterial Blood Glucose   














  09/26/21 09/27/21 09/27/21





  23:14 05:03 10:00


 


WBC   


 


Hgb   


 


Hct   


 


MCV   


 


MCH   


 


MCHC   


 


RDW   


 


Seg Neuts % (Manual)   


 


Lymphocytes % (Manual)   


 


Nucleated RBC %   


 


Seg Neutrophils # Man   


 


Lymphocytes # (Manual)   


 


Monocytes # (Manual)   


 


PT   


 


INR   


 


D-Dimer   


 


ABG pH    7.150 L*


 


POC ABG pCO2   


 


POC ABG pO2   


 


ABG pO2    91.8 H


 


ABG HCO3    37.2 H


 


ABG Base Excess    7.1 H


 


ABG Hemoglobin    7.1 L


 


ABG Oxyhemoglobin   


 


ABG Sodium   


 


ABG Chloride   


 


ABG Glucose   


 


Oxyhemoglobin    93.4 L


 


Carboxyhemoglobin   


 


Sodium   134 L 


 


Potassium   


 


Chloride   91.3 L 


 


Carbon Dioxide   33 H 


 


BUN   63 H 


 


Creatinine   2.2 H 


 


Glucose   159 H 


 


POC Glucose  151 H  


 


Calcium   7.8 L 


 


Ionized Calcium   


 


Total Bilirubin   


 


AST   


 


ALT   


 


Total Creatine Kinase   


 


Troponin T   


 


NT-Pro-B Natriuret Pep   


 


Total Protein   


 


Albumin   


 


LDL Cholesterol Direct   


 


HDL Cholesterol   


 


TSH   


 


Free T4   


 


Arterial Blood Glucose   














  09/27/21 09/27/21 09/27/21





  12:39 16:37 18:40


 


WBC   


 


Hgb   


 


Hct   


 


MCV   


 


MCH   


 


MCHC   


 


RDW   


 


Seg Neuts % (Manual)   


 


Lymphocytes % (Manual)   


 


Nucleated RBC %   


 


Seg Neutrophils # Man   


 


Lymphocytes # (Manual)   


 


Monocytes # (Manual)   


 


PT   


 


INR   


 


D-Dimer   


 


ABG pH    7.237 L


 


POC ABG pCO2   


 


POC ABG pO2   


 


ABG pO2   


 


ABG HCO3    36.2 H


 


ABG Base Excess    7.4 H


 


ABG Hemoglobin    7.8 L


 


ABG Oxyhemoglobin   


 


ABG Sodium   


 


ABG Chloride   


 


ABG Glucose   


 


Oxyhemoglobin    93.7 L


 


Carboxyhemoglobin   


 


Sodium   


 


Potassium   


 


Chloride   


 


Carbon Dioxide   


 


BUN   


 


Creatinine   


 


Glucose   


 


POC Glucose  140 H  132 H 


 


Calcium   


 


Ionized Calcium   


 


Total Bilirubin   


 


AST   


 


ALT   


 


Total Creatine Kinase   


 


Troponin T   


 


NT-Pro-B Natriuret Pep   


 


Total Protein   


 


Albumin   


 


LDL Cholesterol Direct   


 


HDL Cholesterol   


 


TSH   


 


Free T4   


 


Arterial Blood Glucose   














  09/27/21 09/28/21 09/28/21





  23:55 04:27 04:27


 


WBC   


 


Hgb   8.5 L 


 


Hct   29.1 L 


 


MCV   72 L 


 


MCH   21 L 


 


MCHC   29 L 


 


RDW   24.9 H 


 


Seg Neuts % (Manual)   


 


Lymphocytes % (Manual)   


 


Nucleated RBC %   


 


Seg Neutrophils # Man   


 


Lymphocytes # (Manual)   


 


Monocytes # (Manual)   


 


PT   


 


INR   


 


D-Dimer   


 


ABG pH   


 


POC ABG pCO2   


 


POC ABG pO2   


 


ABG pO2   


 


ABG HCO3   


 


ABG Base Excess   


 


ABG Hemoglobin   


 


ABG Oxyhemoglobin   


 


ABG Sodium   


 


ABG Chloride   


 


ABG Glucose   


 


Oxyhemoglobin   


 


Carboxyhemoglobin   


 


Sodium    135 L


 


Potassium   


 


Chloride    93.5 L


 


Carbon Dioxide    33 H


 


BUN    68 H


 


Creatinine    1.9 H


 


Glucose    143 H


 


POC Glucose  135 H  


 


Calcium    8.1 L


 


Ionized Calcium   


 


Total Bilirubin    1.50 H


 


AST    494 H


 


ALT    835 H


 


Total Creatine Kinase   


 


Troponin T   


 


NT-Pro-B Natriuret Pep   


 


Total Protein    9.5 H


 


Albumin    3.1 L


 


LDL Cholesterol Direct   


 


HDL Cholesterol   


 


TSH   


 


Free T4   


 


Arterial Blood Glucose   














  09/28/21 09/28/21 09/29/21





  12:03 17:24 00:19


 


WBC   


 


Hgb   


 


Hct   


 


MCV   


 


MCH   


 


MCHC   


 


RDW   


 


Seg Neuts % (Manual)   


 


Lymphocytes % (Manual)   


 


Nucleated RBC %   


 


Seg Neutrophils # Man   


 


Lymphocytes # (Manual)   


 


Monocytes # (Manual)   


 


PT   


 


INR   


 


D-Dimer   


 


ABG pH  7.291 L  


 


POC ABG pCO2  75.5 H  


 


POC ABG pO2  76.2 L  


 


ABG pO2   


 


ABG HCO3   


 


ABG Base Excess   


 


ABG Hemoglobin  9.9 L  


 


ABG Oxyhemoglobin  91.0 L  


 


ABG Sodium  134.9 L  


 


ABG Chloride  93.0 L  


 


ABG Glucose  146 H  


 


Oxyhemoglobin   


 


Carboxyhemoglobin  2.2 H  


 


Sodium   


 


Potassium   


 


Chloride   


 


Carbon Dioxide   


 


BUN   


 


Creatinine   


 


Glucose   


 


POC Glucose   134 H  136 H


 


Calcium   


 


Ionized Calcium   


 


Total Bilirubin   


 


AST   


 


ALT   


 


Total Creatine Kinase   


 


Troponin T   


 


NT-Pro-B Natriuret Pep   


 


Total Protein   


 


Albumin   


 


LDL Cholesterol Direct   


 


HDL Cholesterol   


 


TSH   


 


Free T4   


 


Arterial Blood Glucose  146 H  














  09/29/21 09/29/21 09/29/21





  04:55 04:55 05:29


 


WBC   


 


Hgb  8.0 L  


 


Hct  27.1 L  


 


MCV  70 L  


 


MCH  21 L  


 


MCHC   


 


RDW  24.3 H  


 


Seg Neuts % (Manual)   


 


Lymphocytes % (Manual)   


 


Nucleated RBC %   


 


Seg Neutrophils # Man   


 


Lymphocytes # (Manual)   


 


Monocytes # (Manual)   


 


PT   


 


INR   


 


D-Dimer   


 


ABG pH   


 


POC ABG pCO2   


 


POC ABG pO2   


 


ABG pO2   


 


ABG HCO3   


 


ABG Base Excess   


 


ABG Hemoglobin   


 


ABG Oxyhemoglobin   


 


ABG Sodium   


 


ABG Chloride   


 


ABG Glucose   


 


Oxyhemoglobin   


 


Carboxyhemoglobin   


 


Sodium   


 


Potassium   


 


Chloride   95.1 L 


 


Carbon Dioxide   36 H 


 


BUN   63 H 


 


Creatinine   1.5 H 


 


Glucose   131 H 


 


POC Glucose    118 H


 


Calcium   8.3 L 


 


Ionized Calcium   


 


Total Bilirubin   1.80 H 


 


AST   323 H 


 


ALT   609 H 


 


Total Creatine Kinase   


 


Troponin T   


 


NT-Pro-B Natriuret Pep   


 


Total Protein   9.3 H 


 


Albumin   2.9 L 


 


LDL Cholesterol Direct   


 


HDL Cholesterol   


 


TSH   


 


Free T4   


 


Arterial Blood Glucose   














  09/29/21 09/29/21 09/29/21





  11:40 18:30 22:44


 


WBC   


 


Hgb   


 


Hct   


 


MCV   


 


MCH   


 


MCHC   


 


RDW   


 


Seg Neuts % (Manual)   


 


Lymphocytes % (Manual)   


 


Nucleated RBC %   


 


Seg Neutrophils # Man   


 


Lymphocytes # (Manual)   


 


Monocytes # (Manual)   


 


PT   


 


INR   


 


D-Dimer   


 


ABG pH   


 


POC ABG pCO2   


 


POC ABG pO2   


 


ABG pO2   


 


ABG HCO3   


 


ABG Base Excess   


 


ABG Hemoglobin   


 


ABG Oxyhemoglobin   


 


ABG Sodium   


 


ABG Chloride   


 


ABG Glucose   


 


Oxyhemoglobin   


 


Carboxyhemoglobin   


 


Sodium   


 


Potassium   


 


Chloride   


 


Carbon Dioxide   


 


BUN   


 


Creatinine   


 


Glucose   


 


POC Glucose  139 H  130 H  123 H


 


Calcium   


 


Ionized Calcium   


 


Total Bilirubin   


 


AST   


 


ALT   


 


Total Creatine Kinase   


 


Troponin T   


 


NT-Pro-B Natriuret Pep   


 


Total Protein   


 


Albumin   


 


LDL Cholesterol Direct   


 


HDL Cholesterol   


 


TSH   


 


Free T4   


 


Arterial Blood Glucose   














  09/30/21 09/30/21 09/30/21





  04:53 04:53 07:16


 


WBC  12.5 H  


 


Hgb  8.8 L  


 


Hct  30.2 L  


 


MCV  74 L  


 


MCH  21 L  


 


MCHC  29 L  


 


RDW  24.5 H  


 


Seg Neuts % (Manual)   


 


Lymphocytes % (Manual)   


 


Nucleated RBC %   


 


Seg Neutrophils # Man   


 


Lymphocytes # (Manual)   


 


Monocytes # (Manual)   


 


PT   


 


INR   


 


D-Dimer   


 


ABG pH   


 


POC ABG pCO2   


 


POC ABG pO2   


 


ABG pO2   


 


ABG HCO3   


 


ABG Base Excess   


 


ABG Hemoglobin   


 


ABG Oxyhemoglobin   


 


ABG Sodium   


 


ABG Chloride   


 


ABG Glucose   


 


Oxyhemoglobin   


 


Carboxyhemoglobin   


 


Sodium   


 


Potassium   


 


Chloride   96.9 L 


 


Carbon Dioxide   35 H 


 


BUN   65 H 


 


Creatinine   1.5 H 


 


Glucose   142 H 


 


POC Glucose    138 H


 


Calcium   


 


Ionized Calcium   


 


Total Bilirubin   1.50 H 


 


AST   251 H 


 


ALT   572 H 


 


Total Creatine Kinase   


 


Troponin T   


 


NT-Pro-B Natriuret Pep   


 


Total Protein   10.1 H 


 


Albumin   3.2 L 


 


LDL Cholesterol Direct   


 


HDL Cholesterol   


 


TSH   


 


Free T4   


 


Arterial Blood Glucose   














  09/30/21 09/30/21 09/30/21





  07:32 11:29 14:42


 


WBC   


 


Hgb   


 


Hct   


 


MCV   


 


MCH   


 


MCHC   


 


RDW   


 


Seg Neuts % (Manual)   


 


Lymphocytes % (Manual)   


 


Nucleated RBC %   


 


Seg Neutrophils # Man   


 


Lymphocytes # (Manual)   


 


Monocytes # (Manual)   


 


PT   


 


INR   


 


D-Dimer   


 


ABG pH  7.086 L   7.188 L


 


POC ABG pCO2  142.2 H   99.3 H


 


POC ABG pO2  196.3 H   132.3 H


 


ABG pO2   


 


ABG HCO3   


 


ABG Base Excess   


 


ABG Hemoglobin  10.0 L   9.2 L


 


ABG Oxyhemoglobin   


 


ABG Sodium   


 


ABG Chloride  96.0 L   96.0 L


 


ABG Glucose  147 H   146 H


 


Oxyhemoglobin   


 


Carboxyhemoglobin  1.6 H   2.0 H


 


Sodium   


 


Potassium   


 


Chloride   


 


Carbon Dioxide   


 


BUN   


 


Creatinine   


 


Glucose   


 


POC Glucose   131 H 


 


Calcium   


 


Ionized Calcium   


 


Total Bilirubin   


 


AST   


 


ALT   


 


Total Creatine Kinase   


 


Troponin T   


 


NT-Pro-B Natriuret Pep   


 


Total Protein   


 


Albumin   


 


LDL Cholesterol Direct   


 


HDL Cholesterol   


 


TSH   


 


Free T4   


 


Arterial Blood Glucose  147 H   146 H














  09/30/21 09/30/21 10/01/21





  17:23 22:34 07:44


 


WBC   


 


Hgb   


 


Hct   


 


MCV   


 


MCH   


 


MCHC   


 


RDW   


 


Seg Neuts % (Manual)   


 


Lymphocytes % (Manual)   


 


Nucleated RBC %   


 


Seg Neutrophils # Man   


 


Lymphocytes # (Manual)   


 


Monocytes # (Manual)   


 


PT   


 


INR   


 


D-Dimer   


 


ABG pH   


 


POC ABG pCO2   


 


POC ABG pO2   


 


ABG pO2   


 


ABG HCO3   


 


ABG Base Excess   


 


ABG Hemoglobin   


 


ABG Oxyhemoglobin   


 


ABG Sodium   


 


ABG Chloride   


 


ABG Glucose   


 


Oxyhemoglobin   


 


Carboxyhemoglobin   


 


Sodium   


 


Potassium   


 


Chloride   


 


Carbon Dioxide   


 


BUN   


 


Creatinine   


 


Glucose   


 


POC Glucose  117 H  169 H  150 H


 


Calcium   


 


Ionized Calcium   


 


Total Bilirubin   


 


AST   


 


ALT   


 


Total Creatine Kinase   


 


Troponin T   


 


NT-Pro-B Natriuret Pep   


 


Total Protein   


 


Albumin   


 


LDL Cholesterol Direct   


 


HDL Cholesterol   


 


TSH   


 


Free T4   


 


Arterial Blood Glucose   














  10/01/21 10/01/21 10/01/21





  08:55 08:55 09:47


 


WBC   


 


Hgb  8.7 L  


 


Hct  29.4 L  


 


MCV  74 L  


 


MCH  22 L  


 


MCHC   


 


RDW  24.8 H  


 


Seg Neuts % (Manual)   


 


Lymphocytes % (Manual)   


 


Nucleated RBC %   


 


Seg Neutrophils # Man   


 


Lymphocytes # (Manual)   


 


Monocytes # (Manual)   


 


PT   


 


INR   


 


D-Dimer   


 


ABG pH    7.234 L


 


POC ABG pCO2    90.5 H


 


POC ABG pO2    139.4 H


 


ABG pO2   


 


ABG HCO3   


 


ABG Base Excess   


 


ABG Hemoglobin    9.5 L


 


ABG Oxyhemoglobin   


 


ABG Sodium   


 


ABG Chloride    97.0 L


 


ABG Glucose    179 H


 


Oxyhemoglobin   


 


Carboxyhemoglobin    1.7 H


 


Sodium   


 


Potassium   


 


Chloride   94.4 L 


 


Carbon Dioxide   37 H 


 


BUN   68 H 


 


Creatinine   1.5 H 


 


Glucose   178 H 


 


POC Glucose   


 


Calcium   


 


Ionized Calcium   


 


Total Bilirubin   1.60 H 


 


AST   172 H 


 


ALT   437 H 


 


Total Creatine Kinase   


 


Troponin T   


 


NT-Pro-B Natriuret Pep   


 


Total Protein   10.1 H 


 


Albumin   3.2 L 


 


LDL Cholesterol Direct   


 


HDL Cholesterol   


 


TSH   


 


Free T4   


 


Arterial Blood Glucose    179 H














  10/01/21 10/01/21 10/01/21





  11:39 16:03 17:24


 


WBC   


 


Hgb   


 


Hct   


 


MCV   


 


MCH   


 


MCHC   


 


RDW   


 


Seg Neuts % (Manual)   


 


Lymphocytes % (Manual)   


 


Nucleated RBC %   


 


Seg Neutrophils # Man   


 


Lymphocytes # (Manual)   


 


Monocytes # (Manual)   


 


PT   


 


INR   


 


D-Dimer   5410.10 H 


 


ABG pH   


 


POC ABG pCO2   


 


POC ABG pO2   


 


ABG pO2   


 


ABG HCO3   


 


ABG Base Excess   


 


ABG Hemoglobin   


 


ABG Oxyhemoglobin   


 


ABG Sodium   


 


ABG Chloride   


 


ABG Glucose   


 


Oxyhemoglobin   


 


Carboxyhemoglobin   


 


Sodium   


 


Potassium   


 


Chloride   


 


Carbon Dioxide   


 


BUN   


 


Creatinine   


 


Glucose   


 


POC Glucose  161 H   130 H


 


Calcium   


 


Ionized Calcium   


 


Total Bilirubin   


 


AST   


 


ALT   


 


Total Creatine Kinase   


 


Troponin T   


 


NT-Pro-B Natriuret Pep   


 


Total Protein   


 


Albumin   


 


LDL Cholesterol Direct   


 


HDL Cholesterol   


 


TSH   


 


Free T4   


 


Arterial Blood Glucose   














  10/01/21 10/02/21 10/02/21





  21:52 10:20 11:58


 


WBC   


 


Hgb   


 


Hct   


 


MCV   


 


MCH   


 


MCHC   


 


RDW   


 


Seg Neuts % (Manual)   


 


Lymphocytes % (Manual)   


 


Nucleated RBC %   


 


Seg Neutrophils # Man   


 


Lymphocytes # (Manual)   


 


Monocytes # (Manual)   


 


PT   


 


INR   


 


D-Dimer   


 


ABG pH   


 


POC ABG pCO2   


 


POC ABG pO2   


 


ABG pO2   


 


ABG HCO3   


 


ABG Base Excess   


 


ABG Hemoglobin   


 


ABG Oxyhemoglobin   


 


ABG Sodium   


 


ABG Chloride   


 


ABG Glucose   


 


Oxyhemoglobin   


 


Carboxyhemoglobin   


 


Sodium   146 H D 


 


Potassium   


 


Chloride   


 


Carbon Dioxide   


 


BUN   75 H 


 


Creatinine   1.6 H 


 


Glucose   158 H 


 


POC Glucose  150 H   143 H


 


Calcium   


 


Ionized Calcium   


 


Total Bilirubin   


 


AST   


 


ALT   


 


Total Creatine Kinase   


 


Troponin T   


 


NT-Pro-B Natriuret Pep   


 


Total Protein   


 


Albumin   


 


LDL Cholesterol Direct   


 


HDL Cholesterol   


 


TSH   


 


Free T4   


 


Arterial Blood Glucose   














  10/02/21 10/02/21 10/02/21





  14:19 16:55 16:55


 


WBC   


 


Hgb   8.3 L 


 


Hct   27.7 L 


 


MCV   


 


MCH   


 


MCHC   


 


RDW   


 


Seg Neuts % (Manual)   


 


Lymphocytes % (Manual)   


 


Nucleated RBC %   


 


Seg Neutrophils # Man   


 


Lymphocytes # (Manual)   


 


Monocytes # (Manual)   


 


PT    18.7 H


 


INR    1.51 H


 


D-Dimer   


 


ABG pH   


 


POC ABG pCO2  67.4 H  


 


POC ABG pO2  150.6 H  


 


ABG pO2   


 


ABG HCO3   


 


ABG Base Excess   


 


ABG Hemoglobin  9.3 L  


 


ABG Oxyhemoglobin   


 


ABG Sodium   


 


ABG Chloride   


 


ABG Glucose  162 H  


 


Oxyhemoglobin   


 


Carboxyhemoglobin  1.6 H  


 


Sodium   


 


Potassium   


 


Chloride   


 


Carbon Dioxide   


 


BUN   


 


Creatinine   


 


Glucose   


 


POC Glucose   


 


Calcium   


 


Ionized Calcium   


 


Total Bilirubin   


 


AST   


 


ALT   


 


Total Creatine Kinase   


 


Troponin T   


 


NT-Pro-B Natriuret Pep   


 


Total Protein   


 


Albumin   


 


LDL Cholesterol Direct   


 


HDL Cholesterol   


 


TSH   


 


Free T4   


 


Arterial Blood Glucose  162 H  














  10/02/21 10/02/21 10/02/21





  17:40 19:55 19:55


 


WBC   


 


Hgb   


 


Hct   


 


MCV   


 


MCH   


 


MCHC   


 


RDW   


 


Seg Neuts % (Manual)   


 


Lymphocytes % (Manual)   


 


Nucleated RBC %   


 


Seg Neutrophils # Man   


 


Lymphocytes # (Manual)   


 


Monocytes # (Manual)   


 


PT   


 


INR   


 


D-Dimer   


 


ABG pH   


 


POC ABG pCO2   


 


POC ABG pO2   


 


ABG pO2   


 


ABG HCO3   


 


ABG Base Excess   


 


ABG Hemoglobin   


 


ABG Oxyhemoglobin   


 


ABG Sodium   


 


ABG Chloride   


 


ABG Glucose   


 


Oxyhemoglobin   


 


Carboxyhemoglobin   


 


Sodium   


 


Potassium   


 


Chloride   


 


Carbon Dioxide   


 


BUN   


 


Creatinine   


 


Glucose   


 


POC Glucose  151 H  


 


Calcium   


 


Ionized Calcium   


 


Total Bilirubin   


 


AST   


 


ALT   


 


Total Creatine Kinase   


 


Troponin T   


 


NT-Pro-B Natriuret Pep   


 


Total Protein   


 


Albumin   


 


LDL Cholesterol Direct   


 


HDL Cholesterol   


 


TSH    6.140 H


 


Free T4   0.67 L 


 


Arterial Blood Glucose   














  10/02/21 10/03/21 10/03/21





  22:19 04:40 04:40


 


WBC   


 


Hgb    8.1 L


 


Hct    27.5 L


 


MCV    73 L


 


MCH    22 L


 


MCHC   


 


RDW    25.4 H


 


Seg Neuts % (Manual)   


 


Lymphocytes % (Manual)   


 


Nucleated RBC %   


 


Seg Neutrophils # Man   


 


Lymphocytes # (Manual)   


 


Monocytes # (Manual)   


 


PT   


 


INR   


 


D-Dimer   


 


ABG pH   


 


POC ABG pCO2   


 


POC ABG pO2   


 


ABG pO2   


 


ABG HCO3   


 


ABG Base Excess   


 


ABG Hemoglobin   


 


ABG Oxyhemoglobin   


 


ABG Sodium   


 


ABG Chloride   


 


ABG Glucose   


 


Oxyhemoglobin   


 


Carboxyhemoglobin   


 


Sodium   


 


Potassium   


 


Chloride   


 


Carbon Dioxide   37 H D 


 


BUN   76 H 


 


Creatinine   1.6 H 


 


Glucose   173 H 


 


POC Glucose  157 H  


 


Calcium   


 


Ionized Calcium   


 


Total Bilirubin   


 


AST   


 


ALT   


 


Total Creatine Kinase   


 


Troponin T   


 


NT-Pro-B Natriuret Pep   


 


Total Protein   


 


Albumin   


 


LDL Cholesterol Direct   


 


HDL Cholesterol   


 


TSH   


 


Free T4   


 


Arterial Blood Glucose   














  10/03/21 10/03/21 10/03/21





  11:36 17:20 21:17


 


WBC   


 


Hgb   


 


Hct   


 


MCV   


 


MCH   


 


MCHC   


 


RDW   


 


Seg Neuts % (Manual)   


 


Lymphocytes % (Manual)   


 


Nucleated RBC %   


 


Seg Neutrophils # Man   


 


Lymphocytes # (Manual)   


 


Monocytes # (Manual)   


 


PT   


 


INR   


 


D-Dimer   


 


ABG pH   


 


POC ABG pCO2   


 


POC ABG pO2   


 


ABG pO2   


 


ABG HCO3   


 


ABG Base Excess   


 


ABG Hemoglobin   


 


ABG Oxyhemoglobin   


 


ABG Sodium   


 


ABG Chloride   


 


ABG Glucose   


 


Oxyhemoglobin   


 


Carboxyhemoglobin   


 


Sodium   


 


Potassium   


 


Chloride   


 


Carbon Dioxide   


 


BUN   


 


Creatinine   


 


Glucose   


 


POC Glucose  168 H  174 H  163 H


 


Calcium   


 


Ionized Calcium   


 


Total Bilirubin   


 


AST   


 


ALT   


 


Total Creatine Kinase   


 


Troponin T   


 


NT-Pro-B Natriuret Pep   


 


Total Protein   


 


Albumin   


 


LDL Cholesterol Direct   


 


HDL Cholesterol   


 


TSH   


 


Free T4   


 


Arterial Blood Glucose   














  10/04/21 10/04/21 10/04/21





  04:22 11:27 17:12


 


WBC   


 


Hgb   


 


Hct   


 


MCV   


 


MCH   


 


MCHC   


 


RDW   


 


Seg Neuts % (Manual)   


 


Lymphocytes % (Manual)   


 


Nucleated RBC %   


 


Seg Neutrophils # Man   


 


Lymphocytes # (Manual)   


 


Monocytes # (Manual)   


 


PT   


 


INR   


 


D-Dimer   


 


ABG pH   


 


POC ABG pCO2   


 


POC ABG pO2   


 


ABG pO2   


 


ABG HCO3   


 


ABG Base Excess   


 


ABG Hemoglobin   


 


ABG Oxyhemoglobin   


 


ABG Sodium   


 


ABG Chloride   


 


ABG Glucose   


 


Oxyhemoglobin   


 


Carboxyhemoglobin   


 


Sodium  147 H  


 


Potassium   


 


Chloride   


 


Carbon Dioxide  37 H  


 


BUN  74 H  


 


Creatinine  1.3 H  


 


Glucose  193 H  


 


POC Glucose   154 H  162 H


 


Calcium   


 


Ionized Calcium   


 


Total Bilirubin   


 


AST   


 


ALT   


 


Total Creatine Kinase   


 


Troponin T   


 


NT-Pro-B Natriuret Pep   


 


Total Protein   


 


Albumin   


 


LDL Cholesterol Direct   


 


HDL Cholesterol   


 


TSH   


 


Free T4   


 


Arterial Blood Glucose   














  10/04/21 10/05/21 10/05/21





  21:59 09:05 12:28


 


WBC   


 


Hgb   


 


Hct   


 


MCV   


 


MCH   


 


MCHC   


 


RDW   


 


Seg Neuts % (Manual)   


 


Lymphocytes % (Manual)   


 


Nucleated RBC %   


 


Seg Neutrophils # Man   


 


Lymphocytes # (Manual)   


 


Monocytes # (Manual)   


 


PT   


 


INR   


 


D-Dimer   


 


ABG pH   


 


POC ABG pCO2   


 


POC ABG pO2   


 


ABG pO2   


 


ABG HCO3   


 


ABG Base Excess   


 


ABG Hemoglobin   


 


ABG Oxyhemoglobin   


 


ABG Sodium   


 


ABG Chloride   


 


ABG Glucose   


 


Oxyhemoglobin   


 


Carboxyhemoglobin   


 


Sodium   


 


Potassium   


 


Chloride   


 


Carbon Dioxide   


 


BUN   


 


Creatinine   


 


Glucose   


 


POC Glucose  160 H  168 H  174 H


 


Calcium   


 


Ionized Calcium   


 


Total Bilirubin   


 


AST   


 


ALT   


 


Total Creatine Kinase   


 


Troponin T   


 


NT-Pro-B Natriuret Pep   


 


Total Protein   


 


Albumin   


 


LDL Cholesterol Direct   


 


HDL Cholesterol   


 


TSH   


 


Free T4   


 


Arterial Blood Glucose   














  10/05/21 10/05/21 10/06/21





  17:27 21:42 08:15


 


WBC   


 


Hgb   


 


Hct   


 


MCV   


 


MCH   


 


MCHC   


 


RDW   


 


Seg Neuts % (Manual)   


 


Lymphocytes % (Manual)   


 


Nucleated RBC %   


 


Seg Neutrophils # Man   


 


Lymphocytes # (Manual)   


 


Monocytes # (Manual)   


 


PT   


 


INR   


 


D-Dimer   


 


ABG pH   


 


POC ABG pCO2   


 


POC ABG pO2   


 


ABG pO2   


 


ABG HCO3   


 


ABG Base Excess   


 


ABG Hemoglobin   


 


ABG Oxyhemoglobin   


 


ABG Sodium   


 


ABG Chloride   


 


ABG Glucose   


 


Oxyhemoglobin   


 


Carboxyhemoglobin   


 


Sodium   


 


Potassium   


 


Chloride   


 


Carbon Dioxide   


 


BUN   


 


Creatinine   


 


Glucose   


 


POC Glucose  175 H  158 H  166 H


 


Calcium   


 


Ionized Calcium   


 


Total Bilirubin   


 


AST   


 


ALT   


 


Total Creatine Kinase   


 


Troponin T   


 


NT-Pro-B Natriuret Pep   


 


Total Protein   


 


Albumin   


 


LDL Cholesterol Direct   


 


HDL Cholesterol   


 


TSH   


 


Free T4   


 


Arterial Blood Glucose   














  10/06/21 10/06/21 10/06/21





  11:28 17:22 21:14


 


WBC   


 


Hgb   


 


Hct   


 


MCV   


 


MCH   


 


MCHC   


 


RDW   


 


Seg Neuts % (Manual)   


 


Lymphocytes % (Manual)   


 


Nucleated RBC %   


 


Seg Neutrophils # Man   


 


Lymphocytes # (Manual)   


 


Monocytes # (Manual)   


 


PT   


 


INR   


 


D-Dimer   


 


ABG pH   


 


POC ABG pCO2   


 


POC ABG pO2   


 


ABG pO2   


 


ABG HCO3   


 


ABG Base Excess   


 


ABG Hemoglobin   


 


ABG Oxyhemoglobin   


 


ABG Sodium   


 


ABG Chloride   


 


ABG Glucose   


 


Oxyhemoglobin   


 


Carboxyhemoglobin   


 


Sodium   


 


Potassium   


 


Chloride   


 


Carbon Dioxide   


 


BUN   


 


Creatinine   


 


Glucose   


 


POC Glucose  198 H  194 H  176 H


 


Calcium   


 


Ionized Calcium   


 


Total Bilirubin   


 


AST   


 


ALT   


 


Total Creatine Kinase   


 


Troponin T   


 


NT-Pro-B Natriuret Pep   


 


Total Protein   


 


Albumin   


 


LDL Cholesterol Direct   


 


HDL Cholesterol   


 


TSH   


 


Free T4   


 


Arterial Blood Glucose   











Allied health notes reviewed: nursing

## 2021-10-06 NOTE — PROGRESS NOTE
Assessment and Plan





Assessment


Acute kidney injury, unknown baseline. Renal ultrasound notes poor visualization

due to body habitus.


Hypernatremia


Hyperkalemia


Hypocalcemia


Morbid obesity


Acute hypoxemic and hypercapnic respiratory failure


Acute exacerbation of CHF (congestive heart failure)


Hypertension


Sleep apnea





Recommendations


Na higher previously at 147, off sodium tabs, no labs for review today


Increase free water intake as able, if unable to drink, will consider D5W


Creatinine improving to 1.3


Excellent urine output noted


Recommend carvedilol 12.5mg BID given high BP, tachycardia


echo noted, likely diastolic disease


Avoid diuresis if able given hypernatremia, note good urine output 


Maintain MAP more than 65


Hold ACE/ARB at this time


Renally dose medications


Avoid nephrotoxins


Renal diet


Daily renal labs





Subjective


Date of service: 10/06/21


Principal diagnosis: Ac hypoxemic and hypercapnic resp failure; AE-CHF; Morbid 

obesity; FABIAN/OHS


Interval history: 





Patient was seen for her renal issues-off Bipap this AM, on NC


Nursing, interdisciplinary and consult notes were reviewed


Vitals, input and output, medications and labs were reviewed





Objective





- Exam


Narrative Exam: 





General: Morbid obesity, on NC, mild discomfort


HEENT: Oral mucosa moist


Neck: Supple, no JVD


Chest: labored breathing on NC


Heart: RRR, S1 and S2


Abdomen: Soft, nontender


Extremity: No peripheral cyanosis, edema


Neurological: Awake and alert


Dermatology: skin intact


Psych: Calm and cooperative


Musculoskeletal: No joint effusion





- Vital Signs


Vital signs: 


                               Vital Signs - 12hr











  10/05/21 10/06/21 10/06/21





  23:45 00:00 01:38


 


Temperature 98.4 F  


 


Pulse Rate  104 H 104 H


 


Respiratory 18  31 H





Rate   


 


Blood Pressure 136/83  


 


Blood Pressure   





[Right]   


 


O2 Sat by Pulse   98





Oximetry   














  10/06/21 10/06/21 10/06/21





  03:46 08:30 08:47


 


Temperature 98.4 F  97.2 F L


 


Pulse Rate   108 H


 


Respiratory 20  24





Rate   


 


Blood Pressure 142/87 165/117 


 


Blood Pressure   165/117





[Right]   


 


O2 Sat by Pulse   98





Oximetry   














  10/06/21





  08:54


 


Temperature 


 


Pulse Rate 


 


Respiratory 





Rate 


 


Blood Pressure 165/117


 


Blood Pressure 





[Right] 


 


O2 Sat by Pulse 





Oximetry 














- Lab





                                 10/03/21 04:40





                                 10/04/21 04:22


                             Most recent lab results











ABG pH  7.382  (7.320-7.450)   10/02/21  14:19    


 


ABG pCO2  86.9 mm Hg  09/27/21  18:40    


 


ABG pO2  81.8 mm Hg (80.0-90.0)   09/27/21  18:40    


 


ABG HCO3  36.2 mmol/L (20.0-26.0)  H  09/27/21  18:40    


 


ABG O2 Saturation  99.4  (0-100)   10/02/21  14:19    


 


Calcium  9.2 mg/dL (8.4-10.2)   10/04/21  04:22    


 


Urine Sodium  16 mmol/L  09/23/21  18:46    














Medications & Allergies





- Medications


Allergies/Adverse Reactions: 


                                    Allergies





No Known Allergies Allergy (Unverified 07/13/17 11:17)


   








Home Medications: 


                                Home Medications











 Medication  Instructions  Recorded  Confirmed  Last Taken  Type


 


Acetaminophen [Acetaminophen TAB] 325 mg PO Q4H PRN #30 tablet 12/14/17 01/22/21

Unknown Rx


 


ALBUTEROL NEB's [Proventil 0.083% 2.5 mg IH Q3HRT PRN #30 day 01/24/21  Unknown 

Rx





NEBS]     


 


Albuterol Mdi (or & Nicu Only) 2 puff IH QID PRN #1 inhalation 01/24/21  Unknown

Rx





[ProAir HFA Inhaler]     


 


Azithromycin [Zithromax Z-JAVIER] 0 mg PO DAILY #1 tab 01/24/21  Unknown Rx


 


Benzonatate [Tessalon Perles] 100 mg PO Q8HR #15 capsule 01/24/21  Unknown Rx


 


Famotidine [Pepcid] 20 mg PO BID #14 tablet 01/24/21  Unknown Rx


 


Ipratropium/Albuterol Sulfate 1 ampul IH TIDRT #30 day 01/24/21  Unknown Rx





[DUONEB *Not for PRN Use*]     


 


hydroCHLOROthiazide [HCTZ] 25 mg PO QDAY  tablet 01/24/21  Unknown Rx


 


hydroCHLOROthiazide [HCTZ] 25 mg PO QDAY #30 tablet 01/24/21  Unknown Rx


 


methylPREDNISolone [Medrol 4MG 4 mg PO DAILY #1 tab.ds.pk 01/24/21  Unknown Rx





DOSEPAK (21 tabs)]     











Active Medications: 














Generic Name Dose Route Start Last Admin





  Trade Name Freq  PRN Reason Stop Dose Admin


 


Acetaminophen  650 mg  09/22/21 03:00  10/01/21 06:42





  Acetaminophen 325 Mg Tab  PO   650 mg





  Q4H PRN   Administration





  Pain MILD(1-3)/Fever >100.5/HA  


 


Albuterol  2.5 mg  09/22/21 03:00 





  Albuterol 2.5 Mg/3 Ml Nebu  IH  





  Q4HRT PRN  





  Shortness Of Breath  


 


Albuterol/Ipratropium  1 ampul  09/22/21 08:00  10/06/21 08:06





  Ipratropium/Albuterol Sulfate 3 Ml Ampul.Neb  IH   1 ampul





  TIDRT DELL   Administration


 


Aspirin  325 mg  09/23/21 10:00  10/06/21 09:15





  Aspirin Ec 325 Mg Tab  PO   325 mg





  QDAY DELL   Administration


 


Atorvastatin Calcium  40 mg  09/22/21 22:00  10/05/21 22:24





  Atorvastatin 40 Mg Tab  PO   40 mg





  QHS DELL   Administration


 


Benzonatate  100 mg  09/22/21 06:00  10/06/21 05:58





  Benzonatate 100 Mg Cap  PO   100 mg





  Q8HR DELL   Administration


 


Guaifenesin  200 mg  09/26/21 14:47  10/01/21 06:43





  Guaifenesin 100 Mg/5 Ml Oral Liqd  PO   200 mg





  Q4H PRN   Administration





  Cough  


 


Haloperidol Lactate  5 mg  10/02/21 16:27  10/05/21 03:30





  Haloperidol Lactate 5 Mg/1 Ml Inj  IV   5 mg





  Q6H PRN   Administration





  Agitation  


 


Heparin Sodium (Porcine)  5,000 unit  10/02/21 14:00  10/06/21 05:58





  Heparin 5,000 Unit/1 Ml Vial  SUB-Q   5,000 unit





  Q8HR DELL   Administration


 


Hydralazine HCl  10 mg  10/03/21 17:25  10/06/21 08:54





  Hydralazine 20 Mg/1 Ml Inj  IV   10 mg





  Q6H PRN   Administration





  Blood Pressure  


 


Levothyroxine Sodium  100 mcg  10/06/21 06:00  10/06/21 05:58





  Levothyroxine 100 Mcg Tab  PO   100 mcg





  DAILY@0600 DELL   Administration


 


Methylprednisolone Sodium Succinate  20 mg  09/30/21 10:00  10/06/21 09:16





  Methylprednisolone Sod Succinate 40 Mg/1 Ml Inj  IV   20 mg





  Q12HR DELL   Administration


 


Nitroglycerin  0.4 mg  09/22/21 03:00 





  Nitroglycerin 0.4 Mg Tab Subl  SL  





  Q5M PRN  





  Chest Pain  


 


Nystatin  1 applic  09/22/21 18:00  10/05/21 22:29





  Nystatin Powder 15 Gm  TP   1 applic





  BID DELL   Administration


 


Ondansetron HCl  4 mg  09/22/21 03:00  09/29/21 23:51





  Ondansetron 4 Mg/2 Ml Inj  IV   4 mg





  Q8H PRN   Administration





  Nausea And Vomiting  


 


Oxycodone/Acetaminophen  1 tab  09/22/21 03:00  10/05/21 16:26





  Oxycodone /Acetaminophen 5-325mg Tab  PO   1 tab





  Q6H PRN   Administration





  Pain, Moderate (4-6)  


 


Pantoprazole Sodium  40 mg  09/30/21 10:00  10/06/21 09:15





  Pantoprazole 40 Mg Inj  IV   40 mg





  QDAY DELL   Administration


 


Quetiapine Fumarate  75 mg  10/02/21 22:00  10/05/21 22:24





  Quetiapine 25 Mg Tab  PO   75 mg





  QHS DELL   Administration


 


Sodium Chloride  10 ml  09/22/21 10:00  10/06/21 09:16





  Sodium Chloride 0.9% 10 Ml Flush Syringe  IV   10 ml





  BID DELL   Administration


 


Sodium Chloride  10 ml  09/22/21 03:00 





  Sodium Chloride 0.9% 10 Ml Flush Syringe  IV  





  PRN PRN  





  LINE FLUSH  


 


Tramadol HCl  50 mg  09/22/21 03:00  10/04/21 21:50





  Tramadol 50 Mg Tab  PO   50 mg





  Q6H PRN   Administration





  Pain, Moderate (4-6)

## 2021-10-06 NOTE — PROGRESS NOTE
Assessment and Plan


Assessment and plan: 


-Acute on chronic hypoxic respiratory failure; requiring BiPAP


Current Visit: Yes   Status: Acute


Continue oxygen titrate O2 sats more than 90%


Patient sometimes requires continuous BiPAP


Multifactorial obesity hypoventilation syndrome


Obstructive sleep apnea, COPD, CHF


Pulmonary and cardiology following


Treat the underlying cause


Patient already has home oxygen





--Acute exacerbation COPD (chronic obstructive pulmonary disease)


Current Visit: Yes   Status: Acute   


Oxygen titrate O2 sats to more than 90%, patient is requiring BiPAP most of the 

times


Wean as tolerated, nebulizers, IV steroids, IV antibiotics, inhalation steroids





-- Morbid obesity; .4


Current Visit: Yes   Status: Acute   


Patient needs to participate on professional weight reduction program when 

medically stable. 


Strongly recommend outpatient follow-up with bariatric surgeon upon discharge 

when stable





--Obesity hypoventilation syndrome;





--Obstructive sleep apnea;





-- Non-ST elevation MI (NSTEMI) 


Current Visit: Yes   Status: Acute   


Cardiology evaluation noted and appreciated, continue current cardiac 

medications


Supportive care





-- Acute exacerbation of CHF (congestive heart failure)


Current Visit: Yes   Status: Acute   


Fluid restriction. Maintain input output. 


Antifailure medications low-sodium diet


Input output monitoring, cardiology following





--hypothyroidism /new onset


Current Visit: Yes   Status: Acute  


Continue Synthroid, closely monitor


Patient needs follow-up with endocrinologist as outpatient upon DC





-- Hypoglycemia/present on admission


Current Visit: Yes   Status: Acute   


Now resolved closely monitor blood sugars





--acute kidney injury with vasomotor nephropathy


Gentle hydration, monitor renal function, avoid nephrotoxins





-- Lymphedema of the abdominal wall


Due to morbid obesity, supportive care fluid restriction


Low-sodium diet





--Anemia of chronic disease; 


Closely monitor H&H, transfuse as needed





--Acute transaminitis acute liver failure


Acute transaminitis; LFTs trending down


Hepatocellular pattern, multifactorial NAFLD, congestive hepatopathy, DILI, 


GI evaluation and recommendations noted, LFTs trending down





-- hypertension


Current Visit: Yes   Status: Acute   


Plan to address problem: 


We will continue the home medication. Hydralazine 10 mg IV every 6 hours as 

needed. We will monitor the blood pressure closely





-- DVT prophylaxis


Current Visit: No   Status: Acute   


Plan to address problem: 


Heparin 5000 units subcu every 8 hours for DVT prophylaxis. Pepcid 20 mg p.o. 

twice daily for GI prophylaxis. Patient is a full code





Also monitor the patient and adjust management as needed


DC planning per case management when medically stable and cleared by consultants





9/23


-Patient is on heparin drip for non-STEMI, cardiology is following.


-Patient is off Lasix and ACE inhibitors because of [place].


-Kidney function is worsening overnight and I put a consult for nephrology.


-Patient is hypotensive and blood pressure from this morning was 101/41.  We 

will continue to monitor.  And if it is dropping we we will give him fluid.


-Echo was done and EF of 50 to 55%.  Diastolic dysfunction is indeterminate.  

Management per cardiology


-Patient has COPD continues on dexamethasone, nebulizer treatment as needed.


-Patient has hyponatremia and hyperkalemia.  I give the patient Kayexalate.  

Monitor electrolytes.  Will follow nephrology for additional recommendation.





9/24


-Renal function is worsening, nephrology is following


-Blood pressure is better today


-Hyperkalemia; I added Kayexalate


-Morbid obesity








9/25


-Slight improvement in creatinine.  Hyponatremia worsened. Nephrology is 

following and put the patient back on Lasix.


-Blood PRESSURE IS BETTER today


-Potassium this morning was 5.8, I ordered 60 g of Kayexalate


-Morbidly obese


-Obesity hypoventilation syndrome





9/26


-Creatinine is improving and this morning was 2.4


-Hyponatremia improved this morning was 134.  Patient is on Lasix


-Blood pressure is better


-Patient is on 10 L of oxygen; worsened 


-Morbidly obese, FABIAN








9/27: Follow ordered ultrasound of abdomen, per Physician unable to get CT. I 

ordered an ABG due to my concern about her breathing pattern this morning, 

patient may benefit from. Will try to establish if patient has a primary 

pulmonary doctor. ABG is showing consistent with Respiratory Acidosis. Will 

place on BIPAP now. May need to transfer to IM for closer monitoring. Patient 

likely with Obesity Hypoventilation syndrome.


cardiology work up, ongoing. 


RENAL SHOWING SOME IMPROVEMENT


Guarded prognosis





9/28: Patient seen and examined today identified some lesion under the pannus 

will obtain wound care and wound surgeon evaluation.  Nephrology input noted 

continue diuresis and stop the sodium tabs creatinine is stable.  I did discuss 

with the family and updated them.  We will continue to hold ACE and ARB and if 

pressures continue to drop may need to hold diuresis also despite as written 

above.  Monitor labs including H&H.  She is more awake review of ABG shows 

improving CO2.  Will discuss with case management as patient may need BiPAP on 

discharge home.  I also updated the family








9/29: Patient showing some clinical improvement.  Liver enzymes is showing mild 

improvement although bilirubin increased slightly.  GI input noted and as 

discussed by his notes below


"Abnormal liver enzymes in hepatocellular pattern.  broad ddx and likely 

multifactorial causes including NAFLD, congestive hepatopathy, possibly DILI.  

will check viral hep serologies.  US with limited views, possible coarsening of 

the liver"


Patient also evaluated by surgery noted with the lymphedema of abdominal wall 

and open wound of the abdominal wall without penetration into the abdominal 

cavity with recommendation to pack the wounds daily with mesalt. Need to 

maintain dry environment discussed with nursing staff.  Elevate the pannus and 

optimize nutrition for which I will get nutritional consult.


No surgical intervention at this time.  Patient is bedbound when I asked when 

last she ambulated she said while "I guess he has not worked" but it has been a 

while.  Unfortunately the LTAC center unwilling to accept the patient will 

discuss with pulmonary and with case management about obtaining BiPAP for this 

patient and possible SNF referral.





09/30: Patient this morning and was noted significantly lethargic and 

unresponsive respiratory and a code to be called.  The patient resuscitated 

without any invasive procedure.  ABG was obtained showed a CO2 of 142.  Despite 

using BiPAP for some time through the night, sure how long she used it for.  I 

have discontinued all IV opioids and recommend no IV opioids for this patient at

this time.  I also discontinued p.o. medications as an essential medication and 

change to IV.  We will repeat an ABG in an hour to see if there is some 

improvement.  Patient remains at high risk for possible intubation.  Clinical 

condition is guarded





10/1: Patient remains on BiPAP this am, has some intermittent twitching, will 

check EEG, not likely seizure, but will monitor. Continue current management


, check Albumin and Pre-Albumin level.


Remains critically ill. Liver status showing improvement.





10/2: Patient with elevated D-dimer unfortunately size precludes being able to 

have a CTA chest done here to rule out pulmonary embolism.  Doppler of lower 

extremities is pending. We will continue subcu anticoagulation with heparin at 

this time. Until Doppler is resolved. Patient is not hypoxic so doubt pulmonary 

embolism at this time. Her problem remains hypercapnia. I did take time to go 

over her history while she has been around hospital in the past and noticed a 

remarkable increase in her BMI from the last 2 years. Discussed with the 

intensivist will agree to check thyroid function test. Critical care time 35-

minute





10/3: Patient with hypothyroidism, while she does not appear to be with myxedema

coma at this time. Will initiate levothyroxine as I believe that this will make 

profound impact  her management at this time, continue BiPAP continue current 

management. Seroquel was added by pulmonary for delirium and agitation 

management, Ventimask during the day and BiPAP at night





10/4: Continue levothyroine, can change to PO in am. Will obtain Psych consult, 

she is clinically stable and will transfer to floor





10/5; levothyroxine changed to 100 mg p.o. daily


Consultant recommendations noted and appreciated


Monitor LFTs





10/6; patient continues to be hypoxemic on continuous BiPAP


Consultants and recommendations noted and appreciated


Continue current management














History


Interval history: 


I seen and examined the patient at the bedside


Patient's chart and medications reviewed


No new changes remains on BiPAP


In mild respiratory distress


Morbidly obese








Hospitalist Physical





- Constitutional


Vitals: 


                                        











Temp Pulse Resp BP Pulse Ox


 


 98.3 F   110 H  18   125/78   91 


 


 10/06/21 15:33  10/06/21 15:19  10/06/21 15:33  10/06/21 15:33  10/06/21 15:19











General appearance: Present: mild distress, well-nourished, obese





- EENT


Eyes: Present: PERRL, EOM intact





- Neck


Neck: Present: supple, normal ROM





- Respiratory


Respiratory effort: normal


Respiratory: bilateral: diminished, rhonchi, negative: rales, wheezing





- Cardiovascular


Rhythm: regular


Heart Sounds: Present: S1 & S2





- Extremities


Extremities: no ischemia, No edema





- Abdominal


General gastrointestinal: soft, non-tender, non-distended, normal bowel sounds





- Integumentary


Integumentary: Present: clear, warm





- Psychiatric


Psychiatric: appropriate mood/affect, cooperative





- Neurologic


Neurologic: moves all extremities





HEART Score





- HEART Score


Troponin: 


                                        











Troponin T  0.078 ng/mL (0.00-0.029)  H D 09/22/21  13:03    














Results





- Labs


CBC & Chem 7: 


                                 10/03/21 04:40





                                 10/04/21 04:22


Labs: 


                             Laboratory Last Values











WBC  8.1 K/mm3 (4.5-11.0)   10/03/21  04:40    


 


RBC  3.79 M/mm3 (3.65-5.03)   10/03/21  04:40    


 


Hgb  8.1 gm/dl (10.1-14.3)  L  10/03/21  04:40    


 


Hct  27.5 % (30.3-42.9)  L  10/03/21  04:40    


 


MCV  73 fl (79-97)  L  10/03/21  04:40    


 


MCH  22 pg (28-32)  L  10/03/21  04:40    


 


MCHC  30 % (30-34)   10/03/21  04:40    


 


RDW  25.4 % (13.2-15.2)  H  10/03/21  04:40    


 


Plt Count  224 K/mm3 (140-440)   10/03/21  04:40    


 


Add Manual Diff  Complete   09/26/21  14:11    


 


Total Counted  100   09/26/21  14:11    


 


Seg Neuts % (Manual)  86.0 % (40.0-70.0)  H  09/26/21  14:11    


 


Band Neutrophils %  1.0 %  09/26/21  14:11    


 


Lymphocytes % (Manual)  6.0 % (13.4-35.0)  L  09/26/21  14:11    


 


Reactive Lymphs % (Man)  1.0 %  09/26/21  14:11    


 


Monocytes % (Manual)  4.0 % (0.0-7.3)   09/26/21  14:11    


 


Metamyelocytes %  2.0 %  09/26/21  14:11    


 


Myelocytes %  2.0 %  09/22/21  04:03    


 


Nucleated RBC %  Not Reportable   09/26/21  14:11    


 


Seg Neutrophils # Man  12.6 K/mm3 (1.8-7.7)  H  09/26/21  14:11    


 


Band Neutrophils #  0.1 K/mm3  09/26/21  14:11    


 


Lymphocytes # (Manual)  0.9 K/mm3 (1.2-5.4)  L  09/26/21  14:11    


 


Abs React Lymphs (Man)  0.1 K/mm3  09/26/21  14:11    


 


Monocytes # (Manual)  0.6 K/mm3 (0.0-0.8)   09/26/21  14:11    


 


Eosinophils # (Manual)  0.0 K/mm3 (0.0-0.4)   09/26/21  14:11    


 


Basophils # (Manual)  0.0 K/mm3 (0.0-0.1)   09/26/21  14:11    


 


Metamyelocytes #  0.3 K/mm3  09/26/21  14:11    


 


Myelocytes #  0.0 K/mm3  09/26/21  14:11    


 


Promyelocytes #  0.0 K/mm3  09/26/21  14:11    


 


Blast Cells #  0.0 K/mm3  09/26/21  14:11    


 


WBC Morphology  Not Reportable   09/26/21  14:11    


 


WBC Morphology  TNR   09/26/21  14:11    


 


Hypersegmented Neuts  Not Reportable   09/26/21  14:11    


 


Hyposegmented Neuts  Not Reportable   09/26/21  14:11    


 


Hypogranular Neuts  Not Reportable   09/26/21  14:11    


 


Smudge Cells  Not Reportable   09/26/21  14:11    


 


Toxic Granulation  Not Reportable   09/26/21  14:11    


 


Toxic Vacuolation  Not Reportable   09/26/21  14:11    


 


Dohle Bodies  Not Reportable   09/26/21  14:11    


 


Pelger-Huet Anomaly  Not Reportable   09/26/21  14:11    


 


Ac Rods  Not Reportable   09/26/21  14:11    


 


Platelet Estimate  Consistent w auto   09/26/21  14:11    


 


Clumped Platelets  Not Reportable   09/26/21  14:11    


 


Plt Clumps, EDTA  Not Reportable   09/26/21  14:11    


 


Large Platelets  Not Reportable   09/26/21  14:11    


 


Giant Platelets  Not Reportable   09/26/21  14:11    


 


Platelet Satelliting  Not Reportable   09/26/21  14:11    


 


Plt Morphology Comment  Not Reportable   09/26/21  14:11    


 


RBC Morphology  Not Reportable   09/26/21  14:11    


 


Dimorphic RBCs  Not Reportable   09/26/21  14:11    


 


Polychromasia  Few   09/26/21  14:11    


 


Hypochromasia  1+   09/26/21  14:11    


 


Poikilocytosis  Not Reportable   09/26/21  14:11    


 


Anisocytosis  Not Reportable   09/26/21  14:11    


 


Microcytosis  Not Reportable   09/26/21  14:11    


 


Macrocytosis  Not Reportable   09/26/21  14:11    


 


Spherocytes  Not Reportable   09/26/21  14:11    


 


Pappenheimer Bodies  Not Reportable   09/26/21  14:11    


 


Sickle Cells  Not Reportable   09/26/21  14:11    


 


Target Cells  1+   09/26/21  14:11    


 


Tear Drop Cells  Few   09/26/21  14:11    


 


Ovalocytes  Not Reportable   09/26/21  14:11    


 


Helmet Cells  Not Reportable   09/26/21  14:11    


 


[place]-Gardi Bodies  Not Reportable   09/26/21  14:11    


 


Cabot Rings  Not Reportable   09/26/21  14:11    


 


Apolinar Cells  Not Reportable   09/26/21  14:11    


 


Bite Cells  Not Reportable   09/26/21  14:11    


 


Crenated Cell  Not Reportable   09/26/21  14:11    


 


Elliptocytes  Not Reportable   09/26/21  14:11    


 


Acanthocytes (Spur)  Not Reportable   09/26/21  14:11    


 


Rouleaux  Not Reportable   09/26/21  14:11    


 


Hemoglobin C Crystals  Not Reportable   09/26/21  14:11    


 


Schistocytes  Not Reportable   09/26/21  14:11    


 


Malaria parasites  Not Reportable   09/26/21  14:11    


 


Sami Bodies  Not Reportable   09/26/21  14:11    


 


Hem Pathologist Commnt  No   09/26/21  14:11    


 


PT  18.7 Sec. (12.2-14.9)  H  10/02/21  16:55    


 


INR  1.51  (0.87-1.13)  H  10/02/21  16:55    


 


APTT  25.2 Sec. (24.2-36.6)   10/02/21  16:55    


 


D-Dimer  5410.10 ng/mlDDU (0-234)  H  10/01/21  16:03    


 


ABG pH  7.382  (7.320-7.450)   10/02/21  14:19    


 


POC ABG pCO2  67.4 mmHg (32.0-48.0)  H  10/02/21  14:19    


 


ABG pCO2  86.9 mm Hg  09/27/21  18:40    


 


POC ABG pO2  150.6 mmHg ()  H  10/02/21  14:19    


 


ABG pO2  81.8 mm Hg (80.0-90.0)   09/27/21  18:40    


 


POC ABG HCO3  39.2   10/02/21  14:19    


 


ABG HCO3  36.2 mmol/L (20.0-26.0)  H  09/27/21  18:40    


 


ABG O2 Saturation  99.4  (0-100)   10/02/21  14:19    


 


ABG O2 Content  10.4  (0.0-44)   09/27/21  18:40    


 


POC ABG Base Excess  12.2   10/02/21  14:19    


 


ABG Base Excess  7.4 mmol/L (-2.0-3.0)  H  09/27/21  18:40    


 


ABG Hemoglobin  9.3  (12.0-17.5)  L  10/02/21  14:19    


 


ABG Oxyhemoglobin  97.5  (94-98)   10/02/21  14:19    


 


ABG Carboxyhemoglobin  1.9 % (0.0-5.0)   09/27/21  18:40    


 


ABG Methemoglobin  0.3  (0.0-1.5)   10/02/21  14:19    


 


ABG Sodium  143.0 mmol/L (136.0-145.0)   10/02/21  14:19    


 


ABG Potassium  4.2 mmol/L (3.40-4.50)   10/02/21  14:19    


 


ABG Chloride  98.0 mmol/L ()   10/02/21  14:19    


 


ABG Glucose  162 mg/dL (65-95)  H  10/02/21  14:19    


 


Oxyhemoglobin  93.7 % (95.0-99.0)  L  09/27/21  18:40    


 


Carboxyhemoglobin  1.6  (0.5-1.5)  H  10/02/21  14:19    


 


FiO2  30 %  09/27/21  18:40    


 


FiO2 %  35.0   10/02/21  14:19    


 


Sodium  147 mmol/L (137-145)  H  10/04/21  04:22    


 


Potassium  4.8 mmol/L (3.6-5.0)   10/04/21  04:22    


 


Chloride  100.6 mmol/L ()   10/04/21  04:22    


 


Carbon Dioxide  37 mmol/L (22-30)  H  10/04/21  04:22    


 


Anion Gap  14 mmol/L  10/04/21  04:22    


 


BUN  74 mg/dL (7-17)  H  10/04/21  04:22    


 


Creatinine  1.3 mg/dL (0.6-1.2)  H  10/04/21  04:22    


 


Estimated GFR  57 ml/min  10/04/21  04:22    


 


BUN/Creatinine Ratio  57 %  10/04/21  04:22    


 


Glucose  193 mg/dL ()  H  10/04/21  04:22    


 


POC Glucose  194 mg/dL ()  H  10/06/21  17:22    


 


Osmolality  301 Mosm/kg  09/23/21  23:15    


 


Calcium  9.2 mg/dL (8.4-10.2)   10/04/21  04:22    


 


Ionized Calcium  3.6 mg/dL (4.8-5.6)  L  09/23/21  23:15    


 


Total Bilirubin  1.60 mg/dL (0.1-1.2)  H  10/01/21  08:55    


 


AST  172 units/L (5-40)  H  10/01/21  08:55    


 


ALT  437 units/L (7-56)  H  10/01/21  08:55    


 


Alkaline Phosphatase  92 units/L ()   10/01/21  08:55    


 


Total Creatine Kinase  1712 units/L ()  H  09/24/21  10:04    


 


Troponin T  0.078 ng/mL (0.00-0.029)  H D 09/22/21  13:03    


 


NT-Pro-B Natriuret Pep  7573 pg/mL (0-450)  H  09/21/21  22:04    


 


Total Protein  10.1 g/dL (6.3-8.2)  H  10/01/21  08:55    


 


Albumin  3.2 g/dL (3.9-5)  L  10/01/21  08:55    


 


Albumin/Globulin Ratio  0.5 %  10/01/21  08:55    


 


Triglycerides  105 mg/dL (2-149)   09/21/21  22:04    


 


Cholesterol  85 mg/dL ()   09/21/21  22:04    


 


LDL Cholesterol Direct  47 mg/dL ()  L  09/21/21  22:04    


 


HDL Cholesterol  25 mg/dL (40-59)  L  09/21/21  22:04    


 


Cholesterol/HDL Ratio  3.40 %  09/21/21  22:04    


 


TSH  6.140 mlU/mL (0.270-4.200)  H  10/02/21  19:55    


 


Free T4  0.67 ng/dL (0.76-1.46)  L  10/02/21  19:55    


 


Total Cortisol  41.3 mcg/dL (***)   09/24/21  10:04    


 


Arterial Blood Glucose  162 mg/dL (65-95)  H  10/02/21  14:19    


 


Arterial Blood Ionized Calcium  4.6 mg/dL (4.6-5.3)   09/30/21  07:32    


 


Urine Osmolality  157 Mosm/kg  09/23/21  18:46    


 


Urine Sodium  16 mmol/L  09/23/21  18:46    


 


Coronavirus (PCR)  Negative  (Negative)   09/28/21  Unknown


 


Hepatitis A IgM Ab  Non-reactive  (NonReactive)   09/28/21  18:45    


 


Hep Bs Antigen  Nonreactive  (Negative)   09/28/21  18:45    


 


Hepatitis C Antibody  Non-reactive  (NonReactive)   09/28/21  18:45    











Diamond/IV: 


                                        





Voiding Method                   Indwelling Catheter











Active Medications





- Current Medications


Current Medications: 














Generic Name Dose Route Start Last Admin





  Trade Name Freq  PRN Reason Stop Dose Admin


 


Acetaminophen  650 mg  09/22/21 03:00  10/01/21 06:42





  Acetaminophen 325 Mg Tab  PO   650 mg





  Q4H PRN   Administration





  Pain MILD(1-3)/Fever >100.5/HA  


 


Albuterol  2.5 mg  09/22/21 03:00 





  Albuterol 2.5 Mg/3 Ml Nebu  IH  





  Q4HRT PRN  





  Shortness Of Breath  


 


Albuterol/Ipratropium  1 ampul  09/22/21 08:00  10/06/21 14:46





  Ipratropium/Albuterol Sulfate 3 Ml Ampul.Neb  IH   1 ampul





  TIDRT DELL   Administration


 


Aspirin  325 mg  09/23/21 10:00  10/06/21 09:15





  Aspirin Ec 325 Mg Tab  PO   325 mg





  QDAY DELL   Administration


 


Atorvastatin Calcium  40 mg  09/22/21 22:00  10/05/21 22:24





  Atorvastatin 40 Mg Tab  PO   40 mg





  QHS DELL   Administration


 


Benzonatate  100 mg  09/22/21 06:00  10/06/21 13:52





  Benzonatate 100 Mg Cap  PO   100 mg





  Q8HR DELL   Administration


 


Guaifenesin  200 mg  09/26/21 14:47  10/01/21 06:43





  Guaifenesin 100 Mg/5 Ml Oral Liqd  PO   200 mg





  Q4H PRN   Administration





  Cough  


 


Haloperidol Lactate  5 mg  10/02/21 16:27  10/05/21 03:30





  Haloperidol Lactate 5 Mg/1 Ml Inj  IV   5 mg





  Q6H PRN   Administration





  Agitation  


 


Heparin Sodium (Porcine)  5,000 unit  10/02/21 14:00  10/06/21 13:52





  Heparin 5,000 Unit/1 Ml Vial  SUB-Q   5,000 unit





  Q8HR DELL   Administration


 


Hydralazine HCl  10 mg  10/03/21 17:25  10/06/21 08:54





  Hydralazine 20 Mg/1 Ml Inj  IV   10 mg





  Q6H PRN   Administration





  Blood Pressure  


 


Levothyroxine Sodium  100 mcg  10/06/21 06:00  10/06/21 05:58





  Levothyroxine 100 Mcg Tab  PO   100 mcg





  DAILY@0600 DELL   Administration


 


Methylprednisolone Sodium Succinate  20 mg  09/30/21 10:00  10/06/21 09:16





  Methylprednisolone Sod Succinate 40 Mg/1 Ml Inj  IV   20 mg





  Q12HR DELL   Administration


 


Nitroglycerin  0.4 mg  09/22/21 03:00 





  Nitroglycerin 0.4 Mg Tab Subl  SL  





  Q5M PRN  





  Chest Pain  


 


Nystatin  1 applic  09/22/21 18:00  10/06/21 13:53





  Nystatin Powder 15 Gm  TP   1 applic





  BID DELL   Administration


 


Ondansetron HCl  4 mg  09/22/21 03:00  09/29/21 23:51





  Ondansetron 4 Mg/2 Ml Inj  IV   4 mg





  Q8H PRN   Administration





  Nausea And Vomiting  


 


Oxycodone/Acetaminophen  1 tab  09/22/21 03:00  10/05/21 16:26





  Oxycodone /Acetaminophen 5-325mg Tab  PO   1 tab





  Q6H PRN   Administration





  Pain, Moderate (4-6)  


 


Pantoprazole Sodium  40 mg  10/07/21 07:30 





  Pantoprazole 40 Mg Tab  PO  





  QDAC DELL  


 


Quetiapine Fumarate  75 mg  10/02/21 22:00  10/05/21 22:24





  Quetiapine 25 Mg Tab  PO   75 mg





  QHS DELL   Administration


 


Sodium Chloride  10 ml  09/22/21 10:00  10/06/21 09:16





  Sodium Chloride 0.9% 10 Ml Flush Syringe  IV   10 ml





  BID DELL   Administration


 


Sodium Chloride  10 ml  09/22/21 03:00 





  Sodium Chloride 0.9% 10 Ml Flush Syringe  IV  





  PRN PRN  





  LINE FLUSH  


 


Tramadol HCl  50 mg  09/22/21 03:00  10/04/21 21:50





  Tramadol 50 Mg Tab  PO   50 mg





  Q6H PRN   Administration





  Pain, Moderate (4-6)  














Nutrition/Malnutrition Assess





- Dietary Evaluation


Nutrition/Malnutrition Findings: 


                                        





Nutrition Notes                                            Start:  09/22/21 

14:21


Freq:                                                      Status: Active       




Protocol:                                                                       




 Document     10/04/21 15:55  FORTINO  (Rec: 10/04/21 16:53  FORTINO  [id number])


 Nutrition Notes


     Need for Assessment generated from:         RN Referral


     Initial or Follow up                        Reassessment


     Current Diagnosis                           Heart Failure


     Other Pertinent Diagnosis                   Accute Kidney Injury


     Current Diet                                Renal Diet (from B 09/27).


     Labs/Tests                                  10/04: Na 147, CO2 37, BUN 74,


                                                 Cr 1.3, Glu 193.


     Pertinent Medications                       Reviewed.


     Height                                      5 ft 4 in


     Weight                                      277.3 kg


     Ideal Body Weight (kg)                      54.54


     BMI                                         104.9


     Intake Prior to Admission                   Good


     Weight change and time frame                expect weight fluctuations r/t


                                                 fluid therapy, CHF


                                                 complications


     Weight Status                               Morbidly Obese


     Subjective/Other Information                Pt developed accute renal


                                                 condition that requires


                                                 support from Renal Diet.


     Percent of energy/protein needs met:        Renal diet provides all energy


                                                 /protein needs (2072 Kcal/77 g


                                                 ) per day.


     Burn                                        Absent


     Trauma                                      Absent


     GI Symptoms                                 None


     Food Allergy                                No


     Skin Integrity/Comment                      Integumentary: clear, warm,


                                                 dry.


     Current % PO                                Good (%)


     Minimum of two criteria                     No physical signs of


                                                 malnutrition


     #1


      Nutrition Diagnosis                        Altered nutrition-related


                                                 laboratory values,Overweight/


                                                 obesity


      Comments:                                  Pt developed accute renal


                                                 condition that requires


                                                 support from Renal Diet.


      Etiology                                   Accute Kidney Injury resulted


                                                 from complications from


                                                 concomitant conditions


      As Evidenced by Signs and Symptoms         Altered lab values and MD


                                                 diagnosis


      Diagnosis Progress(for reassessment        Worsened


       documentation)                            


     Is patient on ventilator?                   No


     Is Patient Ambulatory and/or Out of Bed     Yes


     REE-([place] Jeor-ambulatory/OOB) [     4501.900


      [id number]]                               


     Kcal/Kg value to use for calculation        10


     Approximate Energy Requirements Using       2773


      kcal/Kg                                    


     Calculation Used for Recommendations        Kcal/Kg of IBW


     Additional Notes                            Protein 1.0 g/Kg; 55 g/day;


                                                 220 Kcal/day (from IBW).


                                                 Fluids: 1ml/kcal or per MD


 Nutrition Intervention


     Change Diet Order:                          Continue Renal Diet as MD


                                                 prescribed.


     Teaching Recipient                          Patient


     Learning Readiness                          Good


     Teaching Methods                            Discussion


     Response to Teaching                        Verbalize understanding,


                                                 Reinforcement needed


     Barriers to Learning                        Motivation,Emotional,Social


     Patient aware of follow up options          Yes


     Actions To Overcome Barriers                Collaboration with Other


                                                 Providers


     Goal #1                                     Achieve and maintain


                                                 acceptable chemistry lab


                                                 values during LOS


     Goal #2                                     Support Improvement in renal


                                                 functions while providing


                                                 energy/protein needs during


                                                 LOS


     Follow-Up By:                               10/11/21


     Additional Comments                         Continue monitoring % of food


                                                 intake and tolerance, and BM.


                                                 Reinforce education towards


                                                 bariatric therapy after accute


                                                 kidney injury is solved.

## 2021-10-07 LAB
%HYPO/RBC NFR BLD AUTO: (no result) %
ALBUMIN SERPL-MCNC: 3.2 G/DL (ref 3.9–5)
ALT SERPL-CCNC: 133 UNITS/L (ref 7–56)
ANISOCYTOSIS BLD QL SMEAR: (no result)
BAND NEUTROPHILS # (MANUAL): 0.2 K/MM3
BILIRUB DIRECT SERPL-MCNC: 0.8 MG/DL (ref 0–0.2)
BUN SERPL-MCNC: 74 MG/DL (ref 7–17)
BUN/CREAT SERPL: 57 %
CALCIUM SERPL-MCNC: 9.4 MG/DL (ref 8.4–10.2)
HCT VFR BLD CALC: 27.2 % (ref 30.3–42.9)
HEMOLYSIS INDEX: 0
HGB BLD-MCNC: 7.9 GM/DL (ref 10.1–14.3)
MCHC RBC AUTO-ENTMCNC: 29 % (ref 30–34)
MCV RBC AUTO: 72 FL (ref 79–97)
MYELOCYTES # (MANUAL): 0 K/MM3
PLATELET # BLD: 118 K/MM3 (ref 140–440)
PROMYELOCYTES # (MANUAL): 0 K/MM3
RBC # BLD AUTO: 3.79 M/MM3 (ref 3.65–5.03)
TOTAL CELLS COUNTED BLD: 100

## 2021-10-07 RX ADMIN — Medication SCH ML: at 23:07

## 2021-10-07 RX ADMIN — BENZONATATE SCH MG: 100 CAPSULE ORAL at 05:47

## 2021-10-07 RX ADMIN — BENZONATATE SCH: 100 CAPSULE ORAL at 23:01

## 2021-10-07 RX ADMIN — IPRATROPIUM BROMIDE AND ALBUTEROL SULFATE SCH AMPUL: .5; 3 SOLUTION RESPIRATORY (INHALATION) at 14:28

## 2021-10-07 RX ADMIN — HEPARIN SODIUM SCH UNIT: 5000 INJECTION, SOLUTION INTRAVENOUS; SUBCUTANEOUS at 05:47

## 2021-10-07 RX ADMIN — IPRATROPIUM BROMIDE AND ALBUTEROL SULFATE SCH AMPUL: .5; 3 SOLUTION RESPIRATORY (INHALATION) at 09:44

## 2021-10-07 RX ADMIN — IPRATROPIUM BROMIDE AND ALBUTEROL SULFATE SCH AMPUL: .5; 3 SOLUTION RESPIRATORY (INHALATION) at 20:56

## 2021-10-07 RX ADMIN — Medication SCH ML: at 11:00

## 2021-10-07 RX ADMIN — ASPIRIN SCH MG: 325 TABLET, COATED ORAL at 11:00

## 2021-10-07 RX ADMIN — BENZONATATE SCH MG: 100 CAPSULE ORAL at 03:08

## 2021-10-07 RX ADMIN — PANTOPRAZOLE SODIUM SCH MG: 40 TABLET, DELAYED RELEASE ORAL at 08:00

## 2021-10-07 RX ADMIN — METHYLPREDNISOLONE SODIUM SUCCINATE SCH MG: 40 INJECTION, POWDER, FOR SOLUTION INTRAMUSCULAR; INTRAVENOUS at 23:07

## 2021-10-07 RX ADMIN — LEVOTHYROXINE SODIUM SCH MCG: 0.1 TABLET ORAL at 05:47

## 2021-10-07 RX ADMIN — METHYLPREDNISOLONE SODIUM SUCCINATE SCH MG: 40 INJECTION, POWDER, FOR SOLUTION INTRAMUSCULAR; INTRAVENOUS at 11:00

## 2021-10-07 RX ADMIN — HEPARIN SODIUM SCH UNIT: 5000 INJECTION, SOLUTION INTRAVENOUS; SUBCUTANEOUS at 03:08

## 2021-10-07 RX ADMIN — NYSTATIN SCH APPLIC: 100000 POWDER TOPICAL at 11:00

## 2021-10-07 RX ADMIN — HEPARIN SODIUM SCH UNIT: 5000 INJECTION, SOLUTION INTRAVENOUS; SUBCUTANEOUS at 23:07

## 2021-10-07 RX ADMIN — DEXTROSE SCH MLS/HR: 5 SOLUTION INTRAVENOUS at 23:18

## 2021-10-07 NOTE — CONSULTATION
History of Present Illness





- Reason for Consult


Consult date: 10/07/21


Reason for consult: Visual hallucinations





- Chief Complaint


Chief complaint: 





Abdominal wound





- History of Present Psychiatric Illness


Per Note: 31 years old female with history of morbid obesity, hypertension, 

asthma COPD sleep apnea was brought to the emergency room because of shortness 

of breath, edema, generalized malaise, fatigue, and weakness for last couple of 

days.  She states she just does not feel well.  She has a headache.  She states 

her legs are swollen.  She feels as though she is retaining fluid.  She states 

that she went to her regular doctors on the 13th.  They tested her for Covid.  

It was negative.  She was told to go see a cardiologist.  She is not seen a 

cardiologist as of yet.  She came in here because she was not feeling well and 

did not know what to do.  She has had no sick contacts.  





Leia Farfan is a 31 year old female with unknown psychiatric history. In my 

interview with the patient she is confused. The patient stares and mumbles when 

asked questions.  Unable to assess SI/HI/AVHs.





PAST PSYCHIATRIC HISTORY:Unable to assess








PAST MEDICAL HISTORY: None reported or document





Family Psychiatric History: None reported or documented





SOCIAL HISTORY: Unable to assess








REVIEW OF SYSTEMS: Unable to assess 





MENTAL STATUS EXAMINATION: Unable to assess








 Assessment and Plan 


(1)  encounter for screening examination for mental health and behavioral 

disorder- Z13.30


Current Visit: Yes   Status: Acute


I agree with the current treatment plan.


  


The patient to comply with previously prescribed medications


Risks, benefits and alternatives of medications discussed with the patient, 

questions answered and consent obtained from patient.


PSYCHOTHERAPY: Supportive psychotherapy provided


MEDICAL: Per primary team


DELIRIUM PRECAUTIONS: Please re-orient patient frequently, keep lights on during

the day, and minimize benzodiazepines and opiates as these medications could 

worsen patient's confusion.


SAFETY SITTER: Defer to primary


DISPOSITION:Do not recommend acute inpatient psychiatric hospitalization at this

time. 


FOLLOW-UP: Will follow for medication management. 


 Post hospital care: primary care provider, psychiatric provider


Case staffed with Dr. Tavarez








Medications and Allergies





Medications and Allergies


                                    Allergies











Allergy/AdvReac Type Severity Reaction Status Date / Time


 


No Known Allergies Allergy   Unverified 07/13/17 11:17











                                Home Medications











 Medication  Instructions  Recorded  Confirmed  Last Taken  Type


 


Acetaminophen [Acetaminophen TAB] 325 mg PO Q4H PRN #30 tablet 12/14/17 01/22/21

Unknown Rx


 


ALBUTEROL NEB's [Proventil 0.083% 2.5 mg IH Q3HRT PRN #30 day 01/24/21  Unknown 

Rx





NEBS]     


 


Albuterol Mdi (or & Nicu Only) 2 puff IH QID PRN #1 inhalation 01/24/21  Unknown

Rx





[ProAir HFA Inhaler]     


 


Azithromycin [Zithromax Z-JAVIER] 0 mg PO DAILY #1 tab 01/24/21  Unknown Rx


 


Benzonatate [Tessalon Perles] 100 mg PO Q8HR #15 capsule 01/24/21  Unknown Rx


 


Famotidine [Pepcid] 20 mg PO BID #14 tablet 01/24/21  Unknown Rx


 


Ipratropium/Albuterol Sulfate 1 ampul IH TIDRT #30 day 01/24/21  Unknown Rx





[DUONEB *Not for PRN Use*]     


 


hydroCHLOROthiazide [HCTZ] 25 mg PO QDAY  tablet 01/24/21  Unknown Rx


 


hydroCHLOROthiazide [HCTZ] 25 mg PO QDAY #30 tablet 01/24/21  Unknown Rx


 


methylPREDNISolone [Medrol 4MG 4 mg PO DAILY #1 tab.ds.pk 01/24/21  Unknown Rx





DOSEPAK (21 tabs)]     











Active Meds: 


Active Medications





Acetaminophen (Acetaminophen 325 Mg Tab)  650 mg PO Q4H PRN


   PRN Reason: Pain MILD(1-3)/Fever >100.5/HA


   Last Admin: 10/01/21 06:42 Dose:  650 mg


   Documented by: 


Albuterol (Albuterol 2.5 Mg/3 Ml Nebu)  2.5 mg IH Q4HRT PRN


   PRN Reason: Shortness Of Breath


Albuterol/Ipratropium (Ipratropium/Albuterol Sulfate 3 Ml Ampul.Neb)  1 ampul IH

TIDRT [facility]


   Last Admin: 10/07/21 14:28 Dose:  1 ampul


   Documented by: 


Aspirin (Aspirin Ec 325 Mg Tab)  325 mg PO QDAY [facility]


   Last Admin: 10/07/21 11:00 Dose:  325 mg


   Documented by: 


Atorvastatin Calcium (Atorvastatin 40 Mg Tab)  40 mg PO QHS [facility]


   Last Admin: 10/06/21 22:27 Dose:  40 mg


   Documented by: 


Benzonatate (Benzonatate 100 Mg Cap)  100 mg PO Q8HR [facility]


   Last Admin: 10/07/21 05:47 Dose:  100 mg


   Documented by: 


Guaifenesin (Guaifenesin 100 Mg/5 Ml Oral Liqd)  200 mg PO Q4H PRN


   PRN Reason: Cough


   Last Admin: 10/01/21 06:43 Dose:  200 mg


   Documented by: 


Haloperidol Lactate (Haloperidol Lactate 5 Mg/1 Ml Inj)  5 mg IV Q6H PRN


   PRN Reason: Agitation


   Last Admin: 10/05/21 03:30 Dose:  5 mg


   Documented by: 


Heparin Sodium (Porcine) (Heparin 5,000 Unit/1 Ml Vial)  5,000 unit SUB-Q Q8HR 

[facility]


   Last Admin: 10/07/21 05:47 Dose:  5,000 unit


   Documented by: 


Hydralazine HCl (Hydralazine 20 Mg/1 Ml Inj)  10 mg IV Q6H PRN


   PRN Reason: Blood Pressure


   Last Admin: 10/06/21 08:54 Dose:  10 mg


   Documented by: 


Dextrose (D5w)  1,000 mls @ 75 mls/hr IV AS DIRECT DELL


Levothyroxine Sodium (Levothyroxine 100 Mcg Tab)  100 mcg PO DAILY@0600 [facility]


   Last Admin: 10/07/21 05:47 Dose:  100 mcg


   Documented by: 


Methylprednisolone Sodium Succinate (Methylprednisolone Sod Succinate 40 Mg/1 Ml

Inj)  20 mg IV Q12HR [facility]


   Last Admin: 10/07/21 11:00 Dose:  20 mg


   Documented by: 


Nitroglycerin (Nitroglycerin 0.4 Mg Tab Subl)  0.4 mg SL Q5M PRN


   PRN Reason: Chest Pain


Nystatin (Nystatin Powder 15 Gm)  1 applic TP BID [facility]


   Last Admin: 10/07/21 11:00 Dose:  1 applic


   Documented by: 


Ondansetron HCl (Ondansetron 4 Mg/2 Ml Inj)  4 mg IV Q8H PRN


   PRN Reason: Nausea And Vomiting


   Last Admin: 09/29/21 23:51 Dose:  4 mg


   Documented by: 


Oxycodone/Acetaminophen (Oxycodone /Acetaminophen 5-325mg Tab)  1 tab PO Q6H PRN


   PRN Reason: Pain, Moderate (4-6)


   Last Admin: 10/05/21 16:26 Dose:  1 tab


   Documented by: 


Pantoprazole Sodium (Pantoprazole 40 Mg Tab)  40 mg PO QDAC [facility]


   Last Admin: 10/07/21 08:00 Dose:  40 mg


   Documented by: 


Quetiapine Fumarate (Quetiapine 25 Mg Tab)  75 mg PO QHS [facility]


   Last Admin: 10/06/21 22:27 Dose:  75 mg


   Documented by: 


Sodium Chloride (Sodium Chloride 0.9% 10 Ml Flush Syringe)  10 ml IV BID [facility]


   Last Admin: 10/07/21 11:00 Dose:  10 ml


   Documented by: 


Sodium Chloride (Sodium Chloride 0.9% 10 Ml Flush Syringe)  10 ml IV PRN PRN


   PRN Reason: LINE FLUSH


Tramadol HCl (Tramadol 50 Mg Tab)  50 mg PO Q6H PRN


   PRN Reason: Pain, Moderate (4-6)


   Last Admin: 10/04/21 21:50 Dose:  50 mg


   Documented by: 











Mental Status Exam





- Vital signs


                                Last Vital Signs











Temp  100.2 F H  10/07/21 12:35


 


Pulse  116 H  10/07/21 14:25


 


Resp  20   10/07/21 14:25


 


BP  103/56   10/07/21 12:35


 


Pulse Ox  95   10/07/21 14:39














Results


Result Diagrams: 


                                 10/07/21 02:51





                                 10/07/21 00:54


                              Abnormal lab results











  10/06/21 10/06/21 10/07/21 Range/Units





  17:22 21:14 00:54 


 


WBC     (4.5-11.0)  K/mm3


 


Hgb     (10.1-14.3)  gm/dl


 


Hct     (30.3-42.9)  %


 


MCV     (79-97)  fl


 


MCH     (28-32)  pg


 


MCHC     (30-34)  %


 


RDW     (13.2-15.2)  %


 


Plt Count     (140-440)  K/mm3


 


Sodium    153 H  (137-145)  mmol/L


 


Carbon Dioxide    38 H  (22-30)  mmol/L


 


BUN    74 H  (7-17)  mg/dL


 


Creatinine    1.3 H  (0.6-1.2)  mg/dL


 


Glucose    205 H  ()  mg/dL


 


POC Glucose  194 H  176 H   ()  mg/dL


 


Total Bilirubin     (0.1-1.2)  mg/dL


 


Direct Bilirubin     (0-0.2)  mg/dL


 


ALT     (7-56)  units/L


 


Total Protein     (6.3-8.2)  g/dL


 


Albumin     (3.9-5)  g/dL














  10/07/21 10/07/21 Range/Units





  00:54 02:51 


 


WBC   11.6 H  (4.5-11.0)  K/mm3


 


Hgb   7.9 L  (10.1-14.3)  gm/dl


 


Hct   27.2 L  (30.3-42.9)  %


 


MCV   72 L  (79-97)  fl


 


MCH   21 L  (28-32)  pg


 


MCHC   29 L  (30-34)  %


 


RDW   25.8 H  (13.2-15.2)  %


 


Plt Count   118 L  (140-440)  K/mm3


 


Sodium    (137-145)  mmol/L


 


Carbon Dioxide    (22-30)  mmol/L


 


BUN    (7-17)  mg/dL


 


Creatinine    (0.6-1.2)  mg/dL


 


Glucose    ()  mg/dL


 


POC Glucose    ()  mg/dL


 


Total Bilirubin  1.40 H   (0.1-1.2)  mg/dL


 


Direct Bilirubin  0.8 H   (0-0.2)  mg/dL


 


ALT  133 H   (7-56)  units/L


 


Total Protein  8.4 H   (6.3-8.2)  g/dL


 


Albumin  3.2 L   (3.9-5)  g/dL








All other labs normal.

## 2021-10-07 NOTE — PROGRESS NOTE
Assessment and Plan








Acute hypoxemic and hypercapnic respiratory failure


Acute exacerbation of CHF (congestive heart failure)


Hypertension


Morbid obesity with BMI of 70 and over, adult


Obesity hypoventilation syndrome


Sleep apnea





- continue NIV qhs with prn daytime use


- continue avoid opiates / sedating drugs


- continue care as below otherwise;





- continue diuresis while following electrolytes


- continue to wean supplemental oxygen for target O2 sat's > 90% acutely


- aspiration precautions


- continue bronchodilators with pulmonary hygiene per RT


- continue accuchecks with glycemic control per SSI (While critically ill target

blood glucose of 140-180 mg/dL; avoid hypoglycemia)


- avoid nephrotoxins, renally dose all medications


- AB's per ID rec's 


- prn analgesia per pain score


- Maintenance of sleep-wake cycle, avoid delirium


- G.I. & VTE prophylaxis


- PT/OT/ROM exercises


- continue mobility protocols for pressure ulcer prophylaxis


- Monitor hemodynamics closely


- continue other care per attending / other consultants


- discharge planning ongoing concurrently





COVID SPECIFIC INTERVENTIONS


- COVID-19 assay negative





.... Re-evaluate in am & prn








Subjective


Date of service: 10/07/21


Principal diagnosis: Ac hypoxemic and hypercapnic resp failure; AE-CHF; Morbid 

obesity; FABIAN/OHS


Interval history: 





Patient is seen today for: Acute hypoxemic and hypercapnic respiratory failure; 

Acute exacerbation of CHF; Hypertension; Morbid obesity; FABIAN / OHS





Seen and examined at bedside; 24hour events reviewed; nursing and respiratory 

care staff consulted; no adverse overnight events reported to me; resting in 

bed; alert; denies chest pain; says SOB is improved; no N/V/F/C; FiO2 down to 

32%








Objective


                               Vital Signs - 12hr











  10/07/21 10/07/21 10/07/21





  03:46 06:18 07:37


 


Temperature 100.5 F H  98.0 F


 


Pulse Rate 123 H 124 H 


 


Respiratory 20  18





Rate   


 


Blood Pressure 89/52 109/56 115/66


 


O2 Sat by Pulse 88  





Oximetry   











Constitutional: no acute distress, asleep, other (young extremely obese female 

with mildly increased respiratory effort at rest)


Eyes: non-icteric


ENT: oropharynx moist, other (BIPAP FFM)


Neck: supple, no lymphadenopathy, no JVD


Effort: mildly labored


Ascultation: Bilateral: diminished breath sounds, rhonchi (scant)


Percussion: Bilateral: not dull


Cardiovascular: regular rate and rhythm


Gastrointestinal: normoactive bowel sounds, soft, non-tender, non-distended 

(protuberant)


Integumentary: rash (stasis dermatitis type), other (Stasis dermatititis on legs

and abdomen; see [facility] notes for full details)


Extremities: pulses normal, no ischemia or petechiae, edema, other (stasis 

dermatitis)


Neurologic: non-focal exam (grossly), pupils equal and round, CN II-XII normal


Psychiatric: depressed


CBC and BMP: 


                                 10/07/21 02:51





                                 10/07/21 00:54


ABG, PT/INR, D-dimer: 


ABG











ABG pH  7.382  (7.320-7.450)   10/02/21  14:19    


 


POC ABG pCO2  67.4 mmHg (32.0-48.0)  H  10/02/21  14:19    


 


ABG pCO2  86.9 mm Hg  09/27/21  18:40    


 


POC ABG pO2  150.6 mmHg ()  H  10/02/21  14:19    


 


ABG pO2  81.8 mm Hg (80.0-90.0)   09/27/21  18:40    


 


POC ABG HCO3  39.2   10/02/21  14:19    


 


ABG O2 Saturation  99.4  (0-100)   10/02/21  14:19    





PT/INR, D-dimer











PT  18.7 Sec. (12.2-14.9)  H  10/02/21  16:55    


 


INR  1.51  (0.87-1.13)  H  10/02/21  16:55    


 


D-Dimer  5410.10 ng/mlDDU (0-234)  H  10/01/21  16:03    








Abnormal lab findings: 


                                  Abnormal Labs











  09/21/21 09/21/21 09/22/21





  22:04 22:04 00:18


 


WBC  17.7 H  


 


Hgb  9.1 L  


 


Hct   


 


MCV  77 L  


 


MCH  21 L  


 


MCHC  27 L  


 


RDW  24.3 H  


 


Plt Count   


 


Seg Neuts % (Manual)   


 


Lymphocytes % (Manual)   


 


Nucleated RBC %   


 


Seg Neutrophils # Man   


 


Lymphocytes # (Manual)   


 


Monocytes # (Manual)   


 


PT   


 


INR   


 


D-Dimer   


 


ABG pH   


 


POC ABG pCO2   


 


POC ABG pO2   


 


ABG pO2   


 


ABG HCO3   


 


ABG Base Excess   


 


ABG Hemoglobin   


 


ABG Oxyhemoglobin   


 


ABG Sodium   


 


ABG Chloride   


 


ABG Glucose   


 


Oxyhemoglobin   


 


Carboxyhemoglobin   


 


Sodium   135 L 


 


Potassium   


 


Chloride   91.0 L 


 


Carbon Dioxide   17 L 


 


BUN   


 


Creatinine   1.4 H 


 


Glucose   55 L 


 


POC Glucose    49 L


 


Calcium   


 


Ionized Calcium   


 


Total Bilirubin   


 


Direct Bilirubin   


 


AST   


 


ALT   


 


Total Creatine Kinase   


 


Troponin T   0.043 H 


 


NT-Pro-B Natriuret Pep   7573 H 


 


Total Protein   


 


Albumin   


 


LDL Cholesterol Direct   47 L 


 


HDL Cholesterol   25 L 


 


TSH   


 


Free T4   


 


Arterial Blood Glucose   














  09/22/21 09/22/21 09/22/21





  04:03 04:03 09:22


 


WBC  24.3 H  


 


Hgb  9.3 L  


 


Hct   


 


MCV  74 L  


 


MCH  21 L  


 


MCHC  29 L  


 


RDW  23.1 H  


 


Plt Count   


 


Seg Neuts % (Manual)  78.0 H  


 


Lymphocytes % (Manual)  7.0 L  


 


Nucleated RBC %  1.0 H  


 


Seg Neutrophils # Man  19.0 H  


 


Lymphocytes # (Manual)   


 


Monocytes # (Manual)  1.0 H  


 


PT   


 


INR   


 


D-Dimer   


 


ABG pH   


 


POC ABG pCO2   


 


POC ABG pO2   


 


ABG pO2   


 


ABG HCO3   


 


ABG Base Excess   


 


ABG Hemoglobin   


 


ABG Oxyhemoglobin   


 


ABG Sodium   


 


ABG Chloride   


 


ABG Glucose   


 


Oxyhemoglobin   


 


Carboxyhemoglobin   


 


Sodium   134 L 


 


Potassium   


 


Chloride   91.2 L 


 


Carbon Dioxide   


 


BUN   


 


Creatinine   1.8 H 


 


Glucose   


 


POC Glucose   


 


Calcium   


 


Ionized Calcium   


 


Total Bilirubin   


 


Direct Bilirubin   


 


AST   


 


ALT   


 


Total Creatine Kinase   


 


Troponin T    0.099 H


 


NT-Pro-B Natriuret Pep   


 


Total Protein   


 


Albumin   


 


LDL Cholesterol Direct   


 


HDL Cholesterol   


 


TSH   


 


Free T4   


 


Arterial Blood Glucose   














  09/22/21 09/22/21 09/23/21





  13:03 20:28 04:52


 


WBC    20.9 H


 


Hgb    8.5 L


 


Hct    30.0 L


 


MCV    73 L


 


MCH    21 L


 


MCHC    28 L


 


RDW    23.9 H


 


Plt Count   


 


Seg Neuts % (Manual)    77.0 H


 


Lymphocytes % (Manual)    1.0 L


 


Nucleated RBC %    1.0 H


 


Seg Neutrophils # Man    16.1 H


 


Lymphocytes # (Manual)    0.2 L


 


Monocytes # (Manual)   


 


PT   


 


INR   


 


D-Dimer   


 


ABG pH   


 


POC ABG pCO2   


 


POC ABG pO2   


 


ABG pO2   


 


ABG HCO3   


 


ABG Base Excess   


 


ABG Hemoglobin   


 


ABG Oxyhemoglobin   


 


ABG Sodium   


 


ABG Chloride   


 


ABG Glucose   


 


Oxyhemoglobin   


 


Carboxyhemoglobin   


 


Sodium   


 


Potassium   


 


Chloride   


 


Carbon Dioxide   


 


BUN   


 


Creatinine   


 


Glucose   


 


POC Glucose   115 H 


 


Calcium   


 


Ionized Calcium   


 


Total Bilirubin   


 


Direct Bilirubin   


 


AST   


 


ALT   


 


Total Creatine Kinase   


 


Troponin T  0.078 H D  


 


NT-Pro-B Natriuret Pep   


 


Total Protein   


 


Albumin   


 


LDL Cholesterol Direct   


 


HDL Cholesterol   


 


TSH   


 


Free T4   


 


Arterial Blood Glucose   














  09/23/21 09/23/21 09/23/21





  04:52 08:46 11:50


 


WBC   


 


Hgb   


 


Hct   


 


MCV   


 


MCH   


 


MCHC   


 


RDW   


 


Plt Count   


 


Seg Neuts % (Manual)   


 


Lymphocytes % (Manual)   


 


Nucleated RBC %   


 


Seg Neutrophils # Man   


 


Lymphocytes # (Manual)   


 


Monocytes # (Manual)   


 


PT   


 


INR   


 


D-Dimer   


 


ABG pH   


 


POC ABG pCO2   


 


POC ABG pO2   


 


ABG pO2   


 


ABG HCO3   


 


ABG Base Excess   


 


ABG Hemoglobin   


 


ABG Oxyhemoglobin   


 


ABG Sodium   


 


ABG Chloride   


 


ABG Glucose   


 


Oxyhemoglobin   


 


Carboxyhemoglobin   


 


Sodium  129 L  


 


Potassium  5.6 H  


 


Chloride  88.6 L  


 


Carbon Dioxide   


 


BUN  27 H  


 


Creatinine  2.4 H  


 


Glucose  121 H  


 


POC Glucose   139 H  156 H


 


Calcium  7.7 L  


 


Ionized Calcium   


 


Total Bilirubin   


 


Direct Bilirubin   


 


AST   


 


ALT   


 


Total Creatine Kinase   


 


Troponin T   


 


NT-Pro-B Natriuret Pep   


 


Total Protein   


 


Albumin   


 


LDL Cholesterol Direct   


 


HDL Cholesterol   


 


TSH   


 


Free T4   


 


Arterial Blood Glucose   














  09/23/21 09/23/21 09/23/21





  15:46 16:58 22:08


 


WBC   


 


Hgb   


 


Hct   


 


MCV   


 


MCH   


 


MCHC   


 


RDW   


 


Plt Count   


 


Seg Neuts % (Manual)   


 


Lymphocytes % (Manual)   


 


Nucleated RBC %   


 


Seg Neutrophils # Man   


 


Lymphocytes # (Manual)   


 


Monocytes # (Manual)   


 


PT   


 


INR   


 


D-Dimer   


 


ABG pH   


 


POC ABG pCO2   


 


POC ABG pO2   


 


ABG pO2   


 


ABG HCO3   


 


ABG Base Excess   


 


ABG Hemoglobin   


 


ABG Oxyhemoglobin   


 


ABG Sodium   


 


ABG Chloride   


 


ABG Glucose   


 


Oxyhemoglobin   


 


Carboxyhemoglobin   


 


Sodium  128 L  


 


Potassium  5.5 H  


 


Chloride  88.1 L  


 


Carbon Dioxide   


 


BUN  33 H  


 


Creatinine  2.7 H  


 


Glucose  172 H  


 


POC Glucose   187 H  186 H


 


Calcium  7.3 L  


 


Ionized Calcium   


 


Total Bilirubin   


 


Direct Bilirubin   


 


AST   


 


ALT   


 


Total Creatine Kinase   


 


Troponin T   


 


NT-Pro-B Natriuret Pep   


 


Total Protein   


 


Albumin   


 


LDL Cholesterol Direct   


 


HDL Cholesterol   


 


TSH   


 


Free T4   


 


Arterial Blood Glucose   














  09/23/21 09/24/21 09/24/21





  23:15 02:20 07:39


 


WBC   


 


Hgb   


 


Hct   


 


MCV   


 


MCH   


 


MCHC   


 


RDW   


 


Plt Count   


 


Seg Neuts % (Manual)   


 


Lymphocytes % (Manual)   


 


Nucleated RBC %   


 


Seg Neutrophils # Man   


 


Lymphocytes # (Manual)   


 


Monocytes # (Manual)   


 


PT   


 


INR   


 


D-Dimer   


 


ABG pH   


 


POC ABG pCO2   


 


POC ABG pO2   


 


ABG pO2   


 


ABG HCO3   


 


ABG Base Excess   


 


ABG Hemoglobin   


 


ABG Oxyhemoglobin   


 


ABG Sodium   


 


ABG Chloride   


 


ABG Glucose   


 


Oxyhemoglobin   


 


Carboxyhemoglobin   


 


Sodium   128 L 


 


Potassium   5.2 H 


 


Chloride   88.6 L 


 


Carbon Dioxide   


 


BUN   39 H 


 


Creatinine   3.1 H 


 


Glucose   171 H 


 


POC Glucose    168 H


 


Calcium   7.2 L 


 


Ionized Calcium  3.6 L  


 


Total Bilirubin   


 


Direct Bilirubin   


 


AST   


 


ALT   


 


Total Creatine Kinase   


 


Troponin T   


 


NT-Pro-B Natriuret Pep   


 


Total Protein   


 


Albumin   


 


LDL Cholesterol Direct   


 


HDL Cholesterol   


 


TSH   


 


Free T4   


 


Arterial Blood Glucose   














  09/24/21 09/24/21 09/24/21





  10:04 11:37 17:18


 


WBC   


 


Hgb   


 


Hct   


 


MCV   


 


MCH   


 


MCHC   


 


RDW   


 


Plt Count   


 


Seg Neuts % (Manual)   


 


Lymphocytes % (Manual)   


 


Nucleated RBC %   


 


Seg Neutrophils # Man   


 


Lymphocytes # (Manual)   


 


Monocytes # (Manual)   


 


PT   


 


INR   


 


D-Dimer   


 


ABG pH   


 


POC ABG pCO2   


 


POC ABG pO2   


 


ABG pO2   


 


ABG HCO3   


 


ABG Base Excess   


 


ABG Hemoglobin   


 


ABG Oxyhemoglobin   


 


ABG Sodium   


 


ABG Chloride   


 


ABG Glucose   


 


Oxyhemoglobin   


 


Carboxyhemoglobin   


 


Sodium   


 


Potassium   


 


Chloride   


 


Carbon Dioxide   


 


BUN   


 


Creatinine   


 


Glucose   


 


POC Glucose   170 H  152 H


 


Calcium   


 


Ionized Calcium   


 


Total Bilirubin   


 


Direct Bilirubin   


 


AST   


 


ALT   


 


Total Creatine Kinase  1712 H  


 


Troponin T   


 


NT-Pro-B Natriuret Pep   


 


Total Protein   


 


Albumin   


 


LDL Cholesterol Direct   


 


HDL Cholesterol   


 


TSH   


 


Free T4   


 


Arterial Blood Glucose   














  09/24/21 09/24/21 09/25/21





  19:38 22:02 05:48


 


WBC   


 


Hgb   


 


Hct   


 


MCV   


 


MCH   


 


MCHC   


 


RDW   


 


Plt Count   


 


Seg Neuts % (Manual)   


 


Lymphocytes % (Manual)   


 


Nucleated RBC %   


 


Seg Neutrophils # Man   


 


Lymphocytes # (Manual)   


 


Monocytes # (Manual)   


 


PT   


 


INR   


 


D-Dimer   


 


ABG pH   


 


POC ABG pCO2   


 


POC ABG pO2   


 


ABG pO2   


 


ABG HCO3   


 


ABG Base Excess   


 


ABG Hemoglobin   


 


ABG Oxyhemoglobin   


 


ABG Sodium   


 


ABG Chloride   


 


ABG Glucose   


 


Oxyhemoglobin   


 


Carboxyhemoglobin   


 


Sodium  128 L   124 L


 


Potassium    5.8 H D


 


Chloride  89.6 L   89.4 L


 


Carbon Dioxide   


 


BUN  47 H   53 H


 


Creatinine  3.0 H   2.8 H


 


Glucose  165 H   150 H


 


POC Glucose   147 H 


 


Calcium  6.9 L   7.2 L


 


Ionized Calcium   


 


Total Bilirubin   


 


Direct Bilirubin   


 


AST   


 


ALT   


 


Total Creatine Kinase   


 


Troponin T   


 


NT-Pro-B Natriuret Pep   


 


Total Protein   


 


Albumin   


 


LDL Cholesterol Direct   


 


HDL Cholesterol   


 


TSH   


 


Free T4   


 


Arterial Blood Glucose   














  09/25/21 09/25/21 09/25/21





  08:48 11:35 19:12


 


WBC   


 


Hgb   


 


Hct   


 


MCV   


 


MCH   


 


MCHC   


 


RDW   


 


Plt Count   


 


Seg Neuts % (Manual)   


 


Lymphocytes % (Manual)   


 


Nucleated RBC %   


 


Seg Neutrophils # Man   


 


Lymphocytes # (Manual)   


 


Monocytes # (Manual)   


 


PT   


 


INR   


 


D-Dimer   


 


ABG pH   


 


POC ABG pCO2   


 


POC ABG pO2   


 


ABG pO2   


 


ABG HCO3   


 


ABG Base Excess   


 


ABG Hemoglobin   


 


ABG Oxyhemoglobin   


 


ABG Sodium   


 


ABG Chloride   


 


ABG Glucose   


 


Oxyhemoglobin   


 


Carboxyhemoglobin   


 


Sodium    128 L


 


Potassium   


 


Chloride    89.7 L


 


Carbon Dioxide   


 


BUN    53 H


 


Creatinine    2.4 H


 


Glucose    159 H


 


POC Glucose  152 H  152 H 


 


Calcium    7.1 L


 


Ionized Calcium   


 


Total Bilirubin   


 


Direct Bilirubin   


 


AST   


 


ALT   


 


Total Creatine Kinase   


 


Troponin T   


 


NT-Pro-B Natriuret Pep   


 


Total Protein   


 


Albumin   


 


LDL Cholesterol Direct   


 


HDL Cholesterol   


 


TSH   


 


Free T4   


 


Arterial Blood Glucose   














  09/25/21 09/26/21 09/26/21





  22:03 05:03 07:41


 


WBC   


 


Hgb   


 


Hct   


 


MCV   


 


MCH   


 


MCHC   


 


RDW   


 


Plt Count   


 


Seg Neuts % (Manual)   


 


Lymphocytes % (Manual)   


 


Nucleated RBC %   


 


Seg Neutrophils # Man   


 


Lymphocytes # (Manual)   


 


Monocytes # (Manual)   


 


PT   


 


INR   


 


D-Dimer   


 


ABG pH   


 


POC ABG pCO2   


 


POC ABG pO2   


 


ABG pO2   


 


ABG HCO3   


 


ABG Base Excess   


 


ABG Hemoglobin   


 


ABG Oxyhemoglobin   


 


ABG Sodium   


 


ABG Chloride   


 


ABG Glucose   


 


Oxyhemoglobin   


 


Carboxyhemoglobin   


 


Sodium   134 L 


 


Potassium   


 


Chloride   92.9 L 


 


Carbon Dioxide   33 H D 


 


BUN   54 H 


 


Creatinine   2.4 H 


 


Glucose   150 H 


 


POC Glucose  152 H   144 H


 


Calcium   7.2 L 


 


Ionized Calcium   


 


Total Bilirubin   


 


Direct Bilirubin   


 


AST   


 


ALT   


 


Total Creatine Kinase   


 


Troponin T   


 


NT-Pro-B Natriuret Pep   


 


Total Protein   


 


Albumin   


 


LDL Cholesterol Direct   


 


HDL Cholesterol   


 


TSH   


 


Free T4   


 


Arterial Blood Glucose   














  09/26/21 09/26/21 09/26/21





  11:38 14:11 17:02


 


WBC   14.7 H 


 


Hgb   8.7 L 


 


Hct   29.8 L 


 


MCV   74 L 


 


MCH   22 L 


 


MCHC   29 L 


 


RDW   24.9 H 


 


Plt Count   


 


Seg Neuts % (Manual)   86.0 H 


 


Lymphocytes % (Manual)   6.0 L 


 


Nucleated RBC %   


 


Seg Neutrophils # Man   12.6 H 


 


Lymphocytes # (Manual)   0.9 L 


 


Monocytes # (Manual)   


 


PT   


 


INR   


 


D-Dimer   


 


ABG pH   


 


POC ABG pCO2   


 


POC ABG pO2   


 


ABG pO2   


 


ABG HCO3   


 


ABG Base Excess   


 


ABG Hemoglobin   


 


ABG Oxyhemoglobin   


 


ABG Sodium   


 


ABG Chloride   


 


ABG Glucose   


 


Oxyhemoglobin   


 


Carboxyhemoglobin   


 


Sodium   


 


Potassium   


 


Chloride   


 


Carbon Dioxide   


 


BUN   


 


Creatinine   


 


Glucose   


 


POC Glucose  151 H   154 H


 


Calcium   


 


Ionized Calcium   


 


Total Bilirubin   


 


Direct Bilirubin   


 


AST   


 


ALT   


 


Total Creatine Kinase   


 


Troponin T   


 


NT-Pro-B Natriuret Pep   


 


Total Protein   


 


Albumin   


 


LDL Cholesterol Direct   


 


HDL Cholesterol   


 


TSH   


 


Free T4   


 


Arterial Blood Glucose   














  09/26/21 09/27/21 09/27/21





  23:14 05:03 10:00


 


WBC   


 


Hgb   


 


Hct   


 


MCV   


 


MCH   


 


MCHC   


 


RDW   


 


Plt Count   


 


Seg Neuts % (Manual)   


 


Lymphocytes % (Manual)   


 


Nucleated RBC %   


 


Seg Neutrophils # Man   


 


Lymphocytes # (Manual)   


 


Monocytes # (Manual)   


 


PT   


 


INR   


 


D-Dimer   


 


ABG pH    7.150 L*


 


POC ABG pCO2   


 


POC ABG pO2   


 


ABG pO2    91.8 H


 


ABG HCO3    37.2 H


 


ABG Base Excess    7.1 H


 


ABG Hemoglobin    7.1 L


 


ABG Oxyhemoglobin   


 


ABG Sodium   


 


ABG Chloride   


 


ABG Glucose   


 


Oxyhemoglobin    93.4 L


 


Carboxyhemoglobin   


 


Sodium   134 L 


 


Potassium   


 


Chloride   91.3 L 


 


Carbon Dioxide   33 H 


 


BUN   63 H 


 


Creatinine   2.2 H 


 


Glucose   159 H 


 


POC Glucose  151 H  


 


Calcium   7.8 L 


 


Ionized Calcium   


 


Total Bilirubin   


 


Direct Bilirubin   


 


AST   


 


ALT   


 


Total Creatine Kinase   


 


Troponin T   


 


NT-Pro-B Natriuret Pep   


 


Total Protein   


 


Albumin   


 


LDL Cholesterol Direct   


 


HDL Cholesterol   


 


TSH   


 


Free T4   


 


Arterial Blood Glucose   














  09/27/21 09/27/21 09/27/21





  12:39 16:37 18:40


 


WBC   


 


Hgb   


 


Hct   


 


MCV   


 


MCH   


 


MCHC   


 


RDW   


 


Plt Count   


 


Seg Neuts % (Manual)   


 


Lymphocytes % (Manual)   


 


Nucleated RBC %   


 


Seg Neutrophils # Man   


 


Lymphocytes # (Manual)   


 


Monocytes # (Manual)   


 


PT   


 


INR   


 


D-Dimer   


 


ABG pH    7.237 L


 


POC ABG pCO2   


 


POC ABG pO2   


 


ABG pO2   


 


ABG HCO3    36.2 H


 


ABG Base Excess    7.4 H


 


ABG Hemoglobin    7.8 L


 


ABG Oxyhemoglobin   


 


ABG Sodium   


 


ABG Chloride   


 


ABG Glucose   


 


Oxyhemoglobin    93.7 L


 


Carboxyhemoglobin   


 


Sodium   


 


Potassium   


 


Chloride   


 


Carbon Dioxide   


 


BUN   


 


Creatinine   


 


Glucose   


 


POC Glucose  140 H  132 H 


 


Calcium   


 


Ionized Calcium   


 


Total Bilirubin   


 


Direct Bilirubin   


 


AST   


 


ALT   


 


Total Creatine Kinase   


 


Troponin T   


 


NT-Pro-B Natriuret Pep   


 


Total Protein   


 


Albumin   


 


LDL Cholesterol Direct   


 


HDL Cholesterol   


 


TSH   


 


Free T4   


 


Arterial Blood Glucose   














  09/27/21 09/28/21 09/28/21





  23:55 04:27 04:27


 


WBC   


 


Hgb   8.5 L 


 


Hct   29.1 L 


 


MCV   72 L 


 


MCH   21 L 


 


MCHC   29 L 


 


RDW   24.9 H 


 


Plt Count   


 


Seg Neuts % (Manual)   


 


Lymphocytes % (Manual)   


 


Nucleated RBC %   


 


Seg Neutrophils # Man   


 


Lymphocytes # (Manual)   


 


Monocytes # (Manual)   


 


PT   


 


INR   


 


D-Dimer   


 


ABG pH   


 


POC ABG pCO2   


 


POC ABG pO2   


 


ABG pO2   


 


ABG HCO3   


 


ABG Base Excess   


 


ABG Hemoglobin   


 


ABG Oxyhemoglobin   


 


ABG Sodium   


 


ABG Chloride   


 


ABG Glucose   


 


Oxyhemoglobin   


 


Carboxyhemoglobin   


 


Sodium    135 L


 


Potassium   


 


Chloride    93.5 L


 


Carbon Dioxide    33 H


 


BUN    68 H


 


Creatinine    1.9 H


 


Glucose    143 H


 


POC Glucose  135 H  


 


Calcium    8.1 L


 


Ionized Calcium   


 


Total Bilirubin    1.50 H


 


Direct Bilirubin   


 


AST    494 H


 


ALT    835 H


 


Total Creatine Kinase   


 


Troponin T   


 


NT-Pro-B Natriuret Pep   


 


Total Protein    9.5 H


 


Albumin    3.1 L


 


LDL Cholesterol Direct   


 


HDL Cholesterol   


 


TSH   


 


Free T4   


 


Arterial Blood Glucose   














  09/28/21 09/28/21 09/29/21





  12:03 17:24 00:19


 


WBC   


 


Hgb   


 


Hct   


 


MCV   


 


MCH   


 


MCHC   


 


RDW   


 


Plt Count   


 


Seg Neuts % (Manual)   


 


Lymphocytes % (Manual)   


 


Nucleated RBC %   


 


Seg Neutrophils # Man   


 


Lymphocytes # (Manual)   


 


Monocytes # (Manual)   


 


PT   


 


INR   


 


D-Dimer   


 


ABG pH  7.291 L  


 


POC ABG pCO2  75.5 H  


 


POC ABG pO2  76.2 L  


 


ABG pO2   


 


ABG HCO3   


 


ABG Base Excess   


 


ABG Hemoglobin  9.9 L  


 


ABG Oxyhemoglobin  91.0 L  


 


ABG Sodium  134.9 L  


 


ABG Chloride  93.0 L  


 


ABG Glucose  146 H  


 


Oxyhemoglobin   


 


Carboxyhemoglobin  2.2 H  


 


Sodium   


 


Potassium   


 


Chloride   


 


Carbon Dioxide   


 


BUN   


 


Creatinine   


 


Glucose   


 


POC Glucose   134 H  136 H


 


Calcium   


 


Ionized Calcium   


 


Total Bilirubin   


 


Direct Bilirubin   


 


AST   


 


ALT   


 


Total Creatine Kinase   


 


Troponin T   


 


NT-Pro-B Natriuret Pep   


 


Total Protein   


 


Albumin   


 


LDL Cholesterol Direct   


 


HDL Cholesterol   


 


TSH   


 


Free T4   


 


Arterial Blood Glucose  146 H  














  09/29/21 09/29/21 09/29/21





  04:55 04:55 05:29


 


WBC   


 


Hgb  8.0 L  


 


Hct  27.1 L  


 


MCV  70 L  


 


MCH  21 L  


 


MCHC   


 


RDW  24.3 H  


 


Plt Count   


 


Seg Neuts % (Manual)   


 


Lymphocytes % (Manual)   


 


Nucleated RBC %   


 


Seg Neutrophils # Man   


 


Lymphocytes # (Manual)   


 


Monocytes # (Manual)   


 


PT   


 


INR   


 


D-Dimer   


 


ABG pH   


 


POC ABG pCO2   


 


POC ABG pO2   


 


ABG pO2   


 


ABG HCO3   


 


ABG Base Excess   


 


ABG Hemoglobin   


 


ABG Oxyhemoglobin   


 


ABG Sodium   


 


ABG Chloride   


 


ABG Glucose   


 


Oxyhemoglobin   


 


Carboxyhemoglobin   


 


Sodium   


 


Potassium   


 


Chloride   95.1 L 


 


Carbon Dioxide   36 H 


 


BUN   63 H 


 


Creatinine   1.5 H 


 


Glucose   131 H 


 


POC Glucose    118 H


 


Calcium   8.3 L 


 


Ionized Calcium   


 


Total Bilirubin   1.80 H 


 


Direct Bilirubin   


 


AST   323 H 


 


ALT   609 H 


 


Total Creatine Kinase   


 


Troponin T   


 


NT-Pro-B Natriuret Pep   


 


Total Protein   9.3 H 


 


Albumin   2.9 L 


 


LDL Cholesterol Direct   


 


HDL Cholesterol   


 


TSH   


 


Free T4   


 


Arterial Blood Glucose   














  09/29/21 09/29/21 09/29/21





  11:40 18:30 22:44


 


WBC   


 


Hgb   


 


Hct   


 


MCV   


 


MCH   


 


MCHC   


 


RDW   


 


Plt Count   


 


Seg Neuts % (Manual)   


 


Lymphocytes % (Manual)   


 


Nucleated RBC %   


 


Seg Neutrophils # Man   


 


Lymphocytes # (Manual)   


 


Monocytes # (Manual)   


 


PT   


 


INR   


 


D-Dimer   


 


ABG pH   


 


POC ABG pCO2   


 


POC ABG pO2   


 


ABG pO2   


 


ABG HCO3   


 


ABG Base Excess   


 


ABG Hemoglobin   


 


ABG Oxyhemoglobin   


 


ABG Sodium   


 


ABG Chloride   


 


ABG Glucose   


 


Oxyhemoglobin   


 


Carboxyhemoglobin   


 


Sodium   


 


Potassium   


 


Chloride   


 


Carbon Dioxide   


 


BUN   


 


Creatinine   


 


Glucose   


 


POC Glucose  139 H  130 H  123 H


 


Calcium   


 


Ionized Calcium   


 


Total Bilirubin   


 


Direct Bilirubin   


 


AST   


 


ALT   


 


Total Creatine Kinase   


 


Troponin T   


 


NT-Pro-B Natriuret Pep   


 


Total Protein   


 


Albumin   


 


LDL Cholesterol Direct   


 


HDL Cholesterol   


 


TSH   


 


Free T4   


 


Arterial Blood Glucose   














  09/30/21 09/30/21 09/30/21





  04:53 04:53 07:16


 


WBC  12.5 H  


 


Hgb  8.8 L  


 


Hct  30.2 L  


 


MCV  74 L  


 


MCH  21 L  


 


MCHC  29 L  


 


RDW  24.5 H  


 


Plt Count   


 


Seg Neuts % (Manual)   


 


Lymphocytes % (Manual)   


 


Nucleated RBC %   


 


Seg Neutrophils # Man   


 


Lymphocytes # (Manual)   


 


Monocytes # (Manual)   


 


PT   


 


INR   


 


D-Dimer   


 


ABG pH   


 


POC ABG pCO2   


 


POC ABG pO2   


 


ABG pO2   


 


ABG HCO3   


 


ABG Base Excess   


 


ABG Hemoglobin   


 


ABG Oxyhemoglobin   


 


ABG Sodium   


 


ABG Chloride   


 


ABG Glucose   


 


Oxyhemoglobin   


 


Carboxyhemoglobin   


 


Sodium   


 


Potassium   


 


Chloride   96.9 L 


 


Carbon Dioxide   35 H 


 


BUN   65 H 


 


Creatinine   1.5 H 


 


Glucose   142 H 


 


POC Glucose    138 H


 


Calcium   


 


Ionized Calcium   


 


Total Bilirubin   1.50 H 


 


Direct Bilirubin   


 


AST   251 H 


 


ALT   572 H 


 


Total Creatine Kinase   


 


Troponin T   


 


NT-Pro-B Natriuret Pep   


 


Total Protein   10.1 H 


 


Albumin   3.2 L 


 


LDL Cholesterol Direct   


 


HDL Cholesterol   


 


TSH   


 


Free T4   


 


Arterial Blood Glucose   














  09/30/21 09/30/21 09/30/21





  07:32 11:29 14:42


 


WBC   


 


Hgb   


 


Hct   


 


MCV   


 


MCH   


 


MCHC   


 


RDW   


 


Plt Count   


 


Seg Neuts % (Manual)   


 


Lymphocytes % (Manual)   


 


Nucleated RBC %   


 


Seg Neutrophils # Man   


 


Lymphocytes # (Manual)   


 


Monocytes # (Manual)   


 


PT   


 


INR   


 


D-Dimer   


 


ABG pH  7.086 L   7.188 L


 


POC ABG pCO2  142.2 H   99.3 H


 


POC ABG pO2  196.3 H   132.3 H


 


ABG pO2   


 


ABG HCO3   


 


ABG Base Excess   


 


ABG Hemoglobin  10.0 L   9.2 L


 


ABG Oxyhemoglobin   


 


ABG Sodium   


 


ABG Chloride  96.0 L   96.0 L


 


ABG Glucose  147 H   146 H


 


Oxyhemoglobin   


 


Carboxyhemoglobin  1.6 H   2.0 H


 


Sodium   


 


Potassium   


 


Chloride   


 


Carbon Dioxide   


 


BUN   


 


Creatinine   


 


Glucose   


 


POC Glucose   131 H 


 


Calcium   


 


Ionized Calcium   


 


Total Bilirubin   


 


Direct Bilirubin   


 


AST   


 


ALT   


 


Total Creatine Kinase   


 


Troponin T   


 


NT-Pro-B Natriuret Pep   


 


Total Protein   


 


Albumin   


 


LDL Cholesterol Direct   


 


HDL Cholesterol   


 


TSH   


 


Free T4   


 


Arterial Blood Glucose  147 H   146 H














  09/30/21 09/30/21 10/01/21





  17:23 22:34 07:44


 


WBC   


 


Hgb   


 


Hct   


 


MCV   


 


MCH   


 


MCHC   


 


RDW   


 


Plt Count   


 


Seg Neuts % (Manual)   


 


Lymphocytes % (Manual)   


 


Nucleated RBC %   


 


Seg Neutrophils # Man   


 


Lymphocytes # (Manual)   


 


Monocytes # (Manual)   


 


PT   


 


INR   


 


D-Dimer   


 


ABG pH   


 


POC ABG pCO2   


 


POC ABG pO2   


 


ABG pO2   


 


ABG HCO3   


 


ABG Base Excess   


 


ABG Hemoglobin   


 


ABG Oxyhemoglobin   


 


ABG Sodium   


 


ABG Chloride   


 


ABG Glucose   


 


Oxyhemoglobin   


 


Carboxyhemoglobin   


 


Sodium   


 


Potassium   


 


Chloride   


 


Carbon Dioxide   


 


BUN   


 


Creatinine   


 


Glucose   


 


POC Glucose  117 H  169 H  150 H


 


Calcium   


 


Ionized Calcium   


 


Total Bilirubin   


 


Direct Bilirubin   


 


AST   


 


ALT   


 


Total Creatine Kinase   


 


Troponin T   


 


NT-Pro-B Natriuret Pep   


 


Total Protein   


 


Albumin   


 


LDL Cholesterol Direct   


 


HDL Cholesterol   


 


TSH   


 


Free T4   


 


Arterial Blood Glucose   














  10/01/21 10/01/21 10/01/21





  08:55 08:55 09:47


 


WBC   


 


Hgb  8.7 L  


 


Hct  29.4 L  


 


MCV  74 L  


 


MCH  22 L  


 


MCHC   


 


RDW  24.8 H  


 


Plt Count   


 


Seg Neuts % (Manual)   


 


Lymphocytes % (Manual)   


 


Nucleated RBC %   


 


Seg Neutrophils # Man   


 


Lymphocytes # (Manual)   


 


Monocytes # (Manual)   


 


PT   


 


INR   


 


D-Dimer   


 


ABG pH    7.234 L


 


POC ABG pCO2    90.5 H


 


POC ABG pO2    139.4 H


 


ABG pO2   


 


ABG HCO3   


 


ABG Base Excess   


 


ABG Hemoglobin    9.5 L


 


ABG Oxyhemoglobin   


 


ABG Sodium   


 


ABG Chloride    97.0 L


 


ABG Glucose    179 H


 


Oxyhemoglobin   


 


Carboxyhemoglobin    1.7 H


 


Sodium   


 


Potassium   


 


Chloride   94.4 L 


 


Carbon Dioxide   37 H 


 


BUN   68 H 


 


Creatinine   1.5 H 


 


Glucose   178 H 


 


POC Glucose   


 


Calcium   


 


Ionized Calcium   


 


Total Bilirubin   1.60 H 


 


Direct Bilirubin   


 


AST   172 H 


 


ALT   437 H 


 


Total Creatine Kinase   


 


Troponin T   


 


NT-Pro-B Natriuret Pep   


 


Total Protein   10.1 H 


 


Albumin   3.2 L 


 


LDL Cholesterol Direct   


 


HDL Cholesterol   


 


TSH   


 


Free T4   


 


Arterial Blood Glucose    179 H














  10/01/21 10/01/21 10/01/21





  11:39 16:03 17:24


 


WBC   


 


Hgb   


 


Hct   


 


MCV   


 


MCH   


 


MCHC   


 


RDW   


 


Plt Count   


 


Seg Neuts % (Manual)   


 


Lymphocytes % (Manual)   


 


Nucleated RBC %   


 


Seg Neutrophils # Man   


 


Lymphocytes # (Manual)   


 


Monocytes # (Manual)   


 


PT   


 


INR   


 


D-Dimer   5410.10 H 


 


ABG pH   


 


POC ABG pCO2   


 


POC ABG pO2   


 


ABG pO2   


 


ABG HCO3   


 


ABG Base Excess   


 


ABG Hemoglobin   


 


ABG Oxyhemoglobin   


 


ABG Sodium   


 


ABG Chloride   


 


ABG Glucose   


 


Oxyhemoglobin   


 


Carboxyhemoglobin   


 


Sodium   


 


Potassium   


 


Chloride   


 


Carbon Dioxide   


 


BUN   


 


Creatinine   


 


Glucose   


 


POC Glucose  161 H   130 H


 


Calcium   


 


Ionized Calcium   


 


Total Bilirubin   


 


Direct Bilirubin   


 


AST   


 


ALT   


 


Total Creatine Kinase   


 


Troponin T   


 


NT-Pro-B Natriuret Pep   


 


Total Protein   


 


Albumin   


 


LDL Cholesterol Direct   


 


HDL Cholesterol   


 


TSH   


 


Free T4   


 


Arterial Blood Glucose   














  10/01/21 10/02/21 10/02/21





  21:52 10:20 11:58


 


WBC   


 


Hgb   


 


Hct   


 


MCV   


 


MCH   


 


MCHC   


 


RDW   


 


Plt Count   


 


Seg Neuts % (Manual)   


 


Lymphocytes % (Manual)   


 


Nucleated RBC %   


 


Seg Neutrophils # Man   


 


Lymphocytes # (Manual)   


 


Monocytes # (Manual)   


 


PT   


 


INR   


 


D-Dimer   


 


ABG pH   


 


POC ABG pCO2   


 


POC ABG pO2   


 


ABG pO2   


 


ABG HCO3   


 


ABG Base Excess   


 


ABG Hemoglobin   


 


ABG Oxyhemoglobin   


 


ABG Sodium   


 


ABG Chloride   


 


ABG Glucose   


 


Oxyhemoglobin   


 


Carboxyhemoglobin   


 


Sodium   146 H D 


 


Potassium   


 


Chloride   


 


Carbon Dioxide   


 


BUN   75 H 


 


Creatinine   1.6 H 


 


Glucose   158 H 


 


POC Glucose  150 H   143 H


 


Calcium   


 


Ionized Calcium   


 


Total Bilirubin   


 


Direct Bilirubin   


 


AST   


 


ALT   


 


Total Creatine Kinase   


 


Troponin T   


 


NT-Pro-B Natriuret Pep   


 


Total Protein   


 


Albumin   


 


LDL Cholesterol Direct   


 


HDL Cholesterol   


 


TSH   


 


Free T4   


 


Arterial Blood Glucose   














  10/02/21 10/02/21 10/02/21





  14:19 16:55 16:55


 


WBC   


 


Hgb   8.3 L 


 


Hct   27.7 L 


 


MCV   


 


MCH   


 


MCHC   


 


RDW   


 


Plt Count   


 


Seg Neuts % (Manual)   


 


Lymphocytes % (Manual)   


 


Nucleated RBC %   


 


Seg Neutrophils # Man   


 


Lymphocytes # (Manual)   


 


Monocytes # (Manual)   


 


PT    18.7 H


 


INR    1.51 H


 


D-Dimer   


 


ABG pH   


 


POC ABG pCO2  67.4 H  


 


POC ABG pO2  150.6 H  


 


ABG pO2   


 


ABG HCO3   


 


ABG Base Excess   


 


ABG Hemoglobin  9.3 L  


 


ABG Oxyhemoglobin   


 


ABG Sodium   


 


ABG Chloride   


 


ABG Glucose  162 H  


 


Oxyhemoglobin   


 


Carboxyhemoglobin  1.6 H  


 


Sodium   


 


Potassium   


 


Chloride   


 


Carbon Dioxide   


 


BUN   


 


Creatinine   


 


Glucose   


 


POC Glucose   


 


Calcium   


 


Ionized Calcium   


 


Total Bilirubin   


 


Direct Bilirubin   


 


AST   


 


ALT   


 


Total Creatine Kinase   


 


Troponin T   


 


NT-Pro-B Natriuret Pep   


 


Total Protein   


 


Albumin   


 


LDL Cholesterol Direct   


 


HDL Cholesterol   


 


TSH   


 


Free T4   


 


Arterial Blood Glucose  162 H  














  10/02/21 10/02/21 10/02/21





  17:40 19:55 19:55


 


WBC   


 


Hgb   


 


Hct   


 


MCV   


 


MCH   


 


MCHC   


 


RDW   


 


Plt Count   


 


Seg Neuts % (Manual)   


 


Lymphocytes % (Manual)   


 


Nucleated RBC %   


 


Seg Neutrophils # Man   


 


Lymphocytes # (Manual)   


 


Monocytes # (Manual)   


 


PT   


 


INR   


 


D-Dimer   


 


ABG pH   


 


POC ABG pCO2   


 


POC ABG pO2   


 


ABG pO2   


 


ABG HCO3   


 


ABG Base Excess   


 


ABG Hemoglobin   


 


ABG Oxyhemoglobin   


 


ABG Sodium   


 


ABG Chloride   


 


ABG Glucose   


 


Oxyhemoglobin   


 


Carboxyhemoglobin   


 


Sodium   


 


Potassium   


 


Chloride   


 


Carbon Dioxide   


 


BUN   


 


Creatinine   


 


Glucose   


 


POC Glucose  151 H  


 


Calcium   


 


Ionized Calcium   


 


Total Bilirubin   


 


Direct Bilirubin   


 


AST   


 


ALT   


 


Total Creatine Kinase   


 


Troponin T   


 


NT-Pro-B Natriuret Pep   


 


Total Protein   


 


Albumin   


 


LDL Cholesterol Direct   


 


HDL Cholesterol   


 


TSH    6.140 H


 


Free T4   0.67 L 


 


Arterial Blood Glucose   














  10/02/21 10/03/21 10/03/21





  22:19 04:40 04:40


 


WBC   


 


Hgb    8.1 L


 


Hct    27.5 L


 


MCV    73 L


 


MCH    22 L


 


MCHC   


 


RDW    25.4 H


 


Plt Count   


 


Seg Neuts % (Manual)   


 


Lymphocytes % (Manual)   


 


Nucleated RBC %   


 


Seg Neutrophils # Man   


 


Lymphocytes # (Manual)   


 


Monocytes # (Manual)   


 


PT   


 


INR   


 


D-Dimer   


 


ABG pH   


 


POC ABG pCO2   


 


POC ABG pO2   


 


ABG pO2   


 


ABG HCO3   


 


ABG Base Excess   


 


ABG Hemoglobin   


 


ABG Oxyhemoglobin   


 


ABG Sodium   


 


ABG Chloride   


 


ABG Glucose   


 


Oxyhemoglobin   


 


Carboxyhemoglobin   


 


Sodium   


 


Potassium   


 


Chloride   


 


Carbon Dioxide   37 H D 


 


BUN   76 H 


 


Creatinine   1.6 H 


 


Glucose   173 H 


 


POC Glucose  157 H  


 


Calcium   


 


Ionized Calcium   


 


Total Bilirubin   


 


Direct Bilirubin   


 


AST   


 


ALT   


 


Total Creatine Kinase   


 


Troponin T   


 


NT-Pro-B Natriuret Pep   


 


Total Protein   


 


Albumin   


 


LDL Cholesterol Direct   


 


HDL Cholesterol   


 


TSH   


 


Free T4   


 


Arterial Blood Glucose   














  10/03/21 10/03/21 10/03/21





  11:36 17:20 21:17


 


WBC   


 


Hgb   


 


Hct   


 


MCV   


 


MCH   


 


MCHC   


 


RDW   


 


Plt Count   


 


Seg Neuts % (Manual)   


 


Lymphocytes % (Manual)   


 


Nucleated RBC %   


 


Seg Neutrophils # Man   


 


Lymphocytes # (Manual)   


 


Monocytes # (Manual)   


 


PT   


 


INR   


 


D-Dimer   


 


ABG pH   


 


POC ABG pCO2   


 


POC ABG pO2   


 


ABG pO2   


 


ABG HCO3   


 


ABG Base Excess   


 


ABG Hemoglobin   


 


ABG Oxyhemoglobin   


 


ABG Sodium   


 


ABG Chloride   


 


ABG Glucose   


 


Oxyhemoglobin   


 


Carboxyhemoglobin   


 


Sodium   


 


Potassium   


 


Chloride   


 


Carbon Dioxide   


 


BUN   


 


Creatinine   


 


Glucose   


 


POC Glucose  168 H  174 H  163 H


 


Calcium   


 


Ionized Calcium   


 


Total Bilirubin   


 


Direct Bilirubin   


 


AST   


 


ALT   


 


Total Creatine Kinase   


 


Troponin T   


 


NT-Pro-B Natriuret Pep   


 


Total Protein   


 


Albumin   


 


LDL Cholesterol Direct   


 


HDL Cholesterol   


 


TSH   


 


Free T4   


 


Arterial Blood Glucose   














  10/04/21 10/04/21 10/04/21





  04:22 11:27 17:12


 


WBC   


 


Hgb   


 


Hct   


 


MCV   


 


MCH   


 


MCHC   


 


RDW   


 


Plt Count   


 


Seg Neuts % (Manual)   


 


Lymphocytes % (Manual)   


 


Nucleated RBC %   


 


Seg Neutrophils # Man   


 


Lymphocytes # (Manual)   


 


Monocytes # (Manual)   


 


PT   


 


INR   


 


D-Dimer   


 


ABG pH   


 


POC ABG pCO2   


 


POC ABG pO2   


 


ABG pO2   


 


ABG HCO3   


 


ABG Base Excess   


 


ABG Hemoglobin   


 


ABG Oxyhemoglobin   


 


ABG Sodium   


 


ABG Chloride   


 


ABG Glucose   


 


Oxyhemoglobin   


 


Carboxyhemoglobin   


 


Sodium  147 H  


 


Potassium   


 


Chloride   


 


Carbon Dioxide  37 H  


 


BUN  74 H  


 


Creatinine  1.3 H  


 


Glucose  193 H  


 


POC Glucose   154 H  162 H


 


Calcium   


 


Ionized Calcium   


 


Total Bilirubin   


 


Direct Bilirubin   


 


AST   


 


ALT   


 


Total Creatine Kinase   


 


Troponin T   


 


NT-Pro-B Natriuret Pep   


 


Total Protein   


 


Albumin   


 


LDL Cholesterol Direct   


 


HDL Cholesterol   


 


TSH   


 


Free T4   


 


Arterial Blood Glucose   














  10/04/21 10/05/21 10/05/21





  21:59 09:05 12:28


 


WBC   


 


Hgb   


 


Hct   


 


MCV   


 


MCH   


 


MCHC   


 


RDW   


 


Plt Count   


 


Seg Neuts % (Manual)   


 


Lymphocytes % (Manual)   


 


Nucleated RBC %   


 


Seg Neutrophils # Man   


 


Lymphocytes # (Manual)   


 


Monocytes # (Manual)   


 


PT   


 


INR   


 


D-Dimer   


 


ABG pH   


 


POC ABG pCO2   


 


POC ABG pO2   


 


ABG pO2   


 


ABG HCO3   


 


ABG Base Excess   


 


ABG Hemoglobin   


 


ABG Oxyhemoglobin   


 


ABG Sodium   


 


ABG Chloride   


 


ABG Glucose   


 


Oxyhemoglobin   


 


Carboxyhemoglobin   


 


Sodium   


 


Potassium   


 


Chloride   


 


Carbon Dioxide   


 


BUN   


 


Creatinine   


 


Glucose   


 


POC Glucose  160 H  168 H  174 H


 


Calcium   


 


Ionized Calcium   


 


Total Bilirubin   


 


Direct Bilirubin   


 


AST   


 


ALT   


 


Total Creatine Kinase   


 


Troponin T   


 


NT-Pro-B Natriuret Pep   


 


Total Protein   


 


Albumin   


 


LDL Cholesterol Direct   


 


HDL Cholesterol   


 


TSH   


 


Free T4   


 


Arterial Blood Glucose   














  10/05/21 10/05/21 10/06/21





  17:27 21:42 08:15


 


WBC   


 


Hgb   


 


Hct   


 


MCV   


 


MCH   


 


MCHC   


 


RDW   


 


Plt Count   


 


Seg Neuts % (Manual)   


 


Lymphocytes % (Manual)   


 


Nucleated RBC %   


 


Seg Neutrophils # Man   


 


Lymphocytes # (Manual)   


 


Monocytes # (Manual)   


 


PT   


 


INR   


 


D-Dimer   


 


ABG pH   


 


POC ABG pCO2   


 


POC ABG pO2   


 


ABG pO2   


 


ABG HCO3   


 


ABG Base Excess   


 


ABG Hemoglobin   


 


ABG Oxyhemoglobin   


 


ABG Sodium   


 


ABG Chloride   


 


ABG Glucose   


 


Oxyhemoglobin   


 


Carboxyhemoglobin   


 


Sodium   


 


Potassium   


 


Chloride   


 


Carbon Dioxide   


 


BUN   


 


Creatinine   


 


Glucose   


 


POC Glucose  175 H  158 H  166 H


 


Calcium   


 


Ionized Calcium   


 


Total Bilirubin   


 


Direct Bilirubin   


 


AST   


 


ALT   


 


Total Creatine Kinase   


 


Troponin T   


 


NT-Pro-B Natriuret Pep   


 


Total Protein   


 


Albumin   


 


LDL Cholesterol Direct   


 


HDL Cholesterol   


 


TSH   


 


Free T4   


 


Arterial Blood Glucose   














  10/06/21 10/06/21 10/06/21





  11:28 17:22 21:14


 


WBC   


 


Hgb   


 


Hct   


 


MCV   


 


MCH   


 


MCHC   


 


RDW   


 


Plt Count   


 


Seg Neuts % (Manual)   


 


Lymphocytes % (Manual)   


 


Nucleated RBC %   


 


Seg Neutrophils # Man   


 


Lymphocytes # (Manual)   


 


Monocytes # (Manual)   


 


PT   


 


INR   


 


D-Dimer   


 


ABG pH   


 


POC ABG pCO2   


 


POC ABG pO2   


 


ABG pO2   


 


ABG HCO3   


 


ABG Base Excess   


 


ABG Hemoglobin   


 


ABG Oxyhemoglobin   


 


ABG Sodium   


 


ABG Chloride   


 


ABG Glucose   


 


Oxyhemoglobin   


 


Carboxyhemoglobin   


 


Sodium   


 


Potassium   


 


Chloride   


 


Carbon Dioxide   


 


BUN   


 


Creatinine   


 


Glucose   


 


POC Glucose  198 H  194 H  176 H


 


Calcium   


 


Ionized Calcium   


 


Total Bilirubin   


 


Direct Bilirubin   


 


AST   


 


ALT   


 


Total Creatine Kinase   


 


Troponin T   


 


NT-Pro-B Natriuret Pep   


 


Total Protein   


 


Albumin   


 


LDL Cholesterol Direct   


 


HDL Cholesterol   


 


TSH   


 


Free T4   


 


Arterial Blood Glucose   














  10/07/21 10/07/21 10/07/21





  00:54 00:54 02:51


 


WBC    11.6 H


 


Hgb    7.9 L


 


Hct    27.2 L


 


MCV    72 L


 


MCH    21 L


 


MCHC    29 L


 


RDW    25.8 H


 


Plt Count    118 L


 


Seg Neuts % (Manual)   


 


Lymphocytes % (Manual)   


 


Nucleated RBC %   


 


Seg Neutrophils # Man   


 


Lymphocytes # (Manual)   


 


Monocytes # (Manual)   


 


PT   


 


INR   


 


D-Dimer   


 


ABG pH   


 


POC ABG pCO2   


 


POC ABG pO2   


 


ABG pO2   


 


ABG HCO3   


 


ABG Base Excess   


 


ABG Hemoglobin   


 


ABG Oxyhemoglobin   


 


ABG Sodium   


 


ABG Chloride   


 


ABG Glucose   


 


Oxyhemoglobin   


 


Carboxyhemoglobin   


 


Sodium  153 H  


 


Potassium   


 


Chloride   


 


Carbon Dioxide  38 H  


 


BUN  74 H  


 


Creatinine  1.3 H  


 


Glucose  205 H  


 


POC Glucose   


 


Calcium   


 


Ionized Calcium   


 


Total Bilirubin   1.40 H 


 


Direct Bilirubin   0.8 H 


 


AST   


 


ALT   133 H 


 


Total Creatine Kinase   


 


Troponin T   


 


NT-Pro-B Natriuret Pep   


 


Total Protein   8.4 H 


 


Albumin   3.2 L 


 


LDL Cholesterol Direct   


 


HDL Cholesterol   


 


TSH   


 


Free T4   


 


Arterial Blood Glucose   











Allied health notes reviewed: nursing

## 2021-10-07 NOTE — PROGRESS NOTE
Assessment and Plan


Assessment and plan: 


-Acute on chronic hypoxic respiratory failure; requiring BiPAP


Current Visit: Yes   Status: Acute


Continue oxygen titrate O2 sats more than 90%


Patient sometimes requires continuous BiPAP


Multifactorial obesity hypoventilation syndrome


Obstructive sleep apnea, COPD, CHF


Pulmonary and cardiology following


Treat the underlying cause


Patient already has home oxygen





--Acute exacerbation COPD (chronic obstructive pulmonary disease)


Current Visit: Yes   Status: Acute   


Oxygen titrate O2 sats to more than 90%, patient is requiring BiPAP most of the 

times


Wean as tolerated, nebulizers, IV steroids, IV antibiotics, inhalation steroids





-- Morbid obesity; .4


Current Visit: Yes   Status: Acute   


Patient needs to participate on professional weight reduction program when 

medically stable. 


Strongly recommend outpatient follow-up with bariatric surgeon upon discharge 

when stable





--Obesity hypoventilation syndrome;





--Obstructive sleep apnea;





-- Non-ST elevation MI (NSTEMI) 


Current Visit: Yes   Status: Acute   


Cardiology evaluation noted and appreciated, continue current cardiac 

medications


Supportive care





-- Acute exacerbation of CHF (congestive heart failure)


Current Visit: Yes   Status: Acute   


Fluid restriction. Maintain input output. 


Antifailure medications low-sodium diet


Input output monitoring, cardiology following





--hypothyroidism /new onset


Current Visit: Yes   Status: Acute  


Continue Synthroid, closely monitor


Patient needs follow-up with endocrinologist as outpatient upon DC





-- Hypoglycemia/present on admission


Current Visit: Yes   Status: Acute   


Now resolved closely monitor blood sugars





--acute kidney injury with vasomotor nephropathy


Gentle hydration, monitor renal function, avoid nephrotoxins





-- Lymphedema of the abdominal wall


Due to morbid obesity, supportive care fluid restriction


Low-sodium diet





--Anemia of chronic disease; 


Closely monitor H&H, transfuse as needed





--Acute transaminitis acute liver failure


Acute transaminitis; LFTs trending down


Hepatocellular pattern, multifactorial NAFLD, congestive hepatopathy, DILI, 


GI evaluation and recommendations noted, LFTs trending down





-- hypertension


Current Visit: Yes   Status: Acute   


Plan to address problem: 


We will continue the home medication. Hydralazine 10 mg IV every 6 hours as 

needed. We will monitor the blood pressure closely





-- DVT prophylaxis


Current Visit: No   Status: Acute   


Plan to address problem: 


Heparin 5000 units subcu every 8 hours for DVT prophylaxis. Pepcid 20 mg p.o. 

twice daily for GI prophylaxis. Patient is a full code





Also monitor the patient and adjust management as needed


DC planning per case management when medically stable and cleared by consultants





9/23


-Patient is on heparin drip for non-STEMI, cardiology is following.


-Patient is off Lasix and ACE inhibitors because of [place].


-Kidney function is worsening overnight and I put a consult for nephrology.


-Patient is hypotensive and blood pressure from this morning was 101/41.  We 

will continue to monitor.  And if it is dropping we we will give him fluid.


-Echo was done and EF of 50 to 55%.  Diastolic dysfunction is indeterminate.  

Management per cardiology


-Patient has COPD continues on dexamethasone, nebulizer treatment as needed.


-Patient has hyponatremia and hyperkalemia.  I give the patient Kayexalate.  

Monitor electrolytes.  Will follow nephrology for additional recommendation.





9/24


-Renal function is worsening, nephrology is following


-Blood pressure is better today


-Hyperkalemia; I added Kayexalate


-Morbid obesity








9/25


-Slight improvement in creatinine.  Hyponatremia worsened. Nephrology is 

following and put the patient back on Lasix.


-Blood PRESSURE IS BETTER today


-Potassium this morning was 5.8, I ordered 60 g of Kayexalate


-Morbidly obese


-Obesity hypoventilation syndrome





9/26


-Creatinine is improving and this morning was 2.4


-Hyponatremia improved this morning was 134.  Patient is on Lasix


-Blood pressure is better


-Patient is on 10 L of oxygen; worsened 


-Morbidly obese, FABIAN








9/27: Follow ordered ultrasound of abdomen, per Physician unable to get CT. I 

ordered an ABG due to my concern about her breathing pattern this morning, 

patient may benefit from. Will try to establish if patient has a primary 

pulmonary doctor. ABG is showing consistent with Respiratory Acidosis. Will 

place on BIPAP now. May need to transfer to IM for closer monitoring. Patient 

likely with Obesity Hypoventilation syndrome.


cardiology work up, ongoing. 


RENAL SHOWING SOME IMPROVEMENT


Guarded prognosis





9/28: Patient seen and examined today identified some lesion under the pannus 

will obtain wound care and wound surgeon evaluation.  Nephrology input noted 

continue diuresis and stop the sodium tabs creatinine is stable.  I did discuss 

with the family and updated them.  We will continue to hold ACE and ARB and if 

pressures continue to drop may need to hold diuresis also despite as written 

above.  Monitor labs including H&H.  She is more awake review of ABG shows 

improving CO2.  Will discuss with case management as patient may need BiPAP on 

discharge home.  I also updated the family








9/29: Patient showing some clinical improvement.  Liver enzymes is showing mild 

improvement although bilirubin increased slightly.  GI input noted and as 

discussed by his notes below


"Abnormal liver enzymes in hepatocellular pattern.  broad ddx and likely 

multifactorial causes including NAFLD, congestive hepatopathy, possibly DILI.  

will check viral hep serologies.  US with limited views, possible coarsening of 

the liver"


Patient also evaluated by surgery noted with the lymphedema of abdominal wall 

and open wound of the abdominal wall without penetration into the abdominal 

cavity with recommendation to pack the wounds daily with mesalt. Need to 

maintain dry environment discussed with nursing staff.  Elevate the pannus and 

optimize nutrition for which I will get nutritional consult.


No surgical intervention at this time.  Patient is bedbound when I asked when 

last she ambulated she said while "I guess he has not worked" but it has been a 

while.  Unfortunately the LTAC center unwilling to accept the patient will 

discuss with pulmonary and with case management about obtaining BiPAP for this 

patient and possible SNF referral.





09/30: Patient this morning and was noted significantly lethargic and 

unresponsive respiratory and a code to be called.  The patient resuscitated 

without any invasive procedure.  ABG was obtained showed a CO2 of 142.  Despite 

using BiPAP for some time through the night, sure how long she used it for.  I 

have discontinued all IV opioids and recommend no IV opioids for this patient at

this time.  I also discontinued p.o. medications as an essential medication and 

change to IV.  We will repeat an ABG in an hour to see if there is some 

improvement.  Patient remains at high risk for possible intubation.  Clinical 

condition is guarded





10/1: Patient remains on BiPAP this am, has some intermittent twitching, will 

check EEG, not likely seizure, but will monitor. Continue current management


, check Albumin and Pre-Albumin level.


Remains critically ill. Liver status showing improvement.





10/2: Patient with elevated D-dimer unfortunately size precludes being able to 

have a CTA chest done here to rule out pulmonary embolism.  Doppler of lower 

extremities is pending. We will continue subcu anticoagulation with heparin at 

this time. Until Doppler is resolved. Patient is not hypoxic so doubt pulmonary 

embolism at this time. Her problem remains hypercapnia. I did take time to go 

over her history while she has been around hospital in the past and noticed a 

remarkable increase in her BMI from the last 2 years. Discussed with the 

intensivist will agree to check thyroid function test. Critical care time 35-

minute





10/3: Patient with hypothyroidism, while she does not appear to be with myxedema

coma at this time. Will initiate levothyroxine as I believe that this will make 

profound impact  her management at this time, continue BiPAP continue current 

management. Seroquel was added by pulmonary for delirium and agitation 

management, Ventimask during the day and BiPAP at night





10/4: Continue levothyroine, can change to PO in am. Will obtain Psych consult, 

she is clinically stable and will transfer to floor





10/5; levothyroxine changed to 100 mg p.o. daily


Consultant recommendations noted and appreciated


Monitor LFTs





10/6; patient continues to be hypoxemic on continuous BiPAP


Consultants and recommendations noted and appreciated


Continue current management





10/7; patient has intermittent hallucination, psych following


Patient is currently on nasal cannula oxygen, refusing BiPAP


Mild distress and confused

















History


Interval history: 


I have seen and examined the patient at the bedside


Patient's chart and medications reviewed


Patient is confused, mild distress


On 4 L nasal cannula oxygen








Hospitalist Physical





- Constitutional


Vitals: 


                                        











Temp Pulse Resp BP Pulse Ox


 


 100.2 F H  116 H  20   103/56   95 


 


 10/07/21 12:35  10/07/21 14:25  10/07/21 14:25  10/07/21 12:35  10/07/21 14:39











General appearance: Present: mild distress, well-nourished, obese (Morbidly 

obese), other (Confused)





- EENT


Eyes: Present: PERRL, EOM intact





- Neck


Neck: Present: supple, normal ROM





- Respiratory


Respiratory effort: normal


Respiratory: bilateral: diminished, negative: rales, rhonchi, wheezing





- Cardiovascular


Rhythm: regular


Heart Sounds: Present: S1 & S2





- Extremities


Extremities: no ischemia, No edema





- Abdominal


General gastrointestinal: soft, non-tender, non-distended, normal bowel sounds





- Integumentary


Integumentary: Present: clear, warm





- Psychiatric


Psychiatric: appropriate mood/affect, cooperative





- Neurologic


Neurologic: moves all extremities





HEART Score





- HEART Score


Troponin: 


                                        











Troponin T  0.078 ng/mL (0.00-0.029)  H D 09/22/21  13:03    














Results





- Labs


CBC & Chem 7: 


                                 10/07/21 02:51





                                 10/07/21 00:54


Labs: 


                             Laboratory Last Values











WBC  11.6 K/mm3 (4.5-11.0)  H  10/07/21  02:51    


 


RBC  3.79 M/mm3 (3.65-5.03)   10/07/21  02:51    


 


Hgb  7.9 gm/dl (10.1-14.3)  L  10/07/21  02:51    


 


Hct  27.2 % (30.3-42.9)  L  10/07/21  02:51    


 


MCV  72 fl (79-97)  L  10/07/21  02:51    


 


MCH  21 pg (28-32)  L  10/07/21  02:51    


 


MCHC  29 % (30-34)  L  10/07/21  02:51    


 


RDW  25.8 % (13.2-15.2)  H  10/07/21  02:51    


 


Plt Count  118 K/mm3 (140-440)  L  10/07/21  02:51    


 


Add Manual Diff  Complete   10/07/21  02:51    


 


Total Counted  100   10/07/21  02:51    


 


Seg Neuts % (Manual)  91.0 % (40.0-70.0)  H  10/07/21  02:51    


 


Band Neutrophils %  2.0 %  10/07/21  02:51    


 


Lymphocytes % (Manual)  4.0 % (13.4-35.0)  L  10/07/21  02:51    


 


Reactive Lymphs % (Man)  1.0 %  09/26/21  14:11    


 


Monocytes % (Manual)  3.0 % (0.0-7.3)   10/07/21  02:51    


 


Metamyelocytes %  2.0 %  09/26/21  14:11    


 


Myelocytes %  2.0 %  09/22/21  04:03    


 


Nucleated RBC %  Not Reportable   10/07/21  02:51    


 


Seg Neutrophils # Man  10.6 K/mm3 (1.8-7.7)  H  10/07/21  02:51    


 


Band Neutrophils #  0.2 K/mm3  10/07/21  02:51    


 


Lymphocytes # (Manual)  0.5 K/mm3 (1.2-5.4)  L  10/07/21  02:51    


 


Abs React Lymphs (Man)  0.0 K/mm3  10/07/21  02:51    


 


Monocytes # (Manual)  0.3 K/mm3 (0.0-0.8)   10/07/21  02:51    


 


Eosinophils # (Manual)  0.0 K/mm3 (0.0-0.4)   10/07/21  02:51    


 


Basophils # (Manual)  0.0 K/mm3 (0.0-0.1)   10/07/21  02:51    


 


Metamyelocytes #  0.0 K/mm3  10/07/21  02:51    


 


Myelocytes #  0.0 K/mm3  10/07/21  02:51    


 


Promyelocytes #  0.0 K/mm3  10/07/21  02:51    


 


Blast Cells #  0.0 K/mm3  10/07/21  02:51    


 


WBC Morphology  Not Reportable   10/07/21  02:51    


 


Hypersegmented Neuts  Not Reportable   10/07/21  02:51    


 


Hyposegmented Neuts  Not Reportable   10/07/21  02:51    


 


Hypogranular Neuts  Not Reportable   10/07/21  02:51    


 


Smudge Cells  Not Reportable   10/07/21  02:51    


 


Toxic Granulation  Not Reportable   10/07/21  02:51    


 


Toxic Vacuolation  Not Reportable   10/07/21  02:51    


 


Dohle Bodies  Not Reportable   10/07/21  02:51    


 


Pelger-Huet Anomaly  Not Reportable   10/07/21  02:51    


 


Ac Rods  Not Reportable   10/07/21  02:51    


 


Platelet Estimate  Not Reportable   10/07/21  02:51    


 


Clumped Platelets  Not Reportable   10/07/21  02:51    


 


Plt Clumps, EDTA  Not Reportable   10/07/21  02:51    


 


Large Platelets  Not Reportable   10/07/21  02:51    


 


Giant Platelets  Not Reportable   10/07/21  02:51    


 


Platelet Satelliting  Not Reportable   10/07/21  02:51    


 


Plt Morphology Comment  Not Reportable   10/07/21  02:51    


 


RBC Morphology  Not Reportable   10/07/21  02:51    


 


Dimorphic RBCs  Yes   10/07/21  02:51    


 


Polychromasia  Few   10/07/21  02:51    


 


Hypochromasia  2+   10/07/21  02:51    


 


Poikilocytosis  Not Reportable   10/07/21  02:51    


 


Anisocytosis  2+   10/07/21  02:51    


 


Microcytosis  Not Reportable   10/07/21  02:51    


 


Macrocytosis  Not Reportable   10/07/21  02:51    


 


Spherocytes  Not Reportable   10/07/21  02:51    


 


Pappenheimer Bodies  Not Reportable   10/07/21  02:51    


 


Sickle Cells  Not Reportable   10/07/21  02:51    


 


Target Cells  Few   10/07/21  02:51    


 


Tear Drop Cells  Not Reportable   10/07/21  02:51    


 


Ovalocytes  Few   10/07/21  02:51    


 


Helmet Cells  Not Reportable   10/07/21  02:51    


 


[place]-Fitzhugh Bodies  Not Reportable   10/07/21  02:51    


 


Cabot Rings  Not Reportable   10/07/21  02:51    


 


Apolinar Cells  Not Reportable   10/07/21  02:51    


 


Bite Cells  Not Reportable   10/07/21  02:51    


 


Crenated Cell  Not Reportable   10/07/21  02:51    


 


Elliptocytes  Not Reportable   10/07/21  02:51    


 


Acanthocytes (Spur)  Not Reportable   10/07/21  02:51    


 


Rouleaux  Not Reportable   10/07/21  02:51    


 


Hemoglobin C Crystals  Not Reportable   10/07/21  02:51    


 


Schistocytes  Not Reportable   10/07/21  02:51    


 


Malaria parasites  Not Reportable   10/07/21  02:51    


 


Sami Bodies  Not Reportable   10/07/21  02:51    


 


Hem Pathologist Commnt  No   10/07/21  02:51    


 


PT  18.7 Sec. (12.2-14.9)  H  10/02/21  16:55    


 


INR  1.51  (0.87-1.13)  H  10/02/21  16:55    


 


APTT  25.2 Sec. (24.2-36.6)   10/02/21  16:55    


 


D-Dimer  5410.10 ng/mlDDU (0-234)  H  10/01/21  16:03    


 


ABG pH  7.382  (7.320-7.450)   10/02/21  14:19    


 


POC ABG pCO2  67.4 mmHg (32.0-48.0)  H  10/02/21  14:19    


 


ABG pCO2  86.9 mm Hg  09/27/21  18:40    


 


POC ABG pO2  150.6 mmHg ()  H  10/02/21  14:19    


 


ABG pO2  81.8 mm Hg (80.0-90.0)   09/27/21  18:40    


 


POC ABG HCO3  39.2   10/02/21  14:19    


 


ABG HCO3  36.2 mmol/L (20.0-26.0)  H  09/27/21  18:40    


 


ABG O2 Saturation  99.4  (0-100)   10/02/21  14:19    


 


ABG O2 Content  10.4  (0.0-44)   09/27/21  18:40    


 


POC ABG Base Excess  12.2   10/02/21  14:19    


 


ABG Base Excess  7.4 mmol/L (-2.0-3.0)  H  09/27/21  18:40    


 


ABG Hemoglobin  9.3  (12.0-17.5)  L  10/02/21  14:19    


 


ABG Oxyhemoglobin  97.5  (94-98)   10/02/21  14:19    


 


ABG Carboxyhemoglobin  1.9 % (0.0-5.0)   09/27/21  18:40    


 


ABG Methemoglobin  0.3  (0.0-1.5)   10/02/21  14:19    


 


ABG Sodium  143.0 mmol/L (136.0-145.0)   10/02/21  14:19    


 


ABG Potassium  4.2 mmol/L (3.40-4.50)   10/02/21  14:19    


 


ABG Chloride  98.0 mmol/L ()   10/02/21  14:19    


 


ABG Glucose  162 mg/dL (65-95)  H  10/02/21  14:19    


 


Oxyhemoglobin  93.7 % (95.0-99.0)  L  09/27/21  18:40    


 


Carboxyhemoglobin  1.6  (0.5-1.5)  H  10/02/21  14:19    


 


FiO2  30 %  09/27/21  18:40    


 


FiO2 %  35.0   10/02/21  14:19    


 


Sodium  153 mmol/L (137-145)  H  10/07/21  00:54    


 


Potassium  4.9 mmol/L (3.6-5.0)   10/07/21  00:54    


 


Chloride  106.4 mmol/L ()   10/07/21  00:54    


 


Carbon Dioxide  38 mmol/L (22-30)  H  10/07/21  00:54    


 


Anion Gap  14 mmol/L  10/07/21  00:54    


 


BUN  74 mg/dL (7-17)  H  10/07/21  00:54    


 


Creatinine  1.3 mg/dL (0.6-1.2)  H  10/07/21  00:54    


 


Estimated GFR  57 ml/min  10/07/21  00:54    


 


BUN/Creatinine Ratio  57 %  10/07/21  00:54    


 


Glucose  205 mg/dL ()  H  10/07/21  00:54    


 


POC Glucose  186 mg/dL ()  H  10/07/21  16:09    


 


Osmolality  301 Mosm/kg  09/23/21  23:15    


 


Calcium  9.4 mg/dL (8.4-10.2)   10/07/21  00:54    


 


Ionized Calcium  3.6 mg/dL (4.8-5.6)  L  09/23/21  23:15    


 


Magnesium  2.10 mg/dL (1.7-2.3)   10/07/21  00:54    


 


Total Bilirubin  1.40 mg/dL (0.1-1.2)  H  10/07/21  00:54    


 


Direct Bilirubin  0.8 mg/dL (0-0.2)  H  10/07/21  00:54    


 


Indirect Bilirubin  0.6 mg/dL  10/07/21  00:54    


 


AST  36 units/L (5-40)   10/07/21  00:54    


 


ALT  133 units/L (7-56)  H  10/07/21  00:54    


 


Alkaline Phosphatase  60 units/L ()   10/07/21  00:54    


 


Total Creatine Kinase  1712 units/L ()  H  09/24/21  10:04    


 


Troponin T  0.078 ng/mL (0.00-0.029)  H D 09/22/21  13:03    


 


NT-Pro-B Natriuret Pep  7573 pg/mL (0-450)  H  09/21/21  22:04    


 


Total Protein  8.4 g/dL (6.3-8.2)  H  10/07/21  00:54    


 


Albumin  3.2 g/dL (3.9-5)  L  10/07/21  00:54    


 


Albumin/Globulin Ratio  0.6 %  10/07/21  00:54    


 


Triglycerides  105 mg/dL (2-149)   09/21/21  22:04    


 


Cholesterol  85 mg/dL ()   09/21/21  22:04    


 


LDL Cholesterol Direct  47 mg/dL ()  L  09/21/21  22:04    


 


HDL Cholesterol  25 mg/dL (40-59)  L  09/21/21  22:04    


 


Cholesterol/HDL Ratio  3.40 %  09/21/21  22:04    


 


TSH  6.140 mlU/mL (0.270-4.200)  H  10/02/21  19:55    


 


Free T4  0.67 ng/dL (0.76-1.46)  L  10/02/21  19:55    


 


Total Cortisol  41.3 mcg/dL (***)   09/24/21  10:04    


 


Arterial Blood Glucose  162 mg/dL (65-95)  H  10/02/21  14:19    


 


Arterial Blood Ionized Calcium  4.6 mg/dL (4.6-5.3)   09/30/21  07:32    


 


Urine Osmolality  157 Mosm/kg  09/23/21  18:46    


 


Urine Sodium  16 mmol/L  09/23/21  18:46    


 


Coronavirus (PCR)  Negative  (Negative)   09/28/21  Unknown


 


Hepatitis A IgM Ab  Non-reactive  (NonReactive)   09/28/21  18:45    


 


Hep Bs Antigen  Nonreactive  (Negative)   09/28/21  18:45    


 


Hepatitis C Antibody  Non-reactive  (NonReactive)   09/28/21  18:45    











Diamond/IV: 


                                        





Voiding Method                   Indwelling Catheter











Active Medications





- Current Medications


Current Medications: 














Generic Name Dose Route Start Last Admin





  Trade Name Freq  PRN Reason Stop Dose Admin


 


Acetaminophen  650 mg  09/22/21 03:00  10/01/21 06:42





  Acetaminophen 325 Mg Tab  PO   650 mg





  Q4H PRN   Administration





  Pain MILD(1-3)/Fever >100.5/HA  


 


Albuterol  2.5 mg  09/22/21 03:00 





  Albuterol 2.5 Mg/3 Ml Nebu  IH  





  Q4HRT PRN  





  Shortness Of Breath  


 


Albuterol/Ipratropium  1 ampul  09/22/21 08:00  10/07/21 14:28





  Ipratropium/Albuterol Sulfate 3 Ml Ampul.Neb  IH   1 ampul





  TIDRT DELL   Administration


 


Aspirin  325 mg  09/23/21 10:00  10/07/21 11:00





  Aspirin Ec 325 Mg Tab  PO   325 mg





  QDAY DELL   Administration


 


Atorvastatin Calcium  40 mg  09/22/21 22:00  10/06/21 22:27





  Atorvastatin 40 Mg Tab  PO   40 mg





  QHS DELL   Administration


 


Benzonatate  100 mg  09/22/21 06:00  10/07/21 05:47





  Benzonatate 100 Mg Cap  PO   100 mg





  Q8HR DELL   Administration


 


Guaifenesin  200 mg  09/26/21 14:47  10/01/21 06:43





  Guaifenesin 100 Mg/5 Ml Oral Liqd  PO   200 mg





  Q4H PRN   Administration





  Cough  


 


Haloperidol Lactate  5 mg  10/02/21 16:27  10/05/21 03:30





  Haloperidol Lactate 5 Mg/1 Ml Inj  IV   5 mg





  Q6H PRN   Administration





  Agitation  


 


Heparin Sodium (Porcine)  5,000 unit  10/02/21 14:00  10/07/21 05:47





  Heparin 5,000 Unit/1 Ml Vial  SUB-Q   5,000 unit





  Q8HR DELL   Administration


 


Hydralazine HCl  10 mg  10/03/21 17:25  10/06/21 08:54





  Hydralazine 20 Mg/1 Ml Inj  IV   10 mg





  Q6H PRN   Administration





  Blood Pressure  


 


Dextrose  1,000 mls @ 75 mls/hr  10/07/21 12:00 





  D5w  IV  





  AS DIRECT DELL  


 


Levothyroxine Sodium  100 mcg  10/06/21 06:00  10/07/21 05:47





  Levothyroxine 100 Mcg Tab  PO   100 mcg





  DAILY@0600 DELL   Administration


 


Methylprednisolone Sodium Succinate  20 mg  09/30/21 10:00  10/07/21 11:00





  Methylprednisolone Sod Succinate 40 Mg/1 Ml Inj  IV   20 mg





  Q12HR DELL   Administration


 


Nitroglycerin  0.4 mg  09/22/21 03:00 





  Nitroglycerin 0.4 Mg Tab Subl  SL  





  Q5M PRN  





  Chest Pain  


 


Nystatin  1 applic  09/22/21 18:00  10/07/21 11:00





  Nystatin Powder 15 Gm  TP   1 applic





  BID DELL   Administration


 


Ondansetron HCl  4 mg  09/22/21 03:00  09/29/21 23:51





  Ondansetron 4 Mg/2 Ml Inj  IV   4 mg





  Q8H PRN   Administration





  Nausea And Vomiting  


 


Oxycodone/Acetaminophen  1 tab  09/22/21 03:00  10/05/21 16:26





  Oxycodone /Acetaminophen 5-325mg Tab  PO   1 tab





  Q6H PRN   Administration





  Pain, Moderate (4-6)  


 


Pantoprazole Sodium  40 mg  10/07/21 07:30  10/07/21 08:00





  Pantoprazole 40 Mg Tab  PO   40 mg





  QDAC DELL   Administration


 


Quetiapine Fumarate  75 mg  10/02/21 22:00  10/06/21 22:27





  Quetiapine 25 Mg Tab  PO   75 mg





  QHS DELL   Administration


 


Sodium Chloride  10 ml  09/22/21 10:00  10/07/21 11:00





  Sodium Chloride 0.9% 10 Ml Flush Syringe  IV   10 ml





  BID DELL   Administration


 


Sodium Chloride  10 ml  09/22/21 03:00 





  Sodium Chloride 0.9% 10 Ml Flush Syringe  IV  





  PRN PRN  





  LINE FLUSH  


 


Tramadol HCl  50 mg  09/22/21 03:00  10/04/21 21:50





  Tramadol 50 Mg Tab  PO   50 mg





  Q6H PRN   Administration





  Pain, Moderate (4-6)  














Nutrition/Malnutrition Assess





- Dietary Evaluation


Nutrition/Malnutrition Findings: 


                                        





Nutrition Notes                                            Start:  09/22/21 

14:21


Freq:                                                      Status: Active       




Protocol:                                                                       




 Document     10/04/21 15:55  FORTINO  (Rec: 10/04/21 16:53  FORTINO  [id number])


 Nutrition Notes


     Need for Assessment generated from:         RN Referral


     Initial or Follow up                        Reassessment


     Current Diagnosis                           Heart Failure


     Other Pertinent Diagnosis                   Accute Kidney Injury


     Current Diet                                Renal Diet (from B 09/27).


     Labs/Tests                                  10/04: Na 147, CO2 37, BUN 74,


                                                 Cr 1.3, Glu 193.


     Pertinent Medications                       Reviewed.


     Height                                      5 ft 4 in


     Weight                                      277.3 kg


     Ideal Body Weight (kg)                      54.54


     BMI                                         104.9


     Intake Prior to Admission                   Good


     Weight change and time frame                expect weight fluctuations r/t


                                                 fluid therapy, CHF


                                                 complications


     Weight Status                               Morbidly Obese


     Subjective/Other Information                Pt developed accute renal


                                                 condition that requires


                                                 support from Renal Diet.


     Percent of energy/protein needs met:        Renal diet provides all energy


                                                 /protein needs (2072 Kcal/77 g


                                                 ) per day.


     Burn                                        Absent


     Trauma                                      Absent


     GI Symptoms                                 None


     Food Allergy                                No


     Skin Integrity/Comment                      Integumentary: clear, warm,


                                                 dry.


     Current % PO                                Good (%)


     Minimum of two criteria                     No physical signs of


                                                 malnutrition


     #1


      Nutrition Diagnosis                        Altered nutrition-related


                                                 laboratory values,Overweight/


                                                 obesity


      Comments:                                  Pt developed accute renal


                                                 condition that requires


                                                 support from Renal Diet.


      Etiology                                   Accute Kidney Injury resulted


                                                 from complications from


                                                 concomitant conditions


      As Evidenced by Signs and Symptoms         Altered lab values and MD


                                                 diagnosis


      Diagnosis Progress(for reassessment        Worsened


       documentation)                            


     Is patient on ventilator?                   No


     Is Patient Ambulatory and/or Out of Bed     Yes


     REE-([place]-ambulatory/OOB) [     [phone]


      [email or web address]]                               


     Kcal/Kg value to use for calculation        10


     Approximate Energy Requirements Using       2773


      kcal/Kg                                    


     Calculation Used for Recommendations        Kcal/Kg of IBW


     Additional Notes                            Protein 1.0 g/Kg; 55 g/day;


                                                 220 Kcal/day (from IBW).


                                                 Fluids: 1ml/kcal or per MD


 Nutrition Intervention


     Change Diet Order:                          Continue Renal Diet as MD


                                                 prescribed.


     Teaching Recipient                          Patient


     Learning Readiness                          Good


     Teaching Methods                            Discussion


     Response to Teaching                        Verbalize understanding,


                                                 Reinforcement needed


     Barriers to Learning                        Motivation,Emotional,Social


     Patient aware of follow up options          Yes


     Actions To Overcome Barriers                Collaboration with Other


                                                 Providers


     Goal #1                                     Achieve and maintain


                                                 acceptable chemistry lab


                                                 values during LOS


     Goal #2                                     Support Improvement in renal


                                                 functions while providing


                                                 energy/protein needs during


                                                 LOS


     Follow-Up By:                               10/11/21


     Additional Comments                         Continue monitoring % of food


                                                 intake and tolerance, and BM.


                                                 Reinforce education towards


                                                 bariatric therapy after accute


                                                 kidney injury is solved.

## 2021-10-07 NOTE — PROGRESS NOTE
Assessment and Plan





Assessment


Acute kidney injury, unknown baseline. Renal ultrasound notes poor visualization

due to body habitus.


Hypernatremia


Hyperkalemia


Hypocalcemia


Morbid obesity


Acute hypoxemic and hypercapnic respiratory failure


Acute exacerbation of CHF (congestive heart failure)


Hypertension


Sleep apnea





Recommendations


Na higher previously at 147->153


Increase free water intake as able, if unable to drink, will start D5W


Creatinine stable at 1.3


Excellent urine output noted


Now with soft BP, holding meds


echo noted, likely diastolic disease


Avoid diuresis if able given hypernatremia, note good urine output 


Maintain MAP more than 65


Hold ACE/ARB at this time


Renally dose medications


Avoid nephrotoxins


Renal diet


Daily renal labs





Subjective


Date of service: 10/07/21


Principal diagnosis: Ac hypoxemic and hypercapnic resp failure; AE-CHF; Morbid 

obesity; FABIAN/OHS


Interval history: 





Patient was seen for her renal issues-remains off Bipap this AM, on NC


Nursing, interdisciplinary and consult notes were reviewed


Vitals, input and output, medications and labs were reviewed





Objective





- Exam


Narrative Exam: 





General: Morbid obesity, on NC, mild discomfort


HEENT: Oral mucosa moist


Neck: Supple, no JVD


Chest: labored breathing on NC


Heart: RRR, S1 and S2


Abdomen: Soft, nontender


Extremity: No peripheral cyanosis, edema


Neurological: Awake and alert


Dermatology: skin intact


Psych: Calm and cooperative


Musculoskeletal: No joint effusion





- Vital Signs


Vital signs: 


                               Vital Signs - 12hr











  10/07/21 10/07/21 10/07/21





  03:46 06:18 07:37


 


Temperature 100.5 F H  98.0 F


 


Pulse Rate 123 H 124 H 


 


Respiratory 20  18





Rate   


 


Blood Pressure 89/52 109/56 115/66


 


O2 Sat by Pulse 88  





Oximetry   














- Lab





                                 10/07/21 02:51





                                 10/07/21 00:54


                             Most recent lab results











ABG pH  7.382  (7.320-7.450)   10/02/21  14:19    


 


ABG pCO2  86.9 mm Hg  09/27/21  18:40    


 


ABG pO2  81.8 mm Hg (80.0-90.0)   09/27/21  18:40    


 


ABG HCO3  36.2 mmol/L (20.0-26.0)  H  09/27/21  18:40    


 


ABG O2 Saturation  99.4  (0-100)   10/02/21  14:19    


 


Calcium  9.4 mg/dL (8.4-10.2)   10/07/21  00:54    


 


Magnesium  2.10 mg/dL (1.7-2.3)   10/07/21  00:54    


 


Urine Sodium  16 mmol/L  09/23/21  18:46    














Medications & Allergies





- Medications


Allergies/Adverse Reactions: 


                                    Allergies





No Known Allergies Allergy (Unverified 07/13/17 11:17)


   








Home Medications: 


                                Home Medications











 Medication  Instructions  Recorded  Confirmed  Last Taken  Type


 


Acetaminophen [Acetaminophen TAB] 325 mg PO Q4H PRN #30 tablet 12/14/17 01/22/21

Unknown Rx


 


ALBUTEROL NEB's [Proventil 0.083% 2.5 mg IH Q3HRT PRN #30 day 01/24/21  Unknown 

Rx





NEBS]     


 


Albuterol Mdi (or & Nicu Only) 2 puff IH QID PRN #1 inhalation 01/24/21  Unknown

Rx





[ProAir HFA Inhaler]     


 


Azithromycin [Zithromax Z-JAVIER] 0 mg PO DAILY #1 tab 01/24/21  Unknown Rx


 


Benzonatate [Tessalon Perles] 100 mg PO Q8HR #15 capsule 01/24/21  Unknown Rx


 


Famotidine [Pepcid] 20 mg PO BID #14 tablet 01/24/21  Unknown Rx


 


Ipratropium/Albuterol Sulfate 1 ampul IH TIDRT #30 day 01/24/21  Unknown Rx





[DUONEB *Not for PRN Use*]     


 


hydroCHLOROthiazide [HCTZ] 25 mg PO QDAY  tablet 01/24/21  Unknown Rx


 


hydroCHLOROthiazide [HCTZ] 25 mg PO QDAY #30 tablet 01/24/21  Unknown Rx


 


methylPREDNISolone [Medrol 4MG 4 mg PO DAILY #1 tab.ds.pk 01/24/21  Unknown Rx





DOSEPAK (21 tabs)]     











Active Medications: 














Generic Name Dose Route Start Last Admin





  Trade Name Freq  PRN Reason Stop Dose Admin


 


Acetaminophen  650 mg  09/22/21 03:00  10/01/21 06:42





  Acetaminophen 325 Mg Tab  PO   650 mg





  Q4H PRN   Administration





  Pain MILD(1-3)/Fever >100.5/HA  


 


Albuterol  2.5 mg  09/22/21 03:00 





  Albuterol 2.5 Mg/3 Ml Nebu  IH  





  Q4HRT PRN  





  Shortness Of Breath  


 


Albuterol/Ipratropium  1 ampul  09/22/21 08:00  10/07/21 09:44





  Ipratropium/Albuterol Sulfate 3 Ml Ampul.Neb  IH   1 ampul





  TIDRT DELL   Administration


 


Aspirin  325 mg  09/23/21 10:00  10/06/21 09:15





  Aspirin Ec 325 Mg Tab  PO   325 mg





  QDAY DELL   Administration


 


Atorvastatin Calcium  40 mg  09/22/21 22:00  10/06/21 22:27





  Atorvastatin 40 Mg Tab  PO   40 mg





  QHS DELL   Administration


 


Benzonatate  100 mg  09/22/21 06:00  10/07/21 05:47





  Benzonatate 100 Mg Cap  PO   100 mg





  Q8HR DELL   Administration


 


Guaifenesin  200 mg  09/26/21 14:47  10/01/21 06:43





  Guaifenesin 100 Mg/5 Ml Oral Liqd  PO   200 mg





  Q4H PRN   Administration





  Cough  


 


Haloperidol Lactate  5 mg  10/02/21 16:27  10/05/21 03:30





  Haloperidol Lactate 5 Mg/1 Ml Inj  IV   5 mg





  Q6H PRN   Administration





  Agitation  


 


Heparin Sodium (Porcine)  5,000 unit  10/02/21 14:00  10/07/21 05:47





  Heparin 5,000 Unit/1 Ml Vial  SUB-Q   5,000 unit





  Q8HR DELL   Administration


 


Hydralazine HCl  10 mg  10/03/21 17:25  10/06/21 08:54





  Hydralazine 20 Mg/1 Ml Inj  IV   10 mg





  Q6H PRN   Administration





  Blood Pressure  


 


Levothyroxine Sodium  100 mcg  10/06/21 06:00  10/07/21 05:47





  Levothyroxine 100 Mcg Tab  PO   100 mcg





  DAILY@0600 DELL   Administration


 


Methylprednisolone Sodium Succinate  20 mg  09/30/21 10:00  10/06/21 22:26





  Methylprednisolone Sod Succinate 40 Mg/1 Ml Inj  IV   20 mg





  Q12HR DELL   Administration


 


Nitroglycerin  0.4 mg  09/22/21 03:00 





  Nitroglycerin 0.4 Mg Tab Subl  SL  





  Q5M PRN  





  Chest Pain  


 


Nystatin  1 applic  09/22/21 18:00  10/06/21 22:30





  Nystatin Powder 15 Gm  TP   1 applic





  BID DELL   Administration


 


Ondansetron HCl  4 mg  09/22/21 03:00  09/29/21 23:51





  Ondansetron 4 Mg/2 Ml Inj  IV   4 mg





  Q8H PRN   Administration





  Nausea And Vomiting  


 


Oxycodone/Acetaminophen  1 tab  09/22/21 03:00  10/05/21 16:26





  Oxycodone /Acetaminophen 5-325mg Tab  PO   1 tab





  Q6H PRN   Administration





  Pain, Moderate (4-6)  


 


Pantoprazole Sodium  40 mg  10/07/21 07:30  10/07/21 08:00





  Pantoprazole 40 Mg Tab  PO   40 mg





  QDAC DELL   Administration


 


Quetiapine Fumarate  75 mg  10/02/21 22:00  10/06/21 22:27





  Quetiapine 25 Mg Tab  PO   75 mg





  QHS EDLL   Administration


 


Sodium Chloride  10 ml  09/22/21 10:00  10/06/21 22:27





  Sodium Chloride 0.9% 10 Ml Flush Syringe  IV   10 ml





  BID DELL   Administration


 


Sodium Chloride  10 ml  09/22/21 03:00 





  Sodium Chloride 0.9% 10 Ml Flush Syringe  IV  





  PRN PRN  





  LINE FLUSH  


 


Tramadol HCl  50 mg  09/22/21 03:00  10/04/21 21:50





  Tramadol 50 Mg Tab  PO   50 mg





  Q6H PRN   Administration





  Pain, Moderate (4-6)

## 2021-10-08 RX ADMIN — HEPARIN SODIUM SCH UNIT: 5000 INJECTION, SOLUTION INTRAVENOUS; SUBCUTANEOUS at 22:56

## 2021-10-08 RX ADMIN — IPRATROPIUM BROMIDE AND ALBUTEROL SULFATE SCH AMPUL: .5; 3 SOLUTION RESPIRATORY (INHALATION) at 19:11

## 2021-10-08 RX ADMIN — PANTOPRAZOLE SODIUM SCH MG: 40 TABLET, DELAYED RELEASE ORAL at 14:00

## 2021-10-08 RX ADMIN — DEXTROSE SCH MLS/HR: 5 SOLUTION INTRAVENOUS at 17:30

## 2021-10-08 RX ADMIN — NYSTATIN SCH: 100000 POWDER TOPICAL at 06:23

## 2021-10-08 RX ADMIN — NYSTATIN SCH: 100000 POWDER TOPICAL at 22:53

## 2021-10-08 RX ADMIN — DEXTROSE SCH MLS/HR: 5 SOLUTION INTRAVENOUS at 22:46

## 2021-10-08 RX ADMIN — Medication SCH ML: at 13:10

## 2021-10-08 RX ADMIN — ASPIRIN SCH MG: 325 TABLET, COATED ORAL at 14:00

## 2021-10-08 RX ADMIN — BENZONATATE SCH: 100 CAPSULE ORAL at 22:53

## 2021-10-08 RX ADMIN — NYSTATIN SCH APPLIC: 100000 POWDER TOPICAL at 15:00

## 2021-10-08 RX ADMIN — IPRATROPIUM BROMIDE AND ALBUTEROL SULFATE SCH AMPUL: .5; 3 SOLUTION RESPIRATORY (INHALATION) at 09:27

## 2021-10-08 RX ADMIN — HEPARIN SODIUM SCH UNIT: 5000 INJECTION, SOLUTION INTRAVENOUS; SUBCUTANEOUS at 06:07

## 2021-10-08 RX ADMIN — METHYLPREDNISOLONE SODIUM SUCCINATE SCH MG: 40 INJECTION, POWDER, FOR SOLUTION INTRAMUSCULAR; INTRAVENOUS at 15:00

## 2021-10-08 RX ADMIN — Medication SCH ML: at 21:46

## 2021-10-08 RX ADMIN — LEVOTHYROXINE SODIUM SCH MCG: 0.1 TABLET ORAL at 06:07

## 2021-10-08 RX ADMIN — IPRATROPIUM BROMIDE AND ALBUTEROL SULFATE SCH AMPUL: .5; 3 SOLUTION RESPIRATORY (INHALATION) at 13:59

## 2021-10-08 RX ADMIN — HEPARIN SODIUM SCH UNIT: 5000 INJECTION, SOLUTION INTRAVENOUS; SUBCUTANEOUS at 16:00

## 2021-10-08 RX ADMIN — BENZONATATE SCH MG: 100 CAPSULE ORAL at 06:07

## 2021-10-08 RX ADMIN — BENZONATATE SCH MG: 100 CAPSULE ORAL at 16:00

## 2021-10-08 RX ADMIN — METHYLPREDNISOLONE SODIUM SUCCINATE SCH MG: 40 INJECTION, POWDER, FOR SOLUTION INTRAMUSCULAR; INTRAVENOUS at 22:56

## 2021-10-08 RX ADMIN — NOREPINEPHRINE BITARTRATE SCH MLS/HR: 16 INJECTION, SOLUTION INTRAVENOUS at 21:17

## 2021-10-08 NOTE — PROGRESS NOTE
Assessment and Plan








Acute hypoxemic and hypercapnic respiratory failure


Acute exacerbation of CHF (congestive heart failure)


Hypertension


Morbid obesity with BMI of 70 and over, adult


Obesity hypoventilation syndrome


Sleep apnea





- continue NIV qhs with prn daytime use


- continue avoid opiates / sedating drugs


- continue care as below otherwise;





- continue diuresis while following electrolytes


- continue to wean supplemental oxygen for target O2 sat's > 90% acutely


- aspiration precautions


- continue bronchodilators with pulmonary hygiene per RT


- continue accuchecks with glycemic control per SSI (While critically ill target

blood glucose of 140-180 mg/dL; avoid hypoglycemia)


- avoid nephrotoxins, renally dose all medications


- AB's per ID rec's 


- prn analgesia per pain score


- Maintenance of sleep-wake cycle, avoid delirium


- G.I. & VTE prophylaxis


- PT/OT/ROM exercises


- continue mobility protocols for pressure ulcer prophylaxis


- Monitor hemodynamics closely


- continue other care per attending / other consultants


- discharge planning ongoing concurrently





COVID SPECIFIC INTERVENTIONS


- COVID-19 assay negative





.... Re-evaluate in am & prn








Subjective


Date of service: 10/08/21


Principal diagnosis: Ac hypoxemic and hypercapnic resp failure; AE-CHF; Morbid 

obesity; FABIAN/OHS


Interval history: 





Patient is seen today for: Acute hypoxemic and hypercapnic respiratory failure; 

Acute exacerbation of CHF; Hypertension; Morbid obesity; FABIAN / OHS





Seen and examined at bedside; 24hour events reviewed; nursing and respiratory 

care staff consulted; no adverse overnight events reported to me; resting in 

bed; 





Objective


                               Vital Signs - 12hr











  10/08/21 10/08/21 10/08/21





  04:39 09:31 11:15


 


Temperature 100.1 F H  98.1 F


 


Pulse Rate 110 H  112 H


 


Pulse Rate [  84 





Anterior   





Bilateral   





Throughout]   


 


Respiratory 20  22





Rate   


 


Respiratory  20 





Rate [Anterior   





Bilateral   





Throughout]   


 


Blood Pressure 106/51  


 


Blood Pressure   101/50





[Right]   


 


O2 Sat by Pulse 93 92 94





Oximetry   














  10/08/21 10/08/21





  14:02 14:03


 


Temperature  


 


Pulse Rate  


 


Pulse Rate [  58 L





Anterior  





Bilateral  





Throughout]  


 


Respiratory  





Rate  


 


Respiratory  20





Rate [Anterior  





Bilateral  





Throughout]  


 


Blood Pressure  


 


Blood Pressure  





[Right]  


 


O2 Sat by Pulse 94 





Oximetry  











Constitutional: no acute distress, asleep, other (young extremely obese female 

with mildly increased respiratory effort at rest)


Eyes: non-icteric


ENT: oropharynx moist, other (BIPAP FFM)


Neck: supple, no lymphadenopathy, no JVD


Effort: mildly labored


Ascultation: Bilateral: diminished breath sounds, rhonchi (scant)


Percussion: Bilateral: not dull


Cardiovascular: regular rate and rhythm


Gastrointestinal: normoactive bowel sounds, soft, non-tender, non-distended 

(protuberant)


Integumentary: rash (stasis dermatitis type), other (Stasis dermatititis on legs

 and abdomen; see [facility] notes for full details)


Extremities: pulses normal, no ischemia or petechiae, edema, other (stasis 

dermatitis)


Neurologic: non-focal exam (grossly), pupils equal and round, CN II-XII normal


Psychiatric: depressed


CBC and BMP: 


                                 10/07/21 02:51





                                 10/07/21 00:54


ABG, PT/INR, D-dimer: 


ABG











ABG pH  7.382  (7.320-7.450)   10/02/21  14:19    


 


POC ABG pCO2  67.4 mmHg (32.0-48.0)  H  10/02/21  14:19    


 


ABG pCO2  86.9 mm Hg  09/27/21  18:40    


 


POC ABG pO2  150.6 mmHg ()  H  10/02/21  14:19    


 


ABG pO2  81.8 mm Hg (80.0-90.0)   09/27/21  18:40    


 


POC ABG HCO3  39.2   10/02/21  14:19    


 


ABG O2 Saturation  99.4  (0-100)   10/02/21  14:19    





PT/INR, D-dimer











PT  18.7 Sec. (12.2-14.9)  H  10/02/21  16:55    


 


INR  1.51  (0.87-1.13)  H  10/02/21  16:55    


 


D-Dimer  5410.10 ng/mlDDU (0-234)  H  10/01/21  16:03    








Abnormal lab findings: 


                                  Abnormal Labs











  09/21/21 09/21/21 09/22/21





  22:04 22:04 00:18


 


WBC  17.7 H  


 


Hgb  9.1 L  


 


Hct   


 


MCV  77 L  


 


MCH  21 L  


 


MCHC  27 L  


 


RDW  24.3 H  


 


Plt Count   


 


Seg Neuts % (Manual)   


 


Lymphocytes % (Manual)   


 


Nucleated RBC %   


 


Seg Neutrophils # Man   


 


Lymphocytes # (Manual)   


 


Monocytes # (Manual)   


 


PT   


 


INR   


 


D-Dimer   


 


ABG pH   


 


POC ABG pCO2   


 


POC ABG pO2   


 


ABG pO2   


 


ABG HCO3   


 


ABG Base Excess   


 


ABG Hemoglobin   


 


ABG Oxyhemoglobin   


 


ABG Sodium   


 


ABG Chloride   


 


ABG Glucose   


 


Oxyhemoglobin   


 


Carboxyhemoglobin   


 


Sodium   135 L 


 


Potassium   


 


Chloride   91.0 L 


 


Carbon Dioxide   17 L 


 


BUN   


 


Creatinine   1.4 H 


 


Glucose   55 L 


 


POC Glucose    49 L


 


Calcium   


 


Ionized Calcium   


 


Total Bilirubin   


 


Direct Bilirubin   


 


AST   


 


ALT   


 


Total Creatine Kinase   


 


Troponin T   0.043 H 


 


NT-Pro-B Natriuret Pep   7573 H 


 


Total Protein   


 


Albumin   


 


LDL Cholesterol Direct   47 L 


 


HDL Cholesterol   25 L 


 


TSH   


 


Free T4   


 


Arterial Blood Glucose   














  09/22/21 09/22/21 09/22/21





  04:03 04:03 09:22


 


WBC  24.3 H  


 


Hgb  9.3 L  


 


Hct   


 


MCV  74 L  


 


MCH  21 L  


 


MCHC  29 L  


 


RDW  23.1 H  


 


Plt Count   


 


Seg Neuts % (Manual)  78.0 H  


 


Lymphocytes % (Manual)  7.0 L  


 


Nucleated RBC %  1.0 H  


 


Seg Neutrophils # Man  19.0 H  


 


Lymphocytes # (Manual)   


 


Monocytes # (Manual)  1.0 H  


 


PT   


 


INR   


 


D-Dimer   


 


ABG pH   


 


POC ABG pCO2   


 


POC ABG pO2   


 


ABG pO2   


 


ABG HCO3   


 


ABG Base Excess   


 


ABG Hemoglobin   


 


ABG Oxyhemoglobin   


 


ABG Sodium   


 


ABG Chloride   


 


ABG Glucose   


 


Oxyhemoglobin   


 


Carboxyhemoglobin   


 


Sodium   134 L 


 


Potassium   


 


Chloride   91.2 L 


 


Carbon Dioxide   


 


BUN   


 


Creatinine   1.8 H 


 


Glucose   


 


POC Glucose   


 


Calcium   


 


Ionized Calcium   


 


Total Bilirubin   


 


Direct Bilirubin   


 


AST   


 


ALT   


 


Total Creatine Kinase   


 


Troponin T    0.099 H


 


NT-Pro-B Natriuret Pep   


 


Total Protein   


 


Albumin   


 


LDL Cholesterol Direct   


 


HDL Cholesterol   


 


TSH   


 


Free T4   


 


Arterial Blood Glucose   














  09/22/21 09/22/21 09/23/21





  13:03 20:28 04:52


 


WBC    20.9 H


 


Hgb    8.5 L


 


Hct    30.0 L


 


MCV    73 L


 


MCH    21 L


 


MCHC    28 L


 


RDW    23.9 H


 


Plt Count   


 


Seg Neuts % (Manual)    77.0 H


 


Lymphocytes % (Manual)    1.0 L


 


Nucleated RBC %    1.0 H


 


Seg Neutrophils # Man    16.1 H


 


Lymphocytes # (Manual)    0.2 L


 


Monocytes # (Manual)   


 


PT   


 


INR   


 


D-Dimer   


 


ABG pH   


 


POC ABG pCO2   


 


POC ABG pO2   


 


ABG pO2   


 


ABG HCO3   


 


ABG Base Excess   


 


ABG Hemoglobin   


 


ABG Oxyhemoglobin   


 


ABG Sodium   


 


ABG Chloride   


 


ABG Glucose   


 


Oxyhemoglobin   


 


Carboxyhemoglobin   


 


Sodium   


 


Potassium   


 


Chloride   


 


Carbon Dioxide   


 


BUN   


 


Creatinine   


 


Glucose   


 


POC Glucose   115 H 


 


Calcium   


 


Ionized Calcium   


 


Total Bilirubin   


 


Direct Bilirubin   


 


AST   


 


ALT   


 


Total Creatine Kinase   


 


Troponin T  0.078 H D  


 


NT-Pro-B Natriuret Pep   


 


Total Protein   


 


Albumin   


 


LDL Cholesterol Direct   


 


HDL Cholesterol   


 


TSH   


 


Free T4   


 


Arterial Blood Glucose   














  09/23/21 09/23/21 09/23/21





  04:52 08:46 11:50


 


WBC   


 


Hgb   


 


Hct   


 


MCV   


 


MCH   


 


MCHC   


 


RDW   


 


Plt Count   


 


Seg Neuts % (Manual)   


 


Lymphocytes % (Manual)   


 


Nucleated RBC %   


 


Seg Neutrophils # Man   


 


Lymphocytes # (Manual)   


 


Monocytes # (Manual)   


 


PT   


 


INR   


 


D-Dimer   


 


ABG pH   


 


POC ABG pCO2   


 


POC ABG pO2   


 


ABG pO2   


 


ABG HCO3   


 


ABG Base Excess   


 


ABG Hemoglobin   


 


ABG Oxyhemoglobin   


 


ABG Sodium   


 


ABG Chloride   


 


ABG Glucose   


 


Oxyhemoglobin   


 


Carboxyhemoglobin   


 


Sodium  129 L  


 


Potassium  5.6 H  


 


Chloride  88.6 L  


 


Carbon Dioxide   


 


BUN  27 H  


 


Creatinine  2.4 H  


 


Glucose  121 H  


 


POC Glucose   139 H  156 H


 


Calcium  7.7 L  


 


Ionized Calcium   


 


Total Bilirubin   


 


Direct Bilirubin   


 


AST   


 


ALT   


 


Total Creatine Kinase   


 


Troponin T   


 


NT-Pro-B Natriuret Pep   


 


Total Protein   


 


Albumin   


 


LDL Cholesterol Direct   


 


HDL Cholesterol   


 


TSH   


 


Free T4   


 


Arterial Blood Glucose   














  09/23/21 09/23/21 09/23/21





  15:46 16:58 22:08


 


WBC   


 


Hgb   


 


Hct   


 


MCV   


 


MCH   


 


MCHC   


 


RDW   


 


Plt Count   


 


Seg Neuts % (Manual)   


 


Lymphocytes % (Manual)   


 


Nucleated RBC %   


 


Seg Neutrophils # Man   


 


Lymphocytes # (Manual)   


 


Monocytes # (Manual)   


 


PT   


 


INR   


 


D-Dimer   


 


ABG pH   


 


POC ABG pCO2   


 


POC ABG pO2   


 


ABG pO2   


 


ABG HCO3   


 


ABG Base Excess   


 


ABG Hemoglobin   


 


ABG Oxyhemoglobin   


 


ABG Sodium   


 


ABG Chloride   


 


ABG Glucose   


 


Oxyhemoglobin   


 


Carboxyhemoglobin   


 


Sodium  128 L  


 


Potassium  5.5 H  


 


Chloride  88.1 L  


 


Carbon Dioxide   


 


BUN  33 H  


 


Creatinine  2.7 H  


 


Glucose  172 H  


 


POC Glucose   187 H  186 H


 


Calcium  7.3 L  


 


Ionized Calcium   


 


Total Bilirubin   


 


Direct Bilirubin   


 


AST   


 


ALT   


 


Total Creatine Kinase   


 


Troponin T   


 


NT-Pro-B Natriuret Pep   


 


Total Protein   


 


Albumin   


 


LDL Cholesterol Direct   


 


HDL Cholesterol   


 


TSH   


 


Free T4   


 


Arterial Blood Glucose   














  09/23/21 09/24/21 09/24/21





  23:15 02:20 07:39


 


WBC   


 


Hgb   


 


Hct   


 


MCV   


 


MCH   


 


MCHC   


 


RDW   


 


Plt Count   


 


Seg Neuts % (Manual)   


 


Lymphocytes % (Manual)   


 


Nucleated RBC %   


 


Seg Neutrophils # Man   


 


Lymphocytes # (Manual)   


 


Monocytes # (Manual)   


 


PT   


 


INR   


 


D-Dimer   


 


ABG pH   


 


POC ABG pCO2   


 


POC ABG pO2   


 


ABG pO2   


 


ABG HCO3   


 


ABG Base Excess   


 


ABG Hemoglobin   


 


ABG Oxyhemoglobin   


 


ABG Sodium   


 


ABG Chloride   


 


ABG Glucose   


 


Oxyhemoglobin   


 


Carboxyhemoglobin   


 


Sodium   128 L 


 


Potassium   5.2 H 


 


Chloride   88.6 L 


 


Carbon Dioxide   


 


BUN   39 H 


 


Creatinine   3.1 H 


 


Glucose   171 H 


 


POC Glucose    168 H


 


Calcium   7.2 L 


 


Ionized Calcium  3.6 L  


 


Total Bilirubin   


 


Direct Bilirubin   


 


AST   


 


ALT   


 


Total Creatine Kinase   


 


Troponin T   


 


NT-Pro-B Natriuret Pep   


 


Total Protein   


 


Albumin   


 


LDL Cholesterol Direct   


 


HDL Cholesterol   


 


TSH   


 


Free T4   


 


Arterial Blood Glucose   














  09/24/21 09/24/21 09/24/21





  10:04 11:37 17:18


 


WBC   


 


Hgb   


 


Hct   


 


MCV   


 


MCH   


 


MCHC   


 


RDW   


 


Plt Count   


 


Seg Neuts % (Manual)   


 


Lymphocytes % (Manual)   


 


Nucleated RBC %   


 


Seg Neutrophils # Man   


 


Lymphocytes # (Manual)   


 


Monocytes # (Manual)   


 


PT   


 


INR   


 


D-Dimer   


 


ABG pH   


 


POC ABG pCO2   


 


POC ABG pO2   


 


ABG pO2   


 


ABG HCO3   


 


ABG Base Excess   


 


ABG Hemoglobin   


 


ABG Oxyhemoglobin   


 


ABG Sodium   


 


ABG Chloride   


 


ABG Glucose   


 


Oxyhemoglobin   


 


Carboxyhemoglobin   


 


Sodium   


 


Potassium   


 


Chloride   


 


Carbon Dioxide   


 


BUN   


 


Creatinine   


 


Glucose   


 


POC Glucose   170 H  152 H


 


Calcium   


 


Ionized Calcium   


 


Total Bilirubin   


 


Direct Bilirubin   


 


AST   


 


ALT   


 


Total Creatine Kinase  1712 H  


 


Troponin T   


 


NT-Pro-B Natriuret Pep   


 


Total Protein   


 


Albumin   


 


LDL Cholesterol Direct   


 


HDL Cholesterol   


 


TSH   


 


Free T4   


 


Arterial Blood Glucose   














  09/24/21 09/24/21 09/25/21





  19:38 22:02 05:48


 


WBC   


 


Hgb   


 


Hct   


 


MCV   


 


MCH   


 


MCHC   


 


RDW   


 


Plt Count   


 


Seg Neuts % (Manual)   


 


Lymphocytes % (Manual)   


 


Nucleated RBC %   


 


Seg Neutrophils # Man   


 


Lymphocytes # (Manual)   


 


Monocytes # (Manual)   


 


PT   


 


INR   


 


D-Dimer   


 


ABG pH   


 


POC ABG pCO2   


 


POC ABG pO2   


 


ABG pO2   


 


ABG HCO3   


 


ABG Base Excess   


 


ABG Hemoglobin   


 


ABG Oxyhemoglobin   


 


ABG Sodium   


 


ABG Chloride   


 


ABG Glucose   


 


Oxyhemoglobin   


 


Carboxyhemoglobin   


 


Sodium  128 L   124 L


 


Potassium    5.8 H D


 


Chloride  89.6 L   89.4 L


 


Carbon Dioxide   


 


BUN  47 H   53 H


 


Creatinine  3.0 H   2.8 H


 


Glucose  165 H   150 H


 


POC Glucose   147 H 


 


Calcium  6.9 L   7.2 L


 


Ionized Calcium   


 


Total Bilirubin   


 


Direct Bilirubin   


 


AST   


 


ALT   


 


Total Creatine Kinase   


 


Troponin T   


 


NT-Pro-B Natriuret Pep   


 


Total Protein   


 


Albumin   


 


LDL Cholesterol Direct   


 


HDL Cholesterol   


 


TSH   


 


Free T4   


 


Arterial Blood Glucose   














  09/25/21 09/25/21 09/25/21





  08:48 11:35 19:12


 


WBC   


 


Hgb   


 


Hct   


 


MCV   


 


MCH   


 


MCHC   


 


RDW   


 


Plt Count   


 


Seg Neuts % (Manual)   


 


Lymphocytes % (Manual)   


 


Nucleated RBC %   


 


Seg Neutrophils # Man   


 


Lymphocytes # (Manual)   


 


Monocytes # (Manual)   


 


PT   


 


INR   


 


D-Dimer   


 


ABG pH   


 


POC ABG pCO2   


 


POC ABG pO2   


 


ABG pO2   


 


ABG HCO3   


 


ABG Base Excess   


 


ABG Hemoglobin   


 


ABG Oxyhemoglobin   


 


ABG Sodium   


 


ABG Chloride   


 


ABG Glucose   


 


Oxyhemoglobin   


 


Carboxyhemoglobin   


 


Sodium    128 L


 


Potassium   


 


Chloride    89.7 L


 


Carbon Dioxide   


 


BUN    53 H


 


Creatinine    2.4 H


 


Glucose    159 H


 


POC Glucose  152 H  152 H 


 


Calcium    7.1 L


 


Ionized Calcium   


 


Total Bilirubin   


 


Direct Bilirubin   


 


AST   


 


ALT   


 


Total Creatine Kinase   


 


Troponin T   


 


NT-Pro-B Natriuret Pep   


 


Total Protein   


 


Albumin   


 


LDL Cholesterol Direct   


 


HDL Cholesterol   


 


TSH   


 


Free T4   


 


Arterial Blood Glucose   














  09/25/21 09/26/21 09/26/21





  22:03 05:03 07:41


 


WBC   


 


Hgb   


 


Hct   


 


MCV   


 


MCH   


 


MCHC   


 


RDW   


 


Plt Count   


 


Seg Neuts % (Manual)   


 


Lymphocytes % (Manual)   


 


Nucleated RBC %   


 


Seg Neutrophils # Man   


 


Lymphocytes # (Manual)   


 


Monocytes # (Manual)   


 


PT   


 


INR   


 


D-Dimer   


 


ABG pH   


 


POC ABG pCO2   


 


POC ABG pO2   


 


ABG pO2   


 


ABG HCO3   


 


ABG Base Excess   


 


ABG Hemoglobin   


 


ABG Oxyhemoglobin   


 


ABG Sodium   


 


ABG Chloride   


 


ABG Glucose   


 


Oxyhemoglobin   


 


Carboxyhemoglobin   


 


Sodium   134 L 


 


Potassium   


 


Chloride   92.9 L 


 


Carbon Dioxide   33 H D 


 


BUN   54 H 


 


Creatinine   2.4 H 


 


Glucose   150 H 


 


POC Glucose  152 H   144 H


 


Calcium   7.2 L 


 


Ionized Calcium   


 


Total Bilirubin   


 


Direct Bilirubin   


 


AST   


 


ALT   


 


Total Creatine Kinase   


 


Troponin T   


 


NT-Pro-B Natriuret Pep   


 


Total Protein   


 


Albumin   


 


LDL Cholesterol Direct   


 


HDL Cholesterol   


 


TSH   


 


Free T4   


 


Arterial Blood Glucose   














  09/26/21 09/26/21 09/26/21





  11:38 14:11 17:02


 


WBC   14.7 H 


 


Hgb   8.7 L 


 


Hct   29.8 L 


 


MCV   74 L 


 


MCH   22 L 


 


MCHC   29 L 


 


RDW   24.9 H 


 


Plt Count   


 


Seg Neuts % (Manual)   86.0 H 


 


Lymphocytes % (Manual)   6.0 L 


 


Nucleated RBC %   


 


Seg Neutrophils # Man   12.6 H 


 


Lymphocytes # (Manual)   0.9 L 


 


Monocytes # (Manual)   


 


PT   


 


INR   


 


D-Dimer   


 


ABG pH   


 


POC ABG pCO2   


 


POC ABG pO2   


 


ABG pO2   


 


ABG HCO3   


 


ABG Base Excess   


 


ABG Hemoglobin   


 


ABG Oxyhemoglobin   


 


ABG Sodium   


 


ABG Chloride   


 


ABG Glucose   


 


Oxyhemoglobin   


 


Carboxyhemoglobin   


 


Sodium   


 


Potassium   


 


Chloride   


 


Carbon Dioxide   


 


BUN   


 


Creatinine   


 


Glucose   


 


POC Glucose  151 H   154 H


 


Calcium   


 


Ionized Calcium   


 


Total Bilirubin   


 


Direct Bilirubin   


 


AST   


 


ALT   


 


Total Creatine Kinase   


 


Troponin T   


 


NT-Pro-B Natriuret Pep   


 


Total Protein   


 


Albumin   


 


LDL Cholesterol Direct   


 


HDL Cholesterol   


 


TSH   


 


Free T4   


 


Arterial Blood Glucose   














  09/26/21 09/27/21 09/27/21





  23:14 05:03 10:00


 


WBC   


 


Hgb   


 


Hct   


 


MCV   


 


MCH   


 


MCHC   


 


RDW   


 


Plt Count   


 


Seg Neuts % (Manual)   


 


Lymphocytes % (Manual)   


 


Nucleated RBC %   


 


Seg Neutrophils # Man   


 


Lymphocytes # (Manual)   


 


Monocytes # (Manual)   


 


PT   


 


INR   


 


D-Dimer   


 


ABG pH    7.150 L*


 


POC ABG pCO2   


 


POC ABG pO2   


 


ABG pO2    91.8 H


 


ABG HCO3    37.2 H


 


ABG Base Excess    7.1 H


 


ABG Hemoglobin    7.1 L


 


ABG Oxyhemoglobin   


 


ABG Sodium   


 


ABG Chloride   


 


ABG Glucose   


 


Oxyhemoglobin    93.4 L


 


Carboxyhemoglobin   


 


Sodium   134 L 


 


Potassium   


 


Chloride   91.3 L 


 


Carbon Dioxide   33 H 


 


BUN   63 H 


 


Creatinine   2.2 H 


 


Glucose   159 H 


 


POC Glucose  151 H  


 


Calcium   7.8 L 


 


Ionized Calcium   


 


Total Bilirubin   


 


Direct Bilirubin   


 


AST   


 


ALT   


 


Total Creatine Kinase   


 


Troponin T   


 


NT-Pro-B Natriuret Pep   


 


Total Protein   


 


Albumin   


 


LDL Cholesterol Direct   


 


HDL Cholesterol   


 


TSH   


 


Free T4   


 


Arterial Blood Glucose   














  09/27/21 09/27/21 09/27/21





  12:39 16:37 18:40


 


WBC   


 


Hgb   


 


Hct   


 


MCV   


 


MCH   


 


MCHC   


 


RDW   


 


Plt Count   


 


Seg Neuts % (Manual)   


 


Lymphocytes % (Manual)   


 


Nucleated RBC %   


 


Seg Neutrophils # Man   


 


Lymphocytes # (Manual)   


 


Monocytes # (Manual)   


 


PT   


 


INR   


 


D-Dimer   


 


ABG pH    7.237 L


 


POC ABG pCO2   


 


POC ABG pO2   


 


ABG pO2   


 


ABG HCO3    36.2 H


 


ABG Base Excess    7.4 H


 


ABG Hemoglobin    7.8 L


 


ABG Oxyhemoglobin   


 


ABG Sodium   


 


ABG Chloride   


 


ABG Glucose   


 


Oxyhemoglobin    93.7 L


 


Carboxyhemoglobin   


 


Sodium   


 


Potassium   


 


Chloride   


 


Carbon Dioxide   


 


BUN   


 


Creatinine   


 


Glucose   


 


POC Glucose  140 H  132 H 


 


Calcium   


 


Ionized Calcium   


 


Total Bilirubin   


 


Direct Bilirubin   


 


AST   


 


ALT   


 


Total Creatine Kinase   


 


Troponin T   


 


NT-Pro-B Natriuret Pep   


 


Total Protein   


 


Albumin   


 


LDL Cholesterol Direct   


 


HDL Cholesterol   


 


TSH   


 


Free T4   


 


Arterial Blood Glucose   














  09/27/21 09/28/21 09/28/21





  23:55 04:27 04:27


 


WBC   


 


Hgb   8.5 L 


 


Hct   29.1 L 


 


MCV   72 L 


 


MCH   21 L 


 


MCHC   29 L 


 


RDW   24.9 H 


 


Plt Count   


 


Seg Neuts % (Manual)   


 


Lymphocytes % (Manual)   


 


Nucleated RBC %   


 


Seg Neutrophils # Man   


 


Lymphocytes # (Manual)   


 


Monocytes # (Manual)   


 


PT   


 


INR   


 


D-Dimer   


 


ABG pH   


 


POC ABG pCO2   


 


POC ABG pO2   


 


ABG pO2   


 


ABG HCO3   


 


ABG Base Excess   


 


ABG Hemoglobin   


 


ABG Oxyhemoglobin   


 


ABG Sodium   


 


ABG Chloride   


 


ABG Glucose   


 


Oxyhemoglobin   


 


Carboxyhemoglobin   


 


Sodium    135 L


 


Potassium   


 


Chloride    93.5 L


 


Carbon Dioxide    33 H


 


BUN    68 H


 


Creatinine    1.9 H


 


Glucose    143 H


 


POC Glucose  135 H  


 


Calcium    8.1 L


 


Ionized Calcium   


 


Total Bilirubin    1.50 H


 


Direct Bilirubin   


 


AST    494 H


 


ALT    835 H


 


Total Creatine Kinase   


 


Troponin T   


 


NT-Pro-B Natriuret Pep   


 


Total Protein    9.5 H


 


Albumin    3.1 L


 


LDL Cholesterol Direct   


 


HDL Cholesterol   


 


TSH   


 


Free T4   


 


Arterial Blood Glucose   














  09/28/21 09/28/21 09/29/21





  12:03 17:24 00:19


 


WBC   


 


Hgb   


 


Hct   


 


MCV   


 


MCH   


 


MCHC   


 


RDW   


 


Plt Count   


 


Seg Neuts % (Manual)   


 


Lymphocytes % (Manual)   


 


Nucleated RBC %   


 


Seg Neutrophils # Man   


 


Lymphocytes # (Manual)   


 


Monocytes # (Manual)   


 


PT   


 


INR   


 


D-Dimer   


 


ABG pH  7.291 L  


 


POC ABG pCO2  75.5 H  


 


POC ABG pO2  76.2 L  


 


ABG pO2   


 


ABG HCO3   


 


ABG Base Excess   


 


ABG Hemoglobin  9.9 L  


 


ABG Oxyhemoglobin  91.0 L  


 


ABG Sodium  134.9 L  


 


ABG Chloride  93.0 L  


 


ABG Glucose  146 H  


 


Oxyhemoglobin   


 


Carboxyhemoglobin  2.2 H  


 


Sodium   


 


Potassium   


 


Chloride   


 


Carbon Dioxide   


 


BUN   


 


Creatinine   


 


Glucose   


 


POC Glucose   134 H  136 H


 


Calcium   


 


Ionized Calcium   


 


Total Bilirubin   


 


Direct Bilirubin   


 


AST   


 


ALT   


 


Total Creatine Kinase   


 


Troponin T   


 


NT-Pro-B Natriuret Pep   


 


Total Protein   


 


Albumin   


 


LDL Cholesterol Direct   


 


HDL Cholesterol   


 


TSH   


 


Free T4   


 


Arterial Blood Glucose  146 H  














  09/29/21 09/29/21 09/29/21





  04:55 04:55 05:29


 


WBC   


 


Hgb  8.0 L  


 


Hct  27.1 L  


 


MCV  70 L  


 


MCH  21 L  


 


MCHC   


 


RDW  24.3 H  


 


Plt Count   


 


Seg Neuts % (Manual)   


 


Lymphocytes % (Manual)   


 


Nucleated RBC %   


 


Seg Neutrophils # Man   


 


Lymphocytes # (Manual)   


 


Monocytes # (Manual)   


 


PT   


 


INR   


 


D-Dimer   


 


ABG pH   


 


POC ABG pCO2   


 


POC ABG pO2   


 


ABG pO2   


 


ABG HCO3   


 


ABG Base Excess   


 


ABG Hemoglobin   


 


ABG Oxyhemoglobin   


 


ABG Sodium   


 


ABG Chloride   


 


ABG Glucose   


 


Oxyhemoglobin   


 


Carboxyhemoglobin   


 


Sodium   


 


Potassium   


 


Chloride   95.1 L 


 


Carbon Dioxide   36 H 


 


BUN   63 H 


 


Creatinine   1.5 H 


 


Glucose   131 H 


 


POC Glucose    118 H


 


Calcium   8.3 L 


 


Ionized Calcium   


 


Total Bilirubin   1.80 H 


 


Direct Bilirubin   


 


AST   323 H 


 


ALT   609 H 


 


Total Creatine Kinase   


 


Troponin T   


 


NT-Pro-B Natriuret Pep   


 


Total Protein   9.3 H 


 


Albumin   2.9 L 


 


LDL Cholesterol Direct   


 


HDL Cholesterol   


 


TSH   


 


Free T4   


 


Arterial Blood Glucose   














  09/29/21 09/29/21 09/29/21





  11:40 18:30 22:44


 


WBC   


 


Hgb   


 


Hct   


 


MCV   


 


MCH   


 


MCHC   


 


RDW   


 


Plt Count   


 


Seg Neuts % (Manual)   


 


Lymphocytes % (Manual)   


 


Nucleated RBC %   


 


Seg Neutrophils # Man   


 


Lymphocytes # (Manual)   


 


Monocytes # (Manual)   


 


PT   


 


INR   


 


D-Dimer   


 


ABG pH   


 


POC ABG pCO2   


 


POC ABG pO2   


 


ABG pO2   


 


ABG HCO3   


 


ABG Base Excess   


 


ABG Hemoglobin   


 


ABG Oxyhemoglobin   


 


ABG Sodium   


 


ABG Chloride   


 


ABG Glucose   


 


Oxyhemoglobin   


 


Carboxyhemoglobin   


 


Sodium   


 


Potassium   


 


Chloride   


 


Carbon Dioxide   


 


BUN   


 


Creatinine   


 


Glucose   


 


POC Glucose  139 H  130 H  123 H


 


Calcium   


 


Ionized Calcium   


 


Total Bilirubin   


 


Direct Bilirubin   


 


AST   


 


ALT   


 


Total Creatine Kinase   


 


Troponin T   


 


NT-Pro-B Natriuret Pep   


 


Total Protein   


 


Albumin   


 


LDL Cholesterol Direct   


 


HDL Cholesterol   


 


TSH   


 


Free T4   


 


Arterial Blood Glucose   














  09/30/21 09/30/21 09/30/21





  04:53 04:53 07:16


 


WBC  12.5 H  


 


Hgb  8.8 L  


 


Hct  30.2 L  


 


MCV  74 L  


 


MCH  21 L  


 


MCHC  29 L  


 


RDW  24.5 H  


 


Plt Count   


 


Seg Neuts % (Manual)   


 


Lymphocytes % (Manual)   


 


Nucleated RBC %   


 


Seg Neutrophils # Man   


 


Lymphocytes # (Manual)   


 


Monocytes # (Manual)   


 


PT   


 


INR   


 


D-Dimer   


 


ABG pH   


 


POC ABG pCO2   


 


POC ABG pO2   


 


ABG pO2   


 


ABG HCO3   


 


ABG Base Excess   


 


ABG Hemoglobin   


 


ABG Oxyhemoglobin   


 


ABG Sodium   


 


ABG Chloride   


 


ABG Glucose   


 


Oxyhemoglobin   


 


Carboxyhemoglobin   


 


Sodium   


 


Potassium   


 


Chloride   96.9 L 


 


Carbon Dioxide   35 H 


 


BUN   65 H 


 


Creatinine   1.5 H 


 


Glucose   142 H 


 


POC Glucose    138 H


 


Calcium   


 


Ionized Calcium   


 


Total Bilirubin   1.50 H 


 


Direct Bilirubin   


 


AST   251 H 


 


ALT   572 H 


 


Total Creatine Kinase   


 


Troponin T   


 


NT-Pro-B Natriuret Pep   


 


Total Protein   10.1 H 


 


Albumin   3.2 L 


 


LDL Cholesterol Direct   


 


HDL Cholesterol   


 


TSH   


 


Free T4   


 


Arterial Blood Glucose   














  09/30/21 09/30/21 09/30/21





  07:32 11:29 14:42


 


WBC   


 


Hgb   


 


Hct   


 


MCV   


 


MCH   


 


MCHC   


 


RDW   


 


Plt Count   


 


Seg Neuts % (Manual)   


 


Lymphocytes % (Manual)   


 


Nucleated RBC %   


 


Seg Neutrophils # Man   


 


Lymphocytes # (Manual)   


 


Monocytes # (Manual)   


 


PT   


 


INR   


 


D-Dimer   


 


ABG pH  7.086 L   7.188 L


 


POC ABG pCO2  142.2 H   99.3 H


 


POC ABG pO2  196.3 H   132.3 H


 


ABG pO2   


 


ABG HCO3   


 


ABG Base Excess   


 


ABG Hemoglobin  10.0 L   9.2 L


 


ABG Oxyhemoglobin   


 


ABG Sodium   


 


ABG Chloride  96.0 L   96.0 L


 


ABG Glucose  147 H   146 H


 


Oxyhemoglobin   


 


Carboxyhemoglobin  1.6 H   2.0 H


 


Sodium   


 


Potassium   


 


Chloride   


 


Carbon Dioxide   


 


BUN   


 


Creatinine   


 


Glucose   


 


POC Glucose   131 H 


 


Calcium   


 


Ionized Calcium   


 


Total Bilirubin   


 


Direct Bilirubin   


 


AST   


 


ALT   


 


Total Creatine Kinase   


 


Troponin T   


 


NT-Pro-B Natriuret Pep   


 


Total Protein   


 


Albumin   


 


LDL Cholesterol Direct   


 


HDL Cholesterol   


 


TSH   


 


Free T4   


 


Arterial Blood Glucose  147 H   146 H














  09/30/21 09/30/21 10/01/21





  17:23 22:34 07:44


 


WBC   


 


Hgb   


 


Hct   


 


MCV   


 


MCH   


 


MCHC   


 


RDW   


 


Plt Count   


 


Seg Neuts % (Manual)   


 


Lymphocytes % (Manual)   


 


Nucleated RBC %   


 


Seg Neutrophils # Man   


 


Lymphocytes # (Manual)   


 


Monocytes # (Manual)   


 


PT   


 


INR   


 


D-Dimer   


 


ABG pH   


 


POC ABG pCO2   


 


POC ABG pO2   


 


ABG pO2   


 


ABG HCO3   


 


ABG Base Excess   


 


ABG Hemoglobin   


 


ABG Oxyhemoglobin   


 


ABG Sodium   


 


ABG Chloride   


 


ABG Glucose   


 


Oxyhemoglobin   


 


Carboxyhemoglobin   


 


Sodium   


 


Potassium   


 


Chloride   


 


Carbon Dioxide   


 


BUN   


 


Creatinine   


 


Glucose   


 


POC Glucose  117 H  169 H  150 H


 


Calcium   


 


Ionized Calcium   


 


Total Bilirubin   


 


Direct Bilirubin   


 


AST   


 


ALT   


 


Total Creatine Kinase   


 


Troponin T   


 


NT-Pro-B Natriuret Pep   


 


Total Protein   


 


Albumin   


 


LDL Cholesterol Direct   


 


HDL Cholesterol   


 


TSH   


 


Free T4   


 


Arterial Blood Glucose   














  10/01/21 10/01/21 10/01/21





  08:55 08:55 09:47


 


WBC   


 


Hgb  8.7 L  


 


Hct  29.4 L  


 


MCV  74 L  


 


MCH  22 L  


 


MCHC   


 


RDW  24.8 H  


 


Plt Count   


 


Seg Neuts % (Manual)   


 


Lymphocytes % (Manual)   


 


Nucleated RBC %   


 


Seg Neutrophils # Man   


 


Lymphocytes # (Manual)   


 


Monocytes # (Manual)   


 


PT   


 


INR   


 


D-Dimer   


 


ABG pH    7.234 L


 


POC ABG pCO2    90.5 H


 


POC ABG pO2    139.4 H


 


ABG pO2   


 


ABG HCO3   


 


ABG Base Excess   


 


ABG Hemoglobin    9.5 L


 


ABG Oxyhemoglobin   


 


ABG Sodium   


 


ABG Chloride    97.0 L


 


ABG Glucose    179 H


 


Oxyhemoglobin   


 


Carboxyhemoglobin    1.7 H


 


Sodium   


 


Potassium   


 


Chloride   94.4 L 


 


Carbon Dioxide   37 H 


 


BUN   68 H 


 


Creatinine   1.5 H 


 


Glucose   178 H 


 


POC Glucose   


 


Calcium   


 


Ionized Calcium   


 


Total Bilirubin   1.60 H 


 


Direct Bilirubin   


 


AST   172 H 


 


ALT   437 H 


 


Total Creatine Kinase   


 


Troponin T   


 


NT-Pro-B Natriuret Pep   


 


Total Protein   10.1 H 


 


Albumin   3.2 L 


 


LDL Cholesterol Direct   


 


HDL Cholesterol   


 


TSH   


 


Free T4   


 


Arterial Blood Glucose    179 H














  10/01/21 10/01/21 10/01/21





  11:39 16:03 17:24


 


WBC   


 


Hgb   


 


Hct   


 


MCV   


 


MCH   


 


MCHC   


 


RDW   


 


Plt Count   


 


Seg Neuts % (Manual)   


 


Lymphocytes % (Manual)   


 


Nucleated RBC %   


 


Seg Neutrophils # Man   


 


Lymphocytes # (Manual)   


 


Monocytes # (Manual)   


 


PT   


 


INR   


 


D-Dimer   5410.10 H 


 


ABG pH   


 


POC ABG pCO2   


 


POC ABG pO2   


 


ABG pO2   


 


ABG HCO3   


 


ABG Base Excess   


 


ABG Hemoglobin   


 


ABG Oxyhemoglobin   


 


ABG Sodium   


 


ABG Chloride   


 


ABG Glucose   


 


Oxyhemoglobin   


 


Carboxyhemoglobin   


 


Sodium   


 


Potassium   


 


Chloride   


 


Carbon Dioxide   


 


BUN   


 


Creatinine   


 


Glucose   


 


POC Glucose  161 H   130 H


 


Calcium   


 


Ionized Calcium   


 


Total Bilirubin   


 


Direct Bilirubin   


 


AST   


 


ALT   


 


Total Creatine Kinase   


 


Troponin T   


 


NT-Pro-B Natriuret Pep   


 


Total Protein   


 


Albumin   


 


LDL Cholesterol Direct   


 


HDL Cholesterol   


 


TSH   


 


Free T4   


 


Arterial Blood Glucose   














  10/01/21 10/02/21 10/02/21





  21:52 10:20 11:58


 


WBC   


 


Hgb   


 


Hct   


 


MCV   


 


MCH   


 


MCHC   


 


RDW   


 


Plt Count   


 


Seg Neuts % (Manual)   


 


Lymphocytes % (Manual)   


 


Nucleated RBC %   


 


Seg Neutrophils # Man   


 


Lymphocytes # (Manual)   


 


Monocytes # (Manual)   


 


PT   


 


INR   


 


D-Dimer   


 


ABG pH   


 


POC ABG pCO2   


 


POC ABG pO2   


 


ABG pO2   


 


ABG HCO3   


 


ABG Base Excess   


 


ABG Hemoglobin   


 


ABG Oxyhemoglobin   


 


ABG Sodium   


 


ABG Chloride   


 


ABG Glucose   


 


Oxyhemoglobin   


 


Carboxyhemoglobin   


 


Sodium   146 H D 


 


Potassium   


 


Chloride   


 


Carbon Dioxide   


 


BUN   75 H 


 


Creatinine   1.6 H 


 


Glucose   158 H 


 


POC Glucose  150 H   143 H


 


Calcium   


 


Ionized Calcium   


 


Total Bilirubin   


 


Direct Bilirubin   


 


AST   


 


ALT   


 


Total Creatine Kinase   


 


Troponin T   


 


NT-Pro-B Natriuret Pep   


 


Total Protein   


 


Albumin   


 


LDL Cholesterol Direct   


 


HDL Cholesterol   


 


TSH   


 


Free T4   


 


Arterial Blood Glucose   














  10/02/21 10/02/21 10/02/21





  14:19 16:55 16:55


 


WBC   


 


Hgb   8.3 L 


 


Hct   27.7 L 


 


MCV   


 


MCH   


 


MCHC   


 


RDW   


 


Plt Count   


 


Seg Neuts % (Manual)   


 


Lymphocytes % (Manual)   


 


Nucleated RBC %   


 


Seg Neutrophils # Man   


 


Lymphocytes # (Manual)   


 


Monocytes # (Manual)   


 


PT    18.7 H


 


INR    1.51 H


 


D-Dimer   


 


ABG pH   


 


POC ABG pCO2  67.4 H  


 


POC ABG pO2  150.6 H  


 


ABG pO2   


 


ABG HCO3   


 


ABG Base Excess   


 


ABG Hemoglobin  9.3 L  


 


ABG Oxyhemoglobin   


 


ABG Sodium   


 


ABG Chloride   


 


ABG Glucose  162 H  


 


Oxyhemoglobin   


 


Carboxyhemoglobin  1.6 H  


 


Sodium   


 


Potassium   


 


Chloride   


 


Carbon Dioxide   


 


BUN   


 


Creatinine   


 


Glucose   


 


POC Glucose   


 


Calcium   


 


Ionized Calcium   


 


Total Bilirubin   


 


Direct Bilirubin   


 


AST   


 


ALT   


 


Total Creatine Kinase   


 


Troponin T   


 


NT-Pro-B Natriuret Pep   


 


Total Protein   


 


Albumin   


 


LDL Cholesterol Direct   


 


HDL Cholesterol   


 


TSH   


 


Free T4   


 


Arterial Blood Glucose  162 H  














  10/02/21 10/02/21 10/02/21





  17:40 19:55 19:55


 


WBC   


 


Hgb   


 


Hct   


 


MCV   


 


MCH   


 


MCHC   


 


RDW   


 


Plt Count   


 


Seg Neuts % (Manual)   


 


Lymphocytes % (Manual)   


 


Nucleated RBC %   


 


Seg Neutrophils # Man   


 


Lymphocytes # (Manual)   


 


Monocytes # (Manual)   


 


PT   


 


INR   


 


D-Dimer   


 


ABG pH   


 


POC ABG pCO2   


 


POC ABG pO2   


 


ABG pO2   


 


ABG HCO3   


 


ABG Base Excess   


 


ABG Hemoglobin   


 


ABG Oxyhemoglobin   


 


ABG Sodium   


 


ABG Chloride   


 


ABG Glucose   


 


Oxyhemoglobin   


 


Carboxyhemoglobin   


 


Sodium   


 


Potassium   


 


Chloride   


 


Carbon Dioxide   


 


BUN   


 


Creatinine   


 


Glucose   


 


POC Glucose  151 H  


 


Calcium   


 


Ionized Calcium   


 


Total Bilirubin   


 


Direct Bilirubin   


 


AST   


 


ALT   


 


Total Creatine Kinase   


 


Troponin T   


 


NT-Pro-B Natriuret Pep   


 


Total Protein   


 


Albumin   


 


LDL Cholesterol Direct   


 


HDL Cholesterol   


 


TSH    6.140 H


 


Free T4   0.67 L 


 


Arterial Blood Glucose   














  10/02/21 10/03/21 10/03/21





  22:19 04:40 04:40


 


WBC   


 


Hgb    8.1 L


 


Hct    27.5 L


 


MCV    73 L


 


MCH    22 L


 


MCHC   


 


RDW    25.4 H


 


Plt Count   


 


Seg Neuts % (Manual)   


 


Lymphocytes % (Manual)   


 


Nucleated RBC %   


 


Seg Neutrophils # Man   


 


Lymphocytes # (Manual)   


 


Monocytes # (Manual)   


 


PT   


 


INR   


 


D-Dimer   


 


ABG pH   


 


POC ABG pCO2   


 


POC ABG pO2   


 


ABG pO2   


 


ABG HCO3   


 


ABG Base Excess   


 


ABG Hemoglobin   


 


ABG Oxyhemoglobin   


 


ABG Sodium   


 


ABG Chloride   


 


ABG Glucose   


 


Oxyhemoglobin   


 


Carboxyhemoglobin   


 


Sodium   


 


Potassium   


 


Chloride   


 


Carbon Dioxide   37 H D 


 


BUN   76 H 


 


Creatinine   1.6 H 


 


Glucose   173 H 


 


POC Glucose  157 H  


 


Calcium   


 


Ionized Calcium   


 


Total Bilirubin   


 


Direct Bilirubin   


 


AST   


 


ALT   


 


Total Creatine Kinase   


 


Troponin T   


 


NT-Pro-B Natriuret Pep   


 


Total Protein   


 


Albumin   


 


LDL Cholesterol Direct   


 


HDL Cholesterol   


 


TSH   


 


Free T4   


 


Arterial Blood Glucose   














  10/03/21 10/03/21 10/03/21





  11:36 17:20 21:17


 


WBC   


 


Hgb   


 


Hct   


 


MCV   


 


MCH   


 


MCHC   


 


RDW   


 


Plt Count   


 


Seg Neuts % (Manual)   


 


Lymphocytes % (Manual)   


 


Nucleated RBC %   


 


Seg Neutrophils # Man   


 


Lymphocytes # (Manual)   


 


Monocytes # (Manual)   


 


PT   


 


INR   


 


D-Dimer   


 


ABG pH   


 


POC ABG pCO2   


 


POC ABG pO2   


 


ABG pO2   


 


ABG HCO3   


 


ABG Base Excess   


 


ABG Hemoglobin   


 


ABG Oxyhemoglobin   


 


ABG Sodium   


 


ABG Chloride   


 


ABG Glucose   


 


Oxyhemoglobin   


 


Carboxyhemoglobin   


 


Sodium   


 


Potassium   


 


Chloride   


 


Carbon Dioxide   


 


BUN   


 


Creatinine   


 


Glucose   


 


POC Glucose  168 H  174 H  163 H


 


Calcium   


 


Ionized Calcium   


 


Total Bilirubin   


 


Direct Bilirubin   


 


AST   


 


ALT   


 


Total Creatine Kinase   


 


Troponin T   


 


NT-Pro-B Natriuret Pep   


 


Total Protein   


 


Albumin   


 


LDL Cholesterol Direct   


 


HDL Cholesterol   


 


TSH   


 


Free T4   


 


Arterial Blood Glucose   














  10/04/21 10/04/21 10/04/21





  04:22 11:27 17:12


 


WBC   


 


Hgb   


 


Hct   


 


MCV   


 


MCH   


 


MCHC   


 


RDW   


 


Plt Count   


 


Seg Neuts % (Manual)   


 


Lymphocytes % (Manual)   


 


Nucleated RBC %   


 


Seg Neutrophils # Man   


 


Lymphocytes # (Manual)   


 


Monocytes # (Manual)   


 


PT   


 


INR   


 


D-Dimer   


 


ABG pH   


 


POC ABG pCO2   


 


POC ABG pO2   


 


ABG pO2   


 


ABG HCO3   


 


ABG Base Excess   


 


ABG Hemoglobin   


 


ABG Oxyhemoglobin   


 


ABG Sodium   


 


ABG Chloride   


 


ABG Glucose   


 


Oxyhemoglobin   


 


Carboxyhemoglobin   


 


Sodium  147 H  


 


Potassium   


 


Chloride   


 


Carbon Dioxide  37 H  


 


BUN  74 H  


 


Creatinine  1.3 H  


 


Glucose  193 H  


 


POC Glucose   154 H  162 H


 


Calcium   


 


Ionized Calcium   


 


Total Bilirubin   


 


Direct Bilirubin   


 


AST   


 


ALT   


 


Total Creatine Kinase   


 


Troponin T   


 


NT-Pro-B Natriuret Pep   


 


Total Protein   


 


Albumin   


 


LDL Cholesterol Direct   


 


HDL Cholesterol   


 


TSH   


 


Free T4   


 


Arterial Blood Glucose   














  10/04/21 10/05/21 10/05/21





  21:59 09:05 12:28


 


WBC   


 


Hgb   


 


Hct   


 


MCV   


 


MCH   


 


MCHC   


 


RDW   


 


Plt Count   


 


Seg Neuts % (Manual)   


 


Lymphocytes % (Manual)   


 


Nucleated RBC %   


 


Seg Neutrophils # Man   


 


Lymphocytes # (Manual)   


 


Monocytes # (Manual)   


 


PT   


 


INR   


 


D-Dimer   


 


ABG pH   


 


POC ABG pCO2   


 


POC ABG pO2   


 


ABG pO2   


 


ABG HCO3   


 


ABG Base Excess   


 


ABG Hemoglobin   


 


ABG Oxyhemoglobin   


 


ABG Sodium   


 


ABG Chloride   


 


ABG Glucose   


 


Oxyhemoglobin   


 


Carboxyhemoglobin   


 


Sodium   


 


Potassium   


 


Chloride   


 


Carbon Dioxide   


 


BUN   


 


Creatinine   


 


Glucose   


 


POC Glucose  160 H  168 H  174 H


 


Calcium   


 


Ionized Calcium   


 


Total Bilirubin   


 


Direct Bilirubin   


 


AST   


 


ALT   


 


Total Creatine Kinase   


 


Troponin T   


 


NT-Pro-B Natriuret Pep   


 


Total Protein   


 


Albumin   


 


LDL Cholesterol Direct   


 


HDL Cholesterol   


 


TSH   


 


Free T4   


 


Arterial Blood Glucose   














  10/05/21 10/05/21 10/06/21





  17:27 21:42 08:15


 


WBC   


 


Hgb   


 


Hct   


 


MCV   


 


MCH   


 


MCHC   


 


RDW   


 


Plt Count   


 


Seg Neuts % (Manual)   


 


Lymphocytes % (Manual)   


 


Nucleated RBC %   


 


Seg Neutrophils # Man   


 


Lymphocytes # (Manual)   


 


Monocytes # (Manual)   


 


PT   


 


INR   


 


D-Dimer   


 


ABG pH   


 


POC ABG pCO2   


 


POC ABG pO2   


 


ABG pO2   


 


ABG HCO3   


 


ABG Base Excess   


 


ABG Hemoglobin   


 


ABG Oxyhemoglobin   


 


ABG Sodium   


 


ABG Chloride   


 


ABG Glucose   


 


Oxyhemoglobin   


 


Carboxyhemoglobin   


 


Sodium   


 


Potassium   


 


Chloride   


 


Carbon Dioxide   


 


BUN   


 


Creatinine   


 


Glucose   


 


POC Glucose  175 H  158 H  166 H


 


Calcium   


 


Ionized Calcium   


 


Total Bilirubin   


 


Direct Bilirubin   


 


AST   


 


ALT   


 


Total Creatine Kinase   


 


Troponin T   


 


NT-Pro-B Natriuret Pep   


 


Total Protein   


 


Albumin   


 


LDL Cholesterol Direct   


 


HDL Cholesterol   


 


TSH   


 


Free T4   


 


Arterial Blood Glucose   














  10/06/21 10/06/21 10/06/21





  11:28 17:22 21:14


 


WBC   


 


Hgb   


 


Hct   


 


MCV   


 


MCH   


 


MCHC   


 


RDW   


 


Plt Count   


 


Seg Neuts % (Manual)   


 


Lymphocytes % (Manual)   


 


Nucleated RBC %   


 


Seg Neutrophils # Man   


 


Lymphocytes # (Manual)   


 


Monocytes # (Manual)   


 


PT   


 


INR   


 


D-Dimer   


 


ABG pH   


 


POC ABG pCO2   


 


POC ABG pO2   


 


ABG pO2   


 


ABG HCO3   


 


ABG Base Excess   


 


ABG Hemoglobin   


 


ABG Oxyhemoglobin   


 


ABG Sodium   


 


ABG Chloride   


 


ABG Glucose   


 


Oxyhemoglobin   


 


Carboxyhemoglobin   


 


Sodium   


 


Potassium   


 


Chloride   


 


Carbon Dioxide   


 


BUN   


 


Creatinine   


 


Glucose   


 


POC Glucose  198 H  194 H  176 H


 


Calcium   


 


Ionized Calcium   


 


Total Bilirubin   


 


Direct Bilirubin   


 


AST   


 


ALT   


 


Total Creatine Kinase   


 


Troponin T   


 


NT-Pro-B Natriuret Pep   


 


Total Protein   


 


Albumin   


 


LDL Cholesterol Direct   


 


HDL Cholesterol   


 


TSH   


 


Free T4   


 


Arterial Blood Glucose   














  10/07/21 10/07/21 10/07/21





  00:54 00:54 02:51


 


WBC    11.6 H


 


Hgb    7.9 L


 


Hct    27.2 L


 


MCV    72 L


 


MCH    21 L


 


MCHC    29 L


 


RDW    25.8 H


 


Plt Count    118 L


 


Seg Neuts % (Manual)    91.0 H


 


Lymphocytes % (Manual)    4.0 L


 


Nucleated RBC %   


 


Seg Neutrophils # Man    10.6 H


 


Lymphocytes # (Manual)    0.5 L


 


Monocytes # (Manual)   


 


PT   


 


INR   


 


D-Dimer   


 


ABG pH   


 


POC ABG pCO2   


 


POC ABG pO2   


 


ABG pO2   


 


ABG HCO3   


 


ABG Base Excess   


 


ABG Hemoglobin   


 


ABG Oxyhemoglobin   


 


ABG Sodium   


 


ABG Chloride   


 


ABG Glucose   


 


Oxyhemoglobin   


 


Carboxyhemoglobin   


 


Sodium  153 H  


 


Potassium   


 


Chloride   


 


Carbon Dioxide  38 H  


 


BUN  74 H  


 


Creatinine  1.3 H  


 


Glucose  205 H  


 


POC Glucose   


 


Calcium   


 


Ionized Calcium   


 


Total Bilirubin   1.40 H 


 


Direct Bilirubin   0.8 H 


 


AST   


 


ALT   133 H 


 


Total Creatine Kinase   


 


Troponin T   


 


NT-Pro-B Natriuret Pep   


 


Total Protein   8.4 H 


 


Albumin   3.2 L 


 


LDL Cholesterol Direct   


 


HDL Cholesterol   


 


TSH   


 


Free T4   


 


Arterial Blood Glucose   














  10/07/21 10/07/21 10/08/21





  16:09 21:03 08:03


 


WBC   


 


Hgb   


 


Hct   


 


MCV   


 


MCH   


 


MCHC   


 


RDW   


 


Plt Count   


 


Seg Neuts % (Manual)   


 


Lymphocytes % (Manual)   


 


Nucleated RBC %   


 


Seg Neutrophils # Man   


 


Lymphocytes # (Manual)   


 


Monocytes # (Manual)   


 


PT   


 


INR   


 


D-Dimer   


 


ABG pH   


 


POC ABG pCO2   


 


POC ABG pO2   


 


ABG pO2   


 


ABG HCO3   


 


ABG Base Excess   


 


ABG Hemoglobin   


 


ABG Oxyhemoglobin   


 


ABG Sodium   


 


ABG Chloride   


 


ABG Glucose   


 


Oxyhemoglobin   


 


Carboxyhemoglobin   


 


Sodium   


 


Potassium   


 


Chloride   


 


Carbon Dioxide   


 


BUN   


 


Creatinine   


 


Glucose   


 


POC Glucose  186 H  110 H  178 H


 


Calcium   


 


Ionized Calcium   


 


Total Bilirubin   


 


Direct Bilirubin   


 


AST   


 


ALT   


 


Total Creatine Kinase   


 


Troponin T   


 


NT-Pro-B Natriuret Pep   


 


Total Protein   


 


Albumin   


 


LDL Cholesterol Direct   


 


HDL Cholesterol   


 


TSH   


 


Free T4   


 


Arterial Blood Glucose   














  10/08/21





  11:08


 


WBC 


 


Hgb 


 


Hct 


 


MCV 


 


MCH 


 


MCHC 


 


RDW 


 


Plt Count 


 


Seg Neuts % (Manual) 


 


Lymphocytes % (Manual) 


 


Nucleated RBC % 


 


Seg Neutrophils # Man 


 


Lymphocytes # (Manual) 


 


Monocytes # (Manual) 


 


PT 


 


INR 


 


D-Dimer 


 


ABG pH 


 


POC ABG pCO2 


 


POC ABG pO2 


 


ABG pO2 


 


ABG HCO3 


 


ABG Base Excess 


 


ABG Hemoglobin 


 


ABG Oxyhemoglobin 


 


ABG Sodium 


 


ABG Chloride 


 


ABG Glucose 


 


Oxyhemoglobin 


 


Carboxyhemoglobin 


 


Sodium 


 


Potassium 


 


Chloride 


 


Carbon Dioxide 


 


BUN 


 


Creatinine 


 


Glucose 


 


POC Glucose  189 H


 


Calcium 


 


Ionized Calcium 


 


Total Bilirubin 


 


Direct Bilirubin 


 


AST 


 


ALT 


 


Total Creatine Kinase 


 


Troponin T 


 


NT-Pro-B Natriuret Pep 


 


Total Protein 


 


Albumin 


 


LDL Cholesterol Direct 


 


HDL Cholesterol 


 


TSH 


 


Free T4 


 


Arterial Blood Glucose 











Allied health notes reviewed: nursing

## 2021-10-08 NOTE — PROGRESS NOTE
Assessment and Plan





Assessment


Acute kidney injury, unknown baseline. Renal ultrasound notes poor visualization

due to body habitus.


Hypernatremia


Hyperkalemia


Hypocalcemia


Morbid obesity


Acute hypoxemic and hypercapnic respiratory failure


Acute exacerbation of CHF (congestive heart failure)


Hypertension


Sleep apnea





Recommendations


Na higher previously at 147->153, no labs for review so far this AM


Increase free water intake as able, if unable to drink, will continue D5W


Creatinine stable at 1.3


Excellent urine output noted of 2.5L


Now with soft BP, holding meds


echo noted, likely diastolic disease


Avoid diuresis if able given hypernatremia, note good urine output 


Maintain MAP more than 65


Hold ACE/ARB at this time


Renally dose medications


Avoid nephrotoxins


Renal diet


Daily renal labs





Subjective


Date of service: 10/08/21


Principal diagnosis: Ac hypoxemic and hypercapnic resp failure; AE-CHF; Morbid 

obesity; FABIAN/OHS


Interval history: 





Patient was seen for her renal issues-remains off Bipap this AM, on NC


Episodes of visual hallucinations noted


Nursing, interdisciplinary and consult notes were reviewed


Vitals, input and output, medications and labs were reviewed





Objective





- Exam


Narrative Exam: 





General: Morbid obesity, on NC, mild discomfort


HEENT: Oral mucosa moist


Neck: Supple, no JVD


Chest: labored breathing on NC


Heart: RRR, S1 and S2


Abdomen: Soft, nontender


Extremity: No peripheral cyanosis, edema


Neurological: Awake and alert


Dermatology: skin intact


Psych: Calm and cooperative


Musculoskeletal: No joint effusion





- Vital Signs


Vital signs: 


                               Vital Signs - 12hr











  10/07/21 10/07/21 10/08/21





  22:00 23:56 04:39


 


Temperature  99.9 F H 100.1 F H


 


Pulse Rate  117 H 110 H


 


Pulse Rate [   





Anterior   





Bilateral   





Throughout]   


 


Respiratory  28 H 20





Rate   


 


Respiratory   





Rate [Anterior   





Bilateral   





Throughout]   


 


Blood Pressure  94/52 106/51


 


O2 Sat by Pulse 97 97 93





Oximetry   














  10/08/21





  09:31


 


Temperature 


 


Pulse Rate 


 


Pulse Rate [ 84





Anterior 





Bilateral 





Throughout] 


 


Respiratory 





Rate 


 


Respiratory 20





Rate [Anterior 





Bilateral 





Throughout] 


 


Blood Pressure 


 


O2 Sat by Pulse 92





Oximetry 














- Lab





                                 10/07/21 02:51





                                 10/07/21 00:54


                             Most recent lab results











ABG pH  7.382  (7.320-7.450)   10/02/21  14:19    


 


ABG pCO2  86.9 mm Hg  09/27/21  18:40    


 


ABG pO2  81.8 mm Hg (80.0-90.0)   09/27/21  18:40    


 


ABG HCO3  36.2 mmol/L (20.0-26.0)  H  09/27/21  18:40    


 


ABG O2 Saturation  99.4  (0-100)   10/02/21  14:19    


 


Calcium  9.4 mg/dL (8.4-10.2)   10/07/21  00:54    


 


Magnesium  2.10 mg/dL (1.7-2.3)   10/07/21  00:54    


 


Urine Sodium  16 mmol/L  09/23/21  18:46    














Medications & Allergies





- Medications


Allergies/Adverse Reactions: 


                                    Allergies





No Known Allergies Allergy (Unverified 07/13/17 11:17)


   








Home Medications: 


                                Home Medications











 Medication  Instructions  Recorded  Confirmed  Last Taken  Type


 


Acetaminophen [Acetaminophen TAB] 325 mg PO Q4H PRN #30 tablet 12/14/17 01/22/21

Unknown Rx


 


ALBUTEROL NEB's [Proventil 0.083% 2.5 mg IH Q3HRT PRN #30 day 01/24/21  Unknown 

Rx





NEBS]     


 


Albuterol Mdi (or & Nicu Only) 2 puff IH QID PRN #1 inhalation 01/24/21  Unknown

Rx





[ProAir HFA Inhaler]     


 


Azithromycin [Zithromax Z-JAVIER] 0 mg PO DAILY #1 tab 01/24/21  Unknown Rx


 


Benzonatate [Tessalon Perles] 100 mg PO Q8HR #15 capsule 01/24/21  Unknown Rx


 


Famotidine [Pepcid] 20 mg PO BID #14 tablet 01/24/21  Unknown Rx


 


Ipratropium/Albuterol Sulfate 1 ampul IH TIDRT #30 day 01/24/21  Unknown Rx





[DUONEB *Not for PRN Use*]     


 


hydroCHLOROthiazide [HCTZ] 25 mg PO QDAY  tablet 01/24/21  Unknown Rx


 


hydroCHLOROthiazide [HCTZ] 25 mg PO QDAY #30 tablet 01/24/21  Unknown Rx


 


methylPREDNISolone [Medrol 4MG 4 mg PO DAILY #1 tab.ds.pk 01/24/21  Unknown Rx





DOSEPAK (21 tabs)]     











Active Medications: 














Generic Name Dose Route Start Last Admin





  Trade Name Freq  PRN Reason Stop Dose Admin


 


Acetaminophen  650 mg  09/22/21 03:00  10/01/21 06:42





  Acetaminophen 325 Mg Tab  PO   650 mg





  Q4H PRN   Administration





  Pain MILD(1-3)/Fever >100.5/HA  


 


Albuterol  2.5 mg  09/22/21 03:00 





  Albuterol 2.5 Mg/3 Ml Nebu  IH  





  Q4HRT PRN  





  Shortness Of Breath  


 


Albuterol/Ipratropium  1 ampul  09/22/21 08:00  10/08/21 09:27





  Ipratropium/Albuterol Sulfate 3 Ml Ampul.Neb  IH   1 ampul





  TIDRT DELL   Administration


 


Aspirin  325 mg  09/23/21 10:00  10/07/21 11:00





  Aspirin Ec 325 Mg Tab  PO   325 mg





  QDAY DELL   Administration


 


Atorvastatin Calcium  40 mg  09/22/21 22:00  10/07/21 23:01





  Atorvastatin 40 Mg Tab  PO   Not Given





  QHS DELL  


 


Benzonatate  100 mg  09/22/21 06:00  10/08/21 06:07





  Benzonatate 100 Mg Cap  PO   100 mg





  Q8HR DELL   Administration


 


Guaifenesin  200 mg  09/26/21 14:47  10/01/21 06:43





  Guaifenesin 100 Mg/5 Ml Oral Liqd  PO   200 mg





  Q4H PRN   Administration





  Cough  


 


Haloperidol Lactate  5 mg  10/02/21 16:27  10/05/21 03:30





  Haloperidol Lactate 5 Mg/1 Ml Inj  IV   5 mg





  Q6H PRN   Administration





  Agitation  


 


Heparin Sodium (Porcine)  5,000 unit  10/02/21 14:00  10/08/21 06:07





  Heparin 5,000 Unit/1 Ml Vial  SUB-Q   5,000 unit





  Q8HR DELL   Administration


 


Hydralazine HCl  10 mg  10/03/21 17:25  10/06/21 08:54





  Hydralazine 20 Mg/1 Ml Inj  IV   10 mg





  Q6H PRN   Administration





  Blood Pressure  


 


Dextrose  1,000 mls @ 75 mls/hr  10/07/21 12:00  10/07/21 23:18





  D5w  IV   75 mls/hr





  AS DIRECT DELL   Administration


 


Levothyroxine Sodium  100 mcg  10/06/21 06:00  10/08/21 06:07





  Levothyroxine 100 Mcg Tab  PO   100 mcg





  DAILY@0600 DELL   Administration


 


Methylprednisolone Sodium Succinate  20 mg  09/30/21 10:00  10/07/21 23:07





  Methylprednisolone Sod Succinate 40 Mg/1 Ml Inj  IV   20 mg





  Q12HR DELL   Administration


 


Nitroglycerin  0.4 mg  09/22/21 03:00 





  Nitroglycerin 0.4 Mg Tab Subl  SL  





  Q5M PRN  





  Chest Pain  


 


Nystatin  1 applic  09/22/21 18:00  10/08/21 06:23





  Nystatin Powder 15 Gm  TP   Not Given





  BID DELL  


 


Ondansetron HCl  4 mg  09/22/21 03:00  09/29/21 23:51





  Ondansetron 4 Mg/2 Ml Inj  IV   4 mg





  Q8H PRN   Administration





  Nausea And Vomiting  


 


Oxycodone/Acetaminophen  1 tab  09/22/21 03:00  10/05/21 16:26





  Oxycodone /Acetaminophen 5-325mg Tab  PO   1 tab





  Q6H PRN   Administration





  Pain, Moderate (4-6)  


 


Pantoprazole Sodium  40 mg  10/07/21 07:30  10/07/21 08:00





  Pantoprazole 40 Mg Tab  PO   40 mg





  QDAC DELL   Administration


 


Quetiapine Fumarate  75 mg  10/02/21 22:00  10/07/21 23:01





  Quetiapine 25 Mg Tab  PO   Not Given





  QHS DELL  


 


Sodium Chloride  10 ml  09/22/21 10:00  10/07/21 23:07





  Sodium Chloride 0.9% 10 Ml Flush Syringe  IV   10 ml





  BID DELL   Administration


 


Sodium Chloride  10 ml  09/22/21 03:00 





  Sodium Chloride 0.9% 10 Ml Flush Syringe  IV  





  PRN PRN  





  LINE FLUSH  


 


Tramadol HCl  50 mg  09/22/21 03:00  10/04/21 21:50





  Tramadol 50 Mg Tab  PO   50 mg





  Q6H PRN   Administration





  Pain, Moderate (4-6)

## 2021-10-08 NOTE — PROGRESS NOTE
Assessment and Plan


Assessment and plan: 


-Acute on chronic hypoxic respiratory failure; requiring BiPAP


Current Visit: Yes   Status: Acute


Continue oxygen titrate O2 sats more than 90%


Patient sometimes requires continuous BiPAP


Multifactorial obesity hypoventilation syndrome


Obstructive sleep apnea, COPD, CHF


Pulmonary and cardiology following


Treat the underlying cause


Patient already has home oxygen





--Acute exacerbation COPD (chronic obstructive pulmonary disease)


Current Visit: Yes   Status: Acute   


Oxygen titrate O2 sats to more than 90%, patient is requiring BiPAP most of the 

times


Wean as tolerated, nebulizers, IV steroids, IV antibiotics, inhalation steroids





-- Morbid obesity; .4


Current Visit: Yes   Status: Acute   


Patient needs to participate on professional weight reduction program when 

medically stable. 


Strongly recommend outpatient follow-up with bariatric surgeon upon discharge 

when stable





--Obesity hypoventilation syndrome;





--Obstructive sleep apnea;





-- Non-ST elevation MI (NSTEMI) 


Current Visit: Yes   Status: Acute   


Cardiology evaluation noted and appreciated, continue current cardiac 

medications


Supportive care





-- Acute exacerbation of CHF (congestive heart failure)


Current Visit: Yes   Status: Acute   


Fluid restriction. Maintain input output. 


Antifailure medications low-sodium diet


Input output monitoring, cardiology following





--hypothyroidism /new onset


Current Visit: Yes   Status: Acute  


Continue Synthroid, closely monitor


Patient needs follow-up with endocrinologist as outpatient upon DC





-- Hypoglycemia/present on admission


Current Visit: Yes   Status: Acute   


Now resolved closely monitor blood sugars





--acute kidney injury with vasomotor nephropathy


Gentle hydration, monitor renal function, avoid nephrotoxins





-- Lymphedema of the abdominal wall


Due to morbid obesity, supportive care fluid restriction


Low-sodium diet





--Anemia of chronic disease; 


Closely monitor H&H, transfuse as needed





--Acute transaminitis acute liver failure


Acute transaminitis; LFTs trending down


Hepatocellular pattern, multifactorial NAFLD, congestive hepatopathy, DILI, 


GI evaluation and recommendations noted, LFTs trending down





-- hypertension


Current Visit: Yes   Status: Acute   


Plan to address problem: 


We will continue the home medication. Hydralazine 10 mg IV every 6 hours as 

needed. We will monitor the blood pressure closely





-- DVT prophylaxis


Current Visit: No   Status: Acute   


Plan to address problem: 


Heparin 5000 units subcu every 8 hours for DVT prophylaxis. Pepcid 20 mg p.o. 

twice daily for GI prophylaxis. Patient is a full code





Also monitor the patient and adjust management as needed


DC planning per case management when medically stable and cleared by consultants





9/23


-Patient is on heparin drip for non-STEMI, cardiology is following.


-Patient is off Lasix and ACE inhibitors because of [place].


-Kidney function is worsening overnight and I put a consult for nephrology.


-Patient is hypotensive and blood pressure from this morning was 101/41.  We 

will continue to monitor.  And if it is dropping we we will give him fluid.


-Echo was done and EF of 50 to 55%.  Diastolic dysfunction is indeterminate.  

Management per cardiology


-Patient has COPD continues on dexamethasone, nebulizer treatment as needed.


-Patient has hyponatremia and hyperkalemia.  I give the patient Kayexalate.  

Monitor electrolytes.  Will follow nephrology for additional recommendation.





9/24


-Renal function is worsening, nephrology is following


-Blood pressure is better today


-Hyperkalemia; I added Kayexalate


-Morbid obesity








9/25


-Slight improvement in creatinine.  Hyponatremia worsened. Nephrology is 

following and put the patient back on Lasix.


-Blood PRESSURE IS BETTER today


-Potassium this morning was 5.8, I ordered 60 g of Kayexalate


-Morbidly obese


-Obesity hypoventilation syndrome





9/26


-Creatinine is improving and this morning was 2.4


-Hyponatremia improved this morning was 134.  Patient is on Lasix


-Blood pressure is better


-Patient is on 10 L of oxygen; worsened 


-Morbidly obese, FABIAN








9/27: Follow ordered ultrasound of abdomen, per Physician unable to get CT. I 

ordered an ABG due to my concern about her breathing pattern this morning, 

patient may benefit from. Will try to establish if patient has a primary 

pulmonary doctor. ABG is showing consistent with Respiratory Acidosis. Will 

place on BIPAP now. May need to transfer to IM for closer monitoring. Patient 

likely with Obesity Hypoventilation syndrome.


cardiology work up, ongoing. 


RENAL SHOWING SOME IMPROVEMENT


Guarded prognosis





9/28: Patient seen and examined today identified some lesion under the pannus 

will obtain wound care and wound surgeon evaluation.  Nephrology input noted 

continue diuresis and stop the sodium tabs creatinine is stable.  I did discuss 

with the family and updated them.  We will continue to hold ACE and ARB and if 

pressures continue to drop may need to hold diuresis also despite as written 

above.  Monitor labs including H&H.  She is more awake review of ABG shows 

improving CO2.  Will discuss with case management as patient may need BiPAP on 

discharge home.  I also updated the family








9/29: Patient showing some clinical improvement.  Liver enzymes is showing mild 

improvement although bilirubin increased slightly.  GI input noted and as 

discussed by his notes below


"Abnormal liver enzymes in hepatocellular pattern.  broad ddx and likely 

multifactorial causes including NAFLD, congestive hepatopathy, possibly DILI.  

will check viral hep serologies.  US with limited views, possible coarsening of 

the liver"


Patient also evaluated by surgery noted with the lymphedema of abdominal wall 

and open wound of the abdominal wall without penetration into the abdominal 

cavity with recommendation to pack the wounds daily with mesalt. Need to 

maintain dry environment discussed with nursing staff.  Elevate the pannus and 

optimize nutrition for which I will get nutritional consult.


No surgical intervention at this time.  Patient is bedbound when I asked when 

last she ambulated she said while "I guess he has not worked" but it has been a 

while.  Unfortunately the LTAC center unwilling to accept the patient will 

discuss with pulmonary and with case management about obtaining BiPAP for this 

patient and possible SNF referral.





09/30: Patient this morning and was noted significantly lethargic and 

unresponsive respiratory and a code to be called.  The patient resuscitated 

without any invasive procedure.  ABG was obtained showed a CO2 of 142.  Despite 

using BiPAP for some time through the night, sure how long she used it for.  I 

have discontinued all IV opioids and recommend no IV opioids for this patient at

this time.  I also discontinued p.o. medications as an essential medication and 

change to IV.  We will repeat an ABG in an hour to see if there is some 

improvement.  Patient remains at high risk for possible intubation.  Clinical 

condition is guarded





10/1: Patient remains on BiPAP this am, has some intermittent twitching, will 

check EEG, not likely seizure, but will monitor. Continue current management


, check Albumin and Pre-Albumin level.


Remains critically ill. Liver status showing improvement.





10/2: Patient with elevated D-dimer unfortunately size precludes being able to 

have a CTA chest done here to rule out pulmonary embolism.  Doppler of lower 

extremities is pending. We will continue subcu anticoagulation with heparin at 

this time. Until Doppler is resolved. Patient is not hypoxic so doubt pulmonary 

embolism at this time. Her problem remains hypercapnia. I did take time to go 

over her history while she has been around hospital in the past and noticed a 

remarkable increase in her BMI from the last 2 years. Discussed with the 

intensivist will agree to check thyroid function test. Critical care time 35-

minute





10/3: Patient with hypothyroidism, while she does not appear to be with myxedema

coma at this time. Will initiate levothyroxine as I believe that this will make 

profound impact  her management at this time, continue BiPAP continue current 

management. Seroquel was added by pulmonary for delirium and agitation 

management, Ventimask during the day and BiPAP at night





10/4: Continue levothyroine, can change to PO in am. Will obtain Psych consult, 

she is clinically stable and will transfer to floor





10/5; levothyroxine changed to 100 mg p.o. daily


Consultant recommendations noted and appreciated


Monitor LFTs





10/6; patient continues to be hypoxemic on continuous BiPAP


Consultants and recommendations noted and appreciated


Continue current management





10/7; patient has intermittent hallucination, psych following


Patient is currently on nasal cannula oxygen, refusing BiPAP


Mild distress and confused





10/8; possible discharge tomorrow


With home health, home oxygen


Discussed with pulmonologist 























History


Interval history: 


I seen and examined the patient at the bedside


Patient is in mild distress saturating well on 4 L of nasal cannula oxygen


Patient is morbidly obese severely hypoxemic


No new events reported by nursing


Vital signs noted








Hospitalist Physical





- Constitutional


Vitals: 


                                        











Temp Pulse Resp BP Pulse Ox


 


 98.1 F   116 H  20   101/50   94 


 


 10/08/21 11:15  10/08/21 14:03  10/08/21 14:03  10/08/21 11:15  10/08/21 14:02











General appearance: Present: mild distress, well-nourished, obese (Morbidly 

obese), other (Confused)





- EENT


Eyes: Present: PERRL, EOM intact





- Neck


Neck: Present: supple, normal ROM





- Respiratory


Respiratory effort: normal


Respiratory: bilateral: diminished, negative: rales, rhonchi, wheezing





- Cardiovascular


Rhythm: regular


Heart Sounds: Present: S1 & S2





- Extremities


Extremities: no ischemia, No edema





- Abdominal


General gastrointestinal: soft, non-tender, non-distended, normal bowel sounds





- Integumentary


Integumentary: Present: clear, warm





- Psychiatric


Psychiatric: appropriate mood/affect, cooperative





- Neurologic


Neurologic: moves all extremities





HEART Score





- HEART Score


Troponin: 


                                        











Troponin T  0.078 ng/mL (0.00-0.029)  H D 09/22/21  13:03    














Results





- Labs


CBC & Chem 7: 


                                 10/07/21 02:51





                                 10/07/21 00:54


Labs: 


                             Laboratory Last Values











WBC  11.6 K/mm3 (4.5-11.0)  H  10/07/21  02:51    


 


RBC  3.79 M/mm3 (3.65-5.03)   10/07/21  02:51    


 


Hgb  7.9 gm/dl (10.1-14.3)  L  10/07/21  02:51    


 


Hct  27.2 % (30.3-42.9)  L  10/07/21  02:51    


 


MCV  72 fl (79-97)  L  10/07/21  02:51    


 


MCH  21 pg (28-32)  L  10/07/21  02:51    


 


MCHC  29 % (30-34)  L  10/07/21  02:51    


 


RDW  25.8 % (13.2-15.2)  H  10/07/21  02:51    


 


Plt Count  118 K/mm3 (140-440)  L  10/07/21  02:51    


 


Add Manual Diff  Complete   10/07/21  02:51    


 


Total Counted  100   10/07/21  02:51    


 


Seg Neuts % (Manual)  91.0 % (40.0-70.0)  H  10/07/21  02:51    


 


Band Neutrophils %  2.0 %  10/07/21  02:51    


 


Lymphocytes % (Manual)  4.0 % (13.4-35.0)  L  10/07/21  02:51    


 


Reactive Lymphs % (Man)  1.0 %  09/26/21  14:11    


 


Monocytes % (Manual)  3.0 % (0.0-7.3)   10/07/21  02:51    


 


Metamyelocytes %  2.0 %  09/26/21  14:11    


 


Myelocytes %  2.0 %  09/22/21  04:03    


 


Nucleated RBC %  Not Reportable   10/07/21  02:51    


 


Seg Neutrophils # Man  10.6 K/mm3 (1.8-7.7)  H  10/07/21  02:51    


 


Band Neutrophils #  0.2 K/mm3  10/07/21  02:51    


 


Lymphocytes # (Manual)  0.5 K/mm3 (1.2-5.4)  L  10/07/21  02:51    


 


Abs React Lymphs (Man)  0.0 K/mm3  10/07/21  02:51    


 


Monocytes # (Manual)  0.3 K/mm3 (0.0-0.8)   10/07/21  02:51    


 


Eosinophils # (Manual)  0.0 K/mm3 (0.0-0.4)   10/07/21  02:51    


 


Basophils # (Manual)  0.0 K/mm3 (0.0-0.1)   10/07/21  02:51    


 


Metamyelocytes #  0.0 K/mm3  10/07/21  02:51    


 


Myelocytes #  0.0 K/mm3  10/07/21  02:51    


 


Promyelocytes #  0.0 K/mm3  10/07/21  02:51    


 


Blast Cells #  0.0 K/mm3  10/07/21  02:51    


 


WBC Morphology  Not Reportable   10/07/21  02:51    


 


Hypersegmented Neuts  Not Reportable   10/07/21  02:51    


 


Hyposegmented Neuts  Not Reportable   10/07/21  02:51    


 


Hypogranular Neuts  Not Reportable   10/07/21  02:51    


 


Smudge Cells  Not Reportable   10/07/21  02:51    


 


Toxic Granulation  Not Reportable   10/07/21  02:51    


 


Toxic Vacuolation  Not Reportable   10/07/21  02:51    


 


Dohle Bodies  Not Reportable   10/07/21  02:51    


 


Pelger-Huet Anomaly  Not Reportable   10/07/21  02:51    


 


Ac Rods  Not Reportable   10/07/21  02:51    


 


Platelet Estimate  Not Reportable   10/07/21  02:51    


 


Clumped Platelets  Not Reportable   10/07/21  02:51    


 


Plt Clumps, EDTA  Not Reportable   10/07/21  02:51    


 


Large Platelets  Not Reportable   10/07/21  02:51    


 


Giant Platelets  Not Reportable   10/07/21  02:51    


 


Platelet Satelliting  Not Reportable   10/07/21  02:51    


 


Plt Morphology Comment  Not Reportable   10/07/21  02:51    


 


RBC Morphology  Not Reportable   10/07/21  02:51    


 


Dimorphic RBCs  Yes   10/07/21  02:51    


 


Polychromasia  Few   10/07/21  02:51    


 


Hypochromasia  2+   10/07/21  02:51    


 


Poikilocytosis  Not Reportable   10/07/21  02:51    


 


Anisocytosis  2+   10/07/21  02:51    


 


Microcytosis  Not Reportable   10/07/21  02:51    


 


Macrocytosis  Not Reportable   10/07/21  02:51    


 


Spherocytes  Not Reportable   10/07/21  02:51    


 


Pappenheimer Bodies  Not Reportable   10/07/21  02:51    


 


Sickle Cells  Not Reportable   10/07/21  02:51    


 


Target Cells  Few   10/07/21  02:51    


 


Tear Drop Cells  Not Reportable   10/07/21  02:51    


 


Ovalocytes  Few   10/07/21  02:51    


 


Helmet Cells  Not Reportable   10/07/21  02:51    


 


[place] Bodies  Not Reportable   10/07/21  02:51    


 


Cabot Rings  Not Reportable   10/07/21  02:51    


 


Apolinar Cells  Not Reportable   10/07/21  02:51    


 


Bite Cells  Not Reportable   10/07/21  02:51    


 


Crenated Cell  Not Reportable   10/07/21  02:51    


 


Elliptocytes  Not Reportable   10/07/21  02:51    


 


Acanthocytes (Spur)  Not Reportable   10/07/21  02:51    


 


Rouleaux  Not Reportable   10/07/21  02:51    


 


Hemoglobin C Crystals  Not Reportable   10/07/21  02:51    


 


Schistocytes  Not Reportable   10/07/21  02:51    


 


Malaria parasites  Not Reportable   10/07/21  02:51    


 


Sami Bodies  Not Reportable   10/07/21  02:51    


 


Hem Pathologist Commnt  No   10/07/21  02:51    


 


PT  18.7 Sec. (12.2-14.9)  H  10/02/21  16:55    


 


INR  1.51  (0.87-1.13)  H  10/02/21  16:55    


 


APTT  25.2 Sec. (24.2-36.6)   10/02/21  16:55    


 


D-Dimer  5410.10 ng/mlDDU (0-234)  H  10/01/21  16:03    


 


ABG pH  7.382  (7.320-7.450)   10/02/21  14:19    


 


POC ABG pCO2  67.4 mmHg (32.0-48.0)  H  10/02/21  14:19    


 


ABG pCO2  86.9 mm Hg  09/27/21  18:40    


 


POC ABG pO2  150.6 mmHg ()  H  10/02/21  14:19    


 


ABG pO2  81.8 mm Hg (80.0-90.0)   09/27/21  18:40    


 


POC ABG HCO3  39.2   10/02/21  14:19    


 


ABG HCO3  36.2 mmol/L (20.0-26.0)  H  09/27/21  18:40    


 


ABG O2 Saturation  99.4  (0-100)   10/02/21  14:19    


 


ABG O2 Content  10.4  (0.0-44)   09/27/21  18:40    


 


POC ABG Base Excess  12.2   10/02/21  14:19    


 


ABG Base Excess  7.4 mmol/L (-2.0-3.0)  H  09/27/21  18:40    


 


ABG Hemoglobin  9.3  (12.0-17.5)  L  10/02/21  14:19    


 


ABG Oxyhemoglobin  97.5  (94-98)   10/02/21  14:19    


 


ABG Carboxyhemoglobin  1.9 % (0.0-5.0)   09/27/21  18:40    


 


ABG Methemoglobin  0.3  (0.0-1.5)   10/02/21  14:19    


 


ABG Sodium  143.0 mmol/L (136.0-145.0)   10/02/21  14:19    


 


ABG Potassium  4.2 mmol/L (3.40-4.50)   10/02/21  14:19    


 


ABG Chloride  98.0 mmol/L ()   10/02/21  14:19    


 


ABG Glucose  162 mg/dL (65-95)  H  10/02/21  14:19    


 


Oxyhemoglobin  93.7 % (95.0-99.0)  L  09/27/21  18:40    


 


Carboxyhemoglobin  1.6  (0.5-1.5)  H  10/02/21  14:19    


 


FiO2  30 %  09/27/21  18:40    


 


FiO2 %  35.0   10/02/21  14:19    


 


Sodium  153 mmol/L (137-145)  H  10/07/21  00:54    


 


Potassium  4.9 mmol/L (3.6-5.0)   10/07/21  00:54    


 


Chloride  106.4 mmol/L ()   10/07/21  00:54    


 


Carbon Dioxide  38 mmol/L (22-30)  H  10/07/21  00:54    


 


Anion Gap  14 mmol/L  10/07/21  00:54    


 


BUN  74 mg/dL (7-17)  H  10/07/21  00:54    


 


Creatinine  1.3 mg/dL (0.6-1.2)  H  10/07/21  00:54    


 


Estimated GFR  57 ml/min  10/07/21  00:54    


 


BUN/Creatinine Ratio  57 %  10/07/21  00:54    


 


Glucose  205 mg/dL ()  H  10/07/21  00:54    


 


POC Glucose  189 mg/dL ()  H  10/08/21  11:08    


 


Osmolality  301 Mosm/kg  09/23/21  23:15    


 


Calcium  9.4 mg/dL (8.4-10.2)   10/07/21  00:54    


 


Ionized Calcium  3.6 mg/dL (4.8-5.6)  L  09/23/21  23:15    


 


Magnesium  2.10 mg/dL (1.7-2.3)   10/07/21  00:54    


 


Total Bilirubin  1.40 mg/dL (0.1-1.2)  H  10/07/21  00:54    


 


Direct Bilirubin  0.8 mg/dL (0-0.2)  H  10/07/21  00:54    


 


Indirect Bilirubin  0.6 mg/dL  10/07/21  00:54    


 


AST  36 units/L (5-40)   10/07/21  00:54    


 


ALT  133 units/L (7-56)  H  10/07/21  00:54    


 


Alkaline Phosphatase  60 units/L ()   10/07/21  00:54    


 


Total Creatine Kinase  1712 units/L ()  H  09/24/21  10:04    


 


Troponin T  0.078 ng/mL (0.00-0.029)  H D 09/22/21  13:03    


 


NT-Pro-B Natriuret Pep  7573 pg/mL (0-450)  H  09/21/21  22:04    


 


Total Protein  8.4 g/dL (6.3-8.2)  H  10/07/21  00:54    


 


Albumin  3.2 g/dL (3.9-5)  L  10/07/21  00:54    


 


Albumin/Globulin Ratio  0.6 %  10/07/21  00:54    


 


Triglycerides  105 mg/dL (2-149)   09/21/21  22:04    


 


Cholesterol  85 mg/dL ()   09/21/21  22:04    


 


LDL Cholesterol Direct  47 mg/dL ()  L  09/21/21  22:04    


 


HDL Cholesterol  25 mg/dL (40-59)  L  09/21/21  22:04    


 


Cholesterol/HDL Ratio  3.40 %  09/21/21  22:04    


 


TSH  6.140 mlU/mL (0.270-4.200)  H  10/02/21  19:55    


 


Free T4  0.67 ng/dL (0.76-1.46)  L  10/02/21  19:55    


 


Total Cortisol  41.3 mcg/dL (***)   09/24/21  10:04    


 


Arterial Blood Glucose  162 mg/dL (65-95)  H  10/02/21  14:19    


 


Arterial Blood Ionized Calcium  4.6 mg/dL (4.6-5.3)   09/30/21  07:32    


 


Urine Osmolality  157 Mosm/kg  09/23/21  18:46    


 


Urine Sodium  16 mmol/L  09/23/21  18:46    


 


Coronavirus (PCR)  Negative  (Negative)   09/28/21  Unknown


 


Hepatitis A IgM Ab  Non-reactive  (NonReactive)   09/28/21  18:45    


 


Hep Bs Antigen  Nonreactive  (Negative)   09/28/21  18:45    


 


Hepatitis C Antibody  Non-reactive  (NonReactive)   09/28/21  18:45    











Diamond/IV: 


                                        





Voiding Method                   Indwelling Catheter











Active Medications





- Current Medications


Current Medications: 














Generic Name Dose Route Start Last Admin





  Trade Name Freq  PRN Reason Stop Dose Admin


 


Acetaminophen  650 mg  09/22/21 03:00  10/01/21 06:42





  Acetaminophen 325 Mg Tab  PO   650 mg





  Q4H PRN   Administration





  Pain MILD(1-3)/Fever >100.5/HA  


 


Albuterol  2.5 mg  09/22/21 03:00 





  Albuterol 2.5 Mg/3 Ml Nebu  IH  





  Q4HRT PRN  





  Shortness Of Breath  


 


Albuterol/Ipratropium  1 ampul  09/22/21 08:00  10/08/21 13:59





  Ipratropium/Albuterol Sulfate 3 Ml Ampul.Neb  IH   1 ampul





  TIDRT DELL   Administration


 


Aspirin  325 mg  09/23/21 10:00  10/07/21 11:00





  Aspirin Ec 325 Mg Tab  PO   325 mg





  QDAY DELL   Administration


 


Atorvastatin Calcium  40 mg  09/22/21 22:00  10/07/21 23:01





  Atorvastatin 40 Mg Tab  PO   Not Given





  QHS DELL  


 


Benzonatate  100 mg  09/22/21 06:00  10/08/21 06:07





  Benzonatate 100 Mg Cap  PO   100 mg





  Q8HR DELL   Administration


 


Guaifenesin  200 mg  09/26/21 14:47  10/01/21 06:43





  Guaifenesin 100 Mg/5 Ml Oral Liqd  PO   200 mg





  Q4H PRN   Administration





  Cough  


 


Haloperidol Lactate  5 mg  10/02/21 16:27  10/05/21 03:30





  Haloperidol Lactate 5 Mg/1 Ml Inj  IV   5 mg





  Q6H PRN   Administration





  Agitation  


 


Heparin Sodium (Porcine)  5,000 unit  10/02/21 14:00  10/08/21 06:07





  Heparin 5,000 Unit/1 Ml Vial  SUB-Q   5,000 unit





  Q8HR DELL   Administration


 


Hydralazine HCl  10 mg  10/03/21 17:25  10/06/21 08:54





  Hydralazine 20 Mg/1 Ml Inj  IV   10 mg





  Q6H PRN   Administration





  Blood Pressure  


 


Dextrose  1,000 mls @ 75 mls/hr  10/07/21 12:00  10/07/21 23:18





  D5w  IV   75 mls/hr





  AS DIRECT DELL   Administration


 


Levothyroxine Sodium  100 mcg  10/06/21 06:00  10/08/21 06:07





  Levothyroxine 100 Mcg Tab  PO   100 mcg





  DAILY@0600 DELL   Administration


 


Methylprednisolone Sodium Succinate  20 mg  09/30/21 10:00  10/07/21 23:07





  Methylprednisolone Sod Succinate 40 Mg/1 Ml Inj  IV   20 mg





  Q12HR DELL   Administration


 


Nitroglycerin  0.4 mg  09/22/21 03:00 





  Nitroglycerin 0.4 Mg Tab Subl  SL  





  Q5M PRN  





  Chest Pain  


 


Nystatin  1 applic  09/22/21 18:00  10/08/21 06:23





  Nystatin Powder 15 Gm  TP   Not Given





  BID DELL  


 


Ondansetron HCl  4 mg  09/22/21 03:00  09/29/21 23:51





  Ondansetron 4 Mg/2 Ml Inj  IV   4 mg





  Q8H PRN   Administration





  Nausea And Vomiting  


 


Oxycodone/Acetaminophen  1 tab  09/22/21 03:00  10/05/21 16:26





  Oxycodone /Acetaminophen 5-325mg Tab  PO   1 tab





  Q6H PRN   Administration





  Pain, Moderate (4-6)  


 


Pantoprazole Sodium  40 mg  10/07/21 07:30  10/07/21 08:00





  Pantoprazole 40 Mg Tab  PO   40 mg





  QDAC DELL   Administration


 


Quetiapine Fumarate  75 mg  10/02/21 22:00  10/07/21 23:01





  Quetiapine 25 Mg Tab  PO   Not Given





  QHS DELL  


 


Sodium Chloride  10 ml  09/22/21 10:00  10/07/21 23:07





  Sodium Chloride 0.9% 10 Ml Flush Syringe  IV   10 ml





  BID DELL   Administration


 


Sodium Chloride  10 ml  09/22/21 03:00 





  Sodium Chloride 0.9% 10 Ml Flush Syringe  IV  





  PRN PRN  





  LINE FLUSH  


 


Tramadol HCl  50 mg  09/22/21 03:00  10/04/21 21:50





  Tramadol 50 Mg Tab  PO   50 mg





  Q6H PRN   Administration





  Pain, Moderate (4-6)  














Nutrition/Malnutrition Assess





- Dietary Evaluation


Nutrition/Malnutrition Findings: 


                                        





Nutrition Notes                                            Start:  09/22/21 

14:21


Freq:                                                      Status: Active       




Protocol:                                                                       




 Document     10/04/21 15:55  FORTINO  (Rec: 10/04/21 16:53  FORTINO  [id number])


 Nutrition Notes


     Need for Assessment generated from:         RN Referral


     Initial or Follow up                        Reassessment


     Current Diagnosis                           Heart Failure


     Other Pertinent Diagnosis                   Accute Kidney Injury


     Current Diet                                Renal Diet (from B 09/27).


     Labs/Tests                                  10/04: Na 147, CO2 37, BUN 74,


                                                 Cr 1.3, Glu 193.


     Pertinent Medications                       Reviewed.


     Height                                      5 ft 4 in


     Weight                                      277.3 kg


     Ideal Body Weight (kg)                      54.54


     BMI                                         104.9


     Intake Prior to Admission                   Good


     Weight change and time frame                expect weight fluctuations r/t


                                                 fluid therapy, CHF


                                                 complications


     Weight Status                               Morbidly Obese


     Subjective/Other Information                Pt developed accute renal


                                                 condition that requires


                                                 support from Renal Diet.


     Percent of energy/protein needs met:        Renal diet provides all energy


                                                 /protein needs (2072 Kcal/77 g


                                                 ) per day.


     Burn                                        Absent


     Trauma                                      Absent


     GI Symptoms                                 None


     Food Allergy                                No


     Skin Integrity/Comment                      Integumentary: clear, warm,


                                                 dry.


     Current % PO                                Good (%)


     Minimum of two criteria                     No physical signs of


                                                 malnutrition


     #1


      Nutrition Diagnosis                        Altered nutrition-related


                                                 laboratory values,Overweight/


                                                 obesity


      Comments:                                  Pt developed accute renal


                                                 condition that requires


                                                 support from Renal Diet.


      Etiology                                   Accute Kidney Injury resulted


                                                 from complications from


                                                 concomitant conditions


      As Evidenced by Signs and Symptoms         Altered lab values and MD


                                                 diagnosis


      Diagnosis Progress(for reassessment        Worsened


       documentation)                            


     Is patient on ventilator?                   No


     Is Patient Ambulatory and/or Out of Bed     Yes


     REE-([place]-[facility] [facility]-ambulatory/OOB) [     4501.900


      [email or web address]]                               


     Kcal/Kg value to use for calculation        10


     Approximate Energy Requirements Using       2773


      kcal/Kg                                    


     Calculation Used for Recommendations        Kcal/Kg of IBW


     Additional Notes                            Protein 1.0 g/Kg; 55 g/day;


                                                 220 Kcal/day (from IBW).


                                                 Fluids: 1ml/kcal or per MD


 Nutrition Intervention


     Change Diet Order:                          Continue Renal Diet as MD


                                                 prescribed.


     Teaching Recipient                          Patient


     Learning Readiness                          Good


     Teaching Methods                            Discussion


     Response to Teaching                        Verbalize understanding,


                                                 Reinforcement needed


     Barriers to Learning                        Motivation,Emotional,Social


     Patient aware of follow up options          Yes


     Actions To Overcome Barriers                Collaboration with Other


                                                 Providers


     Goal #1                                     Achieve and maintain


                                                 acceptable chemistry lab


                                                 values during LOS


     Goal #2                                     Support Improvement in renal


                                                 functions while providing


                                                 energy/protein needs during


                                                 LOS


     Follow-Up By:                               10/11/21


     Additional Comments                         Continue monitoring % of food


                                                 intake and tolerance, and BM.


                                                 Reinforce education towards


                                                 bariatric therapy after accute


                                                 kidney injury is solved.

## 2021-10-08 NOTE — PROGRESS NOTE
Subjective





- Reason for Consult


Consult date: 10/08/21


Reason for consult: hallucinations





- Chief Complaint


Chief complaint: 


The patient was seen today. She appears drowsy. She shrugs her shoulders when I 

ask her questions but she doesn't speak. I ask her if she can speak and she nods

"yes." She then drifts back off. I called her name several times but she 

constantly drifted and only shrug her shoulders to questioning. 





REVIEW OF SYSTEMS: Unable to assess 





MENTAL STATUS EXAMINATION: Unable to assess





 Assessment 


(1)  encounter for screening examination for mental health and behavioral 

disorder- Z13.30


Current Visit: Yes   Status: Acute





Treatment Plan


Agree with prescribed meds


The patient to comply with previously prescribed medications


Risks, benefits and alternatives of medications discussed with the patient, 

questions answered and consent obtained from patient.


PSYCHOTHERAPY: Supportive psychotherapy provided


MEDICAL: Per primary team


DELIRIUM PRECAUTIONS: Please re-orient patient frequently, keep lights on during

the day, and minimize benzodiazepines and opiates as these medications could 

worsen patient's confusion.


SAFETY SITTER: Defer to primary


DISPOSITION: Do not recommend acute inpatient psychiatric hospitalization at 

this time. 


FOLLOW-UP: Will follow for psych progress


Post hospital care: primary care provider, psychiatric provider


Case staffed with Dr. Tavarez





Mental Status Exam





- Vital signs


                                Last Vital Signs











Temp  98.1 F   10/08/21 11:15


 


Pulse  112 H  10/08/21 11:15


 


Resp  22   10/08/21 11:15


 


BP  101/50   10/08/21 11:15


 


Pulse Ox  94   10/08/21 11:15

## 2021-10-09 LAB
%HYPO/RBC NFR BLD AUTO: (no result) %
ALBUMIN SERPL-MCNC: 3 G/DL (ref 3.9–5)
ANISOCYTOSIS BLD QL SMEAR: (no result)
BAND NEUTROPHILS # (MANUAL): 0.3 K/MM3
BUN SERPL-MCNC: 112 MG/DL (ref 7–17)
BUN/CREAT SERPL: 32 %
CALCIUM SERPL-MCNC: 8.3 MG/DL (ref 8.4–10.2)
HCT VFR BLD CALC: 33.5 % (ref 30.3–42.9)
HEMOLYSIS INDEX: 29
HGB BLD-MCNC: 9.2 GM/DL (ref 10.1–14.3)
MCHC RBC AUTO-ENTMCNC: 28 % (ref 30–34)
MCV RBC AUTO: 76 FL (ref 79–97)
MYELOCYTES # (MANUAL): 0 K/MM3
PLATELET # BLD: 65 K/MM3 (ref 140–440)
POIKILOCYTOSIS BLD QL SMEAR: (no result)
PROMYELOCYTES # (MANUAL): 0 K/MM3
RBC # BLD AUTO: 4.41 M/MM3 (ref 3.65–5.03)
TARGETS BLD QL SMEAR: (no result)
TOTAL CELLS COUNTED BLD: 100

## 2021-10-09 PROCEDURE — 05HY33Z INSERTION OF INFUSION DEVICE INTO UPPER VEIN, PERCUTANEOUS APPROACH: ICD-10-PCS | Performed by: INTERNAL MEDICINE

## 2021-10-09 RX ADMIN — METHYLPREDNISOLONE SODIUM SUCCINATE SCH MG: 40 INJECTION, POWDER, FOR SOLUTION INTRAMUSCULAR; INTRAVENOUS at 22:18

## 2021-10-09 RX ADMIN — NOREPINEPHRINE BITARTRATE SCH MLS/HR: 16 INJECTION, SOLUTION INTRAVENOUS at 10:32

## 2021-10-09 RX ADMIN — NYSTATIN SCH APPLIC: 100000 POWDER TOPICAL at 04:05

## 2021-10-09 RX ADMIN — NOREPINEPHRINE BITARTRATE SCH MLS/HR: 16 INJECTION, SOLUTION INTRAVENOUS at 01:57

## 2021-10-09 RX ADMIN — VALPROATE SODIUM SCH MLS/HR: 100 INJECTION, SOLUTION INTRAVENOUS at 11:58

## 2021-10-09 RX ADMIN — BENZONATATE SCH: 100 CAPSULE ORAL at 10:28

## 2021-10-09 RX ADMIN — ASPIRIN SCH: 325 TABLET, COATED ORAL at 10:28

## 2021-10-09 RX ADMIN — METHYLPREDNISOLONE SODIUM SUCCINATE SCH MG: 40 INJECTION, POWDER, FOR SOLUTION INTRAMUSCULAR; INTRAVENOUS at 10:26

## 2021-10-09 RX ADMIN — IPRATROPIUM BROMIDE AND ALBUTEROL SULFATE SCH AMPUL: .5; 3 SOLUTION RESPIRATORY (INHALATION) at 08:57

## 2021-10-09 RX ADMIN — IPRATROPIUM BROMIDE AND ALBUTEROL SULFATE SCH: .5; 3 SOLUTION RESPIRATORY (INHALATION) at 21:04

## 2021-10-09 RX ADMIN — BENZONATATE SCH: 100 CAPSULE ORAL at 14:06

## 2021-10-09 RX ADMIN — NYSTATIN SCH APPLIC: 100000 POWDER TOPICAL at 22:17

## 2021-10-09 RX ADMIN — NYSTATIN SCH APPLIC: 100000 POWDER TOPICAL at 10:27

## 2021-10-09 RX ADMIN — HEPARIN SODIUM SCH UNIT: 5000 INJECTION, SOLUTION INTRAVENOUS; SUBCUTANEOUS at 22:18

## 2021-10-09 RX ADMIN — Medication SCH ML: at 22:14

## 2021-10-09 RX ADMIN — HEPARIN SODIUM SCH UNIT: 5000 INJECTION, SOLUTION INTRAVENOUS; SUBCUTANEOUS at 05:13

## 2021-10-09 RX ADMIN — HEPARIN SODIUM SCH UNIT: 5000 INJECTION, SOLUTION INTRAVENOUS; SUBCUTANEOUS at 13:58

## 2021-10-09 RX ADMIN — PANTOPRAZOLE SODIUM SCH: 40 TABLET, DELAYED RELEASE ORAL at 08:00

## 2021-10-09 RX ADMIN — Medication SCH ML: at 10:27

## 2021-10-09 RX ADMIN — BENZONATATE SCH MG: 100 CAPSULE ORAL at 22:19

## 2021-10-09 RX ADMIN — LEVOTHYROXINE SODIUM SCH: 0.1 TABLET ORAL at 10:29

## 2021-10-09 RX ADMIN — IPRATROPIUM BROMIDE AND ALBUTEROL SULFATE SCH: .5; 3 SOLUTION RESPIRATORY (INHALATION) at 14:54

## 2021-10-09 RX ADMIN — DEXTROSE SCH MLS/HR: 5 SOLUTION INTRAVENOUS at 04:04

## 2021-10-09 RX ADMIN — METOCLOPRAMIDE SCH MG: 5 INJECTION, SOLUTION INTRAMUSCULAR; INTRAVENOUS at 17:34

## 2021-10-09 RX ADMIN — DEXTROSE SCH MLS/HR: 5 SOLUTION INTRAVENOUS at 14:00

## 2021-10-09 NOTE — PROGRESS NOTE
Assessment and Plan


Assessment and plan: 


--Acute metabolic encephalopathy;


Current Visit: Yes   Status: Acute


Multifactorial, severe hypoxia, severe bradycardia


Underlying obesity hypoventilation and cardiac issues


Neurochecks, supportive care


If no improvement CT head, and possible neurology consult





--Hypotension; shock


Current Visit: Yes   Status: Acute


Patient is on Levophed, titrate as tolerated


Continue IV fluids





-Acute on chronic hypoxic respiratory failure; requiring BiPAP


Current Visit: Yes   Status: Acute


Continue oxygen titrate O2 sats more than 90%


Continue BiPAP as needed


Multifactorial obesity hypoventilation syndrome


Obstructive sleep apnea, COPD, CHF


Pulmonary following, cardiology evaluated in the past and signed off


Treat the underlying cause





--Acute exacerbation COPD (chronic obstructive pulmonary disease)


Current Visit: Yes   Status: Acute   


Oxygen titrate O2 sats to more than 90%, patient is requiring BiPAP most of the 

times


Wean as tolerated, nebulizers, IV steroids, IV antibiotics, inhalation steroids





--acute kidney injury /worsening renal function


Vasomotor nephropathy ,gentle hydration, monitor renal function, 


avoid nephrotoxins, nephrology following





-- Morbid obesity; .4


Current Visit: Yes   Status: Acute   


Patient needs to participate on professional weight reduction program when 

medically stable. 


Strongly recommend outpatient follow-up with bariatric surgeon upon discharge 

when stable.  





--Obesity hypoventilation syndrome;


Supplemental oxygen and BiPAP





--Obstructive sleep apnea;


CPAP and BiPAP as needed





-- Non-ST elevation MI (NSTEMI) 


Current Visit: Yes   Status: Acute   


Cardiology evaluated, recommend medical management and signed off





-- Acute exacerbation of CHF (congestive heart failure)


Current Visit: Yes   Status: Acute   


Fluid restriction. Maintain input output. 


Antifailure medications low-sodium diet


Input output monitoring, cardiology following





--hypothyroidism /new onset


Current Visit: Yes   Status: Acute  


Continue Synthroid, closely monitor


Patient needs follow-up with endocrinologist as outpatient upon DC





-- Hypoglycemia/present on admission


Current Visit: Yes   Status: Acute   


Now resolved closely monitor blood sugars





-- Lymphedema of the abdominal wall


Due to morbid obesity, supportive care fluid restriction


Low-sodium diet





--Anemia of chronic disease; 


Closely monitor H&H, transfuse as needed





--Acute transaminitis acute liver failure


Acute transaminitis; LFTs trending down


Hepatocellular pattern, multifactorial NAFLD, congestive hepatopathy, DILI, 


GI evaluation and recommendations noted, LFTs trending down





-- hypertension


Current Visit: Yes   Status: Acute   


Plan to address problem: 


We will continue the home medication. Hydralazine 10 mg IV every 6 hours as 

needed. We will monitor the blood pressure closely





-- DVT prophylaxis


Current Visit: No   Status: Acute   


Plan to address problem: 


Heparin 5000 units subcu every 8 hours for DVT prophylaxis. Pepcid 20 mg p.o. 

twice daily for GI prophylaxis. Patient is a full code





Also monitor the patient and adjust management as needed


We will closely monitor the patient and adjust management as needed


Total critical care time 60 minutes








The high probability of a clinically significant, sudden or life threatening 

deterioration of the [multi] system(s) required my full and direct attention, 

intervention and personal management. The aggregate critical care time was [60] 

minutes. This time is in addition to time spent performing reported procedures 

but includes the following: 





[x] Data Review and interpretation 





[x] Patient assessment and monitoring of vital signs 





[x] Documentation 





[x] Medication orders and management














Daily Hospital course;


9/23


-Patient is on heparin drip for non-STEMI, cardiology is following.


-Patient is off Lasix and ACE inhibitors because of [place].


-Kidney function is worsening overnight and I put a consult for nephrology.


-Patient is hypotensive and blood pressure from this morning was 101/41.  We 

will continue to monitor.  And if it is dropping we we will give him fluid.


-Echo was done and EF of 50 to 55%.  Diastolic dysfunction is indeterminate.  

Management per cardiology


-Patient has COPD continues on dexamethasone, nebulizer treatment as needed.


-Patient has hyponatremia and hyperkalemia.  I give the patient Kayexalate.  

Monitor electrolytes.  Will follow nephrology for additional recommendation.





9/24


-Renal function is worsening, nephrology is following


-Blood pressure is better today


-Hyperkalemia; I added Kayexalate


-Morbid obesity








9/25


-Slight improvement in creatinine.  Hyponatremia worsened. Nephrology is 

following and put the patient back on Lasix.


-Blood PRESSURE IS BETTER today


-Potassium this morning was 5.8, I ordered 60 g of Kayexalate


-Morbidly obese


-Obesity hypoventilation syndrome





9/26


-Creatinine is improving and this morning was 2.4


-Hyponatremia improved this morning was 134.  Patient is on Lasix


-Blood pressure is better


-Patient is on 10 L of oxygen; worsened 


-Morbidly obese, FABIAN








9/27: Follow ordered ultrasound of abdomen, per Physician unable to get CT. I 

ordered an ABG due to my concern about her breathing pattern this morning, 

patient may benefit from. Will try to establish if patient has a primary 

pulmonary doctor. ABG is showing consistent with Respiratory Acidosis. Will 

place on BIPAP now. May need to transfer to [facility] for closer monitoring. Patient 

likely with Obesity Hypoventilation syndrome.


cardiology work up, ongoing. 


RENAL SHOWING SOME IMPROVEMENT


Guarded prognosis





9/28: Patient seen and examined today identified some lesion under the pannus 

will obtain wound care and wound surgeon evaluation.  Nephrology input noted 

continue diuresis and stop the sodium tabs creatinine is stable.  I did discuss 

with the family and updated them.  We will continue to hold ACE and ARB and if 

pressures continue to drop may need to hold diuresis also despite as written 

above.  Monitor labs including H&H.  She is more awake review of ABG shows 

improving CO2.  Will discuss with case management as patient may need BiPAP on 

discharge home.  I also updated the family








9/29: Patient showing some clinical improvement.  Liver enzymes is showing mild 

improvement although bilirubin increased slightly.  GI input noted and as 

discussed by his notes below


"Abnormal liver enzymes in hepatocellular pattern.  broad ddx and likely 

multifactorial causes including NAFLD, congestive hepatopathy, possibly DILI.  

will check viral hep serologies.  US with limited views, possible coarsening of 

the liver"


Patient also evaluated by surgery noted with the lymphedema of abdominal wall 

and open wound of the abdominal wall without penetration into the abdominal 

cavity with recommendation to pack the wounds daily with mesalt. Need to 

maintain dry environment discussed with nursing staff.  Elevate the pannus and 

optimize nutrition for which I will get nutritional consult.


No surgical intervention at this time.  Patient is bedbound when I asked when 

last she ambulated she said while "I guess he has not worked" but it has been a 

while.  Unfortunately the LTAC center unwilling to accept the patient will 

discuss with pulmonary and with case management about obtaining BiPAP for this 

patient and possible SNF referral.





09/30: Patient this morning and was noted significantly lethargic and unresp

onsive respiratory and a code to be called.  The patient resuscitated without 

any invasive procedure.  ABG was obtained showed a CO2 of 142.  Despite using 

BiPAP for some time through the night, sure how long she used it for.  I have 

discontinued all IV opioids and recommend no IV opioids for this patient at this

 time.  I also discontinued p.o. medications as an essential medication and 

change to IV.  We will repeat an ABG in an hour to see if there is some 

improvement.  Patient remains at high risk for possible intubation.  Clinical 

condition is guarded





10/1: Patient remains on BiPAP this am, has some intermittent twitching, will 

check EEG, not likely seizure, but will monitor. Continue current management


, check Albumin and Pre-Albumin level.


Remains critically ill. Liver status showing improvement.





10/2: Patient with elevated D-dimer unfortunately size precludes being able to 

have a CTA chest done here to rule out pulmonary embolism.  Doppler of lower 

extremities is pending. We will continue subcu anticoagulation with heparin at 

this time. Until Doppler is resolved. Patient is not hypoxic so doubt pulmonary 

embolism at this time. Her problem remains hypercapnia. I did take time to go 

over her history while she has been around hospital in the past and noticed a 

remarkable increase in her BMI from the last 2 years. Discussed with the 

intensivist will agree to check thyroid function test. Critical care time 35-

minute





10/3: Patient with hypothyroidism, while she does not appear to be with myxedema

 coma at this time. Will initiate levothyroxine as I believe that this will make

 profound impact  her management at this time, continue BiPAP continue current 

management. Seroquel was added by pulmonary for delirium and agitation 

management, Ventimask during the day and BiPAP at night





10/4: Continue levothyroine, can change to PO in am. Will obtain Psych consult, 

she is clinically stable and will transfer to floor





10/5; levothyroxine changed to 100 mg p.o. daily


Consultant recommendations noted and appreciated


Monitor LFTs





10/6; patient continues to be hypoxemic on continuous BiPAP


Consultants and recommendations noted and appreciated


Continue current management





10/7; patient has intermittent hallucination, psych following


Patient is currently on nasal cannula oxygen, refusing BiPAP


Mild distress and confused





10/8; possible discharge tomorrow


With home health, home oxygen


Discussed with pulmonologist 





10/9; patient went into metabolic encephalopathy, sudden altered level of 

consciousness and hypotension


Was transferred to ICU last night, started on Levophed


Drip patient is severely lethargic and hypoxic on BiPAP and she is in shock on 

Levophed, critically ill


























History


Interval history: 


Last night nurse found the patient to be unresponsive to verbal stimuli and not 

able to be aroused, code met was called, patient was found to be hypotensive and

 was transferred to ICU and placed on Levophed .


I have seen and examined the patient in ICU this morning, patient tests and 

reports, medications reviewed


Patient has been lethargic, minimally communicative remains hypotensive today on

 Levophed


Morbidly obese BMI of 104.4





Hospitalist Physical





- Constitutional


Vitals: 


                                        











Temp Pulse Resp BP Pulse Ox


 


 102.9 F H  97 H  27 H  66/34   98 


 


 10/09/21 02:48  10/09/21 09:00  10/09/21 09:00  10/09/21 06:00  10/09/21 09:00











General appearance: Present: mild distress, well-nourished, obese (Morbidly 

obese), other (Minimally communicative)





- EENT


Eyes: Present: PERRL, EOM intact





- Neck


Neck: Present: supple, normal ROM





- Respiratory


Respiratory effort: labored


Respiratory: bilateral: diminished, rhonchi, negative: rales, wheezing





- Cardiovascular


Rhythm: regular


Heart Sounds: Present: S1 & S2





- Extremities


Extremities: no ischemia


Extremity abnormal: edema





- Abdominal


General gastrointestinal: soft, non-tender, non-distended, normal bowel sounds





- Integumentary


Integumentary: Present: clear, warm





- Psychiatric


Psychiatric: other (Altered level of consciousness, lethargy)





- Neurologic


Neurologic: moves all extremities





HEART Score





- HEART Score


Troponin: 


                                        











Troponin T  0.078 ng/mL (0.00-0.029)  H D 09/22/21  13:03    














Results





- Labs


CBC & Chem 7: 


                                 10/09/21 10:35





                                 10/09/21 17:14


Labs: 


                             Laboratory Last Values











WBC  11.6 K/mm3 (4.5-11.0)  H  10/07/21  02:51    


 


RBC  3.79 M/mm3 (3.65-5.03)   10/07/21  02:51    


 


Hgb  7.9 gm/dl (10.1-14.3)  L  10/07/21  02:51    


 


Hct  27.2 % (30.3-42.9)  L  10/07/21  02:51    


 


MCV  72 fl (79-97)  L  10/07/21  02:51    


 


MCH  21 pg (28-32)  L  10/07/21  02:51    


 


MCHC  29 % (30-34)  L  10/07/21  02:51    


 


RDW  25.8 % (13.2-15.2)  H  10/07/21  02:51    


 


Plt Count  118 K/mm3 (140-440)  L  10/07/21  02:51    


 


Add Manual Diff  Complete   10/07/21  02:51    


 


Total Counted  100   10/07/21  02:51    


 


Seg Neuts % (Manual)  91.0 % (40.0-70.0)  H  10/07/21  02:51    


 


Band Neutrophils %  2.0 %  10/07/21  02:51    


 


Lymphocytes % (Manual)  4.0 % (13.4-35.0)  L  10/07/21  02:51    


 


Reactive Lymphs % (Man)  1.0 %  09/26/21  14:11    


 


Monocytes % (Manual)  3.0 % (0.0-7.3)   10/07/21  02:51    


 


Metamyelocytes %  2.0 %  09/26/21  14:11    


 


Myelocytes %  2.0 %  09/22/21  04:03    


 


Nucleated RBC %  Not Reportable   10/07/21  02:51    


 


Seg Neutrophils # Man  10.6 K/mm3 (1.8-7.7)  H  10/07/21  02:51    


 


Band Neutrophils #  0.2 K/mm3  10/07/21  02:51    


 


Lymphocytes # (Manual)  0.5 K/mm3 (1.2-5.4)  L  10/07/21  02:51    


 


Abs React Lymphs (Man)  0.0 K/mm3  10/07/21  02:51    


 


Monocytes # (Manual)  0.3 K/mm3 (0.0-0.8)   10/07/21  02:51    


 


Eosinophils # (Manual)  0.0 K/mm3 (0.0-0.4)   10/07/21  02:51    


 


Basophils # (Manual)  0.0 K/mm3 (0.0-0.1)   10/07/21  02:51    


 


Metamyelocytes #  0.0 K/mm3  10/07/21  02:51    


 


Myelocytes #  0.0 K/mm3  10/07/21  02:51    


 


Promyelocytes #  0.0 K/mm3  10/07/21  02:51    


 


Blast Cells #  0.0 K/mm3  10/07/21  02:51    


 


WBC Morphology  Not Reportable   10/07/21  02:51    


 


Hypersegmented Neuts  Not Reportable   10/07/21  02:51    


 


Hyposegmented Neuts  Not Reportable   10/07/21  02:51    


 


Hypogranular Neuts  Not Reportable   10/07/21  02:51    


 


Smudge Cells  Not Reportable   10/07/21  02:51    


 


Toxic Granulation  Not Reportable   10/07/21  02:51    


 


Toxic Vacuolation  Not Reportable   10/07/21  02:51    


 


Dohle Bodies  Not Reportable   10/07/21  02:51    


 


Pelger-Huet Anomaly  Not Reportable   10/07/21  02:51    


 


Ac Rods  Not Reportable   10/07/21  02:51    


 


Platelet Estimate  Not Reportable   10/07/21  02:51    


 


Clumped Platelets  Not Reportable   10/07/21  02:51    


 


Plt Clumps, EDTA  Not Reportable   10/07/21  02:51    


 


Large Platelets  Not Reportable   10/07/21  02:51    


 


Giant Platelets  Not Reportable   10/07/21  02:51    


 


Platelet Satelliting  Not Reportable   10/07/21  02:51    


 


Plt Morphology Comment  Not Reportable   10/07/21  02:51    


 


RBC Morphology  Not Reportable   10/07/21  02:51    


 


Dimorphic RBCs  Yes   10/07/21  02:51    


 


Polychromasia  Few   10/07/21  02:51    


 


Hypochromasia  2+   10/07/21  02:51    


 


Poikilocytosis  Not Reportable   10/07/21  02:51    


 


Anisocytosis  2+   10/07/21  02:51    


 


Microcytosis  Not Reportable   10/07/21  02:51    


 


Macrocytosis  Not Reportable   10/07/21  02:51    


 


Spherocytes  Not Reportable   10/07/21  02:51    


 


Pappenheimer Bodies  Not Reportable   10/07/21  02:51    


 


Sickle Cells  Not Reportable   10/07/21  02:51    


 


Target Cells  Few   10/07/21  02:51    


 


Tear Drop Cells  Not Reportable   10/07/21  02:51    


 


Ovalocytes  Few   10/07/21  02:51    


 


Helmet Cells  Not Reportable   10/07/21  02:51    


 


[place] Bodies  Not Reportable   10/07/21  02:51    


 


Cabot Rings  Not Reportable   10/07/21  02:51    


 


Apolinar Cells  Not Reportable   10/07/21  02:51    


 


Bite Cells  Not Reportable   10/07/21  02:51    


 


Crenated Cell  Not Reportable   10/07/21  02:51    


 


Elliptocytes  Not Reportable   10/07/21  02:51    


 


Acanthocytes (Spur)  Not Reportable   10/07/21  02:51    


 


Rouleaux  Not Reportable   10/07/21  02:51    


 


Hemoglobin C Crystals  Not Reportable   10/07/21  02:51    


 


Schistocytes  Not Reportable   10/07/21  02:51    


 


Malaria parasites  Not Reportable   10/07/21  02:51    


 


Sami Bodies  Not Reportable   10/07/21  02:51    


 


Hem Pathologist Commnt  No   10/07/21  02:51    


 


PT  18.7 Sec. (12.2-14.9)  H  10/02/21  16:55    


 


INR  1.51  (0.87-1.13)  H  10/02/21  16:55    


 


APTT  25.2 Sec. (24.2-36.6)   10/02/21  16:55    


 


D-Dimer  5410.10 ng/mlDDU (0-234)  H  10/01/21  16:03    


 


ABG pH  7.382  (7.320-7.450)   10/02/21  14:19    


 


POC ABG pCO2  67.4 mmHg (32.0-48.0)  H  10/02/21  14:19    


 


ABG pCO2  86.9 mm Hg  09/27/21  18:40    


 


POC ABG pO2  150.6 mmHg ()  H  10/02/21  14:19    


 


ABG pO2  81.8 mm Hg (80.0-90.0)   09/27/21  18:40    


 


POC ABG HCO3  39.2   10/02/21  14:19    


 


ABG HCO3  36.2 mmol/L (20.0-26.0)  H  09/27/21  18:40    


 


ABG O2 Saturation  99.4  (0-100)   10/02/21  14:19    


 


ABG O2 Content  10.4  (0.0-44)   09/27/21  18:40    


 


POC ABG Base Excess  12.2   10/02/21  14:19    


 


ABG Base Excess  7.4 mmol/L (-2.0-3.0)  H  09/27/21  18:40    


 


ABG Hemoglobin  9.3  (12.0-17.5)  L  10/02/21  14:19    


 


ABG Oxyhemoglobin  97.5  (94-98)   10/02/21  14:19    


 


ABG Carboxyhemoglobin  1.9 % (0.0-5.0)   09/27/21  18:40    


 


ABG Methemoglobin  0.3  (0.0-1.5)   10/02/21  14:19    


 


ABG Sodium  143.0 mmol/L (136.0-145.0)   10/02/21  14:19    


 


ABG Potassium  4.2 mmol/L (3.40-4.50)   10/02/21  14:19    


 


ABG Chloride  98.0 mmol/L ()   10/02/21  14:19    


 


ABG Glucose  162 mg/dL (65-95)  H  10/02/21  14:19    


 


Oxyhemoglobin  93.7 % (95.0-99.0)  L  09/27/21  18:40    


 


Carboxyhemoglobin  1.6  (0.5-1.5)  H  10/02/21  14:19    


 


FiO2  30 %  09/27/21  18:40    


 


FiO2 %  35.0   10/02/21  14:19    


 


Sodium  153 mmol/L (137-145)  H  10/07/21  00:54    


 


Potassium  4.9 mmol/L (3.6-5.0)   10/07/21  00:54    


 


Chloride  106.4 mmol/L ()   10/07/21  00:54    


 


Carbon Dioxide  38 mmol/L (22-30)  H  10/07/21  00:54    


 


Anion Gap  14 mmol/L  10/07/21  00:54    


 


BUN  74 mg/dL (7-17)  H  10/07/21  00:54    


 


Creatinine  1.3 mg/dL (0.6-1.2)  H  10/07/21  00:54    


 


Estimated GFR  57 ml/min  10/07/21  00:54    


 


BUN/Creatinine Ratio  57 %  10/07/21  00:54    


 


Glucose  205 mg/dL ()  H  10/07/21  00:54    


 


POC Glucose  127 mg/dL ()  H  10/08/21  23:23    


 


Osmolality  301 Mosm/kg  09/23/21  23:15    


 


Calcium  9.4 mg/dL (8.4-10.2)   10/07/21  00:54    


 


Ionized Calcium  3.6 mg/dL (4.8-5.6)  L  09/23/21  23:15    


 


Magnesium  2.10 mg/dL (1.7-2.3)   10/07/21  00:54    


 


Total Bilirubin  1.40 mg/dL (0.1-1.2)  H  10/07/21  00:54    


 


Direct Bilirubin  0.8 mg/dL (0-0.2)  H  10/07/21  00:54    


 


Indirect Bilirubin  0.6 mg/dL  10/07/21  00:54    


 


AST  36 units/L (5-40)   10/07/21  00:54    


 


ALT  133 units/L (7-56)  H  10/07/21  00:54    


 


Alkaline Phosphatase  60 units/L ()   10/07/21  00:54    


 


Total Creatine Kinase  1712 units/L ()  H  09/24/21  10:04    


 


Troponin T  0.078 ng/mL (0.00-0.029)  H D 09/22/21  13:03    


 


NT-Pro-B Natriuret Pep  7573 pg/mL (0-450)  H  09/21/21  22:04    


 


Total Protein  8.4 g/dL (6.3-8.2)  H  10/07/21  00:54    


 


Albumin  3.2 g/dL (3.9-5)  L  10/07/21  00:54    


 


Albumin/Globulin Ratio  0.6 %  10/07/21  00:54    


 


Triglycerides  105 mg/dL (2-149)   09/21/21  22:04    


 


Cholesterol  85 mg/dL ()   09/21/21  22:04    


 


LDL Cholesterol Direct  47 mg/dL ()  L  09/21/21  22:04    


 


HDL Cholesterol  25 mg/dL (40-59)  L  09/21/21  22:04    


 


Cholesterol/HDL Ratio  3.40 %  09/21/21  22:04    


 


TSH  6.140 mlU/mL (0.270-4.200)  H  10/02/21  19:55    


 


Free T4  0.67 ng/dL (0.76-1.46)  L  10/02/21  19:55    


 


Total Cortisol  41.3 mcg/dL (***)   09/24/21  10:04    


 


Arterial Blood Glucose  162 mg/dL (65-95)  H  10/02/21  14:19    


 


Arterial Blood Ionized Calcium  4.6 mg/dL (4.6-5.3)   09/30/21  07:32    


 


Urine Osmolality  157 Mosm/kg  09/23/21  18:46    


 


Urine Sodium  16 mmol/L  09/23/21  18:46    


 


Coronavirus (PCR)  Negative  (Negative)   09/28/21  Unknown


 


Hepatitis A IgM Ab  Non-reactive  (NonReactive)   09/28/21  18:45    


 


Hep Bs Antigen  Nonreactive  (Negative)   09/28/21  18:45    


 


Hepatitis C Antibody  Non-reactive  (NonReactive)   09/28/21  18:45    











Diamond/IV: 


                                        





Voiding Method                   Indwelling Catheter











Active Medications





- Current Medications


Current Medications: 














Generic Name Dose Route Start Last Admin





  Trade Name Freq  PRN Reason Stop Dose Admin


 


Acetaminophen  650 mg  09/22/21 03:00  10/01/21 06:42





  Acetaminophen 325 Mg Tab  PO   650 mg





  Q4H PRN   Administration





  Pain MILD(1-3)/Fever >100.5/HA  


 


Albuterol  2.5 mg  09/22/21 03:00 





  Albuterol 2.5 Mg/3 Ml Nebu  IH  





  Q4HRT PRN  





  Shortness Of Breath  


 


Albuterol/Ipratropium  1 ampul  09/22/21 08:00  10/09/21 08:57





  Ipratropium/Albuterol Sulfate 3 Ml Ampul.Neb  IH   1 ampul





  TIDRT DELL   Administration


 


Aspirin  325 mg  09/23/21 10:00  10/09/21 10:28





  Aspirin Ec 325 Mg Tab  PO   Not Given





  QDAY DELL  


 


Atorvastatin Calcium  40 mg  09/22/21 22:00  10/08/21 22:53





  Atorvastatin 40 Mg Tab  PO   Not Given





  QHS DELL  


 


Benzonatate  100 mg  09/22/21 06:00  10/09/21 10:28





  Benzonatate 100 Mg Cap  PO   Not Given





  Q8HR DELL  


 


Guaifenesin  200 mg  09/26/21 14:47  10/01/21 06:43





  Guaifenesin 100 Mg/5 Ml Oral Liqd  PO   200 mg





  Q4H PRN   Administration





  Cough  


 


Haloperidol Lactate  5 mg  10/02/21 16:27  10/05/21 03:30





  Haloperidol Lactate 5 Mg/1 Ml Inj  IV   5 mg





  Q6H PRN   Administration





  Agitation  


 


Heparin Sodium (Porcine)  5,000 unit  10/02/21 14:00  10/09/21 05:13





  Heparin 5,000 Unit/1 Ml Vial  SUB-Q   5,000 unit





  Q8HR DELL   Administration


 


Hydralazine HCl  10 mg  10/03/21 17:25  10/06/21 08:54





  Hydralazine 20 Mg/1 Ml Inj  IV   10 mg





  Q6H PRN   Administration





  Blood Pressure  


 


Dextrose  1,000 mls @ 100 mls/hr  10/08/21 21:46  10/09/21 04:04





  D5w  IV   100 mls/hr





  AS DIRECT DELL   Administration


 


Norepinephrine  4 mg in 250 mls @ 37.5 mls/hr  10/08/21 20:41  10/09/21 10:32





  Levophed Drip 4 Mg/Ns 250 Ml  IV   4 mcg/min





  TITR DELL   15 mls/hr





    Administration





  Protocol  





  10 MCG/MIN  


 


Levothyroxine Sodium  100 mcg  10/06/21 06:00  10/09/21 10:29





  Levothyroxine 100 Mcg Tab  PO   Not Given





  DAILY@0600 DELL  


 


Methylprednisolone Sodium Succinate  20 mg  09/30/21 10:00  10/09/21 10:26





  Methylprednisolone Sod Succinate 40 Mg/1 Ml Inj  IV   20 mg





  Q12HR DELL   Administration


 


Nitroglycerin  0.4 mg  09/22/21 03:00 





  Nitroglycerin 0.4 Mg Tab Subl  SL  





  Q5M PRN  





  Chest Pain  


 


Nystatin  1 applic  09/22/21 18:00  10/09/21 10:27





  Nystatin Powder 15 Gm  TP   1 applic





  BID DELL   Administration


 


Ondansetron HCl  4 mg  09/22/21 03:00  09/29/21 23:51





  Ondansetron 4 Mg/2 Ml Inj  IV   4 mg





  Q8H PRN   Administration





  Nausea And Vomiting  


 


Oxycodone/Acetaminophen  1 tab  09/22/21 03:00  10/05/21 16:26





  Oxycodone /Acetaminophen 5-325mg Tab  PO   1 tab





  Q6H PRN   Administration





  Pain, Moderate (4-6)  


 


Pantoprazole Sodium  40 mg  10/07/21 07:30  10/09/21 08:00





  Pantoprazole 40 Mg Tab  PO   Not Given





  QDAC DELL  


 


Quetiapine Fumarate  75 mg  10/02/21 22:00  10/08/21 21:46





  Quetiapine 25 Mg Tab  PO   Not Given





  QHS DELL  


 


Sodium Chloride  10 ml  09/22/21 10:00  10/09/21 10:27





  Sodium Chloride 0.9% 10 Ml Flush Syringe  IV   10 ml





  BID DELL   Administration


 


Sodium Chloride  10 ml  09/22/21 03:00  10/08/21 20:43





  Sodium Chloride 0.9% 10 Ml Flush Syringe  IV   10 ml





  PRN PRN   Administration





  LINE FLUSH  


 


Tramadol HCl  50 mg  09/22/21 03:00  10/04/21 21:50





  Tramadol 50 Mg Tab  PO   50 mg





  Q6H PRN   Administration





  Pain, Moderate (4-6)  














Nutrition/Malnutrition Assess





- Dietary Evaluation


Nutrition/Malnutrition Findings: 


                                        





Nutrition Notes                                            Start:  09/22/21 

14:21


Freq:                                                      Status: Active       

 


Protocol:                                                                       

 


 Document     10/04/21 15:55  FORTINO  (Rec: 10/04/21 16:53  FORTINO  [id number])


 Nutrition Notes


     Need for Assessment generated from:         RN Referral


     Initial or Follow up                        Reassessment


     Current Diagnosis                           Heart Failure


     Other Pertinent Diagnosis                   Accute Kidney Injury


     Current Diet                                Renal Diet (from B 09/27).


     Labs/Tests                                  10/04: Na 147, CO2 37, BUN 74,


                                                 Cr 1.3, Glu 193.


     Pertinent Medications                       Reviewed.


     Height                                      5 ft 4 in


     Weight                                      277.3 kg


     Ideal Body Weight (kg)                      54.54


     BMI                                         104.9


     Intake Prior to Admission                   Good


     Weight change and time frame                expect weight fluctuations r/t


                                                 fluid therapy, CHF


                                                 complications


     Weight Status                               Morbidly Obese


     Subjective/Other Information                Pt developed accute renal


                                                 condition that requires


                                                 support from Renal Diet.


     Percent of energy/protein needs met:        Renal diet provides all energy


                                                 /protein needs (2072 Kcal/77 g


                                                 ) per day.


     Burn                                        Absent


     Trauma                                      Absent


     GI Symptoms                                 None


     Food Allergy                                No


     Skin Integrity/Comment                      Integumentary: clear, warm,


                                                 dry.


     Current % PO                                Good (%)


     Minimum of two criteria                     No physical signs of


                                                 malnutrition


     #1


      Nutrition Diagnosis                        Altered nutrition-related


                                                 laboratory values,Overweight/


                                                 obesity


      Comments:                                  Pt developed accute renal


                                                 condition that requires


                                                 support from Renal Diet.


      Etiology                                   Accute Kidney Injury resulted


                                                 from complications from


                                                 concomitant conditions


      As Evidenced by Signs and Symptoms         Altered lab values and MD


                                                 diagnosis


      Diagnosis Progress(for reassessment        Worsened


       documentation)                            


     Is patient on ventilator?                   No


     Is Patient Ambulatory and/or Out of Bed     Yes


     REE-([place]gs-St. Jeor-ambulatory/OOB) [     1837.900


      NUTR.[email or web address]]                               


     Kcal/Kg value to use for calculation        10


     Approximate Energy Requirements Using       2773


      kcal/Kg                                    


     Calculation Used for Recommendations        Kcal/Kg of IBW


     Additional Notes                            Protein 1.0 g/Kg; 55 g/day;


                                                 220 Kcal/day (from IBW).


                                                 Fluids: 1ml/kcal or per MD


 Nutrition Intervention


     Change Diet Order:                          Continue Renal Diet as MD


                                                 prescribed.


     Teaching Recipient                          Patient


     Learning Readiness                          Good


     Teaching Methods                            Discussion


     Response to Teaching                        Verbalize understanding,


                                                 Reinforcement needed


     Barriers to Learning                        Motivation,Emotional,Social


     Patient aware of follow up options          Yes


     Actions To Overcome Barriers                Collaboration with Other


                                                 Providers


     Goal #1                                     Achieve and maintain


                                                 acceptable chemistry lab


                                                 values during LOS


     Goal #2                                     Support Improvement in renal


                                                 functions while providing


                                                 energy/protein needs during


                                                 LOS


     Follow-Up By:                               10/11/21


     Additional Comments                         Continue monitoring % of food


                                                 intake and tolerance, and BM.


                                                 Reinforce education towards


                                                 bariatric therapy after accute


                                                 kidney injury is solved.

## 2021-10-09 NOTE — EVENT NOTE
Date: 10/09/21


Patient's labs could not be done in the morning due to technical reasons, 


Labs were done  this evening , showed severe hyperkalemia, elevated troponins, 

worsening renal function.





--Severe hyperkalemia; potassium of 6.8


IV calcium carbonate


IV regular insulin


IV 50% dextrose


Albuterol inhalation


Kayexalate


Closely monitor electrolytes


Check EKG





--Elevated troponin; 0.627


Check EKG, serial cardiac enzymes, serial EKGs


Troponins on 9/22/2021, 0.099- 0.078


Cardiology evaluated advised medical management


Recent echocardiogram; normal limits


Continue aspirin, statin


No beta-blockers, nitrates due to hypotension


No ACE inhibitors, due to hypotension and renal failure





The heart cardiology consulted; 


discussed with , 


advised to continue current treatment


Treat the underlying cause, probably NSTEMI type II


In the setting of hypotension, acute kidney injury.





--Worsening renal function/creatinine 1.3-3.5


Due to severe hypotension, gentle hydration


Monitor renal function, avoid nephrotoxins


Nephrology following





Plan of care reviewed with patient's ICU nurse.





Patient has multiple medical problems, critically ill


Poor prognosis.








Additional critical care time 40 minutes





The high probability of a clinically significant, sudden or life threatening 

deterioration of the [multi] system(s) required my full and direct attention, 

intervention and personal management. The aggregate critical care time was [40] 

minutes. This time is in addition to time spent performing reported procedures 

but includes the following: 





[x] Data Review and interpretation 





[x] Patient assessment and monitoring of vital signs 





[x] Documentation 





[x] Medication orders and management

## 2021-10-09 NOTE — XRAY REPORT
CHEST 1 VIEW



INDICATION: 

r/o aspiration.



COMPARISON: 

9/26/2021



FINDINGS:



Support devices: None.

Heart: Enlarged, similar to the prior. 

Lungs/Pleura: There are low lung volumes with elevation of the right hemidiaphragm. This along with u
nderpenetration limits evaluation of the lungs. No pleural abnormality.  







IMPRESSION:

1. Accounting for low lung volumes and underpenetration related to patient body habitus, no definite 
acute findings or significant change since the prior exam from 9/26/2021.



Signer Name: Joseph Ma MD 

Signed: 10/9/2021 8:22 AM

Workstation Name: [id number]

## 2021-10-09 NOTE — PROGRESS NOTE
Subjective





- Reason for Consult


Consult date: 10/09/21


Reason for consult: MHE





- Chief Complaint


Chief complaint: 


Nursing staff: Nurse caring for patient today states she has been pulling out 

lines at resistive to care at times.





The patient was moved to ICU after being found unresponsive to verbal and 

tactile stimuli. She is sedated and therefore could not be evaluated. 





REVIEW OF SYSTEMS: Unable to assess 





MENTAL STATUS EXAMINATION: Unable to assess





 Assessment 


(1)  encounter for screening examination for mental health and behavioral 

disorder- Z13.30


Current Visit: Yes   Status: Acute





Treatment Plan


Agree with prescribed meds


Start Valproic 500mg IV q12h


The patient to comply with previously prescribed medications


Risks, benefits and alternatives of medications discussed with the patient, 

questions answered and consent obtained from patient.


PSYCHOTHERAPY: Supportive psychotherapy provided


MEDICAL: Per primary team


DELIRIUM PRECAUTIONS: Please re-orient patient frequently, keep lights on during

the day, and minimize benzodiazepines and opiates as these medications could 

worsen patient's confusion.


SAFETY SITTER: Defer to primary


DISPOSITION: Do not recommend acute inpatient psychiatric hospitalization at 

this time. 


FOLLOW-UP: Will follow for psych progress


Post hospital care: primary care provider, psychiatric provider


Case staffed with Dr. Tavarez





Mental Status Exam





- Vital signs


                                Last Vital Signs











Temp  102.9 F H  10/09/21 02:48


 


Pulse  97 H  10/09/21 09:00


 


Resp  27 H  10/09/21 09:00


 


BP  66/34   10/09/21 06:00


 


Pulse Ox  98   10/09/21 09:00

## 2021-10-09 NOTE — PROGRESS NOTE
Assessment and Plan





31 years old female with history of morbid obesity, hypertension, asthma , sleep

apnea was brought to the emergency room because of shortness of breath, edema, 

generalized malaise, fatigue, and weakness for last couple of days.  She states 

she just does not feel well.  She has a headache.  She states her legs are 

swollen.  She feels as though she is retaining fluid.  She states that she went 

to her regular doctors on the 13th.  They tested her for Covid.  It was 

negative.  She was told to go see a cardiologist.  She is not seen a car

diologist as of yet.  She came in here because she was not feeling well and did 

not know what to do.  She has had no sick contacts.  








Patient transfered to [facility]. Patient is on nor epinephrine.





Patient is  awake. Resting on 4L O2. O2 saturation 99%. BIPAP 20/8, rate 30, 

FIO2 40% and stand-by in the room. Patient running low grade temp. Has  

leukocytosis. Blood pressure 66/34, Pulse 104





ABG











ABG pH  7.287  (7.320-7.450)  L  10/08/21  22:28    


 


POC ABG pCO2  66.3 mmHg (32.0-48.0)  H  10/08/21  22:28    


 


ABG pCO2  86.9 mm Hg  09/27/21  18:40    


 


POC ABG pO2  69.5 mmHg ()  L  10/08/21  22:28    


 


ABG pO2  81.8 mm Hg (80.0-90.0)   09/27/21  18:40    


 


POC ABG HCO3  30.9   10/08/21  22:28    


 


ABG O2 Saturation  90.8  (0-100)   10/08/21  22:28    














Patients D dimer 10/1/21 : 5410.10





Patient presently on albuterol/atrovent aerosol treatments q 6 hours, 

subcutaneous Heparin, Protonix, and I/V SoluMedrol and norepinephrine.





I spent critical care time of 35 minutes, review the chart, examine the patient,

review chest xray, lab results, talking to the nursing staff and respiratory 

therapy and work up plan of treatment in this critically ill morbidly Obese 

patient.














- Patient Problems


(1) Acute respiratory failure with hypoxia and hypercapnia


Current Visit: Yes   Status: Acute   


Plan to address problem: 


On 4L O2 Saturation 99%.


BIPAP 20/8, rate 30, FIO2 40%. Stand-by.


 Albuterol/atrovent aerosol treatments.


 Continue Solumedrol


 Contibue S/C Heparin.


 Continue Protonix





 








(2) Acute exacerbation of CHF (congestive heart failure)


Current Visit: Yes   Status: Acute   


Plan to address problem: 


Management as per cardiology.








(3) Hypertension


Current Visit: Yes   Status: Acute   


Plan to address problem: 


Management as per primary care.








(4) Morbid obesity with BMI of 70 and over, adult


Current Visit: No   Status: Acute   


Plan to address problem: 


Weight reduction diet.


 Mobility protocol


 Fall precautions.








(5) Obesity hypoventilation syndrome


Current Visit: No   Status: Acute   


Plan to address problem: 


BIPAP 20/8, rate 30, FIO2 40%.








(6) Sleep apnea


Current Visit: No   Status: Acute   


Plan to address problem: 


BIPAP 20/8, rate 30, FIO2 40%.


 Sleep study if she can as out patient.








(7) Hypotension


Current Visit: Yes   Status: Acute   


Plan to address problem: 


Patient is on Nor epinephrine. Continue titrate ( Increase Dose) until mAP 

reached to 65.








Subjective


Date of service: 10/09/21


Principal diagnosis: Ac hypoxemic and hypercapnic resp failure; AE-CHF; Morbid 

obesity; FABIAN/OHS


Interval history: 





31 years old female with history of morbid obesity, hypertension, asthma COPD 

sleep apnea was brought to the emergency room because of shortness of breath, 

edema, generalized malaise, fatigue, and weakness for last couple of days.  She 

states she just does not feel well.  She has a headache.  She states her legs 

are swollen.  She feels as though she is retaining fluid.  She states that she 

went to her regular doctors on the 13th.  They tested her for Covid.  It was 

negative.  She was told to go see a cardiologist.  She is not seen a 

cardiologist as of yet.  She came in here because she was not feeling well and 

did not know what to do.  She has had no sick contacts.  








Patient transfered to [facility]. Patient is on nor epinephrine.





Patient is  awake. Resting on 4L O2. O2 saturation 99%. BIPAP 20/8, rate 30, 

FIO2 40% and stand-by in the room. Patient running low grade temp. Has  

leukocytosis. Blood pressure 66/34, Pulse 104





ABG











ABG pH  7.287  (7.320-7.450)  L  10/08/21  22:28    


 


POC ABG pCO2  66.3 mmHg (32.0-48.0)  H  10/08/21  22:28    


 


ABG pCO2  86.9 mm Hg  09/27/21  18:40    


 


POC ABG pO2  69.5 mmHg ()  L  10/08/21  22:28    


 


ABG pO2  81.8 mm Hg (80.0-90.0)   09/27/21  18:40    


 


POC ABG HCO3  30.9   10/08/21  22:28    


 


ABG O2 Saturation  90.8  (0-100)   10/08/21  22:28    














Patients D dimer 10/1/21 : 5410.10





Patient presently on albuterol/atrovent aerosol treatments q 6 hours, 

subcutaneous Heparin, Protonix, and I/V SoluMedrol and norepinephrine.








Objective


                               Vital Signs - 12hr











  10/09/21 10/09/21 10/09/21





  04:00 04:05 04:13


 


Temperature   


 


Pulse Rate 91 H  98 H


 


Pulse Rate [   





Anterior   





Bilateral   





Throughout]   


 


Respiratory 12  15





Rate   


 


Respiratory   





Rate [Anterior   





Bilateral   





Throughout]   


 


Respiratory  20 





Rate [Left   





Abdomen]   


 


Blood Pressure 93/58  93/58


 


O2 Sat by Pulse 100  100





Oximetry   














  10/09/21 10/09/21 10/09/21





  05:00 05:05 06:00


 


Temperature   


 


Pulse Rate 103 H  104 H


 


Pulse Rate [   





Anterior   





Bilateral   





Throughout]   


 


Respiratory 15  18





Rate   


 


Respiratory   





Rate [Anterior   





Bilateral   





Throughout]   


 


Respiratory   





Rate [Left   





Abdomen]   


 


Blood Pressure 122/78  66/34


 


O2 Sat by Pulse 97 100 98





Oximetry   














  10/09/21 10/09/21 10/09/21





  08:00 09:00 12:00


 


Temperature 100.4 F H  99.1 F


 


Pulse Rate   


 


Pulse Rate [  97 H 





Anterior   





Bilateral   





Throughout]   


 


Respiratory   





Rate   


 


Respiratory  27 H 





Rate [Anterior   





Bilateral   





Throughout]   


 


Respiratory   





Rate [Left   





Abdomen]   


 


Blood Pressure   


 


O2 Sat by Pulse  98 





Oximetry   











Constitutional: no acute distress, alert, other (young extremely obese female 

with mildly increased respiratory effort at rest)


Eyes: non-icteric


ENT: oropharynx moist, other (BIPAP FFM)


Neck: supple, no lymphadenopathy, no JVD


Effort: mildly labored


Ascultation: Bilateral: diminished breath sounds, rhonchi (scant)


Percussion: Bilateral: not dull


Cardiovascular: regular rate and rhythm


Gastrointestinal: normoactive bowel sounds, soft, non-tender, non-distended 

(protuberant)


Integumentary: rash (stasis dermatitis type), other (Stasis dermatititis on legs

and abdomen; see [facility] notes for full details)


Extremities: pulses normal, no ischemia or petechiae, edema, other (stasis 

dermatitis)


Neurologic: non-focal exam (grossly), pupils equal and round, CN II-XII normal


Psychiatric: depressed


CBC and BMP: 


                                 10/09/21 10:35





                                 10/07/21 00:54


ABG, PT/INR, D-dimer: 


ABG











ABG pH  7.287  (7.320-7.450)  L  10/08/21  22:28    


 


POC ABG pCO2  66.3 mmHg (32.0-48.0)  H  10/08/21  22:28    


 


ABG pCO2  86.9 mm Hg  09/27/21  18:40    


 


POC ABG pO2  69.5 mmHg ()  L  10/08/21  22:28    


 


ABG pO2  81.8 mm Hg (80.0-90.0)   09/27/21  18:40    


 


POC ABG HCO3  30.9   10/08/21  22:28    


 


ABG O2 Saturation  90.8  (0-100)   10/08/21  22:28    





PT/INR, D-dimer











PT  18.7 Sec. (12.2-14.9)  H  10/02/21  16:55    


 


INR  1.51  (0.87-1.13)  H  10/02/21  16:55    


 


D-Dimer  5410.10 ng/mlDDU (0-234)  H  10/01/21  16:03    








Abnormal lab findings: 


                                  Abnormal Labs











  09/21/21 09/21/21 09/22/21





  22:04 22:04 00:18


 


WBC  17.7 H  


 


Hgb  9.1 L  


 


Hct   


 


MCV  77 L  


 


MCH  21 L  


 


MCHC  27 L  


 


RDW  24.3 H  


 


Plt Count   


 


Seg Neuts % (Manual)   


 


Lymphocytes % (Manual)   


 


Nucleated RBC %   


 


Seg Neutrophils # Man   


 


Lymphocytes # (Manual)   


 


Monocytes # (Manual)   


 


PT   


 


INR   


 


D-Dimer   


 


ABG pH   


 


POC ABG pCO2   


 


POC ABG pO2   


 


ABG pO2   


 


ABG HCO3   


 


ABG Base Excess   


 


ABG Hemoglobin   


 


ABG Oxyhemoglobin   


 


ABG Sodium   


 


ABG Potassium   


 


ABG Chloride   


 


ABG Glucose   


 


Oxyhemoglobin   


 


Carboxyhemoglobin   


 


Sodium   135 L 


 


Potassium   


 


Chloride   91.0 L 


 


Carbon Dioxide   17 L 


 


BUN   


 


Creatinine   1.4 H 


 


Glucose   55 L 


 


POC Glucose    49 L


 


Calcium   


 


Ionized Calcium   


 


Total Bilirubin   


 


Direct Bilirubin   


 


AST   


 


ALT   


 


Total Creatine Kinase   


 


Troponin T   0.043 H 


 


NT-Pro-B Natriuret Pep   7573 H 


 


Total Protein   


 


Albumin   


 


LDL Cholesterol Direct   47 L 


 


HDL Cholesterol   25 L 


 


TSH   


 


Free T4   


 


Arterial Blood Glucose   














  09/22/21 09/22/21 09/22/21





  04:03 04:03 09:22


 


WBC  24.3 H  


 


Hgb  9.3 L  


 


Hct   


 


MCV  74 L  


 


MCH  21 L  


 


MCHC  29 L  


 


RDW  23.1 H  


 


Plt Count   


 


Seg Neuts % (Manual)  78.0 H  


 


Lymphocytes % (Manual)  7.0 L  


 


Nucleated RBC %  1.0 H  


 


Seg Neutrophils # Man  19.0 H  


 


Lymphocytes # (Manual)   


 


Monocytes # (Manual)  1.0 H  


 


PT   


 


INR   


 


D-Dimer   


 


ABG pH   


 


POC ABG pCO2   


 


POC ABG pO2   


 


ABG pO2   


 


ABG HCO3   


 


ABG Base Excess   


 


ABG Hemoglobin   


 


ABG Oxyhemoglobin   


 


ABG Sodium   


 


ABG Potassium   


 


ABG Chloride   


 


ABG Glucose   


 


Oxyhemoglobin   


 


Carboxyhemoglobin   


 


Sodium   134 L 


 


Potassium   


 


Chloride   91.2 L 


 


Carbon Dioxide   


 


BUN   


 


Creatinine   1.8 H 


 


Glucose   


 


POC Glucose   


 


Calcium   


 


Ionized Calcium   


 


Total Bilirubin   


 


Direct Bilirubin   


 


AST   


 


ALT   


 


Total Creatine Kinase   


 


Troponin T    0.099 H


 


NT-Pro-B Natriuret Pep   


 


Total Protein   


 


Albumin   


 


LDL Cholesterol Direct   


 


HDL Cholesterol   


 


TSH   


 


Free T4   


 


Arterial Blood Glucose   














  09/22/21 09/22/21 09/23/21





  13:03 20:28 04:52


 


WBC    20.9 H


 


Hgb    8.5 L


 


Hct    30.0 L


 


MCV    73 L


 


MCH    21 L


 


MCHC    28 L


 


RDW    23.9 H


 


Plt Count   


 


Seg Neuts % (Manual)    77.0 H


 


Lymphocytes % (Manual)    1.0 L


 


Nucleated RBC %    1.0 H


 


Seg Neutrophils # Man    16.1 H


 


Lymphocytes # (Manual)    0.2 L


 


Monocytes # (Manual)   


 


PT   


 


INR   


 


D-Dimer   


 


ABG pH   


 


POC ABG pCO2   


 


POC ABG pO2   


 


ABG pO2   


 


ABG HCO3   


 


ABG Base Excess   


 


ABG Hemoglobin   


 


ABG Oxyhemoglobin   


 


ABG Sodium   


 


ABG Potassium   


 


ABG Chloride   


 


ABG Glucose   


 


Oxyhemoglobin   


 


Carboxyhemoglobin   


 


Sodium   


 


Potassium   


 


Chloride   


 


Carbon Dioxide   


 


BUN   


 


Creatinine   


 


Glucose   


 


POC Glucose   115 H 


 


Calcium   


 


Ionized Calcium   


 


Total Bilirubin   


 


Direct Bilirubin   


 


AST   


 


ALT   


 


Total Creatine Kinase   


 


Troponin T  0.078 H D  


 


NT-Pro-B Natriuret Pep   


 


Total Protein   


 


Albumin   


 


LDL Cholesterol Direct   


 


HDL Cholesterol   


 


TSH   


 


Free T4   


 


Arterial Blood Glucose   














  09/23/21 09/23/21 09/23/21





  04:52 08:46 11:50


 


WBC   


 


Hgb   


 


Hct   


 


MCV   


 


MCH   


 


MCHC   


 


RDW   


 


Plt Count   


 


Seg Neuts % (Manual)   


 


Lymphocytes % (Manual)   


 


Nucleated RBC %   


 


Seg Neutrophils # Man   


 


Lymphocytes # (Manual)   


 


Monocytes # (Manual)   


 


PT   


 


INR   


 


D-Dimer   


 


ABG pH   


 


POC ABG pCO2   


 


POC ABG pO2   


 


ABG pO2   


 


ABG HCO3   


 


ABG Base Excess   


 


ABG Hemoglobin   


 


ABG Oxyhemoglobin   


 


ABG Sodium   


 


ABG Potassium   


 


ABG Chloride   


 


ABG Glucose   


 


Oxyhemoglobin   


 


Carboxyhemoglobin   


 


Sodium  129 L  


 


Potassium  5.6 H  


 


Chloride  88.6 L  


 


Carbon Dioxide   


 


BUN  27 H  


 


Creatinine  2.4 H  


 


Glucose  121 H  


 


POC Glucose   139 H  156 H


 


Calcium  7.7 L  


 


Ionized Calcium   


 


Total Bilirubin   


 


Direct Bilirubin   


 


AST   


 


ALT   


 


Total Creatine Kinase   


 


Troponin T   


 


NT-Pro-B Natriuret Pep   


 


Total Protein   


 


Albumin   


 


LDL Cholesterol Direct   


 


HDL Cholesterol   


 


TSH   


 


Free T4   


 


Arterial Blood Glucose   














  09/23/21 09/23/21 09/23/21





  15:46 16:58 22:08


 


WBC   


 


Hgb   


 


Hct   


 


MCV   


 


MCH   


 


MCHC   


 


RDW   


 


Plt Count   


 


Seg Neuts % (Manual)   


 


Lymphocytes % (Manual)   


 


Nucleated RBC %   


 


Seg Neutrophils # Man   


 


Lymphocytes # (Manual)   


 


Monocytes # (Manual)   


 


PT   


 


INR   


 


D-Dimer   


 


ABG pH   


 


POC ABG pCO2   


 


POC ABG pO2   


 


ABG pO2   


 


ABG HCO3   


 


ABG Base Excess   


 


ABG Hemoglobin   


 


ABG Oxyhemoglobin   


 


ABG Sodium   


 


ABG Potassium   


 


ABG Chloride   


 


ABG Glucose   


 


Oxyhemoglobin   


 


Carboxyhemoglobin   


 


Sodium  128 L  


 


Potassium  5.5 H  


 


Chloride  88.1 L  


 


Carbon Dioxide   


 


BUN  33 H  


 


Creatinine  2.7 H  


 


Glucose  172 H  


 


POC Glucose   187 H  186 H


 


Calcium  7.3 L  


 


Ionized Calcium   


 


Total Bilirubin   


 


Direct Bilirubin   


 


AST   


 


ALT   


 


Total Creatine Kinase   


 


Troponin T   


 


NT-Pro-B Natriuret Pep   


 


Total Protein   


 


Albumin   


 


LDL Cholesterol Direct   


 


HDL Cholesterol   


 


TSH   


 


Free T4   


 


Arterial Blood Glucose   














  09/23/21 09/24/21 09/24/21





  23:15 02:20 07:39


 


WBC   


 


Hgb   


 


Hct   


 


MCV   


 


MCH   


 


MCHC   


 


RDW   


 


Plt Count   


 


Seg Neuts % (Manual)   


 


Lymphocytes % (Manual)   


 


Nucleated RBC %   


 


Seg Neutrophils # Man   


 


Lymphocytes # (Manual)   


 


Monocytes # (Manual)   


 


PT   


 


INR   


 


D-Dimer   


 


ABG pH   


 


POC ABG pCO2   


 


POC ABG pO2   


 


ABG pO2   


 


ABG HCO3   


 


ABG Base Excess   


 


ABG Hemoglobin   


 


ABG Oxyhemoglobin   


 


ABG Sodium   


 


ABG Potassium   


 


ABG Chloride   


 


ABG Glucose   


 


Oxyhemoglobin   


 


Carboxyhemoglobin   


 


Sodium   128 L 


 


Potassium   5.2 H 


 


Chloride   88.6 L 


 


Carbon Dioxide   


 


BUN   39 H 


 


Creatinine   3.1 H 


 


Glucose   171 H 


 


POC Glucose    168 H


 


Calcium   7.2 L 


 


Ionized Calcium  3.6 L  


 


Total Bilirubin   


 


Direct Bilirubin   


 


AST   


 


ALT   


 


Total Creatine Kinase   


 


Troponin T   


 


NT-Pro-B Natriuret Pep   


 


Total Protein   


 


Albumin   


 


LDL Cholesterol Direct   


 


HDL Cholesterol   


 


TSH   


 


Free T4   


 


Arterial Blood Glucose   














  09/24/21 09/24/21 09/24/21





  10:04 11:37 17:18


 


WBC   


 


Hgb   


 


Hct   


 


MCV   


 


MCH   


 


MCHC   


 


RDW   


 


Plt Count   


 


Seg Neuts % (Manual)   


 


Lymphocytes % (Manual)   


 


Nucleated RBC %   


 


Seg Neutrophils # Man   


 


Lymphocytes # (Manual)   


 


Monocytes # (Manual)   


 


PT   


 


INR   


 


D-Dimer   


 


ABG pH   


 


POC ABG pCO2   


 


POC ABG pO2   


 


ABG pO2   


 


ABG HCO3   


 


ABG Base Excess   


 


ABG Hemoglobin   


 


ABG Oxyhemoglobin   


 


ABG Sodium   


 


ABG Potassium   


 


ABG Chloride   


 


ABG Glucose   


 


Oxyhemoglobin   


 


Carboxyhemoglobin   


 


Sodium   


 


Potassium   


 


Chloride   


 


Carbon Dioxide   


 


BUN   


 


Creatinine   


 


Glucose   


 


POC Glucose   170 H  152 H


 


Calcium   


 


Ionized Calcium   


 


Total Bilirubin   


 


Direct Bilirubin   


 


AST   


 


ALT   


 


Total Creatine Kinase  1712 H  


 


Troponin T   


 


NT-Pro-B Natriuret Pep   


 


Total Protein   


 


Albumin   


 


LDL Cholesterol Direct   


 


HDL Cholesterol   


 


TSH   


 


Free T4   


 


Arterial Blood Glucose   














  09/24/21 09/24/21 09/25/21





  19:38 22:02 05:48


 


WBC   


 


Hgb   


 


Hct   


 


MCV   


 


MCH   


 


MCHC   


 


RDW   


 


Plt Count   


 


Seg Neuts % (Manual)   


 


Lymphocytes % (Manual)   


 


Nucleated RBC %   


 


Seg Neutrophils # Man   


 


Lymphocytes # (Manual)   


 


Monocytes # (Manual)   


 


PT   


 


INR   


 


D-Dimer   


 


ABG pH   


 


POC ABG pCO2   


 


POC ABG pO2   


 


ABG pO2   


 


ABG HCO3   


 


ABG Base Excess   


 


ABG Hemoglobin   


 


ABG Oxyhemoglobin   


 


ABG Sodium   


 


ABG Potassium   


 


ABG Chloride   


 


ABG Glucose   


 


Oxyhemoglobin   


 


Carboxyhemoglobin   


 


Sodium  128 L   124 L


 


Potassium    5.8 H D


 


Chloride  89.6 L   89.4 L


 


Carbon Dioxide   


 


BUN  47 H   53 H


 


Creatinine  3.0 H   2.8 H


 


Glucose  165 H   150 H


 


POC Glucose   147 H 


 


Calcium  6.9 L   7.2 L


 


Ionized Calcium   


 


Total Bilirubin   


 


Direct Bilirubin   


 


AST   


 


ALT   


 


Total Creatine Kinase   


 


Troponin T   


 


NT-Pro-B Natriuret Pep   


 


Total Protein   


 


Albumin   


 


LDL Cholesterol Direct   


 


HDL Cholesterol   


 


TSH   


 


Free T4   


 


Arterial Blood Glucose   














  09/25/21 09/25/21 09/25/21





  08:48 11:35 19:12


 


WBC   


 


Hgb   


 


Hct   


 


MCV   


 


MCH   


 


MCHC   


 


RDW   


 


Plt Count   


 


Seg Neuts % (Manual)   


 


Lymphocytes % (Manual)   


 


Nucleated RBC %   


 


Seg Neutrophils # Man   


 


Lymphocytes # (Manual)   


 


Monocytes # (Manual)   


 


PT   


 


INR   


 


D-Dimer   


 


ABG pH   


 


POC ABG pCO2   


 


POC ABG pO2   


 


ABG pO2   


 


ABG HCO3   


 


ABG Base Excess   


 


ABG Hemoglobin   


 


ABG Oxyhemoglobin   


 


ABG Sodium   


 


ABG Potassium   


 


ABG Chloride   


 


ABG Glucose   


 


Oxyhemoglobin   


 


Carboxyhemoglobin   


 


Sodium    128 L


 


Potassium   


 


Chloride    89.7 L


 


Carbon Dioxide   


 


BUN    53 H


 


Creatinine    2.4 H


 


Glucose    159 H


 


POC Glucose  152 H  152 H 


 


Calcium    7.1 L


 


Ionized Calcium   


 


Total Bilirubin   


 


Direct Bilirubin   


 


AST   


 


ALT   


 


Total Creatine Kinase   


 


Troponin T   


 


NT-Pro-B Natriuret Pep   


 


Total Protein   


 


Albumin   


 


LDL Cholesterol Direct   


 


HDL Cholesterol   


 


TSH   


 


Free T4   


 


Arterial Blood Glucose   














  09/25/21 09/26/21 09/26/21





  22:03 05:03 07:41


 


WBC   


 


Hgb   


 


Hct   


 


MCV   


 


MCH   


 


MCHC   


 


RDW   


 


Plt Count   


 


Seg Neuts % (Manual)   


 


Lymphocytes % (Manual)   


 


Nucleated RBC %   


 


Seg Neutrophils # Man   


 


Lymphocytes # (Manual)   


 


Monocytes # (Manual)   


 


PT   


 


INR   


 


D-Dimer   


 


ABG pH   


 


POC ABG pCO2   


 


POC ABG pO2   


 


ABG pO2   


 


ABG HCO3   


 


ABG Base Excess   


 


ABG Hemoglobin   


 


ABG Oxyhemoglobin   


 


ABG Sodium   


 


ABG Potassium   


 


ABG Chloride   


 


ABG Glucose   


 


Oxyhemoglobin   


 


Carboxyhemoglobin   


 


Sodium   134 L 


 


Potassium   


 


Chloride   92.9 L 


 


Carbon Dioxide   33 H D 


 


BUN   54 H 


 


Creatinine   2.4 H 


 


Glucose   150 H 


 


POC Glucose  152 H   144 H


 


Calcium   7.2 L 


 


Ionized Calcium   


 


Total Bilirubin   


 


Direct Bilirubin   


 


AST   


 


ALT   


 


Total Creatine Kinase   


 


Troponin T   


 


NT-Pro-B Natriuret Pep   


 


Total Protein   


 


Albumin   


 


LDL Cholesterol Direct   


 


HDL Cholesterol   


 


TSH   


 


Free T4   


 


Arterial Blood Glucose   














  09/26/21 09/26/21 09/26/21





  11:38 14:11 17:02


 


WBC   14.7 H 


 


Hgb   8.7 L 


 


Hct   29.8 L 


 


MCV   74 L 


 


MCH   22 L 


 


MCHC   29 L 


 


RDW   24.9 H 


 


Plt Count   


 


Seg Neuts % (Manual)   86.0 H 


 


Lymphocytes % (Manual)   6.0 L 


 


Nucleated RBC %   


 


Seg Neutrophils # Man   12.6 H 


 


Lymphocytes # (Manual)   0.9 L 


 


Monocytes # (Manual)   


 


PT   


 


INR   


 


D-Dimer   


 


ABG pH   


 


POC ABG pCO2   


 


POC ABG pO2   


 


ABG pO2   


 


ABG HCO3   


 


ABG Base Excess   


 


ABG Hemoglobin   


 


ABG Oxyhemoglobin   


 


ABG Sodium   


 


ABG Potassium   


 


ABG Chloride   


 


ABG Glucose   


 


Oxyhemoglobin   


 


Carboxyhemoglobin   


 


Sodium   


 


Potassium   


 


Chloride   


 


Carbon Dioxide   


 


BUN   


 


Creatinine   


 


Glucose   


 


POC Glucose  151 H   154 H


 


Calcium   


 


Ionized Calcium   


 


Total Bilirubin   


 


Direct Bilirubin   


 


AST   


 


ALT   


 


Total Creatine Kinase   


 


Troponin T   


 


NT-Pro-B Natriuret Pep   


 


Total Protein   


 


Albumin   


 


LDL Cholesterol Direct   


 


HDL Cholesterol   


 


TSH   


 


Free T4   


 


Arterial Blood Glucose   














  09/26/21 09/27/21 09/27/21





  23:14 05:03 10:00


 


WBC   


 


Hgb   


 


Hct   


 


MCV   


 


MCH   


 


MCHC   


 


RDW   


 


Plt Count   


 


Seg Neuts % (Manual)   


 


Lymphocytes % (Manual)   


 


Nucleated RBC %   


 


Seg Neutrophils # Man   


 


Lymphocytes # (Manual)   


 


Monocytes # (Manual)   


 


PT   


 


INR   


 


D-Dimer   


 


ABG pH    7.150 L*


 


POC ABG pCO2   


 


POC ABG pO2   


 


ABG pO2    91.8 H


 


ABG HCO3    37.2 H


 


ABG Base Excess    7.1 H


 


ABG Hemoglobin    7.1 L


 


ABG Oxyhemoglobin   


 


ABG Sodium   


 


ABG Potassium   


 


ABG Chloride   


 


ABG Glucose   


 


Oxyhemoglobin    93.4 L


 


Carboxyhemoglobin   


 


Sodium   134 L 


 


Potassium   


 


Chloride   91.3 L 


 


Carbon Dioxide   33 H 


 


BUN   63 H 


 


Creatinine   2.2 H 


 


Glucose   159 H 


 


POC Glucose  151 H  


 


Calcium   7.8 L 


 


Ionized Calcium   


 


Total Bilirubin   


 


Direct Bilirubin   


 


AST   


 


ALT   


 


Total Creatine Kinase   


 


Troponin T   


 


NT-Pro-B Natriuret Pep   


 


Total Protein   


 


Albumin   


 


LDL Cholesterol Direct   


 


HDL Cholesterol   


 


TSH   


 


Free T4   


 


Arterial Blood Glucose   














  09/27/21 09/27/21 09/27/21





  12:39 16:37 18:40


 


WBC   


 


Hgb   


 


Hct   


 


MCV   


 


MCH   


 


MCHC   


 


RDW   


 


Plt Count   


 


Seg Neuts % (Manual)   


 


Lymphocytes % (Manual)   


 


Nucleated RBC %   


 


Seg Neutrophils # Man   


 


Lymphocytes # (Manual)   


 


Monocytes # (Manual)   


 


PT   


 


INR   


 


D-Dimer   


 


ABG pH    7.237 L


 


POC ABG pCO2   


 


POC ABG pO2   


 


ABG pO2   


 


ABG HCO3    36.2 H


 


ABG Base Excess    7.4 H


 


ABG Hemoglobin    7.8 L


 


ABG Oxyhemoglobin   


 


ABG Sodium   


 


ABG Potassium   


 


ABG Chloride   


 


ABG Glucose   


 


Oxyhemoglobin    93.7 L


 


Carboxyhemoglobin   


 


Sodium   


 


Potassium   


 


Chloride   


 


Carbon Dioxide   


 


BUN   


 


Creatinine   


 


Glucose   


 


POC Glucose  140 H  132 H 


 


Calcium   


 


Ionized Calcium   


 


Total Bilirubin   


 


Direct Bilirubin   


 


AST   


 


ALT   


 


Total Creatine Kinase   


 


Troponin T   


 


NT-Pro-B Natriuret Pep   


 


Total Protein   


 


Albumin   


 


LDL Cholesterol Direct   


 


HDL Cholesterol   


 


TSH   


 


Free T4   


 


Arterial Blood Glucose   














  09/27/21 09/28/21 09/28/21





  23:55 04:27 04:27


 


WBC   


 


Hgb   8.5 L 


 


Hct   29.1 L 


 


MCV   72 L 


 


MCH   21 L 


 


MCHC   29 L 


 


RDW   24.9 H 


 


Plt Count   


 


Seg Neuts % (Manual)   


 


Lymphocytes % (Manual)   


 


Nucleated RBC %   


 


Seg Neutrophils # Man   


 


Lymphocytes # (Manual)   


 


Monocytes # (Manual)   


 


PT   


 


INR   


 


D-Dimer   


 


ABG pH   


 


POC ABG pCO2   


 


POC ABG pO2   


 


ABG pO2   


 


ABG HCO3   


 


ABG Base Excess   


 


ABG Hemoglobin   


 


ABG Oxyhemoglobin   


 


ABG Sodium   


 


ABG Potassium   


 


ABG Chloride   


 


ABG Glucose   


 


Oxyhemoglobin   


 


Carboxyhemoglobin   


 


Sodium    135 L


 


Potassium   


 


Chloride    93.5 L


 


Carbon Dioxide    33 H


 


BUN    68 H


 


Creatinine    1.9 H


 


Glucose    143 H


 


POC Glucose  135 H  


 


Calcium    8.1 L


 


Ionized Calcium   


 


Total Bilirubin    1.50 H


 


Direct Bilirubin   


 


AST    494 H


 


ALT    835 H


 


Total Creatine Kinase   


 


Troponin T   


 


NT-Pro-B Natriuret Pep   


 


Total Protein    9.5 H


 


Albumin    3.1 L


 


LDL Cholesterol Direct   


 


HDL Cholesterol   


 


TSH   


 


Free T4   


 


Arterial Blood Glucose   














  09/28/21 09/28/21 09/29/21





  12:03 17:24 00:19


 


WBC   


 


Hgb   


 


Hct   


 


MCV   


 


MCH   


 


MCHC   


 


RDW   


 


Plt Count   


 


Seg Neuts % (Manual)   


 


Lymphocytes % (Manual)   


 


Nucleated RBC %   


 


Seg Neutrophils # Man   


 


Lymphocytes # (Manual)   


 


Monocytes # (Manual)   


 


PT   


 


INR   


 


D-Dimer   


 


ABG pH  7.291 L  


 


POC ABG pCO2  75.5 H  


 


POC ABG pO2  76.2 L  


 


ABG pO2   


 


ABG HCO3   


 


ABG Base Excess   


 


ABG Hemoglobin  9.9 L  


 


ABG Oxyhemoglobin  91.0 L  


 


ABG Sodium  134.9 L  


 


ABG Potassium   


 


ABG Chloride  93.0 L  


 


ABG Glucose  146 H  


 


Oxyhemoglobin   


 


Carboxyhemoglobin  2.2 H  


 


Sodium   


 


Potassium   


 


Chloride   


 


Carbon Dioxide   


 


BUN   


 


Creatinine   


 


Glucose   


 


POC Glucose   134 H  136 H


 


Calcium   


 


Ionized Calcium   


 


Total Bilirubin   


 


Direct Bilirubin   


 


AST   


 


ALT   


 


Total Creatine Kinase   


 


Troponin T   


 


NT-Pro-B Natriuret Pep   


 


Total Protein   


 


Albumin   


 


LDL Cholesterol Direct   


 


HDL Cholesterol   


 


TSH   


 


Free T4   


 


Arterial Blood Glucose  146 H  














  09/29/21 09/29/21 09/29/21





  04:55 04:55 05:29


 


WBC   


 


Hgb  8.0 L  


 


Hct  27.1 L  


 


MCV  70 L  


 


MCH  21 L  


 


MCHC   


 


RDW  24.3 H  


 


Plt Count   


 


Seg Neuts % (Manual)   


 


Lymphocytes % (Manual)   


 


Nucleated RBC %   


 


Seg Neutrophils # Man   


 


Lymphocytes # (Manual)   


 


Monocytes # (Manual)   


 


PT   


 


INR   


 


D-Dimer   


 


ABG pH   


 


POC ABG pCO2   


 


POC ABG pO2   


 


ABG pO2   


 


ABG HCO3   


 


ABG Base Excess   


 


ABG Hemoglobin   


 


ABG Oxyhemoglobin   


 


ABG Sodium   


 


ABG Potassium   


 


ABG Chloride   


 


ABG Glucose   


 


Oxyhemoglobin   


 


Carboxyhemoglobin   


 


Sodium   


 


Potassium   


 


Chloride   95.1 L 


 


Carbon Dioxide   36 H 


 


BUN   63 H 


 


Creatinine   1.5 H 


 


Glucose   131 H 


 


POC Glucose    118 H


 


Calcium   8.3 L 


 


Ionized Calcium   


 


Total Bilirubin   1.80 H 


 


Direct Bilirubin   


 


AST   323 H 


 


ALT   609 H 


 


Total Creatine Kinase   


 


Troponin T   


 


NT-Pro-B Natriuret Pep   


 


Total Protein   9.3 H 


 


Albumin   2.9 L 


 


LDL Cholesterol Direct   


 


HDL Cholesterol   


 


TSH   


 


Free T4   


 


Arterial Blood Glucose   














  09/29/21 09/29/21 09/29/21





  11:40 18:30 22:44


 


WBC   


 


Hgb   


 


Hct   


 


MCV   


 


MCH   


 


MCHC   


 


RDW   


 


Plt Count   


 


Seg Neuts % (Manual)   


 


Lymphocytes % (Manual)   


 


Nucleated RBC %   


 


Seg Neutrophils # Man   


 


Lymphocytes # (Manual)   


 


Monocytes # (Manual)   


 


PT   


 


INR   


 


D-Dimer   


 


ABG pH   


 


POC ABG pCO2   


 


POC ABG pO2   


 


ABG pO2   


 


ABG HCO3   


 


ABG Base Excess   


 


ABG Hemoglobin   


 


ABG Oxyhemoglobin   


 


ABG Sodium   


 


ABG Potassium   


 


ABG Chloride   


 


ABG Glucose   


 


Oxyhemoglobin   


 


Carboxyhemoglobin   


 


Sodium   


 


Potassium   


 


Chloride   


 


Carbon Dioxide   


 


BUN   


 


Creatinine   


 


Glucose   


 


POC Glucose  139 H  130 H  123 H


 


Calcium   


 


Ionized Calcium   


 


Total Bilirubin   


 


Direct Bilirubin   


 


AST   


 


ALT   


 


Total Creatine Kinase   


 


Troponin T   


 


NT-Pro-B Natriuret Pep   


 


Total Protein   


 


Albumin   


 


LDL Cholesterol Direct   


 


HDL Cholesterol   


 


TSH   


 


Free T4   


 


Arterial Blood Glucose   














  09/30/21 09/30/21 09/30/21





  04:53 04:53 07:16


 


WBC  12.5 H  


 


Hgb  8.8 L  


 


Hct  30.2 L  


 


MCV  74 L  


 


MCH  21 L  


 


MCHC  29 L  


 


RDW  24.5 H  


 


Plt Count   


 


Seg Neuts % (Manual)   


 


Lymphocytes % (Manual)   


 


Nucleated RBC %   


 


Seg Neutrophils # Man   


 


Lymphocytes # (Manual)   


 


Monocytes # (Manual)   


 


PT   


 


INR   


 


D-Dimer   


 


ABG pH   


 


POC ABG pCO2   


 


POC ABG pO2   


 


ABG pO2   


 


ABG HCO3   


 


ABG Base Excess   


 


ABG Hemoglobin   


 


ABG Oxyhemoglobin   


 


ABG Sodium   


 


ABG Potassium   


 


ABG Chloride   


 


ABG Glucose   


 


Oxyhemoglobin   


 


Carboxyhemoglobin   


 


Sodium   


 


Potassium   


 


Chloride   96.9 L 


 


Carbon Dioxide   35 H 


 


BUN   65 H 


 


Creatinine   1.5 H 


 


Glucose   142 H 


 


POC Glucose    138 H


 


Calcium   


 


Ionized Calcium   


 


Total Bilirubin   1.50 H 


 


Direct Bilirubin   


 


AST   251 H 


 


ALT   572 H 


 


Total Creatine Kinase   


 


Troponin T   


 


NT-Pro-B Natriuret Pep   


 


Total Protein   10.1 H 


 


Albumin   3.2 L 


 


LDL Cholesterol Direct   


 


HDL Cholesterol   


 


TSH   


 


Free T4   


 


Arterial Blood Glucose   














  09/30/21 09/30/21 09/30/21





  07:32 11:29 14:42


 


WBC   


 


Hgb   


 


Hct   


 


MCV   


 


MCH   


 


MCHC   


 


RDW   


 


Plt Count   


 


Seg Neuts % (Manual)   


 


Lymphocytes % (Manual)   


 


Nucleated RBC %   


 


Seg Neutrophils # Man   


 


Lymphocytes # (Manual)   


 


Monocytes # (Manual)   


 


PT   


 


INR   


 


D-Dimer   


 


ABG pH  7.086 L   7.188 L


 


POC ABG pCO2  142.2 H   99.3 H


 


POC ABG pO2  196.3 H   132.3 H


 


ABG pO2   


 


ABG HCO3   


 


ABG Base Excess   


 


ABG Hemoglobin  10.0 L   9.2 L


 


ABG Oxyhemoglobin   


 


ABG Sodium   


 


ABG Potassium   


 


ABG Chloride  96.0 L   96.0 L


 


ABG Glucose  147 H   146 H


 


Oxyhemoglobin   


 


Carboxyhemoglobin  1.6 H   2.0 H


 


Sodium   


 


Potassium   


 


Chloride   


 


Carbon Dioxide   


 


BUN   


 


Creatinine   


 


Glucose   


 


POC Glucose   131 H 


 


Calcium   


 


Ionized Calcium   


 


Total Bilirubin   


 


Direct Bilirubin   


 


AST   


 


ALT   


 


Total Creatine Kinase   


 


Troponin T   


 


NT-Pro-B Natriuret Pep   


 


Total Protein   


 


Albumin   


 


LDL Cholesterol Direct   


 


HDL Cholesterol   


 


TSH   


 


Free T4   


 


Arterial Blood Glucose  147 H   146 H














  09/30/21 09/30/21 10/01/21





  17:23 22:34 07:44


 


WBC   


 


Hgb   


 


Hct   


 


MCV   


 


MCH   


 


MCHC   


 


RDW   


 


Plt Count   


 


Seg Neuts % (Manual)   


 


Lymphocytes % (Manual)   


 


Nucleated RBC %   


 


Seg Neutrophils # Man   


 


Lymphocytes # (Manual)   


 


Monocytes # (Manual)   


 


PT   


 


INR   


 


D-Dimer   


 


ABG pH   


 


POC ABG pCO2   


 


POC ABG pO2   


 


ABG pO2   


 


ABG HCO3   


 


ABG Base Excess   


 


ABG Hemoglobin   


 


ABG Oxyhemoglobin   


 


ABG Sodium   


 


ABG Potassium   


 


ABG Chloride   


 


ABG Glucose   


 


Oxyhemoglobin   


 


Carboxyhemoglobin   


 


Sodium   


 


Potassium   


 


Chloride   


 


Carbon Dioxide   


 


BUN   


 


Creatinine   


 


Glucose   


 


POC Glucose  117 H  169 H  150 H


 


Calcium   


 


Ionized Calcium   


 


Total Bilirubin   


 


Direct Bilirubin   


 


AST   


 


ALT   


 


Total Creatine Kinase   


 


Troponin T   


 


NT-Pro-B Natriuret Pep   


 


Total Protein   


 


Albumin   


 


LDL Cholesterol Direct   


 


HDL Cholesterol   


 


TSH   


 


Free T4   


 


Arterial Blood Glucose   














  10/01/21 10/01/21 10/01/21





  08:55 08:55 09:47


 


WBC   


 


Hgb  8.7 L  


 


Hct  29.4 L  


 


MCV  74 L  


 


MCH  22 L  


 


MCHC   


 


RDW  24.8 H  


 


Plt Count   


 


Seg Neuts % (Manual)   


 


Lymphocytes % (Manual)   


 


Nucleated RBC %   


 


Seg Neutrophils # Man   


 


Lymphocytes # (Manual)   


 


Monocytes # (Manual)   


 


PT   


 


INR   


 


D-Dimer   


 


ABG pH    7.234 L


 


POC ABG pCO2    90.5 H


 


POC ABG pO2    139.4 H


 


ABG pO2   


 


ABG HCO3   


 


ABG Base Excess   


 


ABG Hemoglobin    9.5 L


 


ABG Oxyhemoglobin   


 


ABG Sodium   


 


ABG Potassium   


 


ABG Chloride    97.0 L


 


ABG Glucose    179 H


 


Oxyhemoglobin   


 


Carboxyhemoglobin    1.7 H


 


Sodium   


 


Potassium   


 


Chloride   94.4 L 


 


Carbon Dioxide   37 H 


 


BUN   68 H 


 


Creatinine   1.5 H 


 


Glucose   178 H 


 


POC Glucose   


 


Calcium   


 


Ionized Calcium   


 


Total Bilirubin   1.60 H 


 


Direct Bilirubin   


 


AST   172 H 


 


ALT   437 H 


 


Total Creatine Kinase   


 


Troponin T   


 


NT-Pro-B Natriuret Pep   


 


Total Protein   10.1 H 


 


Albumin   3.2 L 


 


LDL Cholesterol Direct   


 


HDL Cholesterol   


 


TSH   


 


Free T4   


 


Arterial Blood Glucose    179 H














  10/01/21 10/01/21 10/01/21





  11:39 16:03 17:24


 


WBC   


 


Hgb   


 


Hct   


 


MCV   


 


MCH   


 


MCHC   


 


RDW   


 


Plt Count   


 


Seg Neuts % (Manual)   


 


Lymphocytes % (Manual)   


 


Nucleated RBC %   


 


Seg Neutrophils # Man   


 


Lymphocytes # (Manual)   


 


Monocytes # (Manual)   


 


PT   


 


INR   


 


D-Dimer   5410.10 H 


 


ABG pH   


 


POC ABG pCO2   


 


POC ABG pO2   


 


ABG pO2   


 


ABG HCO3   


 


ABG Base Excess   


 


ABG Hemoglobin   


 


ABG Oxyhemoglobin   


 


ABG Sodium   


 


ABG Potassium   


 


ABG Chloride   


 


ABG Glucose   


 


Oxyhemoglobin   


 


Carboxyhemoglobin   


 


Sodium   


 


Potassium   


 


Chloride   


 


Carbon Dioxide   


 


BUN   


 


Creatinine   


 


Glucose   


 


POC Glucose  161 H   130 H


 


Calcium   


 


Ionized Calcium   


 


Total Bilirubin   


 


Direct Bilirubin   


 


AST   


 


ALT   


 


Total Creatine Kinase   


 


Troponin T   


 


NT-Pro-B Natriuret Pep   


 


Total Protein   


 


Albumin   


 


LDL Cholesterol Direct   


 


HDL Cholesterol   


 


TSH   


 


Free T4   


 


Arterial Blood Glucose   














  10/01/21 10/02/21 10/02/21





  21:52 10:20 11:58


 


WBC   


 


Hgb   


 


Hct   


 


MCV   


 


MCH   


 


MCHC   


 


RDW   


 


Plt Count   


 


Seg Neuts % (Manual)   


 


Lymphocytes % (Manual)   


 


Nucleated RBC %   


 


Seg Neutrophils # Man   


 


Lymphocytes # (Manual)   


 


Monocytes # (Manual)   


 


PT   


 


INR   


 


D-Dimer   


 


ABG pH   


 


POC ABG pCO2   


 


POC ABG pO2   


 


ABG pO2   


 


ABG HCO3   


 


ABG Base Excess   


 


ABG Hemoglobin   


 


ABG Oxyhemoglobin   


 


ABG Sodium   


 


ABG Potassium   


 


ABG Chloride   


 


ABG Glucose   


 


Oxyhemoglobin   


 


Carboxyhemoglobin   


 


Sodium   146 H D 


 


Potassium   


 


Chloride   


 


Carbon Dioxide   


 


BUN   75 H 


 


Creatinine   1.6 H 


 


Glucose   158 H 


 


POC Glucose  150 H   143 H


 


Calcium   


 


Ionized Calcium   


 


Total Bilirubin   


 


Direct Bilirubin   


 


AST   


 


ALT   


 


Total Creatine Kinase   


 


Troponin T   


 


NT-Pro-B Natriuret Pep   


 


Total Protein   


 


Albumin   


 


LDL Cholesterol Direct   


 


HDL Cholesterol   


 


TSH   


 


Free T4   


 


Arterial Blood Glucose   














  10/02/21 10/02/21 10/02/21





  14:19 16:55 16:55


 


WBC   


 


Hgb   8.3 L 


 


Hct   27.7 L 


 


MCV   


 


MCH   


 


MCHC   


 


RDW   


 


Plt Count   


 


Seg Neuts % (Manual)   


 


Lymphocytes % (Manual)   


 


Nucleated RBC %   


 


Seg Neutrophils # Man   


 


Lymphocytes # (Manual)   


 


Monocytes # (Manual)   


 


PT    18.7 H


 


INR    1.51 H


 


D-Dimer   


 


ABG pH   


 


POC ABG pCO2  67.4 H  


 


POC ABG pO2  150.6 H  


 


ABG pO2   


 


ABG HCO3   


 


ABG Base Excess   


 


ABG Hemoglobin  9.3 L  


 


ABG Oxyhemoglobin   


 


ABG Sodium   


 


ABG Potassium   


 


ABG Chloride   


 


ABG Glucose  162 H  


 


Oxyhemoglobin   


 


Carboxyhemoglobin  1.6 H  


 


Sodium   


 


Potassium   


 


Chloride   


 


Carbon Dioxide   


 


BUN   


 


Creatinine   


 


Glucose   


 


POC Glucose   


 


Calcium   


 


Ionized Calcium   


 


Total Bilirubin   


 


Direct Bilirubin   


 


AST   


 


ALT   


 


Total Creatine Kinase   


 


Troponin T   


 


NT-Pro-B Natriuret Pep   


 


Total Protein   


 


Albumin   


 


LDL Cholesterol Direct   


 


HDL Cholesterol   


 


TSH   


 


Free T4   


 


Arterial Blood Glucose  162 H  














  10/02/21 10/02/21 10/02/21





  17:40 19:55 19:55


 


WBC   


 


Hgb   


 


Hct   


 


MCV   


 


MCH   


 


MCHC   


 


RDW   


 


Plt Count   


 


Seg Neuts % (Manual)   


 


Lymphocytes % (Manual)   


 


Nucleated RBC %   


 


Seg Neutrophils # Man   


 


Lymphocytes # (Manual)   


 


Monocytes # (Manual)   


 


PT   


 


INR   


 


D-Dimer   


 


ABG pH   


 


POC ABG pCO2   


 


POC ABG pO2   


 


ABG pO2   


 


ABG HCO3   


 


ABG Base Excess   


 


ABG Hemoglobin   


 


ABG Oxyhemoglobin   


 


ABG Sodium   


 


ABG Potassium   


 


ABG Chloride   


 


ABG Glucose   


 


Oxyhemoglobin   


 


Carboxyhemoglobin   


 


Sodium   


 


Potassium   


 


Chloride   


 


Carbon Dioxide   


 


BUN   


 


Creatinine   


 


Glucose   


 


POC Glucose  151 H  


 


Calcium   


 


Ionized Calcium   


 


Total Bilirubin   


 


Direct Bilirubin   


 


AST   


 


ALT   


 


Total Creatine Kinase   


 


Troponin T   


 


NT-Pro-B Natriuret Pep   


 


Total Protein   


 


Albumin   


 


LDL Cholesterol Direct   


 


HDL Cholesterol   


 


TSH    6.140 H


 


Free T4   0.67 L 


 


Arterial Blood Glucose   














  10/02/21 10/03/21 10/03/21





  22:19 04:40 04:40


 


WBC   


 


Hgb    8.1 L


 


Hct    27.5 L


 


MCV    73 L


 


MCH    22 L


 


MCHC   


 


RDW    25.4 H


 


Plt Count   


 


Seg Neuts % (Manual)   


 


Lymphocytes % (Manual)   


 


Nucleated RBC %   


 


Seg Neutrophils # Man   


 


Lymphocytes # (Manual)   


 


Monocytes # (Manual)   


 


PT   


 


INR   


 


D-Dimer   


 


ABG pH   


 


POC ABG pCO2   


 


POC ABG pO2   


 


ABG pO2   


 


ABG HCO3   


 


ABG Base Excess   


 


ABG Hemoglobin   


 


ABG Oxyhemoglobin   


 


ABG Sodium   


 


ABG Potassium   


 


ABG Chloride   


 


ABG Glucose   


 


Oxyhemoglobin   


 


Carboxyhemoglobin   


 


Sodium   


 


Potassium   


 


Chloride   


 


Carbon Dioxide   37 H D 


 


BUN   76 H 


 


Creatinine   1.6 H 


 


Glucose   173 H 


 


POC Glucose  157 H  


 


Calcium   


 


Ionized Calcium   


 


Total Bilirubin   


 


Direct Bilirubin   


 


AST   


 


ALT   


 


Total Creatine Kinase   


 


Troponin T   


 


NT-Pro-B Natriuret Pep   


 


Total Protein   


 


Albumin   


 


LDL Cholesterol Direct   


 


HDL Cholesterol   


 


TSH   


 


Free T4   


 


Arterial Blood Glucose   














  10/03/21 10/03/21 10/03/21





  11:36 17:20 21:17


 


WBC   


 


Hgb   


 


Hct   


 


MCV   


 


MCH   


 


MCHC   


 


RDW   


 


Plt Count   


 


Seg Neuts % (Manual)   


 


Lymphocytes % (Manual)   


 


Nucleated RBC %   


 


Seg Neutrophils # Man   


 


Lymphocytes # (Manual)   


 


Monocytes # (Manual)   


 


PT   


 


INR   


 


D-Dimer   


 


ABG pH   


 


POC ABG pCO2   


 


POC ABG pO2   


 


ABG pO2   


 


ABG HCO3   


 


ABG Base Excess   


 


ABG Hemoglobin   


 


ABG Oxyhemoglobin   


 


ABG Sodium   


 


ABG Potassium   


 


ABG Chloride   


 


ABG Glucose   


 


Oxyhemoglobin   


 


Carboxyhemoglobin   


 


Sodium   


 


Potassium   


 


Chloride   


 


Carbon Dioxide   


 


BUN   


 


Creatinine   


 


Glucose   


 


POC Glucose  168 H  174 H  163 H


 


Calcium   


 


Ionized Calcium   


 


Total Bilirubin   


 


Direct Bilirubin   


 


AST   


 


ALT   


 


Total Creatine Kinase   


 


Troponin T   


 


NT-Pro-B Natriuret Pep   


 


Total Protein   


 


Albumin   


 


LDL Cholesterol Direct   


 


HDL Cholesterol   


 


TSH   


 


Free T4   


 


Arterial Blood Glucose   














  10/04/21 10/04/21 10/04/21





  04:22 11:27 17:12


 


WBC   


 


Hgb   


 


Hct   


 


MCV   


 


MCH   


 


MCHC   


 


RDW   


 


Plt Count   


 


Seg Neuts % (Manual)   


 


Lymphocytes % (Manual)   


 


Nucleated RBC %   


 


Seg Neutrophils # Man   


 


Lymphocytes # (Manual)   


 


Monocytes # (Manual)   


 


PT   


 


INR   


 


D-Dimer   


 


ABG pH   


 


POC ABG pCO2   


 


POC ABG pO2   


 


ABG pO2   


 


ABG HCO3   


 


ABG Base Excess   


 


ABG Hemoglobin   


 


ABG Oxyhemoglobin   


 


ABG Sodium   


 


ABG Potassium   


 


ABG Chloride   


 


ABG Glucose   


 


Oxyhemoglobin   


 


Carboxyhemoglobin   


 


Sodium  147 H  


 


Potassium   


 


Chloride   


 


Carbon Dioxide  37 H  


 


BUN  74 H  


 


Creatinine  1.3 H  


 


Glucose  193 H  


 


POC Glucose   154 H  162 H


 


Calcium   


 


Ionized Calcium   


 


Total Bilirubin   


 


Direct Bilirubin   


 


AST   


 


ALT   


 


Total Creatine Kinase   


 


Troponin T   


 


NT-Pro-B Natriuret Pep   


 


Total Protein   


 


Albumin   


 


LDL Cholesterol Direct   


 


HDL Cholesterol   


 


TSH   


 


Free T4   


 


Arterial Blood Glucose   














  10/04/21 10/05/21 10/05/21





  21:59 09:05 12:28


 


WBC   


 


Hgb   


 


Hct   


 


MCV   


 


MCH   


 


MCHC   


 


RDW   


 


Plt Count   


 


Seg Neuts % (Manual)   


 


Lymphocytes % (Manual)   


 


Nucleated RBC %   


 


Seg Neutrophils # Man   


 


Lymphocytes # (Manual)   


 


Monocytes # (Manual)   


 


PT   


 


INR   


 


D-Dimer   


 


ABG pH   


 


POC ABG pCO2   


 


POC ABG pO2   


 


ABG pO2   


 


ABG HCO3   


 


ABG Base Excess   


 


ABG Hemoglobin   


 


ABG Oxyhemoglobin   


 


ABG Sodium   


 


ABG Potassium   


 


ABG Chloride   


 


ABG Glucose   


 


Oxyhemoglobin   


 


Carboxyhemoglobin   


 


Sodium   


 


Potassium   


 


Chloride   


 


Carbon Dioxide   


 


BUN   


 


Creatinine   


 


Glucose   


 


POC Glucose  160 H  168 H  174 H


 


Calcium   


 


Ionized Calcium   


 


Total Bilirubin   


 


Direct Bilirubin   


 


AST   


 


ALT   


 


Total Creatine Kinase   


 


Troponin T   


 


NT-Pro-B Natriuret Pep   


 


Total Protein   


 


Albumin   


 


LDL Cholesterol Direct   


 


HDL Cholesterol   


 


TSH   


 


Free T4   


 


Arterial Blood Glucose   














  10/05/21 10/05/21 10/06/21





  17:27 21:42 08:15


 


WBC   


 


Hgb   


 


Hct   


 


MCV   


 


MCH   


 


MCHC   


 


RDW   


 


Plt Count   


 


Seg Neuts % (Manual)   


 


Lymphocytes % (Manual)   


 


Nucleated RBC %   


 


Seg Neutrophils # Man   


 


Lymphocytes # (Manual)   


 


Monocytes # (Manual)   


 


PT   


 


INR   


 


D-Dimer   


 


ABG pH   


 


POC ABG pCO2   


 


POC ABG pO2   


 


ABG pO2   


 


ABG HCO3   


 


ABG Base Excess   


 


ABG Hemoglobin   


 


ABG Oxyhemoglobin   


 


ABG Sodium   


 


ABG Potassium   


 


ABG Chloride   


 


ABG Glucose   


 


Oxyhemoglobin   


 


Carboxyhemoglobin   


 


Sodium   


 


Potassium   


 


Chloride   


 


Carbon Dioxide   


 


BUN   


 


Creatinine   


 


Glucose   


 


POC Glucose  175 H  158 H  166 H


 


Calcium   


 


Ionized Calcium   


 


Total Bilirubin   


 


Direct Bilirubin   


 


AST   


 


ALT   


 


Total Creatine Kinase   


 


Troponin T   


 


NT-Pro-B Natriuret Pep   


 


Total Protein   


 


Albumin   


 


LDL Cholesterol Direct   


 


HDL Cholesterol   


 


TSH   


 


Free T4   


 


Arterial Blood Glucose   














  10/06/21 10/06/21 10/06/21





  11:28 17:22 21:14


 


WBC   


 


Hgb   


 


Hct   


 


MCV   


 


MCH   


 


MCHC   


 


RDW   


 


Plt Count   


 


Seg Neuts % (Manual)   


 


Lymphocytes % (Manual)   


 


Nucleated RBC %   


 


Seg Neutrophils # Man   


 


Lymphocytes # (Manual)   


 


Monocytes # (Manual)   


 


PT   


 


INR   


 


D-Dimer   


 


ABG pH   


 


POC ABG pCO2   


 


POC ABG pO2   


 


ABG pO2   


 


ABG HCO3   


 


ABG Base Excess   


 


ABG Hemoglobin   


 


ABG Oxyhemoglobin   


 


ABG Sodium   


 


ABG Potassium   


 


ABG Chloride   


 


ABG Glucose   


 


Oxyhemoglobin   


 


Carboxyhemoglobin   


 


Sodium   


 


Potassium   


 


Chloride   


 


Carbon Dioxide   


 


BUN   


 


Creatinine   


 


Glucose   


 


POC Glucose  198 H  194 H  176 H


 


Calcium   


 


Ionized Calcium   


 


Total Bilirubin   


 


Direct Bilirubin   


 


AST   


 


ALT   


 


Total Creatine Kinase   


 


Troponin T   


 


NT-Pro-B Natriuret Pep   


 


Total Protein   


 


Albumin   


 


LDL Cholesterol Direct   


 


HDL Cholesterol   


 


TSH   


 


Free T4   


 


Arterial Blood Glucose   














  10/07/21 10/07/21 10/07/21





  00:54 00:54 02:51


 


WBC    11.6 H


 


Hgb    7.9 L


 


Hct    27.2 L


 


MCV    72 L


 


MCH    21 L


 


MCHC    29 L


 


RDW    25.8 H


 


Plt Count    118 L


 


Seg Neuts % (Manual)    91.0 H


 


Lymphocytes % (Manual)    4.0 L


 


Nucleated RBC %   


 


Seg Neutrophils # Man    10.6 H


 


Lymphocytes # (Manual)    0.5 L


 


Monocytes # (Manual)   


 


PT   


 


INR   


 


D-Dimer   


 


ABG pH   


 


POC ABG pCO2   


 


POC ABG pO2   


 


ABG pO2   


 


ABG HCO3   


 


ABG Base Excess   


 


ABG Hemoglobin   


 


ABG Oxyhemoglobin   


 


ABG Sodium   


 


ABG Potassium   


 


ABG Chloride   


 


ABG Glucose   


 


Oxyhemoglobin   


 


Carboxyhemoglobin   


 


Sodium  153 H  


 


Potassium   


 


Chloride   


 


Carbon Dioxide  38 H  


 


BUN  74 H  


 


Creatinine  1.3 H  


 


Glucose  205 H  


 


POC Glucose   


 


Calcium   


 


Ionized Calcium   


 


Total Bilirubin   1.40 H 


 


Direct Bilirubin   0.8 H 


 


AST   


 


ALT   133 H 


 


Total Creatine Kinase   


 


Troponin T   


 


NT-Pro-B Natriuret Pep   


 


Total Protein   8.4 H 


 


Albumin   3.2 L 


 


LDL Cholesterol Direct   


 


HDL Cholesterol   


 


TSH   


 


Free T4   


 


Arterial Blood Glucose   














  10/07/21 10/07/21 10/08/21





  16:09 21:03 08:03


 


WBC   


 


Hgb   


 


Hct   


 


MCV   


 


MCH   


 


MCHC   


 


RDW   


 


Plt Count   


 


Seg Neuts % (Manual)   


 


Lymphocytes % (Manual)   


 


Nucleated RBC %   


 


Seg Neutrophils # Man   


 


Lymphocytes # (Manual)   


 


Monocytes # (Manual)   


 


PT   


 


INR   


 


D-Dimer   


 


ABG pH   


 


POC ABG pCO2   


 


POC ABG pO2   


 


ABG pO2   


 


ABG HCO3   


 


ABG Base Excess   


 


ABG Hemoglobin   


 


ABG Oxyhemoglobin   


 


ABG Sodium   


 


ABG Potassium   


 


ABG Chloride   


 


ABG Glucose   


 


Oxyhemoglobin   


 


Carboxyhemoglobin   


 


Sodium   


 


Potassium   


 


Chloride   


 


Carbon Dioxide   


 


BUN   


 


Creatinine   


 


Glucose   


 


POC Glucose  186 H  110 H  178 H


 


Calcium   


 


Ionized Calcium   


 


Total Bilirubin   


 


Direct Bilirubin   


 


AST   


 


ALT   


 


Total Creatine Kinase   


 


Troponin T   


 


NT-Pro-B Natriuret Pep   


 


Total Protein   


 


Albumin   


 


LDL Cholesterol Direct   


 


HDL Cholesterol   


 


TSH   


 


Free T4   


 


Arterial Blood Glucose   














  10/08/21 10/08/21 10/08/21





  11:08 19:32 22:28


 


WBC   


 


Hgb   


 


Hct   


 


MCV   


 


MCH   


 


MCHC   


 


RDW   


 


Plt Count   


 


Seg Neuts % (Manual)   


 


Lymphocytes % (Manual)   


 


Nucleated RBC %   


 


Seg Neutrophils # Man   


 


Lymphocytes # (Manual)   


 


Monocytes # (Manual)   


 


PT   


 


INR   


 


D-Dimer   


 


ABG pH    7.287 L


 


POC ABG pCO2    66.3 H


 


POC ABG pO2    69.5 L


 


ABG pO2   


 


ABG HCO3   


 


ABG Base Excess   


 


ABG Hemoglobin    10.5 L


 


ABG Oxyhemoglobin    89.4 L


 


ABG Sodium    149.9 H


 


ABG Potassium    6.0 H


 


ABG Chloride   


 


ABG Glucose    165 H


 


Oxyhemoglobin   


 


Carboxyhemoglobin   


 


Sodium   


 


Potassium   


 


Chloride   


 


Carbon Dioxide   


 


BUN   


 


Creatinine   


 


Glucose   


 


POC Glucose  189 H  136 H 


 


Calcium   


 


Ionized Calcium   


 


Total Bilirubin   


 


Direct Bilirubin   


 


AST   


 


ALT   


 


Total Creatine Kinase   


 


Troponin T   


 


NT-Pro-B Natriuret Pep   


 


Total Protein   


 


Albumin   


 


LDL Cholesterol Direct   


 


HDL Cholesterol   


 


TSH   


 


Free T4   


 


Arterial Blood Glucose    165 H














  10/08/21 10/09/21 10/09/21





  23:23 10:35 11:52


 


WBC   27.2 H 


 


Hgb   9.2 L 


 


Hct   


 


MCV   76 L 


 


MCH   21 L 


 


MCHC   28 L 


 


RDW   26.9 H 


 


Plt Count   65 L 


 


Seg Neuts % (Manual)   94.0 H 


 


Lymphocytes % (Manual)   4.0 L 


 


Nucleated RBC %   


 


Seg Neutrophils # Man   25.6 H 


 


Lymphocytes # (Manual)   1.1 L 


 


Monocytes # (Manual)   


 


PT   


 


INR   


 


D-Dimer   


 


ABG pH   


 


POC ABG pCO2   


 


POC ABG pO2   


 


ABG pO2   


 


ABG HCO3   


 


ABG Base Excess   


 


ABG Hemoglobin   


 


ABG Oxyhemoglobin   


 


ABG Sodium   


 


ABG Potassium   


 


ABG Chloride   


 


ABG Glucose   


 


Oxyhemoglobin   


 


Carboxyhemoglobin   


 


Sodium   


 


Potassium   


 


Chloride   


 


Carbon Dioxide   


 


BUN   


 


Creatinine   


 


Glucose   


 


POC Glucose  127 H   207 H


 


Calcium   


 


Ionized Calcium   


 


Total Bilirubin   


 


Direct Bilirubin   


 


AST   


 


ALT   


 


Total Creatine Kinase   


 


Troponin T   


 


NT-Pro-B Natriuret Pep   


 


Total Protein   


 


Albumin   


 


LDL Cholesterol Direct   


 


HDL Cholesterol   


 


TSH   


 


Free T4   


 


Arterial Blood Glucose   











Chest x-ray: report reviewed, image reviewed


Additional Studies: 





CHEST 1 VIEW  1/9/21


 


 INDICATION:   


 r/o aspiration.  


 


 COMPARISON:   


 9/26/2021  


 


 FINDINGS:  


 


 Support devices: None.  


 Heart: Enlarged, similar to the prior.   


 Lungs/Pleura: There are low lung volumes with elevation of the right 

hemidiaphragm. This along with


underpenetration limits evaluation of the lungs. No pleural abnormality.    


 


 


 


 IMPRESSION:  


 1. Accounting for low lung volumes and underpenetration related to patient body

habitus, no 


definite acute findings or significant change since the prior exam from 

9/26/2021.  


 





Allied health notes reviewed: nursing

## 2021-10-09 NOTE — PROGRESS NOTE
Assessment and Plan





Assessment


Acute kidney injury, unknown baseline. Renal ultrasound notes poor visualization

due to body habitus.


Hypernatremia


Hyperkalemia


Hypocalcemia


Morbid obesity


Acute hypoxemic and hypercapnic respiratory failure


Acute exacerbation of CHF (congestive heart failure)


Hypertension


Sleep apnea





Recommendations


Na higher previously at 147->153, no labs for review so far this AM


Increase free water intake as able, if unable to drink, will continue D5W


Creatinine stable at 1.3


Urine output appears lower


Now with soft BP, holding meds, on pressor support


echo noted, likely diastolic disease


Avoid diuresis if able given hypernatremia, note good urine output 


Maintain MAP more than 65


Hold ACE/ARB at this time


Renally dose medications


Avoid nephrotoxins


Renal diet


Daily renal labs





Subjective


Date of service: 10/09/21


Principal diagnosis: Ac hypoxemic and hypercapnic resp failure; AE-CHF; Morbid 

obesity; FABIAN/OHS


Interval history: 





Transferred to ICU overnight for hypotension, on Levophed this AM.  On D5W





Objective





- Exam


Narrative Exam: 





General: Morbid obesity, on facemask, mild discomfort


HEENT: Oral mucosa moist


Neck: Supple, no JVD


Chest: labored breathing on NC


Heart: RRR, S1 and S2


Abdomen: Soft, nontender


Extremity: No peripheral cyanosis, edema


Neurological: Awake and alert


Dermatology: skin intact


Psych: Calm and cooperative


Musculoskeletal: No joint effusion





- Vital Signs


Vital signs: 


                               Vital Signs - 12hr











  10/08/21 10/08/21 10/08/21





  21:44 22:00 22:45


 


Temperature   


 


Pulse Rate 121 H 121 H 92 H


 


Pulse Rate [   





Anterior   





Bilateral   





Throughout]   


 


Respiratory 22 29 H 





Rate   


 


Respiratory   





Rate [Anterior   





Bilateral   





Throughout]   


 


Respiratory   





Rate [Left   





Abdomen]   


 


Blood Pressure   


 


O2 Sat by Pulse 91 86 100





Oximetry   














  10/08/21 10/08/21 10/08/21





  23:00 23:46 23:52


 


Temperature   98.9 F


 


Pulse Rate 118 H 108 H 


 


Pulse Rate [   





Anterior   





Bilateral   





Throughout]   


 


Respiratory 35 H 30 H 





Rate   


 


Respiratory   





Rate [Anterior   





Bilateral   





Throughout]   


 


Respiratory   





Rate [Left   





Abdomen]   


 


Blood Pressure 144/97 144/97 


 


O2 Sat by Pulse 100 95 





Oximetry   














  10/09/21 10/09/21 10/09/21





  00:00 00:10 00:40


 


Temperature   


 


Pulse Rate 108 H 109 H 92 H


 


Pulse Rate [   





Anterior   





Bilateral   





Throughout]   


 


Respiratory 30 H 30 H 





Rate   


 


Respiratory   





Rate [Anterior   





Bilateral   





Throughout]   


 


Respiratory   





Rate [Left   





Abdomen]   


 


Blood Pressure 98/60 98/60 


 


O2 Sat by Pulse 96 95 100





Oximetry   














  10/09/21 10/09/21 10/09/21





  01:00 02:00 02:48


 


Temperature   102.9 F H


 


Pulse Rate 101 H 107 H 


 


Pulse Rate [   





Anterior   





Bilateral   





Throughout]   


 


Respiratory 30 H 15 





Rate   


 


Respiratory   





Rate [Anterior   





Bilateral   





Throughout]   


 


Respiratory   





Rate [Left   





Abdomen]   


 


Blood Pressure 97/61 120/103 


 


O2 Sat by Pulse 98 85 





Oximetry   














  10/09/21 10/09/21 10/09/21





  03:00 04:00 04:05


 


Temperature   


 


Pulse Rate 102 H 91 H 


 


Pulse Rate [   





Anterior   





Bilateral   





Throughout]   


 


Respiratory 20 12 





Rate   


 


Respiratory   





Rate [Anterior   





Bilateral   





Throughout]   


 


Respiratory   20





Rate [Left   





Abdomen]   


 


Blood Pressure 109/66 93/58 


 


O2 Sat by Pulse 95 100 





Oximetry   














  10/09/21 10/09/21 10/09/21





  04:13 05:00 05:05


 


Temperature   


 


Pulse Rate 98 H 103 H 


 


Pulse Rate [   





Anterior   





Bilateral   





Throughout]   


 


Respiratory 15 15 





Rate   


 


Respiratory   





Rate [Anterior   





Bilateral   





Throughout]   


 


Respiratory   





Rate [Left   





Abdomen]   


 


Blood Pressure 93/58 122/78 


 


O2 Sat by Pulse 100 97 100





Oximetry   














  10/09/21 10/09/21





  06:00 09:00


 


Temperature  


 


Pulse Rate 104 H 


 


Pulse Rate [  97 H





Anterior  





Bilateral  





Throughout]  


 


Respiratory 18 





Rate  


 


Respiratory  27 H





Rate [Anterior  





Bilateral  





Throughout]  


 


Respiratory  





Rate [Left  





Abdomen]  


 


Blood Pressure 66/34 


 


O2 Sat by Pulse 98 98





Oximetry  














- Lab





                                 10/07/21 02:51





                                 10/07/21 00:54


                             Most recent lab results











ABG pH  7.382  (7.320-7.450)   10/02/21  14:19    


 


ABG pCO2  86.9 mm Hg  09/27/21  18:40    


 


ABG pO2  81.8 mm Hg (80.0-90.0)   09/27/21  18:40    


 


ABG HCO3  36.2 mmol/L (20.0-26.0)  H  09/27/21  18:40    


 


ABG O2 Saturation  99.4  (0-100)   10/02/21  14:19    


 


Calcium  9.4 mg/dL (8.4-10.2)   10/07/21  00:54    


 


Magnesium  2.10 mg/dL (1.7-2.3)   10/07/21  00:54    


 


Urine Sodium  16 mmol/L  09/23/21  18:46    














Medications & Allergies





- Medications


Allergies/Adverse Reactions: 


                                    Allergies





No Known Allergies Allergy (Unverified 07/13/17 11:17)


   








Home Medications: 


                                Home Medications











 Medication  Instructions  Recorded  Confirmed  Last Taken  Type


 


Albuterol Mdi (or & Nicu Only) 2 puff IH QID PRN #1 inhalation 01/24/21 10/08/21

Unknown Rx





[ProAir HFA Inhaler]     


 


ALBUTEROL NEB's [Proventil 0.083% 2.5 mg IH Q4H PRN 10/08/21 10/08/21 Unknown 

History





NEBS]     


 


Atrovent HFA 17MCG/PUFF 2 puff IH QID 10/08/21 10/08/21 Unknown History


 


Ferrous Sulfate [Feosol] 325 mg PO QDAY 10/08/21 10/08/21 Unknown History


 


Fluticasone/Salmeterol [Advair 1 puff IH BID 10/08/21 10/08/21 Unknown History





Diskus 250-50 mcg]     


 


Furosemide [Lasix TAB] 40 mg PO QDAY 10/08/21 10/08/21 Unknown History


 


Ibuprofen [Motrin 800 MG tab] 800 mg PO Q6H PRN 10/08/21 10/08/21 Unknown 

History


 


Metformin HCl [Metformin HCl ER] 750 mg PO QDAY 10/08/21 10/08/21 Unknown 

History


 


Semaglutide [Ozempic] 0.25 mg SQ QWEEK 10/08/21 10/08/21 Unknown History


 


amLODIPine [Norvasc] 10 mg PO DAILY 10/08/21 10/08/21 Unknown History











Active Medications: 














Generic Name Dose Route Start Last Admin





  Trade Name Freq  PRN Reason Stop Dose Admin


 


Acetaminophen  650 mg  09/22/21 03:00  10/01/21 06:42





  Acetaminophen 325 Mg Tab  PO   650 mg





  Q4H PRN   Administration





  Pain MILD(1-3)/Fever >100.5/HA  


 


Albuterol  2.5 mg  09/22/21 03:00 





  Albuterol 2.5 Mg/3 Ml Nebu  IH  





  Q4HRT PRN  





  Shortness Of Breath  


 


Albuterol/Ipratropium  1 ampul  09/22/21 08:00  10/09/21 08:57





  Ipratropium/Albuterol Sulfate 3 Ml Ampul.Neb  IH   1 ampul





  TIDRT DELL   Administration


 


Aspirin  325 mg  09/23/21 10:00  10/08/21 14:00





  Aspirin Ec 325 Mg Tab  PO   325 mg





  QDAY DELL   Administration


 


Atorvastatin Calcium  40 mg  09/22/21 22:00  10/08/21 22:53





  Atorvastatin 40 Mg Tab  PO   Not Given





  QHS DELL  


 


Benzonatate  100 mg  09/22/21 06:00  10/08/21 22:53





  Benzonatate 100 Mg Cap  PO   Not Given





  Q8HR DELL  


 


Guaifenesin  200 mg  09/26/21 14:47  10/01/21 06:43





  Guaifenesin 100 Mg/5 Ml Oral Liqd  PO   200 mg





  Q4H PRN   Administration





  Cough  


 


Haloperidol Lactate  5 mg  10/02/21 16:27  10/05/21 03:30





  Haloperidol Lactate 5 Mg/1 Ml Inj  IV   5 mg





  Q6H PRN   Administration





  Agitation  


 


Heparin Sodium (Porcine)  5,000 unit  10/02/21 14:00  10/09/21 05:13





  Heparin 5,000 Unit/1 Ml Vial  SUB-Q   5,000 unit





  Q8HR DELL   Administration


 


Hydralazine HCl  10 mg  10/03/21 17:25  10/06/21 08:54





  Hydralazine 20 Mg/1 Ml Inj  IV   10 mg





  Q6H PRN   Administration





  Blood Pressure  


 


Dextrose  1,000 mls @ 100 mls/hr  10/08/21 21:46  10/09/21 04:04





  D5w  IV   100 mls/hr





  AS DIRECT DELL   Administration


 


Norepinephrine  4 mg in 250 mls @ 37.5 mls/hr  10/08/21 20:41  10/09/21 08:15





  Levophed Drip 4 Mg/Ns 250 Ml  IV   6 mcg/min





  TITR DELL   22.5 mls/hr





    Titration





  Protocol  





  10 MCG/MIN  


 


Levothyroxine Sodium  100 mcg  10/06/21 06:00  10/08/21 06:07





  Levothyroxine 100 Mcg Tab  PO   100 mcg





  DAILY@0600 DELL   Administration


 


Methylprednisolone Sodium Succinate  20 mg  09/30/21 10:00  10/08/21 22:56





  Methylprednisolone Sod Succinate 40 Mg/1 Ml Inj  IV   20 mg





  Q12HR DELL   Administration


 


Nitroglycerin  0.4 mg  09/22/21 03:00 





  Nitroglycerin 0.4 Mg Tab Subl  SL  





  Q5M PRN  





  Chest Pain  


 


Nystatin  1 applic  09/22/21 18:00  10/09/21 04:05





  Nystatin Powder 15 Gm  TP   1 applic





  BID DELL   Administration


 


Ondansetron HCl  4 mg  09/22/21 03:00  09/29/21 23:51





  Ondansetron 4 Mg/2 Ml Inj  IV   4 mg





  Q8H PRN   Administration





  Nausea And Vomiting  


 


Oxycodone/Acetaminophen  1 tab  09/22/21 03:00  10/05/21 16:26





  Oxycodone /Acetaminophen 5-325mg Tab  PO   1 tab





  Q6H PRN   Administration





  Pain, Moderate (4-6)  


 


Pantoprazole Sodium  40 mg  10/07/21 07:30  10/08/21 14:00





  Pantoprazole 40 Mg Tab  PO   40 mg





  QDAC DELL   Administration


 


Quetiapine Fumarate  75 mg  10/02/21 22:00  10/08/21 21:46





  Quetiapine 25 Mg Tab  PO   Not Given





  QHS DELL  


 


Sodium Chloride  10 ml  09/22/21 10:00  10/08/21 21:46





  Sodium Chloride 0.9% 10 Ml Flush Syringe  IV   10 ml





  BID DELL   Administration


 


Sodium Chloride  10 ml  09/22/21 03:00  10/08/21 20:43





  Sodium Chloride 0.9% 10 Ml Flush Syringe  IV   10 ml





  PRN PRN   Administration





  LINE FLUSH  


 


Tramadol HCl  50 mg  09/22/21 03:00  10/04/21 21:50





  Tramadol 50 Mg Tab  PO   50 mg





  Q6H PRN   Administration





  Pain, Moderate (4-6)

## 2021-10-09 NOTE — XRAY REPORT
ABDOMEN 1 VIEW(S)



INDICATION / CLINICAL INFORMATION:

NGT placement.



COMPARISON: 

None available.



FINDINGS:



TUBES / LINES: Gastric tube tip and side-port are in the stomach in satisfactory position.

BOWEL GAS PATTERN: No significant abnormality. 

FREE AIR / EXTRALUMINAL GAS: None seen.



ADDITIONAL FINDINGS: No significant additional findings.



IMPRESSION:

1. Esophagogastric tube in satisfactory position.



Signer Name: Joseph Ma MD 

Signed: 10/9/2021 8:56 PM

Workstation Name: [id number]

## 2021-10-10 VITALS — DIASTOLIC BLOOD PRESSURE: 63 MMHG | SYSTOLIC BLOOD PRESSURE: 120 MMHG

## 2021-10-10 LAB
%HYPO/RBC NFR BLD AUTO: (no result) %
ALBUMIN SERPL-MCNC: 3.3 G/DL (ref 3.9–5)
ANISOCYTOSIS BLD QL SMEAR: (no result)
BAND NEUTROPHILS # (MANUAL): 0 K/MM3
BUN SERPL-MCNC: 121 MG/DL (ref 7–17)
BUN/CREAT SERPL: 30 %
CALCIUM SERPL-MCNC: 8 MG/DL (ref 8.4–10.2)
HCT VFR BLD CALC: 32.1 % (ref 30.3–42.9)
HEMOLYSIS INDEX: 18
HGB BLD-MCNC: 8.7 GM/DL (ref 10.1–14.3)
MCHC RBC AUTO-ENTMCNC: 27 % (ref 30–34)
MCV RBC AUTO: 78 FL (ref 79–97)
MYELOCYTES # (MANUAL): 0 K/MM3
PLATELET # BLD: 62 K/MM3 (ref 140–440)
PROMYELOCYTES # (MANUAL): 0 K/MM3
RBC # BLD AUTO: 4.1 M/MM3 (ref 3.65–5.03)
TOTAL CELLS COUNTED BLD: 100

## 2021-10-10 RX ADMIN — DEXTROSE SCH MLS/HR: 5 SOLUTION INTRAVENOUS at 11:48

## 2021-10-10 RX ADMIN — NYSTATIN SCH APPLIC: 100000 POWDER TOPICAL at 09:56

## 2021-10-10 RX ADMIN — BENZONATATE SCH: 100 CAPSULE ORAL at 15:00

## 2021-10-10 RX ADMIN — HEPARIN SODIUM SCH UNIT: 5000 INJECTION, SOLUTION INTRAVENOUS; SUBCUTANEOUS at 14:58

## 2021-10-10 RX ADMIN — LEVOTHYROXINE SODIUM SCH MCG: 0.1 TABLET ORAL at 06:16

## 2021-10-10 RX ADMIN — METOCLOPRAMIDE SCH MG: 5 INJECTION, SOLUTION INTRAMUSCULAR; INTRAVENOUS at 00:31

## 2021-10-10 RX ADMIN — ASPIRIN SCH: 325 TABLET, COATED ORAL at 11:49

## 2021-10-10 RX ADMIN — IPRATROPIUM BROMIDE AND ALBUTEROL SULFATE SCH AMPUL: .5; 3 SOLUTION RESPIRATORY (INHALATION) at 08:04

## 2021-10-10 RX ADMIN — METHYLPREDNISOLONE SODIUM SUCCINATE SCH MG: 40 INJECTION, POWDER, FOR SOLUTION INTRAMUSCULAR; INTRAVENOUS at 09:53

## 2021-10-10 RX ADMIN — DEXTROSE SCH MLS/HR: 5 SOLUTION INTRAVENOUS at 00:36

## 2021-10-10 RX ADMIN — METOCLOPRAMIDE SCH MG: 5 INJECTION, SOLUTION INTRAMUSCULAR; INTRAVENOUS at 16:49

## 2021-10-10 RX ADMIN — BENZONATATE SCH MG: 100 CAPSULE ORAL at 06:16

## 2021-10-10 RX ADMIN — METOCLOPRAMIDE SCH MG: 5 INJECTION, SOLUTION INTRAMUSCULAR; INTRAVENOUS at 09:54

## 2021-10-10 RX ADMIN — HEPARIN SODIUM SCH UNIT: 5000 INJECTION, SOLUTION INTRAVENOUS; SUBCUTANEOUS at 06:17

## 2021-10-10 RX ADMIN — PANTOPRAZOLE SODIUM SCH: 40 TABLET, DELAYED RELEASE ORAL at 09:54

## 2021-10-10 RX ADMIN — Medication SCH ML: at 09:56

## 2021-10-10 RX ADMIN — VALPROATE SODIUM SCH MLS/HR: 100 INJECTION, SOLUTION INTRAVENOUS at 00:32

## 2021-10-10 RX ADMIN — VALPROATE SODIUM SCH MLS/HR: 100 INJECTION, SOLUTION INTRAVENOUS at 11:48

## 2021-10-10 NOTE — PROGRESS NOTE
Assessment and Plan


Assessment and plan: 


Labs could not be done this morning, unable to get PICC line, midline due to 

morbid obesity


However managed to get neck later





--Non-ST elevation MI;


Elevated troponin; 0.627


Check EKG, serial cardiac enzymes, serial EKGs


On-call cardiologist Dr. Eugenia mccain was consulted last night


Recent echocardiogram; normal limits


Continue aspirin, statin


No beta-blockers, nitrates due to hypotension


No ACE inhibitors, due to hypotension and renal failure





--Severe hyperkalemia; 


Current Visit: Yes   Status: Acute


Patient received calcium carbonate, insulin and dextrose


Albuterol inhalation, Kayexalate


Unable to do labs today to repeat electrolytes


We will closely monitor and adjust as needed





--Acute metabolic encephalopathy;


Current Visit: Yes   Status: Acute


Multifactorial, severe hypoxia, severe bradycardia


Underlying obesity hypoventilation and cardiac issues


Neurochecks, supportive care


If no improvement CT head, and possible neurology consult





--Hypotension; shock


Current Visit: Yes   Status: Acute


Patient is on Levophed, titrate as tolerated


Continue IV fluids





-Acute on chronic hypoxic respiratory failure; requiring BiPAP


Current Visit: Yes   Status: Acute


Continue oxygen titrate O2 sats more than 90%


Continue BiPAP as needed


Multifactorial obesity hypoventilation syndrome


Obstructive sleep apnea, COPD, CHF


Pulmonary following, cardiology evaluated in the past and signed off


Treat the underlying cause





--Acute exacerbation COPD (chronic obstructive pulmonary disease)


Current Visit: Yes   Status: Acute   


Oxygen titrate O2 sats to more than 90%, patient is requiring BiPAP most of the 

times


Wean as tolerated, nebulizers, IV steroids, IV antibiotics, inhalation steroids





--acute kidney injury /worsening renal function


Vasomotor nephropathy ,gentle hydration, monitor renal function, 


avoid nephrotoxins, nephrology following





-- Morbid obesity; .4


Current Visit: Yes   Status: Acute   


Patient needs to participate on professional weight reduction program when 

medically stable. 


Strongly recommend outpatient follow-up with bariatric surgeon upon discharge 

when stable.  





--Obesity hypoventilation syndrome;


Supplemental oxygen and BiPAP





--Obstructive sleep apnea;


CPAP and BiPAP as needed





-- Non-ST elevation MI (NSTEMI) 


Current Visit: Yes   Status: Acute   


Cardiology evaluated, recommend medical management and signed off





-- Acute exacerbation of CHF (congestive heart failure)


Current Visit: Yes   Status: Acute   


Fluid restriction. Maintain input output. 


Antifailure medications low-sodium diet


Input output monitoring, cardiology following





--hypothyroidism /new onset


Current Visit: Yes   Status: Acute  


Continue Synthroid, closely monitor


Patient needs follow-up with endocrinologist as outpatient upon DC





-- Hypoglycemia/present on admission


Current Visit: Yes   Status: Acute   


Now resolved closely monitor blood sugars





-- Lymphedema of the abdominal wall


Due to morbid obesity, supportive care fluid restriction


Low-sodium diet





--Anemia of chronic disease; 


Closely monitor H&H, transfuse as needed





--Acute transaminitis acute liver failure


Acute transaminitis; LFTs trending down


Hepatocellular pattern, multifactorial NAFLD, congestive hepatopathy, DILI, 


GI evaluation and recommendations noted, LFTs trending down





-- hypertension


Current Visit: Yes   Status: Acute   


Plan to address problem: 


We will continue the home medication. Hydralazine 10 mg IV every 6 hours as 

needed. We will monitor the blood pressure closely





-- DVT prophylaxis


Current Visit: No   Status: Acute   


Plan to address problem: 


Heparin 5000 units subcu every 8 hours for DVT prophylaxis. Pepcid 20 mg p.o. 

twice daily for GI prophylaxis. Patient is a full code





Also monitor the patient and adjust management as needed


We will closely monitor the patient and adjust management as needed


Total critical care time 60 minutes








The high probability of a clinically significant, sudden or life threatening 

deterioration of the [multi] system(s) required my full and direct attention, 

intervention and personal management. The aggregate critical care time was [60] 

minutes. This time is in addition to time spent performing reported procedures 

but includes the following: 





[x] Data Review and interpretation 





[x] Patient assessment and monitoring of vital signs 





[x] Documentation 





[x] Medication orders and management














Daily Hospital course;


9/23


-Patient is on heparin drip for non-STEMI, cardiology is following.


-Patient is off Lasix and ACE inhibitors because of [place].


-Kidney function is worsening overnight and I put a consult for nephrology.


-Patient is hypotensive and blood pressure from this morning was 101/41.  We 

will continue to monitor.  And if it is dropping we we will give him fluid.


-Echo was done and EF of 50 to 55%.  Diastolic dysfunction is indeterminate.  

Management per cardiology


-Patient has COPD continues on dexamethasone, nebulizer treatment as needed.


-Patient has hyponatremia and hyperkalemia.  I give the patient Kayexalate.  

Monitor electrolytes.  Will follow nephrology for additional recommendation.





9/24


-Renal function is worsening, nephrology is following


-Blood pressure is better today


-Hyperkalemia; I added Kayexalate


-Morbid obesity








9/25


-Slight improvement in creatinine.  Hyponatremia worsened. Nephrology is 

following and put the patient back on Lasix.


-Blood PRESSURE IS BETTER today


-Potassium this morning was 5.8, I ordered 60 g of Kayexalate


-Morbidly obese


-Obesity hypoventilation syndrome





9/26


-Creatinine is improving and this morning was 2.4


-Hyponatremia improved this morning was 134.  Patient is on Lasix


-Blood pressure is better


-Patient is on 10 L of oxygen; worsened 


-Morbidly obese, FABIAN








9/27: Follow ordered ultrasound of abdomen, per Physician unable to get CT. I 

ordered an ABG due to my concern about her breathing pattern this morning, 

patient may benefit from. Will try to establish if patient has a primary 

pulmonary doctor. ABG is showing consistent with Respiratory Acidosis. Will 

place on BIPAP now. May need to transfer to IM for closer monitoring. Patient 

likely with Obesity Hypoventilation syndrome.


cardiology work up, ongoing. 


RENAL SHOWING SOME IMPROVEMENT


Guarded prognosis





9/28: Patient seen and examined today identified some lesion under the pannus 

will obtain wound care and wound surgeon evaluation.  Nephrology input noted 

continue diuresis and stop the sodium tabs creatinine is stable.  I did discuss 

with the family and updated them.  We will continue to hold ACE and ARB and if 

pressures continue to drop may need to hold diuresis also despite as written 

above.  Monitor labs including H&H.  She is more awake review of ABG shows 

improving CO2.  Will discuss with case management as patient may need BiPAP on 

discharge home.  I also updated the family








9/29: Patient showing some clinical improvement.  Liver enzymes is showing mild 

improvement although bilirubin increased slightly.  GI input noted and as 

discussed by his notes below


"Abnormal liver enzymes in hepatocellular pattern.  broad ddx and likely 

multifactorial causes including NAFLD, congestive hepatopathy, possibly DILI.  

will check viral hep serologies.  US with limited views, possible coarsening of 

the liver"


Patient also evaluated by surgery noted with the lymphedema of abdominal wall 

and open wound of the abdominal wall without penetration into the abdominal 

cavity with recommendation to pack the wounds daily with mesalt. Need to 

maintain dry environment discussed with nursing staff.  Elevate the pannus and 

optimize nutrition for which I will get nutritional consult.


No surgical intervention at this time.  Patient is bedbound when I asked when 

last she ambulated she said while "I guess he has not worked" but it has been a 

while.  Unfortunately the LTAC center unwilling to accept the patient will 

discuss with pulmonary and with case management about obtaining BiPAP for this 

patient and possible SNF referral.





09/30: Patient this morning and was noted significantly lethargic and 

unresponsive respiratory and a code to be called.  The patient resuscitated 

without any invasive procedure.  ABG was obtained showed a CO2 of 142.  Despite 

using BiPAP for some time through the night, sure how long she used it for.  I 

have discontinued all IV opioids and recommend no IV opioids for this patient at

 this time.  I also discontinued p.o. medications as an essential medication and

 change to IV.  We will repeat an ABG in an hour to see if there is some 

improvement.  Patient remains at high risk for possible intubation.  Clinical 

condition is guarded





10/1: Patient remains on BiPAP this am, has some intermittent twitching, will 

check EEG, not likely seizure, but will monitor. Continue current management


, check Albumin and Pre-Albumin level.


Remains critically ill. Liver status showing improvement.





10/2: Patient with elevated D-dimer unfortunately size precludes being able to 

have a CTA chest done here to rule out pulmonary embolism.  Doppler of lower 

extremities is pending. We will continue subcu anticoagulation with heparin at 

this time. Until Doppler is resolved. Patient is not hypoxic so doubt pulmonary 

embolism at this time. Her problem remains hypercapnia. I did take time to go 

over her history while she has been around hospital in the past and noticed a 

remarkable increase in her BMI from the last 2 years. Discussed with the 

intensivist will agree to check thyroid function test. Critical care time 35-

minute





10/3: Patient with hypothyroidism, while she does not appear to be with myxedema

 coma at this time. Will initiate levothyroxine as I believe that this will make

 profound impact  her management at this time, continue BiPAP continue current m

anagement. Seroquel was added by pulmonary for delirium and agitation 

management, Ventimask during the day and BiPAP at night





10/4: Continue levothyroine, can change to PO in am. Will obtain Psych consult, 

she is clinically stable and will transfer to floor





10/5; levothyroxine changed to 100 mg p.o. daily


Consultant recommendations noted and appreciated


Monitor LFTs





10/6; patient continues to be hypoxemic on continuous BiPAP


Consultants and recommendations noted and appreciated


Continue current management





10/7; patient has intermittent hallucination, psych following


Patient is currently on nasal cannula oxygen, refusing BiPAP


Mild distress and confused





10/8; possible discharge tomorrow


With home health, home oxygen


Discussed with pulmonologist 





10/9; patient went into metabolic encephalopathy, sudden altered level of 

consciousness and hypotension


Was transferred to ICU last night, started on Levophed


Drip patient is severely lethargic and hypoxic on BiPAP and she is in shock on 

Levophed, critically ill





Patient had severe hyperkalemia; treated appropriately with multiple medications


Patient had elevated troponins, consulted on-call cardiologist 





10/10: Patient is critically ill severely hypoxemic, respiratory failure, morbid

 obesity


Hypotension requiring Levophed, obesity hypoventilation, non-ST elevation MI, 

worsening renal function


very poor prognosis




















History


Interval history: 


I have seen and examined the patient at the bedside in ICU


Patient's chart and medications reviewed


Patient is critically ill, hypoxemic on supplemental oxygen


Morbidly obese, unable to get the blood drawn


Unable to get midline/PICC line due to morbid obesity


However when the blood.  Patient has severe hyperkalemia


Patient is severely lethargic noncommunicative


Vital signs noted








Hospitalist Physical





- Constitutional


Vitals: 


                                        











Temp Pulse Resp BP Pulse Ox


 


 97.7 F   99 H  32 H  128/32   85 


 


 10/10/21 03:43  10/10/21 09:15  10/10/21 09:15  10/10/21 09:15  10/10/21 09:15











General appearance: Present: mild distress, well-nourished, obese (Morbidly 

obese), other (Lethargic)





- EENT


Eyes: Present: PERRL, EOM intact





- Neck


Neck: Present: supple





- Respiratory


Respiratory effort: labored


Respiratory: bilateral: diminished, rhonchi, negative: rales, wheezing





- Cardiovascular


Rhythm: regular


Heart Sounds: Present: S1 & S2





- Extremities


Extremities: no ischemia


Extremity abnormal: edema





- Abdominal


General gastrointestinal: soft, non-tender, non-distended, normal bowel sounds





- Integumentary


Integumentary: Present: clear, warm





- Psychiatric


Psychiatric: other (Lethargic minimally communicative)





- Neurologic


Neurologic: moves all extremities





HEART Score





- HEART Score


Troponin: 


                                        











Troponin T  0.624 ng/mL (0.00-0.029)  H*  10/09/21  23:52    














Results





- Labs


CBC & Chem 7: 


                                 10/10/21 12:15





                                 10/10/21 11:55


Labs: 


                             Laboratory Last Values











WBC  27.2 K/mm3 (4.5-11.0)  H  10/09/21  10:35    


 


RBC  4.41 M/mm3 (3.65-5.03)   10/09/21  10:35    


 


Hgb  9.2 gm/dl (10.1-14.3)  L  10/09/21  10:35    


 


Hct  33.5 % (30.3-42.9)  D 10/09/21  10:35    


 


MCV  76 fl (79-97)  L  10/09/21  10:35    


 


MCH  21 pg (28-32)  L  10/09/21  10:35    


 


MCHC  28 % (30-34)  L  10/09/21  10:35    


 


RDW  26.9 % (13.2-15.2)  H  10/09/21  10:35    


 


Plt Count  65 K/mm3 (140-440)  L  10/09/21  10:35    


 


Add Manual Diff  Complete   10/09/21  10:35    


 


Total Counted  100   10/09/21  10:35    


 


Seg Neutrophils %  Np   10/09/21  10:35    


 


Seg Neuts % (Manual)  94.0 % (40.0-70.0)  H  10/09/21  10:35    


 


Band Neutrophils %  1.0 %  10/09/21  10:35    


 


Lymphocytes % (Manual)  4.0 % (13.4-35.0)  L  10/09/21  10:35    


 


Reactive Lymphs % (Man)  1.0 %  09/26/21  14:11    


 


Monocytes % (Manual)  1.0 % (0.0-7.3)   10/09/21  10:35    


 


Metamyelocytes %  2.0 %  09/26/21  14:11    


 


Myelocytes %  2.0 %  09/22/21  04:03    


 


Nucleated RBC %  Not Reportable   10/09/21  10:35    


 


Seg Neutrophils # Man  25.6 K/mm3 (1.8-7.7)  H  10/09/21  10:35    


 


Band Neutrophils #  0.3 K/mm3  10/09/21  10:35    


 


Lymphocytes # (Manual)  1.1 K/mm3 (1.2-5.4)  L  10/09/21  10:35    


 


Abs React Lymphs (Man)  0.0 K/mm3  10/09/21  10:35    


 


Monocytes # (Manual)  0.3 K/mm3 (0.0-0.8)   10/09/21  10:35    


 


Eosinophils # (Manual)  0.0 K/mm3 (0.0-0.4)   10/09/21  10:35    


 


Basophils # (Manual)  0.0 K/mm3 (0.0-0.1)   10/09/21  10:35    


 


Metamyelocytes #  0.0 K/mm3  10/09/21  10:35    


 


Myelocytes #  0.0 K/mm3  10/09/21  10:35    


 


Promyelocytes #  0.0 K/mm3  10/09/21  10:35    


 


Blast Cells #  0.0 K/mm3  10/09/21  10:35    


 


WBC Morphology  Not Reportable   10/09/21  10:35    


 


WBC Morphology  TNR   10/09/21  10:35    


 


Hypersegmented Neuts  Not Reportable   10/09/21  10:35    


 


Hyposegmented Neuts  Not Reportable   10/09/21  10:35    


 


Hypogranular Neuts  Not Reportable   10/09/21  10:35    


 


Smudge Cells  Not Reportable   10/09/21  10:35    


 


Toxic Granulation  Not Reportable   10/09/21  10:35    


 


Toxic Vacuolation  Not Reportable   10/09/21  10:35    


 


Dohle Bodies  Not Reportable   10/09/21  10:35    


 


Pelger-Huet Anomaly  Not Reportable   10/09/21  10:35    


 


Ac Rods  Not Reportable   10/09/21  10:35    


 


Platelet Estimate  Not Reportable   10/09/21  10:35    


 


Clumped Platelets  Not Reportable   10/09/21  10:35    


 


Plt Clumps, EDTA  Not Reportable   10/09/21  10:35    


 


Large Platelets  Not Reportable   10/09/21  10:35    


 


Giant Platelets  Not Reportable   10/09/21  10:35    


 


Platelet Satelliting  Not Reportable   10/09/21  10:35    


 


Plt Morphology Comment  Not Reportable   10/09/21  10:35    


 


RBC Morphology  Not Reportable   10/09/21  10:35    


 


Dimorphic RBCs  Not Reportable   10/09/21  10:35    


 


Polychromasia  Not Reportable   10/09/21  10:35    


 


Hypochromasia  1+   10/09/21  10:35    


 


Poikilocytosis  1+   10/09/21  10:35    


 


Anisocytosis  2+   10/09/21  10:35    


 


Microcytosis  Not Reportable   10/09/21  10:35    


 


Macrocytosis  Not Reportable   10/09/21  10:35    


 


Spherocytes  Not Reportable   10/09/21  10:35    


 


Pappenheimer Bodies  Not Reportable   10/09/21  10:35    


 


Sickle Cells  Not Reportable   10/09/21  10:35    


 


Target Cells  1+   10/09/21  10:35    


 


Tear Drop Cells  Not Reportable   10/09/21  10:35    


 


Ovalocytes  Not Reportable   10/09/21  10:35    


 


Helmet Cells  Not Reportable   10/09/21  10:35    


 


[place] Bodies  Not Reportable   10/09/21  10:35    


 


Cabot Rings  Not Reportable   10/09/21  10:35    


 


Apolinar Cells  Not Reportable   10/09/21  10:35    


 


Bite Cells  Not Reportable   10/09/21  10:35    


 


Crenated Cell  Not Reportable   10/09/21  10:35    


 


Elliptocytes  Not Reportable   10/09/21  10:35    


 


Acanthocytes (Spur)  Not Reportable   10/09/21  10:35    


 


Rouleaux  Not Reportable   10/09/21  10:35    


 


Hemoglobin C Crystals  Not Reportable   10/09/21  10:35    


 


Schistocytes  Not Reportable   10/09/21  10:35    


 


Malaria parasites  Not Reportable   10/09/21  10:35    


 


Sami Bodies  Not Reportable   10/09/21  10:35    


 


Hem Pathologist Commnt  No   10/09/21  10:35    


 


PT  18.7 Sec. (12.2-14.9)  H  10/02/21  16:55    


 


INR  1.51  (0.87-1.13)  H  10/02/21  16:55    


 


APTT  25.2 Sec. (24.2-36.6)   10/02/21  16:55    


 


D-Dimer  5410.10 ng/mlDDU (0-234)  H  10/01/21  16:03    


 


ABG pH  7.287  (7.320-7.450)  L  10/08/21  22:28    


 


POC ABG pCO2  66.3 mmHg (32.0-48.0)  H  10/08/21  22:28    


 


ABG pCO2  86.9 mm Hg  09/27/21  18:40    


 


POC ABG pO2  69.5 mmHg ()  L  10/08/21  22:28    


 


ABG pO2  81.8 mm Hg (80.0-90.0)   09/27/21  18:40    


 


POC ABG HCO3  30.9   10/08/21  22:28    


 


ABG HCO3  36.2 mmol/L (20.0-26.0)  H  09/27/21  18:40    


 


ABG O2 Saturation  90.8  (0-100)   10/08/21  22:28    


 


ABG O2 Content  10.4  (0.0-44)   09/27/21  18:40    


 


POC ABG Base Excess  3.1   10/08/21  22:28    


 


ABG Base Excess  7.4 mmol/L (-2.0-3.0)  H  09/27/21  18:40    


 


ABG Hemoglobin  10.5  (12.0-17.5)  L  10/08/21  22:28    


 


ABG Oxyhemoglobin  89.4  (94-98)  L  10/08/21  22:28    


 


ABG Carboxyhemoglobin  1.9 % (0.0-5.0)   09/27/21  18:40    


 


ABG Methemoglobin  0.3  (0.0-1.5)   10/08/21  22:28    


 


ABG Sodium  149.9 mmol/L (136.0-145.0)  H  10/08/21  22:28    


 


ABG Potassium  6.0 mmol/L (3.40-4.50)  H  10/08/21  22:28    


 


ABG Chloride  104.0 mmol/L ()   10/08/21  22:28    


 


ABG Glucose  165 mg/dL (65-95)  H  10/08/21  22:28    


 


Oxyhemoglobin  93.7 % (95.0-99.0)  L  09/27/21  18:40    


 


Carboxyhemoglobin  1.2  (0.5-1.5)   10/08/21  22:28    


 


FiO2  30 %  09/27/21  18:40    


 


FiO2 %  50.0   10/08/21  22:28    


 


Sodium  145 mmol/L (137-145)  D 10/09/21  17:14    


 


Potassium  6.8 mmol/L (3.6-5.0)  H* D 10/09/21  17:14    


 


Chloride  100.7 mmol/L ()   10/09/21  17:14    


 


Carbon Dioxide  26 mmol/L (22-30)  D 10/09/21  17:14    


 


Anion Gap  25 mmol/L  10/09/21  17:14    


 


BUN  112 mg/dL (7-17)  H  10/09/21  17:14    


 


Creatinine  3.5 mg/dL (0.6-1.2)  H D 10/09/21  17:14    


 


Estimated GFR  18 ml/min  10/09/21  17:14    


 


BUN/Creatinine Ratio  32 %  10/09/21  17:14    


 


Glucose  231 mg/dL ()  H  10/09/21  17:14    


 


POC Glucose  232 mg/dL ()  H  10/10/21  05:16    


 


Osmolality  301 Mosm/kg  09/23/21  23:15    


 


Calcium  8.3 mg/dL (8.4-10.2)  L  10/09/21  17:14    


 


Ionized Calcium  3.6 mg/dL (4.8-5.6)  L  09/23/21  23:15    


 


Phosphorus  6.80 mg/dL (2.5-4.5)  H  10/09/21  17:14    


 


Magnesium  2.40 mg/dL (1.7-2.3)  H  10/09/21  17:14    


 


Total Bilirubin  3.60 mg/dL (0.1-1.2)  H  10/09/21  17:14    


 


Direct Bilirubin  0.8 mg/dL (0-0.2)  H  10/07/21  00:54    


 


Indirect Bilirubin  0.6 mg/dL  10/07/21  00:54    


 


AST  8105 units/L (5-40)  H  10/09/21  17:14    


 


ALT  3151 units/L (7-56)  H  10/09/21  17:14    


 


Alkaline Phosphatase  57 units/L ()   10/09/21  17:14    


 


Total Creatine Kinase  3401 units/L ()  H  10/09/21  17:14    


 


CK-MB (CK-2)  5.9 ng/mL (0.0-4.0)  H  10/09/21  17:14    


 


CK-MB (CK-2) Rel Index  0.1  (0-4)   10/09/21  17:14    


 


Troponin T  0.624 ng/mL (0.00-0.029)  H*  10/09/21  23:52    


 


NT-Pro-B Natriuret Pep  7573 pg/mL (0-450)  H  09/21/21  22:04    


 


Total Protein  8.6 g/dL (6.3-8.2)  H  10/09/21  17:14    


 


Albumin  3.0 g/dL (3.9-5)  L  10/09/21  17:14    


 


Albumin/Globulin Ratio  0.5 %  10/09/21  17:14    


 


Triglycerides  105 mg/dL (2-149)   09/21/21  22:04    


 


Cholesterol  85 mg/dL ()   09/21/21  22:04    


 


LDL Cholesterol Direct  47 mg/dL ()  L  09/21/21  22:04    


 


HDL Cholesterol  25 mg/dL (40-59)  L  09/21/21  22:04    


 


Cholesterol/HDL Ratio  3.40 %  09/21/21  22:04    


 


TSH  6.140 mlU/mL (0.270-4.200)  H  10/02/21  19:55    


 


Free T4  0.67 ng/dL (0.76-1.46)  L  10/02/21  19:55    


 


Total Cortisol  41.3 mcg/dL (***)   09/24/21  10:04    


 


Arterial Blood Glucose  165 mg/dL (65-95)  H  10/08/21  22:28    


 


Arterial Blood Ionized Calcium  4.6 mg/dL (4.6-5.3)   09/30/21  07:32    


 


Urine Osmolality  157 Mosm/kg  09/23/21  18:46    


 


Urine Sodium  16 mmol/L  09/23/21  18:46    


 


Coronavirus (PCR)  Negative  (Negative)   09/28/21  Unknown


 


Hepatitis A IgM Ab  Non-reactive  (NonReactive)   09/28/21  18:45    


 


Hep Bs Antigen  Nonreactive  (Negative)   09/28/21  18:45    


 


Hepatitis C Antibody  Non-reactive  (NonReactive)   09/28/21  18:45    











Diamond/IV: 


                                        





Voiding Method                   Indwelling Catheter











Active Medications





- Current Medications


Current Medications: 














Generic Name Dose Route Start Last Admin





  Trade Name Freq  PRN Reason Stop Dose Admin


 


Acetaminophen  650 mg  09/22/21 03:00  10/01/21 06:42





  Acetaminophen 325 Mg Tab  PO   650 mg





  Q4H PRN   Administration





  Pain MILD(1-3)/Fever >100.5/HA  


 


Albuterol  2.5 mg  09/22/21 03:00 





  Albuterol 2.5 Mg/3 Ml Nebu  IH  





  Q4HRT PRN  





  Shortness Of Breath  


 


Albuterol/Ipratropium  1 ampul  09/22/21 08:00  10/10/21 08:04





  Ipratropium/Albuterol Sulfate 3 Ml Ampul.Neb  IH   1 ampul





  TIDRT DELL   Administration


 


Aspirin  325 mg  09/23/21 10:00  10/09/21 10:28





  Aspirin Ec 325 Mg Tab  PO   Not Given





  QDAY DELL  


 


Atorvastatin Calcium  40 mg  09/22/21 22:00  10/09/21 22:19





  Atorvastatin 40 Mg Tab  PO   40 mg





  QHS DLEL   Administration


 


Benzonatate  100 mg  09/22/21 06:00  10/10/21 06:16





  Benzonatate 100 Mg Cap  PO   100 mg





  Q8HR DELL   Administration


 


Guaifenesin  200 mg  09/26/21 14:47  10/01/21 06:43





  Guaifenesin 100 Mg/5 Ml Oral Liqd  PO   200 mg





  Q4H PRN   Administration





  Cough  


 


Haloperidol Lactate  5 mg  10/02/21 16:27  10/05/21 03:30





  Haloperidol Lactate 5 Mg/1 Ml Inj  IV   5 mg





  Q6H PRN   Administration





  Agitation  


 


Heparin Sodium (Porcine)  5,000 unit  10/02/21 14:00  10/10/21 06:17





  Heparin 5,000 Unit/1 Ml Vial  SUB-Q   5,000 unit





  Q8HR DELL   Administration


 


Hydralazine HCl  10 mg  10/03/21 17:25  10/06/21 08:54





  Hydralazine 20 Mg/1 Ml Inj  IV   10 mg





  Q6H PRN   Administration





  Blood Pressure  


 


Dextrose  1,000 mls @ 100 mls/hr  10/08/21 21:46  10/10/21 00:36





  D5w  IV   100 mls/hr





  AS DIRECT DELL   Administration


 


Norepinephrine  4 mg in 250 mls @ 37.5 mls/hr  10/08/21 20:41  10/09/21 13:58





  Levophed Drip 4 Mg/Ns 250 Ml  IV   2 mcg/min





  TITR DELL   7.5 mls/hr





    Titration





  Protocol  





  10 MCG/MIN  


 


Valproate Sodium 500 mg/  105 mls @ 100 mls/hr  10/09/21 11:00  10/10/21 00:32





  Sodium Chloride  IV   100 mls/hr





  Q12H DELL   Administration


 


Levothyroxine Sodium  100 mcg  10/06/21 06:00  10/10/21 06:16





  Levothyroxine 100 Mcg Tab  PO   100 mcg





  DAILY@0600 DELL   Administration


 


Methylprednisolone Sodium Succinate  20 mg  09/30/21 10:00  10/10/21 09:53





  Methylprednisolone Sod Succinate 40 Mg/1 Ml Inj  IV   20 mg





  Q12HR DELL   Administration


 


Metoclopramide HCl  5 mg  10/09/21 16:00  10/10/21 09:54





  Metoclopramide 10 Mg/2 Ml Inj  IV  10/12/21 15:59  5 mg





  Q8H DELL   Administration


 


Nitroglycerin  0.4 mg  09/22/21 03:00 





  Nitroglycerin 0.4 Mg Tab Subl  SL  





  Q5M PRN  





  Chest Pain  


 


Nystatin  1 applic  09/22/21 18:00  10/10/21 09:56





  Nystatin Powder 15 Gm  TP   1 applic





  BID DELL   Administration


 


Ondansetron HCl  4 mg  09/22/21 03:00  09/29/21 23:51





  Ondansetron 4 Mg/2 Ml Inj  IV   4 mg





  Q8H PRN   Administration





  Nausea And Vomiting  


 


Oxycodone/Acetaminophen  1 tab  09/22/21 03:00  10/05/21 16:26





  Oxycodone /Acetaminophen 5-325mg Tab  PO   1 tab





  Q6H PRN   Administration





  Pain, Moderate (4-6)  


 


Pantoprazole Sodium  40 mg  10/07/21 07:30  10/10/21 09:54





  Pantoprazole 40 Mg Tab  PO   Not Given





  QDAC DELL  


 


Quetiapine Fumarate  75 mg  10/02/21 22:00  10/09/21 22:17





  Quetiapine 25 Mg Tab  PO   75 mg





  QHS DELL   Administration


 


Sodium Chloride  10 ml  09/22/21 10:00  10/10/21 09:56





  Sodium Chloride 0.9% 10 Ml Flush Syringe  IV   10 ml





  BID DELL   Administration


 


Sodium Chloride  10 ml  09/22/21 03:00  10/08/21 20:43





  Sodium Chloride 0.9% 10 Ml Flush Syringe  IV   10 ml





  PRN PRN   Administration





  LINE FLUSH  


 


Tramadol HCl  50 mg  09/22/21 03:00  10/04/21 21:50





  Tramadol 50 Mg Tab  PO   50 mg





  Q6H PRN   Administration





  Pain, Moderate (4-6)  














Nutrition/Malnutrition Assess





- Dietary Evaluation


Nutrition/Malnutrition Findings: 


                                        





Nutrition Notes                                            Start:  09/22/21 

14:21


Freq:                                                      Status: Active       

 


Protocol:                                                                       

 


 Document     10/04/21 15:55  FORTINO  (Rec: 10/04/21 16:53  FORTINO  [id number])


 Nutrition Notes


     Need for Assessment generated from:         RN Referral


     Initial or Follow up                        Reassessment


     Current Diagnosis                           Heart Failure


     Other Pertinent Diagnosis                   Accute Kidney Injury


     Current Diet                                Renal Diet (from B 09/27).


     Labs/Tests                                  10/04: Na 147, CO2 37, BUN 74,


                                                 Cr 1.3, Glu 193.


     Pertinent Medications                       Reviewed.


     Height                                      5 ft 4 in


     Weight                                      277.3 kg


     Ideal Body Weight (kg)                      54.54


     BMI                                         104.9


     Intake Prior to Admission                   Good


     Weight change and time frame                expect weight fluctuations r/t


                                                 fluid therapy, CHF


                                                 complications


     Weight Status                               Morbidly Obese


     Subjective/Other Information                Pt developed accute renal


                                                 condition that requires


                                                 support from Renal Diet.


     Percent of energy/protein needs met:        Renal diet provides all energy


                                                 /protein needs (2072 Kcal/77 g


                                                 ) per day.


     Burn                                        Absent


     Trauma                                      Absent


     GI Symptoms                                 None


     Food Allergy                                No


     Skin Integrity/Comment                      Integumentary: clear, warm,


                                                 dry.


     Current % PO                                Good (%)


     Minimum of two criteria                     No physical signs of


                                                 malnutrition


     #1


      Nutrition Diagnosis                        Altered nutrition-related


                                                 laboratory values,Overweight/


                                                 obesity


      Comments:                                  Pt developed accute renal


                                                 condition that requires


                                                 support from Renal Diet.


      Etiology                                   Accute Kidney Injury resulted


                                                 from complications from


                                                 concomitant conditions


      As Evidenced by Signs and Symptoms         Altered lab values and MD


                                                 diagnosis


      Diagnosis Progress(for reassessment        Worsened


       documentation)                            


     Is patient on ventilator?                   No


     Is Patient Ambulatory and/or Out of Bed     Yes


     REE-([place] Jeor-ambulatory/OOB) [     4501.900


      [email or web address]]                               


     Kcal/Kg value to use for calculation        10


     Approximate Energy Requirements Using       2773


      kcal/Kg                                    


     Calculation Used for Recommendations        Kcal/Kg of IBW


     Additional Notes                            Protein 1.0 g/Kg; 55 g/day;


                                                 220 Kcal/day (from IBW).


                                                 Fluids: 1ml/kcal or per MD


 Nutrition Intervention


     Change Diet Order:                          Continue Renal Diet as MD


                                                 prescribed.


     Teaching Recipient                          Patient


     Learning Readiness                          Good


     Teaching Methods                            Discussion


     Response to Teaching                        Verbalize understanding,


                                                 Reinforcement needed


     Barriers to Learning                        Motivation,Emotional,Social


     Patient aware of follow up options          Yes


     Actions To Overcome Barriers                Collaboration with Other


                                                 Providers


     Goal #1                                     Achieve and maintain


                                                 acceptable chemistry lab


                                                 values during LOS


     Goal #2                                     Support Improvement in renal


                                                 functions while providing


                                                 energy/protein needs during


                                                 LOS


     Follow-Up By:                               10/11/21


     Additional Comments                         Continue monitoring % of food


                                                 intake and tolerance, and BM.


                                                 Reinforce education towards


                                                 bariatric therapy after accute


                                                 kidney injury is solved.

## 2021-10-10 NOTE — EVENT NOTE
Date: 10/10/21


DANETTE POLK was called at 18 0 0 hours.


Chest compressions were given for epis and 3 bicarbs were given


Patient continued to be in persistent asystole.


No return of circulation in spite of aggressive compressions and epinephrine and

bicarb.


Patient is also intubated 


Time of expiration is 1823 hrs.


Pronouncing physician is Dr. COLTEN Choudhary


Certifying physician is Dr.Kocherla

## 2021-10-10 NOTE — DISCHARGE SUMMARY
Providers





- Providers


Date of Admission: 


09/22/21 03:00





Date of discharge: 10/10/21


Attending physician: 


AMY J KOCHERLA





                                        





09/22/21


Consult to Cardiac Rehabilitation [CONS] Routine 


   Reason For Exam: Phase I





09/22/21 03:00


Consult to Cardiology [CONS] Routine 


   Consulting Provider: MONICA SALAZAR


   Reason For Exam: Non-ST elevation MI





09/22/21 13:32


Occupational Therapy Evaluate and Treat [CONS] Urgent 


   Comment: 


   Reason For Exam: OT to eval and treat


Physical Therapy Evaluation and Treat [CONS] Urgent 


   Comment: 


   Reason For Exam: PT to eval and treat





09/23/21 08:53


Consult to Physician [CONS] Routine 


   Comment: 


   Consulting Provider: LETITIA VARELA


   Physician Instructions: 


   Reason For Exam: WOODROW WOODROW, hyponatremia, hyperkalemia





09/27/21 15:26


Consult to Physician [CONS] Routine 


   Comment: 


   Consulting Provider: LU DELA CRUZ


   Physician Instructions: 


   Reason For Exam: HYPOXIC RESPIRATORY FAILURE





09/28/21 07:51


Consult to Physician [CONS] Routine 


   Comment: called office/ elliot


   Consulting Provider: WILFRIDO EDWARDS


   Physician Instructions: 


   Reason For Exam: transaminitis





09/28/21 11:23


Consult to Physician [CONS] Routine 


   Comment: called office/ elliot


   Consulting Provider: GERHARD AKBAR


   Physician Instructions: 


   Reason For Exam: abdominal pain with ulcer underneth panus





09/28/21 11:37


Consult to Wound/ET Nurse [CONS] Routine 


   Reason For Exam: wound eval





10/02/21 15:00


Midline [Consult to PICC Line RN] [CONS] Urgent 


   Reason For Exam: need IV access; difficult peripheral stick; thanks


   Type Line:: Midline





10/04/21 12:34


Consult to Physician [CONS] Routine 


   Comment: 


   Consulting Provider: NEHA GRANGER


   Physician Instructions: 


   Reason For Exam: visual hallucination





10/09/21 13:42


PICC Line Insertion [Consult to PICC Line RN] [CONS] Stat 


   Reason For Exam: central line access for pressors.


   Type Line:: PICC











Primary care physician: 


PRIMARY CARE MD








Hospitalization


Condition: Stable


Disposition: 01 HOME / SELF CARE / HOMELESS





Exam





- Constitutional


Vitals: 


                                        











Temp Pulse Resp BP Pulse Ox


 


 97.7 F   102 H  24   104/56   97 


 


 10/10/21 03:43  10/10/21 16:00  10/10/21 16:00  10/10/21 15:45  10/10/21 16:00














Plan


Follow up with: 


MARCELINO TARANGO MD [Staff Physician] - 7 Days


PRIMARY CARE,MD [Primary Care Provider] - 3-5 Days

## 2021-10-10 NOTE — EVENT NOTE
Date: 10/10/21





Ms.Ingar Farfan was called on phone #[phone]


Ms. Ms. Farfan the mother of patient Leia Farfan was informed about the CODE BLUE

and at time of expiration 1639 hrs.  Mother Ms. Farfan to come to see the 

daughter.


ER informed and ICU informed

## 2021-10-10 NOTE — PROGRESS NOTE
Assessment and Plan





Assessment


Acute kidney injury, unknown baseline. Renal ultrasound notes poor visualization

due to body habitus.


Hypernatremia


Hyperkalemia


Hypocalcemia


Morbid obesity


Acute hypoxemic and hypercapnic respiratory failure


Acute exacerbation of CHF (congestive heart failure)


Hypertension


Sleep apnea





Recommendations


Note K 6.8, creatinine 3.5 yesterday evening-> has been difficult to obtain 

labs, repeat labs pending, do suspect falsely elevated K but if repeat labs 


If repeat labs so persistent hyperkalemia, will need HD STAT today, as well as 

vascath placement by ICU team; please notify me at [phone] to review labs 

as able today


no labs for review so far this AM


Increase free water intake as able


Urine output appears lower


Now with soft BP, holding meds, on pressor support


echo noted, likely diastolic disease


Avoid diuresis if able given hypernatremia, note good urine output 


Maintain MAP more than 65


Hold ACE/ARB at this time


Renally dose medications


Avoid nephrotoxins


Renal diet


Daily renal labs





Subjective


Date of service: 10/10/21


Principal diagnosis: Ac hypoxemic and hypercapnic resp failure; AE-CHF; Morbid 

obesity; FABIAN/OHS


Interval history: 





Remains in ICU, on levophed, on D5W





Objective





- Exam


Narrative Exam: 





General: Morbid obesity, on facemask, mild discomfort


HEENT: Oral mucosa moist


Neck: Supple, no JVD


Chest: labored breathing on NC


Heart: RRR, S1 and S2


Abdomen: Soft, nontender


Extremity: No peripheral cyanosis, edema


Neurological: Awake and alert


Dermatology: skin intact


Psych: Calm and cooperative


Musculoskeletal: No joint effusion





- Vital Signs


Vital signs: 


                               Vital Signs - 12hr











  10/09/21 10/09/21 10/09/21





  22:00 23:00 23:46


 


Temperature   98.9 F


 


Pulse Rate 93 H 93 H 


 


Pulse Rate [   





Anterior   





Bilateral   





Throughout]   


 


Respiratory 23 18 





Rate   


 


Respiratory   





Rate [Anterior   





Bilateral   





Throughout]   


 


Blood Pressure 133/85 140/83 


 


O2 Sat by Pulse  100 





Oximetry   














  10/10/21 10/10/21 10/10/21





  00:00 00:36 01:00


 


Temperature   


 


Pulse Rate 90 92 H 93 H


 


Pulse Rate [   





Anterior   





Bilateral   





Throughout]   


 


Respiratory 18 30 H 30 H





Rate   


 


Respiratory   





Rate [Anterior   





Bilateral   





Throughout]   


 


Blood Pressure 112/56 122/70 115/72


 


O2 Sat by Pulse 76 L 100 99





Oximetry   














  10/10/21 10/10/21 10/10/21





  02:00 03:00 03:43


 


Temperature   97.7 F


 


Pulse Rate 92 H 93 H 


 


Pulse Rate [   





Anterior   





Bilateral   





Throughout]   


 


Respiratory 16 18 





Rate   


 


Respiratory   





Rate [Anterior   





Bilateral   





Throughout]   


 


Blood Pressure 121/69 102/69 


 


O2 Sat by Pulse 95 98 





Oximetry   














  10/10/21 10/10/21 10/10/21





  04:00 04:28 05:00


 


Temperature   


 


Pulse Rate 94 H 92 H 


 


Pulse Rate [   





Anterior   





Bilateral   





Throughout]   


 


Respiratory 23 30 H 





Rate   


 


Respiratory   





Rate [Anterior   





Bilateral   





Throughout]   


 


Blood Pressure 105/67 101/62 125/98


 


O2 Sat by Pulse 95 96 96





Oximetry   














  10/10/21 10/10/21 10/10/21





  06:00 07:00 07:45


 


Temperature   


 


Pulse Rate 99 H 102 H 100 H


 


Pulse Rate [   





Anterior   





Bilateral   





Throughout]   


 


Respiratory 29 H 26 H 26 H





Rate   


 


Respiratory   





Rate [Anterior   





Bilateral   





Throughout]   


 


Blood Pressure 118/59 107/70 89/65


 


O2 Sat by Pulse 97 98 99





Oximetry   














  10/10/21 10/10/21 10/10/21





  08:01 08:04 08:15


 


Temperature   


 


Pulse Rate 100 H 99 H 99 H


 


Pulse Rate [  95 H 





Anterior   





Bilateral   





Throughout]   


 


Respiratory 30 H 30 H 21





Rate   


 


Respiratory  30 H 





Rate [Anterior   





Bilateral   





Throughout]   


 


Blood Pressure 122/71 136/70 122/71


 


O2 Sat by Pulse 99 99 99





Oximetry   














  10/10/21 10/10/21 10/10/21





  08:17 08:30 08:45


 


Temperature   


 


Pulse Rate  99 H 100 H


 


Pulse Rate [   





Anterior   





Bilateral   





Throughout]   


 


Respiratory  26 H 30 H





Rate   


 


Respiratory   





Rate [Anterior   





Bilateral   





Throughout]   


 


Blood Pressure  125/56 117/60


 


O2 Sat by Pulse 99 84 86





Oximetry   














  10/10/21 10/10/21





  09:01 09:15


 


Temperature  


 


Pulse Rate 99 H 99 H


 


Pulse Rate [  





Anterior  





Bilateral  





Throughout]  


 


Respiratory 31 H 32 H





Rate  


 


Respiratory  





Rate [Anterior  





Bilateral  





Throughout]  


 


Blood Pressure 107/63 128/32


 


O2 Sat by Pulse 86 85





Oximetry  














- Lab





                                 10/09/21 10:35





                                 10/09/21 17:14


                             Most recent lab results











ABG pH  7.287  (7.320-7.450)  L  10/08/21  22:28    


 


ABG pCO2  86.9 mm Hg  09/27/21  18:40    


 


ABG pO2  81.8 mm Hg (80.0-90.0)   09/27/21  18:40    


 


ABG HCO3  36.2 mmol/L (20.0-26.0)  H  09/27/21  18:40    


 


ABG O2 Saturation  90.8  (0-100)   10/08/21  22:28    


 


Calcium  8.3 mg/dL (8.4-10.2)  L  10/09/21  17:14    


 


Phosphorus  6.80 mg/dL (2.5-4.5)  H  10/09/21  17:14    


 


Magnesium  2.40 mg/dL (1.7-2.3)  H  10/09/21  17:14    


 


Urine Sodium  16 mmol/L  09/23/21  18:46    














Medications & Allergies





- Medications


Allergies/Adverse Reactions: 


                                    Allergies





No Known Allergies Allergy (Unverified 07/13/17 11:17)


   








Home Medications: 


                                Home Medications











 Medication  Instructions  Recorded  Confirmed  Last Taken  Type


 


Albuterol Mdi (or & Nicu Only) 2 puff IH QID PRN #1 inhalation 01/24/21 10/08/21

Unknown Rx





[ProAir HFA Inhaler]     


 


ALBUTEROL NEB's [Proventil 0.083% 2.5 mg IH Q4H PRN 10/08/21 10/08/21 Unknown 

History





NEBS]     


 


Atrovent HFA 17MCG/PUFF 2 puff IH QID 10/08/21 10/08/21 Unknown History


 


Ferrous Sulfate [Feosol] 325 mg PO QDAY 10/08/21 10/08/21 Unknown History


 


Fluticasone/Salmeterol [Advair 1 puff IH BID 10/08/21 10/08/21 Unknown History





Diskus 250-50 mcg]     


 


Furosemide [Lasix TAB] 40 mg PO QDAY 10/08/21 10/08/21 Unknown History


 


Ibuprofen [Motrin 800 MG tab] 800 mg PO Q6H PRN 10/08/21 10/08/21 Unknown 

History


 


Metformin HCl [Metformin HCl ER] 750 mg PO QDAY 10/08/21 10/08/21 Unknown 

History


 


Semaglutide [Ozempic] 0.25 mg SQ QWEEK 10/08/21 10/08/21 Unknown History


 


amLODIPine [Norvasc] 10 mg PO DAILY 10/08/21 10/08/21 Unknown History











Active Medications: 














Generic Name Dose Route Start Last Admin





  Trade Name Freq  PRN Reason Stop Dose Admin


 


Acetaminophen  650 mg  09/22/21 03:00  10/01/21 06:42





  Acetaminophen 325 Mg Tab  PO   650 mg





  Q4H PRN   Administration





  Pain MILD(1-3)/Fever >100.5/HA  


 


Albuterol  2.5 mg  09/22/21 03:00 





  Albuterol 2.5 Mg/3 Ml Nebu  IH  





  Q4HRT PRN  





  Shortness Of Breath  


 


Albuterol/Ipratropium  1 ampul  09/22/21 08:00  10/10/21 08:04





  Ipratropium/Albuterol Sulfate 3 Ml Ampul.Neb  IH   1 ampul





  TIDRT DELL   Administration


 


Aspirin  325 mg  09/23/21 10:00  10/09/21 10:28





  Aspirin Ec 325 Mg Tab  PO   Not Given





  QDAY DELL  


 


Atorvastatin Calcium  40 mg  09/22/21 22:00  10/09/21 22:19





  Atorvastatin 40 Mg Tab  PO   40 mg





  QHS DELL   Administration


 


Benzonatate  100 mg  09/22/21 06:00  10/10/21 06:16





  Benzonatate 100 Mg Cap  PO   100 mg





  Q8HR DELL   Administration


 


Guaifenesin  200 mg  09/26/21 14:47  10/01/21 06:43





  Guaifenesin 100 Mg/5 Ml Oral Liqd  PO   200 mg





  Q4H PRN   Administration





  Cough  


 


Haloperidol Lactate  5 mg  10/02/21 16:27  10/05/21 03:30





  Haloperidol Lactate 5 Mg/1 Ml Inj  IV   5 mg





  Q6H PRN   Administration





  Agitation  


 


Heparin Sodium (Porcine)  5,000 unit  10/02/21 14:00  10/10/21 06:17





  Heparin 5,000 Unit/1 Ml Vial  SUB-Q   5,000 unit





  Q8HR DELL   Administration


 


Hydralazine HCl  10 mg  10/03/21 17:25  10/06/21 08:54





  Hydralazine 20 Mg/1 Ml Inj  IV   10 mg





  Q6H PRN   Administration





  Blood Pressure  


 


Dextrose  1,000 mls @ 100 mls/hr  10/08/21 21:46  10/10/21 00:36





  D5w  IV   100 mls/hr





  AS DIRECT DELL   Administration


 


Norepinephrine  4 mg in 250 mls @ 37.5 mls/hr  10/08/21 20:41  10/09/21 13:58





  Levophed Drip 4 Mg/Ns 250 Ml  IV   2 mcg/min





  TITR DELL   7.5 mls/hr





    Titration





  Protocol  





  10 MCG/MIN  


 


Valproate Sodium 500 mg/  105 mls @ 100 mls/hr  10/09/21 11:00  10/10/21 00:32





  Sodium Chloride  IV   100 mls/hr





  Q12H DELL   Administration


 


Levothyroxine Sodium  100 mcg  10/06/21 06:00  10/10/21 06:16





  Levothyroxine 100 Mcg Tab  PO   100 mcg





  DAILY@0600 DELL   Administration


 


Methylprednisolone Sodium Succinate  20 mg  09/30/21 10:00  10/09/21 22:18





  Methylprednisolone Sod Succinate 40 Mg/1 Ml Inj  IV   20 mg





  Q12HR DELL   Administration


 


Metoclopramide HCl  5 mg  10/09/21 16:00  10/10/21 00:31





  Metoclopramide 10 Mg/2 Ml Inj  IV  10/12/21 15:59  5 mg





  Q8H DELL   Administration


 


Nitroglycerin  0.4 mg  09/22/21 03:00 





  Nitroglycerin 0.4 Mg Tab Subl  SL  





  Q5M PRN  





  Chest Pain  


 


Nystatin  1 applic  09/22/21 18:00  10/09/21 22:17





  Nystatin Powder 15 Gm  TP   1 applic





  BID DELL   Administration


 


Ondansetron HCl  4 mg  09/22/21 03:00  09/29/21 23:51





  Ondansetron 4 Mg/2 Ml Inj  IV   4 mg





  Q8H PRN   Administration





  Nausea And Vomiting  


 


Oxycodone/Acetaminophen  1 tab  09/22/21 03:00  10/05/21 16:26





  Oxycodone /Acetaminophen 5-325mg Tab  PO   1 tab





  Q6H PRN   Administration





  Pain, Moderate (4-6)  


 


Pantoprazole Sodium  40 mg  10/07/21 07:30  10/09/21 08:00





  Pantoprazole 40 Mg Tab  PO   Not Given





  QDAC DELL  


 


Quetiapine Fumarate  75 mg  10/02/21 22:00  10/09/21 22:17





  Quetiapine 25 Mg Tab  PO   75 mg





  QHS DELL   Administration


 


Sodium Chloride  10 ml  09/22/21 10:00  10/09/21 22:14





  Sodium Chloride 0.9% 10 Ml Flush Syringe  IV   10 ml





  BID DELL   Administration


 


Sodium Chloride  10 ml  09/22/21 03:00  10/08/21 20:43





  Sodium Chloride 0.9% 10 Ml Flush Syringe  IV   10 ml





  PRN PRN   Administration





  LINE FLUSH  


 


Tramadol HCl  50 mg  09/22/21 03:00  10/04/21 21:50





  Tramadol 50 Mg Tab  PO   50 mg





  Q6H PRN   Administration





  Pain, Moderate (4-6)

## 2021-10-10 NOTE — EVENT NOTE
Date: 10/10/21





Called to bedside for CODE BLUE.  Upon my arrival, hospitalist running the code.

 Patient was receiving O2 via BVM for respiratory therapist.  Patient was 

intubated using glide scope with 7.5 ET tube.  I observed ET tube pass through 

the cords.  There was condensation in the tube.  There was positive color change

on CO2 detector.  No complications with intubation.

## 2021-10-10 NOTE — EVENT NOTE
Date: 10/10/21


Morning labs could not be done due to patient's morbid obesity, could not draw 

the blood, could not get PICC line or midline


Finally we could get the blood tests reports show





--severe hyperkalemia;


Calcium gluconate, IV insulin, IV dextrose


Albuterol inhalation, Kayexalate


Closely monitor electrolytes, continuous monitoring





--Worsening renal function;


Nephrology already following





--Elevated troponins;


Cardiology following





We will monitor the electrolytes and adjust as needed


Plan of care reviewed with the nurse

## 2021-10-10 NOTE — PROGRESS NOTE
Assessment and Plan


Overall, the patient had multiple medical issues. Her elevated troponin 

suggested type II NSTEMI and she was not a candidate for invasive cardiac 

evaluation considering her renal failure and status at the time of examination 

earlier this morning. Apparently, the nephrologist was contemplating 

hemodialysis which may have been needed.








- Patient Problems


(1) Acute respiratory failure with hypoxia


Current Visit: Yes   Status: Acute   





(2) Non-ST elevation MI (NSTEMI)


Current Visit: Yes   Status: Acute   





(3) COPD (chronic obstructive pulmonary disease)


Current Visit: Yes   Status: Chronic   





(4) FABIAN (obstructive sleep apnea)


Current Visit: Yes   Status: Chronic   





(5) WOODROW (acute kidney injury)


Current Visit: Yes   Status: Acute   





(6) Hyperkalemia


Current Visit: Yes   Status: Acute   





(7) Hypotension


Current Visit: Yes   Status: Acute   





(8) Obesity hypoventilation syndrome


Current Visit: Yes   Status: Chronic   





(9) Morbid obesity


Current Visit: Yes   Status: Chronic   





Subjective


Date of service: 10/10/21 (Seen in the ICU in the morning.)


Principal diagnosis: Ac hypoxemic and hypercapnic resp failure; AE-CHF; Morbid 

obesity; FABIAN/OHS


Interval history: 


I saw this patient this morning and discussed with her nurse. Cardiology was 

reconsulted on account of elevated troponin levels. She was lethargic and could 

not answer my questions. She could barely open her eyes.








Objective


                                   Vital Signs











  Temp Pulse Pulse Pulse Resp Resp BP


 


 10/10/21 19:01        120/63


 


 10/10/21 18:47        120/63


 


 10/10/21 18:32        120/63


 


 10/10/21 18:15   134 H      120/63


 


 10/10/21 18:00   100 H    36 H   120/63


 


 10/10/21 17:45   99 H    27 H   103/55


 


 10/10/21 17:30   99 H    27 H   90/52


 


 10/10/21 17:15   101 H    24   112/57


 


 10/10/21 17:00   102 H    20   98/62


 


 10/10/21 16:45   102 H    13   104/68


 


 10/10/21 16:30   105 H    16   111/63


 


 10/10/21 16:15   104 H    30 H   109/65


 


 10/10/21 16:01   103 H    22   109/43


 


 10/10/21 16:00  99.1 F    102 H  24  


 


 10/10/21 15:45   104 H    24   104/56


 


 10/10/21 15:30   105 H    28 H   104/56


 


 10/10/21 15:15   99 H    30 H   107/59


 


 10/10/21 15:00   98 H    31 H   109/63


 


 10/10/21 14:45   97 H    20   93/51


 


 10/10/21 14:30   96 H    28 H   89/53


 


 10/10/21 14:15   96 H    30 H   102/58


 


 10/10/21 14:00   97 H    32 H   108/46


 


 10/10/21 13:45   96 H    22   99/48


 


 10/10/21 13:30   96 H    27 H   99/53


 


 10/10/21 13:15   95 H    28 H   99/51


 


 10/10/21 13:00   95 H    27 H   110/51


 


 10/10/21 12:45   95 H    30 H   140/124


 


 10/10/21 12:31   95 H    20   122/74


 


 10/10/21 12:15   96 H    23   122/74


 


 10/10/21 12:01   97 H    21   122/74


 


 10/10/21 12:00  98.5 F    98 H  25 H  


 


 10/10/21 11:45   96 H    27 H   107/68


 


 10/10/21 11:31   97 H    26 H   107/68


 


 10/10/21 11:15   97 H    30 H   112/61


 


 10/10/21 11:00   99 H    35 H   134/79


 


 10/10/21 10:45   98 H    25 H   116/68


 


 10/10/21 10:30   98 H    24   112/69


 


 10/10/21 10:15   98 H    29 H   115/77


 


 10/10/21 10:00   99 H    21   113/68


 


 10/10/21 09:45   98 H    29 H   102/71


 


 10/10/21 09:30   99 H    30 H   123/67


 


 10/10/21 09:15   99 H    32 H   128/32


 


 10/10/21 09:01   99 H    31 H   107/63


 


 10/10/21 08:45   100 H    30 H   117/60


 


 10/10/21 08:30   99 H    26 H   125/56


 


 10/10/21 08:17       


 


 10/10/21 08:15   99 H    21   122/71


 


 10/10/21 08:04   99 H  95 H   30 H  30 H  136/70


 


 10/10/21 08:01   100 H    30 H   122/71


 


 10/10/21 08:00  98.3 F    100 H  25 H  


 


 10/10/21 07:45   100 H    26 H   89/65


 


 10/10/21 07:00   102 H    26 H   107/70


 


 10/10/21 06:00   99 H    29 H   118/59


 


 10/10/21 05:00        125/98


 


 10/10/21 04:28   92 H    30 H   101/62


 


 10/10/21 04:00   94 H    23   105/67


 


 10/10/21 03:43  97.7 F      


 


 10/10/21 03:00   93 H    18   102/69


 


 10/10/21 02:00   92 H    16   121/69


 


 10/10/21 01:00   93 H    30 H   115/72


 


 10/10/21 00:36   92 H    30 H   122/70


 


 10/10/21 00:00   90    18   112/56


 


 10/09/21 23:46  98.9 F      


 


 10/09/21 23:00   93 H    18   140/83


 


 10/09/21 22:00   93 H    23   133/85


 


 10/09/21 21:00   96 H    20   108/90


 


 10/09/21 20:58   96 H    31 H   157/134














  Pulse Ox


 


 10/10/21 19:01 


 


 10/10/21 18:47  72 L


 


 10/10/21 18:32 


 


 10/10/21 18:15  61 L


 


 10/10/21 18:00  98


 


 10/10/21 17:45  98


 


 10/10/21 17:30  97


 


 10/10/21 17:15  97


 


 10/10/21 17:00  97


 


 10/10/21 16:45  96


 


 10/10/21 16:30  96


 


 10/10/21 16:15  96


 


 10/10/21 16:01  95


 


 10/10/21 16:00  97


 


 10/10/21 15:45  95


 


 10/10/21 15:30  89


 


 10/10/21 15:15  89


 


 10/10/21 15:00  92


 


 10/10/21 14:45  89


 


 10/10/21 14:30  91


 


 10/10/21 14:15  89


 


 10/10/21 14:00  92


 


 10/10/21 13:45  91


 


 10/10/21 13:30  91


 


 10/10/21 13:15  84


 


 10/10/21 13:00  89


 


 10/10/21 12:45  94


 


 10/10/21 12:31  92


 


 10/10/21 12:15  93


 


 10/10/21 12:01  95


 


 10/10/21 12:00  100


 


 10/10/21 11:45  97


 


 10/10/21 11:31  97


 


 10/10/21 11:15  98


 


 10/10/21 11:00  97


 


 10/10/21 10:45  98


 


 10/10/21 10:30  98


 


 10/10/21 10:15  97


 


 10/10/21 10:00  85


 


 10/10/21 09:45  85


 


 10/10/21 09:30  82 L


 


 10/10/21 09:15  85


 


 10/10/21 09:01  86


 


 10/10/21 08:45  86


 


 10/10/21 08:30  84


 


 10/10/21 08:17  99


 


 10/10/21 08:15  99


 


 10/10/21 08:04  99


 


 10/10/21 08:01  99


 


 10/10/21 08:00  100


 


 10/10/21 07:45  99


 


 10/10/21 07:00  98


 


 10/10/21 06:00  97


 


 10/10/21 05:00  96


 


 10/10/21 04:28  96


 


 10/10/21 04:00  95


 


 10/10/21 03:43 


 


 10/10/21 03:00  98


 


 10/10/21 02:00  95


 


 10/10/21 01:00  99


 


 10/10/21 00:36  100


 


 10/10/21 00:00  76 L


 


 10/09/21 23:46 


 


 10/09/21 23:00  100


 


 10/09/21 22:00 


 


 10/09/21 21:00  98


 


 10/09/21 20:58  95














- Physical Examination


General: No Apparent Distress


HEENT: Positive: EOMI, Normocephaly, Mucus Membranes Moist


Neck: Positive: trachea midline


Cardiac: Positive: Reg Rate and Rhythm, S1/S2


Lungs: Positive: clear to auscultation


Neuro: Positive: Other (Lethargic)


Abdomen: Positive: Soft, Active Bowel Sounds


Skin: Negative: Rash


Musculoskeletal: No Fluid Collection


Extremities: Present: upper extr. pulses.  Absent: edema





- Labs and Meds


                                 Cardiac Enzymes











  10/10/21 Range/Units





  11:55 


 


AST  5989 H  (5-40)  units/L


 


CK-MB (CK-2)  7.8 H  (0.0-4.0)  ng/mL








                                       CBC











  10/10/21 Range/Units





  12:15 


 


WBC  16.8 H  (4.5-11.0)  K/mm3


 


RBC  4.10  (3.65-5.03)  M/mm3


 


Hgb  8.7 L  (10.1-14.3)  gm/dl


 


Hct  32.1  (30.3-42.9)  %


 


Plt Count  62 L  (140-440)  K/mm3








                          Comprehensive Metabolic Panel











  10/10/21 Range/Units





  11:55 


 


Sodium  145  (137-145)  mmol/L


 


Potassium  6.5 H*  (3.6-5.0)  mmol/L


 


Chloride  99.1  ()  mmol/L


 


Carbon Dioxide  27  (22-30)  mmol/L


 


BUN  121 H  (7-17)  mg/dL


 


Creatinine  4.1 H  (0.6-1.2)  mg/dL


 


Glucose  280 H  ()  mg/dL


 


Calcium  8.0 L  (8.4-10.2)  mg/dL


 


AST  5989 H  (5-40)  units/L


 


ALT  3144 H  (7-56)  units/L


 


Alkaline Phosphatase  58  ()  units/L


 


Total Protein  8.1  (6.3-8.2)  g/dL


 


Albumin  3.3 L  (3.9-5)  g/dL














- Imaging and Cardiology


EKG: image reviewed





- EKG


Sinus rhythms and dysrhythmias: sinus rhythm


Repolarization changes or abnormalities: nonspecific abnormality, ST segment, 

and/or T wave


Myocardial infarction: septal MI (old age or ind





- Allied health notes


Allied health notes reviewed: nursing

## 2021-10-10 NOTE — PROGRESS NOTE
Assessment and Plan





31 years old female with history of morbid obesity, hypertension, asthma , sleep

apnea was brought to the emergency room because of shortness of breath, edema, 

generalized malaise, fatigue, and weakness for last couple of days.  She states 

she just does not feel well.  She has a headache.  She states her legs are 

swollen.  She feels as though she is retaining fluid.  She states that she went 

to her regular doctors on the 13th.  They tested her for Covid.  It was 

negative.  She was told to go see a cardiologist.  She is not seen a car

diologist as of yet.  She came in here because she was not feeling well and did 

not know what to do.  She has had no sick contacts.  





In the emergency room patient is found to have acute CHF exacerbation patient 

proBNP is 7573 also patient cardiac enzyme is elevated troponin is 0.043. 

Patient blood glucose also 55 patient is hypoglycemic. Patient admitted to the 

hospital.





Patient sleeping on 4 litres O2 and  O2 saturation 98%. BIPAP 24/6, rate 30, 

FIO2 40% and stand-by in the room. Patient goes on BIPAP during night time. 

Patient running Low grade temp. Has  leukocytosis. Blood pressure 90/52, Pulse 

98.





I


ABG on FIO2 45%











ABG pH  7.234  (7.320-7.450)  L  10/01/21  09:47    


 


POC ABG pCO2  90.5 mmHg (32.0-48.0)  H  10/01/21  09:47    


 


ABG pCO2  86.9 mm Hg  09/27/21  18:40    


 


POC ABG pO2  139.4 mmHg ()  H  10/01/21  09:47    


 


ABG pO2  81.8 mm Hg (80.0-90.0)   09/27/21  18:40    


 


POC ABG HCO3  37.4   10/01/21  09:47    


 


ABG O2 Saturation  99.2  (0-100)   10/01/21  09:47    











Patients D dimer 10/1/21 : 5410.10





Patient presently on albuterol/atrovent aerosol treatments q 6 hours, 

Subcutaneous Heparin, Protonix,  and I/V SoluMedrol and Levophed.














- Patient Problems


(1) Acute respiratory failure with hypoxia and hypercapnia


Status: Acute   


Plan to address problem: 


On 4L O2 Saturation 98%.


BIPAP 24/6, rate 30, FIO2 40%. Stand-by.


 Albuterol/atrovent aerosol treatments.


 Continue Solumedrol


 Contibue S/C Heparin.


 Continue Protonix





 








(2) Acute exacerbation of CHF (congestive heart failure)


Status: Acute   


Plan to address problem: 


Management as per cardiology.








(3) Hypertension


Status: Acute   


Plan to address problem: 


Management as per primary care.








(4) Morbid obesity with BMI of 70 and over, adult


Status: Acute   


Plan to address problem: 


Weight reduction diet.


 Mobility protocol


 Fall precautions.








(5) Obesity hypoventilation syndrome


Status: Chronic   


Plan to address problem: 


BIPAP 20/8, rate 30, FIO2 40%.








(6) Sleep apnea


Status: Acute   


Plan to address problem: 


BIPAP 24/6, rate 30, FIO2 40%.


 Sleep study if she can as out patient.








(7) Hypotension


Status: Acute   


Plan to address problem: 


Patient is on Nor epinephrine. Continue titrate ( Increase Dose) until mAP 

reached to 65.








Subjective


Date of service: 10/10/21


Principal diagnosis: Ac hypoxemic and hypercapnic resp failure; AE-CHF; Morbid 

obesity; FABIAN/OHS


Interval history: 





31 years old female with history of morbid obesity, hypertension, asthma COPD 

sleep apnea was brought to the emergency room because of shortness of breath, 

edema, generalized malaise, fatigue, and weakness for last couple of days.  She 

states she just does not feel well.  She has a headache.  She states her legs ar

e swollen.  She feels as though she is retaining fluid.  She states that she 

went to her regular doctors on the 13th.  They tested her for Covid.  It was 

negative.  She was told to go see a cardiologist.  She is not seen a 

cardiologist as of yet.  She came in here because she was not feeling well and 

did not know what to do.  She has had no sick contacts.  








In the emergency room patient is found to have acute CHF exacerbation patient 

proBNP is 7573 also patient cardiac enzyme is elevated troponin is 0.043. 

Patient blood glucose also 55 patient is hypoglycemic. Patient admitted to the 

hospital.





Patient sleeping on 4 litres O2 and  O2 saturation 98%. BIPAP 24/6, rate 30, 

FIO2 40% and stand-by in the room. Patient goes on BIPAP during night time. 

Patient running Low grade temp. Has  leukocytosis. Blood pressure 90/52, Pulse 

98.





ABG on FIO2 45%











ABG pH  7.234  (7.320-7.450)  L  10/01/21  09:47    


 


POC ABG pCO2  90.5 mmHg (32.0-48.0)  H  10/01/21  09:47    


 


ABG pCO2  86.9 mm Hg  09/27/21  18:40    


 


POC ABG pO2  139.4 mmHg ()  H  10/01/21  09:47    


 


ABG pO2  81.8 mm Hg (80.0-90.0)   09/27/21  18:40    


 


POC ABG HCO3  37.4   10/01/21  09:47    


 


ABG O2 Saturation  99.2  (0-100)   10/01/21  09:47    














Patients D dimer 10/1/21 : 5410.10





Patient presently on albuterol/atrovent aerosol treatments q 6 hours, 

subcutaneous Heparin, Protonix, and I/V SoluMedrol and Levophed.








Objective


                               Vital Signs - 12hr











  10/10/21 10/10/21 10/10/21





  06:00 07:00 07:45


 


Pulse Rate 99 H 102 H 100 H


 


Pulse Rate [   





Anterior   





Bilateral   





Throughout]   


 


Pulse Rate [   





From Monitor]   


 


Respiratory 29 H 26 H 26 H





Rate   


 


Respiratory   





Rate [Anterior   





Bilateral   





Throughout]   


 


Blood Pressure 118/59 107/70 89/65


 


O2 Sat by Pulse 97 98 99





Oximetry   














  10/10/21 10/10/21 10/10/21





  08:00 08:01 08:04


 


Pulse Rate  100 H 99 H


 


Pulse Rate [   95 H





Anterior   





Bilateral   





Throughout]   


 


Pulse Rate [ 100 H  





From Monitor]   


 


Respiratory 25 H 30 H 30 H





Rate   


 


Respiratory   30 H





Rate [Anterior   





Bilateral   





Throughout]   


 


Blood Pressure  122/71 136/70


 


O2 Sat by Pulse 100 99 99





Oximetry   














  10/10/21 10/10/21 10/10/21





  08:15 08:17 08:30


 


Pulse Rate 99 H  99 H


 


Pulse Rate [   





Anterior   





Bilateral   





Throughout]   


 


Pulse Rate [   





From Monitor]   


 


Respiratory 21  26 H





Rate   


 


Respiratory   





Rate [Anterior   





Bilateral   





Throughout]   


 


Blood Pressure 122/71  125/56


 


O2 Sat by Pulse 99 99 84





Oximetry   














  10/10/21 10/10/21 10/10/21





  08:45 09:01 09:15


 


Pulse Rate 100 H 99 H 99 H


 


Pulse Rate [   





Anterior   





Bilateral   





Throughout]   


 


Pulse Rate [   





From Monitor]   


 


Respiratory 30 H 31 H 32 H





Rate   


 


Respiratory   





Rate [Anterior   





Bilateral   





Throughout]   


 


Blood Pressure 117/60 107/63 128/32


 


O2 Sat by Pulse 86 86 85





Oximetry   














  10/10/21 10/10/21 10/10/21





  09:30 09:45 10:00


 


Pulse Rate 99 H 98 H 99 H


 


Pulse Rate [   





Anterior   





Bilateral   





Throughout]   


 


Pulse Rate [   





From Monitor]   


 


Respiratory 30 H 29 H 21





Rate   


 


Respiratory   





Rate [Anterior   





Bilateral   





Throughout]   


 


Blood Pressure 123/67 102/71 113/68


 


O2 Sat by Pulse 82 L 85 85





Oximetry   














  10/10/21 10/10/21 10/10/21





  10:15 10:30 10:45


 


Pulse Rate 98 H 98 H 98 H


 


Pulse Rate [   





Anterior   





Bilateral   





Throughout]   


 


Pulse Rate [   





From Monitor]   


 


Respiratory 29 H 24 25 H





Rate   


 


Respiratory   





Rate [Anterior   





Bilateral   





Throughout]   


 


Blood Pressure 115/77 112/69 116/68


 


O2 Sat by Pulse 97 98 98





Oximetry   














  10/10/21 10/10/21 10/10/21





  11:00 11:15 11:31


 


Pulse Rate 99 H 97 H 97 H


 


Pulse Rate [   





Anterior   





Bilateral   





Throughout]   


 


Pulse Rate [   





From Monitor]   


 


Respiratory 35 H 30 H 26 H





Rate   


 


Respiratory   





Rate [Anterior   





Bilateral   





Throughout]   


 


Blood Pressure 134/79 112/61 107/68


 


O2 Sat by Pulse 97 98 97





Oximetry   














  10/10/21 10/10/21 10/10/21





  11:45 12:00 12:01


 


Pulse Rate 96 H  97 H


 


Pulse Rate [   





Anterior   





Bilateral   





Throughout]   


 


Pulse Rate [  98 H 





From Monitor]   


 


Respiratory 27 H 25 H 21





Rate   


 


Respiratory   





Rate [Anterior   





Bilateral   





Throughout]   


 


Blood Pressure 107/68  122/74


 


O2 Sat by Pulse 97 100 95





Oximetry   














  10/10/21 10/10/21 10/10/21





  12:15 12:31 12:45


 


Pulse Rate 96 H 95 H 95 H


 


Pulse Rate [   





Anterior   





Bilateral   





Throughout]   


 


Pulse Rate [   





From Monitor]   


 


Respiratory 23 20 30 H





Rate   


 


Respiratory   





Rate [Anterior   





Bilateral   





Throughout]   


 


Blood Pressure 122/74 122/74 140/124


 


O2 Sat by Pulse 93 92 94





Oximetry   














  10/10/21 10/10/21 10/10/21





  13:00 13:15 13:30


 


Pulse Rate 95 H 95 H 96 H


 


Pulse Rate [   





Anterior   





Bilateral   





Throughout]   


 


Pulse Rate [   





From Monitor]   


 


Respiratory 27 H 28 H 27 H





Rate   


 


Respiratory   





Rate [Anterior   





Bilateral   





Throughout]   


 


Blood Pressure 110/51 99/51 99/53


 


O2 Sat by Pulse 89 84 91





Oximetry   














  10/10/21 10/10/21 10/10/21





  13:45 14:00 14:15


 


Pulse Rate 96 H 97 H 96 H


 


Pulse Rate [   





Anterior   





Bilateral   





Throughout]   


 


Pulse Rate [   





From Monitor]   


 


Respiratory 22 32 H 30 H





Rate   


 


Respiratory   





Rate [Anterior   





Bilateral   





Throughout]   


 


Blood Pressure 99/48 108/46 102/58


 


O2 Sat by Pulse 91 92 89





Oximetry   














  10/10/21 10/10/21 10/10/21





  14:30 14:45 15:00


 


Pulse Rate 96 H 97 H 98 H


 


Pulse Rate [   





Anterior   





Bilateral   





Throughout]   


 


Pulse Rate [   





From Monitor]   


 


Respiratory 28 H 20 31 H





Rate   


 


Respiratory   





Rate [Anterior   





Bilateral   





Throughout]   


 


Blood Pressure 89/53 93/51 109/63


 


O2 Sat by Pulse 91 89 92





Oximetry   














  10/10/21 10/10/21 10/10/21





  15:15 15:30 15:45


 


Pulse Rate 99 H 105 H 104 H


 


Pulse Rate [   





Anterior   





Bilateral   





Throughout]   


 


Pulse Rate [   





From Monitor]   


 


Respiratory 30 H 28 H 24





Rate   


 


Respiratory   





Rate [Anterior   





Bilateral   





Throughout]   


 


Blood Pressure 107/59 104/56 104/56


 


O2 Sat by Pulse 89 89 95





Oximetry   














  10/10/21





  16:00


 


Pulse Rate 


 


Pulse Rate [ 





Anterior 





Bilateral 





Throughout] 


 


Pulse Rate [ 102 H





From Monitor] 


 


Respiratory 24





Rate 


 


Respiratory 





Rate [Anterior 





Bilateral 





Throughout] 


 


Blood Pressure 


 


O2 Sat by Pulse 97





Oximetry 











Constitutional: no acute distress, asleep, other (young extremely obese female 

with mildly increased respiratory effort at rest)


Eyes: non-icteric


ENT: oropharynx moist, other (BIPAP FFM)


Neck: supple, no lymphadenopathy, no JVD


Effort: mildly labored


Ascultation: Bilateral: diminished breath sounds, rhonchi


Percussion: Bilateral: not dull


Cardiovascular: regular rate and rhythm


Gastrointestinal: normoactive bowel sounds, soft, non-tender, non-distended 

(protuberant)


Integumentary: rash (stasis dermatitis type), other (Stasis dermatititis on legs

and abdomen; see [facility] notes for full details)


Extremities: pulses normal, no ischemia or petechiae, edema (2+), other (stasis 

dermatitis)


Neurologic: non-focal exam (grossly), pupils equal and round, CN II-XII normal


Psychiatric: depressed


CBC and BMP: 


                                 10/10/21 12:15





                                 10/10/21 11:55


ABG, PT/INR, D-dimer: 


ABG











ABG pH  7.287  (7.320-7.450)  L  10/08/21  22:28    


 


POC ABG pCO2  66.3 mmHg (32.0-48.0)  H  10/08/21  22:28    


 


ABG pCO2  86.9 mm Hg  09/27/21  18:40    


 


POC ABG pO2  69.5 mmHg ()  L  10/08/21  22:28    


 


ABG pO2  81.8 mm Hg (80.0-90.0)   09/27/21  18:40    


 


POC ABG HCO3  30.9   10/08/21  22:28    


 


ABG O2 Saturation  90.8  (0-100)   10/08/21  22:28    





PT/INR, D-dimer











PT  18.7 Sec. (12.2-14.9)  H  10/02/21  16:55    


 


INR  1.51  (0.87-1.13)  H  10/02/21  16:55    


 


D-Dimer  5410.10 ng/mlDDU (0-234)  H  10/01/21  16:03    








Abnormal lab findings: 


                                  Abnormal Labs











  09/21/21 09/21/21 09/22/21





  22:04 22:04 00:18


 


WBC  17.7 H  


 


Hgb  9.1 L  


 


Hct   


 


MCV  77 L  


 


MCH  21 L  


 


MCHC  27 L  


 


RDW  24.3 H  


 


Plt Count   


 


Seg Neuts % (Manual)   


 


Lymphocytes % (Manual)   


 


Nucleated RBC %   


 


Seg Neutrophils # Man   


 


Lymphocytes # (Manual)   


 


Monocytes # (Manual)   


 


PT   


 


INR   


 


D-Dimer   


 


ABG pH   


 


POC ABG pCO2   


 


POC ABG pO2   


 


ABG pO2   


 


ABG HCO3   


 


ABG Base Excess   


 


ABG Hemoglobin   


 


ABG Oxyhemoglobin   


 


ABG Sodium   


 


ABG Potassium   


 


ABG Chloride   


 


ABG Glucose   


 


Oxyhemoglobin   


 


Carboxyhemoglobin   


 


Sodium   135 L 


 


Potassium   


 


Chloride   91.0 L 


 


Carbon Dioxide   17 L 


 


BUN   


 


Creatinine   1.4 H 


 


Glucose   55 L 


 


POC Glucose    49 L


 


Calcium   


 


Ionized Calcium   


 


Phosphorus   


 


Magnesium   


 


Total Bilirubin   


 


Direct Bilirubin   


 


AST   


 


ALT   


 


Total Creatine Kinase   


 


CK-MB (CK-2)   


 


Troponin T   0.043 H 


 


NT-Pro-B Natriuret Pep   7573 H 


 


Total Protein   


 


Albumin   


 


LDL Cholesterol Direct   47 L 


 


HDL Cholesterol   25 L 


 


TSH   


 


Free T4   


 


Arterial Blood Glucose   














  09/22/21 09/22/21 09/22/21





  04:03 04:03 09:22


 


WBC  24.3 H  


 


Hgb  9.3 L  


 


Hct   


 


MCV  74 L  


 


MCH  21 L  


 


MCHC  29 L  


 


RDW  23.1 H  


 


Plt Count   


 


Seg Neuts % (Manual)  78.0 H  


 


Lymphocytes % (Manual)  7.0 L  


 


Nucleated RBC %  1.0 H  


 


Seg Neutrophils # Man  19.0 H  


 


Lymphocytes # (Manual)   


 


Monocytes # (Manual)  1.0 H  


 


PT   


 


INR   


 


D-Dimer   


 


ABG pH   


 


POC ABG pCO2   


 


POC ABG pO2   


 


ABG pO2   


 


ABG HCO3   


 


ABG Base Excess   


 


ABG Hemoglobin   


 


ABG Oxyhemoglobin   


 


ABG Sodium   


 


ABG Potassium   


 


ABG Chloride   


 


ABG Glucose   


 


Oxyhemoglobin   


 


Carboxyhemoglobin   


 


Sodium   134 L 


 


Potassium   


 


Chloride   91.2 L 


 


Carbon Dioxide   


 


BUN   


 


Creatinine   1.8 H 


 


Glucose   


 


POC Glucose   


 


Calcium   


 


Ionized Calcium   


 


Phosphorus   


 


Magnesium   


 


Total Bilirubin   


 


Direct Bilirubin   


 


AST   


 


ALT   


 


Total Creatine Kinase   


 


CK-MB (CK-2)   


 


Troponin T    0.099 H


 


NT-Pro-B Natriuret Pep   


 


Total Protein   


 


Albumin   


 


LDL Cholesterol Direct   


 


HDL Cholesterol   


 


TSH   


 


Free T4   


 


Arterial Blood Glucose   














  09/22/21 09/22/21 09/23/21





  13:03 20:28 04:52


 


WBC    20.9 H


 


Hgb    8.5 L


 


Hct    30.0 L


 


MCV    73 L


 


MCH    21 L


 


MCHC    28 L


 


RDW    23.9 H


 


Plt Count   


 


Seg Neuts % (Manual)    77.0 H


 


Lymphocytes % (Manual)    1.0 L


 


Nucleated RBC %    1.0 H


 


Seg Neutrophils # Man    16.1 H


 


Lymphocytes # (Manual)    0.2 L


 


Monocytes # (Manual)   


 


PT   


 


INR   


 


D-Dimer   


 


ABG pH   


 


POC ABG pCO2   


 


POC ABG pO2   


 


ABG pO2   


 


ABG HCO3   


 


ABG Base Excess   


 


ABG Hemoglobin   


 


ABG Oxyhemoglobin   


 


ABG Sodium   


 


ABG Potassium   


 


ABG Chloride   


 


ABG Glucose   


 


Oxyhemoglobin   


 


Carboxyhemoglobin   


 


Sodium   


 


Potassium   


 


Chloride   


 


Carbon Dioxide   


 


BUN   


 


Creatinine   


 


Glucose   


 


POC Glucose   115 H 


 


Calcium   


 


Ionized Calcium   


 


Phosphorus   


 


Magnesium   


 


Total Bilirubin   


 


Direct Bilirubin   


 


AST   


 


ALT   


 


Total Creatine Kinase   


 


CK-MB (CK-2)   


 


Troponin T  0.078 H D  


 


NT-Pro-B Natriuret Pep   


 


Total Protein   


 


Albumin   


 


LDL Cholesterol Direct   


 


HDL Cholesterol   


 


TSH   


 


Free T4   


 


Arterial Blood Glucose   














  09/23/21 09/23/21 09/23/21





  04:52 08:46 11:50


 


WBC   


 


Hgb   


 


Hct   


 


MCV   


 


MCH   


 


MCHC   


 


RDW   


 


Plt Count   


 


Seg Neuts % (Manual)   


 


Lymphocytes % (Manual)   


 


Nucleated RBC %   


 


Seg Neutrophils # Man   


 


Lymphocytes # (Manual)   


 


Monocytes # (Manual)   


 


PT   


 


INR   


 


D-Dimer   


 


ABG pH   


 


POC ABG pCO2   


 


POC ABG pO2   


 


ABG pO2   


 


ABG HCO3   


 


ABG Base Excess   


 


ABG Hemoglobin   


 


ABG Oxyhemoglobin   


 


ABG Sodium   


 


ABG Potassium   


 


ABG Chloride   


 


ABG Glucose   


 


Oxyhemoglobin   


 


Carboxyhemoglobin   


 


Sodium  129 L  


 


Potassium  5.6 H  


 


Chloride  88.6 L  


 


Carbon Dioxide   


 


BUN  27 H  


 


Creatinine  2.4 H  


 


Glucose  121 H  


 


POC Glucose   139 H  156 H


 


Calcium  7.7 L  


 


Ionized Calcium   


 


Phosphorus   


 


Magnesium   


 


Total Bilirubin   


 


Direct Bilirubin   


 


AST   


 


ALT   


 


Total Creatine Kinase   


 


CK-MB (CK-2)   


 


Troponin T   


 


NT-Pro-B Natriuret Pep   


 


Total Protein   


 


Albumin   


 


LDL Cholesterol Direct   


 


HDL Cholesterol   


 


TSH   


 


Free T4   


 


Arterial Blood Glucose   














  09/23/21 09/23/21 09/23/21





  15:46 16:58 22:08


 


WBC   


 


Hgb   


 


Hct   


 


MCV   


 


MCH   


 


MCHC   


 


RDW   


 


Plt Count   


 


Seg Neuts % (Manual)   


 


Lymphocytes % (Manual)   


 


Nucleated RBC %   


 


Seg Neutrophils # Man   


 


Lymphocytes # (Manual)   


 


Monocytes # (Manual)   


 


PT   


 


INR   


 


D-Dimer   


 


ABG pH   


 


POC ABG pCO2   


 


POC ABG pO2   


 


ABG pO2   


 


ABG HCO3   


 


ABG Base Excess   


 


ABG Hemoglobin   


 


ABG Oxyhemoglobin   


 


ABG Sodium   


 


ABG Potassium   


 


ABG Chloride   


 


ABG Glucose   


 


Oxyhemoglobin   


 


Carboxyhemoglobin   


 


Sodium  128 L  


 


Potassium  5.5 H  


 


Chloride  88.1 L  


 


Carbon Dioxide   


 


BUN  33 H  


 


Creatinine  2.7 H  


 


Glucose  172 H  


 


POC Glucose   187 H  186 H


 


Calcium  7.3 L  


 


Ionized Calcium   


 


Phosphorus   


 


Magnesium   


 


Total Bilirubin   


 


Direct Bilirubin   


 


AST   


 


ALT   


 


Total Creatine Kinase   


 


CK-MB (CK-2)   


 


Troponin T   


 


NT-Pro-B Natriuret Pep   


 


Total Protein   


 


Albumin   


 


LDL Cholesterol Direct   


 


HDL Cholesterol   


 


TSH   


 


Free T4   


 


Arterial Blood Glucose   














  09/23/21 09/24/21 09/24/21





  23:15 02:20 07:39


 


WBC   


 


Hgb   


 


Hct   


 


MCV   


 


MCH   


 


MCHC   


 


RDW   


 


Plt Count   


 


Seg Neuts % (Manual)   


 


Lymphocytes % (Manual)   


 


Nucleated RBC %   


 


Seg Neutrophils # Man   


 


Lymphocytes # (Manual)   


 


Monocytes # (Manual)   


 


PT   


 


INR   


 


D-Dimer   


 


ABG pH   


 


POC ABG pCO2   


 


POC ABG pO2   


 


ABG pO2   


 


ABG HCO3   


 


ABG Base Excess   


 


ABG Hemoglobin   


 


ABG Oxyhemoglobin   


 


ABG Sodium   


 


ABG Potassium   


 


ABG Chloride   


 


ABG Glucose   


 


Oxyhemoglobin   


 


Carboxyhemoglobin   


 


Sodium   128 L 


 


Potassium   5.2 H 


 


Chloride   88.6 L 


 


Carbon Dioxide   


 


BUN   39 H 


 


Creatinine   3.1 H 


 


Glucose   171 H 


 


POC Glucose    168 H


 


Calcium   7.2 L 


 


Ionized Calcium  3.6 L  


 


Phosphorus   


 


Magnesium   


 


Total Bilirubin   


 


Direct Bilirubin   


 


AST   


 


ALT   


 


Total Creatine Kinase   


 


CK-MB (CK-2)   


 


Troponin T   


 


NT-Pro-B Natriuret Pep   


 


Total Protein   


 


Albumin   


 


LDL Cholesterol Direct   


 


HDL Cholesterol   


 


TSH   


 


Free T4   


 


Arterial Blood Glucose   














  09/24/21 09/24/21 09/24/21





  10:04 11:37 17:18


 


WBC   


 


Hgb   


 


Hct   


 


MCV   


 


MCH   


 


MCHC   


 


RDW   


 


Plt Count   


 


Seg Neuts % (Manual)   


 


Lymphocytes % (Manual)   


 


Nucleated RBC %   


 


Seg Neutrophils # Man   


 


Lymphocytes # (Manual)   


 


Monocytes # (Manual)   


 


PT   


 


INR   


 


D-Dimer   


 


ABG pH   


 


POC ABG pCO2   


 


POC ABG pO2   


 


ABG pO2   


 


ABG HCO3   


 


ABG Base Excess   


 


ABG Hemoglobin   


 


ABG Oxyhemoglobin   


 


ABG Sodium   


 


ABG Potassium   


 


ABG Chloride   


 


ABG Glucose   


 


Oxyhemoglobin   


 


Carboxyhemoglobin   


 


Sodium   


 


Potassium   


 


Chloride   


 


Carbon Dioxide   


 


BUN   


 


Creatinine   


 


Glucose   


 


POC Glucose   170 H  152 H


 


Calcium   


 


Ionized Calcium   


 


Phosphorus   


 


Magnesium   


 


Total Bilirubin   


 


Direct Bilirubin   


 


AST   


 


ALT   


 


Total Creatine Kinase  1712 H  


 


CK-MB (CK-2)   


 


Troponin T   


 


NT-Pro-B Natriuret Pep   


 


Total Protein   


 


Albumin   


 


LDL Cholesterol Direct   


 


HDL Cholesterol   


 


TSH   


 


Free T4   


 


Arterial Blood Glucose   














  09/24/21 09/24/21 09/25/21





  19:38 22:02 05:48


 


WBC   


 


Hgb   


 


Hct   


 


MCV   


 


MCH   


 


MCHC   


 


RDW   


 


Plt Count   


 


Seg Neuts % (Manual)   


 


Lymphocytes % (Manual)   


 


Nucleated RBC %   


 


Seg Neutrophils # Man   


 


Lymphocytes # (Manual)   


 


Monocytes # (Manual)   


 


PT   


 


INR   


 


D-Dimer   


 


ABG pH   


 


POC ABG pCO2   


 


POC ABG pO2   


 


ABG pO2   


 


ABG HCO3   


 


ABG Base Excess   


 


ABG Hemoglobin   


 


ABG Oxyhemoglobin   


 


ABG Sodium   


 


ABG Potassium   


 


ABG Chloride   


 


ABG Glucose   


 


Oxyhemoglobin   


 


Carboxyhemoglobin   


 


Sodium  128 L   124 L


 


Potassium    5.8 H D


 


Chloride  89.6 L   89.4 L


 


Carbon Dioxide   


 


BUN  47 H   53 H


 


Creatinine  3.0 H   2.8 H


 


Glucose  165 H   150 H


 


POC Glucose   147 H 


 


Calcium  6.9 L   7.2 L


 


Ionized Calcium   


 


Phosphorus   


 


Magnesium   


 


Total Bilirubin   


 


Direct Bilirubin   


 


AST   


 


ALT   


 


Total Creatine Kinase   


 


CK-MB (CK-2)   


 


Troponin T   


 


NT-Pro-B Natriuret Pep   


 


Total Protein   


 


Albumin   


 


LDL Cholesterol Direct   


 


HDL Cholesterol   


 


TSH   


 


Free T4   


 


Arterial Blood Glucose   














  09/25/21 09/25/21 09/25/21





  08:48 11:35 19:12


 


WBC   


 


Hgb   


 


Hct   


 


MCV   


 


MCH   


 


MCHC   


 


RDW   


 


Plt Count   


 


Seg Neuts % (Manual)   


 


Lymphocytes % (Manual)   


 


Nucleated RBC %   


 


Seg Neutrophils # Man   


 


Lymphocytes # (Manual)   


 


Monocytes # (Manual)   


 


PT   


 


INR   


 


D-Dimer   


 


ABG pH   


 


POC ABG pCO2   


 


POC ABG pO2   


 


ABG pO2   


 


ABG HCO3   


 


ABG Base Excess   


 


ABG Hemoglobin   


 


ABG Oxyhemoglobin   


 


ABG Sodium   


 


ABG Potassium   


 


ABG Chloride   


 


ABG Glucose   


 


Oxyhemoglobin   


 


Carboxyhemoglobin   


 


Sodium    128 L


 


Potassium   


 


Chloride    89.7 L


 


Carbon Dioxide   


 


BUN    53 H


 


Creatinine    2.4 H


 


Glucose    159 H


 


POC Glucose  152 H  152 H 


 


Calcium    7.1 L


 


Ionized Calcium   


 


Phosphorus   


 


Magnesium   


 


Total Bilirubin   


 


Direct Bilirubin   


 


AST   


 


ALT   


 


Total Creatine Kinase   


 


CK-MB (CK-2)   


 


Troponin T   


 


NT-Pro-B Natriuret Pep   


 


Total Protein   


 


Albumin   


 


LDL Cholesterol Direct   


 


HDL Cholesterol   


 


TSH   


 


Free T4   


 


Arterial Blood Glucose   














  09/25/21 09/26/21 09/26/21





  22:03 05:03 07:41


 


WBC   


 


Hgb   


 


Hct   


 


MCV   


 


MCH   


 


MCHC   


 


RDW   


 


Plt Count   


 


Seg Neuts % (Manual)   


 


Lymphocytes % (Manual)   


 


Nucleated RBC %   


 


Seg Neutrophils # Man   


 


Lymphocytes # (Manual)   


 


Monocytes # (Manual)   


 


PT   


 


INR   


 


D-Dimer   


 


ABG pH   


 


POC ABG pCO2   


 


POC ABG pO2   


 


ABG pO2   


 


ABG HCO3   


 


ABG Base Excess   


 


ABG Hemoglobin   


 


ABG Oxyhemoglobin   


 


ABG Sodium   


 


ABG Potassium   


 


ABG Chloride   


 


ABG Glucose   


 


Oxyhemoglobin   


 


Carboxyhemoglobin   


 


Sodium   134 L 


 


Potassium   


 


Chloride   92.9 L 


 


Carbon Dioxide   33 H D 


 


BUN   54 H 


 


Creatinine   2.4 H 


 


Glucose   150 H 


 


POC Glucose  152 H   144 H


 


Calcium   7.2 L 


 


Ionized Calcium   


 


Phosphorus   


 


Magnesium   


 


Total Bilirubin   


 


Direct Bilirubin   


 


AST   


 


ALT   


 


Total Creatine Kinase   


 


CK-MB (CK-2)   


 


Troponin T   


 


NT-Pro-B Natriuret Pep   


 


Total Protein   


 


Albumin   


 


LDL Cholesterol Direct   


 


HDL Cholesterol   


 


TSH   


 


Free T4   


 


Arterial Blood Glucose   














  09/26/21 09/26/21 09/26/21





  11:38 14:11 17:02


 


WBC   14.7 H 


 


Hgb   8.7 L 


 


Hct   29.8 L 


 


MCV   74 L 


 


MCH   22 L 


 


MCHC   29 L 


 


RDW   24.9 H 


 


Plt Count   


 


Seg Neuts % (Manual)   86.0 H 


 


Lymphocytes % (Manual)   6.0 L 


 


Nucleated RBC %   


 


Seg Neutrophils # Man   12.6 H 


 


Lymphocytes # (Manual)   0.9 L 


 


Monocytes # (Manual)   


 


PT   


 


INR   


 


D-Dimer   


 


ABG pH   


 


POC ABG pCO2   


 


POC ABG pO2   


 


ABG pO2   


 


ABG HCO3   


 


ABG Base Excess   


 


ABG Hemoglobin   


 


ABG Oxyhemoglobin   


 


ABG Sodium   


 


ABG Potassium   


 


ABG Chloride   


 


ABG Glucose   


 


Oxyhemoglobin   


 


Carboxyhemoglobin   


 


Sodium   


 


Potassium   


 


Chloride   


 


Carbon Dioxide   


 


BUN   


 


Creatinine   


 


Glucose   


 


POC Glucose  151 H   154 H


 


Calcium   


 


Ionized Calcium   


 


Phosphorus   


 


Magnesium   


 


Total Bilirubin   


 


Direct Bilirubin   


 


AST   


 


ALT   


 


Total Creatine Kinase   


 


CK-MB (CK-2)   


 


Troponin T   


 


NT-Pro-B Natriuret Pep   


 


Total Protein   


 


Albumin   


 


LDL Cholesterol Direct   


 


HDL Cholesterol   


 


TSH   


 


Free T4   


 


Arterial Blood Glucose   














  09/26/21 09/27/21 09/27/21





  23:14 05:03 10:00


 


WBC   


 


Hgb   


 


Hct   


 


MCV   


 


MCH   


 


MCHC   


 


RDW   


 


Plt Count   


 


Seg Neuts % (Manual)   


 


Lymphocytes % (Manual)   


 


Nucleated RBC %   


 


Seg Neutrophils # Man   


 


Lymphocytes # (Manual)   


 


Monocytes # (Manual)   


 


PT   


 


INR   


 


D-Dimer   


 


ABG pH    7.150 L*


 


POC ABG pCO2   


 


POC ABG pO2   


 


ABG pO2    91.8 H


 


ABG HCO3    37.2 H


 


ABG Base Excess    7.1 H


 


ABG Hemoglobin    7.1 L


 


ABG Oxyhemoglobin   


 


ABG Sodium   


 


ABG Potassium   


 


ABG Chloride   


 


ABG Glucose   


 


Oxyhemoglobin    93.4 L


 


Carboxyhemoglobin   


 


Sodium   134 L 


 


Potassium   


 


Chloride   91.3 L 


 


Carbon Dioxide   33 H 


 


BUN   63 H 


 


Creatinine   2.2 H 


 


Glucose   159 H 


 


POC Glucose  151 H  


 


Calcium   7.8 L 


 


Ionized Calcium   


 


Phosphorus   


 


Magnesium   


 


Total Bilirubin   


 


Direct Bilirubin   


 


AST   


 


ALT   


 


Total Creatine Kinase   


 


CK-MB (CK-2)   


 


Troponin T   


 


NT-Pro-B Natriuret Pep   


 


Total Protein   


 


Albumin   


 


LDL Cholesterol Direct   


 


HDL Cholesterol   


 


TSH   


 


Free T4   


 


Arterial Blood Glucose   














  09/27/21 09/27/21 09/27/21





  12:39 16:37 18:40


 


WBC   


 


Hgb   


 


Hct   


 


MCV   


 


MCH   


 


MCHC   


 


RDW   


 


Plt Count   


 


Seg Neuts % (Manual)   


 


Lymphocytes % (Manual)   


 


Nucleated RBC %   


 


Seg Neutrophils # Man   


 


Lymphocytes # (Manual)   


 


Monocytes # (Manual)   


 


PT   


 


INR   


 


D-Dimer   


 


ABG pH    7.237 L


 


POC ABG pCO2   


 


POC ABG pO2   


 


ABG pO2   


 


ABG HCO3    36.2 H


 


ABG Base Excess    7.4 H


 


ABG Hemoglobin    7.8 L


 


ABG Oxyhemoglobin   


 


ABG Sodium   


 


ABG Potassium   


 


ABG Chloride   


 


ABG Glucose   


 


Oxyhemoglobin    93.7 L


 


Carboxyhemoglobin   


 


Sodium   


 


Potassium   


 


Chloride   


 


Carbon Dioxide   


 


BUN   


 


Creatinine   


 


Glucose   


 


POC Glucose  140 H  132 H 


 


Calcium   


 


Ionized Calcium   


 


Phosphorus   


 


Magnesium   


 


Total Bilirubin   


 


Direct Bilirubin   


 


AST   


 


ALT   


 


Total Creatine Kinase   


 


CK-MB (CK-2)   


 


Troponin T   


 


NT-Pro-B Natriuret Pep   


 


Total Protein   


 


Albumin   


 


LDL Cholesterol Direct   


 


HDL Cholesterol   


 


TSH   


 


Free T4   


 


Arterial Blood Glucose   














  09/27/21 09/28/21 09/28/21





  23:55 04:27 04:27


 


WBC   


 


Hgb   8.5 L 


 


Hct   29.1 L 


 


MCV   72 L 


 


MCH   21 L 


 


MCHC   29 L 


 


RDW   24.9 H 


 


Plt Count   


 


Seg Neuts % (Manual)   


 


Lymphocytes % (Manual)   


 


Nucleated RBC %   


 


Seg Neutrophils # Man   


 


Lymphocytes # (Manual)   


 


Monocytes # (Manual)   


 


PT   


 


INR   


 


D-Dimer   


 


ABG pH   


 


POC ABG pCO2   


 


POC ABG pO2   


 


ABG pO2   


 


ABG HCO3   


 


ABG Base Excess   


 


ABG Hemoglobin   


 


ABG Oxyhemoglobin   


 


ABG Sodium   


 


ABG Potassium   


 


ABG Chloride   


 


ABG Glucose   


 


Oxyhemoglobin   


 


Carboxyhemoglobin   


 


Sodium    135 L


 


Potassium   


 


Chloride    93.5 L


 


Carbon Dioxide    33 H


 


BUN    68 H


 


Creatinine    1.9 H


 


Glucose    143 H


 


POC Glucose  135 H  


 


Calcium    8.1 L


 


Ionized Calcium   


 


Phosphorus   


 


Magnesium   


 


Total Bilirubin    1.50 H


 


Direct Bilirubin   


 


AST    494 H


 


ALT    835 H


 


Total Creatine Kinase   


 


CK-MB (CK-2)   


 


Troponin T   


 


NT-Pro-B Natriuret Pep   


 


Total Protein    9.5 H


 


Albumin    3.1 L


 


LDL Cholesterol Direct   


 


HDL Cholesterol   


 


TSH   


 


Free T4   


 


Arterial Blood Glucose   














  09/28/21 09/28/21 09/29/21





  12:03 17:24 00:19


 


WBC   


 


Hgb   


 


Hct   


 


MCV   


 


MCH   


 


MCHC   


 


RDW   


 


Plt Count   


 


Seg Neuts % (Manual)   


 


Lymphocytes % (Manual)   


 


Nucleated RBC %   


 


Seg Neutrophils # Man   


 


Lymphocytes # (Manual)   


 


Monocytes # (Manual)   


 


PT   


 


INR   


 


D-Dimer   


 


ABG pH  7.291 L  


 


POC ABG pCO2  75.5 H  


 


POC ABG pO2  76.2 L  


 


ABG pO2   


 


ABG HCO3   


 


ABG Base Excess   


 


ABG Hemoglobin  9.9 L  


 


ABG Oxyhemoglobin  91.0 L  


 


ABG Sodium  134.9 L  


 


ABG Potassium   


 


ABG Chloride  93.0 L  


 


ABG Glucose  146 H  


 


Oxyhemoglobin   


 


Carboxyhemoglobin  2.2 H  


 


Sodium   


 


Potassium   


 


Chloride   


 


Carbon Dioxide   


 


BUN   


 


Creatinine   


 


Glucose   


 


POC Glucose   134 H  136 H


 


Calcium   


 


Ionized Calcium   


 


Phosphorus   


 


Magnesium   


 


Total Bilirubin   


 


Direct Bilirubin   


 


AST   


 


ALT   


 


Total Creatine Kinase   


 


CK-MB (CK-2)   


 


Troponin T   


 


NT-Pro-B Natriuret Pep   


 


Total Protein   


 


Albumin   


 


LDL Cholesterol Direct   


 


HDL Cholesterol   


 


TSH   


 


Free T4   


 


Arterial Blood Glucose  146 H  














  09/29/21 09/29/21 09/29/21





  04:55 04:55 05:29


 


WBC   


 


Hgb  8.0 L  


 


Hct  27.1 L  


 


MCV  70 L  


 


MCH  21 L  


 


MCHC   


 


RDW  24.3 H  


 


Plt Count   


 


Seg Neuts % (Manual)   


 


Lymphocytes % (Manual)   


 


Nucleated RBC %   


 


Seg Neutrophils # Man   


 


Lymphocytes # (Manual)   


 


Monocytes # (Manual)   


 


PT   


 


INR   


 


D-Dimer   


 


ABG pH   


 


POC ABG pCO2   


 


POC ABG pO2   


 


ABG pO2   


 


ABG HCO3   


 


ABG Base Excess   


 


ABG Hemoglobin   


 


ABG Oxyhemoglobin   


 


ABG Sodium   


 


ABG Potassium   


 


ABG Chloride   


 


ABG Glucose   


 


Oxyhemoglobin   


 


Carboxyhemoglobin   


 


Sodium   


 


Potassium   


 


Chloride   95.1 L 


 


Carbon Dioxide   36 H 


 


BUN   63 H 


 


Creatinine   1.5 H 


 


Glucose   131 H 


 


POC Glucose    118 H


 


Calcium   8.3 L 


 


Ionized Calcium   


 


Phosphorus   


 


Magnesium   


 


Total Bilirubin   1.80 H 


 


Direct Bilirubin   


 


AST   323 H 


 


ALT   609 H 


 


Total Creatine Kinase   


 


CK-MB (CK-2)   


 


Troponin T   


 


NT-Pro-B Natriuret Pep   


 


Total Protein   9.3 H 


 


Albumin   2.9 L 


 


LDL Cholesterol Direct   


 


HDL Cholesterol   


 


TSH   


 


Free T4   


 


Arterial Blood Glucose   














  09/29/21 09/29/21 09/29/21





  11:40 18:30 22:44


 


WBC   


 


Hgb   


 


Hct   


 


MCV   


 


MCH   


 


MCHC   


 


RDW   


 


Plt Count   


 


Seg Neuts % (Manual)   


 


Lymphocytes % (Manual)   


 


Nucleated RBC %   


 


Seg Neutrophils # Man   


 


Lymphocytes # (Manual)   


 


Monocytes # (Manual)   


 


PT   


 


INR   


 


D-Dimer   


 


ABG pH   


 


POC ABG pCO2   


 


POC ABG pO2   


 


ABG pO2   


 


ABG HCO3   


 


ABG Base Excess   


 


ABG Hemoglobin   


 


ABG Oxyhemoglobin   


 


ABG Sodium   


 


ABG Potassium   


 


ABG Chloride   


 


ABG Glucose   


 


Oxyhemoglobin   


 


Carboxyhemoglobin   


 


Sodium   


 


Potassium   


 


Chloride   


 


Carbon Dioxide   


 


BUN   


 


Creatinine   


 


Glucose   


 


POC Glucose  139 H  130 H  123 H


 


Calcium   


 


Ionized Calcium   


 


Phosphorus   


 


Magnesium   


 


Total Bilirubin   


 


Direct Bilirubin   


 


AST   


 


ALT   


 


Total Creatine Kinase   


 


CK-MB (CK-2)   


 


Troponin T   


 


NT-Pro-B Natriuret Pep   


 


Total Protein   


 


Albumin   


 


LDL Cholesterol Direct   


 


HDL Cholesterol   


 


TSH   


 


Free T4   


 


Arterial Blood Glucose   














  09/30/21 09/30/21 09/30/21





  04:53 04:53 07:16


 


WBC  12.5 H  


 


Hgb  8.8 L  


 


Hct  30.2 L  


 


MCV  74 L  


 


MCH  21 L  


 


MCHC  29 L  


 


RDW  24.5 H  


 


Plt Count   


 


Seg Neuts % (Manual)   


 


Lymphocytes % (Manual)   


 


Nucleated RBC %   


 


Seg Neutrophils # Man   


 


Lymphocytes # (Manual)   


 


Monocytes # (Manual)   


 


PT   


 


INR   


 


D-Dimer   


 


ABG pH   


 


POC ABG pCO2   


 


POC ABG pO2   


 


ABG pO2   


 


ABG HCO3   


 


ABG Base Excess   


 


ABG Hemoglobin   


 


ABG Oxyhemoglobin   


 


ABG Sodium   


 


ABG Potassium   


 


ABG Chloride   


 


ABG Glucose   


 


Oxyhemoglobin   


 


Carboxyhemoglobin   


 


Sodium   


 


Potassium   


 


Chloride   96.9 L 


 


Carbon Dioxide   35 H 


 


BUN   65 H 


 


Creatinine   1.5 H 


 


Glucose   142 H 


 


POC Glucose    138 H


 


Calcium   


 


Ionized Calcium   


 


Phosphorus   


 


Magnesium   


 


Total Bilirubin   1.50 H 


 


Direct Bilirubin   


 


AST   251 H 


 


ALT   572 H 


 


Total Creatine Kinase   


 


CK-MB (CK-2)   


 


Troponin T   


 


NT-Pro-B Natriuret Pep   


 


Total Protein   10.1 H 


 


Albumin   3.2 L 


 


LDL Cholesterol Direct   


 


HDL Cholesterol   


 


TSH   


 


Free T4   


 


Arterial Blood Glucose   














  09/30/21 09/30/21 09/30/21





  07:32 11:29 14:42


 


WBC   


 


Hgb   


 


Hct   


 


MCV   


 


MCH   


 


MCHC   


 


RDW   


 


Plt Count   


 


Seg Neuts % (Manual)   


 


Lymphocytes % (Manual)   


 


Nucleated RBC %   


 


Seg Neutrophils # Man   


 


Lymphocytes # (Manual)   


 


Monocytes # (Manual)   


 


PT   


 


INR   


 


D-Dimer   


 


ABG pH  7.086 L   7.188 L


 


POC ABG pCO2  142.2 H   99.3 H


 


POC ABG pO2  196.3 H   132.3 H


 


ABG pO2   


 


ABG HCO3   


 


ABG Base Excess   


 


ABG Hemoglobin  10.0 L   9.2 L


 


ABG Oxyhemoglobin   


 


ABG Sodium   


 


ABG Potassium   


 


ABG Chloride  96.0 L   96.0 L


 


ABG Glucose  147 H   146 H


 


Oxyhemoglobin   


 


Carboxyhemoglobin  1.6 H   2.0 H


 


Sodium   


 


Potassium   


 


Chloride   


 


Carbon Dioxide   


 


BUN   


 


Creatinine   


 


Glucose   


 


POC Glucose   131 H 


 


Calcium   


 


Ionized Calcium   


 


Phosphorus   


 


Magnesium   


 


Total Bilirubin   


 


Direct Bilirubin   


 


AST   


 


ALT   


 


Total Creatine Kinase   


 


CK-MB (CK-2)   


 


Troponin T   


 


NT-Pro-B Natriuret Pep   


 


Total Protein   


 


Albumin   


 


LDL Cholesterol Direct   


 


HDL Cholesterol   


 


TSH   


 


Free T4   


 


Arterial Blood Glucose  147 H   146 H














  09/30/21 09/30/21 10/01/21





  17:23 22:34 07:44


 


WBC   


 


Hgb   


 


Hct   


 


MCV   


 


MCH   


 


MCHC   


 


RDW   


 


Plt Count   


 


Seg Neuts % (Manual)   


 


Lymphocytes % (Manual)   


 


Nucleated RBC %   


 


Seg Neutrophils # Man   


 


Lymphocytes # (Manual)   


 


Monocytes # (Manual)   


 


PT   


 


INR   


 


D-Dimer   


 


ABG pH   


 


POC ABG pCO2   


 


POC ABG pO2   


 


ABG pO2   


 


ABG HCO3   


 


ABG Base Excess   


 


ABG Hemoglobin   


 


ABG Oxyhemoglobin   


 


ABG Sodium   


 


ABG Potassium   


 


ABG Chloride   


 


ABG Glucose   


 


Oxyhemoglobin   


 


Carboxyhemoglobin   


 


Sodium   


 


Potassium   


 


Chloride   


 


Carbon Dioxide   


 


BUN   


 


Creatinine   


 


Glucose   


 


POC Glucose  117 H  169 H  150 H


 


Calcium   


 


Ionized Calcium   


 


Phosphorus   


 


Magnesium   


 


Total Bilirubin   


 


Direct Bilirubin   


 


AST   


 


ALT   


 


Total Creatine Kinase   


 


CK-MB (CK-2)   


 


Troponin T   


 


NT-Pro-B Natriuret Pep   


 


Total Protein   


 


Albumin   


 


LDL Cholesterol Direct   


 


HDL Cholesterol   


 


TSH   


 


Free T4   


 


Arterial Blood Glucose   














  10/01/21 10/01/21 10/01/21





  08:55 08:55 09:47


 


WBC   


 


Hgb  8.7 L  


 


Hct  29.4 L  


 


MCV  74 L  


 


MCH  22 L  


 


MCHC   


 


RDW  24.8 H  


 


Plt Count   


 


Seg Neuts % (Manual)   


 


Lymphocytes % (Manual)   


 


Nucleated RBC %   


 


Seg Neutrophils # Man   


 


Lymphocytes # (Manual)   


 


Monocytes # (Manual)   


 


PT   


 


INR   


 


D-Dimer   


 


ABG pH    7.234 L


 


POC ABG pCO2    90.5 H


 


POC ABG pO2    139.4 H


 


ABG pO2   


 


ABG HCO3   


 


ABG Base Excess   


 


ABG Hemoglobin    9.5 L


 


ABG Oxyhemoglobin   


 


ABG Sodium   


 


ABG Potassium   


 


ABG Chloride    97.0 L


 


ABG Glucose    179 H


 


Oxyhemoglobin   


 


Carboxyhemoglobin    1.7 H


 


Sodium   


 


Potassium   


 


Chloride   94.4 L 


 


Carbon Dioxide   37 H 


 


BUN   68 H 


 


Creatinine   1.5 H 


 


Glucose   178 H 


 


POC Glucose   


 


Calcium   


 


Ionized Calcium   


 


Phosphorus   


 


Magnesium   


 


Total Bilirubin   1.60 H 


 


Direct Bilirubin   


 


AST   172 H 


 


ALT   437 H 


 


Total Creatine Kinase   


 


CK-MB (CK-2)   


 


Troponin T   


 


NT-Pro-B Natriuret Pep   


 


Total Protein   10.1 H 


 


Albumin   3.2 L 


 


LDL Cholesterol Direct   


 


HDL Cholesterol   


 


TSH   


 


Free T4   


 


Arterial Blood Glucose    179 H














  10/01/21 10/01/21 10/01/21





  11:39 16:03 17:24


 


WBC   


 


Hgb   


 


Hct   


 


MCV   


 


MCH   


 


MCHC   


 


RDW   


 


Plt Count   


 


Seg Neuts % (Manual)   


 


Lymphocytes % (Manual)   


 


Nucleated RBC %   


 


Seg Neutrophils # Man   


 


Lymphocytes # (Manual)   


 


Monocytes # (Manual)   


 


PT   


 


INR   


 


D-Dimer   5410.10 H 


 


ABG pH   


 


POC ABG pCO2   


 


POC ABG pO2   


 


ABG pO2   


 


ABG HCO3   


 


ABG Base Excess   


 


ABG Hemoglobin   


 


ABG Oxyhemoglobin   


 


ABG Sodium   


 


ABG Potassium   


 


ABG Chloride   


 


ABG Glucose   


 


Oxyhemoglobin   


 


Carboxyhemoglobin   


 


Sodium   


 


Potassium   


 


Chloride   


 


Carbon Dioxide   


 


BUN   


 


Creatinine   


 


Glucose   


 


POC Glucose  161 H   130 H


 


Calcium   


 


Ionized Calcium   


 


Phosphorus   


 


Magnesium   


 


Total Bilirubin   


 


Direct Bilirubin   


 


AST   


 


ALT   


 


Total Creatine Kinase   


 


CK-MB (CK-2)   


 


Troponin T   


 


NT-Pro-B Natriuret Pep   


 


Total Protein   


 


Albumin   


 


LDL Cholesterol Direct   


 


HDL Cholesterol   


 


TSH   


 


Free T4   


 


Arterial Blood Glucose   














  10/01/21 10/02/21 10/02/21





  21:52 10:20 11:58


 


WBC   


 


Hgb   


 


Hct   


 


MCV   


 


MCH   


 


MCHC   


 


RDW   


 


Plt Count   


 


Seg Neuts % (Manual)   


 


Lymphocytes % (Manual)   


 


Nucleated RBC %   


 


Seg Neutrophils # Man   


 


Lymphocytes # (Manual)   


 


Monocytes # (Manual)   


 


PT   


 


INR   


 


D-Dimer   


 


ABG pH   


 


POC ABG pCO2   


 


POC ABG pO2   


 


ABG pO2   


 


ABG HCO3   


 


ABG Base Excess   


 


ABG Hemoglobin   


 


ABG Oxyhemoglobin   


 


ABG Sodium   


 


ABG Potassium   


 


ABG Chloride   


 


ABG Glucose   


 


Oxyhemoglobin   


 


Carboxyhemoglobin   


 


Sodium   146 H D 


 


Potassium   


 


Chloride   


 


Carbon Dioxide   


 


BUN   75 H 


 


Creatinine   1.6 H 


 


Glucose   158 H 


 


POC Glucose  150 H   143 H


 


Calcium   


 


Ionized Calcium   


 


Phosphorus   


 


Magnesium   


 


Total Bilirubin   


 


Direct Bilirubin   


 


AST   


 


ALT   


 


Total Creatine Kinase   


 


CK-MB (CK-2)   


 


Troponin T   


 


NT-Pro-B Natriuret Pep   


 


Total Protein   


 


Albumin   


 


LDL Cholesterol Direct   


 


HDL Cholesterol   


 


TSH   


 


Free T4   


 


Arterial Blood Glucose   














  10/02/21 10/02/21 10/02/21





  14:19 16:55 16:55


 


WBC   


 


Hgb   8.3 L 


 


Hct   27.7 L 


 


MCV   


 


MCH   


 


MCHC   


 


RDW   


 


Plt Count   


 


Seg Neuts % (Manual)   


 


Lymphocytes % (Manual)   


 


Nucleated RBC %   


 


Seg Neutrophils # Man   


 


Lymphocytes # (Manual)   


 


Monocytes # (Manual)   


 


PT    18.7 H


 


INR    1.51 H


 


D-Dimer   


 


ABG pH   


 


POC ABG pCO2  67.4 H  


 


POC ABG pO2  150.6 H  


 


ABG pO2   


 


ABG HCO3   


 


ABG Base Excess   


 


ABG Hemoglobin  9.3 L  


 


ABG Oxyhemoglobin   


 


ABG Sodium   


 


ABG Potassium   


 


ABG Chloride   


 


ABG Glucose  162 H  


 


Oxyhemoglobin   


 


Carboxyhemoglobin  1.6 H  


 


Sodium   


 


Potassium   


 


Chloride   


 


Carbon Dioxide   


 


BUN   


 


Creatinine   


 


Glucose   


 


POC Glucose   


 


Calcium   


 


Ionized Calcium   


 


Phosphorus   


 


Magnesium   


 


Total Bilirubin   


 


Direct Bilirubin   


 


AST   


 


ALT   


 


Total Creatine Kinase   


 


CK-MB (CK-2)   


 


Troponin T   


 


NT-Pro-B Natriuret Pep   


 


Total Protein   


 


Albumin   


 


LDL Cholesterol Direct   


 


HDL Cholesterol   


 


TSH   


 


Free T4   


 


Arterial Blood Glucose  162 H  














  10/02/21 10/02/21 10/02/21





  17:40 19:55 19:55


 


WBC   


 


Hgb   


 


Hct   


 


MCV   


 


MCH   


 


MCHC   


 


RDW   


 


Plt Count   


 


Seg Neuts % (Manual)   


 


Lymphocytes % (Manual)   


 


Nucleated RBC %   


 


Seg Neutrophils # Man   


 


Lymphocytes # (Manual)   


 


Monocytes # (Manual)   


 


PT   


 


INR   


 


D-Dimer   


 


ABG pH   


 


POC ABG pCO2   


 


POC ABG pO2   


 


ABG pO2   


 


ABG HCO3   


 


ABG Base Excess   


 


ABG Hemoglobin   


 


ABG Oxyhemoglobin   


 


ABG Sodium   


 


ABG Potassium   


 


ABG Chloride   


 


ABG Glucose   


 


Oxyhemoglobin   


 


Carboxyhemoglobin   


 


Sodium   


 


Potassium   


 


Chloride   


 


Carbon Dioxide   


 


BUN   


 


Creatinine   


 


Glucose   


 


POC Glucose  151 H  


 


Calcium   


 


Ionized Calcium   


 


Phosphorus   


 


Magnesium   


 


Total Bilirubin   


 


Direct Bilirubin   


 


AST   


 


ALT   


 


Total Creatine Kinase   


 


CK-MB (CK-2)   


 


Troponin T   


 


NT-Pro-B Natriuret Pep   


 


Total Protein   


 


Albumin   


 


LDL Cholesterol Direct   


 


HDL Cholesterol   


 


TSH    6.140 H


 


Free T4   0.67 L 


 


Arterial Blood Glucose   














  10/02/21 10/03/21 10/03/21





  22:19 04:40 04:40


 


WBC   


 


Hgb    8.1 L


 


Hct    27.5 L


 


MCV    73 L


 


MCH    22 L


 


MCHC   


 


RDW    25.4 H


 


Plt Count   


 


Seg Neuts % (Manual)   


 


Lymphocytes % (Manual)   


 


Nucleated RBC %   


 


Seg Neutrophils # Man   


 


Lymphocytes # (Manual)   


 


Monocytes # (Manual)   


 


PT   


 


INR   


 


D-Dimer   


 


ABG pH   


 


POC ABG pCO2   


 


POC ABG pO2   


 


ABG pO2   


 


ABG HCO3   


 


ABG Base Excess   


 


ABG Hemoglobin   


 


ABG Oxyhemoglobin   


 


ABG Sodium   


 


ABG Potassium   


 


ABG Chloride   


 


ABG Glucose   


 


Oxyhemoglobin   


 


Carboxyhemoglobin   


 


Sodium   


 


Potassium   


 


Chloride   


 


Carbon Dioxide   37 H D 


 


BUN   76 H 


 


Creatinine   1.6 H 


 


Glucose   173 H 


 


POC Glucose  157 H  


 


Calcium   


 


Ionized Calcium   


 


Phosphorus   


 


Magnesium   


 


Total Bilirubin   


 


Direct Bilirubin   


 


AST   


 


ALT   


 


Total Creatine Kinase   


 


CK-MB (CK-2)   


 


Troponin T   


 


NT-Pro-B Natriuret Pep   


 


Total Protein   


 


Albumin   


 


LDL Cholesterol Direct   


 


HDL Cholesterol   


 


TSH   


 


Free T4   


 


Arterial Blood Glucose   














  10/03/21 10/03/21 10/03/21





  11:36 17:20 21:17


 


WBC   


 


Hgb   


 


Hct   


 


MCV   


 


MCH   


 


MCHC   


 


RDW   


 


Plt Count   


 


Seg Neuts % (Manual)   


 


Lymphocytes % (Manual)   


 


Nucleated RBC %   


 


Seg Neutrophils # Man   


 


Lymphocytes # (Manual)   


 


Monocytes # (Manual)   


 


PT   


 


INR   


 


D-Dimer   


 


ABG pH   


 


POC ABG pCO2   


 


POC ABG pO2   


 


ABG pO2   


 


ABG HCO3   


 


ABG Base Excess   


 


ABG Hemoglobin   


 


ABG Oxyhemoglobin   


 


ABG Sodium   


 


ABG Potassium   


 


ABG Chloride   


 


ABG Glucose   


 


Oxyhemoglobin   


 


Carboxyhemoglobin   


 


Sodium   


 


Potassium   


 


Chloride   


 


Carbon Dioxide   


 


BUN   


 


Creatinine   


 


Glucose   


 


POC Glucose  168 H  174 H  163 H


 


Calcium   


 


Ionized Calcium   


 


Phosphorus   


 


Magnesium   


 


Total Bilirubin   


 


Direct Bilirubin   


 


AST   


 


ALT   


 


Total Creatine Kinase   


 


CK-MB (CK-2)   


 


Troponin T   


 


NT-Pro-B Natriuret Pep   


 


Total Protein   


 


Albumin   


 


LDL Cholesterol Direct   


 


HDL Cholesterol   


 


TSH   


 


Free T4   


 


Arterial Blood Glucose   














  10/04/21 10/04/21 10/04/21





  04:22 11:27 17:12


 


WBC   


 


Hgb   


 


Hct   


 


MCV   


 


MCH   


 


MCHC   


 


RDW   


 


Plt Count   


 


Seg Neuts % (Manual)   


 


Lymphocytes % (Manual)   


 


Nucleated RBC %   


 


Seg Neutrophils # Man   


 


Lymphocytes # (Manual)   


 


Monocytes # (Manual)   


 


PT   


 


INR   


 


D-Dimer   


 


ABG pH   


 


POC ABG pCO2   


 


POC ABG pO2   


 


ABG pO2   


 


ABG HCO3   


 


ABG Base Excess   


 


ABG Hemoglobin   


 


ABG Oxyhemoglobin   


 


ABG Sodium   


 


ABG Potassium   


 


ABG Chloride   


 


ABG Glucose   


 


Oxyhemoglobin   


 


Carboxyhemoglobin   


 


Sodium  147 H  


 


Potassium   


 


Chloride   


 


Carbon Dioxide  37 H  


 


BUN  74 H  


 


Creatinine  1.3 H  


 


Glucose  193 H  


 


POC Glucose   154 H  162 H


 


Calcium   


 


Ionized Calcium   


 


Phosphorus   


 


Magnesium   


 


Total Bilirubin   


 


Direct Bilirubin   


 


AST   


 


ALT   


 


Total Creatine Kinase   


 


CK-MB (CK-2)   


 


Troponin T   


 


NT-Pro-B Natriuret Pep   


 


Total Protein   


 


Albumin   


 


LDL Cholesterol Direct   


 


HDL Cholesterol   


 


TSH   


 


Free T4   


 


Arterial Blood Glucose   














  10/04/21 10/05/21 10/05/21





  21:59 09:05 12:28


 


WBC   


 


Hgb   


 


Hct   


 


MCV   


 


MCH   


 


MCHC   


 


RDW   


 


Plt Count   


 


Seg Neuts % (Manual)   


 


Lymphocytes % (Manual)   


 


Nucleated RBC %   


 


Seg Neutrophils # Man   


 


Lymphocytes # (Manual)   


 


Monocytes # (Manual)   


 


PT   


 


INR   


 


D-Dimer   


 


ABG pH   


 


POC ABG pCO2   


 


POC ABG pO2   


 


ABG pO2   


 


ABG HCO3   


 


ABG Base Excess   


 


ABG Hemoglobin   


 


ABG Oxyhemoglobin   


 


ABG Sodium   


 


ABG Potassium   


 


ABG Chloride   


 


ABG Glucose   


 


Oxyhemoglobin   


 


Carboxyhemoglobin   


 


Sodium   


 


Potassium   


 


Chloride   


 


Carbon Dioxide   


 


BUN   


 


Creatinine   


 


Glucose   


 


POC Glucose  160 H  168 H  174 H


 


Calcium   


 


Ionized Calcium   


 


Phosphorus   


 


Magnesium   


 


Total Bilirubin   


 


Direct Bilirubin   


 


AST   


 


ALT   


 


Total Creatine Kinase   


 


CK-MB (CK-2)   


 


Troponin T   


 


NT-Pro-B Natriuret Pep   


 


Total Protein   


 


Albumin   


 


LDL Cholesterol Direct   


 


HDL Cholesterol   


 


TSH   


 


Free T4   


 


Arterial Blood Glucose   














  10/05/21 10/05/21 10/06/21





  17:27 21:42 08:15


 


WBC   


 


Hgb   


 


Hct   


 


MCV   


 


MCH   


 


MCHC   


 


RDW   


 


Plt Count   


 


Seg Neuts % (Manual)   


 


Lymphocytes % (Manual)   


 


Nucleated RBC %   


 


Seg Neutrophils # Man   


 


Lymphocytes # (Manual)   


 


Monocytes # (Manual)   


 


PT   


 


INR   


 


D-Dimer   


 


ABG pH   


 


POC ABG pCO2   


 


POC ABG pO2   


 


ABG pO2   


 


ABG HCO3   


 


ABG Base Excess   


 


ABG Hemoglobin   


 


ABG Oxyhemoglobin   


 


ABG Sodium   


 


ABG Potassium   


 


ABG Chloride   


 


ABG Glucose   


 


Oxyhemoglobin   


 


Carboxyhemoglobin   


 


Sodium   


 


Potassium   


 


Chloride   


 


Carbon Dioxide   


 


BUN   


 


Creatinine   


 


Glucose   


 


POC Glucose  175 H  158 H  166 H


 


Calcium   


 


Ionized Calcium   


 


Phosphorus   


 


Magnesium   


 


Total Bilirubin   


 


Direct Bilirubin   


 


AST   


 


ALT   


 


Total Creatine Kinase   


 


CK-MB (CK-2)   


 


Troponin T   


 


NT-Pro-B Natriuret Pep   


 


Total Protein   


 


Albumin   


 


LDL Cholesterol Direct   


 


HDL Cholesterol   


 


TSH   


 


Free T4   


 


Arterial Blood Glucose   














  10/06/21 10/06/21 10/06/21





  11:28 17:22 21:14


 


WBC   


 


Hgb   


 


Hct   


 


MCV   


 


MCH   


 


MCHC   


 


RDW   


 


Plt Count   


 


Seg Neuts % (Manual)   


 


Lymphocytes % (Manual)   


 


Nucleated RBC %   


 


Seg Neutrophils # Man   


 


Lymphocytes # (Manual)   


 


Monocytes # (Manual)   


 


PT   


 


INR   


 


D-Dimer   


 


ABG pH   


 


POC ABG pCO2   


 


POC ABG pO2   


 


ABG pO2   


 


ABG HCO3   


 


ABG Base Excess   


 


ABG Hemoglobin   


 


ABG Oxyhemoglobin   


 


ABG Sodium   


 


ABG Potassium   


 


ABG Chloride   


 


ABG Glucose   


 


Oxyhemoglobin   


 


Carboxyhemoglobin   


 


Sodium   


 


Potassium   


 


Chloride   


 


Carbon Dioxide   


 


BUN   


 


Creatinine   


 


Glucose   


 


POC Glucose  198 H  194 H  176 H


 


Calcium   


 


Ionized Calcium   


 


Phosphorus   


 


Magnesium   


 


Total Bilirubin   


 


Direct Bilirubin   


 


AST   


 


ALT   


 


Total Creatine Kinase   


 


CK-MB (CK-2)   


 


Troponin T   


 


NT-Pro-B Natriuret Pep   


 


Total Protein   


 


Albumin   


 


LDL Cholesterol Direct   


 


HDL Cholesterol   


 


TSH   


 


Free T4   


 


Arterial Blood Glucose   














  10/07/21 10/07/21 10/07/21





  00:54 00:54 02:51


 


WBC    11.6 H


 


Hgb    7.9 L


 


Hct    27.2 L


 


MCV    72 L


 


MCH    21 L


 


MCHC    29 L


 


RDW    25.8 H


 


Plt Count    118 L


 


Seg Neuts % (Manual)    91.0 H


 


Lymphocytes % (Manual)    4.0 L


 


Nucleated RBC %   


 


Seg Neutrophils # Man    10.6 H


 


Lymphocytes # (Manual)    0.5 L


 


Monocytes # (Manual)   


 


PT   


 


INR   


 


D-Dimer   


 


ABG pH   


 


POC ABG pCO2   


 


POC ABG pO2   


 


ABG pO2   


 


ABG HCO3   


 


ABG Base Excess   


 


ABG Hemoglobin   


 


ABG Oxyhemoglobin   


 


ABG Sodium   


 


ABG Potassium   


 


ABG Chloride   


 


ABG Glucose   


 


Oxyhemoglobin   


 


Carboxyhemoglobin   


 


Sodium  153 H  


 


Potassium   


 


Chloride   


 


Carbon Dioxide  38 H  


 


BUN  74 H  


 


Creatinine  1.3 H  


 


Glucose  205 H  


 


POC Glucose   


 


Calcium   


 


Ionized Calcium   


 


Phosphorus   


 


Magnesium   


 


Total Bilirubin   1.40 H 


 


Direct Bilirubin   0.8 H 


 


AST   


 


ALT   133 H 


 


Total Creatine Kinase   


 


CK-MB (CK-2)   


 


Troponin T   


 


NT-Pro-B Natriuret Pep   


 


Total Protein   8.4 H 


 


Albumin   3.2 L 


 


LDL Cholesterol Direct   


 


HDL Cholesterol   


 


TSH   


 


Free T4   


 


Arterial Blood Glucose   














  10/07/21 10/07/21 10/08/21





  16:09 21:03 08:03


 


WBC   


 


Hgb   


 


Hct   


 


MCV   


 


MCH   


 


MCHC   


 


RDW   


 


Plt Count   


 


Seg Neuts % (Manual)   


 


Lymphocytes % (Manual)   


 


Nucleated RBC %   


 


Seg Neutrophils # Man   


 


Lymphocytes # (Manual)   


 


Monocytes # (Manual)   


 


PT   


 


INR   


 


D-Dimer   


 


ABG pH   


 


POC ABG pCO2   


 


POC ABG pO2   


 


ABG pO2   


 


ABG HCO3   


 


ABG Base Excess   


 


ABG Hemoglobin   


 


ABG Oxyhemoglobin   


 


ABG Sodium   


 


ABG Potassium   


 


ABG Chloride   


 


ABG Glucose   


 


Oxyhemoglobin   


 


Carboxyhemoglobin   


 


Sodium   


 


Potassium   


 


Chloride   


 


Carbon Dioxide   


 


BUN   


 


Creatinine   


 


Glucose   


 


POC Glucose  186 H  110 H  178 H


 


Calcium   


 


Ionized Calcium   


 


Phosphorus   


 


Magnesium   


 


Total Bilirubin   


 


Direct Bilirubin   


 


AST   


 


ALT   


 


Total Creatine Kinase   


 


CK-MB (CK-2)   


 


Troponin T   


 


NT-Pro-B Natriuret Pep   


 


Total Protein   


 


Albumin   


 


LDL Cholesterol Direct   


 


HDL Cholesterol   


 


TSH   


 


Free T4   


 


Arterial Blood Glucose   














  10/08/21 10/08/21 10/08/21





  11:08 19:32 22:28


 


WBC   


 


Hgb   


 


Hct   


 


MCV   


 


MCH   


 


MCHC   


 


RDW   


 


Plt Count   


 


Seg Neuts % (Manual)   


 


Lymphocytes % (Manual)   


 


Nucleated RBC %   


 


Seg Neutrophils # Man   


 


Lymphocytes # (Manual)   


 


Monocytes # (Manual)   


 


PT   


 


INR   


 


D-Dimer   


 


ABG pH    7.287 L


 


POC ABG pCO2    66.3 H


 


POC ABG pO2    69.5 L


 


ABG pO2   


 


ABG HCO3   


 


ABG Base Excess   


 


ABG Hemoglobin    10.5 L


 


ABG Oxyhemoglobin    89.4 L


 


ABG Sodium    149.9 H


 


ABG Potassium    6.0 H


 


ABG Chloride   


 


ABG Glucose    165 H


 


Oxyhemoglobin   


 


Carboxyhemoglobin   


 


Sodium   


 


Potassium   


 


Chloride   


 


Carbon Dioxide   


 


BUN   


 


Creatinine   


 


Glucose   


 


POC Glucose  189 H  136 H 


 


Calcium   


 


Ionized Calcium   


 


Phosphorus   


 


Magnesium   


 


Total Bilirubin   


 


Direct Bilirubin   


 


AST   


 


ALT   


 


Total Creatine Kinase   


 


CK-MB (CK-2)   


 


Troponin T   


 


NT-Pro-B Natriuret Pep   


 


Total Protein   


 


Albumin   


 


LDL Cholesterol Direct   


 


HDL Cholesterol   


 


TSH   


 


Free T4   


 


Arterial Blood Glucose    165 H














  10/08/21 10/09/21 10/09/21





  23:23 10:35 11:52


 


WBC   27.2 H 


 


Hgb   9.2 L 


 


Hct   


 


MCV   76 L 


 


MCH   21 L 


 


MCHC   28 L 


 


RDW   26.9 H 


 


Plt Count   65 L 


 


Seg Neuts % (Manual)   94.0 H 


 


Lymphocytes % (Manual)   4.0 L 


 


Nucleated RBC %   


 


Seg Neutrophils # Man   25.6 H 


 


Lymphocytes # (Manual)   1.1 L 


 


Monocytes # (Manual)   


 


PT   


 


INR   


 


D-Dimer   


 


ABG pH   


 


POC ABG pCO2   


 


POC ABG pO2   


 


ABG pO2   


 


ABG HCO3   


 


ABG Base Excess   


 


ABG Hemoglobin   


 


ABG Oxyhemoglobin   


 


ABG Sodium   


 


ABG Potassium   


 


ABG Chloride   


 


ABG Glucose   


 


Oxyhemoglobin   


 


Carboxyhemoglobin   


 


Sodium   


 


Potassium   


 


Chloride   


 


Carbon Dioxide   


 


BUN   


 


Creatinine   


 


Glucose   


 


POC Glucose  127 H   207 H


 


Calcium   


 


Ionized Calcium   


 


Phosphorus   


 


Magnesium   


 


Total Bilirubin   


 


Direct Bilirubin   


 


AST   


 


ALT   


 


Total Creatine Kinase   


 


CK-MB (CK-2)   


 


Troponin T   


 


NT-Pro-B Natriuret Pep   


 


Total Protein   


 


Albumin   


 


LDL Cholesterol Direct   


 


HDL Cholesterol   


 


TSH   


 


Free T4   


 


Arterial Blood Glucose   














  10/09/21 10/09/21 10/09/21





  17:14 17:37 19:39


 


WBC   


 


Hgb   


 


Hct   


 


MCV   


 


MCH   


 


MCHC   


 


RDW   


 


Plt Count   


 


Seg Neuts % (Manual)   


 


Lymphocytes % (Manual)   


 


Nucleated RBC %   


 


Seg Neutrophils # Man   


 


Lymphocytes # (Manual)   


 


Monocytes # (Manual)   


 


PT   


 


INR   


 


D-Dimer   


 


ABG pH   


 


POC ABG pCO2   


 


POC ABG pO2   


 


ABG pO2   


 


ABG HCO3   


 


ABG Base Excess   


 


ABG Hemoglobin   


 


ABG Oxyhemoglobin   


 


ABG Sodium   


 


ABG Potassium   


 


ABG Chloride   


 


ABG Glucose   


 


Oxyhemoglobin   


 


Carboxyhemoglobin   


 


Sodium   


 


Potassium  6.8 H* D  


 


Chloride   


 


Carbon Dioxide   


 


BUN  112 H  


 


Creatinine  3.5 H D  


 


Glucose  231 H  


 


POC Glucose   231 H  275 H


 


Calcium  8.3 L  


 


Ionized Calcium   


 


Phosphorus  6.80 H  


 


Magnesium  2.40 H  


 


Total Bilirubin  3.60 H  


 


Direct Bilirubin   


 


AST  8105 H  


 


ALT  3151 H  


 


Total Creatine Kinase  3401 H  


 


CK-MB (CK-2)  5.9 H  


 


Troponin T  0.627 H*  


 


NT-Pro-B Natriuret Pep   


 


Total Protein  8.6 H  


 


Albumin  3.0 L  


 


LDL Cholesterol Direct   


 


HDL Cholesterol   


 


TSH   


 


Free T4   


 


Arterial Blood Glucose   














  10/09/21 10/10/21 10/10/21





  23:52 05:16 11:55


 


WBC   


 


Hgb   


 


Hct   


 


MCV   


 


MCH   


 


MCHC   


 


RDW   


 


Plt Count   


 


Seg Neuts % (Manual)   


 


Lymphocytes % (Manual)   


 


Nucleated RBC %   


 


Seg Neutrophils # Man   


 


Lymphocytes # (Manual)   


 


Monocytes # (Manual)   


 


PT   


 


INR   


 


D-Dimer   


 


ABG pH   


 


POC ABG pCO2   


 


POC ABG pO2   


 


ABG pO2   


 


ABG HCO3   


 


ABG Base Excess   


 


ABG Hemoglobin   


 


ABG Oxyhemoglobin   


 


ABG Sodium   


 


ABG Potassium   


 


ABG Chloride   


 


ABG Glucose   


 


Oxyhemoglobin   


 


Carboxyhemoglobin   


 


Sodium   


 


Potassium    6.5 H*


 


Chloride   


 


Carbon Dioxide   


 


BUN    121 H


 


Creatinine    4.1 H


 


Glucose    280 H


 


POC Glucose   232 H 


 


Calcium    8.0 L


 


Ionized Calcium   


 


Phosphorus   


 


Magnesium   


 


Total Bilirubin    3.90 H


 


Direct Bilirubin   


 


AST    5989 H


 


ALT    3144 H


 


Total Creatine Kinase    5857 H


 


CK-MB (CK-2)    7.8 H


 


Troponin T  0.624 H*   0.668 H*


 


NT-Pro-B Natriuret Pep   


 


Total Protein   


 


Albumin    3.3 L


 


LDL Cholesterol Direct   


 


HDL Cholesterol   


 


TSH   


 


Free T4   


 


Arterial Blood Glucose   














  10/10/21 10/10/21





  12:15 12:15


 


WBC  16.8 H 


 


Hgb  8.7 L 


 


Hct  


 


MCV  78 L 


 


MCH  21 L 


 


MCHC  27 L 


 


RDW  27.2 H 


 


Plt Count  62 L 


 


Seg Neuts % (Manual)  94.0 H 


 


Lymphocytes % (Manual)  6.0 L 


 


Nucleated RBC %  


 


Seg Neutrophils # Man  15.8 H 


 


Lymphocytes # (Manual)  1.0 L 


 


Monocytes # (Manual)  


 


PT  


 


INR  


 


D-Dimer  


 


ABG pH  


 


POC ABG pCO2  


 


POC ABG pO2  


 


ABG pO2  


 


ABG HCO3  


 


ABG Base Excess  


 


ABG Hemoglobin  


 


ABG Oxyhemoglobin  


 


ABG Sodium  


 


ABG Potassium  


 


ABG Chloride  


 


ABG Glucose  


 


Oxyhemoglobin  


 


Carboxyhemoglobin  


 


Sodium  


 


Potassium  


 


Chloride  


 


Carbon Dioxide  


 


BUN  


 


Creatinine  


 


Glucose  


 


POC Glucose   265 H


 


Calcium  


 


Ionized Calcium  


 


Phosphorus  


 


Magnesium  


 


Total Bilirubin  


 


Direct Bilirubin  


 


AST  


 


ALT  


 


Total Creatine Kinase  


 


CK-MB (CK-2)  


 


Troponin T  


 


NT-Pro-B Natriuret Pep  


 


Total Protein  


 


Albumin  


 


LDL Cholesterol Direct  


 


HDL Cholesterol  


 


TSH  


 


Free T4  


 


Arterial Blood Glucose  











Allied health notes reviewed: nursing

## 2021-10-11 NOTE — ELECTROCARDIOGRAPH REPORT
[facility]

                                       

Test Date:    2021-10-09               Test Time:    18:57:26

Pat Name:     PIPE AHMADI             Department:   

Patient ID:   [id number]          Room:         A259 1

Gender:       F                        Technician:   YARON

:          1990               Requested By: AMY KOCHERLA

Order Number: [id number]              Reading MD:   Reagan Torres

                                 Measurements

Intervals                              Axis          

Rate:         92                       P:            78

OH:           120                      QRS:          100

QRSD:         87                       T:            -63

QT:           342                                    

QTc:          422                                    

                           Interpretive Statements

Sinus rhythm

non specific st-t

Compared to ECG 10/03/2021 07:59:11

Electronically Signed On 10- 11:02:56 EDT by Reagan Torres

## 2021-10-12 NOTE — DEATH SUMMARY
Death Summary





- Providers


Date of service: 10/10/21


Consults: 


                                        





21


Consult to Cardiac Rehabilitation [CONS] Routine 


   Reason For Exam: Phase I





21 03:00


Consult to Cardiology [CONS] Routine 


   Consulting Provider: MONICA SALAZAR


   Reason For Exam: Non-ST elevation MI





21 13:32


Occupational Therapy Evaluate and Treat [CONS] Urgent 


   Comment: 


   Reason For Exam: OT to eval and treat


Physical Therapy Evaluation and Treat [CONS] Urgent 


   Comment: 


   Reason For Exam: PT to eval and treat





21 08:53


Consult to Physician [CONS] Routine 


   Comment: 


   Consulting Provider: LETITIA VARELA


   Physician Instructions: 


   Reason For Exam: WOODROW WOODROW, hyponatremia, hyperkalemia





21 15:26


Consult to Physician [CONS] Routine 


   Comment: 


   Consulting Provider: LU DELA CRUZ


   Physician Instructions: 


   Reason For Exam: HYPOXIC RESPIRATORY FAILURE





21 07:51


Consult to Physician [CONS] Routine 


   Comment: called office/ elliot


   Consulting Provider: WILFRIDO EDWARDS


   Physician Instructions: 


   Reason For Exam: transaminitis





21 11:23


Consult to Physician [CONS] Routine 


   Comment: called office/ elliot


   Consulting Provider: GERHARD AKBAR


   Physician Instructions: 


   Reason For Exam: abdominal pain with ulcer underneth panus





21 11:37


Consult to Wound/ET Nurse [CONS] Routine 


   Reason For Exam: wound eval





10/02/21 15:00


Midline [Consult to PICC Line RN] [CONS] Urgent 


   Reason For Exam: need IV access; difficult peripheral stick; thanks


   Type Line:: Midline





10/04/21 12:34


Consult to Physician [CONS] Routine 


   Comment: 


   Consulting Provider: NEHA GRANGER


   Physician Instructions: 


   Reason For Exam: visual hallucination





10/09/21 13:42


PICC Line Insertion [Consult to PICC Line RN] [CONS] Stat 


   Reason For Exam: central line access for pressors.


   Type Line:: PICC











Attending: 


AMY J KOCHERLA








- Death summary


Date of admission: 


21 03:00





Date of Death: 10/10/21 (18: 23)


Reason for admission: Acute hypoxic respiratory failure/worsening edema/acute 

kidney injury


Significant findings: 


31 years old female patient with significant past medical history of 

hypertension bronchial asthma, COPD, morbid obesity with BMI of 104.4, 

obstructive sleep apnea, obesity hypoventilation was admitted through emergency 

room with worsening shortness of breath, worsening edema, generalized weakness 

for few days duration.  Initial work-up in the emergency room was consistent 

with acute hypoxic respiratory failure requiring supplemental oxygen, acute 

kidney injury due to vasomotor nephropathy, worsening edema as well as non-ST 

elevation MI with chest pain and shortness of breath as well as elevated 

troponins.


Patient was admitted to the hospital evaluated by,Pulmonary, patient was on 

BiPAP most of the time.


Patient was evaluated by cardiologist for non-ST elevation MI, medications 

optimized.


Nephrology evaluated, patient had worsening renal function with hyperkalemia 

medications optimized symptomatically managed, patient's renal function 

continued to worsen nephrology was considering hemodialysis if no improvement.


Patient had severe obstructive sleep apnea and severe obesity hypoventilation 

syndrome due to her morbid obesity with BMI of 104.4


Patient was critically ill, with acute transaminitis, hypo thyroidism, multiple 

electrolyte imbalances, chronic anemia, patient continued to deteriorate


And on 10/10/2021, in the evening patient had cardiac arrest, [facility] was 

called, patient was intubated, received CPR per ACLS protocol, could not be 

revived, patient was pronounced  by the covering hospitalist Dr. Choudhary.  

Time of death 18:23 on 10/10/2021.  


Please refer to the code sheet for all the details.


Patient's family was informed by covering hospitalist Dr. Choudhary.





Time of death: 10/10/2021 at 18:23


 





Discharge diagnosis:


--Acute hypoxic respiratory failure


Current Visit: Yes   Status: Acute





--Cardiopulmonary arrest:


Current Visit: Yes   Status: Acute





--Acute on chronic obesity hypoventilation syndrome


Current Visit: Yes   Status: Acute





--History of obstructive sleep apnea


Current Visit: Yes   Status: Chronic





--Morbid obesity .4


Current Visit: Yes   Status: Chronic





--Non-ST elevation MI;


Current Visit: Yes   Status: Acute


Cardiology consulted, evaluated





--Severe hyperkalemia; 


Current Visit: Yes   Status: Acute





--Acute metabolic encephalopathy;


Current Visit: Yes   Status: Acute





--Hypotension; shock


Current Visit: Yes   Status: Acute





-Acute on chronic hypoxic respiratory failure; requiring BiPAP


Current Visit: Yes   Status: Acute


On BiPAP





--Acute exacerbation COPD (chronic obstructive pulmonary disease)


Current Visit: Yes   Status: Acute   


Oxygen titrate O2 sats to more than 90%, patient is requiring BiPAP most of the 

times


Patient was intubated post cardiac arrest





--hypothyroidism /new onset


Current Visit: Yes   Status: Acute  


Continue Synthroid, closely monitor





-- Hypoglycemia/present on admission


Current Visit: Yes   Status: Acute   


Now resolved closely monitor blood sugars





-- Lymphedema of the abdominal wall


Due to morbid obesity, supportive care fluid restriction


Low-sodium diet





--Anemia of chronic disease; 


Closely monitor H&H, transfuse as needed





--Acute transaminitis acute liver failure


Acute transaminitis; LFTs trending down





Patient .





Procedures/treatments rendered: 


Intubation and mechanical ventilation


CPR per ACLS protocol





Pertinent studies: 


Multiple chest x-rays


Echocardiogram


Pain ultrasound


Abdominal ultrasound


Lower extremity venous Doppler





Disposition: 


Patient 








- Final diagnosis


(1) Acute and chronic respiratory failure with hypoxia


Note: 


Final diagnosis:














(2) Obesity hypoventilation syndrome


Note: 


Final diagnosis:














(3) Non-ST elevation (NSTEMI) myocardial infarction


Note: 


Final diagnosis:














(4) WOODROW (acute kidney injury)


Note: 


Final diagnosis:














(5) Hyperkalemia


Note: 


Final diagnosis:














(6) Acute liver failure


Note: 


Final diagnosis:














(7) Acute on chronic diastolic congestive heart failure due to valvular disease


Note: 


Final diagnosis:














(8) Acute on chronic diastolic congestive heart failure


Note: 


Final diagnosis:














(9) Morbid obesity with BMI of 70 and over, adult


Note: 


Final diagnosis:

## 2023-10-02 NOTE — GASTROENTEROLOGY CONSULTATION
History of Present Illness





- Reason for Consult


Consult date: 09/28/21


abnormal liver enzymes


Requesting physician: MARIYA RENEE





- History of Present Illness





The patient is a 30 yo female with extensive medical history including cad, trista,

morbid obesity, and copd presenting with sob/respiratory failure and also found 

to have nstemi and chf exacerbation.  pt is on bipap in the ICU.  she does not 

provide history at the time of exam.  GI consulted for abnormal liver enzymes 

(primarily hepatocellular pattern) which is new compared to previous levels 

earlier in the year.  





Past History


Past Medical History: COPD, hypertension, other (Asthma, morbid obesity, sleep 

apnea)


Social history: no significant social history


Family history: CAD, diabetes





Medications and Allergies


                                    Allergies











Allergy/AdvReac Type Severity Reaction Status Date / Time


 


No Known Allergies Allergy   Unverified 07/13/17 11:17











                                Home Medications











 Medication  Instructions  Recorded  Confirmed  Last Taken  Type


 


Acetaminophen [Acetaminophen TAB] 325 mg PO Q4H PRN #30 tablet 12/14/17 01/22/21

Unknown Rx


 


ALBUTEROL NEB's [Proventil 0.083% 2.5 mg IH Q3HRT PRN #30 day 01/24/21  Unknown 

Rx





NEBS]     


 


Albuterol Mdi (or & Nicu Only) 2 puff IH QID PRN #1 inhalation 01/24/21  Unknown

Rx





[ProAir HFA Inhaler]     


 


Azithromycin [Zithromax Z-JAVIER] 0 mg PO DAILY #1 tab 01/24/21  Unknown Rx


 


Benzonatate [Tessalon Perles] 100 mg PO Q8HR #15 capsule 01/24/21  Unknown Rx


 


Famotidine [Pepcid] 20 mg PO BID #14 tablet 01/24/21  Unknown Rx


 


Ipratropium/Albuterol Sulfate 1 ampul IH TIDRT #30 day 01/24/21  Unknown Rx





[DUONEB *Not for PRN Use*]     


 


hydroCHLOROthiazide [HCTZ] 25 mg PO QDAY  tablet 01/24/21  Unknown Rx


 


hydroCHLOROthiazide [HCTZ] 25 mg PO QDAY #30 tablet 01/24/21  Unknown Rx


 


methylPREDNISolone [Medrol 4MG 4 mg PO DAILY #1 tab.ds.pk 01/24/21  Unknown Rx





DOSEPAK (21 tabs)]     











Active Meds: 


Active Medications





Acetaminophen (Acetaminophen 325 Mg Tab)  650 mg PO Q4H PRN


   PRN Reason: Pain MILD(1-3)/Fever >100.5/HA


Albuterol (Albuterol 2.5 Mg/3 Ml Nebu)  2.5 mg IH Q4HRT PRN


   PRN Reason: Shortness Of Breath


Albuterol/Ipratropium (Ipratropium/Albuterol Sulfate 3 Ml Ampul.Neb)  1 ampul IH

TIDRT [facility]


   Last Admin: 09/27/21 21:56 Dose:  Not Given


   Documented by: 


Aspirin (Aspirin Ec 325 Mg Tab)  325 mg PO QDAY [facility]


   Last Admin: 09/28/21 11:31 Dose:  325 mg


   Documented by: 


Atorvastatin Calcium (Atorvastatin 40 Mg Tab)  40 mg PO QHS [facility]


   Last Admin: 09/27/21 23:25 Dose:  Not Given


   Documented by: 


Benzonatate (Benzonatate 100 Mg Cap)  100 mg PO Q8HR [facility]


   Last Admin: 09/28/21 14:32 Dose:  100 mg


   Documented by: 


Famotidine (Famotidine 10 Mg Tab)  10 mg PO BID [facility]


   Last Admin: 09/28/21 11:31 Dose:  10 mg


   Documented by: 


Furosemide (Furosemide 40 Mg Tab)  40 mg PO 0600,1800 [facility]


   Last Admin: 09/28/21 05:46 Dose:  Not Given


   Documented by: 


Guaifenesin (Guaifenesin 100 Mg/5 Ml Oral Liqd)  200 mg PO Q4H PRN


   PRN Reason: Cough


Heparin Sodium (Porcine) (Heparin 5,000 Unit/1 Ml Vial)  5,000 unit SUB-Q Q8HR 

[facility]


   Last Admin: 09/28/21 14:32 Dose:  5,000 unit


   Documented by: 


Hydromorphone HCl (Hydromorphone 1 Mg/1 Ml Inj)  0.5 mg IV Q3H PRN


   PRN Reason: Pain , Severe (7-10)


   Last Admin: 09/28/21 04:16 Dose:  0.5 mg


   Documented by: 


Methylprednisolone (Methylprednisolone 4 Mg Tab)  4 mg PO DAILY [facility]


   Last Admin: 09/28/21 11:56 Dose:  4 mg


   Documented by: 


Nitroglycerin (Nitroglycerin 0.4 Mg Tab Subl)  0.4 mg SL Q5M PRN


   PRN Reason: Chest Pain


Nystatin (Nystatin Powder 15 Gm)  1 applic TP BID [facility]


   Last Admin: 09/28/21 16:47 Dose:  1 applic


   Documented by: 


Ondansetron HCl (Ondansetron 4 Mg/2 Ml Inj)  4 mg IV Q8H PRN


   PRN Reason: Nausea And Vomiting


Oxycodone/Acetaminophen (Oxycodone /Acetaminophen 5-325mg Tab)  1 tab PO Q6H PRN


   PRN Reason: Pain, Moderate (4-6)


   Last Admin: 09/27/21 01:52 Dose:  1 tab


   Documented by: 


Sodium Chloride (Sodium Chloride 0.9% 10 Ml Flush Syringe)  10 ml IV BID [facility]


   Last Admin: 09/28/21 11:31 Dose:  10 ml


   Documented by: 


Sodium Chloride (Sodium Chloride 0.9% 10 Ml Flush Syringe)  10 ml IV PRN PRN


   PRN Reason: LINE FLUSH


Tramadol HCl (Tramadol 50 Mg Tab)  50 mg PO Q6H PRN


   PRN Reason: Pain, Moderate (4-6)





reviewed/updated patient's home and current medications





Review of Systems





- Review of Systems


ROS unobtainable: due to mental status





Exam





- Constitutional


Vital Signs: 


                                        











Temp Pulse Resp BP Pulse Ox


 


 98 F   97 H  16   120/61   100 


 


 09/28/21 16:00  09/28/21 12:00  09/28/21 12:00  09/27/21 21:35  09/28/21 12:00











General appearance: obese





- Respiratory


Respiratory effort: labored


Respiratory: bilateral: diminished





- Cardiovascular


Rhythm: regular


Heart Sounds: Present: S1 & S2





- Gastrointestinal


General gastrointestinal: Present: other (obese, nt)





- Labs


CBC & Chem 7: 


                                 09/28/21 04:27





                                 09/28/21 04:27


Lab Results: 


                         Laboratory Results - last 24 hr











  09/24/21 09/27/21 09/27/21





  10:04 18:40 23:55


 


WBC   


 


RBC   


 


Hgb   


 


Hct   


 


MCV   


 


MCH   


 


MCHC   


 


RDW   


 


Plt Count   


 


ABG pH   7.237 L 


 


ABG pCO2   86.9 


 


ABG pO2   81.8 


 


ABG HCO3   36.2 H 


 


ABG O2 Saturation   95.9 


 


ABG O2 Content   10.4 


 


ABG Base Excess   7.4 H 


 


ABG Hemoglobin   7.8 L 


 


ABG Carboxyhemoglobin   1.9 


 


ABG Methemoglobin   0.4 


 


Oxyhemoglobin   93.7 L 


 


FiO2   30 


 


Sodium   


 


Potassium   


 


Chloride   


 


Carbon Dioxide   


 


Anion Gap   


 


BUN   


 


Creatinine   


 


Estimated GFR   


 


BUN/Creatinine Ratio   


 


Glucose   


 


POC Glucose    135 H


 


Calcium   


 


Total Bilirubin   


 


AST   


 


ALT   


 


Alkaline Phosphatase   


 


Total Protein   


 


Albumin   


 


Albumin/Globulin Ratio   


 


Total Cortisol  41.3  


 


Coronavirus (PCR)   














  09/28/21 09/28/21 09/28/21





  04:27 04:27 11:28


 


WBC  10.8  


 


RBC  4.03  


 


Hgb  8.5 L  


 


Hct  29.1 L  


 


MCV  72 L  


 


MCH  21 L  


 


MCHC  29 L  


 


RDW  24.9 H  


 


Plt Count  294  


 


ABG pH   


 


ABG pCO2   


 


ABG pO2   


 


ABG HCO3   


 


ABG O2 Saturation   


 


ABG O2 Content   


 


ABG Base Excess   


 


ABG Hemoglobin   


 


ABG Carboxyhemoglobin   


 


ABG Methemoglobin   


 


Oxyhemoglobin   


 


FiO2   


 


Sodium   135 L 


 


Potassium   4.4 


 


Chloride   93.5 L 


 


Carbon Dioxide   33 H 


 


Anion Gap   13 


 


BUN   68 H 


 


Creatinine   1.9 H 


 


Estimated GFR   37 


 


BUN/Creatinine Ratio   36 


 


Glucose   143 H 


 


POC Glucose    105


 


Calcium   8.1 L 


 


Total Bilirubin   1.50 H 


 


AST   494 H 


 


ALT   835 H 


 


Alkaline Phosphatase   95 


 


Total Protein   9.5 H 


 


Albumin   3.1 L 


 


Albumin/Globulin Ratio   0.5 


 


Total Cortisol   


 


Coronavirus (PCR)   














  09/28/21





  Unknown


 


WBC 


 


RBC 


 


Hgb 


 


Hct 


 


MCV 


 


MCH 


 


MCHC 


 


RDW 


 


Plt Count 


 


ABG pH 


 


ABG pCO2 


 


ABG pO2 


 


ABG HCO3 


 


ABG O2 Saturation 


 


ABG O2 Content 


 


ABG Base Excess 


 


ABG Hemoglobin 


 


ABG Carboxyhemoglobin 


 


ABG Methemoglobin 


 


Oxyhemoglobin 


 


FiO2 


 


Sodium 


 


Potassium 


 


Chloride 


 


Carbon Dioxide 


 


Anion Gap 


 


BUN 


 


Creatinine 


 


Estimated GFR 


 


BUN/Creatinine Ratio 


 


Glucose 


 


POC Glucose 


 


Calcium 


 


Total Bilirubin 


 


AST 


 


ALT 


 


Alkaline Phosphatase 


 


Total Protein 


 


Albumin 


 


Albumin/Globulin Ratio 


 


Total Cortisol 


 


Coronavirus (PCR)  Negative














Assessment and Plan





1. Abnormal liver enzymes in hepatocellular pattern.  broad ddx and likely 

multifactorial causes including NAFLD, congestive hepatopathy, possibly DILI.  

will check viral hep serologies.  US with limited views, possible coarsening of 

the liver.  cont to trend labs
Heterosexual